# Patient Record
Sex: MALE | Employment: OTHER | URBAN - METROPOLITAN AREA
[De-identification: names, ages, dates, MRNs, and addresses within clinical notes are randomized per-mention and may not be internally consistent; named-entity substitution may affect disease eponyms.]

---

## 2017-08-02 ENCOUNTER — TRANSFERRED RECORDS (OUTPATIENT)
Dept: HEALTH INFORMATION MANAGEMENT | Facility: CLINIC | Age: 77
End: 2017-08-02

## 2018-12-13 ENCOUNTER — TRANSFERRED RECORDS (OUTPATIENT)
Dept: HEALTH INFORMATION MANAGEMENT | Facility: CLINIC | Age: 78
End: 2018-12-13

## 2018-12-13 ENCOUNTER — MEDICAL CORRESPONDENCE (OUTPATIENT)
Dept: HEALTH INFORMATION MANAGEMENT | Facility: CLINIC | Age: 78
End: 2018-12-13

## 2018-12-21 ENCOUNTER — ANCILLARY PROCEDURE (OUTPATIENT)
Dept: PET IMAGING | Facility: CLINIC | Age: 78
End: 2018-12-21
Attending: UROLOGY
Payer: COMMERCIAL

## 2018-12-21 DIAGNOSIS — C61 MALIGNANT NEOPLASM OF PROSTATE (H): ICD-10-CM

## 2022-06-28 ENCOUNTER — APPOINTMENT (OUTPATIENT)
Dept: CT IMAGING | Facility: CLINIC | Age: 82
DRG: 884 | End: 2022-06-28
Attending: EMERGENCY MEDICINE
Payer: MEDICARE

## 2022-06-28 ENCOUNTER — HOSPITAL ENCOUNTER (INPATIENT)
Facility: CLINIC | Age: 82
LOS: 35 days | Discharge: HOME HEALTH | DRG: 884 | End: 2022-08-02
Attending: EMERGENCY MEDICINE | Admitting: INTERNAL MEDICINE
Payer: MEDICARE

## 2022-06-28 DIAGNOSIS — Z20.822 COVID-19 RULED OUT BY LABORATORY TESTING: ICD-10-CM

## 2022-06-28 DIAGNOSIS — G89.29 OTHER CHRONIC PAIN: Primary | ICD-10-CM

## 2022-06-28 DIAGNOSIS — F29 PSYCHOSIS, UNSPECIFIED PSYCHOSIS TYPE (H): ICD-10-CM

## 2022-06-28 DIAGNOSIS — F03.91 DEMENTIA WITH BEHAVIORAL DISTURBANCE, UNSPECIFIED DEMENTIA TYPE: ICD-10-CM

## 2022-06-28 DIAGNOSIS — I50.22 CHRONIC SYSTOLIC CONGESTIVE HEART FAILURE (H): ICD-10-CM

## 2022-06-28 LAB
ALBUMIN SERPL BCG-MCNC: 3.9 G/DL (ref 3.5–5.2)
ALBUMIN UR-MCNC: NEGATIVE MG/DL
ALP SERPL-CCNC: 113 U/L (ref 40–129)
ALT SERPL W P-5'-P-CCNC: 14 U/L (ref 10–50)
ANION GAP SERPL CALCULATED.3IONS-SCNC: 11 MMOL/L (ref 7–15)
APPEARANCE UR: ABNORMAL
AST SERPL W P-5'-P-CCNC: 29 U/L (ref 10–50)
BASOPHILS # BLD AUTO: 0 10E3/UL (ref 0–0.2)
BASOPHILS NFR BLD AUTO: 1 %
BILIRUB SERPL-MCNC: 0.6 MG/DL
BILIRUB UR QL STRIP: NEGATIVE
BUN SERPL-MCNC: 32.1 MG/DL (ref 8–23)
CALCIUM SERPL-MCNC: 9.9 MG/DL (ref 8.8–10.2)
CHLORIDE SERPL-SCNC: 98 MMOL/L (ref 98–107)
COLOR UR AUTO: ABNORMAL
CREAT SERPL-MCNC: 1.08 MG/DL (ref 0.67–1.17)
DEPRECATED HCO3 PLAS-SCNC: 28 MMOL/L (ref 22–29)
EOSINOPHIL # BLD AUTO: 0.1 10E3/UL (ref 0–0.7)
EOSINOPHIL NFR BLD AUTO: 2 %
ERYTHROCYTE [DISTWIDTH] IN BLOOD BY AUTOMATED COUNT: 15.7 % (ref 10–15)
GFR SERPL CREATININE-BSD FRML MDRD: 69 ML/MIN/1.73M2
GLUCOSE SERPL-MCNC: 149 MG/DL (ref 70–99)
GLUCOSE UR STRIP-MCNC: NEGATIVE MG/DL
HCT VFR BLD AUTO: 42 % (ref 40–53)
HGB BLD-MCNC: 13.1 G/DL (ref 13.3–17.7)
HGB UR QL STRIP: ABNORMAL
HYALINE CASTS: 1 /LPF
IMM GRANULOCYTES # BLD: 0 10E3/UL
IMM GRANULOCYTES NFR BLD: 0 %
KETONES UR STRIP-MCNC: NEGATIVE MG/DL
LEUKOCYTE ESTERASE UR QL STRIP: ABNORMAL
LYMPHOCYTES # BLD AUTO: 0.8 10E3/UL (ref 0.8–5.3)
LYMPHOCYTES NFR BLD AUTO: 20 %
MCH RBC QN AUTO: 28.4 PG (ref 26.5–33)
MCHC RBC AUTO-ENTMCNC: 31.2 G/DL (ref 31.5–36.5)
MCV RBC AUTO: 91 FL (ref 78–100)
MONOCYTES # BLD AUTO: 0.5 10E3/UL (ref 0–1.3)
MONOCYTES NFR BLD AUTO: 12 %
MUCOUS THREADS #/AREA URNS LPF: PRESENT /LPF
NEUTROPHILS # BLD AUTO: 2.5 10E3/UL (ref 1.6–8.3)
NEUTROPHILS NFR BLD AUTO: 65 %
NITRATE UR QL: POSITIVE
NRBC # BLD AUTO: 0 10E3/UL
NRBC BLD AUTO-RTO: 0 /100
PH UR STRIP: 5.5 [PH] (ref 5–7)
PLATELET # BLD AUTO: 187 10E3/UL (ref 150–450)
POTASSIUM SERPL-SCNC: 3.8 MMOL/L (ref 3.4–5.3)
PROT SERPL-MCNC: 7 G/DL (ref 6.4–8.3)
RADIOLOGIST FLAGS: ABNORMAL
RBC # BLD AUTO: 4.61 10E6/UL (ref 4.4–5.9)
RBC URINE: 1 /HPF
SARS-COV-2 RNA RESP QL NAA+PROBE: NEGATIVE
SODIUM SERPL-SCNC: 137 MMOL/L (ref 136–145)
SP GR UR STRIP: 1.01 (ref 1–1.03)
UROBILINOGEN UR STRIP-MCNC: NORMAL MG/DL
WBC # BLD AUTO: 3.8 10E3/UL (ref 4–11)
WBC URINE: 12 /HPF

## 2022-06-28 PROCEDURE — 120N000002 HC R&B MED SURG/OB UMMC

## 2022-06-28 PROCEDURE — 87086 URINE CULTURE/COLONY COUNT: CPT | Performed by: EMERGENCY MEDICINE

## 2022-06-28 PROCEDURE — U0003 INFECTIOUS AGENT DETECTION BY NUCLEIC ACID (DNA OR RNA); SEVERE ACUTE RESPIRATORY SYNDROME CORONAVIRUS 2 (SARS-COV-2) (CORONAVIRUS DISEASE [COVID-19]), AMPLIFIED PROBE TECHNIQUE, MAKING USE OF HIGH THROUGHPUT TECHNOLOGIES AS DESCRIBED BY CMS-2020-01-R: HCPCS | Performed by: EMERGENCY MEDICINE

## 2022-06-28 PROCEDURE — 258N000003 HC RX IP 258 OP 636: Performed by: EMERGENCY MEDICINE

## 2022-06-28 PROCEDURE — 70450 CT HEAD/BRAIN W/O DYE: CPT | Mod: 26 | Performed by: RADIOLOGY

## 2022-06-28 PROCEDURE — 81001 URINALYSIS AUTO W/SCOPE: CPT | Performed by: EMERGENCY MEDICINE

## 2022-06-28 PROCEDURE — C9803 HOPD COVID-19 SPEC COLLECT: HCPCS | Performed by: EMERGENCY MEDICINE

## 2022-06-28 PROCEDURE — 99285 EMERGENCY DEPT VISIT HI MDM: CPT | Mod: 25 | Performed by: EMERGENCY MEDICINE

## 2022-06-28 PROCEDURE — 99285 EMERGENCY DEPT VISIT HI MDM: CPT | Performed by: EMERGENCY MEDICINE

## 2022-06-28 PROCEDURE — 80053 COMPREHEN METABOLIC PANEL: CPT | Performed by: EMERGENCY MEDICINE

## 2022-06-28 PROCEDURE — G1010 CDSM STANSON: HCPCS

## 2022-06-28 PROCEDURE — G1010 CDSM STANSON: HCPCS | Mod: GC | Performed by: RADIOLOGY

## 2022-06-28 PROCEDURE — 96365 THER/PROPH/DIAG IV INF INIT: CPT | Performed by: EMERGENCY MEDICINE

## 2022-06-28 PROCEDURE — 36415 COLL VENOUS BLD VENIPUNCTURE: CPT | Performed by: EMERGENCY MEDICINE

## 2022-06-28 PROCEDURE — 85025 COMPLETE CBC W/AUTO DIFF WBC: CPT | Performed by: EMERGENCY MEDICINE

## 2022-06-28 PROCEDURE — 96361 HYDRATE IV INFUSION ADD-ON: CPT | Performed by: EMERGENCY MEDICINE

## 2022-06-28 RX ORDER — QUETIAPINE FUMARATE 25 MG/1
25 TABLET, FILM COATED ORAL 2 TIMES DAILY
Status: DISCONTINUED | OUTPATIENT
Start: 2022-06-28 | End: 2022-06-28 | Stop reason: CLARIF

## 2022-06-28 RX ORDER — SODIUM CHLORIDE 9 MG/ML
INJECTION, SOLUTION INTRAVENOUS CONTINUOUS
Status: DISCONTINUED | OUTPATIENT
Start: 2022-06-28 | End: 2022-06-29

## 2022-06-28 RX ADMIN — SODIUM CHLORIDE: 9 INJECTION, SOLUTION INTRAVENOUS at 18:50

## 2022-06-28 ASSESSMENT — ENCOUNTER SYMPTOMS
HALLUCINATIONS: 1
SHORTNESS OF BREATH: 0
CONFUSION: 1
FEVER: 0
ABDOMINAL PAIN: 0

## 2022-06-28 ASSESSMENT — ACTIVITIES OF DAILY LIVING (ADL)
ADLS_ACUITY_SCORE: 35
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
## 2022-06-28 NOTE — ED PROVIDER NOTES
"    [place] EMERGENCY DEPARTMENT ([facility])  6/28/22  History     Chief Complaint   Patient presents with     Mental Health Problem     The history is provided by the patient and medical records.     William Almazan is a 81 year old male with a past medical history significant for paroxysmal atrial fibrillation (anticoagulated on Eliquis), CHF, mixed hyperlipidemia, essential hypertension, gout, type 2 diabetes mellitus, and dementia who presents to the Emergency Department due to auditory hallucinations.  Patient reports he has been hearing voices ever since he had asked his son unexpectedly on 11/2020.  He states that this loss was very difficult for him to process and feels he is still dealing with the emotional aspects of this traumatic event.  Patient's wife reports the voices have become more commanding over the past week.  The wife reports that within the last week the patient was found on the floor one morning because the voices \"told the  to jump to the floor\".  He has been has also reportedly cut an appliance cable in the kitchen due to this commanding hallucinations.  The following day, the patient attempted to get into the car and drive off, however, the  has not been driving for the last 11-12 months.  The wife also reported the voices have told the patient to harm himself.    Past Medical History  No past medical history on file.  No past surgical history on file.  No current outpatient medications on file.    No Known Allergies  Family History  No family history on file.  Social History       Past medical history, past surgical history, medications, allergies, family history, and social history were reviewed with the patient. No additional pertinent items.     I have reviewed the Medications, Allergies, Past Medical and Surgical History, and Social History in the Epic system.    Review of Systems   Constitutional: Negative for fever.   Respiratory: Negative for shortness of " "breath.    Cardiovascular: Negative for chest pain.   Gastrointestinal: Negative for abdominal pain.   Psychiatric/Behavioral: Positive for behavioral problems, confusion and hallucinations (Auditory). Negative for suicidal ideas.   All other systems reviewed and are negative.    Physical Exam   BP: (!) 124/97  Pulse: 69  Temp: 97.9  F (36.6  C)  Resp: 12  Height: 175.3 cm (5' 9\")  Weight: 78.2 kg (172 lb 8 oz)  SpO2: 99 %      Physical Exam  Vitals and nursing note reviewed.   Constitutional:       General: He is not in acute distress.  HENT:      Head: Atraumatic.      Mouth/Throat:      Mouth: Mucous membranes are moist.   Eyes:      Extraocular Movements: Extraocular movements intact.      Pupils: Pupils are equal, round, and reactive to light.   Cardiovascular:      Rate and Rhythm: Normal rate and regular rhythm.   Pulmonary:      Effort: Pulmonary effort is normal.      Breath sounds: Normal breath sounds.   Neurological:      Mental Status: He is alert.   Psychiatric:         Attention and Perception: Attention normal.         Mood and Affect: Mood normal.         Speech: Speech normal.         Behavior: Behavior normal.         Thought Content: Thought content is delusional.         Cognition and Memory: Cognition is impaired. Memory is impaired. He exhibits impaired remote memory.         Judgment: Judgment is impulsive and inappropriate.         ED Course     At 2:58 PM the patient was seen and examined by Chele Thompson MD in Room ED11.     Procedures          Medications   cefTRIAXone (ROCEPHIN) 1 g vial to attach to  mL bag for ADULTS or NS 50 mL bag for PEDS (1 g Intravenous New Bag 7/2/22 0950)   allopurinol (ZYLOPRIM) tablet 300 mg (300 mg Oral Given 7/2/22 0950)   apixaban ANTICOAGULANT (ELIQUIS) tablet 5 mg (5 mg Oral Given 7/2/22 2036)   carvedilol (COREG) tablet 25 mg (25 mg Oral Given 7/2/22 0951)   Vitamin D3 (CHOLECALCIFEROL) tablet 25 mcg (25 mcg Oral Given 7/2/22 0949) "   loratadine (CLARITIN) tablet 10 mg (10 mg Oral Given 7/2/22 0950)   spironolactone (ALDACTONE) tablet 25 mg ( Oral Automatically Held 7/5/22 0800)   lidocaine 1 % 0.1-1 mL (has no administration in time range)   lidocaine (LMX4) cream (has no administration in time range)   sodium chloride (PF) 0.9% PF flush 3 mL (3 mLs Intracatheter Not Given 7/2/22 2217)   sodium chloride (PF) 0.9% PF flush 3 mL (has no administration in time range)   melatonin tablet 1 mg (1 mg Oral Given 7/2/22 0154)   risperiDONE (risperDAL) tablet 0.25 mg (0.25 mg Oral Given 7/2/22 0950)   risperiDONE (risperDAL) tablet 0.5 mg (0 mg Oral Hold 7/2/22 2113)   furosemide (LASIX) tablet 40 mg ( Oral Automatically Held 7/5/22 2000)   gabapentin (NEURONTIN) capsule 100 mg (100 mg Oral Given 7/2/22 2036)   acetaminophen (TYLENOL) tablet 1,000 mg (has no administration in time range)        Assessments & Plan (with Medical Decision Making)   81 year old gentleman brought in by his wife and daughter for evaluation. He has a history of dementia that was diagnosed about 6 months ago.  Within the last couple weeks he has begun to have auditory hallucinations which he is acting on.  Some of these have been dangerous.  They do not feel comfortable taking him home.  They called the facilities that I am aware of that have geriatric psych units that take dementia patients and there was no bed available.  They expressed they do not feel comfortable taking him home because of the danger and they cannot watch him 24/7.  After discussion with observation and the  I have no other option but to bring him in for psych consult and perhaps medication will help suppress this to the point where he can either go home or will be able to be transferred to a higher level of care.  In the meantime we will check routine labs and a urinalysis.    This part of the medical record was transcribed by Brianne Centeno Medical Scribe, from a dictation done by Chele  Valentín Thompson MD.     I have reviewed the nursing notes.    I have reviewed the findings, diagnosis, plan and need for follow up with the patient.    Current Discharge Medication List          Final diagnoses:   Dementia with behavioral disturbance, unspecified dementia type (H)   Psychosis, unspecified psychosis type (H)       ILouis am serving as a trained medical scribe to document services personally performed by Chele Thompson MD, based on the provider's statements to me.      I, Chele Thompson MD, was physically present and have reviewed and verified the accuracy of this note documented by Louis Alejo.     Chele Thompson MD  6/28/2022   [facility] EMERGENCY DEPARTMENT     Chele Thompson MD  07/02/22 8496

## 2022-06-28 NOTE — LETTER
Transition Communication Hand-off for Care Transitions to Next Level of Care Provider    Name: William Almazan  : 1940  MRN #: [id number]  Primary Care Provider: Victoriano Powers     Primary Clinic: [facility] [address]  [place] [address]     Reason for Hospitalization:  Psychosis, unspecified psychosis type (H) [F29]  Dementia with behavioral disturbance, unspecified dementia type (H) [F03.91]  Admit Date/Time: 2022  2:10 PM  Discharge Date: 22  Payor Source: Payor: MEDICARE / Plan: MEDICARE / Product Type: Medicare /          Reason for Communication Hand-off Referral: Other Elevated Risk Score   Discharge Plan: Patient will discharge home with home care, caregivers hired by family and support from family members  Concern for non-adherence with plan of care: No    Discharge Needs Assessment:  Needs    Flowsheet Row Most Recent Value   Equipment Currently Used at Home cane, straight        Already enrolled in Tele-monitoring program and name of program:  Unknown  Follow-up specialty is recommended: Yes , see below    Follow-up plan:    Future Appointments   Date Time Provider Department Center   11/3/2022  2:00 PM Jake Ordonez MD [facility]     Any outstanding tests or procedures:  No      Referrals     Future Labs/Procedures    Home Care Referral     Comments:    A referral has been made to [facility]- They will call you to schedule an appointment. If you have any questions, please call [phone], choose option #1 for Home Health, then option #1 for scheduling             KAHLIL De Luna, LGSW  8A and 10 ICU   [facility]   Phone: [phone]      AVS/Discharge Summary is the source of truth; this is a helpful guide for improved communication of patient story

## 2022-06-29 PROCEDURE — 250N000013 HC RX MED GY IP 250 OP 250 PS 637: Performed by: INTERNAL MEDICINE

## 2022-06-29 PROCEDURE — 120N000002 HC R&B MED SURG/OB UMMC

## 2022-06-29 PROCEDURE — 96366 THER/PROPH/DIAG IV INF ADDON: CPT | Performed by: EMERGENCY MEDICINE

## 2022-06-29 PROCEDURE — 99222 1ST HOSP IP/OBS MODERATE 55: CPT | Mod: AI | Performed by: PHYSICIAN ASSISTANT

## 2022-06-29 PROCEDURE — 90791 PSYCH DIAGNOSTIC EVALUATION: CPT

## 2022-06-29 PROCEDURE — 250N000011 HC RX IP 250 OP 636: Performed by: INTERNAL MEDICINE

## 2022-06-29 RX ORDER — ALLOPURINOL 300 MG/1
1 TABLET ORAL DAILY
COMMUNITY
Start: 2022-01-05

## 2022-06-29 RX ORDER — VITAMIN B COMPLEX
25 TABLET ORAL DAILY
Status: DISCONTINUED | OUTPATIENT
Start: 2022-06-29 | End: 2022-08-02 | Stop reason: HOSPADM

## 2022-06-29 RX ORDER — AMOXICILLIN 500 MG/1
1 CAPSULE ORAL PRN
COMMUNITY

## 2022-06-29 RX ORDER — SPIRONOLACTONE 25 MG/1
1 TABLET ORAL DAILY
COMMUNITY
Start: 2021-10-12

## 2022-06-29 RX ORDER — ATORVASTATIN CALCIUM 80 MG/1
1 TABLET, FILM COATED ORAL DAILY
COMMUNITY
Start: 2020-09-14 | End: 2022-06-29

## 2022-06-29 RX ORDER — CEFTRIAXONE 1 G/1
1 INJECTION, POWDER, FOR SOLUTION INTRAMUSCULAR; INTRAVENOUS EVERY 24 HOURS
Status: DISCONTINUED | OUTPATIENT
Start: 2022-06-29 | End: 2022-07-04

## 2022-06-29 RX ORDER — ALLOPURINOL 300 MG/1
300 TABLET ORAL DAILY
Status: DISCONTINUED | OUTPATIENT
Start: 2022-06-29 | End: 2022-08-02 | Stop reason: HOSPADM

## 2022-06-29 RX ORDER — LIDOCAINE 40 MG/G
CREAM TOPICAL
Status: DISCONTINUED | OUTPATIENT
Start: 2022-06-29 | End: 2022-08-02 | Stop reason: HOSPADM

## 2022-06-29 RX ORDER — FUROSEMIDE 20 MG
80 TABLET ORAL 2 TIMES DAILY
Status: DISCONTINUED | OUTPATIENT
Start: 2022-06-29 | End: 2022-07-02

## 2022-06-29 RX ORDER — LORATADINE 10 MG/1
1 TABLET ORAL DAILY
COMMUNITY

## 2022-06-29 RX ORDER — CARVEDILOL 25 MG/1
25 TABLET ORAL 2 TIMES DAILY
Status: DISCONTINUED | OUTPATIENT
Start: 2022-06-29 | End: 2022-07-03

## 2022-06-29 RX ORDER — LORATADINE 10 MG/1
10 TABLET ORAL DAILY
Status: DISCONTINUED | OUTPATIENT
Start: 2022-06-29 | End: 2022-08-02 | Stop reason: HOSPADM

## 2022-06-29 RX ORDER — SPIRONOLACTONE 25 MG/1
25 TABLET ORAL DAILY
Status: DISCONTINUED | OUTPATIENT
Start: 2022-06-29 | End: 2022-08-02 | Stop reason: HOSPADM

## 2022-06-29 RX ORDER — ATORVASTATIN CALCIUM 40 MG/1
80 TABLET, FILM COATED ORAL DAILY
Status: DISCONTINUED | OUTPATIENT
Start: 2022-06-29 | End: 2022-06-29

## 2022-06-29 RX ORDER — CARVEDILOL 25 MG/1
25 TABLET ORAL 2 TIMES DAILY
Status: ON HOLD | COMMUNITY
Start: 2021-12-29 | End: 2022-08-23

## 2022-06-29 RX ORDER — MULTIPLE VITAMINS W/ MINERALS TAB 9MG-400MCG
1 TAB ORAL DAILY
COMMUNITY
End: 2022-08-30

## 2022-06-29 RX ORDER — FUROSEMIDE 80 MG
80 TABLET ORAL 2 TIMES DAILY
Status: ON HOLD | COMMUNITY
Start: 2021-09-18 | End: 2022-08-02

## 2022-06-29 RX ORDER — LANOLIN ALCOHOL/MO/W.PET/CERES
1000 CREAM (GRAM) TOPICAL DAILY
COMMUNITY

## 2022-06-29 RX ADMIN — CEFTRIAXONE SODIUM 1 G: 1 INJECTION, POWDER, FOR SOLUTION INTRAMUSCULAR; INTRAVENOUS at 12:29

## 2022-06-29 RX ADMIN — CARVEDILOL 25 MG: 25 TABLET, FILM COATED ORAL at 20:05

## 2022-06-29 RX ADMIN — APIXABAN 5 MG: 5 TABLET, FILM COATED ORAL at 20:05

## 2022-06-29 RX ADMIN — LORATADINE 10 MG: 10 TABLET ORAL at 13:10

## 2022-06-29 RX ADMIN — APIXABAN 5 MG: 5 TABLET, FILM COATED ORAL at 13:10

## 2022-06-29 RX ADMIN — ALLOPURINOL 300 MG: 300 TABLET ORAL at 12:32

## 2022-06-29 RX ADMIN — Medication 25 MCG: at 13:10

## 2022-06-29 RX ADMIN — CARVEDILOL 25 MG: 25 TABLET, FILM COATED ORAL at 12:32

## 2022-06-29 ASSESSMENT — ACTIVITIES OF DAILY LIVING (ADL)
ADLS_ACUITY_SCORE: 36
ADLS_ACUITY_SCORE: 35
ADLS_ACUITY_SCORE: 36
ADLS_ACUITY_SCORE: 35
ADLS_ACUITY_SCORE: 36
ADLS_ACUITY_SCORE: 35
ADLS_ACUITY_SCORE: 36
ADLS_ACUITY_SCORE: 35
ADLS_ACUITY_SCORE: 35

## 2022-06-29 NOTE — PROGRESS NOTES
"Writer was informed by 1:1 that patient has a leg bag for urine and that the bag is getting full, 1:1 states that she offered to assist patient with emptying bag, patient refused.   Writer and charge RN also offered to assist patient with emptying bag. Patient again telling staff to \"get out\". Patient stated that he would empty the urine bag himself.   Continuing to monitor patient. 1:1 remains outside patient's room.   "

## 2022-06-29 NOTE — PROGRESS NOTES
"Patient requested water, was given cup with small amount of water and knocked it out of 1:1's hand. Patient told writer and 1:1 to, \"get out\". 1:1 continues to be outside of room monitoring patient.     "

## 2022-06-29 NOTE — CONSULTS
Diagnostic Evaluation Consultation  Crisis Assessment    Patient was assessed: remote  Patient location: [place]  Was a release of information signed: No. Reason: declined      Referral Data and Chief Complaint  Patient is a 81 year old, who uses he/him pronouns, and presents to the ED with family/friends. Patient is referred to the ED by self. Patient is presenting to the ED for the following concerns: onset of hallucinations that are in the form of commands and at time physical towards self. .      Informed Consent and Assessment Methods     Patient is his own guardian. Writer met with patient and explained the crisis assessment process, including applicable information disclosures and limits to confidentiality, assessed understanding of the process, and obtained consent to proceed with the assessment. Patient was observed to be able to participate in the assessment as evidenced by his ability to verbally confirm. Assessment methods included conducting a formal interview with patient, review of medical records, collaboration with medical staff, and obtaining relevant collateral information from family and community providers when available..     Over the course of this crisis assessment provided reassurance, offered validation, engaged patient in problem solving and disposition planning and provided psychoeducation. Patient's response to interventions was positive     Summary of Patient Situation    Patient and family report a decline in patient's overall cognitive functioning over the past 1-2 years, with the past several weeks to month being extremely difficult due to patient experiencing command hallucinations in the form of destruction of technology/cords, as well as physically throwing himself on the floor to be compliant with commands. Epic chart also indicates potential dementia as contributing factor historically and presently, with patient being compliant and polite throughout the assessment  process.    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 Patient states that he is well supported at home, and cites his anabel as being an important component of his daily routine. No other information was offered by patient or family at this time.     Significant Clinical History    Patient denies having any significant MI or CD features historically, nor has he experienced any prior treatments, per his own report.     Collateral Information    The following information was received from wife and two adult children via phone and joint video interview. All family members report a decline in patient's cognitive functioning and memory, as well as increasingly becoming unsafe due to being unable to resist command hallucinations that have been present for approximately several weeks.     What happened today: Patient was unable at times to be redirected while experiencing command hallucinations, and when able to communicate, indicated that he did not feel safe.     What is different about patient's functioning: Decreased cognitive functioning, increased risk, and fear on behalf of family and their ability to safely manage.    Concern about alcohol/drug use: No    What do you think the patient needs: Help with hallucinations    Has patient made comments about wanting to kill themselves/others:  No    If d/c is recommended, can they take part in safety/aftercare planning: Yes wife and children    Risk Assessment     ESS-6  1.a. Over the past 2 weeks, have you had thoughts of killing yourself? No  1.b. Have you ever attempted to kill yourself and, if yes, when did this last happen? No   2. Recent or current suicide plan? No   3. Recent or current intent to act on ideation? No  4. Lifetime psychiatric hospitalization? No  5. Pattern of excessive substance use? No  6. Current irritability, agitation, or aggression? Yes  Scoring note: BOTH 1a and 1b must be yes for it  to score 1 point, if both are not yes it is zero. All others are 1 point per number. If all questions 1a/1b - 6 are no, risk is negligible. If one of 1a/1b is yes, then risk is mild. If either question 2 or 3, but not both, is yes, then risk is automatically moderate regardless of total score. If both 2 and 3 are yes, risk is automatically high regardless of total score.      Score: 1, mild risk      Does the patient have access to lethal means? No     Does the patient engage in non-suicidal self-injurious behavior (NSSI/SIB)? no     Does the patient have thoughts of harming others? No     Is the patient engaging in sexually inappropriate behavior?  no        Current Substance Abuse     Is there recent substance abuse? no     Was a urine drug screen or blood alcohol level obtained: Yes no concerns       Mental Status Exam     Affect: Appropriate   Appearance: Appropriate    Attention Span/Concentration: Attentive?    Eye Contact: Engaged   Fund of Knowledge: Appropriate    Language /Speech Content: Fluent   Language /Speech Volume: Soft    Language /Speech Rate/Productions: Articulate    Recent Memory: Variable   Remote Memory: Variable   Mood: Irritable and Normal    Orientation to Person: Yes    Orientation to Place: Yes   Orientation to Time of Day: Yes    Orientation to Date: Yes    Situation (Do they understand why they are here?): Yes    Psychomotor Behavior: Normal    Thought Content: Clear   Thought Form: Intact      History of commitment: No     Mini-Cog Assessment  Instructions:  1. Ask the patient to listen carefully and remember three unrelated words (ex. Table, apple, peyton).  2. Ask the patient to draw a clock: first the Lime, then the numbers, then the hands at a specific time (ex. 11:10am).  3. Ask the patient to repeat the three words.    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 Patient does not offer any immediate and acute risk to self/others at this time, so inpatient has been ruled out. The most recent note in the the chart from Dr. Leyva indicates patient will not be admitted to psych unit, remain in the ED with a trial of risperdal to be introduced to gauge impact. It is unclear how long patient will remain in the ED, but as recommended in my original ED, if patient is not responsive to medicine in the ED or after being medically admitted, a psych admit can still be considered for stabilization purposes.   Disposition    Recommended disposition: Medical Admission: as indicated orignally in EPIC chart       Reviewed case and recommendations with attending providers were attempted, but DEC was told by [facility] that Dr. Falk and Arley were no longer able to be reached as as patient was moved out of [place] ED due to pending medical admit.        Attending concurs with disposition: Yes       Patient concurs with disposition: Yes       Guardian concurs with disposition: NA      Final disposition: Medical admission: anticipated, given the progression on consults/notes in medical chart .     Assessment Details    Patient interview started at: 430pm and completed at: 530pm     Total duration spent on the patient case in minutes: 2.0 hrs       CPT code(s) utilized: 41240 - Psychotherapy (with patient) - 60 (53+*) min       Valentín Ferrer, LICSW, MSW, LICSW  DEC - Triage & Transition Services

## 2022-06-29 NOTE — PHARMACY-ADMISSION MEDICATION HISTORY
Admission Medication History Completed by Pharmacy    See [facility] Admission Navigator for allergy information, preferred outpatient pharmacy, prior to admission medications and immunization status.     Medication History Sources:     Fill history, interview with patient wife/daughter    Changes made to PTA medication list (reason):    Added: B12    Deleted: atorvastatin    Changed: None    Additional Information:    None    Prior to Admission medications    Medication Sig Last Dose Taking? Auth Provider Long Term End Date   allopurinol (ZYLOPRIM) 300 MG tablet Take 1 tablet by mouth daily 6/28/2022 at Unknown time Yes Reported, Patient     amoxicillin (AMOXIL) 500 MG capsule Take 1 capsule by mouth as needed PRN for dental procedures Unknown at Unknown time  Reported, Patient     apixaban ANTICOAGULANT (ELIQUIS) 5 MG tablet Take 5 mg by mouth 2 times daily 6/28/2022 at Unknown time Yes Reported, Patient     carvedilol (COREG) 25 MG tablet Take 25 mg by mouth 2 times daily 6/28/2022 at Unknown time Yes Reported, Patient Yes 12/29/22   cholecalciferol (VITAMIN D3) 25 mcg (1000 units) capsule Take 1,000 Units by mouth daily 6/28/2022 at Unknown time Yes Reported, Patient     cyanocobalamin (VITAMIN B-12) 1000 MCG tablet Take 1,000 mcg by mouth daily 6/28/2022 at Unknown time Yes Unknown, Entered By History     furosemide (LASIX) 80 MG tablet Take 80 mg by mouth 2 times daily 6/28/2022 at Unknown time Yes Reported, Patient Yes    loratadine 10 MG capsule Take 1 capsule by mouth daily 6/28/2022 at Unknown time Yes Reported, Patient     multivitamin w/minerals (THERA-VIT-M) tablet Take 1 tablet by mouth daily 6/28/2022 at Unknown time Yes Unknown, Entered By History     spironolactone (ALDACTONE) 25 MG tablet Take 1 tablet by mouth daily 6/28/2022 at Unknown time Yes Reported, Patient Yes        Date completed: 06/29/22    Medication history completed by: April Rose RPH

## 2022-06-29 NOTE — CONSULTS
The patient and his daughter were interviewed and chart reviewed.   With the command hallucinations an antipsychotic is clearly indicated, but the family is understandably ambivalent about using one.   I recommend starting with Risperdal 0.25 mg HS if he will try it.Will probably need 0.5 mg HS and perhaps 0.25 mg during the day  I does not meet criteria for admission to inpatient psychiatry (plus, there are no beds; waiting list is as least # 10)   However, due to his command hallucinations telling him to throw himself on the floor he will continue to need a sitter at least until we see how responds to medications.   I will complete the consult tomorrow.     Aryan Leyva M.D.   [facility]   Contact information available via [facility] Paging/Directory  If I am not available, then [facility] CL line ([phone]) should know who   Is covering our consult service.

## 2022-06-29 NOTE — PROGRESS NOTES
Pt resting on cart, writer introduced self, offered blankets and pillows, patient denies need, will update staff when/if needed. 1:1 seated outside of room.     Per 1:1 sitter patient wants to be left alone.

## 2022-06-29 NOTE — ED NOTES
DEC attempted to consult with Dr. Falk and Ovidio Tubbs, but was told patient is already slotted for admission at [facility] with unknown hospitalist and therefore these providers are no longer available.     DEC assessment will be completed in [facility] shortly, with recommendation for psych admit if sx are not resolved on medical unit, or if deemed to still be necessary in the coming days.

## 2022-06-29 NOTE — ED NOTES
Pt. Threw glass of water and demanding everyone leaves his room. Pt. Refuses to verbally de-escalate. Dr. Wyatt notified.

## 2022-06-29 NOTE — H&P
[facility]    History and Physical - Hospitalist Service, GOLD TEAM 17       Date of Admission:  6/28/2022      A & P:  William Almazan is a 81 year old male with history of CAD s/p CABG, dilated cardiomyopathy, HFrEF, PAF on eliquis, HTN, HLD, DM2, KRISTIAN, prostate cancer, gout, and dementia who presents with worsening auditory hallucinations.      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 Present since ~2020 following death of son. Denies worsening depression or anxiety. No visual or tactile hallucinations. Less likely metabolic encephalopathy from UTI. Family not able to manage at home.   - Consult psychiatry, ? Appropriate for inpatient psychiatry  - Continue 1:1 sitter for now, unclear if necessary  - Currently no agitation, therefore will hold off on antipsychotics  - Will need PT/OT consults once bedded to evaluate dispo needs    Possible UTI:    Chronic indwelling keating catheter. UA grossly abnormal. ? Contaminated specimen vs true UTI. UC pending. Afebrile. WBC normal.  - Continue IV ceftriaxone 1g daily  - Follow up UC results    Chronic HFrEF:    Hx dilated cardiomyopathy. Most recent TTE 6/18/21 showing severely dilated LV with EF 20%. Currently euvolemic on exam. No hypoxia. No pulmonary complaints. Trace LE edema.   - Hold diuretics until taking adequate PO (concern for volume depletion)  - Daily weights, I&O's    CAD s/p CABG (1992):    Not ASA or statin at home. No acute concerns.   - Coreg 25mg BID    HTN:    - Coreg 25mg BID    PAF:    Regular on exam. On chronic AC with eliquis. No acute concerns.  - Eliquis 5mg BID    DM2:    Diet controlled. Glucose stable.   - Monitor    KRISTIAN:    Does not use CPAP at home.  - Monitor    Prostate CA:    Treated with XRT and hormone therapy. Hx urinary retention with chronic indwelling keating.    Gout:    -  Continue PTA allopurinol         Diet: Regular Diet Adult    DVT Prophylaxis: Ambulate every shift  Keating Catheter: Not present  Central Lines: None  Cardiac Monitoring: None  Code Status:   FULL    Clinically Significant Risk Factors Present on Admission               # Coagulation Defect: home medication list includes an anticoagulant medication               The patient's care was discussed with the Attending Physician, Dr. Falk.    Ovidio Tubbs PA-C  Hospitalist Service, [address]  Securely message with the Vocera Web Console (learn more here)  Text page via [facility] Paging/Directory   Please see signed in provider for up to date coverage information      ______________________________________________________________________    Chief Complaint   Auditory hallucinations    History is obtained from the patient and ED provider notes    History of Present Illness   William Almazan is a 81 year old male with dementia, HFrEF, CAD s/p CABG, PAF, HTN, DM2, KRISTIAN, and prostate cancer who was brought to ED by family for evaluation of worsening auditory hallucinations. Patient notes hallucinations since 2020 when his son passed away. He notes increased frequency of hallucinations more recently. Some of these are command hallucinations telling him to harm himself. He denies worsening depression or suicidal ideation. Patient recently threw himself to the floor and was found down by family after hallucinations told him to do so. He suffered no injuries. He currently denies any complaints.     Evaluation in the ED revealed possible UTI. He was started on ceftriaxone. He has a chronic indwelling keating due to urinary retention. He was otherwise medically stable. Family did not feel that he was safe to return home.    Review of Systems    The 10 point Review of Systems is negative other than noted in the HPI or here.     Past Medical History    Gout  PAF on  eliquis  HTN  HLD  DM2 - currently diet controlled  HFrEF, echo 6/8/21 showed severe LV dilation and EF 20%  Hx NSVT s/p ICD  CAD s/p CABG in 1992  Prostate CA s/p XRT and hormone therapy  KRISTIAN, not on CPAP  Ascending aortic aneurysm, 4.0 cm    Past Surgical History   CABG  Knee arthroplasty    Social History   I have reviewed this patient's social history and updated it with pertinent information if needed.       Family History     Notable for heart disease and diabetes    Prior to Admission Medications   Prior to Admission Medications   Prescriptions Last Dose Informant Patient Reported? Taking?   allopurinol (ZYLOPRIM) 300 MG tablet 6/28/2022 at Unknown time Self Yes Yes   Sig: Take 1 tablet by mouth daily   amoxicillin (AMOXIL) 500 MG capsule Unknown at Unknown time Self Yes Yes   Sig: Take 1 capsule by mouth as needed PRN for dental procedures   apixaban ANTICOAGULANT (ELIQUIS) 5 MG tablet 6/28/2022 at Unknown time Self Yes Yes   Sig: Take 5 mg by mouth 2 times daily   carvedilol (COREG) 25 MG tablet 6/28/2022 at Unknown time Self Yes Yes   Sig: Take 25 mg by mouth 2 times daily   cholecalciferol (VITAMIN D3) 25 mcg (1000 units) capsule 6/28/2022 at Unknown time Self Yes Yes   Sig: Take 1,000 Units by mouth daily   cyanocobalamin (VITAMIN B-12) 1000 MCG tablet 6/28/2022 at Unknown time  Yes Yes   Sig: Take 1,000 mcg by mouth daily   furosemide (LASIX) 80 MG tablet 6/28/2022 at Unknown time Self Yes Yes   Sig: Take 80 mg by mouth 2 times daily   loratadine 10 MG capsule 6/28/2022 at Unknown time Self Yes Yes   Sig: Take 1 capsule by mouth daily   multivitamin w/minerals (THERA-VIT-M) tablet 6/28/2022 at Unknown time  Yes Yes   Sig: Take 1 tablet by mouth daily   spironolactone (ALDACTONE) 25 MG tablet 6/28/2022 at Unknown time Self Yes Yes   Sig: Take 1 tablet by mouth daily      Facility-Administered Medications: None     Allergies   No Known Allergies    Physical Exam   Vital Signs: Temp: 97.8  F (36.6  C) Temp  src: Oral BP: 130/76 Pulse: 77   Resp: 14 SpO2: 99 % O2 Device: None (Room air)    Weight: 0 lbs 0 oz    General Appearance:  elderly male, appears stated age. Awake. Alert. Oriented x3. NAD.   HEENT:  No scleral icterus. Moist mucous membranes.   Respiratory:  Normal effort. CTAB.   Cardiovascular:  RRR. S1,S2. No murmurs or gallops.   GI:  Soft, ND, NT, +BS.   Extremity:  No pitting edema. No calf tenderness.   Skin:  No visible rash. No jaundice.   Neuro:  Grossly non-focal.  Psych:  Appropriate mood and affect.      Data   Data reviewed today: I reviewed all medications, new labs and imaging results over the last 24 hours.    Recent Labs   Lab 06/28/22  1744      POTASSIUM 3.8   CHLORIDE 98   CO2 28   ANIONGAP 11   BUN 32.1*   CR 1.08   CRYS 9.9   PROTTOTAL 7.0   ALBUMIN 3.9   BILITOTAL 0.6   ALKPHOS 113   AST 29   ALT 14     Recent Labs   Lab 06/28/22  1744   WBC 3.8*   RBC 4.61   HGB 13.1*   HCT 42.0   MCV 91   MCH 28.4   MCHC 31.2*   RDW 15.7*        No lab results found in last 7 days.  No lab results found in last 7 days.   No lab results found in last 7 days.  No lab results found in last 7 days.  Recent Labs   Lab 06/28/22  1744   *        All labs personally reviewed in [facility].  See A&P for additional results.     Unresulted Labs Ordered in the Past 30 Days of this Admission     Date and Time Order Name Status Description    6/28/2022  6:58 PM Urine Culture Preliminary

## 2022-06-30 ENCOUNTER — TELEPHONE (OUTPATIENT)
Dept: BEHAVIORAL HEALTH | Facility: CLINIC | Age: 82
End: 2022-06-30

## 2022-06-30 LAB
ANION GAP SERPL CALCULATED.3IONS-SCNC: 10 MMOL/L (ref 7–15)
BACTERIA UR CULT: NORMAL
BUN SERPL-MCNC: 22.9 MG/DL (ref 8–23)
CALCIUM SERPL-MCNC: 9.4 MG/DL (ref 8.8–10.2)
CHLORIDE SERPL-SCNC: 103 MMOL/L (ref 98–107)
CREAT SERPL-MCNC: 0.89 MG/DL (ref 0.67–1.17)
DEPRECATED HCO3 PLAS-SCNC: 28 MMOL/L (ref 22–29)
ERYTHROCYTE [DISTWIDTH] IN BLOOD BY AUTOMATED COUNT: 15.9 % (ref 10–15)
GFR SERPL CREATININE-BSD FRML MDRD: 86 ML/MIN/1.73M2
GLUCOSE SERPL-MCNC: 135 MG/DL (ref 70–99)
HCT VFR BLD AUTO: 43.4 % (ref 40–53)
HGB BLD-MCNC: 13.2 G/DL (ref 13.3–17.7)
MCH RBC QN AUTO: 28.4 PG (ref 26.5–33)
MCHC RBC AUTO-ENTMCNC: 30.4 G/DL (ref 31.5–36.5)
MCV RBC AUTO: 93 FL (ref 78–100)
PLATELET # BLD AUTO: 198 10E3/UL (ref 150–450)
POTASSIUM SERPL-SCNC: 3.9 MMOL/L (ref 3.4–5.3)
RBC # BLD AUTO: 4.65 10E6/UL (ref 4.4–5.9)
SODIUM SERPL-SCNC: 141 MMOL/L (ref 136–145)
WBC # BLD AUTO: 4.9 10E3/UL (ref 4–11)

## 2022-06-30 PROCEDURE — 250N000013 HC RX MED GY IP 250 OP 250 PS 637: Performed by: INTERNAL MEDICINE

## 2022-06-30 PROCEDURE — 85027 COMPLETE CBC AUTOMATED: CPT | Performed by: PHYSICIAN ASSISTANT

## 2022-06-30 PROCEDURE — 36415 COLL VENOUS BLD VENIPUNCTURE: CPT | Performed by: PHYSICIAN ASSISTANT

## 2022-06-30 PROCEDURE — 250N000013 HC RX MED GY IP 250 OP 250 PS 637: Performed by: PSYCHIATRY & NEUROLOGY

## 2022-06-30 PROCEDURE — 99221 1ST HOSP IP/OBS SF/LOW 40: CPT | Performed by: PSYCHIATRY & NEUROLOGY

## 2022-06-30 PROCEDURE — 80048 BASIC METABOLIC PNL TOTAL CA: CPT | Performed by: PHYSICIAN ASSISTANT

## 2022-06-30 PROCEDURE — 120N000002 HC R&B MED SURG/OB UMMC

## 2022-06-30 PROCEDURE — 250N000011 HC RX IP 250 OP 636: Performed by: INTERNAL MEDICINE

## 2022-06-30 PROCEDURE — 99233 SBSQ HOSP IP/OBS HIGH 50: CPT | Performed by: STUDENT IN AN ORGANIZED HEALTH CARE EDUCATION/TRAINING PROGRAM

## 2022-06-30 RX ORDER — RISPERIDONE 0.25 MG/1
0.25 TABLET ORAL DAILY
Status: DISCONTINUED | OUTPATIENT
Start: 2022-06-30 | End: 2022-08-02 | Stop reason: HOSPADM

## 2022-06-30 RX ORDER — RISPERIDONE 0.5 MG/1
0.5 TABLET ORAL AT BEDTIME
Status: DISCONTINUED | OUTPATIENT
Start: 2022-06-30 | End: 2022-07-07

## 2022-06-30 RX ADMIN — RISPERIDONE 0.25 MG: 0.25 TABLET ORAL at 15:22

## 2022-06-30 RX ADMIN — Medication 25 MCG: at 09:07

## 2022-06-30 RX ADMIN — ALLOPURINOL 300 MG: 300 TABLET ORAL at 09:07

## 2022-06-30 RX ADMIN — LORATADINE 10 MG: 10 TABLET ORAL at 09:07

## 2022-06-30 RX ADMIN — CEFTRIAXONE SODIUM 1 G: 1 INJECTION, POWDER, FOR SOLUTION INTRAMUSCULAR; INTRAVENOUS at 09:08

## 2022-06-30 RX ADMIN — APIXABAN 5 MG: 5 TABLET, FILM COATED ORAL at 19:49

## 2022-06-30 RX ADMIN — RISPERIDONE 0.5 MG: 0.5 TABLET ORAL at 21:30

## 2022-06-30 RX ADMIN — APIXABAN 5 MG: 5 TABLET, FILM COATED ORAL at 09:07

## 2022-06-30 RX ADMIN — CARVEDILOL 25 MG: 25 TABLET, FILM COATED ORAL at 09:07

## 2022-06-30 RX ADMIN — CARVEDILOL 25 MG: 25 TABLET, FILM COATED ORAL at 19:49

## 2022-06-30 ASSESSMENT — ACTIVITIES OF DAILY LIVING (ADL)
DRESS: 1-->ASSISTANCE (EQUIPMENT/PERSON) NEEDED
TOILETING_ISSUES: YES
TOILETING: 1-->ASSISTANCE (EQUIPMENT/PERSON) NEEDED (NOT DEVELOPMENTALLY APPROPRIATE)
WALKING_OR_CLIMBING_STAIRS: AMBULATION DIFFICULTY, ASSISTANCE 1 PERSON;AMBULATION DIFFICULTY, REQUIRES EQUIPMENT
DOING_ERRANDS_INDEPENDENTLY_DIFFICULTY: NO
CHANGE_IN_FUNCTIONAL_STATUS_SINCE_ONSET_OF_CURRENT_ILLNESS/INJURY: YES
ADLS_ACUITY_SCORE: 41
CONCENTRATING,_REMEMBERING_OR_MAKING_DECISIONS_DIFFICULTY: YES
WEAR_GLASSES_OR_BLIND: YES
ADLS_ACUITY_SCORE: 41
ADLS_ACUITY_SCORE: 41
ADLS_ACUITY_SCORE: 36
DRESSING/BATHING: BATHING DIFFICULTY, ASSISTANCE 1 PERSON
ADLS_ACUITY_SCORE: 36
ADLS_ACUITY_SCORE: 36
DRESSING/BATHING_DIFFICULTY: YES
TRANSFERRING: 1-->ASSISTANCE (EQUIPMENT/PERSON) NEEDED (NOT DEVELOPMENTALLY APPROPRIATE)
TRANSFERRING: 1-->ASSISTANCE (EQUIPMENT/PERSON) NEEDED
ADLS_ACUITY_SCORE: 41
ADLS_ACUITY_SCORE: 41
FALL_HISTORY_WITHIN_LAST_SIX_MONTHS: YES
DIFFICULTY_EATING/SWALLOWING: NO
TOILETING: 1-->ASSISTANCE (EQUIPMENT/PERSON) NEEDED
DRESS: 1-->ASSISTANCE (EQUIPMENT/PERSON) NEEDED (NOT DEVELOPMENTALLY APPROPRIATE)
ADLS_ACUITY_SCORE: 41
EQUIPMENT_CURRENTLY_USED_AT_HOME: CANE, STRAIGHT
ADLS_ACUITY_SCORE: 36
ADLS_ACUITY_SCORE: 41
ADLS_ACUITY_SCORE: 36
WALKING_OR_CLIMBING_STAIRS_DIFFICULTY: YES
BATHING: 1-->ASSISTANCE NEEDED

## 2022-06-30 NOTE — PLAN OF CARE
Goal Outcome Evaluation:       Admitted/transferred around 1545    Pt A/OX4. Pt quiet with flat affect but calm and cooperative. On RA. Pt denies any pain, SI, and hallucinations. Pt Louie draining adequately. Pt ate dinner and drinking fluids. 1:1 sitter preset at bedside. Brief changed and pt cleaned. Pt has skin tears/ abrasions underneath belly flap and scrotum. Provider notified and WOC consult requested. Commode at bedside. Pt reports last BM today. Bp at 165/80 but when checked again at 134/69. Provider notified. No meds due at this time. RisperDAL given in ED at 1522. Continue to monitor and support.

## 2022-06-30 NOTE — PROGRESS NOTES
Brief Social Work Note    Expected Discharge Date:  TBD     Concerns to be Addressed:    Resources    Additional Information:    HERNANDEZ received a  request for a call back from daysi's wife Ilsa ([phone]).     1509 SW called and spoke with Ilsa. Ilsa explained that she and her family were trying to organize William's care and needed resources to help them meet his needs. SW discussed possible options including contacting William's health insurance to see what medical equipment would be covered and the process to to get it; and accessing the Senior LinkAge Line ([facility]) for resources to assist the family in caring for him. Ilsa agreed to having HERNANDEZ make an online referral to [facility] for resources, and told HERNANDEZ that she would call the insurance provider to see how what DME is covered and the process to use to get it. Ilsa told HERNANDEZ that she feels William needs a hospital bed to keep him safe, and likely a commode and a bedside table    No additional questions or concerns were reported. SW provided availability and contact information and Ilsa ended the call.    HERNANDEZ will continue to follow as needed.    AKHLIL Howard, LG  ED/OBS   [facility]  Phone: [phone]  Pager: [phone]  Fax: [phone]     On-call pager, [phone], 4:00 pm to midnight

## 2022-06-30 NOTE — PLAN OF CARE
Shift: 1900-0730  VS: hypertensive managing with Coreg. Vitals q 8   Pain: pt denies pain  Neuro: new dx of dementia. Disoriented to time. Pleasant and cooperative this shift. No report of any auditory hallucinations. Pt remains with a sitter.  Cardiac: WDL  Respiratory: WDL. Room air.  Diet/Appetite:  Comb/reg diet  /GI: chronic indwelling cath w/ leg bag. Possible uti- rocephin q 24 hrs. Continent of stools  LDA's: R PIV upper forearm; saline locked  Skin: WDL  Activity: 2 assist with cane/assistive devices  Tests/Procedures:  Pertinent Labs/Lab Collection:      Plan: continue with 1:1 d/t command hallucinations/safety

## 2022-06-30 NOTE — SAFE
William Almazan  June 30, 2022  SAFE Note    Critical Safety Issues: Pt is experiencing command hallucinations telling him to harm himself or objects.      Current Suicidal Ideation/Self-Injurious Concerns/Methods: None - N/A      Current or Historical Inappropriate Sexual Behavior: No      Current or Historical Aggression/Homicidal Ideation: None - N/A      Triggers: pt unable to identify    Guardianship Status: is his own guardian.. Guardianship paperwork is not required.    This patient is a child/adolescent: No    This patient has additional special visitor precautions: No    Updated care team: Yes: MDs and intake notified       For additional details see full LMHP assessment.       VINEET Childs

## 2022-06-30 NOTE — PROGRESS NOTES
[facility]    Medicine Progress Note - Hospitalist Service, GOLD TEAM 17    Date of Admission:  6/28/2022    Assessment & Plan          William Almazan is a 81 year old male with history of CAD s/p CABG, dilated cardiomyopathy, HFrEF, PAF on eliquis, HTN, HLD, DM2, KRISTIAN, prostate cancer, gout, and dementia who presented with worsening auditory hallucinations.     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 Present since ~2020 following death of son. Denies worsening depression or anxiety. No visual or tactile hallucinations. Family not able to manage at home.   - Consulted psychiatry, appreciate input   - Start risperidone per psych  - Continue 1:1 sitter for now  - PT/OT  - Behavioral Health Intake line contacted for inpatient psych dispo      Pyuria with Leuk Esterase:  Chronic indwelling keating:    Chronic indwelling keating catheter. UA grossly abnormal. UC with mixed qasim. Afebrile. WBC normal. Asymptomatic.  - Discontinue antibiotics  - Follow up UC results     Chronic HFrEF:    Hx dilated cardiomyopathy. Most recent TTE 6/18/21 showing severely dilated LV with EF 20%. Currently euvolemic on exam. No hypoxia. No pulmonary complaints. Trace LE edema.   - Hold diuretics until taking adequate PO (concern for volume depletion)  - Daily weights, I&O's     CAD s/p CABG (1992):    Not ASA or statin at home. No acute concerns.   - Coreg 25mg BID     HTN:    - Coreg 25mg BID     PAF:    Regular on exam. On chronic AC with eliquis. No acute concerns.  - Eliquis 5mg BID     DM2:    Diet controlled. Glucose stable.   - Monitor     KRISTIAN:    Does not use CPAP at home.  - Monitor     Prostate CA:    Treated with XRT and hormone therapy. Hx urinary retention with chronic indwelling keating.     Gout:    - Continue PTA allopurinol           Diet: Combination Diet Regular Diet  Adult    DVT Prophylaxis: DOAC  Keating Catheter: Not present  Central Lines: None  Cardiac Monitoring: None  Code Status: Full Code      Disposition Plan      Expected Discharge Date: 07/04/2022                The patient's care was discussed with the Patient and Psychiatry Consultant.    Parish Flaherty MD  Hospitalist Service, GOLD TEAM 17  [facility]  Securely message with the Vocera Web Console (learn more here)  Text page via [facility] Paging/Directory   Please see signed in provider for up to date coverage information      Clinically Significant Risk Factors Present on Admission                     ______________________________________________________________________    Interval History   No acute overnight events.  Denies any headache, dizziness, chest pain/tightness, abdominal pain/nausea/vomiging.  Denies any changes in UOP (chronic keating).  No hematochezia or diarrhea.  Continues to endorse hearing command hallucinations telling him to throw himself at the floor.     Data reviewed today: I reviewed all medications, new labs and imaging results over the last 24 hours. I personally reviewed no images or EKG's today.    Physical Exam   Vital Signs: Temp: (!) 96.2  F (35.7  C) Temp src: Oral BP: (!) 165/80 Pulse: 75   Resp: 16 SpO2: 100 % O2 Device: None (Room air)    Weight: 0 lbs 0 oz  General Appearance:   elderly male, appears younger than stated age. Awake. Alert. Oriented x3. NAD.   HEENT:  No scleral icterus. Moist mucous membranes.   Respiratory:  Normal effort. CTAB.   Cardiovascular:  RRR. S1,S2. No murmurs or gallops.   GI:  Soft, ND, NT, +BS.   Extremity:  No pitting edema. No calf tenderness.   Skin:  No visible rash. No jaundice.   Psych:  Flat affect, mood somewhat dour     Data   Recent Labs   Lab 06/30/22  0643 06/28/22  1744   WBC 4.9 3.8*   HGB 13.2* 13.1*   MCV 93 91    187    137   POTASSIUM 3.9 3.8   CHLORIDE 103 98   CO2 28 28    BUN 22.9 32.1*   CR 0.89 1.08   ANIONGAP 10 11   CRYS 9.4 9.9   * 149*   ALBUMIN  --  3.9   PROTTOTAL  --  7.0   BILITOTAL  --  0.6   ALKPHOS  --  113   ALT  --  14   AST  --  29     No results found for this or any previous visit (from the past 24 hour(s)).

## 2022-06-30 NOTE — CONSULTS
"          Initial Psychiatric Consult   Consult date: June 30, 2022         Reason for Consult, requesting source:    Memory impairment and hallucinations. Also refer to MH assessment done by Valentín ZAMUDIO.   Requesting source: Parish Flaherty    Labs and imaging reviewed. Discussed with nursing and his family.         HPI:   From H and P:  \"William Almazan is a 81 year old male with dementia, HFrEF, CAD s/p CABG, PAF, HTN, DM2, KRISTIAN, and prostate cancer who was brought to ED by family for evaluation of worsening auditory hallucinations. Patient notes hallucinations since 2020 when his son passed away. He notes increased frequency of hallucinations more recently. Some of these are command hallucinations telling him to harm himself. He denies worsening depression or suicidal ideation. Patient recently threw himself to the floor and was found down by family after hallucinations told him to do so. He suffered no injuries. He currently denies any complaints.\"    This very pleasant gentleman who has a PhD in education and has had a significant memory decline in recent months and this is now associated with command hallucinations telling him to harm himself by throwing himself out of bed and he also has some suicidal thoughts which he is a bit vague about.  He is also paranoid; he was suspicious of taking a glass of water but said he would use bottled water.  Due to concern for his safety, the family is not able to care for him and they said that they want a sitter while he is in the hospital.   During my interview he was very pleasant but obviously quite confused and was doing a lot of confabulation.  He said he has been depressed for much of his life but could not name any antidepressants that he is taking and I cannot find anything in his chart.  He said that his mood is good \" now that I am here\" he is a graduate of [employer] and so he is pleased to be at this hospital.  He denies appetite disturbance " or or loss of interest usual activities.  He is currently not feeling hopeless or suicidal, but he does have these command hallucinations telling him to harm himself.  No history of previous mood cycling or psychotic symptoms.    I spoke with his wife again about the use of the Risperdal and she informed me that she would like me to call her back after 1 hour.  In the interim she will discuss the situation with her children.        Past Psychiatric History:   No psychiatric hospitalizations.  I could not see any psychotropic medication listed in Epic.  No psychotherapy        Substance Use and History:   Use of drugs or alcohol or tobacco.        Past Medical History:   PAST MEDICAL HISTORY: HFrEF, CAD s/p CABG, PAF, HTN, DM2, KRISTIAN, and prostate cancer. Chronic indwelling urinary catheter.     PAST SURGICAL HISTORY: He cannot recall.         Family History:   FAMILY HISTORY: negative for significant psychiatric problems or chemical abuse.         Social History:   He is originally from [place] I believe but has been up in [place] since he started attending college at the [employer].  He holds a PhD in education and has worked in human resources and education as an .  He has 4 children.  He has been living with his wife in their own home but he does occasionally have children stay with them.         Physical ROS:   The 10 point Review of Systems is negative other than noted in the HPI or here.         Medications:       allopurinol  300 mg Oral Daily     apixaban ANTICOAGULANT  5 mg Oral BID     carvedilol  25 mg Oral BID     cefTRIAXone  1 g Intravenous Q24H     [Held by provider] furosemide  80 mg Oral BID     loratadine  10 mg Oral Daily     sodium chloride (PF)  3 mL Intracatheter Q8H     [Held by provider] spironolactone  25 mg Oral Daily     Vitamin D3  25 mcg Oral Daily            Allergies:   No Known Allergies       Labs:     Recent Results (from the past 48 hour(s))    Comprehensive metabolic panel    Collection Time: 06/28/22  5:44 PM   Result Value Ref Range    Sodium 137 136 - 145 mmol/L    Potassium 3.8 3.4 - 5.3 mmol/L    Creatinine 1.08 0.67 - 1.17 mg/dL    Urea Nitrogen 32.1 (H) 8.0 - 23.0 mg/dL    Chloride 98 98 - 107 mmol/L    Carbon Dioxide (CO2) 28 22 - 29 mmol/L    Anion Gap 11 7 - 15 mmol/L    Glucose 149 (H) 70 - 99 mg/dL    Calcium 9.9 8.8 - 10.2 mg/dL    Protein Total 7.0 6.4 - 8.3 g/dL    Albumin 3.9 3.5 - 5.2 g/dL    Bilirubin Total 0.6 <=1.2 mg/dL    Alkaline Phosphatase 113 40 - 129 U/L    AST 29 10 - 50 U/L    ALT 14 10 - 50 U/L    GFR Estimate 69 >60 mL/min/1.73m2   CBC with platelets and differential    Collection Time: 06/28/22  5:44 PM   Result Value Ref Range    WBC Count 3.8 (L) 4.0 - 11.0 10e3/uL    RBC Count 4.61 4.40 - 5.90 10e6/uL    Hemoglobin 13.1 (L) 13.3 - 17.7 g/dL    Hematocrit 42.0 40.0 - 53.0 %    MCV 91 78 - 100 fL    MCH 28.4 26.5 - 33.0 pg    MCHC 31.2 (L) 31.5 - 36.5 g/dL    RDW 15.7 (H) 10.0 - 15.0 %    Platelet Count 187 150 - 450 10e3/uL    % Neutrophils 65 %    % Lymphocytes 20 %    % Monocytes 12 %    % Eosinophils 2 %    % Basophils 1 %    % Immature Granulocytes 0 %    NRBCs per 100 WBC 0 <1 /100    Absolute Neutrophils 2.5 1.6 - 8.3 10e3/uL    Absolute Lymphocytes 0.8 0.8 - 5.3 10e3/uL    Absolute Monocytes 0.5 0.0 - 1.3 10e3/uL    Absolute Eosinophils 0.1 0.0 - 0.7 10e3/uL    Absolute Basophils 0.0 0.0 - 0.2 10e3/uL    Absolute Immature Granulocytes 0.0 <=0.4 10e3/uL    Absolute NRBCs 0.0 10e3/uL   Asymptomatic COVID-19 Virus (Coronavirus) by PCR Nasopharyngeal    Collection Time: 06/28/22  5:47 PM    Specimen: Nasopharyngeal; Swab   Result Value Ref Range    SARS CoV2 PCR Negative Negative, Testing sent to reference lab. Results will be returned via unsolicited result   CT Head w/o Contrast    Collection Time: 06/28/22  6:04 PM   Result Value Ref Range    Radiologist flags Age-indeterminate loss of gray-white matter (Urgent)   "  UA with Microscopic reflex to Culture    Collection Time: 06/28/22  6:16 PM    Specimen: Urine, Louie Catheter   Result Value Ref Range    Color Urine Light Yellow Colorless, Straw, Light Yellow, Yellow    Appearance Urine Slightly Cloudy (A) Clear    Glucose Urine Negative Negative mg/dL    Bilirubin Urine Negative Negative    Ketones Urine Negative Negative mg/dL    Specific Gravity Urine 1.012 1.003 - 1.035    Blood Urine Trace (A) Negative    pH Urine 5.5 5.0 - 7.0    Protein Albumin Urine Negative Negative mg/dL    Urobilinogen Urine Normal Normal, 2.0 mg/dL    Nitrite Urine Positive (A) Negative    Leukocyte Esterase Urine Moderate (A) Negative    Mucus Urine Present (A) None Seen /LPF    RBC Urine 1 <=2 /HPF    WBC Urine 12 (H) <=5 /HPF    Hyaline Casts Urine 1 <=2 /LPF   Urine Culture    Collection Time: 06/28/22  6:16 PM    Specimen: Urine, Louie Catheter   Result Value Ref Range    Culture 50,000-100,000 CFU/mL Mixture of urogenital qasim    Basic metabolic panel    Collection Time: 06/30/22  6:43 AM   Result Value Ref Range    Creatinine 0.89 0.67 - 1.17 mg/dL    Sodium 141 136 - 145 mmol/L    Potassium 3.9 3.4 - 5.3 mmol/L    Urea Nitrogen 22.9 8.0 - 23.0 mg/dL    Chloride 103 98 - 107 mmol/L    Carbon Dioxide (CO2) 28 22 - 29 mmol/L    Anion Gap 10 7 - 15 mmol/L    Glucose 135 (H) 70 - 99 mg/dL    GFR Estimate 86 >60 mL/min/1.73m2    Calcium 9.4 8.8 - 10.2 mg/dL   CBC with platelets    Collection Time: 06/30/22  6:43 AM   Result Value Ref Range    WBC Count 4.9 4.0 - 11.0 10e3/uL    RBC Count 4.65 4.40 - 5.90 10e6/uL    Hemoglobin 13.2 (L) 13.3 - 17.7 g/dL    Hematocrit 43.4 40.0 - 53.0 %    MCV 93 78 - 100 fL    MCH 28.4 26.5 - 33.0 pg    MCHC 30.4 (L) 31.5 - 36.5 g/dL    RDW 15.9 (H) 10.0 - 15.0 %    Platelet Count 198 150 - 450 10e3/uL          Physical and Psychiatric Examination:     /67 (BP Location: Right leg)   Pulse 51   Temp 97.8  F (36.6  C) (Oral)   Resp 15   Ht 1.753 m (5' 9\")  "  SpO2 97%   Weight is 0 lbs 0 oz  There is no height or weight on file to calculate BMI.    Physical Exam:  I have reviewed the physical exam as documented by by the medical team and agree with findings and assessment and have no additional findings to add at this time.         MSE:   Appearance: awake, alert  Attitude:  cooperative  Eye Contact:  good  Mood:  anxious  Affect:  intensity is blunted  Speech:  clear, coherent  Psychomotor Behavior:  no evidence of tardive dyskinesia, dystonia, or tics  Thought Process:  circumstantial  Associations:  loosening of associations present  Thought Content:  denies current suicidal thoughts, but with paranoia and voices.   Insight:  limited  Judgement:  limited  Oriented to:  person and place, month.   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  Risks and benefits were reviewed him with his family.   Due to his ongoing command hallucinations telling him to harm himself he will continue to need a sitter.  He may also have some symptoms of delirium from a UTI, but his hallucinations, etc likely preceded the UTI.   I will be talking to his wife again in about 45 minutes and if she agrees to the Risperdal I will go ahead and order it if ok with primary team.           Summary of Recommendations:   If he agrees to it I would take 0.25 Risperdal now (and then daily) and 0.5 mg at bedtime  I have called mental health intake ([phone]) about finding a psychiatric bed.  Due to his memory impairment he does not qualify for our seniors unit but alternative places will be looked into.  You may want to obtain an EKG at some point if QTc meds are prescribed.   At some point Aricept or Memantine should be added, but one thing at a  "time...  I informed his family that he will eventually need a memory care unit but would first need to get him off of a sitter after control of his symptoms with the antipsychotic.      \"This dictation was performed with voice recognition software and may contain errors,  omissions and inadvertent word substitution.\"           "

## 2022-06-30 NOTE — CONSULTS
"[facility] Crisis Reassessment      William Almazan was reassessed at the request of  for the following reasons: consideration for inpatient placement. Pt was first seen on 6/30/2022 by Valentín Ferrer; see the initial assessment note for details.      Patient Presentation    Initial ED presentation details:   Note from 6/29 DEC assessment, \" 81 year old, who uses he/him pronouns, and presents to the ED with family/friends. Patient is referred to the ED by self. Patient is presenting to the ED for the following concerns: onset of hallucinations that are in the form of commands and at time physical towards self. \"    ED note from psychiatrist   \" 81 year old male with dementia, HFrEF, CAD s/p CABG, PAF, HTN, DM2, KRISTIAN, and prostate cancer who was brought to ED by family for evaluation of worsening auditory hallucinations. Patient notes hallucinations since 2020 when his son passed away. He notes increased frequency of hallucinations more recently. Some of these are command hallucinations telling him to harm himself. He denies worsening depression or suicidal ideation. Patient recently threw himself to the floor and was found down by family after hallucinations told him to do so. He suffered no injuries. He currently denies any complaints.\"      Current patient presentation:   Pt was calm and cooperative today. Pt reports improvement in his mood and hopefulness.  Pt denied visual or auditory hallucinations. Pt reports ongoing command hallucinations which he described to  as \"problems\". Pt reports his family has been supportive of him and this helps him be hopeful. He reports he worries about taking medications but wants to follow recommendations of his treatment team and family.         Changes observed since initial assessment: n/a      Risk of Harm    Is the patient experiencing current suicidal ideation: No    Does the patient have thoughts of harming others? No      Mental Status Exam   Affect: Appropriate " "  Appearance: Appropriate    Attention Span/Concentration: Attentive?    Eye Contact: Engaged   Fund of Knowledge: Appropriate    Language /Speech Content: Fluent   Language /Speech Volume: Normal    Language /Speech Rate/Productions: Normal    Recent Memory: Variable   Remote Memory: Variable   Mood: Normal    Orientation to Person: Yes    Orientation to Place: Yes   Orientation to Time of Day: Yes    Orientation to Date: Yes    Situation (Do they understand why they are here?): Yes    Psychomotor Behavior: Normal    Thought Content: Hallucinations   Thought Form: Intact       Additional Collateral Information   Spoke with  MD Gu and psychiatrist . Dr. Leyva reports he has spoken to pt and pts wife about starting Risperdal and inpatient psychiatry placement for stabilization of pt which ongoing recommendation for memory care placement after that.     Spoke to ED nurses Ilda and Lucinda.       Therapeutic Intervention  The following therapeutic methodologies were employed when working with the patient: Establishing rapport, Active listening, Apply solution-focused therapy to address current crisis and Safety planning. Patient response to intervention: pt was calm, cooperative and engaged with .      Disposition  Recommended disposition: Inpatient Mental Health      Reviewed case and recommendations with attending provider. Attending Name: MD Gu and psychiatrist       Attending concurs with disposition: Yes      Patient concurs with disposition: Yes      Final disposition: Inpatient mental health .       Clinical Substantiation of Recommendations      Pt is 81 year old male with reported new diagnosis of dementia and declining cognitive functioning. Pt was alert and oriented during assessment. Pt denied visual or auditory hallucinations. Pt reports ongoing command hallucinations which he described to  as \"problems\". Pt reports his family has been supportive of him " and this helps him be hopeful. He reports he worries about taking medications but wants to follow recommendations of his treatment team and family. Pt denied SIB, HI and SI. Pt denied current concerns with sleep or appetite. Pt reported to ED staff pt has experienced recent decline in cognitive functioning and in the past month has been experiencing command hallucinations in the form of destruction of technology/cords and physically throwing himself on the floor to comply with the commands. Pt has required 1:1 during ED stay for management of safety and physical needs. It is the recommendation of this clinician that pt admit to IP MH for safety and stabilization. Pt displays the following risk factors that support IP admission: command hallucinations putting pts safety at risk, agitation/aggression in the home and unable to safely care for self at home due to severity of symptoms. Pt is unable to engage in safety planning to mitigate risk level in a non-secure setting. Lower levels of care would not be sufficient in managing the level of risk pt is presenting with. Due to this IP is the least restrictive option of care for pt. Pt should remain in IP until deemed safe to return to the community and engage in OP MH supports. Pt will need assistance establishing OP MH services prior to discharge.        Assessment Details  Total duration spent on the patient case in minutes: 2.25 hrs     CPT code(s) utilized: 71291 - Psychotherapy for Crisis (Each additional 30 minutes) - 30 min        VINEET Childs

## 2022-06-30 NOTE — CONSULTS
I spoke with his family again and they have decided to go ahead with the Risperdal as long as we monitor for side effects. I assured his daughter that we will.  I ordered Risperdal 0.25 mg per day (first dose now) and 0.5 mg HS.   This will likely take several days to help; may end up on IP psych prior to transition to memory care.     Aryan Leyva M.D.   [facility]   Contact information available via [facility] Paging/Directory  If I am not available, then [facility] CL line ([phone]) should know who   Is covering our consult service.

## 2022-07-01 PROCEDURE — 250N000013 HC RX MED GY IP 250 OP 250 PS 637: Performed by: INTERNAL MEDICINE

## 2022-07-01 PROCEDURE — G0463 HOSPITAL OUTPT CLINIC VISIT: HCPCS | Mod: 25

## 2022-07-01 PROCEDURE — 250N000011 HC RX IP 250 OP 636: Performed by: INTERNAL MEDICINE

## 2022-07-01 PROCEDURE — 120N000002 HC R&B MED SURG/OB UMMC

## 2022-07-01 PROCEDURE — 250N000013 HC RX MED GY IP 250 OP 250 PS 637: Performed by: PSYCHIATRY & NEUROLOGY

## 2022-07-01 PROCEDURE — 97602 WOUND(S) CARE NON-SELECTIVE: CPT

## 2022-07-01 PROCEDURE — 99233 SBSQ HOSP IP/OBS HIGH 50: CPT | Performed by: INTERNAL MEDICINE

## 2022-07-01 RX ADMIN — RISPERIDONE 0.25 MG: 0.25 TABLET ORAL at 09:16

## 2022-07-01 RX ADMIN — CEFTRIAXONE SODIUM 1 G: 1 INJECTION, POWDER, FOR SOLUTION INTRAMUSCULAR; INTRAVENOUS at 09:37

## 2022-07-01 RX ADMIN — CARVEDILOL 25 MG: 25 TABLET, FILM COATED ORAL at 20:12

## 2022-07-01 RX ADMIN — CARVEDILOL 25 MG: 25 TABLET, FILM COATED ORAL at 09:15

## 2022-07-01 RX ADMIN — ALLOPURINOL 300 MG: 300 TABLET ORAL at 09:15

## 2022-07-01 RX ADMIN — APIXABAN 5 MG: 5 TABLET, FILM COATED ORAL at 20:12

## 2022-07-01 RX ADMIN — Medication 25 MCG: at 09:15

## 2022-07-01 RX ADMIN — APIXABAN 5 MG: 5 TABLET, FILM COATED ORAL at 09:15

## 2022-07-01 RX ADMIN — LORATADINE 10 MG: 10 TABLET ORAL at 09:15

## 2022-07-01 RX ADMIN — RISPERIDONE 0.5 MG: 0.5 TABLET ORAL at 21:39

## 2022-07-01 RX ADMIN — FUROSEMIDE 80 MG: 20 TABLET ORAL at 20:16

## 2022-07-01 ASSESSMENT — ACTIVITIES OF DAILY LIVING (ADL)
ADLS_ACUITY_SCORE: 41
ADLS_ACUITY_SCORE: 41
ADLS_ACUITY_SCORE: 39
ADLS_ACUITY_SCORE: 41
ADLS_ACUITY_SCORE: 41
ADLS_ACUITY_SCORE: 39
ADLS_ACUITY_SCORE: 39
ADLS_ACUITY_SCORE: 41
ADLS_ACUITY_SCORE: 42
ADLS_ACUITY_SCORE: 39
ADLS_ACUITY_SCORE: 41
ADLS_ACUITY_SCORE: 41

## 2022-07-01 NOTE — PROGRESS NOTES
SPIRITUAL HEALTH SERVICES  SPIRITUAL ASSESSMENT Progress Note  [facility] 8 A Med Surge R 0804 7/2/22      REFERRAL SOURCE: Admissions Request    Introduced self and SHS to Pt. Pt stated he wanted prayer to re connect relationship with Leonel Tripp. Pt wanted to be forgiven of things from his past. Prayed the prayer for Pt's to make amends with Qasim our Lord.    PLAN: Will continue to pray for Pt from a far.     Saad Vazquez MA, MPA  Associate   Pager: [phone]

## 2022-07-01 NOTE — CONSULTS
[facility]  WOC Nurse Inpatient Assessment     Consulted for: Skin tear under pannus and around scrotum    Patient History (according to provider note(s):      Per Dr Parish Flaherty on 6/30/2022: William Almazan is a 81 year old male with history of CAD s/p CABG, dilated cardiomyopathy, HFrEF, PAF on eliquis, HTN, HLD, DM2, KRISTIAN, prostate cancer, gout, and dementia who presented with worsening auditory hallucinations.    Areas Assessed:      Areas visualized during today's visit: Groin, scrotum    Wound location: Left groin creases   Patient declined photo    Wound due to: Moisture Associated Skin Damage (MASD) and friction  Wound history/plan of care: Present on admission. Patient has chronic indwelling keating so is not incontinent of urine yet has a brief in place. The brief appears to be cutting into the groin creases. In addition, patient is sweaty so moisture may be contributing.    Wound base: 100 % dermis     Palpation of the wound bed: normal      Drainage: none     Description of drainage: none     Measurements (length x width x depth, in cm): Linear open areas in left groin from under scrotum to the left hip  Periwound skin: Intact      Color: normal and consistent with surrounding tissue      Temperature: normal   Odor: none  Pain: denies   Pain interventions prior to dressing change: N/A  Treatment goal: Increase moisture   STATUS: initial assessment  Supplies ordered: supplies stored on unit       Treatment Plan:     Left groin crease wound(s): BID: wash area with Shima Cleanse and Protect and a soft cloth. Do not rinse. Apply a thin layer of Criticaid Paste in creases. Avoid brief while in bed (patient has keating in place), use Covidean sheet for any incontinent stool.      Orders: Written    RECOMMEND PRIMARY TEAM ORDER: None, at this time  Education provided: plan of care  Discussed plan of care with: Nurse  WOC nurse follow-up plan: weekly  Notify WOC if  wound(s) deteriorate.  Nursing to notify the Provider(s) and re-consult the [facility] Nurse if new skin concern.    DATA:     Current support surface: Standard  Atmos Air mattress  Containment of urine/stool: Indwelling catheter  BMI: There is no height or weight on file to calculate BMI.   Active diet order: Orders Placed This Encounter      Combination Diet Regular Diet Adult     Output: I/O last 3 completed shifts:  In: 480 [P.O.:480]  Out: 1210 [Urine:1210]     Labs: Recent Labs   Lab 06/30/22  0643 06/28/22  1744   ALBUMIN  --  3.9   HGB 13.2* 13.1*   WBC 4.9 3.8*     Pressure injury risk assessment:   Sensory Perception: 4-->no impairment  Moisture: 3-->occasionally moist  Activity: 3-->walks occasionally  Mobility: 3-->slightly limited  Nutrition: 3-->adequate  Friction and Shear: 3-->no apparent problem  John Score: 19    Judy Mccabe RN   Dept. Pager: [phone]  Dept. Office Number: [phone]

## 2022-07-01 NOTE — PLAN OF CARE
Goal Outcome Evaluation:    Alert and oriented x4. VSS on RA. Denies SI or hallucinations, 1:1 sitter for safety. Louie catheter patent, LBM 6/30. Reg diet. Assist x2 with w/gb. Skin tears under panus and scrotum. R PIV SL. Pt rested well all night.

## 2022-07-01 NOTE — PLAN OF CARE
Goal Outcome Evaluation:    Plan of Care Reviewed With: patient     Overall Patient Progress: improving           Pt A/O X 4. Afebrile.Lungs bilaterally with both anterior and posterior. Denies nausea, shortness of breath, and chest pain.  Temp: 97.7  F (36.5  C) Temp src: Oral BP: (!) 161/74 Pulse: 62   Resp: 14 SpO2: 99 % O2 Device: None (Room air)        Output:       Bowels sounds active in all four quadrants. Voids keating catheter without   difficulty.     Activity:     Assist x 2,repositioned every 2 hrs.     Skin: Lower abdominal skin tears, cleaned, applied skin barrier.     Pain:     Pain 3/10, pt declined intervention.     CMS:     Denies numbness and tingling in all extremities.      Dressing:     None     Diet:       Pt is on a regular diet, refused breakfast, ate lunch with family.     LDA:       PIV in right arm is patent.     Equipment:      Walker, commode,bilateral heels are elevated with pillows, off the bed.     Plan:       Pt is able to make needs known and the call light is within the pt's reach. Continue to monitor.Pt has room sitter.      Additional Info:      Pt like Holy Bible to be on his chest, prays frequently, hallucination.When pt daughter arrival, pt become happy,cooperative more open to interact with the staff.

## 2022-07-01 NOTE — PROGRESS NOTES
[facility]    Medicine Progress Note - Hospitalist Service, GOLD TEAM 17    Date of Admission:  6/28/2022    Assessment & Plan            William Almazan is a 81 year old male with history of CAD s/p CABG, dilated cardiomyopathy, HFrEF, PAF on Eliquis, HTN, HLD, DM2, KRISTIAN, prostate cancer, gout, and dementia who presented with worsening auditory hallucinations.     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 Present since ~2020 following death of son. Denies worsening depression or anxiety. No visual or tactile hallucinations. Family not able to manage at home.   -Consulted psychiatry, appreciate input   -Start risperidone per psych  -Continue 1:1 sitter for now  -PT/OT  -Behavioral Health Intake line contacted for inpatient psych dispo      #Pyuria with leuk esterase:  #Chronic indwelling keating:    Chronic indwelling keating catheter. UA grossly abnormal. UC with mixed qasim. Afebrile. WBC normal. Asymptomatic.  -Discontinue antibiotics  -Follow up UC results     #Chronic HFrEF:    Hx dilated cardiomyopathy. Most recent TTE 6/18/21 showing severely dilated LV with EF 20%. Currently euvolemic on exam. No hypoxia. No pulmonary complaints. Trace LE edema.   -Restart diuretic regimen in the setting of elevated blood pressure  -Daily weights, I&O's     #CAD s/p CABG (1992):    Not ASA or statin at home. No acute concerns.   -Coreg 25mg BID     #HTN:    -Coreg 25mg BID  -Restart PTA diuretic regimen (as above)     #PAF:    Regular on exam. On chronic AC with eliquis. No acute concerns.  -Eliquis 5mg BID     #T2DM:    Diet controlled. Glucose stable.   -Monitor     #KRISTIAN:    Does not use CPAP at home.  -Monitor     #Prostate CA:    Treated with XRT and hormone therapy. Hx urinary retention with chronic indwelling keating.     #Gout:    -Continue PTA allopurinol         Diet: Combination Diet Regular Diet Adult    DVT Prophylaxis: DOAC  Louie Catheter: Not present  Central Lines: None  Cardiac Monitoring: None  Code Status: Full Code      Disposition Plan     Expected Discharge Date: 07/04/2022                The patient's care was discussed with the Bedside Nurse, Care Coordinator/ and Patient.    Giovany Sanchez DO  Hospitalist Service, GOLD TEAM [address] [place] [address]  Securely message with the Vocera Web Console (learn more here)  Text page via [facility] Paging/Directory   Please see signed in provider for up to date coverage information      Clinically Significant Risk Factors Present on Admission                     ______________________________________________________________________    Interval History   Unable to obtain comprehensive ROS at present 2/2 altered mental status.  Per nursing staff, no acute events overPM.  Patient is pending placement.    Data reviewed today: I reviewed all medications, new labs and imaging results over the last 24 hours. I personally reviewed no images or EKG's today.    Physical Exam   Vital Signs: Temp: 97.7  F (36.5  C) Temp src: Oral BP: (!) 161/74 Pulse: 62   Resp: 14 SpO2: 99 % O2 Device: None (Room air)    Weight: 0 lbs 0 oz    GENERAL: Patient appears well; no acute distress.  HEENT: Normocephalic; atraumatic; PERRLA; MMM.  CV: Irregular rhythm; normal S1, S2; no rubs, murmurs, or gallops.  RESP: Lung fields clear to aucultation B/L; no wheezing or crepitations.  GI: Abdomen is soft, nontender, nondistended; no organomegaly; normal bowel sounds.  : Deferred genital examination.   MSK: No clubbing, cyanosis, or edema.  DERM: Skin is intact; no rash, lesions, or skin breakdown.  NEURO: No focal deficits appreciated; strength & sensorium are grossly intact.  PSYCH: Flat affect; unable to fully evaluate psychiatric status at present.      Data   Recent Labs   Lab 06/30/22  0650  06/28/22  1744   WBC 4.9 3.8*   HGB 13.2* 13.1*   MCV 93 91    187    137   POTASSIUM 3.9 3.8   CHLORIDE 103 98   CO2 28 28   BUN 22.9 32.1*   CR 0.89 1.08   ANIONGAP 10 11   CRYS 9.4 9.9   * 149*   ALBUMIN  --  3.9   PROTTOTAL  --  7.0   BILITOTAL  --  0.6   ALKPHOS  --  113   ALT  --  14   AST  --  29     No results found for this or any previous visit (from the past 24 hour(s)).  Medications       allopurinol  300 mg Oral Daily     apixaban ANTICOAGULANT  5 mg Oral BID     carvedilol  25 mg Oral BID     cefTRIAXone  1 g Intravenous Q24H     furosemide  80 mg Oral BID     loratadine  10 mg Oral Daily     risperiDONE  0.25 mg Oral Daily     risperiDONE  0.5 mg Oral At Bedtime     sodium chloride (PF)  3 mL Intracatheter Q8H     spironolactone  25 mg Oral Daily     Vitamin D3  25 mcg Oral Daily

## 2022-07-01 NOTE — PLAN OF CARE
"[id number]    /69 (BP Location: Left arm)   Pulse 90   Temp (!) 96.2  F (35.7  C) (Oral)   Resp 16   Ht 1.753 m (5' 9\")   SpO2 100%     A&O x 4. VSS.  Denies N/T. Denies N/V.  LS clear. Denies SOB. Denies chest pain. Currently on RA. No cough observed.  Last BM on 6/30. Louie catheter patent.  Assist x 2 w/ walker and GB.  Commode at bedside.  Skin intact ex abrasion/skin tears bilateral groin/lower abd.   R PIV - patent, saline locked.  Denies pain.  Regular diet.  Sitter at bedside for pt safety. Denies SI or hallucinations.    Continue POC.  "

## 2022-07-02 ENCOUNTER — APPOINTMENT (OUTPATIENT)
Dept: CT IMAGING | Facility: CLINIC | Age: 82
DRG: 884 | End: 2022-07-02
Attending: PHYSICIAN ASSISTANT
Payer: MEDICARE

## 2022-07-02 ENCOUNTER — APPOINTMENT (OUTPATIENT)
Dept: OCCUPATIONAL THERAPY | Facility: CLINIC | Age: 82
DRG: 884 | End: 2022-07-02
Attending: INTERNAL MEDICINE
Payer: MEDICARE

## 2022-07-02 LAB
ALBUMIN SERPL-MCNC: 2.7 G/DL (ref 3.4–5)
ALP SERPL-CCNC: 99 U/L (ref 40–150)
ALT SERPL W P-5'-P-CCNC: 15 U/L (ref 0–70)
ANION GAP SERPL CALCULATED.3IONS-SCNC: 7 MMOL/L (ref 3–14)
AST SERPL W P-5'-P-CCNC: 14 U/L (ref 0–45)
BASOPHILS # BLD AUTO: 0 10E3/UL (ref 0–0.2)
BASOPHILS NFR BLD AUTO: 0 %
BILIRUB SERPL-MCNC: 1.3 MG/DL (ref 0.2–1.3)
BUN SERPL-MCNC: 16 MG/DL (ref 7–30)
CALCIUM SERPL-MCNC: 8.7 MG/DL (ref 8.5–10.1)
CHLORIDE BLD-SCNC: 105 MMOL/L (ref 94–109)
CO2 SERPL-SCNC: 28 MMOL/L (ref 20–32)
CREAT SERPL-MCNC: 0.72 MG/DL (ref 0.66–1.25)
EOSINOPHIL # BLD AUTO: 0 10E3/UL (ref 0–0.7)
EOSINOPHIL NFR BLD AUTO: 0 %
ERYTHROCYTE [DISTWIDTH] IN BLOOD BY AUTOMATED COUNT: 15.5 % (ref 10–15)
GFR SERPL CREATININE-BSD FRML MDRD: >90 ML/MIN/1.73M2
GLUCOSE BLD-MCNC: 131 MG/DL (ref 70–99)
HCT VFR BLD AUTO: 38.8 % (ref 40–53)
HGB BLD-MCNC: 11.5 G/DL (ref 13.3–17.7)
HGB BLD-MCNC: 12.6 G/DL (ref 13.3–17.7)
IMM GRANULOCYTES # BLD: 0 10E3/UL
IMM GRANULOCYTES NFR BLD: 0 %
LYMPHOCYTES # BLD AUTO: 0.4 10E3/UL (ref 0.8–5.3)
LYMPHOCYTES NFR BLD AUTO: 6 %
MAGNESIUM SERPL-MCNC: 2 MG/DL (ref 1.6–2.3)
MCH RBC QN AUTO: 28.8 PG (ref 26.5–33)
MCHC RBC AUTO-ENTMCNC: 32.5 G/DL (ref 31.5–36.5)
MCV RBC AUTO: 89 FL (ref 78–100)
MONOCYTES # BLD AUTO: 1.1 10E3/UL (ref 0–1.3)
MONOCYTES NFR BLD AUTO: 19 %
NEUTROPHILS # BLD AUTO: 4.6 10E3/UL (ref 1.6–8.3)
NEUTROPHILS NFR BLD AUTO: 75 %
NRBC # BLD AUTO: 0 10E3/UL
NRBC BLD AUTO-RTO: 0 /100
PLATELET # BLD AUTO: 150 10E3/UL (ref 150–450)
POTASSIUM BLD-SCNC: 3.4 MMOL/L (ref 3.4–5.3)
POTASSIUM BLD-SCNC: 3.5 MMOL/L (ref 3.4–5.3)
PROT SERPL-MCNC: 6.2 G/DL (ref 6.8–8.8)
RBC # BLD AUTO: 4.38 10E6/UL (ref 4.4–5.9)
SODIUM SERPL-SCNC: 140 MMOL/L (ref 133–144)
TROPONIN I SERPL HS-MCNC: 44 NG/L
WBC # BLD AUTO: 6.1 10E3/UL (ref 4–11)

## 2022-07-02 PROCEDURE — 97535 SELF CARE MNGMENT TRAINING: CPT | Mod: GO

## 2022-07-02 PROCEDURE — 84132 ASSAY OF SERUM POTASSIUM: CPT | Performed by: PHYSICIAN ASSISTANT

## 2022-07-02 PROCEDURE — 70450 CT HEAD/BRAIN W/O DYE: CPT | Mod: 26 | Performed by: RADIOLOGY

## 2022-07-02 PROCEDURE — G1010 CDSM STANSON: HCPCS

## 2022-07-02 PROCEDURE — 97166 OT EVAL MOD COMPLEX 45 MIN: CPT | Mod: GO

## 2022-07-02 PROCEDURE — 85018 HEMOGLOBIN: CPT | Performed by: PHYSICIAN ASSISTANT

## 2022-07-02 PROCEDURE — 120N000002 HC R&B MED SURG/OB UMMC

## 2022-07-02 PROCEDURE — 250N000013 HC RX MED GY IP 250 OP 250 PS 637: Performed by: PHYSICIAN ASSISTANT

## 2022-07-02 PROCEDURE — 36415 COLL VENOUS BLD VENIPUNCTURE: CPT | Performed by: INTERNAL MEDICINE

## 2022-07-02 PROCEDURE — 250N000011 HC RX IP 250 OP 636: Performed by: INTERNAL MEDICINE

## 2022-07-02 PROCEDURE — 36415 COLL VENOUS BLD VENIPUNCTURE: CPT | Performed by: PHYSICIAN ASSISTANT

## 2022-07-02 PROCEDURE — 250N000013 HC RX MED GY IP 250 OP 250 PS 637: Performed by: STUDENT IN AN ORGANIZED HEALTH CARE EDUCATION/TRAINING PROGRAM

## 2022-07-02 PROCEDURE — 99233 SBSQ HOSP IP/OBS HIGH 50: CPT | Performed by: INTERNAL MEDICINE

## 2022-07-02 PROCEDURE — 85025 COMPLETE CBC W/AUTO DIFF WBC: CPT | Performed by: INTERNAL MEDICINE

## 2022-07-02 PROCEDURE — 84484 ASSAY OF TROPONIN QUANT: CPT | Performed by: PHYSICIAN ASSISTANT

## 2022-07-02 PROCEDURE — 250N000013 HC RX MED GY IP 250 OP 250 PS 637: Performed by: INTERNAL MEDICINE

## 2022-07-02 PROCEDURE — 93010 ELECTROCARDIOGRAM REPORT: CPT | Performed by: INTERNAL MEDICINE

## 2022-07-02 PROCEDURE — 83735 ASSAY OF MAGNESIUM: CPT | Performed by: PHYSICIAN ASSISTANT

## 2022-07-02 PROCEDURE — 80053 COMPREHEN METABOLIC PANEL: CPT | Performed by: INTERNAL MEDICINE

## 2022-07-02 PROCEDURE — G1010 CDSM STANSON: HCPCS | Mod: GC | Performed by: RADIOLOGY

## 2022-07-02 PROCEDURE — 250N000013 HC RX MED GY IP 250 OP 250 PS 637: Performed by: PSYCHIATRY & NEUROLOGY

## 2022-07-02 RX ORDER — FUROSEMIDE 20 MG
40 TABLET ORAL 2 TIMES DAILY
Status: DISCONTINUED | OUTPATIENT
Start: 2022-07-02 | End: 2022-07-11

## 2022-07-02 RX ORDER — GABAPENTIN 100 MG/1
100 CAPSULE ORAL 3 TIMES DAILY
Status: DISCONTINUED | OUTPATIENT
Start: 2022-07-02 | End: 2022-08-02 | Stop reason: HOSPADM

## 2022-07-02 RX ORDER — ACETAMINOPHEN 500 MG
1000 TABLET ORAL EVERY 8 HOURS PRN
Status: DISCONTINUED | OUTPATIENT
Start: 2022-07-02 | End: 2022-08-02 | Stop reason: HOSPADM

## 2022-07-02 RX ORDER — FUROSEMIDE 20 MG
40 TABLET ORAL 2 TIMES DAILY
Status: DISCONTINUED | OUTPATIENT
Start: 2022-07-02 | End: 2022-07-02

## 2022-07-02 RX ADMIN — RISPERIDONE 0.25 MG: 0.25 TABLET ORAL at 09:50

## 2022-07-02 RX ADMIN — GABAPENTIN 100 MG: 100 CAPSULE ORAL at 16:15

## 2022-07-02 RX ADMIN — ALLOPURINOL 300 MG: 300 TABLET ORAL at 09:50

## 2022-07-02 RX ADMIN — LORATADINE 10 MG: 10 TABLET ORAL at 09:50

## 2022-07-02 RX ADMIN — GABAPENTIN 100 MG: 100 CAPSULE ORAL at 20:36

## 2022-07-02 RX ADMIN — CARVEDILOL 25 MG: 25 TABLET, FILM COATED ORAL at 09:51

## 2022-07-02 RX ADMIN — FUROSEMIDE 40 MG: 20 TABLET ORAL at 10:09

## 2022-07-02 RX ADMIN — CEFTRIAXONE SODIUM 1 G: 1 INJECTION, POWDER, FOR SOLUTION INTRAMUSCULAR; INTRAVENOUS at 09:50

## 2022-07-02 RX ADMIN — APIXABAN 5 MG: 5 TABLET, FILM COATED ORAL at 20:36

## 2022-07-02 RX ADMIN — SPIRONOLACTONE 25 MG: 25 TABLET ORAL at 09:50

## 2022-07-02 RX ADMIN — Medication 1 MG: at 01:54

## 2022-07-02 RX ADMIN — Medication 25 MCG: at 09:49

## 2022-07-02 RX ADMIN — APIXABAN 5 MG: 5 TABLET, FILM COATED ORAL at 09:49

## 2022-07-02 ASSESSMENT — ACTIVITIES OF DAILY LIVING (ADL)
ADLS_ACUITY_SCORE: 42
ADLS_ACUITY_SCORE: 47
ADLS_ACUITY_SCORE: 45
ADLS_ACUITY_SCORE: 42
ADLS_ACUITY_SCORE: 47
ADLS_ACUITY_SCORE: 42
ADLS_ACUITY_SCORE: 42
ADLS_ACUITY_SCORE: 47
ADLS_ACUITY_SCORE: 42
ADLS_ACUITY_SCORE: 42

## 2022-07-02 NOTE — PLAN OF CARE
"/65   Pulse 68   Temp 97.7  F (36.5  C) (Oral)   Resp 14   Ht 1.753 m (5' 9\")   SpO2 99%     Oxygen:  Room air;    Output: Chronic keating catheter in place with adequate output;    Last BM: 7/2/22 - large, soft, brown;     Activity: Assist x2   Skin: Skin tears in groin and pannus - patient declined assessment as cares were completed on evening shift;    Pain: 2/10 in BLE - x3 lotion applied and repositioning throughout the shift at patient's request; patient declined other interventions;   CMS Moderate edema persisted in BLE;   LDA: R PIV saline locked;    Additional Information: Sitter at bedside for patient safety - denies SI or plan or auditory hallucinations this shift. Patient is skeptical and cautious of environment.     Patient engaged with nurse in whisper throughout shift and was calm and cooperative when assessing/providing cares. Nurse provided reassurance, choices when available, and empowered patient's participation in requests when appropriate.        "

## 2022-07-02 NOTE — PLAN OF CARE
0400- 0700    Patient refusing vitals this shift. Denies SI and hallucinations but seemed paranoid of sounds this shift. Assist of 2 with gait belt and jose steady to commode. Weakness in lower extremities. Chronic keating patent.     Sitter remains at bedside for safety.     Continue to monitor

## 2022-07-02 NOTE — PLAN OF CARE
Goal Outcome Evaluation:    Plan of Care Reviewed With: patient      Nursing    S- cooperative w cares.  Sitter at bedside for safety.   Pt has a hard time getting self to sit at edge of bed- needs assist.  Very hard for pt to stand upright- using jose steady device to stand and transfer to commode or chair.  Enc chair for meals a w Ot visit  pt agreeable .    Very tired in afternoon - sleeping.  Pt has pain in feet and lower legs with any touch.  Very sensitive.  Md informed.  Pt unalbe to lift leg on its own       Ate meals.    Has had oriented conversations,      pt certain he can do things like his usual- walk stairs etc.  Cath draining good amt urine.    A- calm  cooperative  R-  enc chair,  enc nutriton  reorient prn.  Monitor for halluciantions

## 2022-07-02 NOTE — PROGRESS NOTES
[facility]    Medicine Progress Note - Hospitalist Service, GOLD TEAM 17    Date of Admission:  6/28/2022    Assessment & Plan          William Almazan is a 81 year old male with history of CAD s/p CABG, dilated cardiomyopathy, HFrEF, PAF on Eliquis, HTN, HLD, DM2, KRISTIAN, prostate cancer, gout, and dementia who presented with worsening auditory hallucinations.     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 Present since ~2020 following death of son. Denies worsening depression or anxiety. No visual or tactile hallucinations. Family not able to manage at home.   -Consulted psychiatry, appreciate input   -Start risperidone per psych  -Continue 1:1 sitter for now  -PT/OT  -Behavioral Health Intake line contacted for inpatient psych dispo      #Pyuria with leuk esterase:  #Chronic indwelling keating:    Chronic indwelling keating catheter. UA grossly abnormal. UC with mixed qasim. Afebrile. WBC normal. Symptomology unclear.  -Ceftriaxone 1 g IV q24h     #Chronic HFrEF:    Hx dilated cardiomyopathy. Most recent TTE 6/18/21 showing severely dilated LV with EF 20%. Currently euvolemic on exam. No hypoxia. No pulmonary complaints. Trace LE edema.   -Restart diuretic regimen in the setting of elevated blood pressure  -Decrease Lasix to 40 mg IV BID in the setting of borderline BP  -Daily weights, I&O's     #CAD s/p CABG (1992):    Not ASA or statin at home. No acute concerns.   -Coreg 25mg BID     #HTN:    -Coreg 25mg BID  -Restart PTA diuretic regimen (as above)     #PAF:    Regular on exam. On chronic AC with eliquis. No acute concerns.  -Eliquis 5mg BID     #T2DM:    Diet controlled. Glucose stable.   -Monitor     #KRISTIAN:    Does not use CPAP at home.  -Monitor     #Prostate CA:    Treated with XRT and hormone therapy. Hx urinary retention with chronic indwelling  "keating.     #Gout:    -Continue PTA allopurinol        Diet: Combination Diet Regular Diet Adult    DVT Prophylaxis: DOAC  Keating Catheter: Not present  Central Lines: None  Cardiac Monitoring: None  Code Status: Full Code      Disposition Plan     Expected Discharge Date: 07/05/2022                The patient's care was discussed with the Bedside Nurse and Patient.    Giovany Sanchez DO, LIANA  Hospitalist Service, GOLD TEAM 17  [facility]  Securely message with the Vocera Web Console (learn more here)  Text page via [facility] Paging/Directory   Please see signed in provider for up to date coverage information      Clinically Significant Risk Factors Present on Admission               # Overweight: Estimated body mass index is 25.47 kg/m  as calculated from the following:    Height as of this encounter: 1.753 m (5' 9\").    Weight as of this encounter: 78.2 kg (172 lb 8 oz).        ______________________________________________________________________    Interval History   Per nursing staff, patient is reporting neuropathic pain in BLE.  Patient endorses no specific complaints during interview.  Patient denies chest pain, SOB.  Patient denies abdominal pain, nausea, vomiting.  Patient is pending geriatric psychiatric placement.    Data reviewed today: I reviewed all medications, new labs and imaging results over the last 24 hours. I personally reviewed no images or EKG's today.    Physical Exam   Vital Signs: Temp: 98.9  F (37.2  C) Temp src: Oral BP: 126/59 Pulse: 76   Resp: 16 SpO2: 95 % O2 Device: None (Room air)    Weight: 172 lbs 8 oz    GENERAL: Patient appears well; no acute distress.  HEENT: Normocephalic; atraumatic; PERRLA; MMM.  CV: RRR; normal S1, S2; no rubs, murmurs, or gallops.  RESP: Lung fields clear to aucultation B/L; no wheezing or crepitations.  GI: Abdomen is soft, nontender, nondistended; no organomegaly; normal bowel sounds.  : Deferred genital examination. "   MSK: No clubbing, cyanosis, or edema.  DERM: Skin is intact; no rash, lesions, or skin breakdown.  NEURO: No focal deficits appreciated; strength & sensorium are grossly intact.  PSYCH: No active hallucinations; affect, insight appear within normal limits.    Data   Recent Labs   Lab 07/02/22  0705 06/30/22  0643 06/28/22  1744   WBC 6.1 4.9 3.8*   HGB 12.6* 13.2* 13.1*   MCV 89 93 91    198 187    141 137   POTASSIUM 3.5 3.9 3.8   CHLORIDE 105 103 98   CO2 28 28 28   BUN 16 22.9 32.1*   CR 0.72 0.89 1.08   ANIONGAP 7 10 11   CRYS 8.7 9.4 9.9   * 135* 149*   ALBUMIN 2.7*  --  3.9   PROTTOTAL 6.2*  --  7.0   BILITOTAL 1.3  --  0.6   ALKPHOS 99  --  113   ALT 15  --  14   AST 14  --  29     No results found for this or any previous visit (from the past 24 hour(s)).  Medications       allopurinol  300 mg Oral Daily     apixaban ANTICOAGULANT  5 mg Oral BID     carvedilol  25 mg Oral BID     cefTRIAXone  1 g Intravenous Q24H     furosemide  40 mg Oral BID     loratadine  10 mg Oral Daily     risperiDONE  0.25 mg Oral Daily     risperiDONE  0.5 mg Oral At Bedtime     sodium chloride (PF)  3 mL Intracatheter Q8H     spironolactone  25 mg Oral Daily     Vitamin D3  25 mcg Oral Daily

## 2022-07-02 NOTE — PLAN OF CARE
Pt A/Ox4. Denies hallucinations and SI, 1:1 maintained for safety.    Pt reports mild BL leg pain, declined medication, reports he puts Vaseline on his legs to ease discomfort, Vaseline provided.     Skin tears in groin cleaned and barrier applied per POC. Moderate edema in BLE, scheduled Lasix given.     Louie patent, pale yellow output. Cath cares done.     Pt denies n/t, chx pain and SOB.     R PIV SL.

## 2022-07-02 NOTE — PROGRESS NOTES
07/02/22 1103   Quick Adds   Type of Visit Initial Occupational Therapy Evaluation   Living Environment   People in Home spouse   Current Living Arrangements house   Home Accessibility stairs to enter home;stairs within home   Number of Stairs, Main Entrance 2   Number of Stairs, Within Home, Primary greater than 10 stairs  (13)   Transportation Anticipated family or friend will provide   Living Environment Comments Pt has children living near by who can assist as needed.   Self-Care   Usual Activity Tolerance moderate   Current Activity Tolerance fair   Regular Exercise Yes   Activity/Exercise Type   (Leg raises)   Equipment Currently Used at Home cane, straight   Fall history within last six months yes   Number of times patient has fallen within last six months   (apx 4)   Activity/Exercise/Self-Care Comment Pt reports being IND with mobility and ADLs. Spouse assists with IADLs   General Information   Onset of Illness/Injury or Date of Surgery 06/28/22   Referring Physician Parish Flaherty MD   Patient/Family Therapy Goal Statement (OT) To return home and regain independence   Additional Occupational Profile Info/Pertinent History of Current Problem William Almazan is a 81 year old male with history of CAD s/p CABG, dilated cardiomyopathy, HFrEF, PAF on Eliquis, HTN, HLD, DM2, KRISTIAN, prostate cancer, gout, and dementia who presented with worsening auditory hallucinations   Existing Precautions/Restrictions fall   Cognitive Status Examination   Orientation Status orientation to person, place and time   Cognitive Status Comments Pt reports occasional auditory hallucinations (voices telling him to injure self).   Visual Perception   Visual Impairment/Limitations WFL   Sensory   Sensory Comments BLE tingling   Pain Assessment   Patient Currently in Pain Yes, see Vital Sign flowsheet  (Pain varies between 3-8)   Integumentary/Edema   Integumentary/Edema no deficits were identifed   Posture   Posture  forward head position   Range of Motion Comprehensive   General Range of Motion bilateral upper extremity ROM WFL   Strength Comprehensive (MMT)   Comment, General Manual Muscle Testing (MMT) Assessment Pt is generally deconditioned   Bed Mobility   Bed Mobility supine-sit   Supine-Sit [place] (Bed Mobility) minimum assist (75% patient effort);nonverbal cues (demo/gesture);verbal cues   Assistive Device (Bed Mobility) bed rails;draw sheet   Transfers   Transfers bed-chair transfer;sit-stand transfer   Transfer Skill: Bed to Chair/Chair to Bed   Bed-Chair [place] (Transfers) 2 person assist   Assistive Device (Bed-Chair Transfers)   (SeraSteady)   Sit-Stand Transfer   Sit-Stand [place] (Transfers) 2 person assist   Assistive Device (Sit-Stand Transfers)   (SeraSteady)   Activities of Daily Living   BADL Assessment/Intervention lower body dressing   Lower Body Dressing Assessment/Training   Position (Lower Body Dressing) supported sitting   [place] Level (Lower Body Dressing) minimum assist (75% patient effort);verbal cues   Clinical Impression   Criteria for Skilled Therapeutic Interventions Met (OT) Yes, treatment indicated   OT Diagnosis Decreased IND with ADLs/Mobility   OT Problem List-Impairments impacting ADL activity tolerance impaired;balance;cognition;mobility;strength   Assessment of Occupational Performance 3-5 Performance Deficits   Identified Performance Deficits Functional mobility/trx, dressing, toileting, bathing, functional cognition/safety   Planned Therapy Interventions (OT) ADL retraining;balance training;cognition;transfer training;home program guidelines;progressive activity/exercise   Clinical Decision Making Complexity (OT) moderate complexity   Anticipated Equipment Needs Upon Discharge (OT) lift device;dressing equipment;wheelchair;shower chair   Risk & Benefits of therapy have been explained evaluation/treatment results reviewed;care plan/treatment goals  reviewed;risks/benefits reviewed;current/potential barriers reviewed;participants voiced agreement with care plan;participants included;patient   OT Discharge Planning   OT Discharge Recommendation (DC Rec) Transitional Care Facility;home with assist;home with home care occupational therapy   OT Rationale for DC Rec Patient is well below PLOF and will need ongoing therapy to maximize IND with ADLs and mobility. If pt is able to get a ramp and SeraSteady then pt can return home with assist from spouse/family and HHOT, if not then TCU is more appropriate.   Total Evaluation Time (Minutes)   Total Evaluation Time (Minutes) 10   OT Goals   Therapy Frequency (OT) Daily   OT Predicted Duration/Target Date for Goal Attainment 07/09/22   OT Goals Hygiene/Grooming;Lower Body Dressing;Upper Body Dressing;Toilet Transfer/Toileting;Cognition   OT: Hygiene/Grooming supervision/stand-by assist;using adaptive equipment;while standing   OT: Upper Body Dressing Supervision/stand-by assist   OT: Lower Body Dressing Supervision/stand-by assist;using adaptive equipment;within precautions   OT: Toilet Transfer/Toileting Moderate assist;toilet transfer;cleaning and garment management;using adaptive equipment;within precautions   OT: Cognitive Patient/caregiver will verbalize understanding of cognitive assessment results/recommendations as needed for safe discharge planning

## 2022-07-03 ENCOUNTER — APPOINTMENT (OUTPATIENT)
Dept: PHYSICAL THERAPY | Facility: CLINIC | Age: 82
DRG: 884 | End: 2022-07-03
Attending: INTERNAL MEDICINE
Payer: MEDICARE

## 2022-07-03 ENCOUNTER — APPOINTMENT (OUTPATIENT)
Dept: OCCUPATIONAL THERAPY | Facility: CLINIC | Age: 82
DRG: 884 | End: 2022-07-03
Payer: MEDICARE

## 2022-07-03 LAB
HOLD SPECIMEN: NORMAL
TROPONIN I SERPL HS-MCNC: 46 NG/L

## 2022-07-03 PROCEDURE — 99233 SBSQ HOSP IP/OBS HIGH 50: CPT | Performed by: INTERNAL MEDICINE

## 2022-07-03 PROCEDURE — 250N000013 HC RX MED GY IP 250 OP 250 PS 637: Performed by: PSYCHIATRY & NEUROLOGY

## 2022-07-03 PROCEDURE — 97161 PT EVAL LOW COMPLEX 20 MIN: CPT | Mod: GP

## 2022-07-03 PROCEDURE — 250N000013 HC RX MED GY IP 250 OP 250 PS 637: Performed by: STUDENT IN AN ORGANIZED HEALTH CARE EDUCATION/TRAINING PROGRAM

## 2022-07-03 PROCEDURE — 250N000013 HC RX MED GY IP 250 OP 250 PS 637: Performed by: INTERNAL MEDICINE

## 2022-07-03 PROCEDURE — 97535 SELF CARE MNGMENT TRAINING: CPT | Mod: GO

## 2022-07-03 PROCEDURE — 250N000011 HC RX IP 250 OP 636: Performed by: INTERNAL MEDICINE

## 2022-07-03 PROCEDURE — 36415 COLL VENOUS BLD VENIPUNCTURE: CPT | Performed by: PHYSICIAN ASSISTANT

## 2022-07-03 PROCEDURE — 84484 ASSAY OF TROPONIN QUANT: CPT | Performed by: PHYSICIAN ASSISTANT

## 2022-07-03 PROCEDURE — 97530 THERAPEUTIC ACTIVITIES: CPT | Mod: GP

## 2022-07-03 PROCEDURE — 93005 ELECTROCARDIOGRAM TRACING: CPT

## 2022-07-03 PROCEDURE — 120N000002 HC R&B MED SURG/OB UMMC

## 2022-07-03 RX ORDER — CARVEDILOL 12.5 MG/1
12.5 TABLET ORAL 2 TIMES DAILY
Status: DISCONTINUED | OUTPATIENT
Start: 2022-07-03 | End: 2022-07-03

## 2022-07-03 RX ORDER — CARVEDILOL 12.5 MG/1
12.5 TABLET ORAL 2 TIMES DAILY
Status: DISCONTINUED | OUTPATIENT
Start: 2022-07-03 | End: 2022-08-02 | Stop reason: HOSPADM

## 2022-07-03 RX ADMIN — RISPERIDONE 0.25 MG: 0.25 TABLET ORAL at 10:13

## 2022-07-03 RX ADMIN — Medication 25 MCG: at 10:13

## 2022-07-03 RX ADMIN — CARVEDILOL 12.5 MG: 12.5 TABLET, FILM COATED ORAL at 10:21

## 2022-07-03 RX ADMIN — GABAPENTIN 100 MG: 100 CAPSULE ORAL at 10:13

## 2022-07-03 RX ADMIN — RISPERIDONE 0.5 MG: 0.5 TABLET ORAL at 21:44

## 2022-07-03 RX ADMIN — LORATADINE 10 MG: 10 TABLET ORAL at 10:13

## 2022-07-03 RX ADMIN — APIXABAN 5 MG: 5 TABLET, FILM COATED ORAL at 20:07

## 2022-07-03 RX ADMIN — ACETAMINOPHEN 1000 MG: 500 TABLET ORAL at 10:11

## 2022-07-03 RX ADMIN — ALLOPURINOL 300 MG: 300 TABLET ORAL at 10:13

## 2022-07-03 RX ADMIN — CARVEDILOL 12.5 MG: 12.5 TABLET, FILM COATED ORAL at 20:07

## 2022-07-03 RX ADMIN — APIXABAN 5 MG: 5 TABLET, FILM COATED ORAL at 10:13

## 2022-07-03 RX ADMIN — CEFTRIAXONE SODIUM 1 G: 1 INJECTION, POWDER, FOR SOLUTION INTRAMUSCULAR; INTRAVENOUS at 10:10

## 2022-07-03 RX ADMIN — GABAPENTIN 100 MG: 100 CAPSULE ORAL at 13:13

## 2022-07-03 RX ADMIN — GABAPENTIN 100 MG: 100 CAPSULE ORAL at 20:07

## 2022-07-03 ASSESSMENT — ACTIVITIES OF DAILY LIVING (ADL)
ADLS_ACUITY_SCORE: 42
ADLS_ACUITY_SCORE: 47
ADLS_ACUITY_SCORE: 42
ADLS_ACUITY_SCORE: 47

## 2022-07-03 NOTE — PLAN OF CARE
"Pt A/Ox4, reports occasional disorientation to situation. Pt endorses auditory hallucinations of voicing instructing him to harm himself and his family, he states he tries to ignore the voices and has no intention or desire to harm himself or others. 1:1 maintained for safety.    Pt daughter visited, reported pt is \"zombie-like\" occasionally, though able to carry-on his typical level of conversation, pt sedated later in shift MD notified, PM Risperidone held per MD order.      Pt reports mild BLE pain with movement or touch, scheduled Gabapentin given, pt declined PRN Tylenol. Winces when writer touched leg or repositioned him.      Skin tears in groin cleaned and barrier applied per POC. Moderate edema in BLE, dryness and cracking on ankles and legs, lotion provided.      Louie patent, donna output. Cath cares done.     Pt reported dizziness and nausea w/BP of 98/43 at 21:00, MD notified, Echo complete, labs done, CT of head done. Scheduled Lasix held. Electrolytes WDL, pending final results for remaining tests.      Pt denies n/t, chx pain and SOB.          "

## 2022-07-03 NOTE — PLAN OF CARE
Goal Outcome Evaluation:    Plan of Care Reviewed With: patient, spouse     Overall Patient Progress: improving       Nursing     S- Pt seemed alittle more lethargic in am,  less bright than yesterday..  Better in late morning/afternoon.  Pt able to take steps w walker in rroom w PT  Did well sit to stand  strength improved.     Tolerated reg diet well , ate a large lunch w family here.   Changed keating cath- wife stated was changed 2 weeks ago.     Feet and lower legs still sensitve and tender.  Tyelnol x1 w mild improvement.    No dizziness or nausea today.  Coreg dose decreased  Pt states still hears voices.  Doesn't know or remenber what they said  A- stronger,  R- enc chair for meals.  Use walker to take steps to commode and chair and in room  Continue sitter for safety,   enc po fluids and nutrtion  Wxplained to wife some people do straight caths for urniary retention.  SHe is interested to learn more.

## 2022-07-03 NOTE — PLAN OF CARE
Sitter at bedside for patient safety - denies SI or plan - confirmed auditory hallucinations during the evening but confirms no plan to act on them. Patient slept throughout the shift - was able to arouse with voice. Nurse provided reassurance, choices when available, and empowered patient's participation in requests when appropriate.     Moderate edema persisted in BLE; pain 2/10 with movement of legs. No PRN medications given this shift;    Chronic keating catheter in place with adequate output; Last BM 7/2/2022    Orthostatic BPs obtained as requested - paged acute cross over MD with no reply.    Assist x2 with gait belt and jose steady.     L forearm PIV saline locked;

## 2022-07-03 NOTE — PROVIDER NOTIFICATION
"Pt BP 98/43, reporting nausea and dizziness. Pt holding to side rail stating he felt like he was \"falling or about to fall.\" MD cross-coverage paged. CT, echo and labs ordered.  "

## 2022-07-03 NOTE — PROVIDER NOTIFICATION
Brief Medicine Cross-Cover Note:    Was notified by RN regarding nausea and dizziness. No focal neuro deficits. Patient has been undergoing diuresis. Lasix and Spironolactone placed on hold for now, please resume in the morning if patient still appearing volume up. EKG with sinus rhythm first degree AV block with PVCs, fusion complexes and RBBB. He had an EKG 7/7/21 at [facility] which had a first degree AV block, PVCs, fusion complexes and RBBB, so these findings do not appear new. K 3.4, Mag 2.0, Troponin 44, hemoglobin 11.5.   - Delta troponin ordered.   - CT Head WO ordered as patient is chronically anticoagulated and there was concern on previous CT Head 6/28 for loss of gray-white matter differentiation in the right post central gyrus. He is at risk for unwitnessed falls as well. Pending repeat CT findings, if symptoms persist, would recommend MR imaging tomorrow.   - Neuro checks ordered.   - Possibility it is related to his Risperdal as well, daughter was reporting some concerns that he is more somnolent than usual since this medications was started.    Michael Henson PA-C on 7/2/2022 at 10:14 PM

## 2022-07-03 NOTE — PROGRESS NOTES
07/03/22 1301   Quick Adds   Type of Visit Initial PT Evaluation       Present no   Language English   Living Environment   People in Home spouse   Current Living Arrangements house   Home Accessibility stairs to enter home;stairs within home   Number of Stairs, Main Entrance 2   Stair Railings, Main Entrance none   Number of Stairs, Within Home, Primary greater than 10 stairs  (flight)   Stair Railings, Within Home, Primary   (single rail)   Transportation Anticipated family or friend will provide   Self-Care   Usual Activity Tolerance moderate   Current Activity Tolerance fair   Regular Exercise Yes   Activity/Exercise Type walking   Exercise Amount/Frequency daily   Equipment Currently Used at Home cane, straight   Fall history within last six months yes   Activity/Exercise/Self-Care Comment Patient previously able to go to the bathroom and shower independently   General Information   Onset of Illness/Injury or Date of Surgery 07/03/22   Referring Physician Giovany Sanchez, DO   Patient/Family Therapy Goals Statement (PT) To get back to using his cane   Pertinent History of Current Problem (include personal factors and/or comorbidities that impact the POC) 81 year old male with history of CAD s/p CABG, dilated cardiomyopathy, HFrEF, PAF on Eliquis, HTN, HLD, DM2, KRISTIAN, prostate cancer, gout, and dementia who presented with worsening auditory hallucinations   Existing Precautions/Restrictions fall  (1:1 sitter if family not present)   Weight-Bearing Status - LUE full weight-bearing   Weight-Bearing Status - RUE full weight-bearing   Weight-Bearing Status - LLE full weight-bearing   Weight-Bearing Status - RLE full weight-bearing   General Observations Supine in bed upon arrival, pleasant and agreeable   Cognition   Affect/Mental Status (Cognition) confused  (auditory hallucinations)   Orientation Status (Cognition) oriented x 3   Follows Commands (Cognition) follows two-step commands;50-74%  accuracy   Safety Deficit (Cognition) minimal deficit   Memory Deficit (Cognition) minimal deficit   Pain Assessment   Patient Currently in Pain No   Integumentary/Edema   Integumentary/Edema no deficits were identifed   Posture    Posture Forward head position;Protracted shoulders;Kyphosis   Posture Comments Moderate sitting EOB and standing   Range of Motion (ROM)   ROM Comment Did not formally assess, demonstrates functional ROM with mobility   Strength (Manual Muscle Testing)   Strength Comments Did not formally assess, patient generally deconditioned with decreased activity tolerance   Bed Mobility   Comment, (Bed Mobility) Completes supine to sit transfer with HOB slightly elevated, side rail and light Jeffery   Transfers   Comment, (Transfers) Completes sit<>stand transfer with Jeffery   Gait/Stairs (Locomotion)   Comment, (Gait/Stairs) Takes steps in room with Jeffery using walker   Balance   Balance Comments Supervision sitting balance, CGA to Jeffery standing balance with UE support from walker   Sensory Examination   Sensory Perception WNL   Clinical Impression   Criteria for Skilled Therapeutic Intervention Yes, treatment indicated   PT Diagnosis (PT) impaired bed mobility, transfers, ambulation   Influenced by the following impairments impaired cognition, decreased activity tolerance   Functional limitations due to impairments impaired bed mobility, transfers and ambulation   Clinical Presentation (PT Evaluation Complexity) Evolving/Changing   Clinical Presentation Rationale Per clinical judgment   Clinical Decision Making (Complexity) moderate complexity   Planned Therapy Interventions (PT) balance training;bed mobility training;gait training;home exercise program;neuromuscular re-education;stair training;strengthening;transfer training;progressive activity/exercise;risk factor education;home program guidelines   Anticipated Equipment Needs at Discharge (PT) walker, rolling   Risk & Benefits of therapy have been  explained evaluation/treatment results reviewed;care plan/treatment goals reviewed;risks/benefits reviewed;current/potential barriers reviewed   PT Discharge Planning   PT Discharge Recommendation (DC Rec) home with assist;home with home care physical therapy;Transitional Care Facility   PT Rationale for DC Rec PT: At this time dual plan pending LOS and progress. Based on today's session would recommend rehab but will continue to assess. Per MD will be going to inpatient psych. Will hope to continue on that unit for progression   PT Brief overview of current status PT: Saji for bed mobility, transfers and short distance ambulation with walker   Total Evaluation Time   Total Evaluation Time (Minutes) 10   Physical Therapy Goals   PT Frequency 5x/week   PT Predicted Duration/Target Date for Goal Attainment 07/10/22   PT Goals Bed Mobility;Transfers;Gait;Stairs   PT: Bed Mobility Independent;Supine to/from sit   PT: Transfers Modified independent;Sit to/from stand;Bed to/from chair;Assistive device   PT: Gait Modified independent;Assistive device;100 feet   PT: Stairs Supervision/stand-by assist;6 stairs;Rail on left;Rail on right

## 2022-07-03 NOTE — PROGRESS NOTES
[facility]    Medicine Progress Note - Hospitalist Service, GOLD TEAM 17    Date of Admission:  6/28/2022    Assessment & Plan          William Almazan is a 81 year old male with history of CAD s/p CABG, dilated cardiomyopathy, HFrEF, PAF on Eliquis, HTN, HLD, DM2, KRISTIAN, prostate cancer, gout, and dementia who presented with worsening auditory hallucinations.     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 Present since ~2020 following death of son. Denies worsening depression or anxiety. No visual or tactile hallucinations. Family not able to manage at home.   -Consulted psychiatry, appreciate input   -Start risperidone per psych  -Continue 1:1 sitter for now  -PT/OT  -Behavioral Health Intake line contacted for inpatient psych dispo    -Repeat psych consult Tuesday, 7/5/2022    #Pyuria with leuk esterase:  #Chronic indwelling keating:    Chronic indwelling keating catheter. UA grossly abnormal. UC with mixed qasim. Afebrile. WBC normal. Symptomology unclear.  -Ceftriaxone 1 g IV q24h     #Chronic HFrEF:    Hx dilated cardiomyopathy. Most recent TTE 6/18/21 showing severely dilated LV with EF 20%. Currently euvolemic on exam. No hypoxia. No pulmonary complaints. Trace LE edema.   -Patient cannot hemodynamically tolerate diuretic regimen  -Will decreased Coreg to 12.5 mg PO BID in the setting of hypotension  -Daily weights, I&O's     #CAD s/p CABG (1992):    Not ASA or statin at home. No acute concerns.   -Will decreased Coreg to 12.5 mg PO BID in the setting of hypotension     #HTN:    -Will decreased Coreg to 12.5 mg PO BID in the setting of hypotension  -Patient cannot hemodynamically tolerate diuretic regimen     #PAF:    Regular on exam. On chronic AC with eliquis. No acute concerns.  -Eliquis 5mg BID     #T2DM:    Diet controlled. Glucose stable.    -Monitor     #KRISTIAN:    Does not use CPAP at home.  -Monitor     #Prostate CA:    Treated with XRT and hormone therapy. Hx urinary retention with chronic indwelling keating.     #Gout:    -Continue PTA allopurinol        Diet: Combination Diet Regular Diet Adult    DVT Prophylaxis: DOAC  Keating Catheter: PRESENT, indication: Retention  Central Lines: None  Cardiac Monitoring: None  Code Status: Full Code      Disposition Plan      Expected Discharge Date: 07/05/2022    Discharge Delays: Placement - Mental Health/Addiction/Geripsych  Specialist Consult (enter specialist & decision needed in comments)            The patient's care was discussed with the Bedside Nurse, Care Coordinator/ and Patient.    Giovany Sanchez,   Hospitalist Service, GOLD TEAM [address] [place] [address]  Securely message with the Vocera Web Console (learn more here)  Text page via [employer] Paging/Directory   Please see signed in provider for up to date coverage information      Clinically Significant Risk Factors Present on Admission                      ______________________________________________________________________    Interval History   Unable to obtain comprehensive ROS at present 2/2 altered mental status.  Patient cannot hemodynamically tolerate diuretic regimen  Will decreased Coreg to 12.5 mg PO BID in the setting of hypotension    Data reviewed today: I reviewed all medications, new labs and imaging results over the last 24 hours. I personally reviewed no images or EKG's today.    Physical Exam   Vital Signs: Temp: 98.9  F (37.2  C) Temp src: Oral BP: 114/56 Pulse: 56   Resp: 16 SpO2: 98 % O2 Device: None (Room air)    Weight: 181 lbs 9.6 oz    GENERAL: Patient appears well; no acute distress.  HEENT: Normocephalic; atraumatic; PERRLA; MMM.  CV: RRR; normal S1, S2; no rubs, murmurs, or gallops.  RESP: Lung fields clear to aucultation B/L; no wheezing or crepitations.  GI: Abdomen is soft,  nontender, nondistended; no organomegaly; normal bowel sounds.  : Deferred genital examination.   MSK: No clubbing, cyanosis, or edema.  DERM: Skin is intact; no rash, lesions, or skin breakdown.  NEURO: No focal deficits appreciated; strength & sensorium are grossly intact.  PSYCH: No active hallucinations; affect, insight appear within normal limits.    Data   Recent Labs   Lab 07/02/22  2115 07/02/22  0705 06/30/22  0643 06/28/22  1744   WBC  --  6.1 4.9 3.8*   HGB 11.5* 12.6* 13.2* 13.1*   MCV  --  89 93 91   PLT  --  150 198 187   NA  --  140 141 137   POTASSIUM 3.4 3.5 3.9 3.8   CHLORIDE  --  105 103 98   CO2  --  28 28 28   BUN  --  16 22.9 32.1*   CR  --  0.72 0.89 1.08   ANIONGAP  --  7 10 11   CRYS  --  8.7 9.4 9.9   GLC  --  131* 135* 149*   ALBUMIN  --  2.7*  --  3.9   PROTTOTAL  --  6.2*  --  7.0   BILITOTAL  --  1.3  --  0.6   ALKPHOS  --  99  --  113   ALT  --  15  --  14   AST  --  14  --  29     Recent Results (from the past 24 hour(s))   CT Head w/o Contrast    Narrative    CT HEAD W/O CONTRAST 7/2/2022 10:11 PM    Provided History: sudden nausea and dizziness, anticoagulated    Comparison: Head CT 6/28/2022.    Technique: Using multidetector thin collimation helical acquisition  technique, axial, coronal and sagittal CT images from the skull base  to the vertex were obtained without intravenous contrast.     Findings:    No intracranial hemorrhage. No mass effect. No midline shift. No  extra-axial fluid collection. The gray to white matter differentiation  of the cerebral hemispheres is preserved. Ventricles are proportionate  to the sulci. Mild generalized cerebral atrophy. Periventricular and  subcortical white matter hypoattenuation is nonspecific but likely  represents chronic small vessel ischemic disease in patient this age.  No sulcal effacement. The basal cisterns are patent. Virtually empty  sella.    Paranasal sinuses are relatively clear. The mastoid air cells are  clear. Orbits appear  unremarkable. No acute fracture.      Impression    Impression: No acute intracranial pathology. No significant change  since 6/28/2022.    I have personally reviewed the examination and initial interpretation  and I agree with the findings.    LIZBET IBARRA MD         SYSTEM ID:  [id number]     Medications       allopurinol  300 mg Oral Daily     apixaban ANTICOAGULANT  5 mg Oral BID     carvedilol  12.5 mg Oral BID     cefTRIAXone  1 g Intravenous Q24H     [Held by provider] furosemide  40 mg Oral BID     gabapentin  100 mg Oral TID     loratadine  10 mg Oral Daily     risperiDONE  0.25 mg Oral Daily     risperiDONE  0.5 mg Oral At Bedtime     sodium chloride (PF)  3 mL Intracatheter Q8H     [Held by provider] spironolactone  25 mg Oral Daily     Vitamin D3  25 mcg Oral Daily

## 2022-07-04 ENCOUNTER — APPOINTMENT (OUTPATIENT)
Dept: OCCUPATIONAL THERAPY | Facility: CLINIC | Age: 82
DRG: 884 | End: 2022-07-04
Payer: MEDICARE

## 2022-07-04 LAB
ALBUMIN SERPL-MCNC: 2.1 G/DL (ref 3.4–5)
ALP SERPL-CCNC: 81 U/L (ref 40–150)
ALT SERPL W P-5'-P-CCNC: 15 U/L (ref 0–70)
ANION GAP SERPL CALCULATED.3IONS-SCNC: 4 MMOL/L (ref 3–14)
AST SERPL W P-5'-P-CCNC: 15 U/L (ref 0–45)
ATRIAL RATE - MUSE: 65 BPM
BASOPHILS # BLD AUTO: 0 10E3/UL (ref 0–0.2)
BASOPHILS NFR BLD AUTO: 0 %
BILIRUB SERPL-MCNC: 0.3 MG/DL (ref 0.2–1.3)
BUN SERPL-MCNC: 16 MG/DL (ref 7–30)
CALCIUM SERPL-MCNC: 8.8 MG/DL (ref 8.5–10.1)
CHLORIDE BLD-SCNC: 103 MMOL/L (ref 94–109)
CO2 SERPL-SCNC: 31 MMOL/L (ref 20–32)
CREAT SERPL-MCNC: 0.7 MG/DL (ref 0.66–1.25)
DIASTOLIC BLOOD PRESSURE - MUSE: NORMAL MMHG
EOSINOPHIL # BLD AUTO: 0.1 10E3/UL (ref 0–0.7)
EOSINOPHIL NFR BLD AUTO: 2 %
ERYTHROCYTE [DISTWIDTH] IN BLOOD BY AUTOMATED COUNT: 15.4 % (ref 10–15)
GFR SERPL CREATININE-BSD FRML MDRD: >90 ML/MIN/1.73M2
GLUCOSE BLD-MCNC: 105 MG/DL (ref 70–99)
HCT VFR BLD AUTO: 33 % (ref 40–53)
HGB BLD-MCNC: 10.8 G/DL (ref 13.3–17.7)
IMM GRANULOCYTES # BLD: 0 10E3/UL
IMM GRANULOCYTES NFR BLD: 0 %
INTERPRETATION ECG - MUSE: NORMAL
LYMPHOCYTES # BLD AUTO: 0.6 10E3/UL (ref 0.8–5.3)
LYMPHOCYTES NFR BLD AUTO: 10 %
MCH RBC QN AUTO: 28.8 PG (ref 26.5–33)
MCHC RBC AUTO-ENTMCNC: 32.7 G/DL (ref 31.5–36.5)
MCV RBC AUTO: 88 FL (ref 78–100)
MONOCYTES # BLD AUTO: 0.7 10E3/UL (ref 0–1.3)
MONOCYTES NFR BLD AUTO: 11 %
NEUTROPHILS # BLD AUTO: 4.6 10E3/UL (ref 1.6–8.3)
NEUTROPHILS NFR BLD AUTO: 77 %
NRBC # BLD AUTO: 0 10E3/UL
NRBC BLD AUTO-RTO: 0 /100
P AXIS - MUSE: 52 DEGREES
PLATELET # BLD AUTO: 145 10E3/UL (ref 150–450)
POTASSIUM BLD-SCNC: 3.8 MMOL/L (ref 3.4–5.3)
PR INTERVAL - MUSE: 270 MS
PROT SERPL-MCNC: 5.8 G/DL (ref 6.8–8.8)
QRS DURATION - MUSE: 170 MS
QT - MUSE: 454 MS
QTC - MUSE: 472 MS
R AXIS - MUSE: -71 DEGREES
RBC # BLD AUTO: 3.75 10E6/UL (ref 4.4–5.9)
SODIUM SERPL-SCNC: 138 MMOL/L (ref 133–144)
SYSTOLIC BLOOD PRESSURE - MUSE: NORMAL MMHG
T AXIS - MUSE: 89 DEGREES
VENTRICULAR RATE- MUSE: 65 BPM
WBC # BLD AUTO: 6 10E3/UL (ref 4–11)

## 2022-07-04 PROCEDURE — 250N000013 HC RX MED GY IP 250 OP 250 PS 637: Performed by: PHYSICIAN ASSISTANT

## 2022-07-04 PROCEDURE — 250N000013 HC RX MED GY IP 250 OP 250 PS 637: Performed by: INTERNAL MEDICINE

## 2022-07-04 PROCEDURE — 36415 COLL VENOUS BLD VENIPUNCTURE: CPT | Performed by: INTERNAL MEDICINE

## 2022-07-04 PROCEDURE — 97535 SELF CARE MNGMENT TRAINING: CPT | Mod: GO | Performed by: OCCUPATIONAL THERAPIST

## 2022-07-04 PROCEDURE — 97110 THERAPEUTIC EXERCISES: CPT | Mod: GO | Performed by: OCCUPATIONAL THERAPIST

## 2022-07-04 PROCEDURE — 80053 COMPREHEN METABOLIC PANEL: CPT | Performed by: INTERNAL MEDICINE

## 2022-07-04 PROCEDURE — 99233 SBSQ HOSP IP/OBS HIGH 50: CPT | Performed by: INTERNAL MEDICINE

## 2022-07-04 PROCEDURE — 120N000002 HC R&B MED SURG/OB UMMC

## 2022-07-04 PROCEDURE — 85025 COMPLETE CBC W/AUTO DIFF WBC: CPT | Performed by: INTERNAL MEDICINE

## 2022-07-04 PROCEDURE — 250N000013 HC RX MED GY IP 250 OP 250 PS 637: Performed by: PSYCHIATRY & NEUROLOGY

## 2022-07-04 RX ADMIN — ACETAMINOPHEN 1000 MG: 500 TABLET ORAL at 00:04

## 2022-07-04 RX ADMIN — RISPERIDONE 0.25 MG: 0.25 TABLET ORAL at 09:19

## 2022-07-04 RX ADMIN — ALLOPURINOL 300 MG: 300 TABLET ORAL at 09:19

## 2022-07-04 RX ADMIN — Medication 1 MG: at 00:04

## 2022-07-04 RX ADMIN — GABAPENTIN 100 MG: 100 CAPSULE ORAL at 09:19

## 2022-07-04 RX ADMIN — APIXABAN 5 MG: 5 TABLET, FILM COATED ORAL at 22:07

## 2022-07-04 RX ADMIN — GABAPENTIN 100 MG: 100 CAPSULE ORAL at 22:07

## 2022-07-04 RX ADMIN — Medication 25 MCG: at 09:18

## 2022-07-04 RX ADMIN — LORATADINE 10 MG: 10 TABLET ORAL at 09:19

## 2022-07-04 RX ADMIN — CARVEDILOL 12.5 MG: 12.5 TABLET, FILM COATED ORAL at 22:07

## 2022-07-04 RX ADMIN — GABAPENTIN 100 MG: 100 CAPSULE ORAL at 14:01

## 2022-07-04 RX ADMIN — APIXABAN 5 MG: 5 TABLET, FILM COATED ORAL at 09:19

## 2022-07-04 RX ADMIN — RISPERIDONE 0.5 MG: 0.5 TABLET ORAL at 22:07

## 2022-07-04 RX ADMIN — CARVEDILOL 12.5 MG: 12.5 TABLET, FILM COATED ORAL at 09:19

## 2022-07-04 ASSESSMENT — ACTIVITIES OF DAILY LIVING (ADL)
ADLS_ACUITY_SCORE: 42
ADLS_ACUITY_SCORE: 41
ADLS_ACUITY_SCORE: 42
ADLS_ACUITY_SCORE: 41
ADLS_ACUITY_SCORE: 41
ADLS_ACUITY_SCORE: 42

## 2022-07-04 NOTE — PLAN OF CARE
"Goal Outcome Evaluation:    Plan of Care Reviewed With: patient     A&Ox4- CMS intact- denies numbness/tingling, SOB, and chest pain.   LBM: yesterday  Denies pain at this time.   Resting comfortably in bed    Reports auditory hallucinations- \"trump telling me to hurt myself.\" However pt denies suicidal ideations.  Louie in place- patent.  1:1 sitter at the bedside for safety.   "

## 2022-07-04 NOTE — CONSULTS
Triage and Transition - Consult and Liaison     William Almazan  July 4, 2022      Psychiatry consult acknowledged.     Writer has been informed the psychiatric medication reviews and recommendations will resume on 7/6/22.  Pt will be seen as soon as possible.     DERRICK MANJARREZ MSW, [facility]  Triage and Transition - Consult and Liaison   [phone]

## 2022-07-04 NOTE — PLAN OF CARE
Pt A/Ox4, w/ occasional disorientation to situation. Pt still endorses auditory hallucinations of voices instructing him to harm himself, states he tries to ignore the voices and has no intention or desire to harm himself or others. 1:1 maintained for safety.    Pt declined OOB on shift, up Ax1 w/walker + gb on AM shift.      Pt denies n/t, chx pain, dizziness and SOB.     Pt reports mild BLE pain with movement or touch, scheduled Gabapentin given. Winces when writer touched legs or repositioned him.      Groin wound skin intact, pink, cleaned and barrier applied per POC. Mild edema in BLE, dryness and cracking on ankles and legs, lotion provided.      Jacy patent, donna output. Cath cares done. Louie replaced on AM shift.

## 2022-07-04 NOTE — PLAN OF CARE
"Patient confirms auditory hallucinations present at times but no plan to self harm. Patient verbalized understanding of on going safety plan.     Patient awake 0330 - 0530 - patient requesting organization of items in room after waking from sleep. 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 Patient ended conversation saying \"well I better go back to sleep\".     BLE pain/\"discomfort\" persists for patient when repositioning legs - edema improved within the last 24 hours; PRN tylenol x1 3/10 pain score;     PRN melatonin given per patient request;     AO x3 - reoriented to date/day of the week; patient pleasant and cooperative with ordered cares; he is able to make his needs known.     VSS throughout shift - denies SOB, N/V, N/T     Patient repositioned q2h. VS q4h. Bedside attendant.     Chronic catheter in place for retention; adequate output;  "

## 2022-07-04 NOTE — TELEPHONE ENCOUNTER
Inpatient Bed Call Log 7/4/2022 Morning done at 7:05a    Adults:             Outside Placement    [facility] is posting 0 beds.     [facility] is posting 0 beds.    [facility] is posting 0 beds.    [facility] is posting 0 beds.    [facility] is posting 0 beds.    [facility] is posting 0 beds.    [facility] is posting 0 beds.    [facility] is posting 0 beds.      [facility] is posting 1 beds. Mixed unit 12+. Low acuity only.      is positing 0 beds. No aggression.     [facility] is posting 0 beds.     [facility] is posting 0 beds. Low acuity only.    [facility] is posting 1 beds.    [facility] is posting 1 beds. Low acuity.     [facility] is posting 2 beds. 72 hr hold required.     [facility] is posting 0 beds.       [facility] is posting 0 beds. Vol only, No Hx of aggression, violence or assault. No sexual offenders. No 72 hr holds.    [facility] is posting 5 beds. (Must have the cognitive ability to do programming. No aggressive or violent behavior or recent HX in the last 2 yrs. MH must be primary.)    [facility] is posting 0 beds. Low acuity only. Violence and aggression capped.     [facility] is posting 0 beds. Low acuity, Neg Covid.     [facility] is posting 0 beds. Covid neg. Vol only. Combined adolescent and adult unit. No aggressive or violent behavior. No registered sex offenders.     [facility] is posting 8 beds. Call for details.      [facility] is posting 4 beds.    7:30am No appropriate bed available.

## 2022-07-04 NOTE — PROGRESS NOTES
[facility]    Medicine Progress Note - Hospitalist Service, GOLD TEAM 17    Date of Admission:  6/28/2022    Assessment & Plan          William Almazan is a 81 year old male with history of CAD s/p CABG, dilated cardiomyopathy, HFrEF, PAF on Eliquis, HTN, HLD, DM2, KRISTIAN, prostate cancer, gout, and dementia who presented with worsening auditory hallucinations.     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 Present since ~2020 following death of son. Denies worsening depression or anxiety. No visual or tactile hallucinations. Family not able to manage at home.   -Consulted psychiatry, appreciate input   -Start risperidone per psych  -Continue 1:1 sitter for now  -PT/OT  -Behavioral Health Intake line contacted for inpatient psych dispo    -Repeat psych consult Tuesday, 7/5/2022    #Pyuria with leuk esterase:  #Chronic indwelling keating:    Chronic indwelling keating catheter. UA grossly abnormal. UC with mixed qasim. Afebrile. WBC normal. Symptomology unclear.  -Ceftriaxone 1 g IV q24h x five days (completed)     #Chronic HFrEF:    Hx dilated cardiomyopathy. Most recent TTE 6/18/21 showing severely dilated LV with EF 20%. Currently euvolemic on exam. No hypoxia. No pulmonary complaints. Trace LE edema.   -Patient cannot hemodynamically tolerate diuretic regimen  -Will decreased Coreg to 12.5 mg PO BID in the setting of hypotension  -Daily weights, I&O's     #CAD s/p CABG (1992):    Not ASA or statin at home. No acute concerns.   -Will decreased Coreg to 12.5 mg PO BID in the setting of hypotension     #HTN:    -Will decreased Coreg to 12.5 mg PO BID in the setting of hypotension  -Patient cannot hemodynamically tolerate diuretic regimen     #PAF:    Regular on exam. On chronic AC with eliquis. No acute concerns.  -Eliquis 5mg BID     #T2DM:    Diet controlled.  Glucose stable.   -Monitor     #KRISTIAN:    Does not use CPAP at home.  -Monitor     #Prostate CA:    Treated with XRT and hormone therapy. Hx urinary retention with chronic indwelling keating.     #Gout:    -Continue PTA allopurinol        Diet: Combination Diet Regular Diet Adult    DVT Prophylaxis: DOAC  Keating Catheter: PRESENT, indication: Retention, Retention  Central Lines: None  Cardiac Monitoring: None  Code Status: Full Code      Disposition Plan     Expected Discharge Date: 07/05/2022    Discharge Delays: Placement - Mental Health/Addiction/Geripsych  Specialist Consult (enter specialist & decision needed in comments)            The patient's care was discussed with the Bedside Nurse and Patient.    Giovany Sanchez,   Hospitalist Service, GOLD TEAM [address] [place] [address]  Securely message with the Vocera Web Console (learn more here)  Text page via [employer] Paging/Directory   Please see signed in provider for up to date coverage information      Clinically Significant Risk Factors Present on Admission                      ______________________________________________________________________    Interval History   Patient appears to be in excellent spirits.  Patient is cognizant and coherent.  Patient denies any specific complaints.  Patient reports gabapentin is improving LE discomfort.    Data reviewed today: I reviewed all medications, new labs and imaging results over the last 24 hours. I personally reviewed no images or EKG's today.    Physical Exam   Vital Signs: Temp: 97.4  F (36.3  C) Temp src: Oral BP: 132/59 Pulse: 68   Resp: 16 SpO2: 98 % O2 Device: None (Room air)    Weight: 181 lbs 9.6 oz     GENERAL: Patient appears well; no acute distress.  HEENT: Normocephalic; atraumatic; PERRLA; MMM.  CV: Irregularly irregular rhythm; normal S1, S2; no rubs, murmurs, or gallops.  RESP: Lung fields clear to aucultation B/L; no wheezing or crepitations.  GI: Abdomen is soft,  nontender, nondistended; no organomegaly; normal bowel sounds.  : Deferred genital examination.   MSK: No clubbing, cyanosis, or edema.  DERM: Skin is intact; no rash, lesions, or skin breakdown.  NEURO: No focal deficits appreciated; strength & sensorium are grossly intact.  PSYCH: No active hallucinations; affect, insight appear within normal limits.    Data   Recent Labs   Lab 07/04/22  0701 07/02/22  2115 07/02/22  0705 06/30/22  0643   WBC 6.0  --  6.1 4.9   HGB 10.8* 11.5* 12.6* 13.2*   MCV 88  --  89 93   *  --  150 198     --  140 141   POTASSIUM 3.8 3.4 3.5 3.9   CHLORIDE 103  --  105 103   CO2 31  --  28 28   BUN 16  --  16 22.9   CR 0.70  --  0.72 0.89   ANIONGAP 4  --  7 10   CRYS 8.8  --  8.7 9.4   *  --  131* 135*   ALBUMIN 2.1*  --  2.7*  --    PROTTOTAL 5.8*  --  6.2*  --    BILITOTAL 0.3  --  1.3  --    ALKPHOS 81  --  99  --    ALT 15  --  15  --    AST 15  --  14  --      No results found for this or any previous visit (from the past 24 hour(s)).  Medications       allopurinol  300 mg Oral Daily     apixaban ANTICOAGULANT  5 mg Oral BID     carvedilol  12.5 mg Oral BID     cefTRIAXone  1 g Intravenous Q24H     [Held by provider] furosemide  40 mg Oral BID     gabapentin  100 mg Oral TID     loratadine  10 mg Oral Daily     risperiDONE  0.25 mg Oral Daily     risperiDONE  0.5 mg Oral At Bedtime     sodium chloride (PF)  3 mL Intracatheter Q8H     [Held by provider] spironolactone  25 mg Oral Daily     Vitamin D3  25 mcg Oral Daily

## 2022-07-05 ENCOUNTER — TELEPHONE (OUTPATIENT)
Dept: BEHAVIORAL HEALTH | Facility: CLINIC | Age: 82
End: 2022-07-05

## 2022-07-05 ENCOUNTER — APPOINTMENT (OUTPATIENT)
Dept: OCCUPATIONAL THERAPY | Facility: CLINIC | Age: 82
DRG: 884 | End: 2022-07-05
Payer: MEDICARE

## 2022-07-05 ENCOUNTER — APPOINTMENT (OUTPATIENT)
Dept: PHYSICAL THERAPY | Facility: CLINIC | Age: 82
DRG: 884 | End: 2022-07-05
Payer: MEDICARE

## 2022-07-05 PROCEDURE — 97535 SELF CARE MNGMENT TRAINING: CPT | Mod: GO

## 2022-07-05 PROCEDURE — 120N000002 HC R&B MED SURG/OB UMMC

## 2022-07-05 PROCEDURE — 250N000013 HC RX MED GY IP 250 OP 250 PS 637: Performed by: INTERNAL MEDICINE

## 2022-07-05 PROCEDURE — 99233 SBSQ HOSP IP/OBS HIGH 50: CPT | Performed by: INTERNAL MEDICINE

## 2022-07-05 PROCEDURE — 97530 THERAPEUTIC ACTIVITIES: CPT | Mod: GP

## 2022-07-05 PROCEDURE — 99207 PR CDG-CUT & PASTE-POTENTIAL IMPACT ON LEVEL: CPT | Performed by: INTERNAL MEDICINE

## 2022-07-05 PROCEDURE — 97116 GAIT TRAINING THERAPY: CPT | Mod: GP

## 2022-07-05 PROCEDURE — 250N000013 HC RX MED GY IP 250 OP 250 PS 637: Performed by: PHYSICIAN ASSISTANT

## 2022-07-05 PROCEDURE — 250N000013 HC RX MED GY IP 250 OP 250 PS 637: Performed by: PSYCHIATRY & NEUROLOGY

## 2022-07-05 RX ADMIN — Medication 1 MG: at 00:24

## 2022-07-05 RX ADMIN — CARVEDILOL 12.5 MG: 12.5 TABLET, FILM COATED ORAL at 08:03

## 2022-07-05 RX ADMIN — RISPERIDONE 0.5 MG: 0.5 TABLET ORAL at 21:35

## 2022-07-05 RX ADMIN — GABAPENTIN 100 MG: 100 CAPSULE ORAL at 20:08

## 2022-07-05 RX ADMIN — ALLOPURINOL 300 MG: 300 TABLET ORAL at 08:04

## 2022-07-05 RX ADMIN — APIXABAN 5 MG: 5 TABLET, FILM COATED ORAL at 08:03

## 2022-07-05 RX ADMIN — Medication 25 MCG: at 08:03

## 2022-07-05 RX ADMIN — GABAPENTIN 100 MG: 100 CAPSULE ORAL at 08:06

## 2022-07-05 RX ADMIN — CARVEDILOL 12.5 MG: 12.5 TABLET, FILM COATED ORAL at 20:07

## 2022-07-05 RX ADMIN — APIXABAN 5 MG: 5 TABLET, FILM COATED ORAL at 20:08

## 2022-07-05 RX ADMIN — LORATADINE 10 MG: 10 TABLET ORAL at 08:03

## 2022-07-05 RX ADMIN — RISPERIDONE 0.25 MG: 0.25 TABLET ORAL at 08:08

## 2022-07-05 RX ADMIN — ACETAMINOPHEN 1000 MG: 500 TABLET ORAL at 00:24

## 2022-07-05 RX ADMIN — GABAPENTIN 100 MG: 100 CAPSULE ORAL at 13:32

## 2022-07-05 ASSESSMENT — ACTIVITIES OF DAILY LIVING (ADL)
ADLS_ACUITY_SCORE: 41

## 2022-07-05 NOTE — TELEPHONE ENCOUNTER
0041 Bed Search Update:    [facility]-No Irene psych  [facility]-No beds available.  No dementia or cognitive disorders.  [facility]-No beds available  [facility]-No beds available  [facility]-No beds available  [facility]-Low acuity only.    [facility]-No beds available.  Must be completely independent with ADLs as there are no Rochelle to assist with cares  [facility]-No beds available  [facility]-No beds available  [facility]0046 Per Maria Del Carmen, they are unable to review admissions after hours.  Requesting clinical be faxed and someone will review in AM. Clinical faxed to [phone] at [phone].  Awaiting CB from [facility] in AM.  [facility]-No beds available  [facility]-No beds available.    Remains on wait list.

## 2022-07-05 NOTE — TELEPHONE ENCOUNTER
R: Pt is currently at [facility] 8A [facility] awaiting appropriate bed placement    8:30a Note provided from Intake on 7/5/2022 at 12:41a, pt is being reviewed by India Moyer. Intake awaiting response.      1:15p Intake called Gavin Serra) for review of pt. Gavin Zhao informed Intake to fax clinical for review.    1:22p Intake called India Moyer for update on pt's review. Intake unable to reach provider, left VM message.     2:20p Intake received notification from [place] (Yanci) who informed that they are at capacity at this time with no availability this week.

## 2022-07-05 NOTE — PLAN OF CARE
Goal Outcome Evaluation:    Alert and oriented x4. VSS on RA. 1:1 sitter for safety, denies SI at this time. Chronic indwelling keating WDL, for retention. Reg diet. Up Ax1 to bedside commode. Pain managed with PRN tylenol. L PIV SL. Plan for repeat psych consult 7/5.

## 2022-07-05 NOTE — PROGRESS NOTES
[facility]    Medicine Progress Note - Hospitalist Service, GOLD TEAM 18    Date of Admission:  6/28/2022    Assessment & Plan          William Almazan is a 81 year old male with history of CAD s/p CABG, dilated cardiomyopathy, HFrEF, PAF on Eliquis, HTN, HLD, DM2, KRISTIAN, prostate cancer, gout, and dementia who presented with worsening auditory hallucinations.     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 Present since ~2020 following death of son. Denies worsening depression or anxiety. No visual or tactile hallucinations. Family not able to manage at home.   -Consulted psychiatry, appreciate input   -Start risperidone per psych  -Continue 1:1 sitter for now  -PT/OT  -Behavioral Health Intake line contacted for inpatient psych dispo    -New Psych consult.     #Pyuria with leuk esterase:  #Chronic indwelling keating:    Chronic indwelling keating catheter. UA grossly abnormal. UC with mixed qasim. Afebrile. WBC normal. Symptomology unclear.  -Ceftriaxone 1 g IV q24h x five days (completed)     #Chronic HFrEF:    Hx dilated cardiomyopathy. Most recent TTE 6/18/21 showing severely dilated LV with EF 20%. Currently euvolemic on exam. No hypoxia. No pulmonary complaints. Trace LE edema.   -Patient cannot hemodynamically tolerate diuretic regimen  -Will decreased Coreg to 12.5 mg PO BID in the setting of hypotension  -Daily weights, I&O's     #CAD s/p CABG (1992):    Not ASA or statin at home. No acute concerns.   -Will decreased Coreg to 12.5 mg PO BID in the setting of hypotension     #HTN:    -Will decreased Coreg to 12.5 mg PO BID in the setting of hypotension  -Patient cannot hemodynamically tolerate diuretic regimen     #PAF:    Regular on exam. On chronic AC with eliquis. No acute concerns.  -Eliquis 5mg BID     #T2DM:    Diet controlled. Glucose stable.    -Monitor     #KRISTIAN:    Does not use CPAP at home.  -Monitor     #Prostate CA:    Treated with XRT and hormone therapy. Hx urinary retention with chronic indwelling keating.     #Gout:    -Continue PTA allopurinol        Diet: Combination Diet Regular Diet Adult    DVT Prophylaxis: DOAC  Keating Catheter: PRESENT, indication: Retention, Retention  Central Lines: None  Cardiac Monitoring: None  Code Status: Full Code      Disposition Plan     Expected Discharge Date: 07/05/2022    Discharge Delays: Placement - Mental Health/Addiction/Geripsych  Specialist Consult (enter specialist & decision needed in comments)            The patient's care was discussed with the Bedside Nurse and Patient.    Nirmal Ledesma MD  Hospitalist Service, [address]  Securely message with the Vocera Web Console (learn more here)  Text page via [employer] Paging/Directory   Please see signed in provider for up to date coverage information      Clinically Significant Risk Factors Present on Admission                      ______________________________________________________________________    Interval History     No acute events overnight.   He was pleasant.   No chest pain, palpitations or shortness of breath.     Data reviewed today: I reviewed all medications, new labs and imaging results over the last 24 hours. I personally reviewed no images or EKG's today.    Physical Exam   Vital Signs: Temp: 97.8  F (36.6  C) Temp src: Oral BP: 119/62 Pulse: 71   Resp: 16 SpO2: 97 % O2 Device: None (Room air)    Weight: 181 lbs 9.6 oz     GENERAL: Patient appears well; no acute distress.  HEENT: Normocephalic; atraumatic; PERRLA; MMM.  CV: Irregularly irregular rhythm; normal S1, S2; no rubs, murmurs, or gallops.  RESP: Lung fields clear to aucultation B/L; no wheezing or crepitations.  GI: Abdomen is soft, nontender, nondistended; no organomegaly; normal bowel sounds.  : Deferred genital  examination.   MSK: No clubbing, cyanosis, or edema.  DERM: Skin is intact; no rash, lesions, or skin breakdown.  NEURO: No focal deficits appreciated; strength & sensorium are grossly intact.  PSYCH: No active hallucinations; affect, insight appear within normal limits.    Data   Recent Labs   Lab 07/04/22  0701 07/02/22  2115 07/02/22  0705 06/30/22  0643   WBC 6.0  --  6.1 4.9   HGB 10.8* 11.5* 12.6* 13.2*   MCV 88  --  89 93   *  --  150 198     --  140 141   POTASSIUM 3.8 3.4 3.5 3.9   CHLORIDE 103  --  105 103   CO2 31  --  28 28   BUN 16  --  16 22.9   CR 0.70  --  0.72 0.89   ANIONGAP 4  --  7 10   CRYS 8.8  --  8.7 9.4   *  --  131* 135*   ALBUMIN 2.1*  --  2.7*  --    PROTTOTAL 5.8*  --  6.2*  --    BILITOTAL 0.3  --  1.3  --    ALKPHOS 81  --  99  --    ALT 15  --  15  --    AST 15  --  14  --      No results found for this or any previous visit (from the past 24 hour(s)).  Medications       allopurinol  300 mg Oral Daily     apixaban ANTICOAGULANT  5 mg Oral BID     carvedilol  12.5 mg Oral BID     [Held by provider] furosemide  40 mg Oral BID     gabapentin  100 mg Oral TID     loratadine  10 mg Oral Daily     risperiDONE  0.25 mg Oral Daily     risperiDONE  0.5 mg Oral At Bedtime     sodium chloride (PF)  3 mL Intracatheter Q8H     [Held by provider] spironolactone  25 mg Oral Daily     Vitamin D3  25 mcg Oral Daily

## 2022-07-05 NOTE — PLAN OF CARE
"Pt admitted for auditory hallucinations    /62 (BP Location: Right arm)   Pulse 71   Temp 97.8  F (36.6  C) (Oral)   Resp 16   Ht 1.753 m (5' 9\")   Wt 82.4 kg (181 lb 9.6 oz)   SpO2 97%   BMI 26.82 kg/m      Pt. Alert and oriented x 3   Pt. Continues to hear threatening voices, telling him to hurt himself. Voices are threatening to burn him and his family. Pt states \"I am guarding myself, Im protecting myself from them\" sitter at bedside    Denies suicidal thoughts or behaviors.  Pt up and ambulated to bedside commode.  VSS.  Chronic Louie catheter patent   No wounds noted in scrotum.    Updated report given to family about patient status and upcoming plan of care to inpt. Psych.    Family would like to be contacted post rounds.  "

## 2022-07-05 NOTE — PLAN OF CARE
Pt was a&o x4 this shift, lung sounds clear, bowel sounds active and audible. Denies pain and resting comfortably in bed. Reports auditory hallucinations of Trump telling him to hurt himself but denies suicidal ideations. Louie in place- patent. 1:1 sitter at the bedside for safety. Pt had to be approached multiple times before finally accepting to take HS meds.

## 2022-07-05 NOTE — PLAN OF CARE
"Nursing Assessment:  VT: /62 (BP Location: Right arm)   Pulse 71   Temp 97.8  F (36.6  C) (Oral)   Resp 16   Ht 1.753 m (5' 9\")   Wt 82.4 kg (181 lb 9.6 oz)   SpO2 97%   BMI 26.82 kg/m       Additional Notes: pt's family visited. Pt was calm and denied current SI.    Nursing Plan:  Continue POC    Discharge Disposition:   Awaiting inpt psych placement  "

## 2022-07-06 ENCOUNTER — APPOINTMENT (OUTPATIENT)
Dept: PHYSICAL THERAPY | Facility: CLINIC | Age: 82
DRG: 884 | End: 2022-07-06
Payer: MEDICARE

## 2022-07-06 ENCOUNTER — TELEPHONE (OUTPATIENT)
Dept: BEHAVIORAL HEALTH | Facility: CLINIC | Age: 82
End: 2022-07-06

## 2022-07-06 LAB
ALBUMIN SERPL-MCNC: 2.2 G/DL (ref 3.4–5)
ALP SERPL-CCNC: 81 U/L (ref 40–150)
ALT SERPL W P-5'-P-CCNC: 16 U/L (ref 0–70)
ANION GAP SERPL CALCULATED.3IONS-SCNC: 5 MMOL/L (ref 3–14)
AST SERPL W P-5'-P-CCNC: 17 U/L (ref 0–45)
BASOPHILS # BLD AUTO: 0 10E3/UL (ref 0–0.2)
BASOPHILS NFR BLD AUTO: 1 %
BILIRUB SERPL-MCNC: 0.3 MG/DL (ref 0.2–1.3)
BUN SERPL-MCNC: 18 MG/DL (ref 7–30)
CALCIUM SERPL-MCNC: 8.7 MG/DL (ref 8.5–10.1)
CHLORIDE BLD-SCNC: 107 MMOL/L (ref 94–109)
CO2 SERPL-SCNC: 27 MMOL/L (ref 20–32)
CREAT SERPL-MCNC: 0.62 MG/DL (ref 0.66–1.25)
EOSINOPHIL # BLD AUTO: 0.2 10E3/UL (ref 0–0.7)
EOSINOPHIL NFR BLD AUTO: 5 %
ERYTHROCYTE [DISTWIDTH] IN BLOOD BY AUTOMATED COUNT: 15.5 % (ref 10–15)
GFR SERPL CREATININE-BSD FRML MDRD: >90 ML/MIN/1.73M2
GLUCOSE BLD-MCNC: 100 MG/DL (ref 70–99)
HCT VFR BLD AUTO: 34.8 % (ref 40–53)
HGB BLD-MCNC: 11 G/DL (ref 13.3–17.7)
IMM GRANULOCYTES # BLD: 0 10E3/UL
IMM GRANULOCYTES NFR BLD: 0 %
LYMPHOCYTES # BLD AUTO: 0.6 10E3/UL (ref 0.8–5.3)
LYMPHOCYTES NFR BLD AUTO: 14 %
MCH RBC QN AUTO: 28 PG (ref 26.5–33)
MCHC RBC AUTO-ENTMCNC: 31.6 G/DL (ref 31.5–36.5)
MCV RBC AUTO: 89 FL (ref 78–100)
MONOCYTES # BLD AUTO: 0.5 10E3/UL (ref 0–1.3)
MONOCYTES NFR BLD AUTO: 13 %
NEUTROPHILS # BLD AUTO: 2.6 10E3/UL (ref 1.6–8.3)
NEUTROPHILS NFR BLD AUTO: 67 %
NRBC # BLD AUTO: 0 10E3/UL
NRBC BLD AUTO-RTO: 0 /100
PLATELET # BLD AUTO: 178 10E3/UL (ref 150–450)
POTASSIUM BLD-SCNC: 4.1 MMOL/L (ref 3.4–5.3)
PROT SERPL-MCNC: 5.6 G/DL (ref 6.8–8.8)
RBC # BLD AUTO: 3.93 10E6/UL (ref 4.4–5.9)
SODIUM SERPL-SCNC: 139 MMOL/L (ref 133–144)
WBC # BLD AUTO: 3.9 10E3/UL (ref 4–11)

## 2022-07-06 PROCEDURE — 85025 COMPLETE CBC W/AUTO DIFF WBC: CPT | Performed by: INTERNAL MEDICINE

## 2022-07-06 PROCEDURE — 97116 GAIT TRAINING THERAPY: CPT | Mod: GP

## 2022-07-06 PROCEDURE — 250N000013 HC RX MED GY IP 250 OP 250 PS 637: Performed by: INTERNAL MEDICINE

## 2022-07-06 PROCEDURE — 36415 COLL VENOUS BLD VENIPUNCTURE: CPT | Performed by: INTERNAL MEDICINE

## 2022-07-06 PROCEDURE — 80053 COMPREHEN METABOLIC PANEL: CPT | Performed by: INTERNAL MEDICINE

## 2022-07-06 PROCEDURE — 97530 THERAPEUTIC ACTIVITIES: CPT | Mod: GP

## 2022-07-06 PROCEDURE — 99233 SBSQ HOSP IP/OBS HIGH 50: CPT | Performed by: INTERNAL MEDICINE

## 2022-07-06 PROCEDURE — 250N000013 HC RX MED GY IP 250 OP 250 PS 637: Performed by: PSYCHIATRY & NEUROLOGY

## 2022-07-06 PROCEDURE — 120N000002 HC R&B MED SURG/OB UMMC

## 2022-07-06 PROCEDURE — 99207 PR CDG-CUT & PASTE-POTENTIAL IMPACT ON LEVEL: CPT | Performed by: INTERNAL MEDICINE

## 2022-07-06 RX ADMIN — CARVEDILOL 12.5 MG: 12.5 TABLET, FILM COATED ORAL at 09:06

## 2022-07-06 RX ADMIN — RISPERIDONE 0.25 MG: 0.25 TABLET ORAL at 09:06

## 2022-07-06 RX ADMIN — GABAPENTIN 100 MG: 100 CAPSULE ORAL at 09:06

## 2022-07-06 RX ADMIN — Medication 25 MCG: at 09:06

## 2022-07-06 RX ADMIN — ACETAMINOPHEN 1000 MG: 500 TABLET ORAL at 17:39

## 2022-07-06 RX ADMIN — GABAPENTIN 100 MG: 100 CAPSULE ORAL at 14:05

## 2022-07-06 RX ADMIN — APIXABAN 5 MG: 5 TABLET, FILM COATED ORAL at 19:23

## 2022-07-06 RX ADMIN — CARVEDILOL 12.5 MG: 12.5 TABLET, FILM COATED ORAL at 19:22

## 2022-07-06 RX ADMIN — APIXABAN 5 MG: 5 TABLET, FILM COATED ORAL at 09:06

## 2022-07-06 RX ADMIN — GABAPENTIN 100 MG: 100 CAPSULE ORAL at 19:21

## 2022-07-06 RX ADMIN — LORATADINE 10 MG: 10 TABLET ORAL at 09:06

## 2022-07-06 RX ADMIN — RISPERIDONE 0.5 MG: 0.5 TABLET ORAL at 21:38

## 2022-07-06 RX ADMIN — ACETAMINOPHEN 1000 MG: 500 TABLET ORAL at 03:40

## 2022-07-06 RX ADMIN — ALLOPURINOL 300 MG: 300 TABLET ORAL at 09:06

## 2022-07-06 ASSESSMENT — ACTIVITIES OF DAILY LIVING (ADL)
ADLS_ACUITY_SCORE: 42
ADLS_ACUITY_SCORE: 41
ADLS_ACUITY_SCORE: 42
ADLS_ACUITY_SCORE: 41
ADLS_ACUITY_SCORE: 42

## 2022-07-06 NOTE — PLAN OF CARE
A&Ox4. Ax1 with walker, gait belt. Louie catheter patent. LBM 7/6- uses bedside commode. Denies SI. Continues to have auditory hallucinations. Pt reports that the voices get mad at him and tell him to hurt himself and others. C/o pain in buttock managed with PRN tylenol and repositioning. Denies chest pain, SOB, headache, numbness or tingling. L PIV saline locked. WOC RN consult ordered d/t concern of abrasion in abdominal skin folds and nonblanchable skin on buttocks. Preventive mepilex dressings on coccyx and buttocks. Mild edema in bilateral ankles and feet. Regular diet, thin liquids, takes pills whole. Needs help ordering meals. Sitter at bedside for safety. Makes needs known.

## 2022-07-06 NOTE — PLAN OF CARE
"Nursing Assessment:  VT: /62 (BP Location: Right arm)   Pulse 78   Temp 98.7  F (37.1  C) (Oral)   Resp 16   Ht 1.753 m (5' 9\")   Wt 82.4 kg (181 lb 9.6 oz)   SpO2 100%   BMI 26.82 kg/m       Neuro: pt reported still hearing voices telling him to harm himself, when asked if he has any though or plans to harm himself pt denied and said, \"that would be stupid\"    Additional Notes: patient refused COVID swab    Nursing Plan:  Continue POC, 1:1 sitter for safety, assess hallucinations    Discharge Disposition:   Awaiting inpt psych placement  "

## 2022-07-06 NOTE — PROGRESS NOTES
[facility]    Medicine Progress Note - Hospitalist Service, GOLD TEAM 18    Date of Admission:  6/28/2022    Assessment & Plan          William Almazan is a 81 year old male with history of CAD s/p CABG, dilated cardiomyopathy, HFrEF, PAF on Eliquis, HTN, HLD, DM2, KRISTIAN, prostate cancer, gout, and dementia who presented with worsening auditory hallucinations.     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 Present since ~2020 following death of son. Denies worsening depression or anxiety. No visual or tactile hallucinations. Family not able to manage at home.   -Consulted psychiatry, appreciate input   -Start risperidone per psych  -Continue 1:1 sitter for now  -PT/OT  -Behavioral Health Intake line contacted for inpatient psych dispo    -Waiting for new Psychiatry eval.     #Pyuria with leuk esterase:  #Chronic indwelling keating:    Chronic indwelling keating catheter. UA grossly abnormal. UC with mixed qasim. Afebrile. WBC normal. Symptomology unclear.  -Ceftriaxone 1 g IV q24h x five days (completed)     #Chronic HFrEF:    Hx dilated cardiomyopathy. Most recent TTE 6/18/21 showing severely dilated LV with EF 20%. Currently euvolemic on exam. No hypoxia. No pulmonary complaints. Trace LE edema.   -Patient cannot hemodynamically tolerate diuretic regimen  -Will decreased Coreg to 12.5 mg PO BID in the setting of hypotension  -Daily weights, I&O's     #CAD s/p CABG (1992):    Not ASA or statin at home. No acute concerns.   -Will decreased Coreg to 12.5 mg PO BID in the setting of hypotension     #HTN:    -Will decreased Coreg to 12.5 mg PO BID in the setting of hypotension  -Patient cannot hemodynamically tolerate diuretic regimen     #PAF:    Regular on exam. On chronic AC with eliquis. No acute concerns.  -Eliquis 5mg BID     #T2DM:    Diet controlled. Glucose  stable.   -Monitor     #KRISTIAN:    Does not use CPAP at home.  -Monitor     #Prostate CA:    Treated with XRT and hormone therapy. Hx urinary retention with chronic indwelling keating.     #Gout:    -Continue PTA allopurinol        Diet: Combination Diet Regular Diet Adult    DVT Prophylaxis: DOAC  Keating Catheter: PRESENT, indication: Retention, Retention  Central Lines: None  Cardiac Monitoring: None  Code Status: Full Code      Disposition Plan      The patient's care was discussed with the Bedside Nurse and Patient.    Nirmal Ledesma MD  Hospitalist Service, [address]  Securely message with the Vocera Web Console (learn more here)  Text page via [facility] Paging/Directory   Please see signed in provider for up to date coverage information      Clinically Significant Risk Factors Present on Admission                      ______________________________________________________________________    Interval History     No acute events overnight.   He was pleasant.   No chest pain, palpitations or shortness of breath.   Waiting for placement.     Data reviewed today: I reviewed all medications, new labs and imaging results over the last 24 hours. I personally reviewed no images or EKG's today.    Physical Exam   Vital Signs: Temp: 98.7  F (37.1  C) Temp src: Oral BP: 116/62 Pulse: 78   Resp: 16 SpO2: 100 % O2 Device: None (Room air)    Weight: 181 lbs 9.6 oz     GENERAL: Patient appears well; no acute distress.  CV: Irregularly irregular rhythm; normal S1, S2; no rubs, murmurs, or gallops.  RESP: Lung fields clear to aucultation B/L; no wheezing or crepitations.  GI: Abdomen is soft, nontender, nondistended; no organomegaly; normal bowel sounds.  DERM: Skin is intact; no rash, lesions, or skin breakdown.  NEURO: No focal deficits appreciated; strength & sensorium are grossly intact.  PSYCH: No active hallucinations; affect, insight appear within normal  limits.    Data   Recent Labs   Lab 07/06/22  0540 07/04/22  0701 07/02/22  2115 07/02/22  0705   WBC 3.9* 6.0  --  6.1   HGB 11.0* 10.8* 11.5* 12.6*   MCV 89 88  --  89    145*  --  150    138  --  140   POTASSIUM 4.1 3.8 3.4 3.5   CHLORIDE 107 103  --  105   CO2 27 31  --  28   BUN 18 16  --  16   CR 0.62* 0.70  --  0.72   ANIONGAP 5 4  --  7   CRYS 8.7 8.8  --  8.7   * 105*  --  131*   ALBUMIN 2.2* 2.1*  --  2.7*   PROTTOTAL 5.6* 5.8*  --  6.2*   BILITOTAL 0.3 0.3  --  1.3   ALKPHOS 81 81  --  99   ALT 16 15  --  15   AST 17 15  --  14     No results found for this or any previous visit (from the past 24 hour(s)).  Medications       allopurinol  300 mg Oral Daily     apixaban ANTICOAGULANT  5 mg Oral BID     carvedilol  12.5 mg Oral BID     [Held by provider] furosemide  40 mg Oral BID     gabapentin  100 mg Oral TID     loratadine  10 mg Oral Daily     risperiDONE  0.25 mg Oral Daily     risperiDONE  0.5 mg Oral At Bedtime     sodium chloride (PF)  3 mL Intracatheter Q8H     [Held by provider] spironolactone  25 mg Oral Daily     Vitamin D3  25 mcg Oral Daily

## 2022-07-06 NOTE — TELEPHONE ENCOUNTER
0235 Bed Search Update:    Per chart review, pt was being reviewed by [facility] on 7/5.  This writer called for a status update and was informed that this patient is not being reviewed currently and they are unaware if he was declined on 7/5.  Per [facility], pt's presentation is too acute for their milieu.  Pt remains on wait list.

## 2022-07-06 NOTE — PLAN OF CARE
Goal Outcome Evaluation:    Alert and oriented x4. VSS on RA. Pain managed with PRN tylenol. Up to bedside commode Ax1 and had a BM. Louie in place and patent. Awake most of shift. Denies SI, A & V hallucinations. Sitter maintained for safety.

## 2022-07-06 NOTE — PROGRESS NOTES
"/62 (BP Location: Right arm)   Pulse 71   Temp 97.8  F (36.6  C) (Oral)   Resp 16   Ht 1.753 m (5' 9\")   Wt 82.4 kg (181 lb 9.6 oz)   SpO2 97%   BMI 26.82 kg/m     Pt alert and orientated x 3. Denies chest pain, SOA, and nausea. Sitter at bedside for safety, as patient c/o of auditory hallucinations to harm himself. Denies SI. Up with walker and gait belt with sitter. Went for a walk in the hallways. Louie patent and draining urine. TERESO wounds in groin. Calm and cooperative with staff. Able to make needs known. Continue plan of care.     "

## 2022-07-06 NOTE — TELEPHONE ENCOUNTER
[facility] Access Inpatient Bed Call Log 7/6/2022 done at 8:20am    Adults:       Outside Placement    [facility] is posting 0 beds.     [facility] is posting 0 beds.    [facility] is posting 0 beds.    [facility] is posting 0 beds.    [facility] is posting 0 beds.     [facility] is posting 0 beds.    [facility] is posting 0 beds.    [facility] is posting 0 beds.      [facility] is posting 2 beds. Mixed unit 12+. Low acuity only.     [facility] is positing 0 beds. No aggression.     [facility] is posting 0 beds.     [facility] is posting 1 beds. Low acuity only.    [facility] is posting 1 beds.    [facility] is posting 0 beds. Low acuity.     [facility] is posting 2 beds. 72 hr hold required.     [facility] is posting 2 beds.       [facility] is posting 0 beds. Vol only, No Hx of aggression, violence or assault. No sexual offenders. No 72 hr holds.    [facility] is posting 2 beds. (Must have the cognitive ability to do programming. No aggressive or violent behavior or recent HX in the last 2 yrs. MH must be primary.)    [facility] is posting 0 beds. Low acuity only. Violence and aggression capped.     [facility] is posting 0 beds. Low acuity, Neg Covid.     [facility] is posting 1 beds. Covid neg. Vol only. Combined adolescent and adult unit. No aggressive or violent behavior. No registered sex offenders.     [facility] is posting 6 beds. Call for details.    [facility] is posting 4 beds.     8:20am No appropriate bed available at this time.     9:01am Intake called [facility] and spoke with a representative, Dc. [facility] reviewing.     9:27am Intake faxed all clinical to [facility].     10am Intake received a call from [facility] declining this pt due to acuity.     10:01am Intake called [facility]. Pt was declined during screening due to Dementia  hx.     10:06am Intake tried to call [facility]; phone is off the hook. Intake will try back later.     12:29pm Intake called [facility]. They are unable to accept dementia pt's.

## 2022-07-07 ENCOUNTER — APPOINTMENT (OUTPATIENT)
Dept: PHYSICAL THERAPY | Facility: CLINIC | Age: 82
DRG: 884 | End: 2022-07-07
Payer: MEDICARE

## 2022-07-07 ENCOUNTER — APPOINTMENT (OUTPATIENT)
Dept: OCCUPATIONAL THERAPY | Facility: CLINIC | Age: 82
DRG: 884 | End: 2022-07-07
Payer: MEDICARE

## 2022-07-07 LAB
GLUCOSE BLDC GLUCOMTR-MCNC: 127 MG/DL (ref 70–99)
SARS-COV-2 RNA RESP QL NAA+PROBE: NEGATIVE

## 2022-07-07 PROCEDURE — G0463 HOSPITAL OUTPT CLINIC VISIT: HCPCS

## 2022-07-07 PROCEDURE — 250N000013 HC RX MED GY IP 250 OP 250 PS 637: Performed by: PSYCHIATRY & NEUROLOGY

## 2022-07-07 PROCEDURE — 97530 THERAPEUTIC ACTIVITIES: CPT | Mod: GO

## 2022-07-07 PROCEDURE — 250N000013 HC RX MED GY IP 250 OP 250 PS 637: Performed by: INTERNAL MEDICINE

## 2022-07-07 PROCEDURE — 99233 SBSQ HOSP IP/OBS HIGH 50: CPT | Performed by: INTERNAL MEDICINE

## 2022-07-07 PROCEDURE — 99232 SBSQ HOSP IP/OBS MODERATE 35: CPT | Performed by: PSYCHIATRY & NEUROLOGY

## 2022-07-07 PROCEDURE — 250N000013 HC RX MED GY IP 250 OP 250 PS 637

## 2022-07-07 PROCEDURE — 99207 PR CDG-CUT & PASTE-POTENTIAL IMPACT ON LEVEL: CPT | Performed by: INTERNAL MEDICINE

## 2022-07-07 PROCEDURE — 97116 GAIT TRAINING THERAPY: CPT | Mod: GP

## 2022-07-07 PROCEDURE — 120N000002 HC R&B MED SURG/OB UMMC

## 2022-07-07 PROCEDURE — 87635 SARS-COV-2 COVID-19 AMP PRB: CPT | Performed by: INTERNAL MEDICINE

## 2022-07-07 RX ORDER — RISPERIDONE 0.5 MG/1
2 TABLET ORAL AT BEDTIME
Status: DISCONTINUED | OUTPATIENT
Start: 2022-07-07 | End: 2022-08-02 | Stop reason: HOSPADM

## 2022-07-07 RX ORDER — DONEPEZIL HYDROCHLORIDE 5 MG/1
5 TABLET, FILM COATED ORAL AT BEDTIME
Status: DISCONTINUED | OUTPATIENT
Start: 2022-07-07 | End: 2022-08-02 | Stop reason: HOSPADM

## 2022-07-07 RX ADMIN — ACETAMINOPHEN 1000 MG: 500 TABLET ORAL at 17:52

## 2022-07-07 RX ADMIN — ACETAMINOPHEN 1000 MG: 500 TABLET ORAL at 03:38

## 2022-07-07 RX ADMIN — APIXABAN 5 MG: 5 TABLET, FILM COATED ORAL at 20:10

## 2022-07-07 RX ADMIN — LORATADINE 10 MG: 10 TABLET ORAL at 08:39

## 2022-07-07 RX ADMIN — DONEPEZIL HYDROCHLORIDE 5 MG: 5 TABLET, FILM COATED ORAL at 22:01

## 2022-07-07 RX ADMIN — RISPERIDONE 2 MG: 0.5 TABLET ORAL at 22:00

## 2022-07-07 RX ADMIN — ALLOPURINOL 300 MG: 300 TABLET ORAL at 08:39

## 2022-07-07 RX ADMIN — CARVEDILOL 12.5 MG: 12.5 TABLET, FILM COATED ORAL at 20:10

## 2022-07-07 RX ADMIN — GABAPENTIN 100 MG: 100 CAPSULE ORAL at 13:32

## 2022-07-07 RX ADMIN — GABAPENTIN 100 MG: 100 CAPSULE ORAL at 08:39

## 2022-07-07 RX ADMIN — CARVEDILOL 12.5 MG: 12.5 TABLET, FILM COATED ORAL at 08:35

## 2022-07-07 RX ADMIN — Medication 25 MCG: at 08:39

## 2022-07-07 RX ADMIN — APIXABAN 5 MG: 5 TABLET, FILM COATED ORAL at 08:39

## 2022-07-07 RX ADMIN — RISPERIDONE 0.25 MG: 0.25 TABLET ORAL at 08:39

## 2022-07-07 RX ADMIN — GABAPENTIN 100 MG: 100 CAPSULE ORAL at 20:10

## 2022-07-07 ASSESSMENT — ACTIVITIES OF DAILY LIVING (ADL)
ADLS_ACUITY_SCORE: 42

## 2022-07-07 NOTE — PROGRESS NOTES
[facility]    Medicine Progress Note - Hospitalist Service, GOLD TEAM 18    Date of Admission:  6/28/2022    Assessment & Plan          William Almazan is a 81 year old male with history of CAD s/p CABG, dilated cardiomyopathy, HFrEF, PAF on Eliquis, HTN, HLD, DM2, KRISTIAN, prostate cancer, gout, and dementia who presented with worsening auditory hallucinations.     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 Present since ~2020 following death of son. Denies worsening depression or anxiety. No visual or tactile hallucinations. Family not able to manage at home.   -He continues with auditory hallucinations telling him to hurt himself.   -Start risperidone per psych  -Continue 1:1 sitter for now  -PT/OT  -Waiting for new Psychiatry eval.     #Pyuria with leuk esterase:  #Chronic indwelling keating:    Chronic indwelling keating catheter. UA grossly abnormal. UC with mixed qasim. Afebrile. WBC normal. Symptomology unclear.  -Ceftriaxone 1 g IV q24h x five days (completed)     #Chronic HFrEF:    Hx dilated cardiomyopathy. Most recent TTE 6/18/21 showing severely dilated LV with EF 20%. Currently euvolemic on exam. No hypoxia. No pulmonary complaints. Trace LE edema.   -Coreg to 12.5 mg PO BID   -Daily weights, I&O's     #CAD s/p CABG (1992):    Not ASA or statin at home. No acute concerns.   -Coreg to 12.5 mg PO BID     #PAF:    No acute concerns.  -Eliquis 5mg BID     #T2DM:    Diet controlled. Glucose stable.   -Monitor     #KRISTIAN:    Does not use CPAP at home.  -Monitor     #Prostate CA:    Treated with XRT and hormone therapy. Hx urinary retention with chronic indwelling keating.     #Gout:    -Continue PTA allopurinol        Diet: Combination Diet Regular Diet Adult    DVT Prophylaxis: DOAC  Keating Catheter: PRESENT, indication: Retention, Retention  Central Lines:  None  Cardiac Monitoring: None  Code Status: Full Code      Disposition Plan      The patient's care was discussed with the Bedside Nurse and Patient.    Nirmal Ledesma MD  Hospitalist Service, GOLD TEAM 18  [facility]  Securely message with the Vocera Web Console (learn more here)  Text page via [facility] Paging/Directory   Please see signed in provider for up to date coverage information      Clinically Significant Risk Factors Present on Admission                      ______________________________________________________________________    Interval History     No acute events overnight.   He was pleasant.   No chest pain, palpitations or shortness of breath.   Waiting for placement.   Continues with auditory hallucinations     Data reviewed today: I reviewed all medications, new labs and imaging results over the last 24 hours. I personally reviewed no images or EKG's today.    Physical Exam   Vital Signs: Temp: (!) 96.6  F (35.9  C) Temp src: Oral BP: (!) 149/79 Pulse: 66   Resp: 16 SpO2: 98 % O2 Device: None (Room air)    Weight: 182 lbs 6.4 oz     GENERAL: Patient appears well; no acute distress.  CV: Irregularly irregular rhythm; normal S1, S2; no rubs, murmurs, or gallops.  RESP: Lung fields clear to aucultation B/L; no wheezing or crepitations.  GI: Abdomen is soft, nontender, nondistended; no organomegaly; normal bowel sounds.  DERM: Skin is intact; no rash, lesions, or skin breakdown.  NEURO: No focal deficits appreciated; strength & sensorium are grossly intact.  PSYCH: No active hallucinations; affect, insight appear within normal limits.    Data   Recent Labs   Lab 07/06/22  0540 07/04/22  0701 07/02/22  2115 07/02/22  0705   WBC 3.9* 6.0  --  6.1   HGB 11.0* 10.8* 11.5* 12.6*   MCV 89 88  --  89    145*  --  150    138  --  140   POTASSIUM 4.1 3.8 3.4 3.5   CHLORIDE 107 103  --  105   CO2 27 31  --  28   BUN 18 16  --  16   CR 0.62* 0.70  --   0.72   ANIONGAP 5 4  --  7   CYRS 8.7 8.8  --  8.7   * 105*  --  131*   ALBUMIN 2.2* 2.1*  --  2.7*   PROTTOTAL 5.6* 5.8*  --  6.2*   BILITOTAL 0.3 0.3  --  1.3   ALKPHOS 81 81  --  99   ALT 16 15  --  15   AST 17 15  --  14     No results found for this or any previous visit (from the past 24 hour(s)).  Medications       allopurinol  300 mg Oral Daily     apixaban ANTICOAGULANT  5 mg Oral BID     carvedilol  12.5 mg Oral BID     [Held by provider] furosemide  40 mg Oral BID     gabapentin  100 mg Oral TID     loratadine  10 mg Oral Daily     risperiDONE  0.25 mg Oral Daily     risperiDONE  0.5 mg Oral At Bedtime     sodium chloride (PF)  3 mL Intracatheter Q8H     [Held by provider] spironolactone  25 mg Oral Daily     Vitamin D3  25 mcg Oral Daily

## 2022-07-07 NOTE — PLAN OF CARE
No acute changes this shift. Patient continues to hear voices. Verbalized there are good voices and bad voices. Darin with the voices by using distraction mechanism. Coloring and reading to help cope with the voices. States applying lotion to body help decrease the voices.  Patient slept through part of the night. Patient walked the caputo this shift with assist of 1, walker and gait belt. Bilateral feet ankle +2 edema. Louie catheter in place.     Sitter at bedside for safety    Continue to monitor.

## 2022-07-07 NOTE — TELEPHONE ENCOUNTER
[facility] Access Inpatient Bed Call Log 7/7/2022 done at 8:30am    Adults:           Non-FV Placement    [facility] is posting 0 beds.     [facility] is posting 0 beds.    [facility] is posting 0 beds.    [facility] is posting 0 beds.    [facility] is posting 0 beds.     [facility] is posting 0 beds.    [facility] is posting 0 beds.    [facility] is posting 0 beds.      [facility] is posting 0 beds. Mixed unit 12+. Low acuity only.     [facility] is positing 0 beds. No aggression.     [facility] is posting 0 beds.     [facility] is posting 0 beds. Low acuity only.    [facility] is posting 1 beds.    [facility] is posting 0 beds. Low acuity.     [facility] is posting 1 beds. 72 hr hold required.     [facility] is posting 2 beds.       [facility] is posting 0 beds. Vol only, No Hx of aggression, violence or assault. No sexual offenders. No 72 hr holds.    [facility] is posting 3 beds. (Must have the cognitive ability to do programming. No aggressive or violent behavior or recent HX in the last 2 yrs. MH must be primary.)- [facility] is posting 0 beds. Low acuity only. Violence and aggression capped.     [facility] is posting 0 beds. Low acuity, Neg Covid.     [facility] is posting 1 beds. Covid neg. Vol only. Combined adolescent and adult unit. No aggressive or violent behavior. No registered sex offenders. - [facility] is posting 12 beds. Call for details.- Declined [facility] is posting 1 beds. - Declined    8:30am Intake [facility] and spoke with Anjali nolasco. They are currently on divert. No appropriate bed available at this time.     9:30am Intake called [facility] and spoke with Rosalind nolasco. Dementia is exclusionary.

## 2022-07-07 NOTE — PROGRESS NOTES
"Pt. Admitted for Psychosis Auditory Hallucinations. Pt. Appeared to be pleasant and alert. Pt. A&O to self, location and date with intermittent confusion. Pt. Currently on a 1:1 for safety. Pt. On regular diet and takes medication whole with water. During assessment pt. Stated \"my friend Zuhair Lainez is back again.\" Pt. Also went on to say that the voices say \"hurt himself, but I'm not going to do that.\"  Pt. Walked back and forth in the hallways with PT. LBM 7/7 PIV x1 SL, Ax1 Continue POC.   "

## 2022-07-07 NOTE — TELEPHONE ENCOUNTER
0050 Bed Search Update:    [facility]-No Irene psych  [facility]-No beds available  [facility]-No beds available  [facility]-No beds available  [facility]-No beds available  [facility]-Low acuity only  [facility]-No beds available.  Must be completely independent with ADLs as there are no Rochelle to assist with cares  [facility]-No beds available  [facility]-No beds available  [facility]-No beds available  [facility]-No beds available  [facility]-Declined x2 due to dementia hx.  [facility]-Declined on 7/6 due to dementia hx.    Remains on wait list.

## 2022-07-07 NOTE — PLAN OF CARE
7-11pm  Denies SOB or chest pain. Pt hears voices that's telling him to harm himself and others but denies SI. Pt states that coloring and bible distracts him from voice. Ambulated in hallway with assist x1. Louie in place, adequate output. Lesion on abdominal fold, skin barrier applied and WOC was ordered. Daughter was in room with pt this evening. 1:1 sitter for safety. Regular diet, denies N/V. Continue POC.

## 2022-07-07 NOTE — CONSULTS
[facility] Nurse Inpatient Assessment     Consulted for: Skin tear under pannus and around scrotum  re consulted for same wounds    Patient History (according to provider note(s):      Per Dr Parish Flaherty on 6/30/2022: William Almazan is a 81 year old male with history of CAD s/p CABG, dilated cardiomyopathy, HFrEF, PAF on eliquis, HTN, HLD, DM2, KRISTIAN, prostate cancer, gout, and dementia who presented with worsening auditory hallucinations.    Areas Assessed:      Areas visualized during today's visit: Groin, scrotum, buttock    Wound location: Right groin creases   Patient declined photo    Wound due to: Moisture Associated Skin Damage (MASD) and friction  Wound history/plan of care: Present on admission. Patient has chronic indwelling keating so is not incontinent of urine yet has a brief in place. The brief appears to be cutting into the groin creases. In addition, patient is sweaty so moisture may be contributing.    Wound base: 100 % dermis     Palpation of the wound bed: normal      Drainage: none     Description of drainage: none     Measurements (length x width x depth, in cm):0.3 x 15 x 0.1cm with larger open area on posterior right crease 0.6 x 4 x 0.1 cm   Periwound skin: Intact      Color: dusky chronic moisture damage       Temperature: normal   Odor: none  Pain: denies   Pain interventions prior to dressing change: N/A  Treatment goal: Decrease moisture and Protection  STATUS: evolving  Supplies ordered: supplies stored on unit     Pt refusing interdry and pt refusing to remove brief in bed       Treatment Plan:     Groin crease wound(s): BID: wash area with Shima Cleanse and Protect and a soft cloth. Do not rinse. Apply a thin layer of [employer] Paste in creases. Avoid brief while in bed (patient has keating in place), use Covidean sheet for any incontinent stool.      Right posterior groin crease wound(s): Every 3 days and PRN cleanse with wound  cleanser and pat dry. Paint samy wound skin with no sting. Conform mepilex over wound.     Orders: Written and Updated    RECOMMEND PRIMARY TEAM ORDER: None, at this time  Education provided: plan of care  Discussed plan of care with: Nurse  WOC nurse follow-up plan: weekly  Notify WOC if wound(s) deteriorate.  Nursing to notify the Provider(s) and re-consult the WOC Nurse if new skin concern.    DATA:     Current support surface: Standard  Atmos Air mattress  Containment of urine/stool: Incontinent pad in bed and Indwelling catheter  BMI: Body mass index is 26.82 kg/m .   Active diet order: Orders Placed This Encounter      Combination Diet Regular Diet Adult     Output: I/O last 3 completed shifts:  In: 400 [P.O.:400]  Out: 650 [Urine:650]     Labs:   Recent Labs   Lab 07/06/22  0540   ALBUMIN 2.2*   HGB 11.0*   WBC 3.9*     Pressure injury risk assessment:   Sensory Perception: 4-->no impairment  Moisture: 4-->rarely moist  Activity: 3-->walks occasionally  Mobility: 3-->slightly limited  Nutrition: 3-->adequate  Friction and Shear: 3-->no apparent problem  John Score: 20    Zainab Madrigal RN CWOCN   Dept. Pager: [phone]  Dept. Office Number: [phone]

## 2022-07-08 ENCOUNTER — APPOINTMENT (OUTPATIENT)
Dept: PHYSICAL THERAPY | Facility: CLINIC | Age: 82
DRG: 884 | End: 2022-07-08
Payer: MEDICARE

## 2022-07-08 ENCOUNTER — TELEPHONE (OUTPATIENT)
Dept: BEHAVIORAL HEALTH | Facility: CLINIC | Age: 82
End: 2022-07-08

## 2022-07-08 PROCEDURE — 99207 PR CDG-CUT & PASTE-POTENTIAL IMPACT ON LEVEL: CPT | Performed by: INTERNAL MEDICINE

## 2022-07-08 PROCEDURE — 120N000002 HC R&B MED SURG/OB UMMC

## 2022-07-08 PROCEDURE — 250N000013 HC RX MED GY IP 250 OP 250 PS 637

## 2022-07-08 PROCEDURE — 99232 SBSQ HOSP IP/OBS MODERATE 35: CPT | Performed by: INTERNAL MEDICINE

## 2022-07-08 PROCEDURE — 250N000013 HC RX MED GY IP 250 OP 250 PS 637: Performed by: INTERNAL MEDICINE

## 2022-07-08 PROCEDURE — 97116 GAIT TRAINING THERAPY: CPT | Mod: GP | Performed by: PHYSICAL THERAPIST

## 2022-07-08 PROCEDURE — 250N000013 HC RX MED GY IP 250 OP 250 PS 637: Performed by: PSYCHIATRY & NEUROLOGY

## 2022-07-08 RX ADMIN — CARVEDILOL 12.5 MG: 12.5 TABLET, FILM COATED ORAL at 19:55

## 2022-07-08 RX ADMIN — GABAPENTIN 100 MG: 100 CAPSULE ORAL at 08:02

## 2022-07-08 RX ADMIN — Medication 25 MCG: at 08:01

## 2022-07-08 RX ADMIN — GABAPENTIN 100 MG: 100 CAPSULE ORAL at 19:56

## 2022-07-08 RX ADMIN — RISPERIDONE 0.25 MG: 0.25 TABLET ORAL at 08:01

## 2022-07-08 RX ADMIN — RISPERIDONE 2 MG: 0.5 TABLET ORAL at 21:22

## 2022-07-08 RX ADMIN — APIXABAN 5 MG: 5 TABLET, FILM COATED ORAL at 08:01

## 2022-07-08 RX ADMIN — CARVEDILOL 12.5 MG: 12.5 TABLET, FILM COATED ORAL at 08:02

## 2022-07-08 RX ADMIN — ALLOPURINOL 300 MG: 300 TABLET ORAL at 08:01

## 2022-07-08 RX ADMIN — LORATADINE 10 MG: 10 TABLET ORAL at 08:01

## 2022-07-08 RX ADMIN — DONEPEZIL HYDROCHLORIDE 5 MG: 5 TABLET, FILM COATED ORAL at 21:23

## 2022-07-08 RX ADMIN — GABAPENTIN 100 MG: 100 CAPSULE ORAL at 13:47

## 2022-07-08 RX ADMIN — APIXABAN 5 MG: 5 TABLET, FILM COATED ORAL at 19:56

## 2022-07-08 ASSESSMENT — ACTIVITIES OF DAILY LIVING (ADL)
ADLS_ACUITY_SCORE: 42

## 2022-07-08 NOTE — TELEPHONE ENCOUNTER
0159 Bed Search Update:    [facility]-No [facility] psych  [facility]-No beds available  [facility]-No beds available  [facility]-No beds available  [facility]-No beds available  [facility]-Low acuity.  Must be independent with ADLs.  [facility]-Low acuity.  Must be completely independent with ADLs as there are no Rochelle to assist with cares  [facility]-No beds available  [facility]-No beds available  [facility]-No beds available  [facility]-Declined due to dementia hx.  [facility]-Declined due to dementia hx.    Remains on wait list.

## 2022-07-08 NOTE — PROGRESS NOTES
"CLINICAL NUTRITION SERVICES - BRIEF NOTE    REASON FOR BRIEF ASSESSMENT  William Almazan is a/an 81 year old male assessed by the dietitian for LOS     FINDINGS   Chart review only today. Pt remains admitted to hospital in the setting of auditory hallucinations, suspect dementia with psychosis, other past medical history noted for CAD s/p CABG, dilated cardiomyopathy, HFrEF, PAF on Eliquis, HTN, HLD, DM2, KRISTIAN, prostate cancer, gout, and dementia. Appears discharge disposition is reason for continued admission. Pt is on a regular diet taking % of meals per flow sheets. MAR reviewed. Labs reviewed.     Ht Readings from Last 1 Encounters:   06/28/22 1.753 m (5' 9\")     Wt Readings from Last 20 Encounters:   07/07/22 82.7 kg (182 lb 6.4 oz)     83 kg (183 lb) 01/14/2022 - per CE     BMI Readings from Last 1 Encounters:   07/07/22 26.94 kg/m      Weight assessment: Weight appears fairly stable from 6 months ago, BMI classified as overweight.     INTERVENTIONS/MONITORING/EVALUATION  RD has no nutritional concerns or recommendations at this time, RD services will sign off. Please consult RD services if nutritional issues arise.     Huma Paniagua RD, CNSC, LD  8A RD pager: [phone]      "

## 2022-07-08 NOTE — PLAN OF CARE
"Vitals stable. Forgetful. Still hearing positive and negative voices. Constant hearing negative voice of \"Zuhair Trump\" talking to him. Denies pain later in shift. Patient intermittently slept throughout the night. Bilateral edema in lower extremity. Assist 1 with walker.       Sitter at bedside for safety.     Continue to monitor  "

## 2022-07-08 NOTE — CONSULTS
Consult Date: 07/07/2022    IDENTIFICATION:  Mr. William Almazan is an 81-year-old  male who is being hospitalized for decreased short-term memory and hallucinations.  I am asked to reevaluate his psychiatric status by Dr. Ledesma.    Prior to interviewing this patient, I had an opportunity to review the initial psychiatric consultation completed by Dr. Aryan Leyva on 06/29 and a followup on 06/30.  He started the patient on low-dose Risperdal and suggested that donepezil might be appropriate in the future.    On interview today, the patient has again had auditory hallucinations telling him to hurt himself.  He reports that he is concerned that in the past when they told him to jump out of bed and smash himself on the floor, he did.  He suggests that he has a number of bruises from that event and further suggests he has done that twice.  He also is having visual hallucinations of Zuhairsonja Lainez, which he finds extremely unpleasant.    MENTAL STATUS EXAMINATION:  Today, the patient is very pleasant and cooperative.  He reports his mood is sad, though he denies most symptoms of depression.  He says that he has good energy, good appetite and is not helpless or hopeless, yet he does have a dysphoric affect.  His speech is coherent and goal oriented.  His associations are tight and his thought processes are generally logical and linear.  His content of thought includes auditory and visual hallucinations but not suicidal ideation, though he has responded to command hallucinations in the past.  Recent memory is quite impaired.  He tells me that is why he came here and he reports he does get lost and he has his wife and daughter do the bills, checking, et cetera.  His long-term memory is better preserved.  Interestingly, he has trouble naming the presidents in order until he gets to [facility] and from there, he can go all the way back to [place] as he has responded to auditory hallucinations.  Muscle  strength and tone appear to be at baseline.  He uses a walker or a cane to get around.  Recent vitals include a blood pressure of 122/62, temperature of 97, pulse of 71, respiration rate of 15 with 97% oxygen saturation.  His CT scan reveals generalized atrophy.    ASSESSMENT:    1.  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    2.  One could consider psychotic depression as well, but he is not reporting most symptoms of major depressive disorder.    RECOMMENDATIONS:    1.  Increase nighttime Risperdal to 2 mg as he does have a history of responding to the hallucinations.  Please monitor for any stiffness.    2.  Start donepezil 5 mg.  3.  Consider Lexapro 10 mg if he continues to appear depressed.    Joe Schaefer MD        D: 2022   T: 2022   MT: NE    Name:     NIKKIE ARAUZ  MRN:      [id number]        Account:      [id number]   :      1940           Consult Date: 2022     Document: [id number]

## 2022-07-08 NOTE — PROGRESS NOTES
[facility]    Medicine Progress Note - Hospitalist Service, GOLD TEAM 18    Date of Admission:  6/28/2022    Assessment & Plan          William Almazan is a 81 year old male with history of CAD s/p CABG, dilated cardiomyopathy, HFrEF, PAF on Eliquis, HTN, HLD, DM2, KRISTIAN, prostate cancer, gout, and dementia who presented with worsening auditory hallucinations.     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 Present since ~2020 following death of son. Denies worsening depression or anxiety. No visual or tactile hallucinations. Family not able to manage at home.   -He continues with auditory hallucinations telling him to hurt himself.   -Psych following the patient.   -Continue 1:1 sitter for now.  -PT/OT.    Psych recs:  1.  Increase nighttime Risperdal to 2 mg as he does have a history of responding to the hallucinations.  Please monitor for any stiffness.    2.  Start donepezil 5 mg.  3.  Consider Lexapro 10 mg if he continues to appear depressed.    #Pyuria with leuk esterase:  #Chronic indwelling keating:    Chronic indwelling keating catheter. UA grossly abnormal. UC with mixed qasim. Afebrile. WBC normal. Symptomology unclear.  -Ceftriaxone 1 g IV q24h x five days (completed)     #Chronic HFrEF:    Hx dilated cardiomyopathy. Most recent TTE 6/18/21 showing severely dilated LV with EF 20%. Currently euvolemic on exam. No hypoxia. No pulmonary complaints. Trace LE edema.   -Coreg to 12.5 mg PO BID   -Daily weights, I&O's     #CAD s/p CABG (1992):    Not ASA or statin at home. No acute concerns.   -Coreg to 12.5 mg PO BID     #PAF:    No acute concerns.  -Eliquis 5mg BID     #T2DM:    Diet controlled. Glucose stable.   -Monitor     #KRISTIAN:    Does not use CPAP at home.  -Monitor     #Prostate CA:    Treated with XRT and hormone therapy. Hx urinary retention with  chronic indwelling keating.     #Gout:    -Continue PTA allopurinol        Diet: Combination Diet Regular Diet Adult    DVT Prophylaxis: DOAC  Keating Catheter: PRESENT, indication: Retention, Retention  Central Lines: None  Cardiac Monitoring: None  Code Status: Full Code      Disposition Plan      The patient's care was discussed with the Bedside Nurse and Patient.    Nirmal Ledesma MD  Hospitalist Service, GOLD TEAM 18  [facility]  Securely message with the Vocera Web Console (learn more here)  Text page via [facility] Paging/Directory   Please see signed in provider for up to date coverage information      Clinically Significant Risk Factors Present on Admission                      ______________________________________________________________________    Interval History     No acute events overnight.   No new issues.   He was pleasant.     Data reviewed today: I reviewed all medications, new labs and imaging results over the last 24 hours. I personally reviewed no images or EKG's today.    Physical Exam   Vital Signs: Temp: 98.1  F (36.7  C) Temp src: Oral BP: 125/58 Pulse: 70   Resp: 18 SpO2: 95 % O2 Device: None (Room air)    Weight: 182 lbs 6.4 oz     GENERAL: Patient appears well; no acute distress.  CV: Irregularly irregular rhythm; normal S1, S2; no rubs, murmurs, or gallops.  RESP: Lung fields clear to aucultation B/L; no wheezing or crepitations.  GI: Abdomen is soft, nontender, nondistended; no organomegaly; + BS.   DERM: Skin is intact; no rash, lesions, or skin breakdown.  NEURO: No focal deficits appreciated; strength & sensorium are grossly intact.  PSYCH: No active hallucinations; affect, insight appear within normal limits.    Data   Recent Labs   Lab 07/07/22  1801 07/06/22  0540 07/04/22  0701 07/02/22  2115 07/02/22  0705   WBC  --  3.9* 6.0  --  6.1   HGB  --  11.0* 10.8* 11.5* 12.6*   MCV  --  89 88  --  89   PLT  --  178 145*  --  150   NA  --   139 138  --  140   POTASSIUM  --  4.1 3.8 3.4 3.5   CHLORIDE  --  107 103  --  105   CO2  --  27 31  --  28   BUN  --  18 16  --  16   CR  --  0.62* 0.70  --  0.72   ANIONGAP  --  5 4  --  7   CRYS  --  8.7 8.8  --  8.7   * 100* 105*  --  131*   ALBUMIN  --  2.2* 2.1*  --  2.7*   PROTTOTAL  --  5.6* 5.8*  --  6.2*   BILITOTAL  --  0.3 0.3  --  1.3   ALKPHOS  --  81 81  --  99   ALT  --  16 15  --  15   AST  --  17 15  --  14     No results found for this or any previous visit (from the past 24 hour(s)).  Medications       allopurinol  300 mg Oral Daily     apixaban ANTICOAGULANT  5 mg Oral BID     carvedilol  12.5 mg Oral BID     donepezil  5 mg Oral At Bedtime     [Held by provider] furosemide  40 mg Oral BID     gabapentin  100 mg Oral TID     loratadine  10 mg Oral Daily     risperiDONE  0.25 mg Oral Daily     risperiDONE  2 mg Oral At Bedtime     sodium chloride (PF)  3 mL Intracatheter Q8H     [Held by provider] spironolactone  25 mg Oral Daily     Vitamin D3  25 mcg Oral Daily

## 2022-07-08 NOTE — PLAN OF CARE
"/63 (BP Location: Right arm)   Pulse 80   Temp 99.1  F (37.3  C) (Oral)   Resp 17   Ht 1.753 m (5' 9\")   Wt 82.7 kg (182 lb 6.4 oz)   SpO2 98%   BMI 26.94 kg/m      VSS on RA- A&O x4 with forgetfulness at times. Pt on 1:1 for safety.   Pt denies N/V, SOB, chest pain & pain. Reports numbness in toes.    Pt states that he is still hearing positive and negative voices. The positive voices are telling him that the medications are working and that \"through all the pain and suffering he will be saved by the Lord.\" The negative voices are telling him to \"jump on the floor\" and to hurt himself. He also stated that they \"do not like the medications because they drown out the voices\". Denies SI- does not want to listen to the negative voices.    Louie catheter in place- last BM was this AM (7/8). Regular diet. Bilateral edema in ankles & feet. Assist of 1 with walker & gait belt. Mepilex on abdominal skin fold abrasion.     Continue with POC- possibly going to [facility] for outpatient psych.  "

## 2022-07-08 NOTE — PLAN OF CARE
"Nursing Assessment:  VT: BP (!) 141/68   Pulse 78   Temp 97  F (36.1  C) (Oral)   Resp 15   Ht 1.753 m (5' 9\")   Wt 82.7 kg (182 lb 6.4 oz)   SpO2 97%   BMI 26.94 kg/m       Neuro: pt pleasant, still having auditory hallucinations, denied any visual hallucinations. He said he hears positive and negative voices. The positive voices are voices of his family telling him he is doing great by taking his medications. The negative voices are \"Mr. Lainez, Mr. Hall, \"Wilcox\", and others\". These negative voices are telling him he needs to be punished for taking his medication and that he should harm himself. He denied SI and said it would be stupid to listen to the negative voices.    Skin: right skin fold wound has foam mepilex in place. Pt agreed to intra dry application but floor did not have supply, waiting for [facility] to provide supply    Additional Notes: family updated regarding new psych medications. Psych team mentioned [facility] for one of the possible facilities pt can go to and when writer mentioned the facility to the family, the wife mentioned that pt's son use to work there and could possibly be a trigger but family is still open to [facility] as an option for placement.    Pain Management:  Left hip pain managed w/ PRN tylenol    Nursing Plan:  Continue POC, 1:1 sitter for safety, assess hallucinations, monitor for stiffness    Discharge Disposition:  pending  "

## 2022-07-09 PROCEDURE — 250N000013 HC RX MED GY IP 250 OP 250 PS 637

## 2022-07-09 PROCEDURE — 99207 PR CDG-CUT & PASTE-POTENTIAL IMPACT ON LEVEL: CPT | Performed by: INTERNAL MEDICINE

## 2022-07-09 PROCEDURE — 250N000013 HC RX MED GY IP 250 OP 250 PS 637: Performed by: PSYCHIATRY & NEUROLOGY

## 2022-07-09 PROCEDURE — 120N000002 HC R&B MED SURG/OB UMMC

## 2022-07-09 PROCEDURE — 99232 SBSQ HOSP IP/OBS MODERATE 35: CPT | Performed by: INTERNAL MEDICINE

## 2022-07-09 PROCEDURE — 250N000013 HC RX MED GY IP 250 OP 250 PS 637: Performed by: INTERNAL MEDICINE

## 2022-07-09 RX ADMIN — APIXABAN 5 MG: 5 TABLET, FILM COATED ORAL at 08:09

## 2022-07-09 RX ADMIN — LORATADINE 10 MG: 10 TABLET ORAL at 08:09

## 2022-07-09 RX ADMIN — Medication 25 MCG: at 08:09

## 2022-07-09 RX ADMIN — GABAPENTIN 100 MG: 100 CAPSULE ORAL at 13:54

## 2022-07-09 RX ADMIN — GABAPENTIN 100 MG: 100 CAPSULE ORAL at 19:53

## 2022-07-09 RX ADMIN — DONEPEZIL HYDROCHLORIDE 5 MG: 5 TABLET, FILM COATED ORAL at 22:36

## 2022-07-09 RX ADMIN — ALLOPURINOL 300 MG: 300 TABLET ORAL at 08:08

## 2022-07-09 RX ADMIN — GABAPENTIN 100 MG: 100 CAPSULE ORAL at 08:08

## 2022-07-09 RX ADMIN — APIXABAN 5 MG: 5 TABLET, FILM COATED ORAL at 19:53

## 2022-07-09 RX ADMIN — RISPERIDONE 0.25 MG: 0.25 TABLET ORAL at 08:09

## 2022-07-09 RX ADMIN — CARVEDILOL 12.5 MG: 12.5 TABLET, FILM COATED ORAL at 08:09

## 2022-07-09 RX ADMIN — RISPERIDONE 2 MG: 0.5 TABLET ORAL at 22:36

## 2022-07-09 RX ADMIN — CARVEDILOL 12.5 MG: 12.5 TABLET, FILM COATED ORAL at 19:53

## 2022-07-09 ASSESSMENT — ACTIVITIES OF DAILY LIVING (ADL)
ADLS_ACUITY_SCORE: 42

## 2022-07-09 NOTE — PLAN OF CARE
"/67 (BP Location: Right arm, Cuff Size: Adult Regular)   Pulse 67   Temp 97.4  F (36.3  C) (Oral)   Resp 18   Ht 1.753 m (5' 9\")   Wt 82.7 kg (182 lb 6.4 oz)   SpO2 97%   BMI 26.94 kg/m    Pt states \"I am still hearing voices, but I will not listen to what they are saying\" pt denies SI. 1:1 sitter for safety. Pt ambulated in hallway with assist x 1 walker and GB. Pt refused to order dinner tonight but writer offered snacks and pt agreed to eat. Pt is  alert and oriented x 4, but forgetful. Mepilex to abdominal abrasion. Louie in place, adequate output.  Pt slept after bedtime medications. No acute changes.   "

## 2022-07-09 NOTE — PLAN OF CARE
Goal Outcome Evaluation:    Continued 1:1 sitter for safety. Pt resting comfortably in bed. Up to bedside commode Ax1 with walker. Alert and oriented, able to make needs known.

## 2022-07-09 NOTE — PLAN OF CARE
"Goal Outcome Evaluation:      A/O. Denies any Hallucinations or SI. Pt ambulated to hallway using a walker/gait belt X1 assist. Pt's son and daughter in law came to see pt. 1:1 sitter for safety. Adequately voiding through keating and incontinent of bowel X2. Pt ate 100% of breakfast and lunch. Mepilex to abdm abrasion. Scrotal wound cleansed and barrier applied.      /65 (BP Location: Right arm, Patient Position: Supine, Cuff Size: Adult Regular)   Pulse 93   Temp 97.4  F (36.3  C) (Oral)   Resp 16   Ht 1.753 m (5' 9\")   Wt 82.7 kg (182 lb 6.4 oz)   SpO2 99%   BMI 26.94 kg/m                  "

## 2022-07-09 NOTE — PROGRESS NOTES
[facility]    Medicine Progress Note - Hospitalist Service, GOLD TEAM 18    Date of Admission:  6/28/2022    Assessment & Plan          William Almazan is a 81 year old male with history of CAD s/p CABG, dilated cardiomyopathy, HFrEF, PAF on Eliquis, HTN, HLD, DM2, KRISTIAN, prostate cancer, gout, and dementia who presented with worsening auditory hallucinations.     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 Present since ~2020 following death of son. Denies worsening depression or anxiety. No visual or tactile hallucinations. Family not able to manage at home.   -He continues with auditory hallucinations telling him to hurt himself.   -Psych following the patient.   -Continue 1:1 sitter for now.  -PT/OT.    Psych recs:  1.  Increase nighttime Risperdal to 2 mg as he does have a history of responding to the hallucinations.  Please monitor for any stiffness. 2.  Start donepezil 5 mg.  3.  Consider Lexapro 10 mg if he continues to appear depressed.    #Chronic indwelling keating:    Chronic indwelling keating catheter.     #Chronic HFrEF:    Hx dilated cardiomyopathy. Most recent TTE 6/18/21 showing severely dilated LV with EF 20%. Currently euvolemic on exam. No hypoxia. No pulmonary complaints. Trace LE edema.   -Coreg to 12.5 mg PO BID   -Daily weights, I&O's     #CAD s/p CABG (1992):    Not ASA or statin at home. No acute concerns.   -Coreg to 12.5 mg PO BID     #PAF:    No acute concerns.  -Eliquis 5mg BID     #T2DM:    Diet controlled. Glucose stable.   -Monitor     #KRISTIAN:    Does not use CPAP at home.  -Monitor     #Prostate CA:    Treated with XRT and hormone therapy. Hx urinary retention with chronic indwelling keating.     #Gout:    -Continue PTA allopurinol        Diet: Combination Diet Regular Diet Adult    DVT Prophylaxis: DOAC  Keating Catheter: PRESENT,  indication: Retention, Retention  Central Lines: None  Cardiac Monitoring: None  Code Status: Full Code      Disposition Plan      The patient's care was discussed with the Bedside Nurse and Patient.    Nirmal Ledesma MD  Hospitalist Service, GOLD TEAM 18  [facility]  Securely message with the Vocera Web Console (learn more here)  Text page via [facility] Paging/Directory   Please see signed in provider for up to date coverage information      Clinically Significant Risk Factors Present on Admission                      ______________________________________________________________________    Interval History     No acute events overnight.   No new issues.   He was pleasant.   Sitter at bedside.     Data reviewed today: I reviewed all medications, new labs and imaging results over the last 24 hours. I personally reviewed no images or EKG's today.    Physical Exam   Vital Signs: Temp: 97.4  F (36.3  C) Temp src: Oral BP: 126/65 Pulse: 93   Resp: 16 SpO2: 99 % O2 Device: None (Room air)    Weight: 182 lbs 6.4 oz     GENERAL: Patient appears well; no acute distress.  CV: Irregularly irregular rhythm; normal S1, S2; no rubs, murmurs, or gallops.  RESP: Lung fields clear to aucultation B/L; no wheezing or crepitations.  NEURO: No focal deficits appreciated; strength & sensorium are grossly intact.  PSYCH: No active hallucinations; affect, insight appear within normal limits.    Data   Recent Labs   Lab 07/07/22  1801 07/06/22  0540 07/04/22  0701 07/02/22  2115   WBC  --  3.9* 6.0  --    HGB  --  11.0* 10.8* 11.5*   MCV  --  89 88  --    PLT  --  178 145*  --    NA  --  139 138  --    POTASSIUM  --  4.1 3.8 3.4   CHLORIDE  --  107 103  --    CO2  --  27 31  --    BUN  --  18 16  --    CR  --  0.62* 0.70  --    ANIONGAP  --  5 4  --    CRYS  --  8.7 8.8  --    * 100* 105*  --    ALBUMIN  --  2.2* 2.1*  --    PROTTOTAL  --  5.6* 5.8*  --    BILITOTAL  --  0.3 0.3  --     ALKPHOS  --  81 81  --    ALT  --  16 15  --    AST  --  17 15  --      No results found for this or any previous visit (from the past 24 hour(s)).  Medications       allopurinol  300 mg Oral Daily     apixaban ANTICOAGULANT  5 mg Oral BID     carvedilol  12.5 mg Oral BID     donepezil  5 mg Oral At Bedtime     [Held by provider] furosemide  40 mg Oral BID     gabapentin  100 mg Oral TID     loratadine  10 mg Oral Daily     risperiDONE  0.25 mg Oral Daily     risperiDONE  2 mg Oral At Bedtime     sodium chloride (PF)  3 mL Intracatheter Q8H     [Held by provider] spironolactone  25 mg Oral Daily     Vitamin D3  25 mcg Oral Daily

## 2022-07-10 PROCEDURE — 250N000013 HC RX MED GY IP 250 OP 250 PS 637: Performed by: INTERNAL MEDICINE

## 2022-07-10 PROCEDURE — 250N000013 HC RX MED GY IP 250 OP 250 PS 637: Performed by: PSYCHIATRY & NEUROLOGY

## 2022-07-10 PROCEDURE — 250N000013 HC RX MED GY IP 250 OP 250 PS 637

## 2022-07-10 PROCEDURE — 120N000002 HC R&B MED SURG/OB UMMC

## 2022-07-10 PROCEDURE — 99207 PR CDG-CUT & PASTE-POTENTIAL IMPACT ON LEVEL: CPT | Performed by: INTERNAL MEDICINE

## 2022-07-10 PROCEDURE — 99232 SBSQ HOSP IP/OBS MODERATE 35: CPT | Performed by: INTERNAL MEDICINE

## 2022-07-10 RX ADMIN — ALLOPURINOL 300 MG: 300 TABLET ORAL at 09:44

## 2022-07-10 RX ADMIN — CARVEDILOL 12.5 MG: 12.5 TABLET, FILM COATED ORAL at 09:43

## 2022-07-10 RX ADMIN — APIXABAN 5 MG: 5 TABLET, FILM COATED ORAL at 09:44

## 2022-07-10 RX ADMIN — LORATADINE 10 MG: 10 TABLET ORAL at 09:44

## 2022-07-10 RX ADMIN — RISPERIDONE 0.25 MG: 0.25 TABLET ORAL at 09:43

## 2022-07-10 RX ADMIN — DONEPEZIL HYDROCHLORIDE 5 MG: 5 TABLET, FILM COATED ORAL at 23:54

## 2022-07-10 RX ADMIN — CARVEDILOL 12.5 MG: 12.5 TABLET, FILM COATED ORAL at 20:18

## 2022-07-10 RX ADMIN — GABAPENTIN 100 MG: 100 CAPSULE ORAL at 09:44

## 2022-07-10 RX ADMIN — GABAPENTIN 100 MG: 100 CAPSULE ORAL at 15:15

## 2022-07-10 RX ADMIN — GABAPENTIN 100 MG: 100 CAPSULE ORAL at 20:19

## 2022-07-10 RX ADMIN — Medication 25 MCG: at 09:43

## 2022-07-10 RX ADMIN — APIXABAN 5 MG: 5 TABLET, FILM COATED ORAL at 20:19

## 2022-07-10 RX ADMIN — RISPERIDONE 2 MG: 0.5 TABLET ORAL at 23:52

## 2022-07-10 ASSESSMENT — ACTIVITIES OF DAILY LIVING (ADL)
ADLS_ACUITY_SCORE: 38
ADLS_ACUITY_SCORE: 42
ADLS_ACUITY_SCORE: 38
ADLS_ACUITY_SCORE: 42
ADLS_ACUITY_SCORE: 42
ADLS_ACUITY_SCORE: 38
ADLS_ACUITY_SCORE: 42

## 2022-07-10 NOTE — PLAN OF CARE
Goal Outcome Evaluation:      Pt had an uneventful night. Alert and oriented with forgetfulness. Continue  to be on 1:1this shift. Spent most of the night sleeping.  Pt appears to be impulsive and unaware of his limitations.  Requested to be help to the chair and this writer did. Pt spent about 5 minutes on the chair and requested  to be put back to bed. Pt got up from sleep at around 4:30 Am  and requested to be taken to the bathroom. Pt had a medium soft formed BM. Pt is presently coloring at the time of this report. Will continue with POC.        Patient's most recent vital signs are:     Vital signs:  BP: 126/71  Temp: 98.3  HR: 73  RR: 18  SpO2: 98 %     Patient does not have new respiratory symptoms.  Patient does not have new sore throat.  Patient does not have a fever greater than 99.5.

## 2022-07-10 NOTE — PROGRESS NOTES
[facility]    Medicine Progress Note - Hospitalist Service, GOLD TEAM 18    Date of Admission:  6/28/2022    Assessment & Plan          William Almazan is a 81 year old male with history of CAD s/p CABG, dilated cardiomyopathy, HFrEF, PAF on Eliquis, HTN, HLD, DM2, KRISTIAN, prostate cancer, gout, and dementia who presented with worsening auditory hallucinations.     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 Present since ~2020 following death of son. Denies worsening depression or anxiety. No visual or tactile hallucinations. Family not able to manage at home.   -Psych following the patient.   -Continue 1:1 sitter for now.  -PT/OT.    Psych recs:  1.  Increase nighttime Risperdal to 2 mg as he does have a history of responding to the hallucinations.  Please monitor for any stiffness. 2.  Start donepezil 5 mg.  3.  Consider Lexapro 10 mg if he continues to appear depressed.    #Chronic indwelling keating:    Chronic indwelling keating catheter.     #Chronic HFrEF:    Hx dilated cardiomyopathy. Most recent TTE 6/18/21 showing severely dilated LV with EF 20%. Currently euvolemic on exam. No hypoxia. No pulmonary complaints. Trace LE edema.   -Coreg to 12.5 mg PO BID   -Daily weights, I&O's     #CAD s/p CABG (1992):    Not ASA or statin at home. No acute concerns.   -Coreg to 12.5 mg PO BID     #PAF:    No acute concerns.  -Eliquis 5mg BID     #T2DM:    Diet controlled. Glucose stable.   -Monitor     #KRISTIAN:    Does not use CPAP at home.  -Monitor     #Prostate CA:    Treated with XRT and hormone therapy. Hx urinary retention with chronic indwelling keating.     #Gout:    -Continue PTA allopurinol        Diet: Combination Diet Regular Diet Adult    DVT Prophylaxis: DOAC  Keating Catheter: PRESENT, indication: Retention, Retention  Central Lines: None  Cardiac Monitoring:  None  Code Status: Full Code      Disposition Plan      The patient's care was discussed with the Bedside Nurse and Patient.    Nirmal Ledesma MD  Hospitalist Service, GOLD TEAM 18  [facility]  Securely message with the Vocera Web Console (learn more here)  Text page via [facility] Paging/Directory   Please see signed in provider for up to date coverage information      Clinically Significant Risk Factors Present on Admission                      ______________________________________________________________________    Interval History     No acute events overnight.   Pt's family at bedside, she had an extensive conversation and they are still interested on placement.   No new issues.   He was pleasant.     Data reviewed today: I reviewed all medications, new labs and imaging results over the last 24 hours. I personally reviewed no images or EKG's today.    Physical Exam   Vital Signs: Temp: 96.9  F (36.1  C) Temp src: Oral BP: 134/62 Pulse: 100   Resp: 13 SpO2: 97 % O2 Device: None (Room air)    Weight: 182 lbs 6.4 oz     GENERAL: Alert, room air, no  CV: Irregularly irregular rhythm; normal S1, S2; no rubs, murmurs, or gallops.  RESP: Lung fields clear to aucultation B/L; no wheezing or crepitations.  NEURO: Alert, oriented, moving all extremities.   PSYCH: Mood appears stable.     Data   Recent Labs   Lab 07/07/22  1801 07/06/22  0540 07/04/22  0701   WBC  --  3.9* 6.0   HGB  --  11.0* 10.8*   MCV  --  89 88   PLT  --  178 145*   NA  --  139 138   POTASSIUM  --  4.1 3.8   CHLORIDE  --  107 103   CO2  --  27 31   BUN  --  18 16   CR  --  0.62* 0.70   ANIONGAP  --  5 4   CRYS  --  8.7 8.8   * 100* 105*   ALBUMIN  --  2.2* 2.1*   PROTTOTAL  --  5.6* 5.8*   BILITOTAL  --  0.3 0.3   ALKPHOS  --  81 81   ALT  --  16 15   AST  --  17 15     No results found for this or any previous visit (from the past 24 hour(s)).  Medications       allopurinol  300 mg Oral Daily      apixaban ANTICOAGULANT  5 mg Oral BID     carvedilol  12.5 mg Oral BID     donepezil  5 mg Oral At Bedtime     [Held by provider] furosemide  40 mg Oral BID     gabapentin  100 mg Oral TID     loratadine  10 mg Oral Daily     risperiDONE  0.25 mg Oral Daily     risperiDONE  2 mg Oral At Bedtime     sodium chloride (PF)  3 mL Intracatheter Q8H     [Held by provider] spironolactone  25 mg Oral Daily     Vitamin D3  25 mcg Oral Daily

## 2022-07-11 ENCOUNTER — APPOINTMENT (OUTPATIENT)
Dept: OCCUPATIONAL THERAPY | Facility: CLINIC | Age: 82
DRG: 884 | End: 2022-07-11
Payer: MEDICARE

## 2022-07-11 ENCOUNTER — APPOINTMENT (OUTPATIENT)
Dept: PHYSICAL THERAPY | Facility: CLINIC | Age: 82
DRG: 884 | End: 2022-07-11
Payer: MEDICARE

## 2022-07-11 PROCEDURE — 250N000013 HC RX MED GY IP 250 OP 250 PS 637

## 2022-07-11 PROCEDURE — 120N000002 HC R&B MED SURG/OB UMMC

## 2022-07-11 PROCEDURE — 97530 THERAPEUTIC ACTIVITIES: CPT | Mod: GP

## 2022-07-11 PROCEDURE — 97530 THERAPEUTIC ACTIVITIES: CPT | Mod: GO

## 2022-07-11 PROCEDURE — 250N000013 HC RX MED GY IP 250 OP 250 PS 637: Performed by: PSYCHIATRY & NEUROLOGY

## 2022-07-11 PROCEDURE — 250N000013 HC RX MED GY IP 250 OP 250 PS 637: Performed by: INTERNAL MEDICINE

## 2022-07-11 PROCEDURE — 99232 SBSQ HOSP IP/OBS MODERATE 35: CPT | Performed by: INTERNAL MEDICINE

## 2022-07-11 PROCEDURE — 97535 SELF CARE MNGMENT TRAINING: CPT | Mod: GO

## 2022-07-11 RX ORDER — FUROSEMIDE 20 MG
40 TABLET ORAL
Status: DISCONTINUED | OUTPATIENT
Start: 2022-07-12 | End: 2022-08-02 | Stop reason: HOSPADM

## 2022-07-11 RX ADMIN — APIXABAN 5 MG: 5 TABLET, FILM COATED ORAL at 20:11

## 2022-07-11 RX ADMIN — RISPERIDONE 0.25 MG: 0.25 TABLET ORAL at 10:19

## 2022-07-11 RX ADMIN — CARVEDILOL 12.5 MG: 12.5 TABLET, FILM COATED ORAL at 10:18

## 2022-07-11 RX ADMIN — APIXABAN 5 MG: 5 TABLET, FILM COATED ORAL at 10:18

## 2022-07-11 RX ADMIN — GABAPENTIN 100 MG: 100 CAPSULE ORAL at 20:11

## 2022-07-11 RX ADMIN — GABAPENTIN 100 MG: 100 CAPSULE ORAL at 10:19

## 2022-07-11 RX ADMIN — ACETAMINOPHEN 1000 MG: 500 TABLET ORAL at 20:11

## 2022-07-11 RX ADMIN — CARVEDILOL 12.5 MG: 12.5 TABLET, FILM COATED ORAL at 20:11

## 2022-07-11 RX ADMIN — Medication 25 MCG: at 10:19

## 2022-07-11 RX ADMIN — GABAPENTIN 100 MG: 100 CAPSULE ORAL at 13:57

## 2022-07-11 RX ADMIN — DONEPEZIL HYDROCHLORIDE 5 MG: 5 TABLET, FILM COATED ORAL at 22:07

## 2022-07-11 RX ADMIN — ALLOPURINOL 300 MG: 300 TABLET ORAL at 10:19

## 2022-07-11 RX ADMIN — LORATADINE 10 MG: 10 TABLET ORAL at 10:19

## 2022-07-11 RX ADMIN — RISPERIDONE 2 MG: 0.5 TABLET ORAL at 22:07

## 2022-07-11 ASSESSMENT — ACTIVITIES OF DAILY LIVING (ADL)
ADLS_ACUITY_SCORE: 38
ADLS_ACUITY_SCORE: 44
ADLS_ACUITY_SCORE: 44
ADLS_ACUITY_SCORE: 38
ADLS_ACUITY_SCORE: 44
ADLS_ACUITY_SCORE: 38
ADLS_ACUITY_SCORE: 44
ADLS_ACUITY_SCORE: 38

## 2022-07-11 NOTE — PLAN OF CARE
"Shift Summary     Patient continues to endorse hallucinations but denies SI. Stated that he \"feels safe\". Pleasant demeanor, answering questions appropriately.    Plan: LTC, referrals sent. Please call [facility] tomorrow at 10am for bed availability [phone]  "

## 2022-07-11 NOTE — PROGRESS NOTES
[facility]    Medicine Progress Note - Hospitalist Service, GOLD TEAM 18    Date of Admission:  6/28/2022    Assessment & Plan          William Almazan is a 81 year old male with history of CAD s/p CABG, dilated cardiomyopathy, HFrEF, PAF on Eliquis, HTN, HLD, DM2, KRISTIAN, prostate cancer, gout, and dementia who presented with worsening auditory hallucinations.     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 Present since ~2020 following death of son. Denies worsening depression or anxiety. No visual or tactile hallucinations. Family not able to manage at home.   -Psych following the patient.   -Continue 1:1 sitter for now.  -PT/OT.    Psych recs:  1.  Increase nighttime Risperdal to 2 mg as he does have a history of responding to the hallucinations.  Please monitor for any stiffness. 2.  Start donepezil 5 mg.  3.  Consider Lexapro 10 mg if he continues to appear depressed (mood stable, I didn't start Lexapro).    #Chronic indwelling keating:    Chronic indwelling keating catheter.     #Chronic HFrEF:    Hx dilated cardiomyopathy. Most recent TTE 6/18/21 showing severely dilated LV with EF 20%. Currently euvolemic on exam. No hypoxia. No pulmonary complaints. Trace LE edema.   -Coreg to 12.5 mg PO BID   -Restart PTA Lasix  -Daily weights, I&O's     #CAD s/p CABG (1992):    Not ASA or statin at home. No acute concerns.   -Coreg to 12.5 mg PO BID     #PAF:    No acute concerns.  -Eliquis 5mg BID     #T2DM:    Diet controlled. Glucose stable.   -Monitor     #KRISTIAN:    Does not use CPAP at home.  -Monitor     #Prostate CA:    Treated with XRT and hormone therapy. Hx urinary retention with chronic indwelling keating.     #Gout:    -Continue PTA allopurinol        Diet: Combination Diet Regular Diet Adult    DVT Prophylaxis: DOAC  Keating Catheter: PRESENT, indication:  Retention, Retention  Central Lines: None  Cardiac Monitoring: None  Code Status: Full Code      Disposition Plan      The patient's care was discussed with the Bedside Nurse and Patient.    Nirmal Ledesma MD  Hospitalist Service, GOLD TEAM 18  [facility]  Securely message with the Vocera Web Console (learn more here)  Text page via [facility] Paging/Directory   Please see signed in provider for up to date coverage information      Clinically Significant Risk Factors Present on Admission                      ______________________________________________________________________    Interval History     No acute events overnight.   No new issues.   He was pleasant.   Waiting for placement.     Data reviewed today: I reviewed all medications, new labs and imaging results over the last 24 hours. I personally reviewed no images or EKG's today.    Physical Exam   Vital Signs: Temp: 98  F (36.7  C) Temp src: Oral BP: 130/60 Pulse: 67   Resp: 16 SpO2: 98 % O2 Device: None (Room air)    Weight: 182 lbs 6.4 oz     GENERAL: Alert, room air, no  CV: Irregularly irregular rhythm; normal S1, S2; no rubs, murmurs, or gallops.  RESP: Lung fields clear to aucultation B/L; no wheezing or crepitations.  NEURO: Alert, oriented, moving all extremities.   PSYCH: Mood appears stable.     Data   Recent Labs   Lab 07/07/22  1801 07/06/22  0540   WBC  --  3.9*   HGB  --  11.0*   MCV  --  89   PLT  --  178   NA  --  139   POTASSIUM  --  4.1   CHLORIDE  --  107   CO2  --  27   BUN  --  18   CR  --  0.62*   ANIONGAP  --  5   CRYS  --  8.7   * 100*   ALBUMIN  --  2.2*   PROTTOTAL  --  5.6*   BILITOTAL  --  0.3   ALKPHOS  --  81   ALT  --  16   AST  --  17     No results found for this or any previous visit (from the past 24 hour(s)).  Medications       allopurinol  300 mg Oral Daily     apixaban ANTICOAGULANT  5 mg Oral BID     carvedilol  12.5 mg Oral BID     donepezil  5 mg Oral At Bedtime      [Held by provider] furosemide  40 mg Oral BID     gabapentin  100 mg Oral TID     loratadine  10 mg Oral Daily     risperiDONE  0.25 mg Oral Daily     risperiDONE  2 mg Oral At Bedtime     sodium chloride (PF)  3 mL Intracatheter Q8H     [Held by provider] spironolactone  25 mg Oral Daily     Vitamin D3  25 mcg Oral Daily

## 2022-07-11 NOTE — PROGRESS NOTES
"Alert but disoriented to time. Reports to having both positive and negative auditory hallucinations. Pt states, \"the positive ones helps me decide what color I should use next. The negative ones is mostly Trump taking credit for the things I've done.\"     Has a keating with leg bag with total output of 950 mL on this shift. 1:1 sitter for SI. Appears to be sleeping during rounds. No acute changes. Continue with POC.  "

## 2022-07-11 NOTE — PROGRESS NOTES
Social Work Progress Note    Social work called the [facility] to see if they had any availability for patient to admit. [facility] can be reached at [phone]. Social work was told that the unit reevaluates bed status at 5pm each night and that it would be best to call around that time. SW will not be in office at that time, however, it appears charge nurse may be able to help by calling [phone] at 5pm to see if they have any open beds.     Informed day charge (Alia) who stated she will pass this along. At 5pm, please call the number listed above and ask if they have an openings for patient to transfer to the [facility].     Social work will continue to follow and provide assistance to ensure a safe and timely discharge.     KAHLIL De Luna, LGSW  8A and 10 ICU   [facility]   Phone: [phone]

## 2022-07-11 NOTE — PROGRESS NOTES
1702  RN called [facility] at number listed in SW note to obtain update on bed availability.  RN spoke with [facility]  who stated the unit is at capacity until 10AM tomorrow.      indicated to call back tomorrow at that time, 1000, to assess bed availability.   Writer updated bedside RN.

## 2022-07-11 NOTE — PLAN OF CARE
Patient bypassing urine from keating into brief.  Irrigated keating x 2. Irrigates without problems. Urine clear. No sediment noted. Urine in leg bag. Plan to continue to monitor output. Oncoming nurse updated.

## 2022-07-11 NOTE — PLAN OF CARE
"Goal Outcome Evaluation:       A/O. Pt reports to having Hallucinations tell him to hurt self but \"we are stronger with cherry on our side\". 1:1 sitter for safety. Pt ambulated to BR using a walker/gait belt.  Pt's stayed w/ pt for a few hours. Pt's brief was full of urine and keating bag emptied(provider notified). On coming RN to irrigate keating. Incontinent of bowel and bladder this afternoon. Pt eating adequately. Mepilex to abdm abrasion dressing changed per POC. Scrotal wound cleansed and barrier applied.  Continue to monitor.       /61 (BP Location: Right arm)   Pulse 72   Temp 97.1  F (36.2  C) (Oral)   Resp 15   Ht 1.753 m (5' 9\")   Wt 82.7 kg (182 lb 6.4 oz)   SpO2 98%   BMI 26.94 kg/m             "

## 2022-07-11 NOTE — TELEPHONE ENCOUNTER
0027 Bed Search Update:    [facility]-No beds available  [facility]-No beds available  [facility]-No beds available  [facility]-No beds available  [facility]-Low acuity referrals  [facility]-Low acuity referrals.  Must be completely independent with ADLs as there are no Rochelle to assist with cares  [facility]-No beds available  [facility]-No beds available  [facility]-No beds available  [facility]-No beds available  [facility]-No beds available    Remains on wait list.

## 2022-07-12 ENCOUNTER — APPOINTMENT (OUTPATIENT)
Dept: PHYSICAL THERAPY | Facility: CLINIC | Age: 82
DRG: 884 | End: 2022-07-12
Payer: MEDICARE

## 2022-07-12 LAB
ANION GAP SERPL CALCULATED.3IONS-SCNC: 4 MMOL/L (ref 3–14)
BUN SERPL-MCNC: 13 MG/DL (ref 7–30)
CALCIUM SERPL-MCNC: 8.6 MG/DL (ref 8.5–10.1)
CHLORIDE BLD-SCNC: 111 MMOL/L (ref 94–109)
CO2 SERPL-SCNC: 25 MMOL/L (ref 20–32)
CREAT SERPL-MCNC: 0.56 MG/DL (ref 0.66–1.25)
GFR SERPL CREATININE-BSD FRML MDRD: >90 ML/MIN/1.73M2
GLUCOSE BLD-MCNC: 90 MG/DL (ref 70–99)
HOLD SPECIMEN: NORMAL
POTASSIUM BLD-SCNC: 4.9 MMOL/L (ref 3.4–5.3)
SODIUM SERPL-SCNC: 140 MMOL/L (ref 133–144)

## 2022-07-12 PROCEDURE — 250N000013 HC RX MED GY IP 250 OP 250 PS 637: Performed by: INTERNAL MEDICINE

## 2022-07-12 PROCEDURE — 97530 THERAPEUTIC ACTIVITIES: CPT | Mod: GP

## 2022-07-12 PROCEDURE — 250N000013 HC RX MED GY IP 250 OP 250 PS 637: Performed by: PSYCHIATRY & NEUROLOGY

## 2022-07-12 PROCEDURE — 250N000013 HC RX MED GY IP 250 OP 250 PS 637

## 2022-07-12 PROCEDURE — 36415 COLL VENOUS BLD VENIPUNCTURE: CPT | Performed by: STUDENT IN AN ORGANIZED HEALTH CARE EDUCATION/TRAINING PROGRAM

## 2022-07-12 PROCEDURE — 97116 GAIT TRAINING THERAPY: CPT | Mod: GP

## 2022-07-12 PROCEDURE — 99232 SBSQ HOSP IP/OBS MODERATE 35: CPT | Performed by: INTERNAL MEDICINE

## 2022-07-12 PROCEDURE — 120N000002 HC R&B MED SURG/OB UMMC

## 2022-07-12 PROCEDURE — 250N000013 HC RX MED GY IP 250 OP 250 PS 637: Performed by: PHYSICIAN ASSISTANT

## 2022-07-12 PROCEDURE — 82374 ASSAY BLOOD CARBON DIOXIDE: CPT | Performed by: STUDENT IN AN ORGANIZED HEALTH CARE EDUCATION/TRAINING PROGRAM

## 2022-07-12 RX ADMIN — GABAPENTIN 100 MG: 100 CAPSULE ORAL at 20:34

## 2022-07-12 RX ADMIN — DONEPEZIL HYDROCHLORIDE 5 MG: 5 TABLET, FILM COATED ORAL at 22:03

## 2022-07-12 RX ADMIN — RISPERIDONE 2 MG: 0.5 TABLET ORAL at 22:02

## 2022-07-12 RX ADMIN — APIXABAN 5 MG: 5 TABLET, FILM COATED ORAL at 08:25

## 2022-07-12 RX ADMIN — FUROSEMIDE 40 MG: 20 TABLET ORAL at 08:25

## 2022-07-12 RX ADMIN — CARVEDILOL 12.5 MG: 12.5 TABLET, FILM COATED ORAL at 08:25

## 2022-07-12 RX ADMIN — Medication 1 MG: at 03:00

## 2022-07-12 RX ADMIN — ALLOPURINOL 300 MG: 300 TABLET ORAL at 08:25

## 2022-07-12 RX ADMIN — APIXABAN 5 MG: 5 TABLET, FILM COATED ORAL at 20:33

## 2022-07-12 RX ADMIN — ACETAMINOPHEN 1000 MG: 500 TABLET ORAL at 17:24

## 2022-07-12 RX ADMIN — GABAPENTIN 100 MG: 100 CAPSULE ORAL at 08:25

## 2022-07-12 RX ADMIN — GABAPENTIN 100 MG: 100 CAPSULE ORAL at 13:36

## 2022-07-12 RX ADMIN — RISPERIDONE 0.25 MG: 0.25 TABLET ORAL at 08:25

## 2022-07-12 RX ADMIN — CARVEDILOL 12.5 MG: 12.5 TABLET, FILM COATED ORAL at 20:33

## 2022-07-12 RX ADMIN — FUROSEMIDE 40 MG: 20 TABLET ORAL at 17:20

## 2022-07-12 RX ADMIN — LORATADINE 10 MG: 10 TABLET ORAL at 08:25

## 2022-07-12 RX ADMIN — Medication 25 MCG: at 08:25

## 2022-07-12 ASSESSMENT — ACTIVITIES OF DAILY LIVING (ADL)
ADLS_ACUITY_SCORE: 44

## 2022-07-12 NOTE — PLAN OF CARE
"Goal Outcome Evaluation:      Pt A/OX4. Pt continues to experience hallucinations but feels safe and denies SI. Pt given tylenol for pain. Dressing changed per orders. Louie draining adequately. No new changes. Continue to monitor.     Plan: please call [facility] for bed availability at 10 am today.     /72 (BP Location: Right arm, Patient Position: Semi-Ruby's, Cuff Size: Adult Regular)   Pulse 91   Temp 97.1  F (36.2  C) (Axillary)   Resp 15   Ht 1.753 m (5' 9\")   Wt 84.2 kg (185 lb 9.6 oz)   SpO2 97%   BMI 27.41 kg/m                  "

## 2022-07-12 NOTE — PLAN OF CARE
"/70 (BP Location: Right arm, Patient Position: Supine, Cuff Size: Adult Regular)   Pulse 69   Temp (!) 96.1  F (35.6  C) (Oral)   Resp 18   Ht 1.753 m (5' 9\")   Wt 84.2 kg (185 lb 9.6 oz)   SpO2 96%   BMI 27.41 kg/m    Pt is still hallucinating, states \"voices are telling that trump is removing kids eyes.\" Denies SI. Sitter at bedside for safety. Denies pain, headache, SOB, or chest pain. Ambulated to bathroom with SBA and walker. LBM: 7/12.      Plan: please call [facility] for bed availability at 10 am today.   "

## 2022-07-12 NOTE — PROGRESS NOTES
[facility]    Medicine Progress Note - Hospitalist Service, GOLD TEAM 17    Date of Admission:  6/28/2022    Assessment & Plan        William Almazan is a 81 year old male with history of CAD s/p CABG, dilated cardiomyopathy, HFrEF, PAF on Eliquis, HTN, HLD, DM2, KRISTIAN, prostate cancer, gout, and dementia who presented on 6/28/22 with worsening auditory hallucinations.     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   ---   Hallucination present since ~2020 following death of son.   ---   Denied worsening depression or anxiety.   ---   No visual or tactile hallucinations.   ---   Family not able to manage at home.   ---   Psych following the patient.   ---   Continue 1:1 sitter for now  ---   Increase nighttime Risperdal to 2 mg as he does have a history of responding to the hallucinations.  Please monitor for any stiffness.  ---   Started on donepezil 5 mg daily  ---   Will consider Lexapro 10 mg if he continues to appear depressed (mood stable).    Urinary retention  ---   Chronic indwelling keating catheter.     Chronic HFrEF:    ---   Hx dilated cardiomyopathy.   ---   Most recent TTE 6/18/21 showing severely dilated LV with EF 20%.   ---   Currently euvolemic on exam.   ---   No hypoxia or pulmonary complaints.   ---   Has trace LE edema.   ---   Continue Coreg to 12.5 mg PO BID   ---   Continue PTA Lasix  ---   Daily weights, I&O's     CAD s/p CABG (1992):    ---   Not ASA or statin at home.   ---   No acute concerns.   ---   Coreg to 12.5 mg PO BID     PAF   ---   No acute concerns.  ---   Eliquis 5mg BID     T2DM:    ---   Diet controlled.   ---   Glucose stable.      KRISTIAN:    ---   Does not use CPAP at home.       Prostate CA:    ---   Treated with XRT and hormone therapy.   ---   Hx urinary retention with chronic indwelling keating.     Gout:    ---    Continue PTA allopurinol           Diet: Combination Diet Regular Diet Adult    DVT Prophylaxis: DOAC  Louie Catheter: PRESENT, indication: Retention, Retention  Central Lines: None  Cardiac Monitoring: None  Code Status: Full Code      Disposition Plan   TBD     The patient's care was discussed with the Bedside Nurse and Patient.          Chapis Kinney MD  Hospitalist Service, GOLD TEAM 17  [facility]  Securely message with the Vocera Web Console (learn more here)  Text page via [facility] Paging/Directory   Please see signed in provider for up to date coverage information      ______________________________________________________________________    Interval History     No acute events overnight.   No new issues.   He was pleasant.   Awaiting for placement.     Data reviewed today: I reviewed all medications, new labs and imaging results over the last 24 hours. I personally reviewed no images or EKG's today.    Physical Exam   Vital Signs: Temp: 97.4  F (36.3  C) Temp src: Oral BP: 125/59 Pulse: 66   Resp: 16 SpO2: 100 % O2 Device: None (Room air)    Weight: 185 lbs 9.6 oz     GENERAL: Alert, room air, no  CV: Irregularly irregular rhythm; normal S1, S2; no rubs, murmurs, or gallops.  RESP: Lung fields clear to aucultation B/L; no wheezing or crepitations.  NEURO: Alert, oriented, moving all extremities.   PSYCH: Mood appears stable.     Data   Recent Labs   Lab 07/12/22  0634 07/07/22  1801 07/06/22  0540   WBC  --   --  3.9*   HGB  --   --  11.0*   MCV  --   --  89   PLT  --   --  178     --  139   POTASSIUM 4.9  --  4.1   CHLORIDE 111*  --  107   CO2 25  --  27   BUN 13  --  18   CR 0.56*  --  0.62*   ANIONGAP 4  --  5   CRYS 8.6  --  8.7   GLC 90 127* 100*   ALBUMIN  --   --  2.2*   PROTTOTAL  --   --  5.6*   BILITOTAL  --   --  0.3   ALKPHOS  --   --  81   ALT  --   --  16   AST  --   --  17     No results found for this or any previous visit (from the past 24  hour(s)).  Medications       allopurinol  300 mg Oral Daily     apixaban ANTICOAGULANT  5 mg Oral BID     carvedilol  12.5 mg Oral BID     donepezil  5 mg Oral At Bedtime     furosemide  40 mg Oral BID     gabapentin  100 mg Oral TID     loratadine  10 mg Oral Daily     risperiDONE  0.25 mg Oral Daily     risperiDONE  2 mg Oral At Bedtime     sodium chloride (PF)  3 mL Intracatheter Q8H     [Held by provider] spironolactone  25 mg Oral Daily     Vitamin D3  25 mcg Oral Daily

## 2022-07-12 NOTE — PROGRESS NOTES
Social Work Progress Note     Social work called the [facility] to see if they had any availability for patient to admit. [facility] can be reached at [phone]. No bed availability today. Staff stated to call back again at 10am to see if there are any openings. SW will follow up at the same time tomorrow.     Social work will continue to follow and provide assistance to ensure a safe and timely discharge.      KAHLIL De Luna, LGSW  8A and 10 ICU   [facility]   Phone: [phone]

## 2022-07-13 PROCEDURE — 250N000013 HC RX MED GY IP 250 OP 250 PS 637: Performed by: INTERNAL MEDICINE

## 2022-07-13 PROCEDURE — 99232 SBSQ HOSP IP/OBS MODERATE 35: CPT

## 2022-07-13 PROCEDURE — 99232 SBSQ HOSP IP/OBS MODERATE 35: CPT | Performed by: INTERNAL MEDICINE

## 2022-07-13 PROCEDURE — 250N000013 HC RX MED GY IP 250 OP 250 PS 637

## 2022-07-13 PROCEDURE — 250N000013 HC RX MED GY IP 250 OP 250 PS 637: Performed by: PSYCHIATRY & NEUROLOGY

## 2022-07-13 PROCEDURE — 120N000002 HC R&B MED SURG/OB UMMC

## 2022-07-13 RX ADMIN — FUROSEMIDE 40 MG: 20 TABLET ORAL at 16:26

## 2022-07-13 RX ADMIN — GABAPENTIN 100 MG: 100 CAPSULE ORAL at 21:09

## 2022-07-13 RX ADMIN — LORATADINE 10 MG: 10 TABLET ORAL at 09:29

## 2022-07-13 RX ADMIN — FUROSEMIDE 40 MG: 20 TABLET ORAL at 09:30

## 2022-07-13 RX ADMIN — ALLOPURINOL 300 MG: 300 TABLET ORAL at 09:29

## 2022-07-13 RX ADMIN — DONEPEZIL HYDROCHLORIDE 5 MG: 5 TABLET, FILM COATED ORAL at 21:07

## 2022-07-13 RX ADMIN — APIXABAN 5 MG: 5 TABLET, FILM COATED ORAL at 09:29

## 2022-07-13 RX ADMIN — GABAPENTIN 100 MG: 100 CAPSULE ORAL at 14:24

## 2022-07-13 RX ADMIN — GABAPENTIN 100 MG: 100 CAPSULE ORAL at 09:30

## 2022-07-13 RX ADMIN — Medication 25 MCG: at 09:30

## 2022-07-13 RX ADMIN — CARVEDILOL 12.5 MG: 12.5 TABLET, FILM COATED ORAL at 09:30

## 2022-07-13 RX ADMIN — CARVEDILOL 12.5 MG: 12.5 TABLET, FILM COATED ORAL at 21:07

## 2022-07-13 RX ADMIN — RISPERIDONE 0.25 MG: 0.25 TABLET ORAL at 09:30

## 2022-07-13 RX ADMIN — RISPERIDONE 2 MG: 0.5 TABLET ORAL at 21:09

## 2022-07-13 RX ADMIN — APIXABAN 5 MG: 5 TABLET, FILM COATED ORAL at 21:07

## 2022-07-13 ASSESSMENT — ACTIVITIES OF DAILY LIVING (ADL)
ADLS_ACUITY_SCORE: 42
ADLS_ACUITY_SCORE: 44
ADLS_ACUITY_SCORE: 42

## 2022-07-13 NOTE — PROGRESS NOTES
[facility]    Medicine Progress Note - Hospitalist Service, GOLD TEAM 17    Date of Admission:  6/28/2022    Assessment & Plan        William Almazan is a 81 year old male with history of CAD s/p CABG, dilated cardiomyopathy, HFrEF, PAF on Eliquis, HTN, HLD, DM2, KRISTIAN, prostate cancer, gout, and dementia who presented on 6/28/22 with worsening auditory hallucinations.     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   ---   Hallucination present since ~2020 following death of son.  ---   Patient continues to have auditory hallucination of a friend telling him to harm himself.  ---   He denies suicidal ideation  ---   Denied worsening depression or anxiety.   ---   No visual or tactile hallucinations.   ---   Family not able to manage at home.   ---   Psychiatry evaluated patient this morning and subsequently d/c'd 1:1 sitter  ---   Continue Risperdal 0.5 mg qam and 2 mg at bedtime  ---   Continue donepezil 5 mg daily  ---   continue gabapentin 100 mg po tid  ---   Will consider Lexapro 10 mg if he continues to appear depressed (mood stable).    Urinary retention  ---   Chronic indwelling keating catheter.     Chronic HFrEF:    ---   Hx dilated cardiomyopathy.   ---   Most recent TTE 6/18/21 showing severely dilated LV with EF 20%.   ---   Currently euvolemic on exam.   ---   No hypoxia or pulmonary complaints.   ---   Has trace LE edema.   ---   Continue Coreg to 12.5 mg PO BID   ---   Continue PTA Lasix  ---   Daily weights, I&O's     CAD s/p CABG (1992):    ---   Not ASA or statin at home.   ---   No acute concerns.   ---   Coreg to 12.5 mg PO BID     PAF   ---   No acute concerns.  ---   Eliquis 5mg BID     T2DM:    ---   Diet controlled.   ---   Glucose stable.      KRISTIAN:    ---   Does not use CPAP at home.       Prostate CA:    ---   Treated with XRT and  hormone therapy.   ---   Hx urinary retention with chronic indwelling keating.     Gout:    ---   Continue PTA allopurinol           Diet: Combination Diet Regular Diet Adult    DVT Prophylaxis: DOAC  Keating Catheter: PRESENT, indication: Retention, Retention  Central Lines: None  Cardiac Monitoring: None  Code Status: Full Code      Disposition Plan   Awaiting for placement     The patient's care was discussed with the Bedside Nurse and Patient.          Chapis Kinney MD  Hospitalist Service, GOLD TEAM 17  [facility]  Securely message with the Vocera Web Console (learn more here)  Text page via [facility] Paging/Directory   Please see signed in provider for up to date coverage information      ______________________________________________________________________    Interval History     Uneventful night  No complaints  Continue to hear voices of people telling him to harm himself  Denied suicidal ideation  1:1 sitter discontinued by psychiatry today    Data reviewed today: I reviewed all medications, new labs and imaging results over the last 24 hours. I personally reviewed no images or EKG's today.    Physical Exam   Vital Signs: Temp: 98.2  F (36.8  C) Temp src: Oral BP: 128/67 Pulse: 90   Resp: 16 SpO2: 98 % O2 Device: None (Room air)    Weight: 185 lbs 9.6 oz   General: aao x 3, NAD.  HEENT:  NC/AT, neck supple  CVS:  NL s 1 and s2, no m/r/g.  Lungs:  CTA B/L.   Abd:  Soft, + bs  Ext:  No c/c.  Lymph:  + edema.  Neuro:  Nonfocal.  Musculoskeletal: No calf tenderness to palpation.    Skin:  No rash.  Psychiatry:  Mood and affect appropriate.      Data   Recent Labs   Lab 07/12/22  0634 07/07/22  1801     --    POTASSIUM 4.9  --    CHLORIDE 111*  --    CO2 25  --    BUN 13  --    CR 0.56*  --    ANIONGAP 4  --    CRYS 8.6  --    GLC 90 127*     No results found for this or any previous visit (from the past 24 hour(s)).  Medications       allopurinol  300 mg Oral Daily      apixaban ANTICOAGULANT  5 mg Oral BID     carvedilol  12.5 mg Oral BID     donepezil  5 mg Oral At Bedtime     furosemide  40 mg Oral BID     gabapentin  100 mg Oral TID     loratadine  10 mg Oral Daily     risperiDONE  0.25 mg Oral Daily     risperiDONE  2 mg Oral At Bedtime     sodium chloride (PF)  3 mL Intracatheter Q8H     [Held by provider] spironolactone  25 mg Oral Daily     Vitamin D3  25 mcg Oral Daily

## 2022-07-13 NOTE — PROGRESS NOTES
"VS: /64 (BP Location: Right arm)   Pulse 77   Temp 98.3  F (36.8  C) (Oral)   Resp 15   Ht 1.753 m (5' 9\")   Wt 84.2 kg (185 lb 9.6 oz)   SpO2 98%   BMI 27.41 kg/m   Pt denies CP or SOB, dizziness,lightheadedness and nausea and vomiting..    O2: Room air sat. > 90%.    Output: Voiding adequate amount via leg trap cath keating without difficulty.   Last BM: 7/12/22. Can be incontinent.   Activity: SBA with walker and gait belt.   Skin: Intact.Ex skin rear/abrasion to the right adb, mepilex in coccyx. BLE rosa elena.   Pain: Denies.   CMS: CMS and neuro intact. A/O X 4. Able to make needs known. Baseline N/T in BLE.   Dressing: CDI to coccyx and right abd site.   Diet: Tolerating regular diet.   LDA: PIV SL   Equipment: IV pole, walker and personal belongings.   Plan: Continue with plan of care. Call light within reach.   Additional Info: Sitter at bed side.   please call [facility] for bed availability at 10 am today.         "

## 2022-07-13 NOTE — CONSULTS
"      Psychiatry Consultation; Follow up              Reason for Consult, requesting source:    \"Auditory hallucinations and suicidal ideations, need for sitter\"  Patient was seen by psychiatry on 6/29, 6/30, and 7/7    Requesting source: Chapis Kinney    Labs and imaging reviewed, patient seen and evaluated by Shazia Washington, OTONIEL ZHANG. Discussed case with .                Interim history:     Mr. William Almazan is an 81-year-old  male who is being hospitalized for decreased short-term memory and hallucinations. Prior to interviewing this patient, I had an opportunity to review the initial psychiatric consultation completed by Dr. Aryan Leyva on 06/29 and a followup on 06/30.  He started the patient on low-dose Risperdal and suggested that donepezil might be appropriate in the future. Patient was again seen by psychiatry on 7/7 and risperdal was increased and donepezil started due to continued hallucinations and history of responding to hallucinations.     Upon assessment today, patient continues to be pleasant and cooperative. Patient reports that the last few days he has been sleeping very well. Eating well. He showed provider his paintings and was excited to start a new coloring book. Patient states that he continues to have hallucinations or Trump standing upside down with a bible and being a monster. He states that these hallucinations have not recently told him to do anything. Adamantly states that he would never intentionally hurt himself and would ask for help if his hallucinations got worse. States that he feels protected and safe in the hospital. Denies suicidal ideation and homicidal ideation. States his mood is good.         Current Medications:       allopurinol  300 mg Oral Daily     apixaban ANTICOAGULANT  5 mg Oral BID     carvedilol  12.5 mg Oral BID     donepezil  5 mg Oral At Bedtime     furosemide  40 mg Oral BID     gabapentin  100 mg Oral TID     loratadine  10 mg Oral " "Daily     risperiDONE  0.25 mg Oral Daily     risperiDONE  2 mg Oral At Bedtime     sodium chloride (PF)  3 mL Intracatheter Q8H     [Held by provider] spironolactone  25 mg Oral Daily     Vitamin D3  25 mcg Oral Daily              MSE:   Appearance: awake, alert, adequately groomed and dressed in hospital scrubs  Attitude:  cooperative  Eye Contact:  good  Mood:  good  Affect:  appropriate and in normal range and mood congruent  Speech:  clear, coherent  Psychomotor Behavior:  no evidence of tardive dyskinesia, dystonia, or tics  Thought Process:  goal oriented and illogical at times  Associations:  no loose associations  Thought Content:  no evidence of suicidal ideation or homicidal ideation and auditory hallucinations present  Insight:  Limited  Judgement:  fair  Oriented to:  time, person, and place  Attention Span and Concentration:  intact  Recent and Remote Memory:  poor    Vital signs:  Temp: 98.2  F (36.8  C) Temp src: Oral BP: 128/67 Pulse: 90   Resp: 16 SpO2: 98 % O2 Device: None (Room air)   Height: 175.3 cm (5' 9\") Weight: 84.2 kg (185 lb 9.6 oz)  Estimated body mass index is 27.41 kg/m  as calculated from the following:    Height as of this encounter: 1.753 m (5' 9\").    Weight as of this encounter: 84.2 kg (185 lb 9.6 oz).              DSM-5 Diagnosis:   1. 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 Patient states he is sleeping much better, he is calm, cooperative, and reports to be in a good mood. He continues to report auditory hallucinations, but this appears to be his baseline related to his Major neurocognitive disorder. He denies suicidal ideation and states he would never hurt himself and would tell someone if his voices begin commanding him to hurt himself. Patient states that he feels protected and safe in the " [facility].           Summary of Recommendations:     1. Continue psychotropic medication as prescribed.     2. Recommend patient either be discharged to memory care unit or [facility].     3. May trial patient off of bedside sitter with frequent nursing checks and bed alarm.       Shazia Washington, STANP-BC  Consult/Liaison Psychiatry   [facility]

## 2022-07-13 NOTE — PROGRESS NOTES
Social Work Progress Note     Social work called the [facility] to see if they had any availability for patient to admit. [facility] can be reached at [phone]. No bed availability today. Staff stated to call back again at 10am to see if there are any openings. SW will follow up at the same time tomorrow.     Awaiting psych to see patient to determine what next steps are. [facility] does not seem like a promising option at this time due to lack of beds each day.      Social work will continue to follow and provide assistance to ensure a safe and timely discharge.      Khushbu Rosenberg, KAHLIL, LGSW  8A and 10 ICU   [facility]   Phone: [phone]

## 2022-07-14 ENCOUNTER — APPOINTMENT (OUTPATIENT)
Dept: PHYSICAL THERAPY | Facility: CLINIC | Age: 82
DRG: 884 | End: 2022-07-14
Payer: MEDICARE

## 2022-07-14 PROCEDURE — 250N000013 HC RX MED GY IP 250 OP 250 PS 637: Performed by: PSYCHIATRY & NEUROLOGY

## 2022-07-14 PROCEDURE — 250N000013 HC RX MED GY IP 250 OP 250 PS 637: Performed by: INTERNAL MEDICINE

## 2022-07-14 PROCEDURE — G0463 HOSPITAL OUTPT CLINIC VISIT: HCPCS

## 2022-07-14 PROCEDURE — 250N000013 HC RX MED GY IP 250 OP 250 PS 637

## 2022-07-14 PROCEDURE — 99232 SBSQ HOSP IP/OBS MODERATE 35: CPT | Performed by: INTERNAL MEDICINE

## 2022-07-14 PROCEDURE — 97530 THERAPEUTIC ACTIVITIES: CPT | Mod: GP | Performed by: REHABILITATION PRACTITIONER

## 2022-07-14 PROCEDURE — 120N000002 HC R&B MED SURG/OB UMMC

## 2022-07-14 RX ORDER — POLYETHYLENE GLYCOL 3350 17 G/17G
17 POWDER, FOR SOLUTION ORAL DAILY
Status: DISCONTINUED | OUTPATIENT
Start: 2022-07-14 | End: 2022-07-16

## 2022-07-14 RX ORDER — AMOXICILLIN 250 MG
1-2 CAPSULE ORAL 2 TIMES DAILY PRN
Status: DISCONTINUED | OUTPATIENT
Start: 2022-07-14 | End: 2022-08-02 | Stop reason: HOSPADM

## 2022-07-14 RX ADMIN — CARVEDILOL 12.5 MG: 12.5 TABLET, FILM COATED ORAL at 09:09

## 2022-07-14 RX ADMIN — FUROSEMIDE 40 MG: 20 TABLET ORAL at 16:23

## 2022-07-14 RX ADMIN — GABAPENTIN 100 MG: 100 CAPSULE ORAL at 14:42

## 2022-07-14 RX ADMIN — RISPERIDONE 0.25 MG: 0.25 TABLET ORAL at 09:09

## 2022-07-14 RX ADMIN — RISPERIDONE 2 MG: 0.5 TABLET ORAL at 22:04

## 2022-07-14 RX ADMIN — GABAPENTIN 100 MG: 100 CAPSULE ORAL at 09:09

## 2022-07-14 RX ADMIN — CARVEDILOL 12.5 MG: 12.5 TABLET, FILM COATED ORAL at 20:06

## 2022-07-14 RX ADMIN — ALLOPURINOL 300 MG: 300 TABLET ORAL at 09:09

## 2022-07-14 RX ADMIN — Medication 25 MCG: at 09:09

## 2022-07-14 RX ADMIN — APIXABAN 5 MG: 5 TABLET, FILM COATED ORAL at 20:06

## 2022-07-14 RX ADMIN — LORATADINE 10 MG: 10 TABLET ORAL at 09:09

## 2022-07-14 RX ADMIN — GABAPENTIN 100 MG: 100 CAPSULE ORAL at 20:06

## 2022-07-14 RX ADMIN — FUROSEMIDE 40 MG: 20 TABLET ORAL at 09:09

## 2022-07-14 RX ADMIN — DONEPEZIL HYDROCHLORIDE 5 MG: 5 TABLET, FILM COATED ORAL at 22:04

## 2022-07-14 RX ADMIN — APIXABAN 5 MG: 5 TABLET, FILM COATED ORAL at 09:09

## 2022-07-14 RX ADMIN — POLYETHYLENE GLYCOL 3350 17 G: 17 POWDER, FOR SOLUTION ORAL at 14:42

## 2022-07-14 ASSESSMENT — ACTIVITIES OF DAILY LIVING (ADL)
ADLS_ACUITY_SCORE: 42

## 2022-07-14 NOTE — PROGRESS NOTES
[facility]    Medicine Progress Note - Hospitalist Service, GOLD TEAM 17    Date of Admission:  6/28/2022    Assessment & Plan        William Almazan is a 81 year old male with history of CAD s/p CABG, dilated cardiomyopathy, HFrEF, PAF on Eliquis, HTN, HLD, DM2, KRISTIAN, prostate cancer, gout, and dementia who presented on 6/28/22 with worsening auditory hallucinations.     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   ---   Hallucination present since ~2020 following death of son.  ---   Patient continued to have auditory hallucination of a friend telling him to harm himself.  ---   He denied suicidal ideation  ---   1:1 sitter d/c'd by psychiatry on 7/13/22  ---   Denied worsening depression or anxiety.   ---   No visual or tactile hallucinations.   ---   Family not able to manage at home.   ---   Continue Risperdal 0.5 mg qam and 2 mg at bedtime  ---   Continue donepezil 5 mg daily  ---   continue gabapentin 100 mg po tid  ---   Will consider Lexapro 10 mg if he continues to appear depressed (mood stable).    Urinary retention  ---   Chronic indwelling keating catheter.     Chronic HFrEF:    ---   Hx dilated cardiomyopathy.   ---   Most recent TTE 6/18/21 showing severely dilated LV with EF 20%.   ---   Currently euvolemic on exam.   ---   No hypoxia or pulmonary complaints.   ---   Has trace LE edema.   ---   Continue Coreg to 12.5 mg PO BID   ---   Continue PTA Lasix  ---   Daily weights, I&O's     CAD s/p CABG (1992):    ---   Not ASA or statin at home.   ---   No acute concerns.   ---   Coreg to 12.5 mg PO BID     PAF   ---   No acute concerns.  ---   Eliquis 5mg BID     T2DM:    ---   Diet controlled.   ---   Glucose stable.      KRISTIAN:    ---   Does not use CPAP at home.       Prostate CA:    ---   Treated with XRT and hormone therapy.   ---   Hx  urinary retention with chronic indwelling keating.     Gout:    ---   Continue PTA allopurinol     ?  Rectal prolapse noted by RN  ---   On exam, it looks like rectal prolapse but reducible  ---   Patient denied history of constipation  ---   Avoid constipation  ---   Bowel meds ordered          Diet: Combination Diet Regular Diet Adult    DVT Prophylaxis: DOAC  Keating Catheter: PRESENT, indication: Retention, Retention  Central Lines: None  Cardiac Monitoring: None  Code Status: Full Code      Disposition Plan   Awaiting for placement     The patient's care was discussed with the Bedside Nurse and Patient.          Chapis Kinney MD  Hospitalist Service, GOLD TEAM [address] [place] [address]  Securely message with the Vocera Web Console (learn more here)  Text page via [employer] Paging/Directory   Please see signed in provider for up to date coverage information      ______________________________________________________________________    Interval History     Uneventful night  No complaints  Auditory hallucination persisted  Denied suicidal ideation    Data reviewed today: I reviewed all medications, new labs and imaging results over the last 24 hours. I personally reviewed no images or EKG's today.    Physical Exam   Vital Signs: Temp: 96.9  F (36.1  C) Temp src: Oral BP: 132/69 Pulse: 67   Resp: 15 SpO2: 98 % O2 Device: None (Room air)    Weight: 185 lbs 9.6 oz   General: aao x 3, NAD.  HEENT:  NC/AT, neck supple  CVS:  NL s 1 and s2, no m/r/g.  Lungs:  CTA B/L.   Abd:  Soft, + bs  Ext:  No c/c.  Lymph:  + edema.  Neuro:  Nonfocal.  Musculoskeletal: No calf tenderness to palpation.    Skin:  No rash.  Psychiatry:  Mood and affect appropriate.      Data   Recent Labs   Lab 07/12/22  0634 07/07/22  1801     --    POTASSIUM 4.9  --    CHLORIDE 111*  --    CO2 25  --    BUN 13  --    CR 0.56*  --    ANIONGAP 4  --    CRYS 8.6  --    GLC 90 127*     No results found for this or any  previous visit (from the past 24 hour(s)).  Medications       allopurinol  300 mg Oral Daily     apixaban ANTICOAGULANT  5 mg Oral BID     carvedilol  12.5 mg Oral BID     donepezil  5 mg Oral At Bedtime     furosemide  40 mg Oral BID     gabapentin  100 mg Oral TID     loratadine  10 mg Oral Daily     risperiDONE  0.25 mg Oral Daily     risperiDONE  2 mg Oral At Bedtime     sodium chloride (PF)  3 mL Intracatheter Q8H     [Held by provider] spironolactone  25 mg Oral Daily     Vitamin D3  25 mcg Oral Daily

## 2022-07-14 NOTE — PROGRESS NOTES
[facility]  WO Nurse Inpatient Assessment     Consulted for: Skin tear under pannus and around scrotum  re consulted for same wounds    Patient History (according to provider note(s):      Per Dr Parish Flaherty on 6/30/2022: William Almazan is a 81 year old male with history of CAD s/p CABG, dilated cardiomyopathy, HFrEF, PAF on eliquis, HTN, HLD, DM2, KRISTIAN, prostate cancer, gout, and dementia who presented with worsening auditory hallucinations.    Areas Assessed:      Areas visualized during today's visit: Groin, scrotum, buttock    Wound location: Right groin creases   Patient declined photo    Wound due to: Moisture Associated Skin Damage (MASD) and friction  Wound history/plan of care: Present on admission. Patient has chronic indwelling keating so is not incontinent of urine yet has a brief in place. The brief appears to be cutting into the groin creases. In addition, patient is sweaty so moisture may be contributing.    Wound base: 100 % dermis     Palpation of the wound bed: normal      Drainage: none     Description of drainage: none     Measurements (length x width x depth, in cm):0.3 x 15 x 0.1cm with larger open area on posterior right crease 0.6 x 4 x 0.1 cm: now with scattered open areas, some healing   Periwound skin: Intact      Color: dusky chronic moisture damage       Temperature: normal   Odor: none  Pain: denies   Pain interventions prior to dressing change: N/A  Treatment goal: Decrease moisture and Protection  STATUS: evolving  Supplies ordered: supplies stored on unit     Pt refusing interdry and pt refusing to remove brief in bed      Treatment Plan:     Groin crease wound(s): BID: wash area with Shima Cleanse and Protect and a soft cloth. Do not rinse. Apply a thin layer of [employer] Paste in creases. Avoid brief while in bed (patient has keating in place), use Covidean sheet for any incontinent stool.      Right posterior groin crease wound(s):  Every 3 days and PRN cleanse with wound cleanser and pat dry. Paint samy wound skin with no sting. Conform mepilex over wound.     Orders: Reviewed    RECOMMEND PRIMARY TEAM ORDER: None, at this time  Education provided: plan of care  Discussed plan of care with: Nurse  WOC nurse follow-up plan: weekly  Notify WOC if wound(s) deteriorate.  Nursing to notify the Provider(s) and re-consult the WOC Nurse if new skin concern.    DATA:     Current support surface: Standard  Atmos Air mattress  Containment of urine/stool: Incontinent pad in bed and Indwelling catheter  BMI: Body mass index is 27.41 kg/m .   Active diet order: Orders Placed This Encounter      Combination Diet Regular Diet Adult     Output: I/O last 3 completed shifts:  In: -   Out: 2750 [Urine:2750]     Labs:   No lab results found in last 7 days.    Invalid input(s): GLUCOMBO  Pressure injury risk assessment:   Sensory Perception: 4-->no impairment  Moisture: 4-->rarely moist  Activity: 3-->walks occasionally  Mobility: 3-->slightly limited  Nutrition: 3-->adequate  Friction and Shear: 2-->potential problem  John Score: 19    Judy Mccabe RN CWOCN   Dept. Pager: [phone]  Dept. Office Number: [phone]

## 2022-07-14 NOTE — PROGRESS NOTES
"Social Work Progress Note    Social work still not able to get patient into the [facility]. [facility] can be reached at [phone]. They do not anticipate any openings until at least July 20th, 2022. Per psych note from 7/13, psych provider is continuing to recommend the [facility] or a memory care unit.     Social work attempted to reach patients wife to discuss next steps. Could not leave message because the voicemail states \"memory full\". SW is wondering if wife anticipates to take patient home or if she would be okay with him going to a memory care unit. Will attempt to reach out to wife again later in the day. Auditory hallucinations appears to be patients baseline due to dementia, therefore, a memory care unit may be the most appropriate LOC at this time.     Addendum 3:10 PM:  Social work called patients son as well to see if he would answer since I could not get in touch with patients wife. No answer but able to leave voicemail. Awaiting a call back.    Social work will continue to follow and provide assistance to ensure a safe and timely discharge.     Khushbu Rosenberg, KAHLIL, LGSW  8A and 10 ICU   [facility]   Phone: [phone]  "

## 2022-07-14 NOTE — PLAN OF CARE
"Goal Outcome Evaluation:       Pt alert but disoriented to situation. Pt denied to having hallucinations this shift and said \"not yet: when asked twice. Louie draining adequately. Pt incontinent of bowel at times but usually able to call for BR. Pt has prolapsed rectum, provider notified. Miralax ordered.  Pt family at bedside. Pt denies any pain. Pt is eating well and sleeping adequately. Pt reports to feeling safe. Awaiting for placement. Continue to monitor.    /69 (BP Location: Right arm)   Pulse 67   Temp 96.9  F (36.1  C) (Oral)   Resp 15   Ht 1.753 m (5' 9\")   Wt 84.2 kg (185 lb 9.6 oz)   SpO2 98%   BMI 27.41 kg/m                  "

## 2022-07-14 NOTE — TELEPHONE ENCOUNTER
R:  11am  Spoke w/ LSW on 8A.  They continue to look for appropriate placement in a facility w/ dementia care and may also look at memory care facilities.  Intake will continue bed search  for facilities that have not already declined patient when presented.      R:  12 PM  Bed search completed-there are no appropriate available beds within the [facility].

## 2022-07-14 NOTE — PLAN OF CARE
"Goal Outcome Evaluation:      Pt alert but disoriented to situation. Pt continues to experience hallucinations but denies any SI. Pt says he would never hurt himself and is \"not stupid enough to do that\". Pt reports to feeling safe. Pt off sitter. Pt had one incontinence of bowel and bladder. Louie bag changed from leg bag to the 1000 ml bag. Pt is eating well and sleeping adequately. Pt seen in room reading the bible. Continue to monitor.        /61 (BP Location: Right arm)   Pulse 77   Temp 98.7  F (37.1  C) (Oral)   Resp 16   Ht 1.753 m (5' 9\")   Wt 84.2 kg (185 lb 9.6 oz)   SpO2 96%   BMI 27.41 kg/m           "

## 2022-07-15 PROCEDURE — 99231 SBSQ HOSP IP/OBS SF/LOW 25: CPT | Performed by: INTERNAL MEDICINE

## 2022-07-15 PROCEDURE — 250N000013 HC RX MED GY IP 250 OP 250 PS 637: Performed by: INTERNAL MEDICINE

## 2022-07-15 PROCEDURE — 120N000002 HC R&B MED SURG/OB UMMC

## 2022-07-15 PROCEDURE — 250N000013 HC RX MED GY IP 250 OP 250 PS 637: Performed by: PSYCHIATRY & NEUROLOGY

## 2022-07-15 PROCEDURE — 250N000013 HC RX MED GY IP 250 OP 250 PS 637

## 2022-07-15 RX ADMIN — ACETAMINOPHEN 1000 MG: 500 TABLET ORAL at 09:45

## 2022-07-15 RX ADMIN — RISPERIDONE 2 MG: 0.5 TABLET ORAL at 21:16

## 2022-07-15 RX ADMIN — GABAPENTIN 100 MG: 100 CAPSULE ORAL at 21:16

## 2022-07-15 RX ADMIN — GABAPENTIN 100 MG: 100 CAPSULE ORAL at 13:17

## 2022-07-15 RX ADMIN — CARVEDILOL 12.5 MG: 12.5 TABLET, FILM COATED ORAL at 21:16

## 2022-07-15 RX ADMIN — RISPERIDONE 0.25 MG: 0.25 TABLET ORAL at 07:56

## 2022-07-15 RX ADMIN — APIXABAN 5 MG: 5 TABLET, FILM COATED ORAL at 21:16

## 2022-07-15 RX ADMIN — GABAPENTIN 100 MG: 100 CAPSULE ORAL at 07:56

## 2022-07-15 RX ADMIN — APIXABAN 5 MG: 5 TABLET, FILM COATED ORAL at 07:56

## 2022-07-15 RX ADMIN — POLYETHYLENE GLYCOL 3350 17 G: 17 POWDER, FOR SOLUTION ORAL at 07:56

## 2022-07-15 RX ADMIN — LORATADINE 10 MG: 10 TABLET ORAL at 07:56

## 2022-07-15 RX ADMIN — CARVEDILOL 12.5 MG: 12.5 TABLET, FILM COATED ORAL at 07:56

## 2022-07-15 RX ADMIN — FUROSEMIDE 40 MG: 20 TABLET ORAL at 17:24

## 2022-07-15 RX ADMIN — DONEPEZIL HYDROCHLORIDE 5 MG: 5 TABLET, FILM COATED ORAL at 21:16

## 2022-07-15 RX ADMIN — FUROSEMIDE 40 MG: 20 TABLET ORAL at 07:56

## 2022-07-15 RX ADMIN — ALLOPURINOL 300 MG: 300 TABLET ORAL at 07:56

## 2022-07-15 RX ADMIN — Medication 25 MCG: at 07:56

## 2022-07-15 ASSESSMENT — ACTIVITIES OF DAILY LIVING (ADL)
ADLS_ACUITY_SCORE: 42
ADLS_ACUITY_SCORE: 38
ADLS_ACUITY_SCORE: 42
ADLS_ACUITY_SCORE: 42
ADLS_ACUITY_SCORE: 40
ADLS_ACUITY_SCORE: 42
ADLS_ACUITY_SCORE: 40

## 2022-07-15 NOTE — PLAN OF CARE
"Nursing Assessment:  VT: /66 (BP Location: Right arm)   Pulse 73   Temp 98.4  F (36.9  C) (Oral)   Resp 16   Ht 1.753 m (5' 9\")   Wt 84.2 kg (185 lb 9.6 oz)   SpO2 96%   BMI 27.41 kg/m       Neuro: disoriented to situation, reported to nurse that he is in the hospital for short term memory use. Pt still hearing voices. He reported good voices encouraged him to \"continue to the treatment like I have been doing\" and the bad voices talk him he will be punished for reporting the truth regarding what they have been saying. He also reported that he is not having SI and that the suicide precautions on him was a mistake, that it was for  different patient    Skin: right abdomen and coccyx mepilex intact    Nursing Plan:  Continue POC, assess for hallucinations, monitor for stiffness due to risperidone    Discharge Disposition:   LTC/memory vs home  "

## 2022-07-15 NOTE — PROGRESS NOTES
Social Work Progress Note    Social work was finally able to get in contact with patients wife, Ilsa. Spoke to [place] and patients daughter on the phone to discuss options moving forward. Discussed LTC with memory vs nursing home with memory and the differences regarding nursing services. The current plan is to get Financial Counseling involved to see if patient will qualify for MA as the family reports he will not be able to pay for LTC or SHELLY out of pocket.    Wife expressed being overwhelmed with everything that was going on and fears she wont be able to take care of patient at home. SW reassured Ilsa and daughter that we will continue to work together to get patient to a safe place outside of the hospital. Patients wife wants to be present when Financial Counseling interviews patient, as he has dementia and it may be hard for him to answer questions appropriately.     Social work will reach out to Financial Counseling on Monday to see if we can collaborate a time for FC, wife and patient to get together.     Social work will continue to follow and provide assistance to ensure a safe and timely discharge.     Khushbu Rosenberg, KAHLIL, LGSW  8A and 10 ICU   [facility]   Phone: [phone]

## 2022-07-15 NOTE — PLAN OF CARE
Alert to self,place but not to situation.Does use call light to make needs known.Up CGA1,gait belt and walker.Wounds to R side abdominal fold with mepilex drsg,CDI.Protective mepilex drsg to coccyx,CDI. Numbness to BLE his baseline.Louie patent with descent amts of clear,yellow urine.Is passing gas,had BM today.Continue to hear voices but denies SI.PIV line L AC,saline locked. SW onboard for [facility] unit or memory care unit.

## 2022-07-15 NOTE — PROGRESS NOTES
[facility]    Medicine Progress Note - Hospitalist Service, GOLD TEAM 17    Date of Admission:  6/28/2022    Assessment & Plan        William Almazan is a 81 year old male with history of CAD s/p CABG, dilated cardiomyopathy, HFrEF, PAF on Eliquis, HTN, HLD, DM2, KRISTIAN, prostate cancer, gout, and dementia who presented on 6/28/22 with worsening auditory hallucinations.     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   ---   Hallucination present since ~2020 following death of son.  ---   Patient continued to have auditory hallucination of a friend telling him to harm himself.  ---   He denied suicidal ideation  ---   1:1 sitter d/c'd by psychiatry on 7/13/22  ---   Denied worsening depression or anxiety.   ---   No visual or tactile hallucinations.   ---   Family not able to manage at home.   ---   Continue Risperdal 0.5 mg qam and 2 mg at bedtime  ---   Continue donepezil 5 mg daily  ---   Continue gabapentin 100 mg po tid  ---   Will consider Lexapro 10 mg if he continues to appear depressed (mood stable).    Urinary retention  ---   Chronic indwelling keating catheter.     Chronic HFrEF:    ---   Hx dilated cardiomyopathy.   ---   Most recent TTE 6/18/21 showing severely dilated LV with EF 20%.   ---   Currently euvolemic on exam.   ---   No hypoxia or pulmonary complaints.   ---   Has trace LE edema.   ---   Continue Coreg to 12.5 mg PO BID   ---   Continue PTA Lasix  ---   Daily weights, I&O's     CAD s/p CABG (1992):    ---   Not ASA or statin at home.   ---   No acute concerns.   ---   Coreg to 12.5 mg PO BID     PAF   ---   No acute concerns.  ---   Eliquis 5mg BID     T2DM:    ---   Diet controlled.   ---   Glucose stable.      KRISTIAN:    ---   Does not use CPAP at home.       Prostate CA:    ---   Treated with XRT and hormone therapy.   ---   Hx  urinary retention with chronic indwelling keating.     Gout:    ---   Continue PTA allopurinol     ?  Rectal prolapse noted by RN  ---   On exam, it looks like rectal prolapse but reducible  ---   Patient denied history of constipation  ---   Avoid constipation  ---   Bowel meds ordered          Diet: Combination Diet Regular Diet Adult    DVT Prophylaxis: DOAC  Keating Catheter: PRESENT, indication: Retention, Retention  Central Lines: None  Cardiac Monitoring: None  Code Status: Full Code      Disposition Plan   Awaiting for placement        The patient's care was discussed with the Bedside Nurse and Patient.          Chapis Kinney MD  Hospitalist Service, GOLD TEAM [address] [place] [address]  Securely message with the Vocera Web Console (learn more here)  Text page via [facility] Paging/Directory   Please see signed in provider for up to date coverage information      ______________________________________________________________________    Interval History     Uneventful night  No complaints  Denied suicidal ideation    Data reviewed today: I reviewed all medications, new labs and imaging results over the last 24 hours. I personally reviewed no images or EKG's today.    Physical Exam   Vital Signs: Temp: (!) 96.2  F (35.7  C) Temp src: Oral BP: 122/63 Pulse: 76   Resp: 16 SpO2: 94 % O2 Device: None (Room air)    Weight: 184 lbs 9.6 oz   General: aao x 3, NAD.  HEENT:  NC/AT, neck supple  CVS:  NL s 1 and s2, no m/r/g.  Lungs:  CTA B/L.   Abd:  Soft, + bs  Ext:  No c/c.  Lymph:  + edema.  Neuro:  Nonfocal.  Musculoskeletal: No calf tenderness to palpation.    Skin:  No rash.  Psychiatry:  Mood and affect appropriate.      Data   Recent Labs   Lab 07/12/22  0634      POTASSIUM 4.9   CHLORIDE 111*   CO2 25   BUN 13   CR 0.56*   ANIONGAP 4   CRYS 8.6   GLC 90     No results found for this or any previous visit (from the past 24 hour(s)).  Medications       allopurinol  300 mg Oral  Daily     apixaban ANTICOAGULANT  5 mg Oral BID     carvedilol  12.5 mg Oral BID     donepezil  5 mg Oral At Bedtime     furosemide  40 mg Oral BID     gabapentin  100 mg Oral TID     loratadine  10 mg Oral Daily     polyethylene glycol  17 g Oral Daily     risperiDONE  0.25 mg Oral Daily     risperiDONE  2 mg Oral At Bedtime     sodium chloride (PF)  3 mL Intracatheter Q8H     [Held by provider] spironolactone  25 mg Oral Daily     Vitamin D3  25 mcg Oral Daily

## 2022-07-15 NOTE — PROGRESS NOTES
SPIRITUAL HEALTH SERVICES  SPIRITUAL ASSESSMENT Progress Note  [facility] ([facility]) 8 A Med Surge R 804 7/15/22      REFERRAL SOURCE: Follow Up Visit    Revisited Pt this morning to assess spiritual needs. Pt stated he was doing fine and appreciated recent prayer time. Pt desired prayer ongoing from afar.     PLAN: Will follow up with Pt as needed while on unit,.    Saad Vazquez MA, MPA  Associate   Pager: [phone]

## 2022-07-15 NOTE — PLAN OF CARE
"Pt Alert, disoriented to time and situation. Pt reporting pain in legs when moving, PRN Tylenol given. Up to chair Ax1 w/gb + walker. Pt denies chx pain and SOB.     Pt denies SI. Still reporting auditory hallucinations. Pt reports some positive voices encouraging him to keep going, he also reports negative voices threatening to hurt him if he \"tells the truth.\" Therapeutic listening provided, pt states he tries to not pay attention to the voices.     Louie in place, patent, cath cares done, pale yellow output. LBM 7/14.    L PIV SL.     Groin wound cares done per orders. L abdominal excoriation pink/red, mepilex changed. Preventative sacral mepilex in place.     Anticipated discharge to memory care or [facility] unit pending placement.            "

## 2022-07-16 LAB
ANION GAP SERPL CALCULATED.3IONS-SCNC: 6 MMOL/L (ref 3–14)
BUN SERPL-MCNC: 24 MG/DL (ref 7–30)
CALCIUM SERPL-MCNC: 8.6 MG/DL (ref 8.5–10.1)
CHLORIDE BLD-SCNC: 103 MMOL/L (ref 94–109)
CO2 SERPL-SCNC: 30 MMOL/L (ref 20–32)
CREAT SERPL-MCNC: 0.76 MG/DL (ref 0.66–1.25)
ERYTHROCYTE [DISTWIDTH] IN BLOOD BY AUTOMATED COUNT: 15.9 % (ref 10–15)
GFR SERPL CREATININE-BSD FRML MDRD: 90 ML/MIN/1.73M2
GLUCOSE BLD-MCNC: 136 MG/DL (ref 70–99)
HCT VFR BLD AUTO: 36.6 % (ref 40–53)
HGB BLD-MCNC: 11.7 G/DL (ref 13.3–17.7)
HGB BLD-MCNC: 11.7 G/DL (ref 13.3–17.7)
MAGNESIUM SERPL-MCNC: 1.9 MG/DL (ref 1.6–2.3)
MCH RBC QN AUTO: 28.4 PG (ref 26.5–33)
MCHC RBC AUTO-ENTMCNC: 32 G/DL (ref 31.5–36.5)
MCV RBC AUTO: 89 FL (ref 78–100)
PHOSPHATE SERPL-MCNC: 3.1 MG/DL (ref 2.5–4.5)
PLATELET # BLD AUTO: 259 10E3/UL (ref 150–450)
POTASSIUM BLD-SCNC: 4 MMOL/L (ref 3.4–5.3)
RBC # BLD AUTO: 4.12 10E6/UL (ref 4.4–5.9)
SODIUM SERPL-SCNC: 139 MMOL/L (ref 133–144)
TSH SERPL DL<=0.005 MIU/L-ACNC: 1.49 MU/L (ref 0.4–4)
WBC # BLD AUTO: 8.4 10E3/UL (ref 4–11)

## 2022-07-16 PROCEDURE — 82310 ASSAY OF CALCIUM: CPT | Performed by: PHYSICIAN ASSISTANT

## 2022-07-16 PROCEDURE — 84443 ASSAY THYROID STIM HORMONE: CPT | Performed by: PHYSICIAN ASSISTANT

## 2022-07-16 PROCEDURE — 250N000013 HC RX MED GY IP 250 OP 250 PS 637: Performed by: INTERNAL MEDICINE

## 2022-07-16 PROCEDURE — 250N000013 HC RX MED GY IP 250 OP 250 PS 637

## 2022-07-16 PROCEDURE — 84100 ASSAY OF PHOSPHORUS: CPT | Performed by: PHYSICIAN ASSISTANT

## 2022-07-16 PROCEDURE — 250N000013 HC RX MED GY IP 250 OP 250 PS 637: Performed by: PHYSICIAN ASSISTANT

## 2022-07-16 PROCEDURE — 36415 COLL VENOUS BLD VENIPUNCTURE: CPT | Performed by: INTERNAL MEDICINE

## 2022-07-16 PROCEDURE — 85027 COMPLETE CBC AUTOMATED: CPT | Performed by: PHYSICIAN ASSISTANT

## 2022-07-16 PROCEDURE — 250N000013 HC RX MED GY IP 250 OP 250 PS 637: Performed by: PSYCHIATRY & NEUROLOGY

## 2022-07-16 PROCEDURE — 82746 ASSAY OF FOLIC ACID SERUM: CPT | Performed by: PHYSICIAN ASSISTANT

## 2022-07-16 PROCEDURE — 36415 COLL VENOUS BLD VENIPUNCTURE: CPT | Performed by: PHYSICIAN ASSISTANT

## 2022-07-16 PROCEDURE — 83735 ASSAY OF MAGNESIUM: CPT | Performed by: PHYSICIAN ASSISTANT

## 2022-07-16 PROCEDURE — 99231 SBSQ HOSP IP/OBS SF/LOW 25: CPT | Performed by: INTERNAL MEDICINE

## 2022-07-16 PROCEDURE — 85018 HEMOGLOBIN: CPT | Performed by: INTERNAL MEDICINE

## 2022-07-16 PROCEDURE — 82607 VITAMIN B-12: CPT | Performed by: PHYSICIAN ASSISTANT

## 2022-07-16 PROCEDURE — 120N000002 HC R&B MED SURG/OB UMMC

## 2022-07-16 RX ORDER — POLYETHYLENE GLYCOL 3350 17 G/17G
17 POWDER, FOR SOLUTION ORAL 2 TIMES DAILY
Status: DISCONTINUED | OUTPATIENT
Start: 2022-07-16 | End: 2022-08-02 | Stop reason: HOSPADM

## 2022-07-16 RX ADMIN — APIXABAN 5 MG: 5 TABLET, FILM COATED ORAL at 09:27

## 2022-07-16 RX ADMIN — GABAPENTIN 100 MG: 100 CAPSULE ORAL at 13:27

## 2022-07-16 RX ADMIN — RISPERIDONE 0.25 MG: 0.25 TABLET ORAL at 08:59

## 2022-07-16 RX ADMIN — CARVEDILOL 12.5 MG: 12.5 TABLET, FILM COATED ORAL at 19:57

## 2022-07-16 RX ADMIN — LORATADINE 10 MG: 10 TABLET ORAL at 08:59

## 2022-07-16 RX ADMIN — FUROSEMIDE 40 MG: 20 TABLET ORAL at 16:58

## 2022-07-16 RX ADMIN — POLYETHYLENE GLYCOL 3350 17 G: 17 POWDER, FOR SOLUTION ORAL at 08:59

## 2022-07-16 RX ADMIN — GABAPENTIN 100 MG: 100 CAPSULE ORAL at 08:59

## 2022-07-16 RX ADMIN — CARVEDILOL 12.5 MG: 12.5 TABLET, FILM COATED ORAL at 08:59

## 2022-07-16 RX ADMIN — DONEPEZIL HYDROCHLORIDE 5 MG: 5 TABLET, FILM COATED ORAL at 21:41

## 2022-07-16 RX ADMIN — POLYETHYLENE GLYCOL 3350 17 G: 17 POWDER, FOR SOLUTION ORAL at 19:47

## 2022-07-16 RX ADMIN — GABAPENTIN 100 MG: 100 CAPSULE ORAL at 19:48

## 2022-07-16 RX ADMIN — Medication 25 MCG: at 08:59

## 2022-07-16 RX ADMIN — APIXABAN 5 MG: 5 TABLET, FILM COATED ORAL at 19:48

## 2022-07-16 RX ADMIN — RISPERIDONE 2 MG: 0.5 TABLET ORAL at 21:41

## 2022-07-16 RX ADMIN — ALLOPURINOL 300 MG: 300 TABLET ORAL at 08:58

## 2022-07-16 RX ADMIN — FUROSEMIDE 40 MG: 20 TABLET ORAL at 08:58

## 2022-07-16 ASSESSMENT — ACTIVITIES OF DAILY LIVING (ADL)
ADLS_ACUITY_SCORE: 42
ADLS_ACUITY_SCORE: 38
ADLS_ACUITY_SCORE: 42
ADLS_ACUITY_SCORE: 38
ADLS_ACUITY_SCORE: 42
ADLS_ACUITY_SCORE: 42
ADLS_ACUITY_SCORE: 38
ADLS_ACUITY_SCORE: 44

## 2022-07-16 NOTE — PLAN OF CARE
"/71 (BP Location: Right arm)   Pulse 92   Temp 98.1  F (36.7  C) (Oral)   Resp 16   Ht 1.753 m (5' 9\")   Wt 85 kg (187 lb 4.8 oz)   SpO2 95%   BMI 27.66 kg/m         Patient A&O to self and time. Had 100% of his dinner. Denies hallucinations and SI. No SOB noted. Edema in bilateral ankles +1 non-pitting. Patient had a BM during shift; rectal prolapse present with active bleeding. Small firm BM during shift. Writer reassessed rectal area; small amount of dry blood noted on depends. Notified Dr. Kinney regarding rectal bleeding, MD states he will send PA to come and assess patient. VSS. Reports no pain. Louie cath intact. Urine output 400mL. PA called and advised to push fluids and prescribed BID Miralax. PA ordered labs for electrolytes and baseline labs. Patient reported numbness/tingling in bilateral hands. PA advised neuro checks Q4H and to call RRT for any acute changes in neurologic status.     Writer assessed Neuro check at 9:40pm; patient is A&O to person, place, and time. Reports numbness/tinignling in bilateral toes per baseline. Declined numbness/tingnling in upper extremities. Writer assessed right abdominal fold; mepilex dressing not needed; open to air. Applied powder to fold. Mepilex on coccyx area for protection.   "

## 2022-07-16 NOTE — PLAN OF CARE
A/Ox3. VSS on Room Air. Denied SOB, CP, Cough, N/V, Dizziness, Headache. Baseline numbness in BLE. Pt denied pain this shift. Regular diet, thin liquids, takes pills whole. Continent of B/B, LBM 7/14/22. Assist of 1 with walker and gait belt. L PIV SL, patent with flush. Call light within reach, able to make needs known. Appears to be sleeping during rounds. Continue POC with discharge pending placement.

## 2022-07-16 NOTE — PROGRESS NOTES
[facility]    Medicine Progress Note - Hospitalist Service, GOLD TEAM 17    Date of Admission:  6/28/2022    Assessment & Plan        William Almazan is a 81 year old male with history of CAD s/p CABG, dilated cardiomyopathy, HFrEF, PAF on Eliquis, HTN, HLD, DM2, KRISTIAN, prostate cancer, gout, and dementia who presented on 6/28/22 with worsening auditory hallucinations.     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   ---   Hallucination present since ~2020 following death of son.  ---   Patient continued to have auditory hallucination of a friend telling him to harm himself.  ---   He denied suicidal ideation  ---   1:1 sitter d/c'd by psychiatry on 7/13/22  ---   Denied worsening depression or anxiety.   ---   No visual or tactile hallucinations.   ---   Family not able to manage at home.   ---   Continue Risperdal 0.5 mg qam and 2 mg at bedtime  ---   Continue donepezil 5 mg daily  ---   Continue gabapentin 100 mg po tid  ---   Will consider Lexapro 10 mg if he continues to appear depressed (mood stable).    Urinary retention  ---   Chronic indwelling keating catheter.     Chronic HFrEF:    ---   Hx dilated cardiomyopathy.   ---   Most recent TTE 6/18/21 showing severely dilated LV with EF 20%.   ---   Currently euvolemic on exam.   ---   No hypoxia or pulmonary complaints.   ---   Has trace LE edema.   ---   Continue Coreg to 12.5 mg PO BID   ---   Continue PTA Lasix  ---   Daily weights, I&O's     CAD s/p CABG (1992):    ---   Not ASA or statin at home.   ---   No acute concerns.   ---   Coreg to 12.5 mg PO BID     PAF   ---   No acute concerns.  ---   Eliquis 5mg BID     T2DM:    ---   Diet controlled.   ---   Glucose stable.      KRISTIAN:    ---   Does not use CPAP at home.       Prostate CA:    ---   Treated with XRT and hormone therapy.   ---   Hx  urinary retention with chronic indwelling keating.     Gout:    ---   Continue PTA allopurinol     ?  Rectal prolapse noted by RN  ---   On exam, it looks like rectal prolapse but reducible  ---   Patient denied history of constipation  ---   RN noted streaks of blood mixed with his stool today  ---   Check Hgb today  ---   Avoid constipation  ---   Bowel meds ordered          Diet: Combination Diet Regular Diet Adult    DVT Prophylaxis: DOAC  Keating Catheter: PRESENT, indication: Retention, Retention  Central Lines: None  Cardiac Monitoring: None  Code Status: Full Code      Disposition Plan   Awaiting for placement        The patient's care was discussed with the Bedside Nurse and Patient.          Chapis Kinney MD  Hospitalist Service, GOLD TEAM [address] [place] [address]  Securely message with the Vocera Web Console (learn more here)  Text page via [place] [facility] Paging/Directory   Please see signed in provider for up to date coverage information      ______________________________________________________________________    Interval History     Uneventful night  No complaints  Denied suicidal ideation  Are noted streaks of blood mixed with stool earlier today    Data reviewed today: I reviewed all medications, new labs and imaging results over the last 24 hours. I personally reviewed no images or EKG's today.    Physical Exam   Vital Signs: Temp: 98.1  F (36.7  C) Temp src: Oral BP: 132/73 Pulse: 94   Resp: 16 SpO2: 98 % O2 Device: None (Room air)    Weight: 187 lbs 4.8 oz   General: aao x 3, NAD.  HEENT:  NC/AT, neck supple  CVS:  NL s 1 and s2, no m/r/g.  Lungs:  CTA B/L.   Abd:  Soft, + bs  Ext:  No c/c.  Lymph:  + edema.  Neuro:  Nonfocal.  Musculoskeletal: No calf tenderness to palpation.    Skin:  No rash.  Psychiatry:  Mood and affect appropriate.      Data   Recent Labs   Lab 07/12/22  0634      POTASSIUM 4.9   CHLORIDE 111*   CO2 25   BUN 13   CR 0.56*   ANIONGAP 4   CRYS 8.6    GLC 90     No results found for this or any previous visit (from the past 24 hour(s)).  Medications       allopurinol  300 mg Oral Daily     apixaban ANTICOAGULANT  5 mg Oral BID     carvedilol  12.5 mg Oral BID     donepezil  5 mg Oral At Bedtime     furosemide  40 mg Oral BID     gabapentin  100 mg Oral TID     loratadine  10 mg Oral Daily     polyethylene glycol  17 g Oral Daily     risperiDONE  0.25 mg Oral Daily     risperiDONE  2 mg Oral At Bedtime     sodium chloride (PF)  3 mL Intracatheter Q8H     [Held by provider] spironolactone  25 mg Oral Daily     Vitamin D3  25 mcg Oral Daily

## 2022-07-16 NOTE — PLAN OF CARE
Pt disoriented to situation, endorsing auditory hallucinations, denies SI. Denies pain at rest, endorses leg pain with movement. Ax1 to bathroom w/gb + walker.     Pt notes baseline numbness in BLE.     Louie patent, cath cares done. Pale/yellow output.     BM x3 on shift, bright red blood noted in toilet, MD notified, VSS, Hgb ordered, no action advised. Pt has known hx of rectal prolapse. Hbg 11.7. Loose stools x2.     Groin skin pink/red intact, cares provided per POC. Sacral preventitive mepilex in place, skin intact. R abdominal abrasion, no drianage, mepilex in place. Edema in BLE +2 RLE, +3 LLE, scheduled lasix given.     Anticipated discharge to memory care or [facility] unit pending placement. Financial counseling meeting Mon (7/18) per , to be coordinated w/pt wife.

## 2022-07-16 NOTE — PROVIDER NOTIFICATION
PT had bright red blood in toilet with BM this AM, blood appeared to be from rectum. VSS. Dr. Kinney notified. No action advised, Hgb ordered per MD, Eliquis dose OK'ed.

## 2022-07-16 NOTE — PROGRESS NOTES
Care Management Follow Up    Length of Stay (days): 18    Expected Discharge Date: Unknown      Concerns to be Addressed: Discharge planning      Patient plan of care discussed at interdisciplinary rounds: Yes    Anticipated Discharge Disposition:  Geriatric Inpatient Psychiatry Unit ([facility]) vs LTC or [facility] with a memory care unit.      Education Provided on the Discharge Plan: yes    Patient/Family in Agreement with the Plan: yes    Private pay costs discussed: Out of pocket expense for Long term care (LTC) and/or Assisted Living Facility ([facility])     Additional Information:   and writer spoke to patient's spouse, Ilsa and their daughter on the phone. They were informed that SW has been working on getting patient into the [facility] (geriatric inpatient psychiatry unit). However, they have limited beds and bed availability is not know at this time. Discussed goals with Ilsa and she expressed being overwhelmed and fears she wont be able to take care of patient at home. Went over the options of LTC vs [facility] with a memory care unit and the differences (the main being 24 hour nursing staff at LTC). Ilsa stated that they do not have enough funds to pay out of pocket. SW will be contacting Financial Counseling to see if patient would be eligible for MA (see SW note). Ilsa and daughter verbalized understanding that the [facility] will continue to be pursued. However, psychiatry is recommending discharge to memory care unit if one is able to be set up before a bed is available at the [facility]. See Psychiatry note on 7/13/22.     ELISEO Garrett RNCC  RN Care Coordinator   Office: [phone]   Pager: [phone]

## 2022-07-16 NOTE — PROVIDER NOTIFICATION
Pt had loose BM and started bleeding from rectum at change of shift, estimated loss of 10mL blood or more, pt stopped bleeding within 5 min. Incontinence cares provided, brief re-inforced w/pad, VSS. Dr. Nirmal garcia. Hand-off report given to oncoming nurse, instructed to re-page if nor response within 45min.

## 2022-07-17 LAB
FOLATE SERPL-MCNC: 13.5 NG/ML (ref 4.6–34.8)
HGB BLD-MCNC: 10.3 G/DL (ref 13.3–17.7)
HGB BLD-MCNC: 10.6 G/DL (ref 13.3–17.7)
HOLD SPECIMEN: NORMAL
SARS-COV-2 RNA RESP QL NAA+PROBE: NEGATIVE
VIT B12 SERPL-MCNC: 178 PG/ML (ref 232–1245)

## 2022-07-17 PROCEDURE — 250N000013 HC RX MED GY IP 250 OP 250 PS 637

## 2022-07-17 PROCEDURE — 250N000013 HC RX MED GY IP 250 OP 250 PS 637: Performed by: INTERNAL MEDICINE

## 2022-07-17 PROCEDURE — 99232 SBSQ HOSP IP/OBS MODERATE 35: CPT | Performed by: INTERNAL MEDICINE

## 2022-07-17 PROCEDURE — 250N000013 HC RX MED GY IP 250 OP 250 PS 637: Performed by: PSYCHIATRY & NEUROLOGY

## 2022-07-17 PROCEDURE — 85018 HEMOGLOBIN: CPT | Performed by: INTERNAL MEDICINE

## 2022-07-17 PROCEDURE — U0005 INFEC AGEN DETEC AMPLI PROBE: HCPCS | Performed by: INTERNAL MEDICINE

## 2022-07-17 PROCEDURE — 120N000002 HC R&B MED SURG/OB UMMC

## 2022-07-17 PROCEDURE — 36415 COLL VENOUS BLD VENIPUNCTURE: CPT | Performed by: INTERNAL MEDICINE

## 2022-07-17 RX ADMIN — GABAPENTIN 100 MG: 100 CAPSULE ORAL at 13:08

## 2022-07-17 RX ADMIN — CARVEDILOL 12.5 MG: 12.5 TABLET, FILM COATED ORAL at 21:29

## 2022-07-17 RX ADMIN — ALLOPURINOL 300 MG: 300 TABLET ORAL at 08:58

## 2022-07-17 RX ADMIN — DONEPEZIL HYDROCHLORIDE 5 MG: 5 TABLET, FILM COATED ORAL at 21:29

## 2022-07-17 RX ADMIN — CARVEDILOL 12.5 MG: 12.5 TABLET, FILM COATED ORAL at 08:58

## 2022-07-17 RX ADMIN — FUROSEMIDE 40 MG: 20 TABLET ORAL at 08:58

## 2022-07-17 RX ADMIN — RISPERIDONE 0.25 MG: 0.25 TABLET ORAL at 08:58

## 2022-07-17 RX ADMIN — LORATADINE 10 MG: 10 TABLET ORAL at 08:58

## 2022-07-17 RX ADMIN — FUROSEMIDE 40 MG: 20 TABLET ORAL at 17:14

## 2022-07-17 RX ADMIN — GABAPENTIN 100 MG: 100 CAPSULE ORAL at 21:29

## 2022-07-17 RX ADMIN — Medication 25 MCG: at 08:58

## 2022-07-17 RX ADMIN — Medication 3 CAPSULE: at 13:06

## 2022-07-17 RX ADMIN — GABAPENTIN 100 MG: 100 CAPSULE ORAL at 08:58

## 2022-07-17 RX ADMIN — ACETAMINOPHEN 1000 MG: 500 TABLET ORAL at 09:02

## 2022-07-17 RX ADMIN — RISPERIDONE 2 MG: 0.5 TABLET ORAL at 21:28

## 2022-07-17 ASSESSMENT — ACTIVITIES OF DAILY LIVING (ADL)
ADLS_ACUITY_SCORE: 44
ADLS_ACUITY_SCORE: 43
ADLS_ACUITY_SCORE: 43
ADLS_ACUITY_SCORE: 44
ADLS_ACUITY_SCORE: 43

## 2022-07-17 NOTE — PROGRESS NOTES
Received page from LATONYA Goldsmith that patient continued to have small bleeding from presumed rectal prolapse. Recommend holding Eliquis pending consult today. Will communicate with primary team via this note.     Valentín Vasques MD

## 2022-07-17 NOTE — PLAN OF CARE
A/Ox4. Pt was able to answer all questions correctly and was glad to remember what he wanted to. VSS on Room Air. Denied SOB, CP, Cough, N/V, Dizziness, Headache. Baseline N/T in BLE. Pt denied pain this shift. Regular diet, thin liquids, takes pills whole. Continent of bowel, LBM 7/17/22. Louie in place for retention. Assist of 1 with walker and gait belt. Skin has abrasion on L abd treated with powder. Mepilex on coccyx as precaution. L PIV SL, patent with flush. Call light within reach, able to make needs known. Appears to be sleeping during rounds. Continue POC. Pt is hoping to discharge to memory care facility.     Pt has anal bleeding from prolapsed rectum, consult is scheduled for 7/17/22.

## 2022-07-17 NOTE — PROGRESS NOTES
[facility]    Medicine Progress Note - Hospitalist Service, GOLD TEAM 17    Date of Admission:  6/28/2022    Assessment & Plan        William Almazan is a 81 year old male with history of CAD s/p CABG, dilated cardiomyopathy, HFrEF, PAF on Eliquis, HTN, HLD, DM2, KRISTIAN, prostate cancer, gout, and dementia who presented on 6/28/22 with worsening auditory hallucinations.     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   ---   Hallucination present since ~2020 following death of son.  ---   Patient continued to have auditory hallucination of a friend telling him to harm himself.  ---   He denied suicidal ideation  ---   1:1 sitter d/c'd by psychiatry on 7/13/22  ---   Denied worsening depression or anxiety.   ---   No visual or tactile hallucinations.   ---   Family not able to manage at home.   ---   Continue Risperdal 0.5 mg qam and 2 mg at bedtime  ---   Continue donepezil 5 mg daily  ---   Continue gabapentin 100 mg po tid  ---   Will consider Lexapro 10 mg if he continues to appear depressed (mood stable).    Full thickness rectal prolapse, initially noted 7/15  ---   It appears to be full-thickness but reducible  ---   Intermittently bleeding with bowel movements  ---   Hgb remained stable at 11+ g  ---   Avoid constipation  ---   Psyllium fiber 3 capsule daily, MiraLAX 17 g daily and senna 1-2 tabs bid prn ordered  ---   Discussed patient's condition with CRS consult service.  Recommendation is for fiber and avoid constipation.  Surgical option is colostomy to which the patient is probably not the best candidate.  ---   Check Hgb q8 hr    Urinary retention  ---   Chronic indwelling keating catheter.     Chronic HFrEF:    ---   Hx dilated cardiomyopathy.   ---   Most recent TTE 6/18/21 showing severely dilated LV with EF 20%.   ---   Currently euvolemic on  exam.   ---   No hypoxia or pulmonary complaints.   ---   Has trace LE edema.   ---   Continue Coreg to 12.5 mg PO BID   ---   Continue PTA Lasix  ---   Daily weights, I&O's     CAD s/p CABG (1992):    ---   Not ASA or statin at home.   ---   No acute concerns.   ---   Coreg to 12.5 mg PO BID     PAF   ---   No acute concerns.  ---   Eliquis 5mg BID     T2DM:    ---   Diet controlled.   ---   Glucose stable.      KRISTIAN:    ---   Does not use CPAP at home.       Prostate CA:    ---   Treated with XRT and hormone therapy.   ---   Hx urinary retention with chronic indwelling keating.     Gout:    ---   Continue PTA allopurinol         Diet: Combination Diet Regular Diet Adult    DVT Prophylaxis: DOAC  Keating Catheter: PRESENT, indication: Retention, Retention  Central Lines: None  Cardiac Monitoring: None  Code Status: Full Code      Disposition Plan   Awaiting for placement        The patient's care was discussed with the Bedside Nurse and Patient.          Chapis Kinney MD  Hospitalist Service, GOLD TEAM [address] [place] [address]  Securely message with the Vocera Web Console (learn more here)  Text page via [facility] Paging/Directory   Please see signed in provider for up to date coverage information      ______________________________________________________________________    Interval History     Uneventful night  No complaints  Has had couple episodes of rectal prolapse and bleed with BMs    Data reviewed today: I reviewed all medications, new labs and imaging results over the last 24 hours. I personally reviewed no images or EKG's today.    Physical Exam   Vital Signs: Temp: 97.8  F (36.6  C) Temp src: Oral BP: 119/65 Pulse: 78   Resp: 16 SpO2: 97 % O2 Device: None (Room air)    Weight: 184 lbs 8 oz   General: aao x 3, NAD.  HEENT:  NC/AT, neck supple  CVS:  NL s 1 and s2, no m/r/g.  Lungs:  CTA B/L.   Abd:  Soft, + bs  Ext:  No c/c.  Lymph:  + edema.  Neuro:   Nonfocal.  Musculoskeletal: No calf tenderness to palpation.    Skin:  No rash.  :   Louie present  Rectal exam:   Full-thickness rectal prolapse that is oozing blood after BM noted      Data   Recent Labs   Lab 07/16/22  2056 07/16/22  1226 07/12/22  0634   WBC  --  8.4  --    HGB  --  11.7*  11.7*  --    MCV  --  89  --    PLT  --  259  --      --  140   POTASSIUM 4.0  --  4.9   CHLORIDE 103  --  111*   CO2 30  --  25   BUN 24  --  13   CR 0.76  --  0.56*   ANIONGAP 6  --  4   CRYS 8.6  --  8.6   *  --  90     No results found for this or any previous visit (from the past 24 hour(s)).  Medications       allopurinol  300 mg Oral Daily     [Held by provider] apixaban ANTICOAGULANT  5 mg Oral BID     carvedilol  12.5 mg Oral BID     donepezil  5 mg Oral At Bedtime     furosemide  40 mg Oral BID     gabapentin  100 mg Oral TID     loratadine  10 mg Oral Daily     polyethylene glycol  17 g Oral BID     psyllium  3 capsule Oral Daily     risperiDONE  0.25 mg Oral Daily     risperiDONE  2 mg Oral At Bedtime     sodium chloride (PF)  3 mL Intracatheter Q8H     [Held by provider] spironolactone  25 mg Oral Daily     Vitamin D3  25 mcg Oral Daily

## 2022-07-17 NOTE — PROVIDER NOTIFICATION
Pt bleeding bright red blood w/BM, 10mL or more. Dr Vasques (cross coverage) paged and asked if Eliquis dosing should continue or any action advised. MD responded by holding Eliquis and advised no other action, Gen Surg consult in place for this AM.

## 2022-07-17 NOTE — PROGRESS NOTES
"CLINICAL NUTRITION SERVICES - ASSESSMENT NOTE     Nutrition Prescription    RECOMMENDATIONS FOR MDs/PROVIDERS TO ORDER:  1. Please note, there is no evidence to support low albumin as an indicator for malnutrition. Please refrain from nutrition consults 2/2 low albumin, thanks!  2. Please consult RDN if nutrition needs arise.    Malnutrition Status:    Unable to determine due to inability to perform NFPE as RDN remote on call.    Recommendations already ordered by Registered Dietitian (RD):  None at this time. RDN to sign off.       REASON FOR ASSESSMENT  William Almazan is a/an 81 year old male assessed by the dietitian for Reason For Assessment: [facility] Provider Order - malnutrition, low albumin    [facility]  William Almazan is a 81 year old male with history of CAD s/p CABG, dilated cardiomyopathy, HFrEF, PAF on Eliquis, HTN, HLD, DM2, KRISTIAN, prostate cancer, gout, and dementia who presented on 6/28/22 with worsening auditory hallucinations.    NUTRITION HISTORY  Spoke to nursing over the phone. Reports that patient has been eating well the past few days.    CURRENT NUTRITION ORDERS  Diet: Regular  Diet/Feeding Assistance: none  Intake (%): 100%, per flow sheets     LABS  Labs reviewed 7/17  Vitamin B12 178L (7/16)  Glucose 90WNL-136H (7/12-7/16)     MEDICATIONS  Medications reviewed 7/17  Lasix  Vitamin D3 25mcg  Melatonin     ANTHROPOMETRICS  Height: 175.3 cm (5' 9\")  Most Recent Weight: 83.7 kg (184 lb 8 oz)    IBW: 72.8 kg  %IBW: 115%  Weight History: Weight gain during admission.  07/17/22 0857 83.7 kg (184 lb 8 oz) Bed scale   07/16/22 0900 85 kg (187 lb 4.8 oz) --   07/15/22 1024 83.7 kg (184 lb 9.6 oz) Bed scale   07/11/22 0900 84.2 kg (185 lb 9.6 oz) Standing scale   07/07/22 1501 82.7 kg (182 lb 6.4 oz) Standing scale   07/03/22 0945 82.4 kg (181 lb 9.6 oz) Bed scale   07/02/22 0300 78.2 kg (172 lb 8 oz) Bed scale     Dosing Weight: 83.7 kg (current wt)    ASSESSED NUTRITION NEEDS  Estimated Energy Needs: 9245-2443 " kcals/day (25 - 30 kcals/kg)  Justification: Maintenance  Estimated Protein Needs:  grams protein/day (1 - 1.2 grams of pro/kg)  Justification: Repletion  Estimated Fluid Needs: 0591-5961 mL/day (1 mL/kcal)   Justification: Maintenance    PHYSICAL FINDINGS  See malnutrition section below.     MALNUTRITION  % Intake: No decreased intake noted  % Weight Loss: None noted  Subcutaneous Fat Loss: Unable to assess, RDN remote on call  Muscle Loss: Unable to assess, RDN remote on call  Fluid Accumulation/Edema: trace LE edema, per chart review  Malnutrition Diagnosis: Unable to determine due to inability to perform NFPE as RDN remote on call.    NUTRITION DIAGNOSIS  No nutrition diagnosis at this time.    INTERVENTIONS  Implementation  Nutrition Education: Not appropriate at this time due to patient condition.    Goals  Patient to consume % of nutritionally adequate meal trays TID, or the equivalent with supplements/snacks.     Monitoring/Evaluation  Progress toward goals will be monitored and evaluated per protocol.  Bhakti Cao, MPH, RDN, LD  On Call Pager (weekends only): [phone]

## 2022-07-17 NOTE — PLAN OF CARE
Pt alert w/fluctuating orientation to situation. Denies visual hallucinations, endorses auditory hallucinations. Reports mild pain in L hip w/increased ambulation to bathroom, PRN Tylenol given.     Pt having loose stools w/bright red rectal bleeding, MD garcia, Gen Surgery consult complete, colorectal surgery consult deemed appropriate per Gen Surg, passed on to Dr. Kinney. Scheduled psyllium given for loose stools.    Pt reports baseline numbness in BLE. Denies BUE n/t.     Preventative Mepilex on coccyx, skin intact. R abdominal fold abrasion pink, no drainage, WDL, wound cleanser applied, mepliex changed. Groin wound care provided per POC, skin intact, no drainage.     Anticipated Financial Counseling meeting w/wife present 7/18. Discharge pending placement and care coordination.

## 2022-07-18 ENCOUNTER — APPOINTMENT (OUTPATIENT)
Dept: PHYSICAL THERAPY | Facility: CLINIC | Age: 82
DRG: 884 | End: 2022-07-18
Payer: MEDICARE

## 2022-07-18 LAB
HGB BLD-MCNC: 10.4 G/DL (ref 13.3–17.7)
HOLD SPECIMEN: NORMAL

## 2022-07-18 PROCEDURE — 250N000013 HC RX MED GY IP 250 OP 250 PS 637: Performed by: PSYCHIATRY & NEUROLOGY

## 2022-07-18 PROCEDURE — 36415 COLL VENOUS BLD VENIPUNCTURE: CPT | Performed by: INTERNAL MEDICINE

## 2022-07-18 PROCEDURE — 85018 HEMOGLOBIN: CPT | Performed by: INTERNAL MEDICINE

## 2022-07-18 PROCEDURE — 250N000013 HC RX MED GY IP 250 OP 250 PS 637: Performed by: INTERNAL MEDICINE

## 2022-07-18 PROCEDURE — 99232 SBSQ HOSP IP/OBS MODERATE 35: CPT | Performed by: INTERNAL MEDICINE

## 2022-07-18 PROCEDURE — 97530 THERAPEUTIC ACTIVITIES: CPT | Mod: GP

## 2022-07-18 PROCEDURE — 99221 1ST HOSP IP/OBS SF/LOW 40: CPT | Performed by: STUDENT IN AN ORGANIZED HEALTH CARE EDUCATION/TRAINING PROGRAM

## 2022-07-18 PROCEDURE — 97116 GAIT TRAINING THERAPY: CPT | Mod: GP

## 2022-07-18 PROCEDURE — 120N000002 HC R&B MED SURG/OB UMMC

## 2022-07-18 PROCEDURE — 250N000013 HC RX MED GY IP 250 OP 250 PS 637

## 2022-07-18 RX ADMIN — GABAPENTIN 100 MG: 100 CAPSULE ORAL at 20:49

## 2022-07-18 RX ADMIN — CARVEDILOL 12.5 MG: 12.5 TABLET, FILM COATED ORAL at 20:49

## 2022-07-18 RX ADMIN — RISPERIDONE 2 MG: 0.5 TABLET ORAL at 21:45

## 2022-07-18 RX ADMIN — ACETAMINOPHEN 1000 MG: 500 TABLET ORAL at 16:16

## 2022-07-18 RX ADMIN — FUROSEMIDE 40 MG: 20 TABLET ORAL at 09:09

## 2022-07-18 RX ADMIN — Medication 3 CAPSULE: at 09:09

## 2022-07-18 RX ADMIN — LORATADINE 10 MG: 10 TABLET ORAL at 09:10

## 2022-07-18 RX ADMIN — RISPERIDONE 0.25 MG: 0.25 TABLET ORAL at 09:10

## 2022-07-18 RX ADMIN — DONEPEZIL HYDROCHLORIDE 5 MG: 5 TABLET, FILM COATED ORAL at 21:45

## 2022-07-18 RX ADMIN — FUROSEMIDE 40 MG: 20 TABLET ORAL at 16:16

## 2022-07-18 RX ADMIN — GABAPENTIN 100 MG: 100 CAPSULE ORAL at 13:11

## 2022-07-18 RX ADMIN — ACETAMINOPHEN 1000 MG: 500 TABLET ORAL at 09:09

## 2022-07-18 RX ADMIN — CARVEDILOL 12.5 MG: 12.5 TABLET, FILM COATED ORAL at 09:09

## 2022-07-18 RX ADMIN — ALLOPURINOL 300 MG: 300 TABLET ORAL at 09:09

## 2022-07-18 RX ADMIN — GABAPENTIN 100 MG: 100 CAPSULE ORAL at 09:09

## 2022-07-18 RX ADMIN — Medication 25 MCG: at 09:10

## 2022-07-18 ASSESSMENT — ACTIVITIES OF DAILY LIVING (ADL)
ADLS_ACUITY_SCORE: 44

## 2022-07-18 NOTE — CONSULTS
"      Psychiatry Consultation; Follow up              Reason for Consult, requesting source:    Medication adjustment.   Requesting source: Chapis Kinney MD    Labs and imaging reviewed, patient seen and evaluated by OTONIEL Tobar CNP               Interim history:    Mr. William Almazan is an 81-year-old  male who is being hospitalized for decreased short-term memory and hallucinations. Prior to interviewing this patient, I had an opportunity to review the initial psychiatric consultation completed by Dr. Aryan Leyva on 06/29 and a followup on 06/30.  He started the patient on low-dose Risperdal and suggested that donepezil might be appropriate in the future. Patient was again seen by psychiatry on 7/7 and risperdal was increased and donepezil started due to continued hallucinations and history of responding to hallucinations. On 7/13, psychiatry continued all medications and discontinued the sitter and also recommended memory care placement over [facility].     Patient continues to be doing well. He is in a good mood and has pleasant for all staff members. He is alert and oriented and states he continues to have some hallucinations but he feels safe and \"protected.\" No tremor noted, no cogwheel rigidity.         Current Medications:       allopurinol  300 mg Oral Daily     [Held by provider] apixaban ANTICOAGULANT  5 mg Oral BID     carvedilol  12.5 mg Oral BID     donepezil  5 mg Oral At Bedtime     furosemide  40 mg Oral BID     gabapentin  100 mg Oral TID     loratadine  10 mg Oral Daily     polyethylene glycol  17 g Oral BID     psyllium  3 capsule Oral Daily     risperiDONE  0.25 mg Oral Daily     risperiDONE  2 mg Oral At Bedtime     sodium chloride (PF)  3 mL Intracatheter Q8H     [Held by provider] spironolactone  25 mg Oral Daily     Vitamin D3  25 mcg Oral Daily              MSE:   Appearance: awake, alert, adequately groomed and dressed in hospital scrubs  Attitude:  cooperative  Eye " "Contact:  good  Mood:  good  Affect:  appropriate and in normal range and mood congruent  Speech:  clear, coherent  Psychomotor Behavior:  no evidence of tardive dyskinesia, dystonia, or tics  Thought Process:  logical, linear and goal oriented  Associations:  no loose associations  Thought Content:  no evidence of suicidal ideation or homicidal ideation  Insight:  fair  Judgement:  intact  Oriented to:  time, person, and place  Attention Span and Concentration:  intact  Recent and Remote Memory:  limited    Vital signs:  Temp: 97.6  F (36.4  C) Temp src: Oral BP: 134/58 Pulse: 75   Resp: 14 SpO2: 96 % O2 Device: None (Room air)   Height: 175.3 cm (5' 9\") Weight: 83.7 kg (184 lb 8 oz)  Estimated body mass index is 27.25 kg/m  as calculated from the following:    Height as of this encounter: 1.753 m (5' 9\").    Weight as of this encounter: 83.7 kg (184 lb 8 oz).              DSM-5 Diagnosis:   1. Major Neurocognitive Disorder          Assessment:   Patient was seen by psychiatry most recently on 7/13 and sitter was discontinued, Risperdal was continued at 2mg at bedtime along with Donepezil 5mg. Patient states he is sleeping much better, he is calm, cooperative, and reports to be in a good mood. He continues to report auditory hallucinations, but this appears to be his baseline related to his Major neurocognitive disorder. He denies suicidal ideation and nursing staff report he is doing well off of the sitter, he is cooperative, medication compliant, and sleeping well. Upon assessment today, patient did not have any resting tremor or with arms extended and had little to no cogwheel rigidity.           Summary of Recommendations:     1. Continue psychotropic medications as prescribed.     2. Neurology recommended b12 replacement.       Shazia Washington, PMP-BC  Consult/Liaison Psychiatry   [facility]                 "

## 2022-07-18 NOTE — PROGRESS NOTES
Social Work Progress Note    Social work and RNCC met with patient, patient wife and daughter to discuss next steps. Patient is medically ready to discharge. Patients wife would like to see patient go to TCU first to see if he can regain strength enough to come home. SW sent out referrals near [place] for TCU. See below. RNCC reached out to Financial Counseling to have patient and wife start an MA application.     Pending:    [facility]  [address]  [place] [address]  P: [phone]  F: [phone]    [facility] [place]  [address] [place]  [address]  P: [phone]  F: [phone]    [facility]  [address]  P: [phone]  F: [phone]    [facility]  [address]  [place]  [address]  P: [phone]  F: [phone]    [facility]  [address] [facility] [place]  [address]  P: [phone]  F: [phone]    [facility]  [address] [place] [address]  P: [phone]  F: [phone]    [facility]  [address] [place] [place]  [address]  P: [phone]  F: [phone]    [facility]   [address] [facility] [place]  [address]  P: [phone]  P: [phone] - Admissions  F: [phone]    Social work will continue to follow and provide assistance to ensure a safe and timely discharge.     KAHLIL [place], LGSW  8A and 10 ICU   [facility]   Phone: [phone]

## 2022-07-18 NOTE — PLAN OF CARE
Pt A/Ox4, forgetful about situation. Reports pain in L hip and legs from frequent ambulation to commode, PRN Tylenol given. Ax1 w/walker + gb.     Chronic keating in place for retention. Cath cares done.     Groin skin intact, pink, cleanser and care provided per POC. Powder applied to abdominal folds. R hip abrasion skin pink, intact. BLE edema, scheduled lasix given.     Pt still having bright red, blood dripping from rectum w/loose stools. MD aware, Hgb  10.4, monitoring of prolapsed rectum for swelling and discoloration per Colorectal Surgery note, no swelling or discoloration noted. Hgb re-check ordered for 7/19 AM.     Family (wife and daughter) updated by Dr. Kinney about progression and POC. POC and discharge questions answered. SW paged per family request, consulted with family. Neurology and Psych consults placed by MD, wife's number on white board to be called during consultation.     Family noted infrequent, intermittent tremor in pt, Dr. Kinney aware. Pharmacy called by writer, tremor most likely attributed to medication side effect, instructed to monitor for worsening tremor, page psych to adjust dose if becomes un-manageable per pharmacist.    Discharge pending care coordination and placement.

## 2022-07-18 NOTE — PLAN OF CARE
"Goal Outcome Evaluation:    Plan of Care Reviewed With: patient    /72   Pulse 84   Temp 97.7  F (36.5  C) (Oral)   Resp 18   Ht 1.753 m (5' 9\")   Wt 83.7 kg (184 lb 8 oz)   SpO2 97%   BMI 27.25 kg/m      No acute changes this shift-    A&Ox4- Baseline numbness/tingling in toes.  Denies nausea/vomiting, SOB, and chest pain.   Assist of 1 with walker & gait belt. Able to make needs knows and uses calls light appropriately  Pt continues to have significant amount of bleeding with BM's. Blood dripping from rectum when standing up to wipe. No blood noted in breifs. Cross over paged. Provider came up to assess pt. No external rectal bleeding noted. Will continue to monitor. Per provider- continue to monitor and continue with Miralax although stools are loose, until bleeding improves.   Bed and chair alarm on.  Call light within reach- able to make needs known.  "

## 2022-07-18 NOTE — CONSULTS
"[facility]  Neurology Consultation    Patient Name:  William Almazan  MRN:  [id number]    :  1940  Date of Service:  2022  Primary care provider:  Victoriano Powers      Neurology consultation service was asked to see William Almazan by Dr. Kinney to evaluate diffuse tremoring/shaking    History of Present Illness:   William Almazan is a 81 year old male with history of CAD s/p CABG, a fib on eliquis, CHF, HTN, HLD, DM2, KRISTIAN, prostate cancer, gout, and reported history of dementia who presented in late  with worsening auditory hallucinations.  Since his hospitalization he has had intermittent whole body tremors for which neurology was consulted.    His 1:1 sitter has since been DCed.   Medications notable for risperdal 0.5/2 mg, gabapentin 100 mg TID, donepezil 5 mg daily     Discussed these episodes with William.  He notes preserved consciousness throughout.  He notes precipitating factors primarily in using the restroom/urinating.  Lesser but prominent factors of stress and mood.  They have acutely worsened over the last few days as his stress has worsened, he notes.      Spoke with wife on the phone who agrees these are likely stress related.  She wasn't aware this was a phenomena and was reassured that this is a common manifestation of stress      PMH  As above     Also noteworthy last neurology note in our system: ():  \"Impression:  Memory loss. Patient seems to have a family history of memory loss and also vascular disease. Possible mixed dementia.  Recommend neuropsych testing. Need to establish level of functioning  Vitamin B12 is very low. Recommend patient contact his primary care provider to start replacement.  Patient will be contacted results of memory testing are available to come back and discuss findings.  I briefly discussed with patient today importance of getting more active out in the community, consider volunteering, take a class. Try to " "interact more with people  For now mood does not seem to be significantly affected to warrant any changes    Rehana Boyd MD 8/19/2021\"      Medications   Medications Prior to Admission   Medication Sig Dispense Refill Last Dose     allopurinol (ZYLOPRIM) 300 MG tablet Take 1 tablet by mouth daily   6/28/2022 at Unknown time     amoxicillin (AMOXIL) 500 MG capsule Take 1 capsule by mouth as needed PRN for dental procedures   Unknown at Unknown time     apixaban ANTICOAGULANT (ELIQUIS) 5 MG tablet Take 5 mg by mouth 2 times daily   6/28/2022 at Unknown time     carvedilol (COREG) 25 MG tablet Take 25 mg by mouth 2 times daily   6/28/2022 at Unknown time     cholecalciferol (VITAMIN D3) 25 mcg (1000 units) capsule Take 1,000 Units by mouth daily   6/28/2022 at Unknown time     cyanocobalamin (VITAMIN B-12) 1000 MCG tablet Take 1,000 mcg by mouth daily   6/28/2022 at Unknown time     furosemide (LASIX) 80 MG tablet Take 80 mg by mouth 2 times daily   6/28/2022 at Unknown time     loratadine 10 MG capsule Take 1 capsule by mouth daily   6/28/2022 at Unknown time     multivitamin w/minerals (THERA-VIT-M) tablet Take 1 tablet by mouth daily   6/28/2022 at Unknown time     spironolactone (ALDACTONE) 25 MG tablet Take 1 tablet by mouth daily   6/28/2022 at Unknown time       Allergies  No Known Allergies    Social History  Social History     Tobacco Use     Smoking status: Not on file     Smokeless tobacco: Not on file   Substance Use Topics     Alcohol use: Not on file       Family History    No family history on file.      Physical Examination   Vitals: /58 (BP Location: Right arm, Patient Position: Semi-Ruby's)   Pulse 75   Temp 97.6  F (36.4  C) (Oral)   Resp 14   Ht 1.753 m (5' 9\")   Wt 83.7 kg (184 lb 8 oz)   SpO2 96%   BMI 27.25 kg/m    General: Adult male patient, lying in bed, NAD  HEENT: NC/AT, no icterus, op pink and moist  Chest: non-labored on RA  Psych: Mood pleasant,   Neuro:  Mental status: " Awake, alert, attentive, oriented to self, reason for being in hospital.  Could discuss his favorite book of the bible (Melecio), book was on bed/being read when I entered the room.  Knows current president and .  Struggled with [employer] math.  Mostly appropriate to questioning.    Cranial nerves: VFF, PERRL, conjugate gaze, EOMI, facial sensation intact, face symmetric, shoulder shrug strong, tongue/uvula midline, no dysarthria.   Motor: Normal bulk and tone. No abnormal movements. 5/5 strength in upper and lower extremities.  Reflexes: 2+ symmetric biceps, brachioradialis, triceps, patella  Sensory: Intact to light touch  Coordination: FNF ataxia or dysmetria.  Gait Deferred    Investigations   CT HEAD W/O CONTRAST 7/2/2022 10:11 PM     Provided History: sudden nausea and dizziness, anticoagulated     Comparison: Head CT 6/28/2022.     Technique: Using multidetector thin collimation helical acquisition  technique, axial, coronal and sagittal CT images from the skull base  to the vertex were obtained without intravenous contrast.      Findings:    No intracranial hemorrhage. No mass effect. No midline shift. No  extra-axial fluid collection. The gray to white matter differentiation  of the cerebral hemispheres is preserved. Ventricles are proportionate  to the sulci. Mild generalized cerebral atrophy. Periventricular and  subcortical white matter hypoattenuation is nonspecific but likely  represents chronic small vessel ischemic disease in patient this age.  No sulcal effacement. The basal cisterns are patent. Virtually empty  sella.     Paranasal sinuses are relatively clear. The mastoid air cells are  clear. Orbits appear unremarkable. No acute fracture.                                                                      Impression: No acute intracranial pathology. No significant change  since 6/28/2022.       I personally reviewed the above imaging and agree with the findings in the report.      TSH    Date Value Ref Range Status   07/16/2022 1.49 0.40 - 4.00 mU/L Final     Last B12 178  Impression/Recommendations  William Almazan is a 81 year old male with history of CAD s/p CABG, a fib on eliquis, CHF, HTN, HLD, DM2, KRISTIAN, prostate cancer, gout, and reported history of dementia who presented in late June with worsening auditory hallucinations.  Since his hospitalization he has had intermittent whole body tremors for which neurology was consulted.    I discussed the potential relationship to mood/stress and both he and his wife (who I spoke with on the phone) agreed that this was very likely to be stress related.  That his intermittent tremoring occasionally involving whole body shaking with preserved consciousness/which he can recall fully and aggravated by urination and stress further points to this conclusion which I discussed with William and his wife.  They were reassured that this is something that we in neurology commonly see and that seizures do not present in this manner typically.  I reiterated that William likely cannot help when these occur but that they should dissipate as his mood/stress improves.  They were relieved to know this is something that is typical/ not unexpected in similar patients.     -Unrelated to tremor most likely but for memory/neurology care in general would continue B12 1000 mcg daily and encourage outpatient follow up of this level to see if long term injections are required to get this level >400 as this could improve memory long term.      Thank you for involving Neurology in the care of William Almazan.  Please do not hesitate to call with questions/concerns or should these movements worsen or change (consult pager [phone]).      Yovani Neves MD   of Neurology  [facility]    My total floor time today for this patient was at least 60 minutes, greater than half of which was for counseling and coordination of care

## 2022-07-18 NOTE — PLAN OF CARE
A/Ox4. Pt was able to answer all questions correctly. Pt reported voices telling him to self harm but stated he would never self harm. VSS on Room Air. Denied SOB, CP, Cough, N/V, Dizziness, Headache. Baseline N/T and edema in BLE. Pt denied pain this shift. Regular diet, thin liquids, takes pills whole. Continent of bowel, LBM 7/18/22. Louie in place for retention. Assist of 1 with walker and gait belt. Skin has abrasion on L abd. Mepilex on coccyx as precaution. L PIV SL, patent with flush. Call light within reach, able to make needs known. Appears to be sleeping during rounds. Bed alarm on for safety. Continue POC. Discharge pending placement.    Pt has anal bleeding from prolapsed rectum. MD paged about increase in bleeding. Bleeding returned to previous amount a few hours later. Hemoglobin ordered for AM.

## 2022-07-18 NOTE — PROGRESS NOTES
[facility]    Medicine Progress Note - Hospitalist Service, GOLD TEAM 17    Date of Admission:  6/28/2022    Assessment & Plan        William Almazan is a 81 year old male with history of CAD s/p CABG, dilated cardiomyopathy, HFrEF, PAF on Eliquis, HTN, HLD, DM2, KRISTIAN, prostate cancer, gout, and dementia who presented on 6/28/22 with worsening auditory hallucinations.     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   ---   Hallucination present since ~2020 following death of son.  ---   Patient continued to have auditory hallucination of a friend telling him to harm himself.  ---   He denied suicidal ideation  ---   1:1 sitter d/c'd by psychiatry on 7/13/22  ---   Denied worsening depression or anxiety.   ---   No visual or tactile hallucinations.   ---   Family not able to manage at home.   ---   Continue Risperdal 0.5 mg qam and 2 mg at bedtime  ---   Continue donepezil 5 mg daily  ---   Continue gabapentin 100 mg po tid  ---   Will consider Lexapro 10 mg if he continues to appear depressed (mood stable).    Full thickness rectal prolapse, initially noted 7/15  ---   It appears to be full-thickness but reducible  ---   Intermittently bleeding with bowel movements  ---   Hgb remained stable at 10 - 11+ g  ---   Avoid constipation  ---   Psyllium fiber 3 capsule daily, MiraLAX 17 g daily and senna 1-2 tabs bid prn ordered  ---   Discussed patient's condition with CRS consult service.  Recommendation is for fiber and avoid constipation.  Surgical option is colostomy to which the patient is probably not the best candidate.  ---   CRS recommended outpt clinic follow up    Tremor, whole body  ---   New since admit  ---   Will consult with neurology per patient's family request    Urinary retention  ---   Chronic indwelling keating catheter.     Chronic HFrEF:     ---   Hx dilated cardiomyopathy.   ---   Most recent TTE 6/18/21 showing severely dilated LV with EF 20%.   ---   Currently euvolemic on exam.   ---   No hypoxia or pulmonary complaints.   ---   Has trace LE edema.   ---   Continue Coreg to 12.5 mg PO BID   ---   Continue PTA Lasix  ---   Daily weights, I&O's     CAD s/p CABG (1992):    ---   Not ASA or statin at home.   ---   No acute concerns.   ---   Coreg to 12.5 mg PO BID     PAF   ---   No acute concerns.  ---   Eliquis 5mg BID     T2DM:    ---   Diet controlled.   ---   Glucose stable.      KRISTIAN:    ---   Does not use CPAP at home.       Prostate CA:    ---   Treated with XRT and hormone therapy.   ---   Hx urinary retention with chronic indwelling keating.     Gout:    ---   Continue PTA allopurinol         Diet: Combination Diet Regular Diet Adult    DVT Prophylaxis: DOAC  Keating Catheter: PRESENT, indication: Retention, Retention  Central Lines: None  Cardiac Monitoring: None  Code Status: Full Code      Disposition Plan   Awaiting for placement        The patient's care was discussed with the Bedside Nurse and Patient.          Chapis Kinney MD  Hospitalist Service, GOLD TEAM [address] [place] [address]  Securely message with the Vocera Web Console (learn more here)  Text page via [employer] Paging/Directory   Please see signed in provider for up to date coverage information      ______________________________________________________________________    Interval History     Uneventful night  No complaints  Bleeding from rectal prolapse is slowing down.    Data reviewed today: I reviewed all medications, new labs and imaging results over the last 24 hours. I personally reviewed no images or EKG's today.    Physical Exam   Vital Signs: Temp: 97.6  F (36.4  C) Temp src: Oral BP: 120/61 Pulse: 78   Resp: 16 SpO2: 97 % O2 Device: None (Room air)    Weight: 184 lbs 8 oz   General: aao x 3, NAD.  HEENT:  NC/AT, neck supple  CVS:  NL s 1  and s2, no m/r/g.  Lungs:  CTA B/L.   Abd:  Soft, + bs  Ext:  No c/c.  Lymph:  + edema.  Neuro:  Nonfocal.  Musculoskeletal: No calf tenderness to palpation.    Skin:  No rash.  :   Louie present  Rectal exam:   Full-thickness rectal prolapse that is oozing blood after BM noted      Data   Recent Labs   Lab 07/18/22  0644 07/17/22  2218 07/17/22  1543 07/16/22  2056 07/16/22  1226 07/16/22  1226 07/12/22  0634   WBC  --   --   --   --   --  8.4  --    HGB 10.4* 10.3* 10.6*  --    < > 11.7*  11.7*  --    MCV  --   --   --   --   --  89  --    PLT  --   --   --   --   --  259  --    NA  --   --   --  139  --   --  140   POTASSIUM  --   --   --  4.0  --   --  4.9   CHLORIDE  --   --   --  103  --   --  111*   CO2  --   --   --  30  --   --  25   BUN  --   --   --  24  --   --  13   CR  --   --   --  0.76  --   --  0.56*   ANIONGAP  --   --   --  6  --   --  4   CRYS  --   --   --  8.6  --   --  8.6   GLC  --   --   --  136*  --   --  90    < > = values in this interval not displayed.     No results found for this or any previous visit (from the past 24 hour(s)).  Medications       allopurinol  300 mg Oral Daily     [Held by provider] apixaban ANTICOAGULANT  5 mg Oral BID     carvedilol  12.5 mg Oral BID     donepezil  5 mg Oral At Bedtime     furosemide  40 mg Oral BID     gabapentin  100 mg Oral TID     loratadine  10 mg Oral Daily     polyethylene glycol  17 g Oral BID     psyllium  3 capsule Oral Daily     risperiDONE  0.25 mg Oral Daily     risperiDONE  2 mg Oral At Bedtime     sodium chloride (PF)  3 mL Intracatheter Q8H     [Held by provider] spironolactone  25 mg Oral Daily     Vitamin D3  25 mcg Oral Daily

## 2022-07-18 NOTE — PROGRESS NOTES
CRS Brief Note: Outpatient follow-up requested for 4-6 weeks to ensure post discharge. We will sign off. Please page Colorectal Surgery on-call if additional questions or concerns specifically if unable to reduce and rectum becomes swollen or purple in discoloration. Discussed with staff Dr. Ordonez.

## 2022-07-19 LAB
ANION GAP SERPL CALCULATED.3IONS-SCNC: 3 MMOL/L (ref 3–14)
BUN SERPL-MCNC: 18 MG/DL (ref 7–30)
CALCIUM SERPL-MCNC: 8.6 MG/DL (ref 8.5–10.1)
CHLORIDE BLD-SCNC: 107 MMOL/L (ref 94–109)
CO2 SERPL-SCNC: 30 MMOL/L (ref 20–32)
CREAT SERPL-MCNC: 0.79 MG/DL (ref 0.66–1.25)
ERYTHROCYTE [DISTWIDTH] IN BLOOD BY AUTOMATED COUNT: 15.9 % (ref 10–15)
GFR SERPL CREATININE-BSD FRML MDRD: 89 ML/MIN/1.73M2
GLUCOSE BLD-MCNC: 99 MG/DL (ref 70–99)
HCT VFR BLD AUTO: 30.7 % (ref 40–53)
HGB BLD-MCNC: 9.6 G/DL (ref 13.3–17.7)
MAGNESIUM SERPL-MCNC: 2.2 MG/DL (ref 1.6–2.3)
MCH RBC QN AUTO: 28.2 PG (ref 26.5–33)
MCHC RBC AUTO-ENTMCNC: 31.3 G/DL (ref 31.5–36.5)
MCV RBC AUTO: 90 FL (ref 78–100)
PLATELET # BLD AUTO: 242 10E3/UL (ref 150–450)
POTASSIUM BLD-SCNC: 3.8 MMOL/L (ref 3.4–5.3)
RBC # BLD AUTO: 3.4 10E6/UL (ref 4.4–5.9)
SODIUM SERPL-SCNC: 140 MMOL/L (ref 133–144)
WBC # BLD AUTO: 4.8 10E3/UL (ref 4–11)

## 2022-07-19 PROCEDURE — 250N000013 HC RX MED GY IP 250 OP 250 PS 637: Performed by: INTERNAL MEDICINE

## 2022-07-19 PROCEDURE — 250N000013 HC RX MED GY IP 250 OP 250 PS 637

## 2022-07-19 PROCEDURE — 99233 SBSQ HOSP IP/OBS HIGH 50: CPT | Performed by: INTERNAL MEDICINE

## 2022-07-19 PROCEDURE — 250N000013 HC RX MED GY IP 250 OP 250 PS 637: Performed by: PHYSICIAN ASSISTANT

## 2022-07-19 PROCEDURE — 83735 ASSAY OF MAGNESIUM: CPT | Performed by: INTERNAL MEDICINE

## 2022-07-19 PROCEDURE — 36415 COLL VENOUS BLD VENIPUNCTURE: CPT | Performed by: INTERNAL MEDICINE

## 2022-07-19 PROCEDURE — 120N000002 HC R&B MED SURG/OB UMMC

## 2022-07-19 PROCEDURE — 250N000013 HC RX MED GY IP 250 OP 250 PS 637: Performed by: PSYCHIATRY & NEUROLOGY

## 2022-07-19 PROCEDURE — 80048 BASIC METABOLIC PNL TOTAL CA: CPT | Performed by: INTERNAL MEDICINE

## 2022-07-19 PROCEDURE — 85048 AUTOMATED LEUKOCYTE COUNT: CPT | Performed by: INTERNAL MEDICINE

## 2022-07-19 PROCEDURE — 99207 PR CDG-CUT & PASTE-POTENTIAL IMPACT ON LEVEL: CPT | Performed by: INTERNAL MEDICINE

## 2022-07-19 RX ADMIN — FUROSEMIDE 40 MG: 20 TABLET ORAL at 08:40

## 2022-07-19 RX ADMIN — POLYETHYLENE GLYCOL 3350 17 G: 17 POWDER, FOR SOLUTION ORAL at 08:40

## 2022-07-19 RX ADMIN — CARVEDILOL 12.5 MG: 12.5 TABLET, FILM COATED ORAL at 08:39

## 2022-07-19 RX ADMIN — GABAPENTIN 100 MG: 100 CAPSULE ORAL at 08:39

## 2022-07-19 RX ADMIN — CARVEDILOL 12.5 MG: 12.5 TABLET, FILM COATED ORAL at 20:30

## 2022-07-19 RX ADMIN — Medication 25 MCG: at 08:40

## 2022-07-19 RX ADMIN — LORATADINE 10 MG: 10 TABLET ORAL at 08:40

## 2022-07-19 RX ADMIN — RISPERIDONE 2 MG: 0.5 TABLET ORAL at 21:47

## 2022-07-19 RX ADMIN — Medication 3 CAPSULE: at 08:39

## 2022-07-19 RX ADMIN — RISPERIDONE 0.25 MG: 0.25 TABLET ORAL at 08:40

## 2022-07-19 RX ADMIN — GABAPENTIN 100 MG: 100 CAPSULE ORAL at 14:32

## 2022-07-19 RX ADMIN — DONEPEZIL HYDROCHLORIDE 5 MG: 5 TABLET, FILM COATED ORAL at 21:47

## 2022-07-19 RX ADMIN — ALLOPURINOL 300 MG: 300 TABLET ORAL at 08:39

## 2022-07-19 RX ADMIN — FUROSEMIDE 40 MG: 20 TABLET ORAL at 16:10

## 2022-07-19 RX ADMIN — GABAPENTIN 100 MG: 100 CAPSULE ORAL at 20:30

## 2022-07-19 RX ADMIN — POLYETHYLENE GLYCOL 3350 17 G: 17 POWDER, FOR SOLUTION ORAL at 20:30

## 2022-07-19 ASSESSMENT — ACTIVITIES OF DAILY LIVING (ADL)
ADLS_ACUITY_SCORE: 44

## 2022-07-19 NOTE — PROGRESS NOTES
"Social Work Progress Note    Social work informed by Financial Counseling that patients wife was not interested in completing the MA mihir. Social work let patients wife know that it might be best to complete this application if patient is not accepted to a TCU facility and ends up needing LTC. Patient also would not be able to get PCA services or on an elderly waiver if MA mihir is not completed. Patient wife stated she was going to talk with her family and think on it.     Social work continuing to follow up with TCU referrals and updates can be seen below.      Pending:  [facility]  [address].  P: [phone]  F: [phone]    7/19 - Has not yet reviewed. Facility states they will follow up tomorrow.      [facility]  [address] [place]  [address]  P: [phone]  F: [phone]    7/19 - Social work called to follow up. Left voicemail.      [facility]  [address] [place] [address]  P: [phone]  F: [phone]    7/19 - Has not had a chance to review yet. Social work will follow up tomorrow.      [facility]  [address] [place]  [address]  P: [phone]  F: [phone]    7/19 - Social work called to follow up. No answer. Left voicemail.      [facility]   [address] [place]  [address]  P: [phone]  P: [phone] - Admissions  F: [phone]    7/19 - Admissions states they havent looked at patients file yet but will follow up once they have.     Declined:      [facility]  [address] [place]  [address]  P: [phone]  F: [phone]  7/19 - Declined due to no \"appropriate\" beds for patient    [facility]  [address] [place] [address]  P: [phone]  F: [phone]   7/19 - No beds for the next 7-10 days     [facility], [address] [place]  [address]  P: [phone]  F: [phone]  7/19 - " No availability for the next week or so per Admissions     Social work will continue to follow and provide assistance to ensure a safe and timely discharge.      KAHLIL De Luna, LGSW  8A and 10 ICU   [facility]   Phone: [phone]

## 2022-07-19 NOTE — PLAN OF CARE
"/63 (BP Location: Right arm, Patient Position: Semi-Ruby's, Cuff Size: Adult Regular)   Pulse 75   Temp 98  F (36.7  C)   Resp 16   Ht 1.753 m (5' 9\")   Wt 83.7 kg (184 lb 8 oz)   SpO2 98%   BMI 27.25 kg/m      Pt. A &Ox3.   Denies pain during this shift  Pt. Admits to hearing voices but states that they are \"confusing.\" Pt. States \"The good voices are telling me keep up what am doing and that I am protected\" also states \"the bad voices are telling me to hurt myself.    Denies suicidal ideation.  Chronic keating patent. Light yellow   Patient having  moderate amount of rectal bleeding, with bloody loose stools  Hgb being monitored. Recent Hgb level 9.6, trending down, decreased from previous value  Up and ambulated to the bathroom with gait belt and walker, x 1 assist   Reports baseline intermittent numbness/tinging on toes  "

## 2022-07-19 NOTE — PLAN OF CARE
VSS on room air. Denies pain. Denies chest pain and SOB. Chronic keating, patent and draining. Continues to have moderate rectal bleeding when up to toilet. Daughter and spouse at bedside this evening. Family stated they were supplementing 1000mg b12 at home prior to admission - neurology also recommended B12 supplement - sticky note written to MD team.  Awaiting TCU placement.

## 2022-07-19 NOTE — PROGRESS NOTES
[facility]    Medicine Progress Note - Hospitalist Service, GOLD TEAM 18    Date of Admission:  6/28/2022    Assessment & Plan        William Almazan is a 81 year old male with history of CAD s/p CABG, dilated cardiomyopathy, HFrEF, PAF on Eliquis, HTN, HLD, DM2, KRISTIAN, prostate cancer, gout, and dementia who presented on 6/28/22 with worsening auditory hallucinations.     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   ---   Hallucination present since ~2020 following death of son.  ---   Patient continued to have auditory hallucination of a friend telling him to harm himself.  ---   He denied suicidal ideation  ---   1:1 sitter d/c'd by psychiatry on 7/13/22  ---   Denied worsening depression or anxiety.   ---   No visual or tactile hallucinations.   ---   Family not able to manage at home.   ---   Continue Risperdal 0.5 mg qam and 2 mg at bedtime  ---   Continue donepezil 5 mg daily  ---   Continue gabapentin 100 mg po tid  ---   consider Lexapro 10 mg if he continues to appear depressed (mood stable).     Full thickness rectal prolapse, initially noted 7/15  ---   It appears to be full-thickness but reducible  ---   Intermittently bleeding with bowel movements  ---   Hgb remained stable at 10 - 11+ g  ---   Avoid constipation  ---   Psyllium fiber 3 capsule daily, MiraLAX 17 g daily and senna 1-2 tabs bid prn ordered  ---   Discussed patient's condition with CRS consult service.  Recommendation is for fiber and avoid constipation.  Surgical option is colostomy to which the patient is probably not the best candidate.  ---   CRS recommended outpt clinic follow up     Tremor, whole body  ---   New since admit  ---   consulted  neurology per patient's family request, and was felt to be stress related       Urinary retention  ---   Chronic indwelling keating  catheter.     Chronic HFrEF:    ---   Hx dilated cardiomyopathy.   ---   Most recent TTE 6/18/21 showing severely dilated LV with EF 20%.   ---   Currently euvolemic on exam.   ---   No hypoxia or pulmonary complaints.   ---   Has trace LE edema.   ---   Continue Coreg to 12.5 mg PO BID   ---   Continue PTA Lasix  ---   Daily weights, I&O's     CAD s/p CABG (1992):    ---   Not ASA or statin at home.   ---   No acute concerns.   ---   Coreg to 12.5 mg PO BID     PAF   ---   No acute concerns.  ---   Eliquis 5mg BID     T2DM:    ---   Diet controlled.   ---   Glucose stable.      KRISTIAN:    ---   Does not use CPAP at home.        Prostate CA:    ---   Treated with XRT and hormone therapy.   ---   Hx urinary retention with chronic indwelling keating.     Gout:    ---   Continue PTA allopurinol            Diet: Combination Diet Regular Diet Adult    DVT Prophylaxis: DOAC  Keating Catheter: PRESENT, indication: Retention, Retention  Central Lines: None  Cardiac Monitoring: None  Code Status: Full Code             Diet: Combination Diet Regular Diet Adult    DVT Prophylaxis: ambulate   Keating Catheter: PRESENT, indication: Retention, Retention  Central Lines: None  Cardiac Monitoring: None  Code Status: Full Code      Disposition Plan      Expected Discharge Date: 07/21/2022    Discharge Delays: Placement - Mental Health/Addiction/Geripsych  Specialist Consult (enter specialist & decision needed in comments)    Discharge Comments: psych placement        The patient's care was discussed with the care team .    Марина Ware MD  Hospitalist Service, [address]  Securely message with the Vocera Web Console (learn more here)  Text page via [employer] Paging/Directory   Please see signed in provider for up to date coverage information      Clinically Significant Risk Factors Present on Admission                       ______________________________________________________________________    Interval History    Doing ok, still has hallucinations, still passing blood with BM, no chest pain  No shortness of breath  , no emesis       Data reviewed today: I reviewed all medications, new labs and imaging results over the last 24 hours.  Physical Exam   Vital Signs: Temp: 97.1  F (36.2  C) Temp src: Oral BP: 128/56 Pulse: 67   Resp: 17 SpO2: 98 % O2 Device: None (Room air)    Weight: 184 lbs 8 oz   General appearence: awake alert  no apparent distress    HEENT: EOMI, PEARLA, sclera nonicteric,  moist,  dry mucus membranes,   NECK : supple  RESPIRATORY: lungs clear to auscultation bilateral,    CARDIOVASCULAR:S1 S2 regular rate and rhythm, no rubs gallops or murmurs appreciated  GASTROINTESTINAL:soft, non-distended , non-tender , + bowel sounds, no masses felt   SKIN: warm and dry, no mottling noted   NEUROLOGIC; awake alert and oriented, no focal deficits found  EXTREMITIES: no clubbing, cyanosis or edema , moves all extremity, good pedal pulses   MUSCULOSKELETAL: without deformity       Data   Recent Labs   Lab 07/19/22  0517 07/18/22  0644 07/17/22  2218 07/17/22  1543 07/16/22  2056 07/16/22  1226   WBC 4.8  --   --   --   --  8.4   HGB 9.6* 10.4* 10.3*   < >  --  11.7*  11.7*   MCV 90  --   --   --   --  89     --   --   --   --  259     --   --   --  139  --    POTASSIUM 3.8  --   --   --  4.0  --    CHLORIDE 107  --   --   --  103  --    CO2 30  --   --   --  30  --    BUN 18  --   --   --  24  --    CR 0.79  --   --   --  0.76  --    ANIONGAP 3  --   --   --  6  --    CRYS 8.6  --   --   --  8.6  --    GLC 99  --   --   --  136*  --     < > = values in this interval not displayed.     No results found for this or any previous visit (from the past 24 hour(s)).

## 2022-07-19 NOTE — PROGRESS NOTES
"A/Ox 4. Able to make needs known. Denies SOB, CP, N/V. Reports to having both positive and negative auditory hallucinations. Pt states, \"the good ones tells me that I am protected but the negative ones are Trump telling me to harm myself.\"    Has a keating in place with total output of 1250 mL on this shift. Patent and clear/yellow. Ax1 with walker and gait belt to bathroom. Rectal bleeding, dark red bloody loose stools. Paged MD about stool and HGB value changes of 10.4 to 9.6, awaiting for response.    Call light within reach. Continue with POC.    "

## 2022-07-19 NOTE — PLAN OF CARE
Goal Outcome Evaluation:      AxO x4. Chronic keating in place. Patent and draining clear, yellow urine. Continues to experience rectal bleeding. Watery/ bloody stools. Monitoring HBG. Last HBG was 10.4. Recheck tomorrow morning. Pt denies pain. Baseline numbness/tingling in toes. Edema in L leg. Bed alarms on for safety. Continue to monitor. Pending discharge to TCU.

## 2022-07-20 ENCOUNTER — APPOINTMENT (OUTPATIENT)
Dept: PHYSICAL THERAPY | Facility: CLINIC | Age: 82
DRG: 884 | End: 2022-07-20
Payer: MEDICARE

## 2022-07-20 PROCEDURE — 99233 SBSQ HOSP IP/OBS HIGH 50: CPT | Performed by: INTERNAL MEDICINE

## 2022-07-20 PROCEDURE — 250N000013 HC RX MED GY IP 250 OP 250 PS 637: Performed by: INTERNAL MEDICINE

## 2022-07-20 PROCEDURE — 250N000013 HC RX MED GY IP 250 OP 250 PS 637

## 2022-07-20 PROCEDURE — 250N000013 HC RX MED GY IP 250 OP 250 PS 637: Performed by: PHYSICIAN ASSISTANT

## 2022-07-20 PROCEDURE — 97530 THERAPEUTIC ACTIVITIES: CPT | Mod: GP

## 2022-07-20 PROCEDURE — 250N000013 HC RX MED GY IP 250 OP 250 PS 637: Performed by: NURSE PRACTITIONER

## 2022-07-20 PROCEDURE — 97116 GAIT TRAINING THERAPY: CPT | Mod: GP

## 2022-07-20 PROCEDURE — 99207 PR CDG-CUT & PASTE-POTENTIAL IMPACT ON LEVEL: CPT | Performed by: INTERNAL MEDICINE

## 2022-07-20 PROCEDURE — 250N000013 HC RX MED GY IP 250 OP 250 PS 637: Performed by: PSYCHIATRY & NEUROLOGY

## 2022-07-20 PROCEDURE — 120N000002 HC R&B MED SURG/OB UMMC

## 2022-07-20 RX ORDER — LIDOCAINE 4 G/G
1-2 PATCH TOPICAL
Status: DISCONTINUED | OUTPATIENT
Start: 2022-07-20 | End: 2022-08-02 | Stop reason: HOSPADM

## 2022-07-20 RX ORDER — LANOLIN ALCOHOL/MO/W.PET/CERES
1000 CREAM (GRAM) TOPICAL DAILY
Status: DISCONTINUED | OUTPATIENT
Start: 2022-07-20 | End: 2022-08-02 | Stop reason: HOSPADM

## 2022-07-20 RX ADMIN — CYANOCOBALAMIN TAB 1000 MCG 1000 MCG: 1000 TAB at 14:33

## 2022-07-20 RX ADMIN — Medication 3 CAPSULE: at 08:05

## 2022-07-20 RX ADMIN — LIDOCAINE PATCH 4% 1 PATCH: 40 PATCH TOPICAL at 16:29

## 2022-07-20 RX ADMIN — LORATADINE 10 MG: 10 TABLET ORAL at 08:06

## 2022-07-20 RX ADMIN — RISPERIDONE 2 MG: 0.5 TABLET ORAL at 21:28

## 2022-07-20 RX ADMIN — GABAPENTIN 100 MG: 100 CAPSULE ORAL at 08:06

## 2022-07-20 RX ADMIN — GABAPENTIN 100 MG: 100 CAPSULE ORAL at 14:33

## 2022-07-20 RX ADMIN — FUROSEMIDE 40 MG: 20 TABLET ORAL at 16:29

## 2022-07-20 RX ADMIN — CARVEDILOL 12.5 MG: 12.5 TABLET, FILM COATED ORAL at 08:05

## 2022-07-20 RX ADMIN — DONEPEZIL HYDROCHLORIDE 5 MG: 5 TABLET, FILM COATED ORAL at 21:23

## 2022-07-20 RX ADMIN — ACETAMINOPHEN 1000 MG: 500 TABLET ORAL at 18:02

## 2022-07-20 RX ADMIN — CARVEDILOL 12.5 MG: 12.5 TABLET, FILM COATED ORAL at 21:23

## 2022-07-20 RX ADMIN — POLYETHYLENE GLYCOL 3350 17 G: 17 POWDER, FOR SOLUTION ORAL at 08:05

## 2022-07-20 RX ADMIN — POLYETHYLENE GLYCOL 3350 17 G: 17 POWDER, FOR SOLUTION ORAL at 21:24

## 2022-07-20 RX ADMIN — ACETAMINOPHEN 1000 MG: 500 TABLET ORAL at 08:05

## 2022-07-20 RX ADMIN — GABAPENTIN 100 MG: 100 CAPSULE ORAL at 21:23

## 2022-07-20 RX ADMIN — Medication 25 MCG: at 08:06

## 2022-07-20 RX ADMIN — ALLOPURINOL 300 MG: 300 TABLET ORAL at 08:05

## 2022-07-20 RX ADMIN — FUROSEMIDE 40 MG: 20 TABLET ORAL at 08:06

## 2022-07-20 RX ADMIN — RISPERIDONE 0.25 MG: 0.25 TABLET ORAL at 08:06

## 2022-07-20 ASSESSMENT — ACTIVITIES OF DAILY LIVING (ADL)
ADLS_ACUITY_SCORE: 44
ADLS_ACUITY_SCORE: 39
ADLS_ACUITY_SCORE: 44
ADLS_ACUITY_SCORE: 44
ADLS_ACUITY_SCORE: 40
ADLS_ACUITY_SCORE: 40
ADLS_ACUITY_SCORE: 44
ADLS_ACUITY_SCORE: 40
ADLS_ACUITY_SCORE: 44

## 2022-07-20 NOTE — PROGRESS NOTES
[facility]    Medicine Progress Note - Hospitalist Service, GOLD TEAM 18    Date of Admission:  6/28/2022    Assessment & Plan        William Almazan is a 81 year old male with history of CAD s/p CABG, dilated cardiomyopathy, HFrEF, PAF on Eliquis, HTN, HLD, DM2, KRISTIAN, prostate cancer, gout, and dementia who presented on 6/28/22 with worsening auditory hallucinations.     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   ---   Hallucination present since ~2020 following death of son.  ---   Patient continued to have auditory hallucination of a friend telling him to harm himself.  ---   He denied suicidal ideation  ---   1:1 sitter d/c'd by psychiatry on 7/13/22  ---   Denied worsening depression or anxiety.   ---   No visual or tactile hallucinations.   ---   Family not able to manage at home.   ---   Continue Risperdal 0.5 mg qam and 2 mg at bedtime  ---   Continue donepezil 5 mg daily  ---   Continue gabapentin 100 mg po tid  ---   consider Lexapro 10 mg if he continues to appear depressed (mood stable).     Full thickness rectal prolapse, initially noted 7/15  ---   It appears to be full-thickness but reducible  ---   Intermittently bleeding with bowel movements  ---   Hgb remained stable at 10 - 11+ g  ---   Avoid constipation  ---   Psyllium fiber 3 capsule daily, MiraLAX 17 g daily and senna 1-2 tabs bid prn ordered  ---  Dr Kinney Discussed patient's condition with CRS consult service.  Recommendation is for fiber and avoid constipation.  Surgical option is colostomy to which the patient is probably not the best candidate.  ---   CRS recommended outpt clinic follow up 4-6 weeks       Tremor, whole body  ---   New since admit  ---   consulted  neurology per patient's family request, and was felt to be stress related    -- continue B12 supplements , follow  up  Levels as out patient       Urinary retention  ---   Chronic indwelling keating catheter.     Chronic HFrEF:    ---   Hx dilated cardiomyopathy.   ---   Most recent TTE 6/18/21 showing severely dilated LV with EF 20%.   ---   Currently euvolemic on exam.   ---   No hypoxia or pulmonary complaints.   ---   Has trace LE edema.   ---   Continue Coreg to 12.5 mg PO BID   ---   Continue PTA Lasix  ---   Daily weights, I&O's     CAD s/p CABG (1992):    ---   Not ASA or statin at home.   ---   No acute concerns.   ---   Coreg to 12.5 mg PO BID     PAF   ---   No acute concerns.  ---   Eliquis 5mg BID     T2DM:    ---   Diet controlled.   ---   Glucose stable.      KRISTIAN:    ---   Does not use CPAP at home.        Prostate CA:    ---   Treated with XRT and hormone therapy.   ---   Hx urinary retention with chronic indwelling keating.     Gout:    ---   Continue PTA allopurinol            Diet: Combination Diet Regular Diet Adult    DVT Prophylaxis: DOAC  Keating Catheter: PRESENT, indication: Retention, Retention  Central Lines: None  Cardiac Monitoring: None  Code Status: Full Code             Diet: Combination Diet Regular Diet Adult    DVT Prophylaxis: ambulate   Keating Catheter: PRESENT, indication: Retention, Retention  Central Lines: None  Cardiac Monitoring: None  Code Status: Full Code      Disposition Plan     Expected Discharge Date: 07/21/2022    Discharge Delays: Placement - Mental Health/Addiction/Geripsych  Specialist Consult (enter specialist & decision needed in comments)    Discharge Comments: psych placement        The patient's care was discussed with the care team .    Марина Ware MD  Hospitalist Service, [address]  Securely message with the Vocera Web Console (learn more here)  Text page via [employer] Paging/Directory   Please see signed in provider for up to date coverage information      Clinically Significant Risk Factors Present on Admission                       ______________________________________________________________________    Interval History      Doing ok, appetite good, when I was in he pretty much ordered everything off of the menu. still with hallucinations         Data reviewed today: I reviewed all medications, new labs and imaging results over the last 24 hours.  Physical Exam   Vital Signs: Temp: (!) 96.3  F (35.7  C) Temp src: Oral BP: 136/59 Pulse: 78   Resp: 16 SpO2: 95 % O2 Device: None (Room air)    Weight: 184 lbs 8 oz   General appearence: awake alert  no apparent distress    HEENT: EOMI, PEARLA, sclera nonicteric,  moist,  dry mucus membranes,   NECK : supple  RESPIRATORY: lungs clear to auscultation bilateral,    CARDIOVASCULAR:S1 S2 regular rate and rhythm, no rubs gallops or murmurs appreciated  GASTROINTESTINAL:soft, non-distended , non-tender , + bowel sounds, no masses felt   SKIN: warm and dry, no mottling noted   NEUROLOGIC; awake alert and oriented, no focal deficits found  EXTREMITIES: no clubbing, cyanosis or edema , moves all extremity, good pedal pulses   MUSCULOSKELETAL: without deformity       Data   Recent Labs   Lab 07/19/22  0517 07/18/22  0644 07/17/22  2218 07/17/22  1543 07/16/22  2056 07/16/22  1226   WBC 4.8  --   --   --   --  8.4   HGB 9.6* 10.4* 10.3*   < >  --  11.7*  11.7*   MCV 90  --   --   --   --  89     --   --   --   --  259     --   --   --  139  --    POTASSIUM 3.8  --   --   --  4.0  --    CHLORIDE 107  --   --   --  103  --    CO2 30  --   --   --  30  --    BUN 18  --   --   --  24  --    CR 0.79  --   --   --  0.76  --    ANIONGAP 3  --   --   --  6  --    CRYS 8.6  --   --   --  8.6  --    GLC 99  --   --   --  136*  --     < > = values in this interval not displayed.     No results found for this or any previous visit (from the past 24 hour(s)).

## 2022-07-20 NOTE — PROGRESS NOTES
CROSS COVER NOTE:    Contacted by bedside RN regarding patient reports severe left hip pain rating 8/10    Chart reviewed and hospitalization course reviewed in brief.  Searched chart for 'left hip pain'. Patient saw orthopedics for same problem at [facility] in April 2022 and was diagnosed with severe osteoarthritis. Advised to follow up for possible steroid injection as an outpatient.     Plan:  1. Continue acetaminophen 1000mg TID prn  2. Add voltaren gel prn  3. Add lidocaine patch prn  4. If patient qualifies for specialty bed/low airloss bed per RN protocol, please order.    No indication for imaging this evening. Not a good candidate for NSAID.     Krystina Bazzi, APRN, ACNPC-AG  BA, BSN-RN, CNRN, TCRN  Pgr [phone]

## 2022-07-20 NOTE — PLAN OF CARE
Goal Outcome Evaluation:           Pt up with walker to the bathroom. No BM noted at this time. No report of pain. VSS. Used call light appropriately. No report of hallucinations. Pleasant and cooperative. Cont to assess.

## 2022-07-20 NOTE — PLAN OF CARE
PT: Patient appears to be at new baseline from PT perspective as patient has not made any further progress in the last 2 weeks. Patient SBA for bed mobility, transfers and ambulation with use of walker. Should continue to ambulate throughout the day with nursing staff to maintain level of strength. Has completed 12 stair with bilateral rails at SBA. At this time no further expectation of functional progress. Recommend home with 24 hour hands on assist vs LTC.    Physical Therapy Discharge Summary    Reason for therapy discharge:    All goals and outcomes met, no further needs identified.    Progress towards therapy goal(s). See goals on Care Plan in [facility] electronic health record for goal details.  Goals met    Therapy recommendation(s):    Continue home exercise program.  See above. Recommend home with 24 hour hands on assist vs LTC. Does not appear patient has any further rehab needs or rehab potential at this time.

## 2022-07-20 NOTE — CARE PLAN
"./59 (BP Location: Right arm, Patient Position: Supine, Cuff Size: Adult Regular)   Pulse 78   Temp (!) 96.3  F (35.7  C) (Oral)   Resp 16   Ht 1.753 m (5' 9\")   Wt 83.7 kg (184 lb 8 oz)   SpO2 95%   BMI 27.25 kg/m  .     A&O x 4. VSS.  Numbness reported BLE. Denies N/V.  LS clear. Denies SOB. Denies chest pain. Currently on RA. No cough observed.  Last Bm on 7/20. Chronic keating in place for retention.  SBA w/ walker and GB.  L PIV - patent, saline locked.  Pain rated 3/10 in legs - managed w/ PRN tylenol.  Lidocaine patch on L knee per pt request.  Regular diet.     Continue POC.  "

## 2022-07-21 PROCEDURE — 250N000013 HC RX MED GY IP 250 OP 250 PS 637: Performed by: PSYCHIATRY & NEUROLOGY

## 2022-07-21 PROCEDURE — 120N000002 HC R&B MED SURG/OB UMMC

## 2022-07-21 PROCEDURE — 99233 SBSQ HOSP IP/OBS HIGH 50: CPT | Performed by: INTERNAL MEDICINE

## 2022-07-21 PROCEDURE — 250N000013 HC RX MED GY IP 250 OP 250 PS 637: Performed by: INTERNAL MEDICINE

## 2022-07-21 PROCEDURE — 99207 PR CDG-CUT & PASTE-POTENTIAL IMPACT ON LEVEL: CPT | Performed by: INTERNAL MEDICINE

## 2022-07-21 PROCEDURE — 250N000013 HC RX MED GY IP 250 OP 250 PS 637: Performed by: NURSE PRACTITIONER

## 2022-07-21 PROCEDURE — 250N000013 HC RX MED GY IP 250 OP 250 PS 637: Performed by: PHYSICIAN ASSISTANT

## 2022-07-21 PROCEDURE — G0463 HOSPITAL OUTPT CLINIC VISIT: HCPCS

## 2022-07-21 PROCEDURE — 250N000013 HC RX MED GY IP 250 OP 250 PS 637

## 2022-07-21 RX ADMIN — GABAPENTIN 100 MG: 100 CAPSULE ORAL at 21:26

## 2022-07-21 RX ADMIN — GABAPENTIN 100 MG: 100 CAPSULE ORAL at 08:05

## 2022-07-21 RX ADMIN — Medication 3 CAPSULE: at 08:05

## 2022-07-21 RX ADMIN — CARVEDILOL 12.5 MG: 12.5 TABLET, FILM COATED ORAL at 21:27

## 2022-07-21 RX ADMIN — CARVEDILOL 12.5 MG: 12.5 TABLET, FILM COATED ORAL at 08:05

## 2022-07-21 RX ADMIN — FUROSEMIDE 40 MG: 20 TABLET ORAL at 17:03

## 2022-07-21 RX ADMIN — CYANOCOBALAMIN TAB 1000 MCG 1000 MCG: 1000 TAB at 08:05

## 2022-07-21 RX ADMIN — DONEPEZIL HYDROCHLORIDE 5 MG: 5 TABLET, FILM COATED ORAL at 21:26

## 2022-07-21 RX ADMIN — GABAPENTIN 100 MG: 100 CAPSULE ORAL at 14:33

## 2022-07-21 RX ADMIN — LORATADINE 10 MG: 10 TABLET ORAL at 08:05

## 2022-07-21 RX ADMIN — RISPERIDONE 0.25 MG: 0.25 TABLET ORAL at 08:05

## 2022-07-21 RX ADMIN — Medication 25 MCG: at 08:05

## 2022-07-21 RX ADMIN — RISPERIDONE 2 MG: 0.5 TABLET ORAL at 21:26

## 2022-07-21 RX ADMIN — FUROSEMIDE 40 MG: 20 TABLET ORAL at 08:05

## 2022-07-21 RX ADMIN — POLYETHYLENE GLYCOL 3350 17 G: 17 POWDER, FOR SOLUTION ORAL at 21:26

## 2022-07-21 RX ADMIN — ALLOPURINOL 300 MG: 300 TABLET ORAL at 08:05

## 2022-07-21 RX ADMIN — LIDOCAINE PATCH 4% 1 PATCH: 40 PATCH TOPICAL at 09:15

## 2022-07-21 ASSESSMENT — ACTIVITIES OF DAILY LIVING (ADL)
ADLS_ACUITY_SCORE: 39
ADLS_ACUITY_SCORE: 39
ADLS_ACUITY_SCORE: 43
ADLS_ACUITY_SCORE: 39

## 2022-07-21 NOTE — PLAN OF CARE
Patient is alert and oriented per baseline, and able to communicate needs. VSS. Denies pain at this time. Assist of x1 with GB and walker. Louie is in place, patent. Patient up during meal time. Will continue with current POC.

## 2022-07-21 NOTE — PROGRESS NOTES
Pt A+Ox4, calm, cooperative, uses call light appropriately and able to express his needs.  Pt up with SBA, gait belt and walker.

## 2022-07-21 NOTE — PROGRESS NOTES
"Care Management Follow Up    Length of Stay (days): 23    Expected Discharge Date: Unknown      Concerns to be Addressed: Discharge planning       Patient plan of care discussed at interdisciplinary rounds: Yes    Anticipated Discharge Disposition: SHE[facility]/LTC with [facility] vs Home with 24 hour care and home care- PT/OT/HHA    Anticipated Discharge Services: TBD  Anticipated Discharge DME: TBD    Patient/family educated on Medicare website which has current facility and service quality ratings: yes   Education Provided on the Discharge Plan: yes   Patient/Family in Agreement with the Plan: yes    Referrals Placed by CM/SW:    Private pay costs discussed: Private pay caregiving    Additional Information:  TCU referrals are pending. However, per therapy, \"patient has remained at the same level for the past 2 weeks. Recommend patient either return home with 24 hour hands on assist vs LTC. Patient appears to be at his new baseline from PT perspective\".     Psychiatry is recommending memory care placement vs [facility].     Discussed LTC/care home with memory care facilities vs home with 24 hour assist and hiring care givers (out of pocket) if needed. Would also be able to look into home care- PT/OT/HHA. Ilsa would like to talk to family about both options. Will start working on putting a list together of facilities near [place] and resources if he returns home.     4:20 PM  Spoke to Ilsa and she is leaning towards patient going to a facility. Gave her the number to [facility] who can assist with looking into SHELLY or LTC with a memory care unit.       [facility]: [phone]    ELISEO Garrett RNCC  RN Care Coordinator   Office: [phone]   Pager: [phone]          "

## 2022-07-21 NOTE — PROGRESS NOTES
[facility]    Medicine Progress Note - Hospitalist Service, GOLD TEAM 18    Date of Admission:  6/28/2022    Assessment & Plan        William Almazan is a 81 year old male with history of CAD s/p CABG, dilated cardiomyopathy, HFrEF, PAF on Eliquis, HTN, HLD, DM2, KRISTIAN, prostate cancer, gout, and dementia who presented on 6/28/22 with worsening auditory hallucinations.     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   ---   Hallucination present since ~2020 following death of son.  ---   Patient continued to have auditory hallucination of a friend telling him to harm himself.  ---   He denied suicidal ideation  ---   1:1 sitter d/c'd by psychiatry on 7/13/22  ---   Denied worsening depression or anxiety.   ---   No visual or tactile hallucinations.   ---   Family not able to manage at home.   ---   Continue Risperdal 0.5 mg qam and 2 mg at bedtime  ---   Continue donepezil 5 mg daily  ---   Continue gabapentin 100 mg po tid  ---   consider Lexapro 10 mg if he continues to appear depressed (mood stable).     Full thickness rectal prolapse, initially noted 7/15  ---   It appears to be full-thickness but reducible  ---   Intermittently bleeding with bowel movements  ---   Hgb remained stable at 10 - 11+ g  ---   Avoid constipation  ---   Psyllium fiber 3 capsule daily, MiraLAX 17 g daily and senna 1-2 tabs bid prn ordered  ---  Dr Kinney Discussed patient's condition with CRS consult service.  Recommendation is for fiber and avoid constipation.  Surgical option is colostomy to which the patient is probably not the best candidate.  ---   CRS recommended outpt clinic follow up 4-6 weeks       Left hip pain  --- complains of  7/19 and seen by cross cover  per notes patient  Was seen by orthopedic surgery  In April for hip pain  and has severe arthritis  tylenol OTC  added as well as lidocaine patch and volatern cream, avoid NSAIDS with rectal bleed  ---states his pain is at baseline     Tremor, whole body  ---   New since admit  ---   consulted  neurology per patient's family request, and was felt to be stress related    -- continue B12 supplements , follow up  Levels as out patient       Urinary retention  ---   Chronic indwelling keating catheter.     Chronic HFrEF:    ---   Hx dilated cardiomyopathy.   ---   Most recent TTE 6/18/21 showing severely dilated LV with EF 20%.   ---   Currently euvolemic on exam.   ---   No hypoxia or pulmonary complaints.   ---   Has trace LE edema.   ---   Continue Coreg to 12.5 mg PO BID   ---   Continue PTA Lasix  ---   Daily weights, I&O's     CAD s/p CABG (1992):    ---   Not ASA or statin at home.   ---   No acute concerns.   ---   Coreg to 12.5 mg PO BID     PAF   ---   No acute concerns.  ---   Eliquis 5mg BID     T2DM:    ---   Diet controlled.   ---   Glucose stable.      KRISTIAN:    ---   Does not use CPAP at home.        Prostate CA:    ---   Treated with XRT and hormone therapy.   ---   Hx urinary retention with chronic indwelling keating.     Gout:    ---   Continue PTA allopurinol            Diet: Combination Diet Regular Diet Adult    DVT Prophylaxis: DOAC  Keating Catheter: PRESENT, indication: Retention, Retention  Central Lines: None  Cardiac Monitoring: None  Code Status: Full Code             Diet: Combination Diet Regular Diet Adult    DVT Prophylaxis: ambulate   Keating Catheter: PRESENT, indication: Retention, Retention  Central Lines: None  Cardiac Monitoring: None  Code Status: Full Code      Disposition Plan     Expected Discharge Date: 07/21/2022    Discharge Delays: Placement - Mental Health/Addiction/Geripsych  Specialist Consult (enter specialist & decision needed in comments)    Discharge Comments: psych placement        The patient's care was discussed with the care team .    Марина Ware MD  Hospitalist ServiceMy[facility]  TEAM 18  [facility]  Securely message with the Vocera Web Console (learn more here)  Text page via [facility] Paging/Directory   Please see signed in provider for up to date coverage information      Clinically Significant Risk Factors Present on Admission                      ______________________________________________________________________    Interval History      Was seen for hip pain yesterday , now his pain is at baseline , no chest pain  No shortness of breath      Data reviewed today: I reviewed all medications, new labs and imaging results over the last 24 hours.  Physical Exam   Vital Signs: Temp: 98.4  F (36.9  C) Temp src: Oral BP: 113/70 Pulse: 88   Resp: 17 SpO2: 95 % O2 Device: None (Room air)    Weight: 184 lbs 8 oz   General appearence: awake alert  no apparent distress    HEENT: EOMI, PEARLA, sclera nonicteric,  moist,  dry mucus membranes,   NECK : supple  RESPIRATORY: lungs clear to auscultation bilateral,    CARDIOVASCULAR:S1 S2 regular rate and rhythm, no rubs gallops or murmurs appreciated  GASTROINTESTINAL:soft, non-distended , non-tender , + bowel sounds, no masses felt   SKIN: warm and dry, no mottling noted   NEUROLOGIC; awake alert and oriented, no focal deficits found  EXTREMITIES: no clubbing, cyanosis or edema , moves all extremity, good pedal pulses   MUSCULOSKELETAL: without deformity       Data   Recent Labs   Lab 07/19/22  0517 07/18/22  0644 07/17/22  2218 07/17/22  1543 07/16/22  2056 07/16/22  1226   WBC 4.8  --   --   --   --  8.4   HGB 9.6* 10.4* 10.3*   < >  --  11.7*  11.7*   MCV 90  --   --   --   --  89     --   --   --   --  259     --   --   --  139  --    POTASSIUM 3.8  --   --   --  4.0  --    CHLORIDE 107  --   --   --  103  --    CO2 30  --   --   --  30  --    BUN 18  --   --   --  24  --    CR 0.79  --   --   --  0.76  --    ANIONGAP 3  --   --   --  6  --    CRYS 8.6  --   --   --  8.6  --    GLC 99  --    --   --  136*  --     < > = values in this interval not displayed.     No results found for this or any previous visit (from the past 24 hour(s)).

## 2022-07-21 NOTE — PLAN OF CARE
[id number]  Alert and oriented x 4. Ambulated to BR x2  Louie in place, patent, adequete output  No BM, no rectal bleeding noted, drained blood on brief  Lidocaine patch on L knee  Regular diet, denies N/V  Pt denies Pain. Up with assist x1, walker and GB

## 2022-07-21 NOTE — PROGRESS NOTES
[facility]  WOC Nurse Inpatient Assessment     Consulted for: Skin tear under pannus and around scrotum  re consulted for same wounds    Patient History (according to provider note(s):      Per Dr Parish Flaherty on 6/30/2022: William Almazan is a 81 year old male with history of CAD s/p CABG, dilated cardiomyopathy, HFrEF, PAF on eliquis, HTN, HLD, DM2, KRISTIAN, prostate cancer, gout, and dementia who presented with worsening auditory hallucinations.    Areas Assessed:      Areas visualized during today's visit: Groin, scrotum, buttock    Wound location: Right groin creases   Patient declined photo    Wound due to: Moisture Associated Skin Damage (MASD) and friction  Wound history/plan of care: Present on admission. Patient has chronic indwelling keating so is not incontinent of urine yet has a brief in place. The brief appears to be cutting into the groin creases. In addition, patient is sweaty so moisture may be contributing.    Wound base: mostly resolved     Pt refusing interdry and pt refusing to remove brief in bed      Treatment Plan:     Groin crease wound(s): BID: wash area with Shima Cleanse and Protect and a soft cloth. Do not rinse. Apply a thin layer of [employer] Paste in creases. Avoid brief while in bed (patient has keating in place), use Covidean sheet for any incontinent stool.      Right posterior groin crease wound(s): Every 3 days and PRN cleanse with wound cleanser and pat dry. Paint samy wound skin with no sting. Conform mepilex over wound.     Orders: Reviewed    RECOMMEND PRIMARY TEAM ORDER: None, at this time  Education provided: plan of care  Discussed plan of care with: Nurse  WOC nurse follow-up plan: signing off  Notify WOC if wound(s) deteriorate.  Nursing to notify the Provider(s) and re-consult the WOC Nurse if new skin concern.    DATA:     Current support surface: Standard  Atmos Air mattress  Containment of urine/stool: Incontinent pad in bed  Vaccine Information Statement(s) for was given today. This has been reviewed, questions answered, and verbal consent given by Patient for injection(s) and administration of Influenza (Inactivated).    1. Does the patient have a moderate to severe fever?  No  2. Has the patient had a serious reaction to a flu shot before?   No  3. Has the patient ever had Guillian Dickinson Syndrome within 6 weeks of a previous flu shot?  No  4. Does the patient have a serious allergy to eggs?  No  5. Is the patient less that 6 months of age?  No    Patient is eligible to receive the vaccine based on all questions being answered as 'No'.          Patient tolerated without incident. See immunization grid for documentation.   and Indwelling catheter  BMI: Body mass index is 27.25 kg/m .   Active diet order: Orders Placed This Encounter      Combination Diet Regular Diet Adult     Output: I/O last 3 completed shifts:  In: -   Out: 1100 [Urine:1100]     Labs:   Recent Labs   Lab 07/19/22  0517   HGB 9.6*   WBC 4.8     Pressure injury risk assessment:   Sensory Perception: 4-->no impairment  Moisture: 4-->rarely moist  Activity: 3-->walks occasionally  Mobility: 3-->slightly limited  Nutrition: 3-->adequate  Friction and Shear: 3-->no apparent problem  John Score: 20    Eliza Jones RN BSN CWOCN  Dept. Pager: [phone]  Dept. Office Number: [phone]

## 2022-07-22 PROCEDURE — 99207 PR CDG-CUT & PASTE-POTENTIAL IMPACT ON LEVEL: CPT | Performed by: INTERNAL MEDICINE

## 2022-07-22 PROCEDURE — 250N000013 HC RX MED GY IP 250 OP 250 PS 637: Performed by: INTERNAL MEDICINE

## 2022-07-22 PROCEDURE — 99233 SBSQ HOSP IP/OBS HIGH 50: CPT | Performed by: INTERNAL MEDICINE

## 2022-07-22 PROCEDURE — 250N000013 HC RX MED GY IP 250 OP 250 PS 637: Performed by: PSYCHIATRY & NEUROLOGY

## 2022-07-22 PROCEDURE — 250N000013 HC RX MED GY IP 250 OP 250 PS 637

## 2022-07-22 PROCEDURE — 250N000013 HC RX MED GY IP 250 OP 250 PS 637: Performed by: PHYSICIAN ASSISTANT

## 2022-07-22 PROCEDURE — 120N000002 HC R&B MED SURG/OB UMMC

## 2022-07-22 RX ADMIN — POLYETHYLENE GLYCOL 3350 17 G: 17 POWDER, FOR SOLUTION ORAL at 08:23

## 2022-07-22 RX ADMIN — POLYETHYLENE GLYCOL 3350 17 G: 17 POWDER, FOR SOLUTION ORAL at 21:35

## 2022-07-22 RX ADMIN — Medication 25 MCG: at 08:23

## 2022-07-22 RX ADMIN — ACETAMINOPHEN 1000 MG: 500 TABLET ORAL at 21:34

## 2022-07-22 RX ADMIN — LORATADINE 10 MG: 10 TABLET ORAL at 08:23

## 2022-07-22 RX ADMIN — RISPERIDONE 0.25 MG: 0.25 TABLET ORAL at 08:23

## 2022-07-22 RX ADMIN — GABAPENTIN 100 MG: 100 CAPSULE ORAL at 15:02

## 2022-07-22 RX ADMIN — CYANOCOBALAMIN TAB 1000 MCG 1000 MCG: 1000 TAB at 08:23

## 2022-07-22 RX ADMIN — GABAPENTIN 100 MG: 100 CAPSULE ORAL at 21:33

## 2022-07-22 RX ADMIN — GABAPENTIN 100 MG: 100 CAPSULE ORAL at 08:23

## 2022-07-22 RX ADMIN — FUROSEMIDE 40 MG: 20 TABLET ORAL at 15:02

## 2022-07-22 RX ADMIN — CARVEDILOL 12.5 MG: 12.5 TABLET, FILM COATED ORAL at 08:23

## 2022-07-22 RX ADMIN — Medication 3 CAPSULE: at 09:56

## 2022-07-22 RX ADMIN — FUROSEMIDE 40 MG: 20 TABLET ORAL at 08:23

## 2022-07-22 RX ADMIN — CARVEDILOL 12.5 MG: 12.5 TABLET, FILM COATED ORAL at 21:34

## 2022-07-22 RX ADMIN — ALLOPURINOL 300 MG: 300 TABLET ORAL at 09:56

## 2022-07-22 RX ADMIN — DONEPEZIL HYDROCHLORIDE 5 MG: 5 TABLET, FILM COATED ORAL at 21:33

## 2022-07-22 RX ADMIN — RISPERIDONE 2 MG: 0.5 TABLET ORAL at 21:34

## 2022-07-22 ASSESSMENT — ACTIVITIES OF DAILY LIVING (ADL)
ADLS_ACUITY_SCORE: 43

## 2022-07-22 NOTE — PROGRESS NOTES
"Care Management Follow Up    Length of Stay (days): 24    Expected Discharge Date: Unknown      Concerns to be Addressed: Discharge planning       Patient plan of care discussed at interdisciplinary rounds: Yes    Anticipated Discharge Disposition: [facility]/LTC with [facility] vs Home with 24 hour care and home care- PT/OT/HHA    Anticipated Discharge Services: TBD  Anticipated Discharge DME: TBD    Patient/family educated on Medicare website which has current facility and service quality ratings: yes   Education Provided on the Discharge Plan: yes   Patient/Family in Agreement with the Plan: yes    Referrals Placed by CM/SW:    Private pay costs discussed: Private pay caregiving    Additional Information:  Attempted to call Ilsa to follow up about placement but she did not answer and her VM is full.       Note on 7/21/22:  TCU referrals are pending. However, per therapy, \"patient has remained at the same level for the past 2 weeks. Recommend patient either return home with 24 hour hands on assist vs LTC. Patient appears to be at his new baseline from PT perspective\".     Psychiatry is recommending memory care placement vs [facility].     Discussed LTC/SHELLY with memory care facilities vs home with 24 hour assist and hiring care givers (out of pocket) if needed. Would also be able to look into home care- PT/OT/HHA. Ilsa would like to talk to family about both options. Will start working on putting a list together of facilities near [place] and resources if he returns home.     4:20 PM  Spoke to Ilsa and she is leaning towards patient going to a facility. Gave her the number to [facility] who can assist with looking into SHELLY or LTC with a memory care unit.     [facility]: [phone]    ELISEO Garrett RNCC  RN Care Coordinator   Office: [phone]   Pager: [phone]          "

## 2022-07-22 NOTE — PROGRESS NOTES
[facility]    Medicine Progress Note - Hospitalist Service, GOLD TEAM 18    Date of Admission:  6/28/2022    Assessment & Plan        William Almazan is a 81 year old male with history of CAD s/p CABG, dilated cardiomyopathy, HFrEF, PAF on Eliquis, HTN, HLD, DM2, KRISTIAN, prostate cancer, gout, and dementia who presented on 6/28/22 with worsening auditory hallucinations.     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   ---   Hallucination present since ~2020 following death of son.  ---   Patient continued to have auditory hallucination of a friend telling him to harm himself.  ---   He denied suicidal ideation  ---   1:1 sitter d/c'd by psychiatry on 7/13/22  ---   Denied worsening depression or anxiety.   ---   No visual or tactile hallucinations.   ---   Family not able to manage at home.   ---   Continue Risperdal 0.5 mg qam and 2 mg at bedtime  ---   Continue donepezil 5 mg daily  ---   Continue gabapentin 100 mg po tid  ---   consider Lexapro 10 mg if he continues to appear depressed (mood stable).     Full thickness rectal prolapse, initially noted 7/15  ---   It appears to be full-thickness but reducible  ---   Intermittently bleeding with bowel movements  ---   Hgb remained stable at 10 - 11+ g  ---   Avoid constipation  ---   Psyllium fiber 3 capsule daily, MiraLAX 17 g daily and senna 1-2 tabs bid prn ordered  ---  Dr Kinney Discussed patient's condition with CRS consult service.  Recommendation is for fiber and avoid constipation.  Surgical option is colostomy to which the patient is probably not the best candidate.  ---   CRS recommended outpt clinic follow up 4-6 weeks       Left hip pain  --- complains of  7/19 and seen by cross cover  per notes patient  Was seen by orthopedic surgery  In April for hip pain  and has severe arthritis  tylenol OTC  added as well as lidocaine patch and volatern cream, avoid NSAIDS with rectal bleed  ---states his pain is at baseline     Tremor, whole body  ---   New since admit  ---   consulted  neurology per patient's family request, and was felt to be stress related    -- continue B12 supplements , follow up  Levels as out patient       Urinary retention  ---   Chronic indwelling keating catheter.     Chronic HFrEF:    ---   Hx dilated cardiomyopathy.   ---   Most recent TTE 6/18/21 showing severely dilated LV with EF 20%.   ---   Currently euvolemic on exam.   ---   No hypoxia or pulmonary complaints.   ---   Has trace LE edema.   ---   Continue Coreg to 12.5 mg PO BID   ---   Continue PTA Lasix  ---   Daily weights, I&O's     CAD s/p CABG (1992):    ---   Not ASA or statin at home.   ---   No acute concerns.   ---   Coreg to 12.5 mg PO BID     PAF   ---   No acute concerns.  ---   Eliquis 5mg BID     T2DM:    ---   Diet controlled.   ---   Glucose stable.      KRISTIAN:    ---   Does not use CPAP at home.        Prostate CA:    ---   Treated with XRT and hormone therapy.   ---   Hx urinary retention with chronic indwelling keating.     Gout:    ---   Continue PTA allopurinol            Diet: Combination Diet Regular Diet Adult    DVT Prophylaxis: DOAC  Keating Catheter: PRESENT, indication: Retention, Retention  Central Lines: None  Cardiac Monitoring: None  Code Status: Full Code             Diet: Combination Diet Regular Diet Adult    DVT Prophylaxis: ambulate   Keating Catheter: PRESENT, indication: Retention, Retention  Central Lines: None  Cardiac Monitoring: None  Code Status: Full Code      Disposition Plan         The patient's care was discussed with the care team .    Марина Ware MD  Hospitalist Service, GOLD TEAM 18  [facility]  Securely message with the Vocera Web Console (learn more here)  Text page via [employer] Paging/Directory   Please see signed in provider for up to date  coverage information      Clinically Significant Risk Factors Present on Admission                      ______________________________________________________________________    Interval History      No major issues, appetite good, ongoing chronic hip pain     Data reviewed today: I reviewed all medications, new labs and imaging results over the last 24 hours.  Physical Exam   Vital Signs: Temp: 98.6  F (37  C) Temp src: Oral BP: 124/63 Pulse: 74   Resp: 16 SpO2: 97 % O2 Device: None (Room air)    Weight: 184 lbs 8 oz   General appearence: awake alert  no apparent distress    HEENT: EOMI, PEARLA, sclera nonicteric,  moist,  dry mucus membranes,   NECK : supple  RESPIRATORY: lungs clear to auscultation bilateral,    CARDIOVASCULAR:S1 S2 regular rate and rhythm, no rubs gallops or murmurs appreciated  GASTROINTESTINAL:soft, non-distended , non-tender , + bowel sounds, no masses felt   SKIN: warm and dry, no mottling noted   NEUROLOGIC; awake alert and oriented, no focal deficits found  EXTREMITIES: no clubbing, cyanosis or edema , moves all extremity, good pedal pulses   MUSCULOSKELETAL: without deformity       Data   Recent Labs   Lab 07/19/22  0517 07/18/22  0644 07/17/22  2218 07/17/22  1543 07/16/22  2056 07/16/22  1226   WBC 4.8  --   --   --   --  8.4   HGB 9.6* 10.4* 10.3*   < >  --  11.7*  11.7*   MCV 90  --   --   --   --  89     --   --   --   --  259     --   --   --  139  --    POTASSIUM 3.8  --   --   --  4.0  --    CHLORIDE 107  --   --   --  103  --    CO2 30  --   --   --  30  --    BUN 18  --   --   --  24  --    CR 0.79  --   --   --  0.76  --    ANIONGAP 3  --   --   --  6  --    CRYS 8.6  --   --   --  8.6  --    GLC 99  --   --   --  136*  --     < > = values in this interval not displayed.     No results found for this or any previous visit (from the past 24 hour(s)).

## 2022-07-22 NOTE — PLAN OF CARE
Up to BR walker/gb  Loiue in place (baseline)   Monitor rectal prolapse/bleeding in stool - none this shift noted   A&Ox4  Wife vistiing

## 2022-07-22 NOTE — PLAN OF CARE
Goal Outcome Evaluation:      Pt resting in bed. Up to ambulated to bathroom with walker and gait bed. Sat up in chair for a short time. No BM or blood from rectum. VSS.  Cont to assess.

## 2022-07-23 PROCEDURE — 250N000013 HC RX MED GY IP 250 OP 250 PS 637

## 2022-07-23 PROCEDURE — 250N000013 HC RX MED GY IP 250 OP 250 PS 637: Performed by: INTERNAL MEDICINE

## 2022-07-23 PROCEDURE — 99207 PR CDG-CUT & PASTE-POTENTIAL IMPACT ON LEVEL: CPT | Performed by: INTERNAL MEDICINE

## 2022-07-23 PROCEDURE — 250N000013 HC RX MED GY IP 250 OP 250 PS 637: Performed by: PSYCHIATRY & NEUROLOGY

## 2022-07-23 PROCEDURE — 250N000013 HC RX MED GY IP 250 OP 250 PS 637: Performed by: PHYSICIAN ASSISTANT

## 2022-07-23 PROCEDURE — 120N000002 HC R&B MED SURG/OB UMMC

## 2022-07-23 PROCEDURE — 99233 SBSQ HOSP IP/OBS HIGH 50: CPT | Performed by: INTERNAL MEDICINE

## 2022-07-23 RX ADMIN — GABAPENTIN 100 MG: 100 CAPSULE ORAL at 19:44

## 2022-07-23 RX ADMIN — POLYETHYLENE GLYCOL 3350 17 G: 17 POWDER, FOR SOLUTION ORAL at 08:00

## 2022-07-23 RX ADMIN — DONEPEZIL HYDROCHLORIDE 5 MG: 5 TABLET, FILM COATED ORAL at 22:07

## 2022-07-23 RX ADMIN — CARVEDILOL 12.5 MG: 12.5 TABLET, FILM COATED ORAL at 19:44

## 2022-07-23 RX ADMIN — Medication 3 CAPSULE: at 08:38

## 2022-07-23 RX ADMIN — LORATADINE 10 MG: 10 TABLET ORAL at 08:38

## 2022-07-23 RX ADMIN — GABAPENTIN 100 MG: 100 CAPSULE ORAL at 13:53

## 2022-07-23 RX ADMIN — FUROSEMIDE 40 MG: 20 TABLET ORAL at 15:27

## 2022-07-23 RX ADMIN — POLYETHYLENE GLYCOL 3350 17 G: 17 POWDER, FOR SOLUTION ORAL at 19:43

## 2022-07-23 RX ADMIN — CYANOCOBALAMIN TAB 1000 MCG 1000 MCG: 1000 TAB at 08:38

## 2022-07-23 RX ADMIN — Medication 25 MCG: at 08:38

## 2022-07-23 RX ADMIN — GABAPENTIN 100 MG: 100 CAPSULE ORAL at 08:38

## 2022-07-23 RX ADMIN — RISPERIDONE 0.25 MG: 0.25 TABLET ORAL at 08:38

## 2022-07-23 RX ADMIN — CARVEDILOL 12.5 MG: 12.5 TABLET, FILM COATED ORAL at 08:38

## 2022-07-23 RX ADMIN — FUROSEMIDE 40 MG: 20 TABLET ORAL at 08:38

## 2022-07-23 RX ADMIN — RISPERIDONE 2 MG: 0.5 TABLET ORAL at 22:07

## 2022-07-23 RX ADMIN — ALLOPURINOL 300 MG: 300 TABLET ORAL at 08:38

## 2022-07-23 ASSESSMENT — ACTIVITIES OF DAILY LIVING (ADL)
ADLS_ACUITY_SCORE: 43

## 2022-07-23 NOTE — PLAN OF CARE
Jacy in place  A&Ox4  Using walker A1, weakness  Son visited   Michelo patch off this am   Up to chair for a bit then back to bed

## 2022-07-23 NOTE — PLAN OF CARE
Patient is alert and oriented per baseline, and able to communicate needs. VSS. Patient c/o pain this evening, PRN tylanol given. Up with assist of x1 with walker.  Stayed in bed for the most part of the shift. Eating well. Family visited. No concern noted. Will continue with current POC.

## 2022-07-23 NOTE — PROGRESS NOTES
[facility]    Medicine Progress Note - Hospitalist Service, GOLD TEAM 18    Date of Admission:  6/28/2022    Assessment & Plan        William Almazan is a 81 year old male with history of CAD s/p CABG, dilated cardiomyopathy, HFrEF, PAF on Eliquis, HTN, HLD, DM2, KRISTIAN, prostate cancer, gout, and dementia who presented on 6/28/22 with worsening auditory hallucinations.     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   ---   Hallucination present since ~2020 following death of son.  ---   Patient continued to have auditory hallucination of a friend telling him to harm himself.  ---   He denied suicidal ideation  ---   1:1 sitter d/c'd by psychiatry on 7/13/22  ---   Denied worsening depression or anxiety.   ---   No visual or tactile hallucinations.   ---   Family not able to manage at home.   ---   Continue Risperdal 0.5 mg qam and 2 mg at bedtime  ---   Continue donepezil 5 mg daily  ---   Continue gabapentin 100 mg po tid  ---   consider Lexapro 10 mg if he continues to appear depressed (mood stable).     Full thickness rectal prolapse, initially noted 7/15  ---   It appears to be full-thickness but reducible  ---   Intermittently bleeding with bowel movements  ---   Hgb remained stable at 10 - 11+ g  ---   Avoid constipation  ---   Psyllium fiber 3 capsule daily, MiraLAX 17 g daily and senna 1-2 tabs bid prn ordered  ---  Dr Kinney Discussed patient's condition with CRS consult service.  Recommendation is for fiber and avoid constipation.  Surgical option is colostomy to which the patient is probably not the best candidate.  ---   CRS recommended outpt clinic follow up 4-6 weeks       Left hip pain  --- complains of  7/19 and seen by cross cover  per notes patient  Was seen by orthopedic surgery  In April for hip pain  and has severe arthritis  tylenol OTC  added as well as lidocaine patch and volatern cream, avoid NSAIDS with rectal bleed  ---states his pain is at baseline     Tremor, whole body  ---   New since admit  ---   consulted  neurology per patient's family request, and was felt to be stress related    -- continue B12 supplements , follow up  Levels as out patient       Urinary retention  ---   Chronic indwelling keating catheter.     Chronic HFrEF:    ---   Hx dilated cardiomyopathy.   ---   Most recent TTE 6/18/21 showing severely dilated LV with EF 20%.   ---   Currently euvolemic on exam.   ---   No hypoxia or pulmonary complaints.   ---   Has trace LE edema.   ---   Continue Coreg to 12.5 mg PO BID   ---   Continue PTA Lasix  ---   Daily weights, I&O's     CAD s/p CABG (1992):    ---   Not ASA or statin at home.   ---   No acute concerns.   ---   Coreg to 12.5 mg PO BID     PAF   ---   No acute concerns.  ---   Eliquis 5mg BID on hold with rectal bleed      T2DM:    ---   Diet controlled.   ---   Glucose stable.      KRISTIAN:    ---   Does not use CPAP at home.        Prostate CA:    ---   Treated with XRT and hormone therapy.   ---   Hx urinary retention with chronic indwelling keating.     Gout:    ---   Continue PTA allopurinol            Diet: Combination Diet Regular Diet Adult    DVT Prophylaxis: DOAC  Keating Catheter: PRESENT, indication: Retention, Retention  Central Lines: None  Cardiac Monitoring: None  Code Status: Full Code             Diet: Combination Diet Regular Diet Adult    DVT Prophylaxis: ambulate AC on hold with rectal bleed   Keating Catheter: PRESENT, indication: Retention, Retention  Central Lines: None  Cardiac Monitoring: None  Code Status: Full Code      Disposition Plan         The patient's care was discussed with the care team .    Марина Ware MD  Hospitalist Service, GOLD TEAM [address] [place] [address]  Securely message with the [employer] Web Console (learn more here)  Text page via [employer]  Paging/Directory   Please see signed in provider for up to date coverage information      Clinically Significant Risk Factors Present on Admission                      ______________________________________________________________________    Interval History     doing ok, no new issues, hip paina bout the same       Data reviewed today: I reviewed all medications, new labs and imaging results over the last 24 hours.  Physical Exam   Vital Signs: Temp: 97.6  F (36.4  C) Temp src: Oral BP: 133/63 Pulse: 77   Resp: 18 SpO2: 98 % O2 Device: None (Room air)    Weight: 184 lbs 8 oz   General appearence: awake alert  no apparent distress    HEENT: EOMI, PEARLA, sclera nonicteric,  moist,  dry mucus membranes,   NECK : supple  RESPIRATORY: lungs clear to auscultation bilateral,    CARDIOVASCULAR:S1 S2 regular rate and rhythm, no rubs gallops or murmurs appreciated  GASTROINTESTINAL:soft, non-distended , non-tender , + bowel sounds, no masses felt   SKIN: warm and dry, no mottling noted   NEUROLOGIC; awake alert and oriented, no focal deficits found  EXTREMITIES: no clubbing, cyanosis or edema , moves all extremity, good pedal pulses   MUSCULOSKELETAL: without deformity       Data   Recent Labs   Lab 07/19/22  0517 07/18/22  0644 07/17/22  2218 07/17/22  1543 07/16/22  2056   WBC 4.8  --   --   --   --    HGB 9.6* 10.4* 10.3*   < >  --    MCV 90  --   --   --   --      --   --   --   --      --   --   --  139   POTASSIUM 3.8  --   --   --  4.0   CHLORIDE 107  --   --   --  103   CO2 30  --   --   --  30   BUN 18  --   --   --  24   CR 0.79  --   --   --  0.76   ANIONGAP 3  --   --   --  6   CRYS 8.6  --   --   --  8.6   GLC 99  --   --   --  136*    < > = values in this interval not displayed.     No results found for this or any previous visit (from the past 24 hour(s)).

## 2022-07-23 NOTE — PLAN OF CARE
Pt rested well most of night. Louie in place and patent. No complaints of pain this shift. Alert and oriented x4. No new concerns. Pt refused skin assessment.

## 2022-07-23 NOTE — PLAN OF CARE
Goal Outcome Evaluation:    Patient alert/oriented x 4  BM this shift; Louie in place  Walker with1 assist  Good appetite. Ate dinner in chair.   Uses call light appropriately.

## 2022-07-24 PROCEDURE — 250N000013 HC RX MED GY IP 250 OP 250 PS 637: Performed by: PSYCHIATRY & NEUROLOGY

## 2022-07-24 PROCEDURE — 250N000013 HC RX MED GY IP 250 OP 250 PS 637

## 2022-07-24 PROCEDURE — 250N000013 HC RX MED GY IP 250 OP 250 PS 637: Performed by: INTERNAL MEDICINE

## 2022-07-24 PROCEDURE — 99207 PR CDG-CUT & PASTE-POTENTIAL IMPACT ON LEVEL: CPT | Performed by: INTERNAL MEDICINE

## 2022-07-24 PROCEDURE — 99233 SBSQ HOSP IP/OBS HIGH 50: CPT | Performed by: INTERNAL MEDICINE

## 2022-07-24 PROCEDURE — 250N000013 HC RX MED GY IP 250 OP 250 PS 637: Performed by: PHYSICIAN ASSISTANT

## 2022-07-24 PROCEDURE — 120N000002 HC R&B MED SURG/OB UMMC

## 2022-07-24 RX ADMIN — LORATADINE 10 MG: 10 TABLET ORAL at 08:45

## 2022-07-24 RX ADMIN — CARVEDILOL 12.5 MG: 12.5 TABLET, FILM COATED ORAL at 08:44

## 2022-07-24 RX ADMIN — CYANOCOBALAMIN TAB 1000 MCG 1000 MCG: 1000 TAB at 08:00

## 2022-07-24 RX ADMIN — FUROSEMIDE 40 MG: 20 TABLET ORAL at 15:33

## 2022-07-24 RX ADMIN — RISPERIDONE 2 MG: 0.5 TABLET ORAL at 21:13

## 2022-07-24 RX ADMIN — Medication 25 MCG: at 08:43

## 2022-07-24 RX ADMIN — GABAPENTIN 100 MG: 100 CAPSULE ORAL at 08:44

## 2022-07-24 RX ADMIN — POLYETHYLENE GLYCOL 3350 17 G: 17 POWDER, FOR SOLUTION ORAL at 21:12

## 2022-07-24 RX ADMIN — ALLOPURINOL 300 MG: 300 TABLET ORAL at 08:43

## 2022-07-24 RX ADMIN — DONEPEZIL HYDROCHLORIDE 5 MG: 5 TABLET, FILM COATED ORAL at 21:13

## 2022-07-24 RX ADMIN — Medication 3 CAPSULE: at 08:45

## 2022-07-24 RX ADMIN — CARVEDILOL 12.5 MG: 12.5 TABLET, FILM COATED ORAL at 21:12

## 2022-07-24 RX ADMIN — RISPERIDONE 0.25 MG: 0.25 TABLET ORAL at 08:43

## 2022-07-24 RX ADMIN — GABAPENTIN 100 MG: 100 CAPSULE ORAL at 21:13

## 2022-07-24 RX ADMIN — POLYETHYLENE GLYCOL 3350 17 G: 17 POWDER, FOR SOLUTION ORAL at 08:44

## 2022-07-24 RX ADMIN — GABAPENTIN 100 MG: 100 CAPSULE ORAL at 14:10

## 2022-07-24 ASSESSMENT — ACTIVITIES OF DAILY LIVING (ADL)
ADLS_ACUITY_SCORE: 46
ADLS_ACUITY_SCORE: 45
ADLS_ACUITY_SCORE: 43
ADLS_ACUITY_SCORE: 46
ADLS_ACUITY_SCORE: 45
ADLS_ACUITY_SCORE: 43
ADLS_ACUITY_SCORE: 45
ADLS_ACUITY_SCORE: 43
ADLS_ACUITY_SCORE: 46
ADLS_ACUITY_SCORE: 45
ADLS_ACUITY_SCORE: 43
ADLS_ACUITY_SCORE: 46

## 2022-07-24 NOTE — PROGRESS NOTES
[facility]    Medicine Progress Note - Hospitalist Service, GOLD TEAM 18    Date of Admission:  6/28/2022    Assessment & Plan        William Almazan is a 81 year old male with history of CAD s/p CABG, dilated cardiomyopathy, HFrEF, PAF on Eliquis, HTN, HLD, DM2, KRISTIAN, prostate cancer, gout, and dementia who presented on 6/28/22 with worsening auditory hallucinations.     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   ---   Hallucination present since ~2020 following death of son.\  --- still having hallucinations   ---   Patient continued to have auditory hallucination of a friend telling him to harm himself.  ---   He denied suicidal ideation  ---   1:1 sitter d/c'd by psychiatry on 7/13/22  ---   Denied worsening depression or anxiety.   ---   No visual or tactile hallucinations.   ---   Family not able to manage at home.   ---   Continue Risperdal 0.5 mg qam and 2 mg at bedtime  ---   Continue donepezil 5 mg daily  ---   Continue gabapentin 100 mg po tid  ---   consider Lexapro 10 mg if he continues to appear depressed (mood stable).     Full thickness rectal prolapse, initially noted 7/15  ---   It appears to be full-thickness but reducible  ---   Intermittently bleeding with bowel movements  ---   Hgb remained stable at 10 - 11+ g  ---   Avoid constipation  ---   Psyllium fiber 3 capsule daily, MiraLAX 17 g daily and senna 1-2 tabs bid prn ordered  ---  Dr Kinney Discussed patient's condition with CRS consult service.  Recommendation is for fiber and avoid constipation.  Surgical option is colostomy to which the patient is probably not the best candidate.  ---   CRS recommended outpt clinic follow up 4-6 weeks       Left hip pain  --- complains of  7/19 and seen by cross cover  per notes patient  Was seen by orthopedic surgery  In April for hip pain   and has severe arthritis  tylenol OTC added as well as lidocaine patch and volatern cream, avoid NSAIDS with rectal bleed  ---states his pain is at baseline     Tremor, whole body  ---   New since admit  ---   consulted  neurology per patient's family request, and was felt to be stress related    -- continue B12 supplements , follow up  Levels as out patient       Urinary retention  ---   Chronic indwelling keating catheter.     Chronic HFrEF:    ---   Hx dilated cardiomyopathy.   ---   Most recent TTE 6/18/21 showing severely dilated LV with EF 20%.   ---   Currently euvolemic on exam.   ---   No hypoxia or pulmonary complaints.   ---   Has trace LE edema.   ---   Continue Coreg to 12.5 mg PO BID   ---   Continue PTA Lasix  ---   Daily weights, I&O's, in sign negative balance,  Needs daily weights, repeat BMP 7/25       CAD s/p CABG (1992):    ---   Not ASA or statin at home.   ---   No acute concerns.   ---   Coreg to 12.5 mg PO BID     PAF   ---   No acute concerns.  ---   Eliquis 5mg BID on hold with rectal bleed      T2DM:    ---   Diet controlled.   ---   Glucose stable.      KRISTIAN:    ---   Does not use CPAP at home.        Prostate CA:    ---   Treated with XRT and hormone therapy.   ---   Hx urinary retention with chronic indwelling keating.     Gout:    ---   Continue PTA allopurinol            Diet: Combination Diet Regular Diet Adult    DVT Prophylaxis: DOAC  Keating Catheter: PRESENT, indication: Retention, Retention  Central Lines: None  Cardiac Monitoring: None  Code Status: Full Code             Diet: Combination Diet Regular Diet Adult    DVT Prophylaxis: ambulate AC on hold with rectal bleed   Keating Catheter: PRESENT, indication: Retention, Retention  Central Lines: None  Cardiac Monitoring: None  Code Status: Full Code      Disposition Plan         The patient's care was discussed with the care team .  7/24 with son by bedside     Марина Ware MD  Hospitalist Service, GOLD TEAM [address] [place] [address]  [facility]  Securely message with the [employer] Web Console (learn more here)  Text page via [facility] Paging/Directory   Please see signed in provider for up to date coverage information      Clinically Significant Risk Factors Present on Admission                      ______________________________________________________________________    Interval History    Still with ongoing hallucinations, hearing voices , hip pain about the same, calm and cooperative and very respectful         Data reviewed today: I reviewed all medications, new labs and imaging results over the last 24 hours.  Physical Exam   Vital Signs: Temp: 97  F (36.1  C) Temp src: Oral BP: 122/67 Pulse: 97   Resp: 14 SpO2: 98 % O2 Device: None (Room air)    Weight: 184 lbs 8 oz   General appearence: awake alert  no apparent distress  Respiratory nonlaboured   neur awake alert, oriented   extremities, moves all extremities       Data   Recent Labs   Lab 07/19/22  0517 07/18/22  0644 07/17/22  2218   WBC 4.8  --   --    HGB 9.6* 10.4* 10.3*   MCV 90  --   --      --   --      --   --    POTASSIUM 3.8  --   --    CHLORIDE 107  --   --    CO2 30  --   --    BUN 18  --   --    CR 0.79  --   --    ANIONGAP 3  --   --    CRYS 8.6  --   --    GLC 99  --   --      No results found for this or any previous visit (from the past 24 hour(s)).

## 2022-07-24 NOTE — PLAN OF CARE
Pt rested well most of night. Up to BR with walker/gb and minimal assistance. Sat up in chair briefy to color. Denied pain this shift. Jacy patent. L PIV SL. No new concerns.

## 2022-07-24 NOTE — PLAN OF CARE
Told NA he was having breakfast with god  Still having hallucintations to hurt himself with Mr Hawley?  Up to chair this am  A1 with walker  Louie in place  Son visiting, wife expected this afternoon  [facility] while waiting for [facility] care placement

## 2022-07-25 LAB
ANION GAP SERPL CALCULATED.3IONS-SCNC: 5 MMOL/L (ref 3–14)
BUN SERPL-MCNC: 22 MG/DL (ref 7–30)
CALCIUM SERPL-MCNC: 8.8 MG/DL (ref 8.5–10.1)
CHLORIDE BLD-SCNC: 106 MMOL/L (ref 94–109)
CO2 SERPL-SCNC: 31 MMOL/L (ref 20–32)
CREAT SERPL-MCNC: 0.76 MG/DL (ref 0.66–1.25)
GFR SERPL CREATININE-BSD FRML MDRD: 90 ML/MIN/1.73M2
GLUCOSE BLD-MCNC: 93 MG/DL (ref 70–99)
HOLD SPECIMEN: NORMAL
POTASSIUM BLD-SCNC: 4.1 MMOL/L (ref 3.4–5.3)
SODIUM SERPL-SCNC: 142 MMOL/L (ref 133–144)

## 2022-07-25 PROCEDURE — 250N000013 HC RX MED GY IP 250 OP 250 PS 637: Performed by: INTERNAL MEDICINE

## 2022-07-25 PROCEDURE — 250N000013 HC RX MED GY IP 250 OP 250 PS 637: Performed by: PHYSICIAN ASSISTANT

## 2022-07-25 PROCEDURE — 250N000013 HC RX MED GY IP 250 OP 250 PS 637: Performed by: PSYCHIATRY & NEUROLOGY

## 2022-07-25 PROCEDURE — 99232 SBSQ HOSP IP/OBS MODERATE 35: CPT

## 2022-07-25 PROCEDURE — 250N000013 HC RX MED GY IP 250 OP 250 PS 637: Performed by: NURSE PRACTITIONER

## 2022-07-25 PROCEDURE — 36415 COLL VENOUS BLD VENIPUNCTURE: CPT | Performed by: INTERNAL MEDICINE

## 2022-07-25 PROCEDURE — 99233 SBSQ HOSP IP/OBS HIGH 50: CPT | Performed by: INTERNAL MEDICINE

## 2022-07-25 PROCEDURE — 80048 BASIC METABOLIC PNL TOTAL CA: CPT | Performed by: INTERNAL MEDICINE

## 2022-07-25 PROCEDURE — 99207 PR CDG-CUT & PASTE-POTENTIAL IMPACT ON LEVEL: CPT | Performed by: INTERNAL MEDICINE

## 2022-07-25 PROCEDURE — 120N000002 HC R&B MED SURG/OB UMMC

## 2022-07-25 RX ORDER — ESCITALOPRAM OXALATE 10 MG/1
10 TABLET ORAL DAILY
Status: DISCONTINUED | OUTPATIENT
Start: 2022-07-26 | End: 2022-08-02 | Stop reason: HOSPADM

## 2022-07-25 RX ORDER — LANOLIN ALCOHOL/MO/W.PET/CERES
1000 CREAM (GRAM) TOPICAL DAILY
Status: DISCONTINUED | OUTPATIENT
Start: 2022-07-25 | End: 2022-07-25

## 2022-07-25 RX ADMIN — GABAPENTIN 100 MG: 100 CAPSULE ORAL at 14:04

## 2022-07-25 RX ADMIN — Medication 3 CAPSULE: at 08:49

## 2022-07-25 RX ADMIN — ALLOPURINOL 300 MG: 300 TABLET ORAL at 08:48

## 2022-07-25 RX ADMIN — LORATADINE 10 MG: 10 TABLET ORAL at 08:49

## 2022-07-25 RX ADMIN — CYANOCOBALAMIN TAB 1000 MCG 1000 MCG: 1000 TAB at 08:49

## 2022-07-25 RX ADMIN — FUROSEMIDE 40 MG: 20 TABLET ORAL at 16:10

## 2022-07-25 RX ADMIN — RISPERIDONE 0.25 MG: 0.25 TABLET ORAL at 08:49

## 2022-07-25 RX ADMIN — DONEPEZIL HYDROCHLORIDE 5 MG: 5 TABLET, FILM COATED ORAL at 22:04

## 2022-07-25 RX ADMIN — CARVEDILOL 12.5 MG: 12.5 TABLET, FILM COATED ORAL at 22:05

## 2022-07-25 RX ADMIN — CARVEDILOL 12.5 MG: 12.5 TABLET, FILM COATED ORAL at 08:48

## 2022-07-25 RX ADMIN — GABAPENTIN 100 MG: 100 CAPSULE ORAL at 08:49

## 2022-07-25 RX ADMIN — GABAPENTIN 100 MG: 100 CAPSULE ORAL at 22:05

## 2022-07-25 RX ADMIN — LIDOCAINE PATCH 4% 1 PATCH: 40 PATCH TOPICAL at 08:53

## 2022-07-25 RX ADMIN — FUROSEMIDE 40 MG: 20 TABLET ORAL at 08:49

## 2022-07-25 RX ADMIN — POLYETHYLENE GLYCOL 3350 17 G: 17 POWDER, FOR SOLUTION ORAL at 22:05

## 2022-07-25 RX ADMIN — Medication 25 MCG: at 08:49

## 2022-07-25 RX ADMIN — POLYETHYLENE GLYCOL 3350 17 G: 17 POWDER, FOR SOLUTION ORAL at 08:49

## 2022-07-25 ASSESSMENT — ACTIVITIES OF DAILY LIVING (ADL)
ADLS_ACUITY_SCORE: 42
ADLS_ACUITY_SCORE: 44
ADLS_ACUITY_SCORE: 42
ADLS_ACUITY_SCORE: 42
ADLS_ACUITY_SCORE: 44
ADLS_ACUITY_SCORE: 42
ADLS_ACUITY_SCORE: 44
ADLS_ACUITY_SCORE: 46
ADLS_ACUITY_SCORE: 46
ADLS_ACUITY_SCORE: 44

## 2022-07-25 NOTE — PLAN OF CARE
Goal Outcome Evaluation:    Patient alert/oriented x4.   Wife visited today and ate a late lunch with him.   Assist of 1 with walker to bathroom.   Louie in place  BM today  Uses call light appropriately.

## 2022-07-25 NOTE — PROGRESS NOTES
[facility]    Medicine Progress Note - Hospitalist Service, GOLD TEAM 18    Date of Admission:  6/28/2022    Assessment & Plan        William Almazan is a 81 year old male with history of CAD s/p CABG, dilated cardiomyopathy, HFrEF, PAF on Eliquis, HTN, HLD, DM2, KRISTIAN, prostate cancer, gout, and dementia who presented on 6/28/22 with worsening auditory hallucinations.     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   ---   Hallucination present since ~2020 following death of son.\  --- still having hallucinations   ---   Patient continued to have auditory hallucination of a friend telling him to harm himself.  ---   He denied suicidal ideation  ---   1:1 sitter d/c'd by psychiatry on 7/13/22  ---   Denied worsening depression or anxiety.   ---   No visual or tactile hallucinations.   ---   Family not able to manage at home.   ---   Continue Risperdal 0.5 mg qam and 2 mg at bedtime  ---   Continue donepezil 5 mg daily  ---   Continue gabapentin 100 mg po tid  ---   Lexapro 10 mg started 7/25 .     Full thickness rectal prolapse, initially noted 7/15  ---   It appears to be full-thickness but reducible  ---   Intermittently bleeding with bowel movements  ---   Hgb remained stable at 10 - 11+ g  ---   Avoid constipation  ---   Psyllium fiber 3 capsule daily, MiraLAX 17 g daily and senna 1-2 tabs bid prn ordered  ---  Dr Kinney Discussed patient's condition with CRS consult service.  Recommendation is for fiber and avoid constipation.  Surgical option is colostomy to which the patient is probably not the best candidate.  ---   CRS recommended outpt clinic follow up 4-6 weeks       Left hip pain  --- complains of  7/19 and seen by cross cover  per notes patient  Was seen by orthopedic surgery  In April for hip pain  and has severe arthritis  tylenol OTC added as  well as lidocaine patch and volatern cream, avoid NSAIDS with rectal bleed  ---states his pain is at baseline     Tremor, whole body  ---   New since admit  ---   consulted  neurology per patient's family request, and was felt to be stress related    -- continue B12 supplements , follow up  Levels as out patient       Urinary retention  ---   Chronic indwelling keating catheter.     Chronic HFrEF:    ---   Hx dilated cardiomyopathy.   ---   Most recent TTE 6/18/21 showing severely dilated LV with EF 20%.   ---   Currently euvolemic on exam.   ---   No hypoxia or pulmonary complaints.   ---   Has trace LE edema.   ---   Continue Coreg to 12.5 mg PO BID   ---   Continue PTA Lasix 40 mg bid but might need to cut back if weight down   ---   Daily weights, I&O's, in sign negative balance,  Needs daily weights  - BMP OK  7/25       CAD s/p CABG (1992):    ---   Not ASA or statin at home.   ---   No acute concerns.   ---   Coreg to 12.5 mg PO BID     PAF   ---   No acute concerns.  ---   Eliquis 5mg BID on hold with rectal bleed      T2DM:    ---   Diet controlled.   ---   Glucose stable.      KRISTIAN:    ---   Does not use CPAP at home.        Prostate CA:    ---   Treated with XRT and hormone therapy.   ---   Hx urinary retention with chronic indwelling keating.     Gout:    ---   Continue PTA allopurinol            Diet: Combination Diet Regular Diet Adult    DVT Prophylaxis: DOAC  Keating Catheter: PRESENT, indication: Retention, Retention  Central Lines: None  Cardiac Monitoring: None  Code Status: Full Code             Diet: Combination Diet Regular Diet Adult    DVT Prophylaxis: ambulate AC on hold with rectal bleed   Keating Catheter: PRESENT, indication: Retention, Retention  Central Lines: None  Cardiac Monitoring: None  Code Status: Full Code      Disposition Plan         The patient's care was discussed with the care team .  7/24 with son by bedside     Марина Ware MD  Hospitalist Service, GOLD TEAM 18  [facility]  [facility]  Securely message with the [employer] Web Console (learn more here)  Text page via [facility] Paging/Directory   Please see signed in provider for up to date coverage information      Clinically Significant Risk Factors Present on Admission                      ______________________________________________________________________    Interval History      Still with hallucinations , denies blood per rectum        Data reviewed today: I reviewed all medications, new labs and imaging results over the last 24 hours.  Physical Exam   Vital Signs: Temp: (!) 96.7  F (35.9  C) Temp src: Oral BP: 131/77 Pulse: 87   Resp: 16 SpO2: 96 % O2 Device: None (Room air)    Weight: 184 lbs 8 oz   General appearence: awake alert  no apparent distress  Respiratory nonlaboured   neur awake alert, oriented   extremities, moves all extremities       Data   Recent Labs   Lab 07/25/22  0559 07/19/22  0517   WBC  --  4.8   HGB  --  9.6*   MCV  --  90   PLT  --  242    140   POTASSIUM 4.1 3.8   CHLORIDE 106 107   CO2 31 30   BUN 22 18   CR 0.76 0.79   ANIONGAP 5 3   CRYS 8.8 8.6   GLC 93 99     No results found for this or any previous visit (from the past 24 hour(s)).

## 2022-07-25 NOTE — PLAN OF CARE
"Nursing Assessment:  VT: BP (!) 145/79 (BP Location: Right arm, Patient Position: Semi-Ruby's, Cuff Size: Adult Regular)   Pulse 82   Temp 97.1  F (36.2  C) (Oral)   Resp 16   Ht 1.753 m (5' 9\")   Wt 83.7 kg (184 lb 8 oz)   SpO2 96%   BMI 27.25 kg/m       Neuro: pt reported still having auditory hallucinations. He reported that he is hearing Mr. Lainez threatening to poison him for hiding his toothbrush and toothpaste under his shirt. He also warned writer to be careful because Mr. Lainez is threatening to harm writer once writer leaves pt's room. Pt requested to have his can by him so he can use it incase Mr. Lainez comes in to harm him.     Additional Notes: pt has stiffness in the left extremity    Pain Management:  Left knee pain reported by pt declined pain intervention    Nursing Plan:  Continue POC  "

## 2022-07-25 NOTE — PLAN OF CARE
No acute changes on shift. Pt A/Ox4, endorses auditory hallucinations, denies visual hallucinations. Pt denies SI or intent to hurt self. Pt reports mild L knee pain, managed w/Lidocaine patch on medial side of knee.     Pt ambulated in caputo with writer Ax1 w/walker + GB.    Baseline numbness in BLE.     Pt denies chx pain, SOB and n/v.     Louie in pace for retention, clear yellow output. Cath cares done.     R abdominal abrasion healing, site cleaned w/wound cleanser and protective mepilex applied per POC. Groin skin intact, cares provided per POC.

## 2022-07-25 NOTE — CONSULTS
"      Psychiatry Consultation; Follow up              Reason for Consult, requesting source:    \"ongoing hallucinations\"     Requesting source: Марина Ware MD    Labs and imaging reviewed, patient seen and evaluated by OTONIEL Tobar CNP               Interim history:    Mr. William Almazan is an 81-year-old  male who is being hospitalized for decreased short-term memory and hallucinations. Prior to interviewing this patient, I had an opportunity to review the initial psychiatric consultation completed by Dr. Aryan Leyva on 06/29 and a followup on 06/30.  He started the patient on low-dose Risperdal and suggested that donepezil might be appropriate in the future. Patient was again seen by psychiatry on 7/7 and risperdal was increased and donepezil started due to continued hallucinations and history of responding to hallucinations. On 7/13, psychiatry continued all medications and discontinued the sitter and also recommended memory care placement over [facility]. On 7/18, psychaityry saw again and pt was doing well. No changes made.     Psychiatry was consulted again today for ongoing hallucinations. Patient seems more down in the dumps today. States he misses his wife, feels Zuhair Lainez is holding her hostage. The hallucinations are no longer commanding. Patient states he feels safe and protected.         Current Medications:       allopurinol  300 mg Oral Daily     [Held by provider] apixaban ANTICOAGULANT  5 mg Oral BID     carvedilol  12.5 mg Oral BID     cyanocobalamin  1,000 mcg Oral Daily     cyanocobalamin  1,000 mcg Oral Daily     donepezil  5 mg Oral At Bedtime     [START ON 7/26/2022] escitalopram  10 mg Oral Daily     furosemide  40 mg Oral BID     gabapentin  100 mg Oral TID     lidocaine  1-2 patch Transdermal Q24H     lidocaine   Transdermal Q8H [facility]     loratadine  10 mg Oral Daily     polyethylene glycol  17 g Oral BID     psyllium  3 capsule Oral Daily     risperiDONE  0.25 " "mg Oral Daily     risperiDONE  2 mg Oral At Bedtime     sodium chloride (PF)  3 mL Intracatheter Q8H     [Held by provider] spironolactone  25 mg Oral Daily     Vitamin D3  25 mcg Oral Daily              MSE:   Appearance: awake, alert, adequately groomed and dressed in hospital scrubs  Attitude:  cooperative  Eye Contact:  good  Mood:  good  Affect:  appropriate and in normal range and mood congruent  Speech:  clear, coherent  Psychomotor Behavior:  no evidence of tardive dyskinesia, dystonia, or tics  Thought Process:  logical, linear and goal oriented  Associations:  no loose associations  Thought Content:  no evidence of suicidal ideation or homicidal ideation  Insight:  fair  Judgement:  intact  Oriented to:  time, person, and place  Attention Span and Concentration:  intact  Recent and Remote Memory:  limited    Vital signs:  Temp: 97.8  F (36.6  C) Temp src: Oral BP: 117/63 Pulse: 74   Resp: 16 SpO2: 95 % O2 Device: None (Room air)   Height: 175.3 cm (5' 9\") Weight: 83.7 kg (184 lb 8 oz)  Estimated body mass index is 27.25 kg/m  as calculated from the following:    Height as of this encounter: 1.753 m (5' 9\").    Weight as of this encounter: 83.7 kg (184 lb 8 oz).              DSM-5 Diagnosis:   1. Major Neurocognitive Disorder           Assessment:   Patient was seen by psychiatry most recently on 7/13 and sitter was discontinued, Risperdal was continued at 2mg at bedtime along with Donepezil 5mg. Patient states he is sleeping much better, he is calm, cooperative. Per my assessment last note which continues to be true: He continues to report auditory hallucinations, but this appears to be his baseline related to his Major neurocognitive disorder. There is room to increase Risperdal, however benefits must outweigh the risks and as of now he feels safe. He appears more depressed today, will start Lexapro. He denies suicidal ideation and nursing staff report he is doing well off of the sitter, he is " cooperative, medication compliant, and sleeping well. Upon assessment today, patient did not have any resting tremor or with arms extended and had little to no cogwheel rigidity.           Summary of Recommendations:     1. Started Lexapro 10mg    2. Ordered home med of B12 replacement per psych and neurology recommendation.       Shazia Washington, PMHNP-BC  Consult/Liaison Psychiatry   [facility]

## 2022-07-26 PROCEDURE — 250N000013 HC RX MED GY IP 250 OP 250 PS 637: Performed by: INTERNAL MEDICINE

## 2022-07-26 PROCEDURE — 99232 SBSQ HOSP IP/OBS MODERATE 35: CPT | Performed by: INTERNAL MEDICINE

## 2022-07-26 PROCEDURE — 250N000013 HC RX MED GY IP 250 OP 250 PS 637: Performed by: NURSE PRACTITIONER

## 2022-07-26 PROCEDURE — 120N000002 HC R&B MED SURG/OB UMMC

## 2022-07-26 PROCEDURE — 250N000013 HC RX MED GY IP 250 OP 250 PS 637: Performed by: PHYSICIAN ASSISTANT

## 2022-07-26 PROCEDURE — 250N000013 HC RX MED GY IP 250 OP 250 PS 637: Performed by: PSYCHIATRY & NEUROLOGY

## 2022-07-26 PROCEDURE — 250N000013 HC RX MED GY IP 250 OP 250 PS 637

## 2022-07-26 RX ADMIN — LORATADINE 10 MG: 10 TABLET ORAL at 13:04

## 2022-07-26 RX ADMIN — GABAPENTIN 100 MG: 100 CAPSULE ORAL at 10:05

## 2022-07-26 RX ADMIN — RISPERIDONE 2 MG: 0.5 TABLET ORAL at 22:12

## 2022-07-26 RX ADMIN — Medication 1 MG: at 02:08

## 2022-07-26 RX ADMIN — Medication 25 MCG: at 13:02

## 2022-07-26 RX ADMIN — RISPERIDONE 2 MG: 0.5 TABLET ORAL at 00:52

## 2022-07-26 RX ADMIN — ESCITALOPRAM OXALATE 10 MG: 10 TABLET ORAL at 10:05

## 2022-07-26 RX ADMIN — CYANOCOBALAMIN TAB 1000 MCG 1000 MCG: 1000 TAB at 13:02

## 2022-07-26 RX ADMIN — GABAPENTIN 100 MG: 100 CAPSULE ORAL at 14:03

## 2022-07-26 RX ADMIN — ALLOPURINOL 300 MG: 300 TABLET ORAL at 07:44

## 2022-07-26 RX ADMIN — DONEPEZIL HYDROCHLORIDE 5 MG: 5 TABLET, FILM COATED ORAL at 22:12

## 2022-07-26 RX ADMIN — FUROSEMIDE 20 MG: 20 TABLET ORAL at 07:44

## 2022-07-26 RX ADMIN — GABAPENTIN 100 MG: 100 CAPSULE ORAL at 20:06

## 2022-07-26 RX ADMIN — RISPERIDONE 0.25 MG: 0.25 TABLET ORAL at 07:44

## 2022-07-26 RX ADMIN — CARVEDILOL 12.5 MG: 12.5 TABLET, FILM COATED ORAL at 22:12

## 2022-07-26 RX ADMIN — POLYETHYLENE GLYCOL 3350 17 G: 17 POWDER, FOR SOLUTION ORAL at 20:06

## 2022-07-26 RX ADMIN — POLYETHYLENE GLYCOL 3350 17 G: 17 POWDER, FOR SOLUTION ORAL at 07:41

## 2022-07-26 RX ADMIN — Medication 1 CAPSULE: at 13:03

## 2022-07-26 RX ADMIN — CARVEDILOL 12.5 MG: 12.5 TABLET, FILM COATED ORAL at 10:05

## 2022-07-26 RX ADMIN — FUROSEMIDE 40 MG: 20 TABLET ORAL at 16:44

## 2022-07-26 RX ADMIN — LIDOCAINE PATCH 4% 2 PATCH: 40 PATCH TOPICAL at 07:41

## 2022-07-26 ASSESSMENT — ACTIVITIES OF DAILY LIVING (ADL)
ADLS_ACUITY_SCORE: 43
ADLS_ACUITY_SCORE: 44
ADLS_ACUITY_SCORE: 43

## 2022-07-26 NOTE — PLAN OF CARE
"Temp: 97  F (36.1  C) Temp src: Oral BP: 127/67 Pulse: 75   Resp: 16 SpO2: 98 % O2 Device: None (Room air)      AOx4. Denies CP & SOB. Baseline N/T in BLE. Louie in place with adequate output. LBM 7/26/22.  Pt denies pain this shift. Reports auditory and visual hallucinations with mention of \"Mr. Lainez threatening his family.\" Denies thoughts of self-harm. Pt reassured of his safety; verbalizes acceptance.     Call light within reach and pt able to make needs known. Awaiting catrina-psych placement. Continue with POC.   "

## 2022-07-26 NOTE — PROGRESS NOTES
[facility]    Medicine Progress Note - Hospitalist Service, GOLD TEAM 18    Date of Admission:  6/28/2022    Assessment & Plan        William Almazan is a 81 year old male with history of CAD s/p CABG, dilated cardiomyopathy, HFrEF, PAF on Eliquis, HTN, HLD, DM2, KRISTIAN, prostate cancer, gout, and dementia who presented on 6/28/22 with worsening auditory hallucinations.     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   ---   Hallucination present since ~2020 following death of son.\  --- still having hallucinations   ---   Patient continued to have auditory hallucination of a friend telling him to harm himself.  ---   He denied suicidal ideation  ---   1:1 sitter d/c'd by psychiatry on 7/13/22  ---   Denied worsening depression or anxiety.   ---   No visual or tactile hallucinations.   ---   Family not able to manage at home.   ---   Continue Risperdal 0.5 mg qam and 2 mg at bedtime  ---   Continue donepezil 5 mg daily  ---   Continue gabapentin 100 mg po tid  ---   Lexapro 10 mg started 7/25 .     Full thickness rectal prolapse, initially noted 7/15  ---   It appears to be full-thickness but reducible  ---   Intermittently bleeding with bowel movements  ---   Hgb remained stable at 10 - 11+ g  ---   Avoid constipation  ---   Psyllium fiber 3 capsule daily, MiraLAX 17 g daily and senna 1-2 tabs bid prn ordered  ---  Dr Kinney Discussed patient's condition with CRS consult service.  Recommendation is for fiber and avoid constipation.  Surgical option is colostomy to which the patient is probably not the best candidate.  ---   CRS recommended outpt clinic follow up 4-6 weeks       Left hip pain  --- complains of  7/19 and seen by cross cover  per notes patient  Was seen by orthopedic surgery  In April for hip pain  and has severe arthritis  tylenol OTC added as  well as lidocaine patch and volatern cream, avoid NSAIDS with rectal bleed  ---states his pain is at baseline     Tremor, whole body  ---   New since admit  ---   consulted  neurology per patient's family request, and was felt to be stress related    -- continue B12 supplements , follow up  Levels as out patient       Urinary retention  ---   Chronic indwelling keating catheter.     Chronic HFrEF:    ---   Hx dilated cardiomyopathy.   ---   Most recent TTE 6/18/21 showing severely dilated LV with EF 20%.   ---   Currently euvolemic on exam.   ---   No hypoxia or pulmonary complaints.   ---   Has trace LE edema.   ---   Continue Coreg to 12.5 mg PO BID   ---   Continue PTA Lasix 40 mg bid but might need to cut back if weight down   ---   Daily weights, I&O's, in sign negative balance,  Needs daily weights  - BMP OK  7/25       CAD s/p CABG (1992):    ---   Not ASA or statin at home.   ---   No acute concerns.   ---   Coreg to 12.5 mg PO BID     PAF   ---   No acute concerns.  ---   Eliquis 5mg BID on hold with rectal bleed      T2DM:    ---   Diet controlled.   ---   Glucose stable.      KRISTIAN:    ---   Does not use CPAP at home.        Prostate CA:    ---   Treated with XRT and hormone therapy.   ---   Hx urinary retention with chronic indwelling keating.     Gout:    ---   Continue PTA allopurinol            Diet: Combination Diet Regular Diet Adult    DVT Prophylaxis: DOAC  Keating Catheter: PRESENT, indication: Retention, Retention  Central Lines: None  Cardiac Monitoring: None  Code Status: Full Code             Diet: Combination Diet Regular Diet Adult    DVT Prophylaxis: ambulate AC on hold with rectal bleed   Keating Catheter: PRESENT, indication: Retention, Retention  Central Lines: None  Cardiac Monitoring: None  Code Status: Full Code      Disposition Plan         The patient's care was discussed with the care team .  7/24 with son by bedside     Rhiannon Peña MD  Hospitalist Service, GOLD TEAM 18  [facility]  [facility]  Securely message with the [employer] Web Console (learn more here)  Text page via [facility] Paging/Directory   Please see signed in provider for up to date coverage information      Clinically Significant Risk Factors Present on Admission                      ______________________________________________________________________    Interval History    7/26/2022.  Awake and alert.  Continues to have hallucinations.  He tells me that he does not need help at home as soon as I walked into his room to examine him.  He does not want to go to TCU.  , denies blood per rectum.  Family would like  to continue to look for home care in the home so he can return to home staff.  Will likely need home care 24/7.      Data reviewed today: I reviewed all medications, new labs and imaging results over the last 24 hours.  Physical Exam   Vital Signs: Temp: (!) 96.6  F (35.9  C) Temp src: Oral BP: 130/64 Pulse: 82   Resp: 17 SpO2: 97 % O2 Device: None (Room air) Oxygen Delivery: (P) 1 LPM  Weight: 168 lbs 0 oz   General appearence: awake alert  no apparent distress  Respiratory nonlaboured   neur awake alert, oriented   extremities, moves all extremities       Data   Recent Labs   Lab 07/25/22  0559      POTASSIUM 4.1   CHLORIDE 106   CO2 31   BUN 22   CR 0.76   ANIONGAP 5   CRYS 8.8   GLC 93     No results found for this or any previous visit (from the past 24 hour(s)).

## 2022-07-26 NOTE — PROGRESS NOTES
"Pt alert and oriented to person and place. Refused covid testing, states his daughter \"doesn't want me to do that, there are other methods.\" Denied pain. Groin skin intact, no Shima spray, and pt declined wound care. Took meds as ordered, risperidone late due to pt request. Continue POC  "

## 2022-07-26 NOTE — PLAN OF CARE
Goal Outcome Evaluation:                    Pt doing well, up to chair X 2, tolerating reg diet.  Pleasant, calm, cooperative with meds and cares.  Family is cont. To look for home care in the home so he can return to home asap.

## 2022-07-26 NOTE — PROGRESS NOTES
"Care Management Follow Up    Length of Stay (days): 28    Expected Discharge Date: when family secures private pay help at home   Concerns to be Addressed:       Patient plan of care discussed at interdisciplinary rounds: Yes    Anticipated Discharge Disposition:  Home with 24/7 assistance for spouse     Anticipated Discharge Services:  Private caregivers  Anticipated Discharge DME:  N/A      Additional Information:  Patient is not a candidate for TCU, current  Behavior is new \"baseline\" patient able to ambulate independently. This RNCC spoke with spouse Ilsa who states they are trying to locate private  Caregivers and it has been difficult, they have another interview today, spouse aware that patient has been medically ready for several weeks and that they may have to take him home and continue to work on securing care givers. Ilsa is trying to get 24/7 care giver, suggested she might need several care givers who can rotate shifts. Ilsa has contacted [facility] and was directed to a company that can provide a Care Coordinator in the community that can assist with ongoing future needs. Informed Ilsa we will check back in several days to discuss her progress with obtaining services and the need to DC to home as soon as possible.      Hilary HORNERN RN CCM  RN Care Coordinator 8A and 10 ICU  [address] [place] [address]  [email or web address]   Office: (10 ICU) [phone]   Pager: [phone]    For Weekend & Holiday on call RN Care Coordinator:  (Tasks: Home care, home infusion, medical equipment/oxygen, transportation, IMM & [place] forms, etc.)   [facility] ([id number]) Saturday & Sunday; ([phone]) [facility] Recognized Holidays  Pager #1: [phone] Units: 4A, 4C, 4E, 5A & 5B   Pager #2: [phone] Units: 6A, 6B, 6C, 6D  Pager #3: [phone] Units: 7A, 7B, 7C, 7D & 5C   Pager #4: [phone] Units: 5 Ortho, 8A, 10 ICU, & [facility]      For Weekend & " Holiday on call Social Work:  (Tasks: TCU, transportation, Hospice, adjustment to illness counseling, Health Care Directives, Child Protection and Domestic Violence concerns, Vulnerable Adult, IMM forms, etc.)   [place] (0800 [phone] 1630) Saturday and Sunday  Pager: [phone] Units: 4A, 4C, 4E, 5A and 5B   Pager: [phone] Units: 6A, 6B, 6C, 6D   Pager: [phone]: 7A, 7B, 7C, 7D, and 5C      [facility] ([phone]) Saturday and Sunday  Units: 5 Ortho, 8A, and 10 ICU   Pager: [phone]

## 2022-07-27 PROCEDURE — 250N000013 HC RX MED GY IP 250 OP 250 PS 637: Performed by: INTERNAL MEDICINE

## 2022-07-27 PROCEDURE — 250N000013 HC RX MED GY IP 250 OP 250 PS 637: Performed by: PSYCHIATRY & NEUROLOGY

## 2022-07-27 PROCEDURE — 120N000002 HC R&B MED SURG/OB UMMC

## 2022-07-27 PROCEDURE — 99232 SBSQ HOSP IP/OBS MODERATE 35: CPT | Performed by: INTERNAL MEDICINE

## 2022-07-27 PROCEDURE — 250N000013 HC RX MED GY IP 250 OP 250 PS 637: Performed by: NURSE PRACTITIONER

## 2022-07-27 PROCEDURE — 250N000013 HC RX MED GY IP 250 OP 250 PS 637: Performed by: PHYSICIAN ASSISTANT

## 2022-07-27 PROCEDURE — 250N000013 HC RX MED GY IP 250 OP 250 PS 637

## 2022-07-27 RX ADMIN — FUROSEMIDE 40 MG: 20 TABLET ORAL at 08:25

## 2022-07-27 RX ADMIN — ESCITALOPRAM OXALATE 10 MG: 10 TABLET ORAL at 08:25

## 2022-07-27 RX ADMIN — DONEPEZIL HYDROCHLORIDE 5 MG: 5 TABLET, FILM COATED ORAL at 21:12

## 2022-07-27 RX ADMIN — RISPERIDONE 2 MG: 0.5 TABLET ORAL at 21:13

## 2022-07-27 RX ADMIN — CARVEDILOL 12.5 MG: 12.5 TABLET, FILM COATED ORAL at 21:11

## 2022-07-27 RX ADMIN — ALLOPURINOL 300 MG: 300 TABLET ORAL at 08:25

## 2022-07-27 RX ADMIN — GABAPENTIN 100 MG: 100 CAPSULE ORAL at 21:11

## 2022-07-27 RX ADMIN — POLYETHYLENE GLYCOL 3350 17 G: 17 POWDER, FOR SOLUTION ORAL at 21:14

## 2022-07-27 RX ADMIN — LORATADINE 10 MG: 10 TABLET ORAL at 08:25

## 2022-07-27 RX ADMIN — Medication 3 CAPSULE: at 08:27

## 2022-07-27 RX ADMIN — FUROSEMIDE 40 MG: 20 TABLET ORAL at 15:44

## 2022-07-27 RX ADMIN — RISPERIDONE 0.25 MG: 0.25 TABLET ORAL at 08:25

## 2022-07-27 RX ADMIN — LIDOCAINE PATCH 4% 2 PATCH: 40 PATCH TOPICAL at 08:29

## 2022-07-27 RX ADMIN — GABAPENTIN 100 MG: 100 CAPSULE ORAL at 08:25

## 2022-07-27 RX ADMIN — CYANOCOBALAMIN TAB 1000 MCG 1000 MCG: 1000 TAB at 08:27

## 2022-07-27 RX ADMIN — Medication 25 MCG: at 08:27

## 2022-07-27 RX ADMIN — GABAPENTIN 100 MG: 100 CAPSULE ORAL at 14:20

## 2022-07-27 RX ADMIN — CARVEDILOL 12.5 MG: 12.5 TABLET, FILM COATED ORAL at 08:25

## 2022-07-27 ASSESSMENT — ACTIVITIES OF DAILY LIVING (ADL)
ADLS_ACUITY_SCORE: 43

## 2022-07-27 NOTE — PROGRESS NOTES
Pt. A&Ox4 able to make needs known and use the call-light effectively. Pt. Denied SI, Hallucinations and acts of self harm towards himself and others. Louie in place LBM 7/27. Pt. Denied pain this shift. Pt. Sat in the chair during the night for sometime and after went back to bed. Bed alarm on due to impulsive behavior. Vitals within normal range. Continue with POC.

## 2022-07-27 NOTE — PLAN OF CARE
"VS:     BP (P) 106/48 (BP Location: Right arm)   Pulse (P) 76   Temp (P) 98.6  F (37  C) (Oral)   Resp (P) 20   Ht 1.753 m (5' 9\")   Wt 82.8 kg (182 lb 9.6 oz)   SpO2 (P) 96%   BMI 26.97 kg/m      Pt A/O X 4. Lungs- clear bilaterally. IS encouraged. Denies nausea, shortness of breath, and chest pain.     Output:       Bowels- active in all four quadrants. Louie in place. BM today     Activity:       Pt up w/ assist of. Walker and gait belt     Skin:    Right abdominal skin fold tear, cleaned w/ Shima and mepilex applied. Scab like wounds on sacrum, sacral meplix applied.     Pain:     Pt denies pain     CMS:       CMS and Neuro's are intact. Denies numbness and tingling in all extremities.      Diet:       Pt is on a reg diet and appetite was adequate this shift.       Plan:       Pt is able to make needs known and the call light is within the pt's reach.       Additional Info:     Pt was hearing voices this shift. Denies command hallucinations.   The voices threatened to throw him out of bed. Pt was able to ignore them and knows they are not real.             "

## 2022-07-27 NOTE — PROGRESS NOTES
[facility]    Medicine Progress Note - Hospitalist Service, GOLD TEAM 18    Date of Admission:  6/28/2022    Assessment & Plan        William Almazan is a 81 year old male with history of CAD s/p CABG, dilated cardiomyopathy, HFrEF, PAF on Eliquis, HTN, HLD, DM2, KRISTIAN, prostate cancer, gout, and dementia who presented on 6/28/22 with worsening auditory hallucinations.     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   ---   Hallucination present since ~2020 following death of son.\  --- still having hallucinations   ---   Patient continued to have auditory hallucination of a friend telling him to harm himself.  ---   He denied suicidal ideation  ---   1:1 sitter d/c'd by psychiatry on 7/13/22  ---   Denied worsening depression or anxiety.   ---   No visual or tactile hallucinations.   ---   Family not able to manage at home.   ---   Continue Risperdal 0.5 mg qam and 2 mg at bedtime  ---   Continue donepezil 5 mg daily  ---   Continue gabapentin 100 mg po tid  ---   Lexapro 10 mg started 7/25 .     Full thickness rectal prolapse, initially noted 7/15  ---   It appears to be full-thickness but reducible  ---   Intermittently bleeding with bowel movements  ---   Hgb remained stable at 10 - 11+ g  ---   Avoid constipation  ---   Psyllium fiber 3 capsule daily, MiraLAX 17 g daily and senna 1-2 tabs bid prn ordered  ---  Dr Kinney Discussed patient's condition with CRS consult service.  Recommendation is for fiber and avoid constipation.  Surgical option is colostomy to which the patient is probably not the best candidate.  ---   CRS recommended outpt clinic follow up 4-6 weeks       Left hip pain  --- complains of  7/19 and seen by cross cover  per notes patient  Was seen by orthopedic surgery  In April for hip pain  and has severe arthritis  tylenol OTC added as  well as lidocaine patch and volatern cream, avoid NSAIDS with rectal bleed  ---states his pain is at baseline     Tremor, whole body  ---   New since admit  ---   consulted  neurology per patient's family request, and was felt to be stress related    -- continue B12 supplements , follow up  Levels as out patient       Urinary retention  ---   Chronic indwelling keating catheter.     Chronic HFrEF:    ---   Hx dilated cardiomyopathy.   ---   Most recent TTE 6/18/21 showing severely dilated LV with EF 20%.   ---   Currently euvolemic on exam.   ---   No hypoxia or pulmonary complaints.   ---   Has trace LE edema.   ---   Continue Coreg to 12.5 mg PO BID   ---   Continue PTA Lasix 40 mg bid but might need to cut back if weight down   ---   Daily weights, I&O's, in sign negative balance,  Needs daily weights  - BMP OK  7/25       CAD s/p CABG (1992):    ---   Not ASA or statin at home.   ---   No acute concerns.   ---   Coreg to 12.5 mg PO BID     PAF   ---   No acute concerns.  ---   Eliquis 5mg BID on hold with rectal bleed      T2DM:    ---   Diet controlled.   ---   Glucose stable.      KRISTIAN:    ---   Does not use CPAP at home.        Prostate CA:    ---   Treated with XRT and hormone therapy.   ---   Hx urinary retention with chronic indwelling keating.     Gout:    ---   Continue PTA allopurinol            Diet: Combination Diet Regular Diet Adult    DVT Prophylaxis: DOAC  Keating Catheter: PRESENT, indication: Retention, Retention  Central Lines: None  Cardiac Monitoring: None  Code Status: Full Code             Diet: Combination Diet Regular Diet Adult    DVT Prophylaxis: ambulate AC on hold with rectal bleed   Keating Catheter: PRESENT, indication: Retention, Retention  Central Lines: None  Cardiac Monitoring: None  Code Status: Full Code      Disposition Plan         The patient's care was discussed with the care team .  7/24 with son by bedside     Rhiannon Peña MD  Hospitalist Service, GOLD TEAM 18  [facility]  [facility]  Securely message with the [email or web address] Web Console (learn more here)  Text page via [facility] Paging/Directory   Please see signed in provider for up to date coverage information      Clinically Significant Risk Factors Present on Admission                      ______________________________________________________________________    Interval History    7/26/2022.  Awake and alert.  Continues to have hallucinations.  He tells me that he does not need help at home as soon as I walked into his room to examine him.  He does not want to go to TCU.  , denies blood per rectum.  Family would like  to continue to look for home care in the home so he can return to home staff.  Will likely need home care 24/7.  7/27/22.  No any active issues overnight.  Still has hallucinations.  At times impulsive behavior.  Had a bowel movement today.  Plan is for discharge home with home health care.  Discussed with care coordinators during multidisciplinary rounds.  Data reviewed today: I reviewed all medications, new labs and imaging results over the last 24 hours.  Physical Exam   Vital Signs: Temp: (P) 98.6  F (37  C) Temp src: (P) Oral BP: (P) 106/48 Pulse: (P) 76   Resp: (P) 20 SpO2: (P) 96 % O2 Device: (P) None (Room air)    Weight: 168 lbs 0 oz   General appearence: awake alert  no apparent distress  Respiratory nonlaboured   neur awake alert, oriented   extremities, moves all extremities       Data   Recent Labs   Lab 07/25/22  0559      POTASSIUM 4.1   CHLORIDE 106   CO2 31   BUN 22   CR 0.76   ANIONGAP 5   CRYS 8.8   GLC 93     No results found for this or any previous visit (from the past 24 hour(s)).

## 2022-07-27 NOTE — PLAN OF CARE
Occupational Therapy Discharge Summary    Reason for therapy discharge:    No further expectations of functional progress. Goals met to SBA/supervision level.    Progress towards therapy goal(s). See goals on Care Plan in [facility] electronic health record for goal details.  Goals partially met.  Barriers to achieving goals:   Pt appearing to be at new baseline, needs set-up/SBA for most tasks related to cognition, impulsiveness. Safe discharge plan in place as family in the process of obtaining 24/7 assist.    Therapy recommendation(s):    May benefit from home safety evaluation.      Spoke with RNCC regarding any DME needs for home. RNCC not aware of any DME needs at this time; please contact OT if pt/family needing DME issued prior to discharge. Please continue to encourage pt to complete ADL and ambulate with A from staff.

## 2022-07-28 ENCOUNTER — TELEPHONE (OUTPATIENT)
Dept: SURGERY | Facility: CLINIC | Age: 82
End: 2022-07-28

## 2022-07-28 PROCEDURE — 250N000013 HC RX MED GY IP 250 OP 250 PS 637: Performed by: INTERNAL MEDICINE

## 2022-07-28 PROCEDURE — 99232 SBSQ HOSP IP/OBS MODERATE 35: CPT | Performed by: INTERNAL MEDICINE

## 2022-07-28 PROCEDURE — 250N000013 HC RX MED GY IP 250 OP 250 PS 637: Performed by: PHYSICIAN ASSISTANT

## 2022-07-28 PROCEDURE — 250N000013 HC RX MED GY IP 250 OP 250 PS 637: Performed by: NURSE PRACTITIONER

## 2022-07-28 PROCEDURE — 250N000013 HC RX MED GY IP 250 OP 250 PS 637: Performed by: PSYCHIATRY & NEUROLOGY

## 2022-07-28 PROCEDURE — 250N000013 HC RX MED GY IP 250 OP 250 PS 637

## 2022-07-28 PROCEDURE — 120N000002 HC R&B MED SURG/OB UMMC

## 2022-07-28 RX ADMIN — CYANOCOBALAMIN TAB 1000 MCG 1000 MCG: 1000 TAB at 07:55

## 2022-07-28 RX ADMIN — POLYETHYLENE GLYCOL 3350 17 G: 17 POWDER, FOR SOLUTION ORAL at 20:00

## 2022-07-28 RX ADMIN — DONEPEZIL HYDROCHLORIDE 5 MG: 5 TABLET, FILM COATED ORAL at 21:33

## 2022-07-28 RX ADMIN — GABAPENTIN 100 MG: 100 CAPSULE ORAL at 14:44

## 2022-07-28 RX ADMIN — GABAPENTIN 100 MG: 100 CAPSULE ORAL at 20:00

## 2022-07-28 RX ADMIN — FUROSEMIDE 40 MG: 20 TABLET ORAL at 07:54

## 2022-07-28 RX ADMIN — ESCITALOPRAM OXALATE 10 MG: 10 TABLET ORAL at 07:55

## 2022-07-28 RX ADMIN — FUROSEMIDE 40 MG: 20 TABLET ORAL at 16:58

## 2022-07-28 RX ADMIN — CARVEDILOL 12.5 MG: 12.5 TABLET, FILM COATED ORAL at 07:54

## 2022-07-28 RX ADMIN — Medication 3 CAPSULE: at 07:54

## 2022-07-28 RX ADMIN — GABAPENTIN 100 MG: 100 CAPSULE ORAL at 07:54

## 2022-07-28 RX ADMIN — Medication 25 MCG: at 07:54

## 2022-07-28 RX ADMIN — ALLOPURINOL 300 MG: 300 TABLET ORAL at 07:54

## 2022-07-28 RX ADMIN — CARVEDILOL 12.5 MG: 12.5 TABLET, FILM COATED ORAL at 20:00

## 2022-07-28 RX ADMIN — RISPERIDONE 2 MG: 0.5 TABLET ORAL at 21:33

## 2022-07-28 RX ADMIN — POLYETHYLENE GLYCOL 3350 17 G: 17 POWDER, FOR SOLUTION ORAL at 07:53

## 2022-07-28 RX ADMIN — RISPERIDONE 0.25 MG: 0.25 TABLET ORAL at 07:56

## 2022-07-28 RX ADMIN — LIDOCAINE PATCH 4% 1 PATCH: 40 PATCH TOPICAL at 07:55

## 2022-07-28 RX ADMIN — LORATADINE 10 MG: 10 TABLET ORAL at 07:55

## 2022-07-28 ASSESSMENT — ACTIVITIES OF DAILY LIVING (ADL)
ADLS_ACUITY_SCORE: 43

## 2022-07-28 NOTE — TELEPHONE ENCOUNTER
Called to schedule f/u from hospital. Unable to lvm, so will send letter.    Please Schedule This Appointment:     With: Dr. Jake Ordonez     For / Appt Notes: reducible rectal prolapse and associated rectal bleeding     Appt Type: Post op  and Return Patient     Appt Date/Time: 8-10 weeks.       Thank you!

## 2022-07-28 NOTE — PROGRESS NOTES
Care Management Follow Up    Length of Stay (days): 30    Expected Discharge Date: TBD      Concerns to be Addressed: Discharge planning        Patient plan of care discussed at interdisciplinary rounds: Yes    Anticipated Discharge Disposition:  Home with 24 hour care givers   Anticipated Discharge Services:  Home Care- RN/SURESHA/SW/PT/OT  Anticipated Discharge DME: TBD    Patient/family educated on Medicare website which has current facility and service quality ratings: No home care agency preference    Education Provided on the Discharge Plan: yes   Patient/Family in Agreement with the Plan: yes    Referrals Placed by CM/SW: Home Care    Private pay costs discussed: Not applicable    Additional Information:  Discussed discharge planning with Ilsa and her daughter Renuka via phone. Ilsa set up Meals on Wheels and is in the process of interviewing 3 private pay caregivers. A family member is also planning to stay with them for additional support. Ilsa stated that patient will have 24/7 supervision. Discussed home care and Jamia agree with RN/LOW/HERNANDEZ/PT/OT. No agency preference. Sent a referral to [facility]. Awaiting response.     Ilsa stated that she hopes to have caregivers lined up soon and would like to plan discharge for Monday, 8/1/22.     Of note, Ilsa verbalized that she is not feeling well. She explained that she thinks it is from something she ate but will be testing for Covid.     ELISEO Garrett RNCC  RN Care Coordinator   Office: [phone]   Pager: [phone]

## 2022-07-28 NOTE — PLAN OF CARE
"  VS: /67 (BP Location: Right arm)   Pulse 73   Temp 97.4  F (36.3  C) (Oral)   Resp 16   Ht 1.753 m (5' 9\")   Wt 83.2 kg (183 lb 6.4 oz)   SpO2 97%   BMI 27.08 kg/m     O2: 97% on RA, denies SOB or respiratory distress    Output: Louie catheter in place, draining clear, yellow urine    Last BM: 7/27   Activity: Needs assist of one with walker and gait belt    Up for meals? Up in chair for meals    Skin: Right abdominal wound and groin area wounds, blanchable erythema to sacrum    Pain: Denies pain or discomfort. Lidocaine path to left knee    CMS: Alert & oriented, reports baseline numbness to bilateral toes    Dressing: Sacral mepilex to sacrum changed    Diet: Regular, appetite 100% for meals    LDA: None    Equipment: W/C and personal belongings    Plan: Discharge home with homecare, awaiting for homecare referrals response    Additional Info:                            "

## 2022-07-28 NOTE — PLAN OF CARE
"/68 (BP Location: Right arm)   Pulse 78   Temp 98.3  F (36.8  C) (Oral)   Resp 16   Ht 1.753 m (5' 9\")   Wt 82.8 kg (182 lb 9.6 oz)   SpO2 98%   BMI 26.97 kg/m    Denies pain. Regular diet, adequate intake. Alert and oriented x 4, Tingling present to BLE (toes). Chronic keating, adequate output. Pt c/o of auditory hallucinations, but denies SI. Ambulated to bathroom with assist x1, walker&GB. Pt denies SOB, chest pain. Mepilex to sacrum. R abdominal wound care completed. Repositioned and heels suspended. Bed alarm on for impulsiveness. Call light within reach.       "

## 2022-07-28 NOTE — PROGRESS NOTES
[facility]    Medicine Progress Note - Hospitalist Service, GOLD TEAM 18    Date of Admission:  6/28/2022    Assessment & Plan        William Almazan is a 81 year old male with history of CAD s/p CABG, dilated cardiomyopathy, HFrEF, PAF on Eliquis, HTN, HLD, DM2, KRISTIAN, prostate cancer, gout, and dementia who presented on 6/28/22 with worsening auditory hallucinations.     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   ---   Hallucination present since ~2020 following death of son.\  --- still having hallucinations   ---   Patient continued to have auditory hallucination of a friend telling him to harm himself.  ---   He denied suicidal ideation  ---   1:1 sitter d/c'd by psychiatry on 7/13/22  ---   Denied worsening depression or anxiety.   ---   No visual or tactile hallucinations.   ---   Family not able to manage at home.   ---   Continue Risperdal 0.5 mg qam and 2 mg at bedtime  ---   Continue donepezil 5 mg daily  ---   Continue gabapentin 100 mg po tid  ---   Lexapro 10 mg started 7/25 .     Full thickness rectal prolapse, initially noted 7/15  ---   It appears to be full-thickness but reducible  ---   Intermittently bleeding with bowel movements  ---   Hgb remained stable at 10 - 11+ g  ---   Avoid constipation  ---   Psyllium fiber 3 capsule daily, MiraLAX 17 g daily and senna 1-2 tabs bid prn ordered  ---  Dr Kinney Discussed patient's condition with CRS consult service.  Recommendation is for fiber and avoid constipation.  Surgical option is colostomy to which the patient is probably not the best candidate.  ---   CRS recommended outpt clinic follow up 4-6 weeks       Left hip pain  --- complains of  7/19 and seen by cross cover  per notes patient  Was seen by orthopedic surgery  In April for hip pain  and has severe arthritis  tylenol OTC added as  well as lidocaine patch and volatern cream, avoid NSAIDS with rectal bleed  ---states his pain is at baseline     Tremor, whole body  ---   New since admit  ---   consulted  neurology per patient's family request, and was felt to be stress related    -- continue B12 supplements , follow up  Levels as out patient       Urinary retention  ---   Chronic indwelling keating catheter.     Chronic HFrEF:    ---   Hx dilated cardiomyopathy.   ---   Most recent TTE 6/18/21 showing severely dilated LV with EF 20%.   ---   Currently euvolemic on exam.   ---   No hypoxia or pulmonary complaints.   ---   Has trace LE edema.   ---   Continue Coreg to 12.5 mg PO BID   ---   Continue PTA Lasix 40 mg bid but might need to cut back if weight down   ---   Daily weights, I&O's, in sign negative balance,  Needs daily weights  - BMP OK  7/25       CAD s/p CABG (1992):    ---   Not ASA or statin at home.   ---   No acute concerns.   ---   Coreg to 12.5 mg PO BID     PAF   ---   No acute concerns.  ---   Eliquis 5mg BID on hold with rectal bleed      T2DM:    ---   Diet controlled.   ---   Glucose stable.      KRISTIAN:    ---   Does not use CPAP at home.        Prostate CA:    ---   Treated with XRT and hormone therapy.   ---   Hx urinary retention with chronic indwelling keating.     Gout:    ---   Continue PTA allopurinol            Diet: Combination Diet Regular Diet Adult    DVT Prophylaxis: DOAC  Keating Catheter: PRESENT, indication: Retention, Retention  Central Lines: None  Cardiac Monitoring: None  Code Status: Full Code             Diet: Combination Diet Regular Diet Adult    DVT Prophylaxis: ambulate AC on hold with rectal bleed   Keating Catheter: PRESENT, indication: Retention, Retention  Central Lines: None  Cardiac Monitoring: None  Code Status: Full Code      Disposition Plan         The patient's care was discussed with the care team .  7/24 with son by bedside     Rhiannon Peña MD  Hospitalist Service, GOLD TEAM 18  [facility]  [facility]  Securely message with the [employer] Web Console (learn more here)  Text page via [facility] Paging/Directory   Please see signed in provider for up to date coverage information      Clinically Significant Risk Factors Present on Admission                      ______________________________________________________________________    Interval History    7/26/2022.  Awake and alert.  Continues to have hallucinations.  He tells me that he does not need help at home as soon as I walked into his room to examine him.  He does not want to go to TCU.  , denies blood per rectum.  Family would like  to continue to look for home care in the home so he can return to home staff.  Will likely need home care 24/7.  7/27/22.  No any active issues overnight.  Still has hallucinations.  At times impulsive behavior.  Had a bowel movement today.  Plan is for discharge home with home health care.  Discussed with care.  During multidisciplinary rounds.  7/28/2022.  No acute events overnight.  Patient is awake alert and conversant.  Discharge is pending Home with home health care.  I tried to reach his wife Ilsa on her fall but unable to talk to her.  Discussed with patient's RN and care coordinator.  Hoping for discharge home with assistance at home tomorrow.    Data reviewed today: I reviewed all medications, new labs and imaging results over the last 24 hours.  Physical Exam   Vital Signs: Temp: 97.8  F (36.6  C) Temp src: Oral BP: 111/65 Pulse: 69   Resp: 16 SpO2: 97 % O2 Device: None (Room air)    Weight: 183 lbs 6.4 oz   General appearence: awake alert  no apparent distress  Respiratory nonlaboured   neur awake alert, oriented   extremities, moves all extremities       Data   Recent Labs   Lab 07/25/22  0559      POTASSIUM 4.1   CHLORIDE 106   CO2 31   BUN 22   CR 0.76   ANIONGAP 5   CRYS 8.8   GLC 93     No results found for this or any previous visit (from the past 24 hour(s)).

## 2022-07-29 PROCEDURE — 99232 SBSQ HOSP IP/OBS MODERATE 35: CPT | Performed by: INTERNAL MEDICINE

## 2022-07-29 PROCEDURE — 250N000013 HC RX MED GY IP 250 OP 250 PS 637: Performed by: INTERNAL MEDICINE

## 2022-07-29 PROCEDURE — 250N000013 HC RX MED GY IP 250 OP 250 PS 637: Performed by: PHYSICIAN ASSISTANT

## 2022-07-29 PROCEDURE — 250N000013 HC RX MED GY IP 250 OP 250 PS 637: Performed by: PSYCHIATRY & NEUROLOGY

## 2022-07-29 PROCEDURE — 120N000002 HC R&B MED SURG/OB UMMC

## 2022-07-29 PROCEDURE — 250N000013 HC RX MED GY IP 250 OP 250 PS 637

## 2022-07-29 PROCEDURE — 250N000013 HC RX MED GY IP 250 OP 250 PS 637: Performed by: NURSE PRACTITIONER

## 2022-07-29 RX ADMIN — CARVEDILOL 12.5 MG: 12.5 TABLET, FILM COATED ORAL at 20:19

## 2022-07-29 RX ADMIN — FUROSEMIDE 40 MG: 20 TABLET ORAL at 10:36

## 2022-07-29 RX ADMIN — ALLOPURINOL 300 MG: 300 TABLET ORAL at 10:36

## 2022-07-29 RX ADMIN — LIDOCAINE PATCH 4% 1 PATCH: 40 PATCH TOPICAL at 10:40

## 2022-07-29 RX ADMIN — RISPERIDONE 2 MG: 0.5 TABLET ORAL at 22:44

## 2022-07-29 RX ADMIN — Medication 3 CAPSULE: at 10:36

## 2022-07-29 RX ADMIN — FUROSEMIDE 40 MG: 20 TABLET ORAL at 20:18

## 2022-07-29 RX ADMIN — LORATADINE 10 MG: 10 TABLET ORAL at 10:37

## 2022-07-29 RX ADMIN — CARVEDILOL 12.5 MG: 12.5 TABLET, FILM COATED ORAL at 10:37

## 2022-07-29 RX ADMIN — RISPERIDONE 0.25 MG: 0.25 TABLET ORAL at 10:37

## 2022-07-29 RX ADMIN — DONEPEZIL HYDROCHLORIDE 5 MG: 5 TABLET, FILM COATED ORAL at 22:42

## 2022-07-29 RX ADMIN — POLYETHYLENE GLYCOL 3350 17 G: 17 POWDER, FOR SOLUTION ORAL at 10:40

## 2022-07-29 RX ADMIN — GABAPENTIN 100 MG: 100 CAPSULE ORAL at 20:28

## 2022-07-29 RX ADMIN — CYANOCOBALAMIN TAB 1000 MCG 1000 MCG: 1000 TAB at 10:36

## 2022-07-29 RX ADMIN — GABAPENTIN 100 MG: 100 CAPSULE ORAL at 10:49

## 2022-07-29 RX ADMIN — Medication 25 MCG: at 10:36

## 2022-07-29 RX ADMIN — ESCITALOPRAM OXALATE 10 MG: 10 TABLET ORAL at 10:37

## 2022-07-29 ASSESSMENT — ACTIVITIES OF DAILY LIVING (ADL)
ADLS_ACUITY_SCORE: 43

## 2022-07-29 NOTE — PROGRESS NOTES
[facility]    Medicine Progress Note - Hospitalist Service, GOLD TEAM 18    Date of Admission:  6/28/2022    Assessment & Plan        William Almazan is a 81 year old male with history of CAD s/p CABG, dilated cardiomyopathy, HFrEF, PAF on Eliquis, HTN, HLD, DM2, KRISTIAN, prostate cancer, gout, and dementia who presented on 6/28/22 with worsening auditory hallucinations.     Auditory hallucinations, Dementia with behavioral health disturbances.   ---   Presented with progressive auditory hallucinations, including self harm command hallucinations.   ---   Hallucination present since ~2020 following death of son.  --- still having hallucinations   ---   Patient continued to have auditory hallucination of a friend telling him to harm himself.  ---   He denied suicidal ideation  ---   1:1 sitter d/c'd by psychiatry on 7/13/22  ---   Denied worsening depression or anxiety.   ---   No visual or tactile hallucinations.   ---   Family not able to manage at home.   ---   Continue Risperdal 0.5 mg qam and 2 mg at bedtime  ---   Continue donepezil 5 mg daily  ---   Continue gabapentin 100 mg po tid  ---   Lexapro 10 mg started 7/25 .     Full thickness rectal prolapse, initially noted 7/15  ---   It appears to be full-thickness but reducible  ---   Intermittently bleeding with bowel movements  ---   Hgb remained stable at 10 - 11+ g  ---   Avoid constipation  ---   Psyllium fiber 3 capsule daily, MiraLAX 17 g daily and senna 1-2 tabs bid prn ordered  ---  Dr Kinney Discussed patient's condition with CRS consult service.  Recommendation is for fiber and avoid constipation.  Surgical option is colostomy to which the patient is probably not the best candidate.  ---   CRS recommended outpt clinic follow up 4-6 weeks       Left hip pain  --- complains of  7/19 and seen by cross cover  per notes patient  Was seen by orthopedic surgery  In April for hip pain  and has severe arthritis  tylenol  OTC added as well as lidocaine patch and volatern cream, avoid NSAIDS with rectal bleed  ---states his pain is at baseline     Tremor, whole body  ---   New since admit  ---   consulted  neurology per patient's family request, and was felt to be stress related    -- continue B12 supplements , follow up  Levels as out patient       Urinary retention  ---   Chronic indwelling keating catheter.     Chronic HFrEF:    ---   Hx dilated cardiomyopathy.   ---   Most recent TTE 6/18/21 showing severely dilated LV with EF 20%.   ---   Currently euvolemic on exam.   ---   No hypoxia or pulmonary complaints.   ---   Has trace LE edema.   ---   Continue Coreg to 12.5 mg PO BID   ---   Continue PTA Lasix 40 mg bid but might need to cut back if weight down   ---   Daily weights, I&O's, in sign negative balance,  Needs daily weights  - BMP OK  7/25       CAD s/p CABG (1992):    ---   Not ASA or statin at home.   ---   No acute concerns.   ---   Coreg to 12.5 mg PO BID     PAF   ---   No acute concerns.  ---   Eliquis 5mg BID on hold with rectal bleed      T2DM:    ---   Diet controlled.   ---   Glucose stable.      KRISTIAN:    ---   Does not use CPAP at home.        Prostate CA:    ---   Treated with XRT and hormone therapy.   ---   Hx urinary retention with chronic indwelling keating.     Gout:    ---   Continue PTA allopurinol            Diet: Combination Diet Regular Diet Adult    DVT Prophylaxis: DOAC  Keating Catheter: PRESENT, indication: Retention, Retention  Central Lines: None  Cardiac Monitoring: None  Code Status: Full Code             Diet: Combination Diet Regular Diet Adult    DVT Prophylaxis: ambulate AC on hold with rectal bleed   Keating Catheter: PRESENT, indication: Retention, Retention  Central Lines: None  Cardiac Monitoring: None  Code Status: Full Code      Disposition Plan         The patient's care was discussed with the care team .  7/24 with son by bedside     Rhiannon Peña MD  Hospitalist Service, [facility] TEAM 18  [facility]  [facility]  Securely message with the Voc[employer] Web Console (learn more here)  Text page via [facility] Paging/Directory   Please see signed in provider for up to date coverage information      Clinically Significant Risk Factors Present on Admission                      ______________________________________________________________________    Interval History    7/26/2022.  Awake and alert.  Continues to have hallucinations.  He tells me that he does not need help at home as soon as I walked into his room to examine him.  He does not want to go to TCU.  , denies blood per rectum.  Family would like  to continue to look for home care in the home so he can return to home staff.  Will likely need home care 24/7.  7/27/22.  No any active issues overnight.  Still has hallucinations.  At times impulsive behavior.  Had a bowel movement today.  Plan is for discharge home with home health care.  Discussed with care.  During multidisciplinary rounds.  7/28/2022.  No acute events overnight.  Patient is awake alert and conversant.  Discharge is pending Home with home health care.  I tried to reach his wife Ilsa on her fall but unable to talk to her.  Discussed with patient's RN and care coordinator.  Hoping for discharge home with assistance at home tomorrow.  7/29/2022.  Patient continues to have hallucinations.  He requires 1 person assist for transfer and uses walker.  He has a Louie catheter in.  Has blanchable coccygeal lesions as well as wound on the right  abdomen and right groin.  Calm and composed.  Disposition plan remains the same, home with home health care.  Discussed care during the collaborative rounds.  Data reviewed today: I reviewed all medications, new labs and imaging results over the last 24 hours.  Physical Exam   Vital Signs: Temp: 99.4  F (37.4  C) Temp src: Oral BP: 134/61 Pulse: 76   Resp: 15 SpO2: 95 % O2 Device: None (Room air)    Weight: 183 lbs 6.4 oz   General  appearence: awake alert  no apparent distress  Respiratory nonlaboured   neur awake alert, oriented   extremities, moves all extremities       Data   Recent Labs   Lab 07/25/22  0559      POTASSIUM 4.1   CHLORIDE 106   CO2 31   BUN 22   CR 0.76   ANIONGAP 5   CRYS 8.8   GLC 93     Current Facility-Administered Medications   Medication     acetaminophen (TYLENOL) tablet 1,000 mg     allopurinol (ZYLOPRIM) tablet 300 mg     [Held by provider] apixaban ANTICOAGULANT (ELIQUIS) tablet 5 mg     carvedilol (COREG) tablet 12.5 mg     cyanocobalamin (VITAMIN B-12) tablet 1,000 mcg     diclofenac (VOLTAREN) 1 % topical gel 4 g     donepezil (ARICEPT) tablet 5 mg     escitalopram (LEXAPRO) tablet 10 mg     furosemide (LASIX) tablet 40 mg     gabapentin (NEURONTIN) capsule 100 mg     Lidocaine (LIDOCARE) 4 % Patch 1-2 patch     lidocaine (LMX4) cream     lidocaine 1 % 0.1-1 mL     lidocaine patch in PLACE     loratadine (CLARITIN) tablet 10 mg     melatonin tablet 1 mg     polyethylene glycol (MIRALAX) Packet 17 g     psyllium (METAMUCIL/KONSYL) capsule 3 capsule     risperiDONE (risperDAL) tablet 0.25 mg     risperiDONE (risperDAL) tablet 2 mg     senna-docusate (SENOKOT-S/PERICOLACE) 8.6-50 MG per tablet 1-2 tablet     sodium chloride (PF) 0.9% PF flush 3 mL     sodium chloride (PF) 0.9% PF flush 3 mL     [Held by provider] spironolactone (ALDACTONE) tablet 25 mg     Vitamin D3 (CHOLECALCIFEROL) tablet 25 mcg

## 2022-07-29 NOTE — PROGRESS NOTES
Care Management Follow Up    Length of Stay (days): 31    Expected Discharge Date: 8/1/22     Concerns to be Addressed: Discharge planning        Patient plan of care discussed at interdisciplinary rounds: Yes    Anticipated Discharge Disposition:  Home with 24 hour care givers   Anticipated Discharge Services:  [facility] ([facility])- RN/SURESHA/SW/PT/OT  Anticipated Discharge DME: TBD    Patient/family educated on Medicare website which has current facility and service quality ratings: No home care agency preference    Education Provided on the Discharge Plan: yes   Patient/Family in Agreement with the Plan: yes    Referrals Placed by CM/SW: Home Care    Private pay costs discussed: Not applicable    Additional Information:  [facility] accepted patient for RN/HHA/SW/PT/OT. Wife, Ilsa, family or caregivers will need to be able to do his wound care on days the nurse is not there. Attempted to call Ilsa x2 but she did not answer and VM is full.    3:31 PM  Spoke to Ilsa and her daughter Renuka. They stated that a family member or caregiver will be able to assist with wound care on days a nurse isn't there. Updated [facility] HC liaison. Family is planning to transport on Monday. They do not know what time they will be available. Requested that they come to the hospital around 1000 to go over discharge instructions and transport him home. They would like medications filled here and sent with him.       Note on 7/28/22:  Discussed discharge planning with Ilsa and her daughter Renuka via phone. Ilsa set up [employer] Whe[employer] and is in the process of interviewing 3 private pay caregivers. A family member is also planning to stay with them for additional support. Ilsa stated that patient will have 24/7 supervision. Discussed home care and Ilsa and Renuka agree with RN/SURESHA/HERNANDEZ/PT/OT. No agency preference. Sent a referral to [facility]. Awaiting response.   Ilsa stated that she hopes to have  caregivers lined up soon and would like to plan discharge for Monday, 8/1/22.   Of note, Ilsa verbalized that she is not feeling well. She explained that she thinks it is from something she ate but will be testing for Covid.     EVENS GarrettN RNCC  RN Care Coordinator   Office: [phone]   Pager: [phone]

## 2022-07-29 NOTE — PROGRESS NOTES
"Pt slept all noc with no significant clinical events. Continues to be intermittently confused asking for his breakfast at 0200, asking to bring his wife in because she was standing in front of his room, and using the call light at times and saying he doesn't know why he called. No s/s of auditory hallucination noted and VSS, SPO2 >90% on room air. Needs assist x1 with transfers and FWB as tolerated. Louie intact and patent, draining clear yellow urine with output 450cc this shift. Plans for discharge to home with home care services per  updates.     /61 (BP Location: Right arm)   Pulse 76   Temp 99.4  F (37.4  C) (Oral)   Resp 15   Ht 1.753 m (5' 9\")   Wt 83.2 kg (183 lb 6.4 oz)   SpO2 95%   BMI 27.08 kg/m      "

## 2022-07-29 NOTE — PLAN OF CARE
"  1930-2330    /65 (BP Location: Right arm)   Pulse 77   Temp 97.4  F (36.3  C) (Oral)   Resp 16   Ht 1.753 m (5' 9\")   Wt 83.2 kg (183 lb 6.4 oz)   SpO2 98%   BMI 27.08 kg/m      Patient is alert and oriented x4, able to make needs known. Denies pain. On RA stable >97%, denies SOB, denies distress, denies cough, clear LS bilaterally. Denies CP, baseline numbness and tingling to bilateral toes. Denies N/V. On regular diet, thins. Encouraging oral hydration. Louie in place, patent. Draining adequate, clear, yellow urine. LBM 7/28/22, BS+, passing flatus. Edema to BLE, elevating on pillow. Up with assist of 1, walker and belt. Blanchable redness to coccyx covered with foam dressing.  Wounds to R abdominal and groin, cleaned and barrier cream applied. Plan to discharge home with home care, awaiting for referrals response. Continue with POC.      "

## 2022-07-30 LAB — SARS-COV-2 RNA RESP QL NAA+PROBE: POSITIVE

## 2022-07-30 PROCEDURE — 250N000013 HC RX MED GY IP 250 OP 250 PS 637: Performed by: PSYCHIATRY & NEUROLOGY

## 2022-07-30 PROCEDURE — 250N000013 HC RX MED GY IP 250 OP 250 PS 637: Performed by: PHYSICIAN ASSISTANT

## 2022-07-30 PROCEDURE — 250N000013 HC RX MED GY IP 250 OP 250 PS 637: Performed by: INTERNAL MEDICINE

## 2022-07-30 PROCEDURE — 120N000002 HC R&B MED SURG/OB UMMC

## 2022-07-30 PROCEDURE — 250N000013 HC RX MED GY IP 250 OP 250 PS 637: Performed by: NURSE PRACTITIONER

## 2022-07-30 PROCEDURE — 87635 SARS-COV-2 COVID-19 AMP PRB: CPT | Performed by: INTERNAL MEDICINE

## 2022-07-30 PROCEDURE — 250N000013 HC RX MED GY IP 250 OP 250 PS 637

## 2022-07-30 PROCEDURE — 99232 SBSQ HOSP IP/OBS MODERATE 35: CPT | Performed by: INTERNAL MEDICINE

## 2022-07-30 RX ORDER — MULTIPLE VITAMINS W/ MINERALS TAB 9MG-400MCG
1 TAB ORAL DAILY
Status: DISCONTINUED | OUTPATIENT
Start: 2022-07-30 | End: 2022-08-02 | Stop reason: HOSPADM

## 2022-07-30 RX ADMIN — GABAPENTIN 100 MG: 100 CAPSULE ORAL at 15:26

## 2022-07-30 RX ADMIN — GABAPENTIN 100 MG: 100 CAPSULE ORAL at 20:35

## 2022-07-30 RX ADMIN — ESCITALOPRAM OXALATE 10 MG: 10 TABLET ORAL at 10:03

## 2022-07-30 RX ADMIN — RISPERIDONE 0.25 MG: 0.25 TABLET ORAL at 10:03

## 2022-07-30 RX ADMIN — FUROSEMIDE 40 MG: 20 TABLET ORAL at 15:26

## 2022-07-30 RX ADMIN — CARVEDILOL 12.5 MG: 12.5 TABLET, FILM COATED ORAL at 20:35

## 2022-07-30 RX ADMIN — CARVEDILOL 12.5 MG: 12.5 TABLET, FILM COATED ORAL at 10:03

## 2022-07-30 RX ADMIN — CYANOCOBALAMIN TAB 1000 MCG 1000 MCG: 1000 TAB at 10:03

## 2022-07-30 RX ADMIN — MULTIPLE VITAMINS W/ MINERALS TAB 1 TABLET: TAB at 13:24

## 2022-07-30 RX ADMIN — LIDOCAINE PATCH 4% 1 PATCH: 40 PATCH TOPICAL at 10:01

## 2022-07-30 RX ADMIN — DONEPEZIL HYDROCHLORIDE 5 MG: 5 TABLET, FILM COATED ORAL at 22:13

## 2022-07-30 RX ADMIN — LORATADINE 10 MG: 10 TABLET ORAL at 10:03

## 2022-07-30 RX ADMIN — RISPERIDONE 2 MG: 0.5 TABLET ORAL at 22:13

## 2022-07-30 RX ADMIN — GABAPENTIN 100 MG: 100 CAPSULE ORAL at 10:32

## 2022-07-30 RX ADMIN — ALLOPURINOL 300 MG: 300 TABLET ORAL at 10:03

## 2022-07-30 RX ADMIN — FUROSEMIDE 40 MG: 20 TABLET ORAL at 10:02

## 2022-07-30 RX ADMIN — Medication 25 MCG: at 10:03

## 2022-07-30 ASSESSMENT — ACTIVITIES OF DAILY LIVING (ADL)
ADLS_ACUITY_SCORE: 42
ADLS_ACUITY_SCORE: 43
ADLS_ACUITY_SCORE: 43
ADLS_ACUITY_SCORE: 42

## 2022-07-30 NOTE — PLAN OF CARE
Goal Outcome Evaluation:    Pt is A/OX3. Pt had fever of 101.2 this morning. Pt was Covid swapped and results came back positive for covid. Pt is on isolation. Provider and wife Ilsa notified. Pt denies any other symptoms. Louie draining adequately with yellow clear urine. Pt had diarrhea once this shift.   Up with assist of 1 GB+walker to BR.  Bed alarm on for safety but able to use call light appropriately. Continue with POC.

## 2022-07-30 NOTE — PLAN OF CARE
Pt resting in bed. A&Ox4. Declined dinner. Able to make needs known. Passing gas. Has not gotten out of bed this shift. Bed alarm on and bed in low position.

## 2022-07-30 NOTE — PLAN OF CARE
A&Ox3, calm, intermittent confusion   Up with Ax1+GB+walker ambulating to BR  Pt moves very slow and seemed moderately unsteady during this shift  Had loose BMx1,   Louie in place with good yellow clear urine   Resting in bed, sleeping on/off  Bed alarm on for safety but able to use call light appropriately

## 2022-07-30 NOTE — PROGRESS NOTES
[facility]    Medicine Progress Note - Hospitalist Service, GOLD TEAM 18    Date of Admission:  6/28/2022    Assessment & Plan        William Almazan is a 81 year old male with history of CAD s/p CABG, dilated cardiomyopathy, HFrEF, PAF on Eliquis, HTN, HLD, DM2, KRISTIAN, prostate cancer, gout, and dementia who presented on 6/28/22 with worsening auditory hallucinations.     Auditory hallucinations, Dementia with behavioral health disturbances.   ---   Presented with progressive auditory hallucinations, including self harm command hallucinations.   ---   Hallucination present since ~2020 following death of son.  --- still having hallucinations   ---   Patient continued to have auditory hallucination of a friend telling him to harm himself.  ---   He denied suicidal ideation  ---   1:1 sitter d/c'd by psychiatry on 7/13/22  ---   Denied worsening depression or anxiety.   ---   No visual or tactile hallucinations.   ---   Family not able to manage at home.   ---   Continue Risperdal 0.5 mg qam and 2 mg at bedtime  ---   Continue donepezil 5 mg daily  ---   Continue gabapentin 100 mg po tid  ---   Lexapro 10 mg started 7/25 .     Full thickness rectal prolapse, initially noted 7/15  ---   It appears to be full-thickness but reducible  ---   Intermittently bleeding with bowel movements  ---   Hgb remained stable at 10 - 11+ g  ---   Avoid constipation  ---   Psyllium fiber 3 capsule daily, MiraLAX 17 g daily and senna 1-2 tabs bid prn ordered  ---  Dr Kinney Discussed patient's condition with CRS consult service.  Recommendation is for fiber and avoid constipation.  Surgical option is colostomy to which the patient is probably not the best candidate.  ---   CRS recommended outpt clinic follow up 4-6 weeks       Left hip pain  --- complains of  7/19 and seen by cross cover  per notes patient  Was seen by orthopedic surgery  In April for hip pain  and has severe arthritis  tylenol  OTC added as well as lidocaine patch and volatern cream, avoid NSAIDS with rectal bleed  ---states his pain is at baseline     Tremor, whole body  ---   New since admit  ---   consulted  neurology per patient's family request, and was felt to be stress related    -- continue B12 supplements , follow up  Levels as out patient       Urinary retention  ---   Chronic indwelling keating catheter.     Chronic HFrEF:    ---   Hx dilated cardiomyopathy.   ---   Most recent TTE 6/18/21 showing severely dilated LV with EF 20%.   ---   Currently euvolemic on exam.   ---   No hypoxia or pulmonary complaints.   ---   Has trace LE edema.   ---   Continue Coreg to 12.5 mg PO BID   ---   Continue PTA Lasix 40 mg bid but might need to cut back if weight down   ---   Daily weights, I&O's, in sign negative balance,  Needs daily weights  - BMP OK  7/25       CAD s/p CABG (1992):    ---   Not ASA or statin at home.   ---   No acute concerns.   ---   Coreg to 12.5 mg PO BID     PAF   ---   No acute concerns.  ---   Eliquis 5mg BID on hold with rectal bleed      T2DM:    ---   Diet controlled.   ---   Glucose stable.      KRISTIAN:    ---   Does not use CPAP at home.        Prostate CA:    ---   Treated with XRT and hormone therapy.   ---   Hx urinary retention with chronic indwelling keating.     Gout:    ---   Continue PTA allopurinol            Diet: Combination Diet Regular Diet Adult    DVT Prophylaxis: DOAC  Keating Catheter: PRESENT, indication: Retention, Retention  Central Lines: None  Cardiac Monitoring: None  Code Status: Full Code             Diet: Combination Diet Regular Diet Adult    DVT Prophylaxis: ambulate AC on hold with rectal bleed   Keating Catheter: PRESENT, indication: Retention, Retention  Central Lines: None  Cardiac Monitoring: None  Code Status: Full Code      Disposition Plan         The patient's care was discussed with the care team .  7/24 with son by bedside     Rhiannon Peña MD  Hospitalist Service, [facility] TEAM 18  M  [facility]  Securely message with the Vocera Web Console (learn more here)  Text page via [facility] Paging/Directory   Please see signed in provider for up to date coverage information      Clinically Significant Risk Factors Present on Admission                      ______________________________________________________________________    Interval History    7/26/2022.  Awake and alert.  Continues to have hallucinations.  He tells me that he does not need help at home as soon as I walked into his room to examine him.  He does not want to go to TCU.  , denies blood per rectum.  Family would like  to continue to look for home care in the home so he can return to home staff.  Will likely need home care 24/7.  7/27/22.  No any active issues overnight.  Still has hallucinations.  At times impulsive behavior.  Had a bowel movement today.  Plan is for discharge home with home health care.  Discussed with care.  During multidisciplinary rounds.  7/28/2022.  No acute events overnight.  Patient is awake alert and conversant.  Discharge is pending Home with home health care.  I tried to reach his wife Ilsa on her fall but unable to talk to her.  Discussed with patient's RN and care coordinator.  Hoping for discharge home with assistance at home tomorrow.  7/29/2022.  Patient continues to have hallucinations.  He requires 1 person assist for transfer and uses walker.  He has a Louie catheter in.  Has blanchable coccygeal lesions as well as wound on the right  abdomen and right groin.  Calm and composed.  Disposition plan remains the same, home with home health care.  Discussed care during the collaborative rounds.  7/30/2022.  Patient is awake alert.  He has been having fever since last night.  This morning his temperature is 101.2  F.  Not tachycardic.  Blood pressure is moderately elevated.  No any cough or dyspnea complaints.  No diarrhea.  He tested negative for SARS-CoV-2 on 7/17.   SARS-CoV-2 PCR test ordered today along with CBC, CMP and procalcitonin.  I did auscultate his lungs.  No any wheezes or crackles.  Does not have any urinary complaints.  Data reviewed today: I reviewed all medications, new labs and imaging results over the last 24 hours.  Physical Exam   Vital Signs: Temp: (!) 101.2  F (38.4  C) Temp src: Oral BP: (!) 154/58 Pulse: 66   Resp: 16 SpO2: 96 % O2 Device: None (Room air)    Weight: 183 lbs 6.4 oz   General appearence: awake alert  no apparent distress  Respiratory nonlaboured   neur awake alert, oriented   extremities, moves all extremities       Data   Recent Labs   Lab 07/25/22  0559      POTASSIUM 4.1   CHLORIDE 106   CO2 31   BUN 22   CR 0.76   ANIONGAP 5   CRYS 8.8   GLC 93     Current Facility-Administered Medications   Medication     acetaminophen (TYLENOL) tablet 1,000 mg     allopurinol (ZYLOPRIM) tablet 300 mg     [Held by provider] apixaban ANTICOAGULANT (ELIQUIS) tablet 5 mg     carvedilol (COREG) tablet 12.5 mg     cyanocobalamin (VITAMIN B-12) tablet 1,000 mcg     diclofenac (VOLTAREN) 1 % topical gel 4 g     donepezil (ARICEPT) tablet 5 mg     escitalopram (LEXAPRO) tablet 10 mg     furosemide (LASIX) tablet 40 mg     gabapentin (NEURONTIN) capsule 100 mg     Lidocaine (LIDOCARE) 4 % Patch 1-2 patch     lidocaine (LMX4) cream     lidocaine 1 % 0.1-1 mL     lidocaine patch in PLACE     loratadine (CLARITIN) tablet 10 mg     melatonin tablet 1 mg     multivitamin w/minerals (THERA-VIT-M) tablet 1 tablet     polyethylene glycol (MIRALAX) Packet 17 g     psyllium (METAMUCIL/KONSYL) capsule 3 capsule     risperiDONE (risperDAL) tablet 0.25 mg     risperiDONE (risperDAL) tablet 2 mg     senna-docusate (SENOKOT-S/PERICOLACE) 8.6-50 MG per tablet 1-2 tablet     sodium chloride (PF) 0.9% PF flush 3 mL     sodium chloride (PF) 0.9% PF flush 3 mL     [Held by provider] spironolactone (ALDACTONE) tablet 25 mg     Vitamin D3 (CHOLECALCIFEROL) tablet 25 mcg

## 2022-07-31 LAB
ALBUMIN SERPL-MCNC: 2.5 G/DL (ref 3.4–5)
ALP SERPL-CCNC: 95 U/L (ref 40–150)
ALT SERPL W P-5'-P-CCNC: 15 U/L (ref 0–70)
ANION GAP SERPL CALCULATED.3IONS-SCNC: 5 MMOL/L (ref 3–14)
AST SERPL W P-5'-P-CCNC: 16 U/L (ref 0–45)
BASOPHILS # BLD MANUAL: 0 10E3/UL (ref 0–0.2)
BASOPHILS NFR BLD MANUAL: 1 %
BILIRUB SERPL-MCNC: 0.3 MG/DL (ref 0.2–1.3)
BUN SERPL-MCNC: 24 MG/DL (ref 7–30)
CALCIUM SERPL-MCNC: 8.4 MG/DL (ref 8.5–10.1)
CHLORIDE BLD-SCNC: 106 MMOL/L (ref 94–109)
CO2 SERPL-SCNC: 28 MMOL/L (ref 20–32)
CREAT SERPL-MCNC: 0.7 MG/DL (ref 0.66–1.25)
EOSINOPHIL # BLD MANUAL: 0 10E3/UL (ref 0–0.7)
EOSINOPHIL NFR BLD MANUAL: 0 %
ERYTHROCYTE [DISTWIDTH] IN BLOOD BY AUTOMATED COUNT: 15.7 % (ref 10–15)
GFR SERPL CREATININE-BSD FRML MDRD: >90 ML/MIN/1.73M2
GLUCOSE BLD-MCNC: 116 MG/DL (ref 70–99)
HCT VFR BLD AUTO: 31.2 % (ref 40–53)
HGB BLD-MCNC: 9.7 G/DL (ref 13.3–17.7)
LYMPHOCYTES # BLD MANUAL: 0.2 10E3/UL (ref 0.8–5.3)
LYMPHOCYTES NFR BLD MANUAL: 6 %
MCH RBC QN AUTO: 27.8 PG (ref 26.5–33)
MCHC RBC AUTO-ENTMCNC: 31.1 G/DL (ref 31.5–36.5)
MCV RBC AUTO: 89 FL (ref 78–100)
MONOCYTES # BLD MANUAL: 0.8 10E3/UL (ref 0–1.3)
MONOCYTES NFR BLD MANUAL: 29 %
NEUTROPHILS # BLD MANUAL: 1.7 10E3/UL (ref 1.6–8.3)
NEUTROPHILS NFR BLD MANUAL: 64 %
NRBC # BLD AUTO: 0 10E3/UL
NRBC BLD MANUAL-RTO: 1 %
PLAT MORPH BLD: ABNORMAL
PLATELET # BLD AUTO: 148 10E3/UL (ref 150–450)
POTASSIUM BLD-SCNC: 3.6 MMOL/L (ref 3.4–5.3)
PROCALCITONIN SERPL-MCNC: 0.06 NG/ML
PROT SERPL-MCNC: 5.8 G/DL (ref 6.8–8.8)
RBC # BLD AUTO: 3.49 10E6/UL (ref 4.4–5.9)
RBC MORPH BLD: ABNORMAL
SODIUM SERPL-SCNC: 139 MMOL/L (ref 133–144)
WBC # BLD AUTO: 2.6 10E3/UL (ref 4–11)

## 2022-07-31 PROCEDURE — 80053 COMPREHEN METABOLIC PANEL: CPT | Performed by: STUDENT IN AN ORGANIZED HEALTH CARE EDUCATION/TRAINING PROGRAM

## 2022-07-31 PROCEDURE — 250N000013 HC RX MED GY IP 250 OP 250 PS 637: Performed by: PSYCHIATRY & NEUROLOGY

## 2022-07-31 PROCEDURE — 250N000013 HC RX MED GY IP 250 OP 250 PS 637: Performed by: INTERNAL MEDICINE

## 2022-07-31 PROCEDURE — 36415 COLL VENOUS BLD VENIPUNCTURE: CPT | Performed by: STUDENT IN AN ORGANIZED HEALTH CARE EDUCATION/TRAINING PROGRAM

## 2022-07-31 PROCEDURE — 85027 COMPLETE CBC AUTOMATED: CPT | Performed by: STUDENT IN AN ORGANIZED HEALTH CARE EDUCATION/TRAINING PROGRAM

## 2022-07-31 PROCEDURE — 120N000002 HC R&B MED SURG/OB UMMC

## 2022-07-31 PROCEDURE — 99232 SBSQ HOSP IP/OBS MODERATE 35: CPT | Performed by: INTERNAL MEDICINE

## 2022-07-31 PROCEDURE — 85007 BL SMEAR W/DIFF WBC COUNT: CPT | Performed by: STUDENT IN AN ORGANIZED HEALTH CARE EDUCATION/TRAINING PROGRAM

## 2022-07-31 PROCEDURE — 84145 PROCALCITONIN (PCT): CPT | Performed by: STUDENT IN AN ORGANIZED HEALTH CARE EDUCATION/TRAINING PROGRAM

## 2022-07-31 PROCEDURE — 250N000013 HC RX MED GY IP 250 OP 250 PS 637: Performed by: NURSE PRACTITIONER

## 2022-07-31 PROCEDURE — 250N000013 HC RX MED GY IP 250 OP 250 PS 637

## 2022-07-31 RX ADMIN — APIXABAN 5 MG: 5 TABLET, FILM COATED ORAL at 20:48

## 2022-07-31 RX ADMIN — DONEPEZIL HYDROCHLORIDE 5 MG: 5 TABLET, FILM COATED ORAL at 20:48

## 2022-07-31 RX ADMIN — RISPERIDONE 0.25 MG: 0.25 TABLET ORAL at 08:06

## 2022-07-31 RX ADMIN — GABAPENTIN 100 MG: 100 CAPSULE ORAL at 20:48

## 2022-07-31 RX ADMIN — GABAPENTIN 100 MG: 100 CAPSULE ORAL at 08:06

## 2022-07-31 RX ADMIN — ACETAMINOPHEN 1000 MG: 500 TABLET ORAL at 14:18

## 2022-07-31 RX ADMIN — LORATADINE 10 MG: 10 TABLET ORAL at 08:06

## 2022-07-31 RX ADMIN — FUROSEMIDE 40 MG: 20 TABLET ORAL at 16:58

## 2022-07-31 RX ADMIN — RISPERIDONE 2 MG: 0.5 TABLET ORAL at 20:48

## 2022-07-31 RX ADMIN — CYANOCOBALAMIN TAB 1000 MCG 1000 MCG: 1000 TAB at 08:06

## 2022-07-31 RX ADMIN — FUROSEMIDE 40 MG: 20 TABLET ORAL at 08:06

## 2022-07-31 RX ADMIN — LIDOCAINE PATCH 4% 1 PATCH: 40 PATCH TOPICAL at 08:06

## 2022-07-31 RX ADMIN — CARVEDILOL 12.5 MG: 12.5 TABLET, FILM COATED ORAL at 08:06

## 2022-07-31 RX ADMIN — MULTIPLE VITAMINS W/ MINERALS TAB 1 TABLET: TAB at 08:06

## 2022-07-31 RX ADMIN — Medication 25 MCG: at 08:06

## 2022-07-31 RX ADMIN — ESCITALOPRAM OXALATE 10 MG: 10 TABLET ORAL at 08:06

## 2022-07-31 RX ADMIN — ACETAMINOPHEN 1000 MG: 500 TABLET ORAL at 05:16

## 2022-07-31 RX ADMIN — GABAPENTIN 100 MG: 100 CAPSULE ORAL at 14:19

## 2022-07-31 RX ADMIN — ALLOPURINOL 300 MG: 300 TABLET ORAL at 08:06

## 2022-07-31 RX ADMIN — CARVEDILOL 12.5 MG: 12.5 TABLET, FILM COATED ORAL at 20:48

## 2022-07-31 ASSESSMENT — ACTIVITIES OF DAILY LIVING (ADL)
ADLS_ACUITY_SCORE: 44
ADLS_ACUITY_SCORE: 42
ADLS_ACUITY_SCORE: 44
ADLS_ACUITY_SCORE: 42
ADLS_ACUITY_SCORE: 44
ADLS_ACUITY_SCORE: 42

## 2022-07-31 NOTE — PLAN OF CARE
19:00-23:00  Pt has fluctuating disorientation to situation and place, bed alarm maintained. Denies pain. Not OOB on shift.    Pt reports baseline numbness in BLE.    Pt having loose BM's this AM.     Mild edema in BLE. Pt groin fold skin pink, intact, cleaned/barrier applied per POC. L abdominal fold skin slough, cleaned w/wound cleanser/barrier applied.     Chronic catheter for retention, patent, 550 donna/clear output.

## 2022-07-31 NOTE — PROGRESS NOTES
[facility]    Medicine Progress Note - Hospitalist Service, GOLD TEAM 18    Date of Admission:  6/28/2022    Assessment & Plan        William Almazan is a 81 year old male with history of CAD s/p CABG, dilated cardiomyopathy, HFrEF, PAF on Eliquis, HTN, HLD, DM2, KRISTIAN, prostate cancer, gout, and dementia who presented on 6/28/22 with worsening auditory hallucinations.     Auditory hallucinations, Dementia with behavioral health disturbances.   ---   Presented with progressive auditory hallucinations, including self harm command hallucinations.   ---   Hallucination present since ~2020 following death of son.  --- still having hallucinations   ---   Patient continued to have auditory hallucination of a friend telling him to harm himself.  ---   He denied suicidal ideation  ---   1:1 sitter d/c'd by psychiatry on 7/13/22  ---   Denied worsening depression or anxiety.   ---   No visual or tactile hallucinations.   ---   Family not able to manage at home.   ---   Continue Risperdal 0.5 mg qam and 2 mg at bedtime  ---   Continue donepezil 5 mg daily  ---   Continue gabapentin 100 mg po tid  ---   Lexapro 10 mg started 7/25 .     Full thickness rectal prolapse, initially noted 7/15  ---   It appears to be full-thickness but reducible  ---   Intermittently bleeding with bowel movements  ---   Hgb remained stable at 10 - 11+ g  ---   Avoid constipation  ---   Psyllium fiber 3 capsule daily, MiraLAX 17 g daily and senna 1-2 tabs bid prn ordered  ---  Dr Kinney Discussed patient's condition with CRS consult service.  Recommendation is for fiber and avoid constipation.  Surgical option is colostomy to which the patient is probably not the best candidate.  ---   CRS recommended outpt clinic follow up 4-6 weeks       Left hip pain  --- complains of  7/19 and seen by cross cover  per notes patient  Was seen by orthopedic surgery  In April for hip pain  and has severe arthritis  tylenol  OTC added as well as lidocaine patch and volatern cream, avoid NSAIDS with rectal bleed  ---states his pain is at baseline     Tremor, whole body  ---   New since admit  ---   consulted  neurology per patient's family request, and was felt to be stress related    -- continue B12 supplements , follow up  Levels as out patient       Urinary retention  ---   Chronic indwelling keating catheter.     Chronic HFrEF:    ---   Hx dilated cardiomyopathy.   ---   Most recent TTE 6/18/21 showing severely dilated LV with EF 20%.   ---   Currently euvolemic on exam.   ---   No hypoxia or pulmonary complaints.   ---   Has trace LE edema.   ---   Continue Coreg to 12.5 mg PO BID   ---   Continue PTA Lasix 40 mg bid but might need to cut back if weight down   ---   Daily weights, I&O's, in sign negative balance,  Needs daily weights  - BMP OK  7/25       CAD s/p CABG (1992):    ---   Not ASA or statin at home.   ---   No acute concerns.   ---   Coreg to 12.5 mg PO BID     PAF   ---   No acute concerns.  ---   Eliquis 5mg BID was held with rectal bleed. No any ongoing rectal bleed and after he tested positive for covid-19 he was put back on  Eliquis 5 mg po BID     T2DM:    ---   Diet controlled.   ---   Glucose stable.      KRISTIAN:    ---   Does not use CPAP at home.        Prostate CA:    ---   Treated with XRT and hormone therapy.   ---   Hx urinary retention with chronic indwelling keating.     Gout:    ---   Continue PTA allopurinol      COVID-19 infection.  Tested positive on 7/30/2022.  No signs of severity.  Symptoms have started on 7/29/2022.  He did have fever and occasional cough.       Diet: Combination Diet Regular Diet Adult    DVT Prophylaxis: DOAC  Keating Catheter: PRESENT, indication: Retention, Retention  Central Lines: None  Cardiac Monitoring: None  Code Status: Full Code             Diet: Combination Diet Regular Diet Adult    DVT Prophylaxis: ambulate AC on hold with rectal bleed   Keating Catheter: PRESENT, indication:  Retention, Retention  Central Lines: None  Cardiac Monitoring: None  Code Status: Full Code      Disposition Plan         The patient's care was discussed with the care team .  7/24 with son by bedside     Rhiannon Peña MD  Hospitalist Service, GOLD TEAM 18  [facility]  Securely message with the Vocera Web Console (learn more here)  Text page via [facility] Paging/Directory   Please see signed in provider for up to date coverage information      Clinically Significant Risk Factors Present on Admission                      ______________________________________________________________________    Interval History    7/26/2022.  Awake and alert.  Continues to have hallucinations.  He tells me that he does not need help at home as soon as I walked into his room to examine him.  He does not want to go to TCU.  , denies blood per rectum.  Family would like  to continue to look for home care in the home so he can return to home staff.  Will likely need home care 24/7.  7/27/22.  No any active issues overnight.  Still has hallucinations.  At times impulsive behavior.  Had a bowel movement today.  Plan is for discharge home with home health care.  Discussed with care.  During multidisciplinary rounds.  7/28/2022.  No acute events overnight.  Patient is awake alert and conversant.  Discharge is pending Home with home health care.  I tried to reach his wife Ilsa on her fall but unable to talk to her.  Discussed with patient's RN and care coordinator.  Hoping for discharge home with assistance at home tomorrow.  7/29/2022.  Patient continues to have hallucinations.  He requires 1 person assist for transfer and uses walker.  He has a Louie catheter in.  Has blanchable coccygeal lesions as well as wound on the right  abdomen and right groin.  Calm and composed.  Disposition plan remains the same, home with home health care.  Discussed care during the collaborative rounds.  7/30/2022.   Patient is awake alert.  He has been having fever since last night.  This morning his temperature is 101.2  F.  Not tachycardic.  Blood pressure is moderately elevated.  No any cough or dyspnea complaints.  No diarrhea.  He tested negative for SARS-CoV-2 on 7/17.  SARS-CoV-2 PCR test ordered today along with CBC, CMP and procalcitonin.  I did auscultate his lungs.  No any wheezes or crackles.  Does not have any urinary complaints.  7/31/2022.  Patient awake alert was eating his breakfast when I came.  Patient denies any pain.  Vital signs stable.  SARS-CoV-2 screen positive from 7/30/22.  That explains the fever that he had yesterday.  He is not needing any oxygen.  No complaining of any dyspnea.  Has occasional cough.  In contact plus precautions/isolation.    Data reviewed today: I reviewed all medications, new labs and imaging results over the last 24 hours.  Physical Exam   Vital Signs: Temp: 97.8  F (36.6  C) Temp src: Oral BP: 118/58 Pulse: 67   Resp: 16 SpO2: 95 % O2 Device: None (Room air)    Weight: 183 lbs 6.4 oz   General appearence: awake alert  no apparent distress  Respiratory nonlaboured   neur awake alert, oriented   extremities, moves all extremities       Data   Recent Labs   Lab 07/31/22  0608 07/25/22  0559   WBC 2.6*  --    HGB 9.7*  --    MCV 89  --    *  --     142   POTASSIUM 3.6 4.1   CHLORIDE 106 106   CO2 28 31   BUN 24 22   CR 0.70 0.76   ANIONGAP 5 5   CRYS 8.4* 8.8   * 93   ALBUMIN 2.5*  --    PROTTOTAL 5.8*  --    BILITOTAL 0.3  --    ALKPHOS 95  --    ALT 15  --    AST 16  --      Current Facility-Administered Medications   Medication     acetaminophen (TYLENOL) tablet 1,000 mg     allopurinol (ZYLOPRIM) tablet 300 mg     [Held by provider] apixaban ANTICOAGULANT (ELIQUIS) tablet 5 mg     carvedilol (COREG) tablet 12.5 mg     cyanocobalamin (VITAMIN B-12) tablet 1,000 mcg     diclofenac (VOLTAREN) 1 % topical gel 4 g     donepezil (ARICEPT) tablet 5 mg      escitalopram (LEXAPRO) tablet 10 mg     furosemide (LASIX) tablet 40 mg     gabapentin (NEURONTIN) capsule 100 mg     Lidocaine (LIDOCARE) 4 % Patch 1-2 patch     lidocaine (LMX4) cream     lidocaine 1 % 0.1-1 mL     lidocaine patch in PLACE     loratadine (CLARITIN) tablet 10 mg     melatonin tablet 1 mg     multivitamin w/minerals (THERA-VIT-M) tablet 1 tablet     polyethylene glycol (MIRALAX) Packet 17 g     psyllium (METAMUCIL/KONSYL) capsule 3 capsule     risperiDONE (risperDAL) tablet 0.25 mg     risperiDONE (risperDAL) tablet 2 mg     senna-docusate (SENOKOT-S/PERICOLACE) 8.6-50 MG per tablet 1-2 tablet     sodium chloride (PF) 0.9% PF flush 3 mL     sodium chloride (PF) 0.9% PF flush 3 mL     [Held by provider] spironolactone (ALDACTONE) tablet 25 mg     Vitamin D3 (CHOLECALCIFEROL) tablet 25 mcg

## 2022-07-31 NOTE — PLAN OF CARE
"Goal Outcome Evaluation:  /62 (BP Location: Right arm)   Pulse 67   Temp 97.8  F (36.6  C) (Oral)   Resp 16   Ht 1.753 m (5' 9\")   Wt 83.2 kg (183 lb 6.4 oz)   SpO2 95%   BMI 27.08 kg/m         Pt a/o but intermittently sleeping in between meals. Pt complains of fatigue and has infrequent coughing. c/o pain in toes; Tylenol given with good effect. Pt had diarrhea X1 this shift. Louie draining adequately with yellow clear urine. Up with assist of 1 GB+walker to BR. Bed alarm on for safety but able to use call light appropriately. Covid precautions maintained. Continue with POC.                 "

## 2022-07-31 NOTE — PLAN OF CARE
"/58 (BP Location: Right arm, Patient Position: Semi-Ruby's, Cuff Size: Adult Regular)   Pulse 68   Temp (!) 96.4  F (35.8  C) (Oral)   Resp 16   Ht 1.753 m (5' 9\")   Wt 83.2 kg (183 lb 6.4 oz)   SpO2 94%   BMI 27.08 kg/m      A&Ox2. Disorientated to time and situation. BA on.   COVID precautions.   Louie for retention.   LBM 7/30 (loose).   SBA w/ walker/GB.   No IV access.   Pt c/o 8/10 pain in R thigh and received tylenol for pain.   L abdominal + groin wounds.   Plan: continue POC. Pending discharge home w/ home care 8/1 per care coordinator note.     "

## 2022-08-01 PROCEDURE — 250N000013 HC RX MED GY IP 250 OP 250 PS 637: Performed by: INTERNAL MEDICINE

## 2022-08-01 PROCEDURE — 250N000013 HC RX MED GY IP 250 OP 250 PS 637: Performed by: PSYCHIATRY & NEUROLOGY

## 2022-08-01 PROCEDURE — 250N000013 HC RX MED GY IP 250 OP 250 PS 637: Performed by: PHYSICIAN ASSISTANT

## 2022-08-01 PROCEDURE — 99232 SBSQ HOSP IP/OBS MODERATE 35: CPT | Performed by: INTERNAL MEDICINE

## 2022-08-01 PROCEDURE — 120N000002 HC R&B MED SURG/OB UMMC

## 2022-08-01 PROCEDURE — 250N000013 HC RX MED GY IP 250 OP 250 PS 637: Performed by: NURSE PRACTITIONER

## 2022-08-01 PROCEDURE — 250N000013 HC RX MED GY IP 250 OP 250 PS 637

## 2022-08-01 RX ADMIN — GABAPENTIN 100 MG: 100 CAPSULE ORAL at 08:51

## 2022-08-01 RX ADMIN — CARVEDILOL 12.5 MG: 12.5 TABLET, FILM COATED ORAL at 08:51

## 2022-08-01 RX ADMIN — GABAPENTIN 100 MG: 100 CAPSULE ORAL at 14:06

## 2022-08-01 RX ADMIN — Medication 25 MCG: at 08:51

## 2022-08-01 RX ADMIN — LIDOCAINE PATCH 4% 1 PATCH: 40 PATCH TOPICAL at 08:51

## 2022-08-01 RX ADMIN — LORATADINE 10 MG: 10 TABLET ORAL at 08:51

## 2022-08-01 RX ADMIN — FUROSEMIDE 40 MG: 20 TABLET ORAL at 15:58

## 2022-08-01 RX ADMIN — CYANOCOBALAMIN TAB 1000 MCG 1000 MCG: 1000 TAB at 08:50

## 2022-08-01 RX ADMIN — FUROSEMIDE 40 MG: 20 TABLET ORAL at 08:50

## 2022-08-01 RX ADMIN — APIXABAN 5 MG: 5 TABLET, FILM COATED ORAL at 21:08

## 2022-08-01 RX ADMIN — ESCITALOPRAM OXALATE 10 MG: 10 TABLET ORAL at 09:18

## 2022-08-01 RX ADMIN — APIXABAN 5 MG: 5 TABLET, FILM COATED ORAL at 08:51

## 2022-08-01 RX ADMIN — MULTIPLE VITAMINS W/ MINERALS TAB 1 TABLET: TAB at 08:50

## 2022-08-01 RX ADMIN — RISPERIDONE 0.25 MG: 0.25 TABLET ORAL at 08:51

## 2022-08-01 RX ADMIN — ALLOPURINOL 300 MG: 300 TABLET ORAL at 08:51

## 2022-08-01 RX ADMIN — RISPERIDONE 2 MG: 0.5 TABLET ORAL at 21:08

## 2022-08-01 RX ADMIN — DONEPEZIL HYDROCHLORIDE 5 MG: 5 TABLET, FILM COATED ORAL at 21:09

## 2022-08-01 RX ADMIN — Medication 3 CAPSULE: at 08:49

## 2022-08-01 RX ADMIN — GABAPENTIN 100 MG: 100 CAPSULE ORAL at 21:09

## 2022-08-01 RX ADMIN — CARVEDILOL 12.5 MG: 12.5 TABLET, FILM COATED ORAL at 21:07

## 2022-08-01 ASSESSMENT — ACTIVITIES OF DAILY LIVING (ADL)
ADLS_ACUITY_SCORE: 42

## 2022-08-01 NOTE — PROGRESS NOTES
"/65 (BP Location: Right arm)   Pulse 70   Temp 98.3  F (36.8  C) (Oral)   Resp 16   Ht 1.753 m (5' 9\")   Wt 83.2 kg (183 lb 6.4 oz)   SpO2 95%   BMI 27.08 kg/m       Covid positive. Pt still reporting loose stools; LBM 8/1/22 per pt report. Pt denies SOB or chest pain, pt complaining of intermittent cough with some phlegm; lung sounds clear and equal. Bed alarm on; making frequent checks on patient. Louie is patent and draining; 1550 mL output this shift.    Pt reports auditory hallucinations today, when asked what the voices are saying, pt reports: \"they are punishing me for telling the truth, but I feel safe here\".     Plan for discharge is TBD with positive covid test; home care.  "

## 2022-08-01 NOTE — PROGRESS NOTES
[facility]    Medicine Progress Note - Hospitalist Service, GOLD TEAM 18    Date of Admission:  6/28/2022    Assessment & Plan        William Almazan is a 81 year old male with history of CAD s/p CABG, dilated cardiomyopathy, HFrEF, PAF on Eliquis, HTN, HLD, DM2, KRISTIAN, prostate cancer, gout, and dementia who presented on 6/28/22 with worsening auditory hallucinations.     Auditory hallucinations, Dementia with behavioral health disturbances.   ---   Presented with progressive auditory hallucinations, including self harm command hallucinations.   ---   Hallucination present since ~2020 following death of son.  --- still having hallucinations   ---   Patient continued to have auditory hallucination of a friend telling him to harm himself.  ---   He denied suicidal ideation  ---   1:1 sitter d/c'd by psychiatry on 7/13/22  ---   Denied worsening depression or anxiety.   ---   No visual or tactile hallucinations.   ---   Family not able to manage at home.   ---   Continue Risperdal 0.5 mg qam and 2 mg at bedtime  ---   Continue donepezil 5 mg daily  ---   Continue gabapentin 100 mg po tid  ---   Lexapro 10 mg started 7/25 .     Full thickness rectal prolapse, initially noted 7/15  ---   It appears to be full-thickness but reducible  ---   Intermittently bleeding with bowel movements  ---   Hgb remained stable at 10 - 11+ g  ---   Avoid constipation  ---   Psyllium fiber 3 capsule daily, MiraLAX 17 g daily and senna 1-2 tabs bid prn ordered  ---  Dr Kinney Discussed patient's condition with CRS consult service.  Recommendation is for fiber and avoid constipation.  Surgical option is colostomy to which the patient is probably not the best candidate.  ---   CRS recommended outpt clinic follow up 4-6 weeks       Left hip pain  --- complains of  7/19 and seen by cross cover  per notes patient  Was seen by orthopedic surgery  In April for hip pain  and has severe arthritis  tylenol  OTC added as well as lidocaine patch and volatern cream, avoid NSAIDS with rectal bleed  ---states his pain is at baseline     Tremor, whole body  ---   New since admit  ---   consulted  neurology per patient's family request, and was felt to be stress related    -- continue B12 supplements , follow up  Levels as out patient       Urinary retention  ---   Chronic indwelling keating catheter.     Chronic HFrEF:    ---   Hx dilated cardiomyopathy.   ---   Most recent TTE 6/18/21 showing severely dilated LV with EF 20%.   ---   Currently euvolemic on exam.   ---   No hypoxia or pulmonary complaints.   ---   Has trace LE edema.   ---   Continue Coreg to 12.5 mg PO BID   ---   Continue PTA Lasix 40 mg bid but might need to cut back if weight down   ---   Daily weights, I&O's, in sign negative balance,  Needs daily weights  - BMP OK  7/25       CAD s/p CABG (1992):    ---   Not ASA or statin at home.   ---   No acute concerns.   ---   Coreg to 12.5 mg PO BID     PAF   ---   No acute concerns.  ---   Eliquis 5mg BID was held with rectal bleed. No any ongoing rectal bleed and after he tested positive for covid-19 he was put back on  Eliquis 5 mg po BID     T2DM:    ---   Diet controlled.   ---   Glucose stable.      KRISTIAN:    ---   Does not use CPAP at home.        Prostate CA:    ---   Treated with XRT and hormone therapy.   ---   Hx urinary retention with chronic indwelling keating.     Gout:    ---   Continue PTA allopurinol      COVID-19 infection.  Tested positive on 7/30/2022.  No signs of severity.  Symptoms have started on 7/29/2022.  He did have fever and occasional cough.    Patient's symptoms are mild.  Discussed with his wife.  I called her on an phone [phone].  Wife told me that she had vomiting and diarrhea since Wednesday 4 days ago and feels weak.  She is the primary caregiver and waiting for COVID test results from [facility] pharmacy where she was tested last Sunday yesterday.       Diet: Combination Diet  Regular Diet Adult    DVT Prophylaxis: DOAC  Louie Catheter: PRESENT, indication: Retention, Retention  Central Lines: None  Cardiac Monitoring: None  Code Status: Full Code             Diet: Combination Diet Regular Diet Adult    DVT Prophylaxis: ambulate AC on hold with rectal bleed   Louie Catheter: PRESENT, indication: Retention, Retention  Central Lines: None  Cardiac Monitoring: None  Code Status: Full Code      Disposition Plan         The patient's care was discussed with the care team .  7/24 with son by bedside     Rhiannon Peña MD  Hospitalist Service, GOLD TEAM 18  [facility]  Securely message with the Vocera Web Console (learn more here)  Text page via [facility] Paging/Directory   Please see signed in provider for up to date coverage information      Clinically Significant Risk Factors Present on Admission                      ______________________________________________________________________    Interval History    7/26/2022.  Awake and alert.  Continues to have hallucinations.  He tells me that he does not need help at home as soon as I walked into his room to examine him.  He does not want to go to TCU.  , denies blood per rectum.  Family would like  to continue to look for home care in the home so he can return to home staff.  Will likely need home care 24/7.  7/27/22.  No any active issues overnight.  Still has hallucinations.  At times impulsive behavior.  Had a bowel movement today.  Plan is for discharge home with home health care.  Discussed with care.  During multidisciplinary rounds.  7/28/2022.  No acute events overnight.  Patient is awake alert and conversant.  Discharge is pending Home with home health care.  I tried to reach his wife Ilsa on her fall but unable to talk to her.  Discussed with patient's RN and care coordinator.  Hoping for discharge home with assistance at home tomorrow.  7/29/2022.  Patient continues to have hallucinations.   He requires 1 person assist for transfer and uses walker.  He has a Louie catheter in.  Has blanchable coccygeal lesions as well as wound on the right  abdomen and right groin.  Calm and composed.  Disposition plan remains the same, home with home health care.  Discussed care during the collaborative rounds.  7/30/2022.  Patient is awake alert.  He has been having fever since last night.  This morning his temperature is 101.2  F.  Not tachycardic.  Blood pressure is moderately elevated.  No any cough or dyspnea complaints.  No diarrhea.  He tested negative for SARS-CoV-2 on 7/17.  SARS-CoV-2 PCR test ordered today along with CBC, CMP and procalcitonin.  I did auscultate his lungs.  No any wheezes or crackles.  Does not have any urinary complaints.  7/31/2022.  Patient awake alert was eating his breakfast when I came.  Patient denies any pain.  Vital signs stable.  SARS-CoV-2 screen positive from 7/30/22.  That explains the fever that he had yesterday.  He is not needing any oxygen.  No complaining of any dyspnea.  Has occasional cough.  In contact plus precautions/isolation.  8/20/2022.COVID-19 infection.  Tested positive on 7/30/2022.  No signs of severity.  Symptoms have started on 7/29/2022.  He did have fever and occasional cough.    Patient's symptoms are mild.  Discussed with his wife.  I called her on an phone [phone].  Wife told me that she had vomiting and diarrhea since Wednesday 4 days ago and feels weak.  She is the primary caregiver and waiting for COVID test results from [facility] pharmacy where she was tested last Sunday yesterday.  She told me that she is happy with the care her  and she had received the hospital, but states she is not ready to have him back to her house today, while she does not know her on the status and while still feeling weak from the vomiting and diarrhea that she has been having she thinks is likely from COVID infection.  Data reviewed today: I reviewed all  medications, new labs and imaging results over the last 24 hours.  Physical Exam   Vital Signs: Temp: 98.3  F (36.8  C) Temp src: Oral BP: 132/65 Pulse: 70   Resp: 16 SpO2: 95 % O2 Device: None (Room air)    Weight: 183 lbs 6.4 oz   General appearence: awake alert  no apparent distress  Respiratory nonlaboured   neur awake alert, oriented   extremities, moves all extremities       Data   Recent Labs   Lab 07/31/22  0608   WBC 2.6*   HGB 9.7*   MCV 89   *      POTASSIUM 3.6   CHLORIDE 106   CO2 28   BUN 24   CR 0.70   ANIONGAP 5   CRYS 8.4*   *   ALBUMIN 2.5*   PROTTOTAL 5.8*   BILITOTAL 0.3   ALKPHOS 95   ALT 15   AST 16     Current Facility-Administered Medications   Medication     acetaminophen (TYLENOL) tablet 1,000 mg     allopurinol (ZYLOPRIM) tablet 300 mg     apixaban ANTICOAGULANT (ELIQUIS) tablet 5 mg     carvedilol (COREG) tablet 12.5 mg     cyanocobalamin (VITAMIN B-12) tablet 1,000 mcg     diclofenac (VOLTAREN) 1 % topical gel 4 g     donepezil (ARICEPT) tablet 5 mg     escitalopram (LEXAPRO) tablet 10 mg     furosemide (LASIX) tablet 40 mg     gabapentin (NEURONTIN) capsule 100 mg     Lidocaine (LIDOCARE) 4 % Patch 1-2 patch     lidocaine (LMX4) cream     lidocaine 1 % 0.1-1 mL     lidocaine patch in PLACE     loratadine (CLARITIN) tablet 10 mg     melatonin tablet 1 mg     multivitamin w/minerals (THERA-VIT-M) tablet 1 tablet     polyethylene glycol (MIRALAX) Packet 17 g     psyllium (METAMUCIL/KONSYL) capsule 3 capsule     risperiDONE (risperDAL) tablet 0.25 mg     risperiDONE (risperDAL) tablet 2 mg     senna-docusate (SENOKOT-S/PERICOLACE) 8.6-50 MG per tablet 1-2 tablet     sodium chloride (PF) 0.9% PF flush 3 mL     [Held by provider] spironolactone (ALDACTONE) tablet 25 mg     Vitamin D3 (CHOLECALCIFEROL) tablet 25 mcg

## 2022-08-01 NOTE — PLAN OF CARE
"Nursing Assessment:  VT: /67 (BP Location: Right arm)   Pulse 65   Temp 99.3  F (37.4  C) (Oral)   Resp 16   Ht 1.753 m (5' 9\")   Wt 83.2 kg (183 lb 6.4 oz)   SpO2 94%   BMI 27.08 kg/m       Neuro: pt still having hallucinations, reported that the voices are telling him to hurt himself and that he should be punished for telling the truth about the voices. He denied any SI or thoughts of self-harm    Pain Management:  Denied pain    Nursing Plan:  Continue POC    Discharge Disposition:   Discharge home with home care.  "

## 2022-08-01 NOTE — PLAN OF CARE
"/59   Pulse 71   Temp 98.4  F (36.9  C) (Oral)   Resp 16   Ht 1.753 m (5' 9\")   Wt 83.2 kg (183 lb 6.4 oz)   SpO2 95%   BMI 27.08 kg/m  on RA  Asymptomatic covid positive except loose BMs.    Denies SOB, CP, change in tastes / smell.   Flat affect , denies hallucinations  or  Suicidal thought.    Pt denies pain.   Lidocaine off from left knee.  Fair appetite ,  keeping meal tray at bedside  per pt's request but not eating.  Encouraged  PO fluid intake.    Bed alarm on for safety.   Pt used call light for assist well.  Louie patent with adequate urine output.  LBM  7/31/22.  Bowel meds held  due to loose BM.  Discharge plan  :  TBD  for discharge date due to covid +.   home with  home care.              "

## 2022-08-02 ENCOUNTER — APPOINTMENT (OUTPATIENT)
Dept: GENERAL RADIOLOGY | Facility: CLINIC | Age: 82
DRG: 640 | End: 2022-08-02
Attending: EMERGENCY MEDICINE
Payer: MEDICARE

## 2022-08-02 ENCOUNTER — HOSPITAL ENCOUNTER (INPATIENT)
Facility: CLINIC | Age: 82
LOS: 21 days | Discharge: NURSING, REHAB OR LONG-TERM CARE | DRG: 640 | End: 2022-08-24
Attending: EMERGENCY MEDICINE | Admitting: HOSPITALIST
Payer: MEDICARE

## 2022-08-02 VITALS
HEIGHT: 69 IN | DIASTOLIC BLOOD PRESSURE: 68 MMHG | TEMPERATURE: 97.9 F | BODY MASS INDEX: 27.16 KG/M2 | HEART RATE: 57 BPM | RESPIRATION RATE: 15 BRPM | SYSTOLIC BLOOD PRESSURE: 113 MMHG | OXYGEN SATURATION: 96 % | WEIGHT: 183.4 LBS

## 2022-08-02 DIAGNOSIS — K59.00 CONSTIPATION, UNSPECIFIED CONSTIPATION TYPE: ICD-10-CM

## 2022-08-02 DIAGNOSIS — I50.22 CHRONIC SYSTOLIC HEART FAILURE (H): ICD-10-CM

## 2022-08-02 DIAGNOSIS — G89.29 CHRONIC PAIN OF BOTH KNEES: ICD-10-CM

## 2022-08-02 DIAGNOSIS — F03.91 DEMENTIA WITH BEHAVIORAL DISTURBANCE, UNSPECIFIED DEMENTIA TYPE: ICD-10-CM

## 2022-08-02 DIAGNOSIS — M25.562 CHRONIC PAIN OF BOTH KNEES: ICD-10-CM

## 2022-08-02 DIAGNOSIS — I95.9 HYPOTENSION, UNSPECIFIED HYPOTENSION TYPE: ICD-10-CM

## 2022-08-02 DIAGNOSIS — U07.1 INFECTION DUE TO 2019 NOVEL CORONAVIRUS: ICD-10-CM

## 2022-08-02 DIAGNOSIS — M25.561 CHRONIC PAIN OF BOTH KNEES: ICD-10-CM

## 2022-08-02 DIAGNOSIS — F06.30 DEPRESSIVE DISORDER DUE TO SEPARATE MEDICAL CONDITION: ICD-10-CM

## 2022-08-02 DIAGNOSIS — F29 PSYCHOSIS, UNSPECIFIED PSYCHOSIS TYPE (H): ICD-10-CM

## 2022-08-02 DIAGNOSIS — F03.92 DEMENTIA WITH PSYCHOSIS (H): ICD-10-CM

## 2022-08-02 DIAGNOSIS — F03.91 DEMENTIA WITH BEHAVIORAL DISTURBANCE, UNSPECIFIED DEMENTIA TYPE: Primary | ICD-10-CM

## 2022-08-02 DIAGNOSIS — E61.1 IRON DEFICIENCY: Primary | ICD-10-CM

## 2022-08-02 LAB
ALBUMIN SERPL BCG-MCNC: 3.2 G/DL (ref 3.5–5.2)
ALP SERPL-CCNC: 97 U/L (ref 40–129)
ALT SERPL W P-5'-P-CCNC: 15 U/L (ref 10–50)
ANION GAP SERPL CALCULATED.3IONS-SCNC: 16 MMOL/L (ref 7–15)
AST SERPL W P-5'-P-CCNC: 35 U/L (ref 10–50)
BASOPHILS # BLD AUTO: 0 10E3/UL (ref 0–0.2)
BASOPHILS NFR BLD AUTO: 0 %
BILIRUB SERPL-MCNC: 0.6 MG/DL
BUN SERPL-MCNC: 23.9 MG/DL (ref 8–23)
CA-I BLD-MCNC: 4.8 MG/DL (ref 4.4–5.2)
CALCIUM SERPL-MCNC: 8.7 MG/DL (ref 8.8–10.2)
CHLORIDE SERPL-SCNC: 103 MMOL/L (ref 98–107)
CPB POCT: NO
CREAT SERPL-MCNC: 1.28 MG/DL (ref 0.67–1.17)
DEPRECATED HCO3 PLAS-SCNC: 21 MMOL/L (ref 22–29)
EOSINOPHIL # BLD AUTO: 0 10E3/UL (ref 0–0.7)
EOSINOPHIL NFR BLD AUTO: 0 %
ERYTHROCYTE [DISTWIDTH] IN BLOOD BY AUTOMATED COUNT: 15.5 % (ref 10–15)
GFR SERPL CREATININE-BSD FRML MDRD: 56 ML/MIN/1.73M2
GLUCOSE BLD-MCNC: 192 MG/DL (ref 70–99)
GLUCOSE SERPL-MCNC: 198 MG/DL (ref 70–99)
HCO3 BLDV-SCNC: 28 MMOL/L (ref 21–28)
HCO3 BLDV-SCNC: 29 MMOL/L (ref 21–28)
HCT VFR BLD AUTO: 36.5 % (ref 40–53)
HCT VFR BLD CALC: 35 % (ref 40–53)
HGB BLD-MCNC: 11.1 G/DL (ref 13.3–17.7)
HGB BLD-MCNC: 11.9 G/DL (ref 13.3–17.7)
HOLD SPECIMEN: NORMAL
IMM GRANULOCYTES # BLD: 0.1 10E3/UL
IMM GRANULOCYTES NFR BLD: 1 %
LACTATE BLD-SCNC: 3 MMOL/L
LACTATE SERPL-SCNC: 3.1 MMOL/L (ref 0.7–2)
LYMPHOCYTES # BLD AUTO: 0.5 10E3/UL (ref 0.8–5.3)
LYMPHOCYTES NFR BLD AUTO: 13 %
MCH RBC QN AUTO: 27.8 PG (ref 26.5–33)
MCHC RBC AUTO-ENTMCNC: 30.4 G/DL (ref 31.5–36.5)
MCV RBC AUTO: 91 FL (ref 78–100)
MONOCYTES # BLD AUTO: 0.7 10E3/UL (ref 0–1.3)
MONOCYTES NFR BLD AUTO: 18 %
NEUTROPHILS # BLD AUTO: 2.6 10E3/UL (ref 1.6–8.3)
NEUTROPHILS NFR BLD AUTO: 68 %
NRBC # BLD AUTO: 0 10E3/UL
NRBC BLD AUTO-RTO: 0 /100
PCO2 BLDV: 46 MM HG (ref 40–50)
PCO2 BLDV: 46 MM HG (ref 40–50)
PH BLDV: 7.4 [PH] (ref 7.32–7.43)
PH BLDV: 7.4 [PH] (ref 7.32–7.43)
PLATELET # BLD AUTO: 201 10E3/UL (ref 150–450)
PO2 BLDV: 20 MM HG (ref 25–47)
PO2 BLDV: 23 MM HG (ref 25–47)
POTASSIUM BLD-SCNC: 3.6 MMOL/L (ref 3.4–5.3)
POTASSIUM SERPL-SCNC: 3.7 MMOL/L (ref 3.4–5.3)
PROT SERPL-MCNC: 5.9 G/DL (ref 6.4–8.3)
RBC # BLD AUTO: 4 10E6/UL (ref 4.4–5.9)
SAO2 % BLDV: 31 % (ref 94–100)
SAO2 % BLDV: 39 % (ref 94–100)
SODIUM BLD-SCNC: 142 MMOL/L (ref 133–144)
SODIUM SERPL-SCNC: 140 MMOL/L (ref 136–145)
WBC # BLD AUTO: 3.9 10E3/UL (ref 4–11)

## 2022-08-02 PROCEDURE — 51702 INSERT TEMP BLADDER CATH: CPT | Performed by: EMERGENCY MEDICINE

## 2022-08-02 PROCEDURE — 87086 URINE CULTURE/COLONY COUNT: CPT | Performed by: EMERGENCY MEDICINE

## 2022-08-02 PROCEDURE — 250N000013 HC RX MED GY IP 250 OP 250 PS 637: Performed by: INTERNAL MEDICINE

## 2022-08-02 PROCEDURE — 93010 ELECTROCARDIOGRAM REPORT: CPT | Mod: 76 | Performed by: EMERGENCY MEDICINE

## 2022-08-02 PROCEDURE — 99285 EMERGENCY DEPT VISIT HI MDM: CPT | Mod: 25 | Performed by: EMERGENCY MEDICINE

## 2022-08-02 PROCEDURE — 83880 ASSAY OF NATRIURETIC PEPTIDE: CPT | Performed by: EMERGENCY MEDICINE

## 2022-08-02 PROCEDURE — 84540 ASSAY OF URINE/UREA-N: CPT

## 2022-08-02 PROCEDURE — 36415 COLL VENOUS BLD VENIPUNCTURE: CPT | Performed by: EMERGENCY MEDICINE

## 2022-08-02 PROCEDURE — 83605 ASSAY OF LACTIC ACID: CPT

## 2022-08-02 PROCEDURE — 250N000013 HC RX MED GY IP 250 OP 250 PS 637: Performed by: PHYSICIAN ASSISTANT

## 2022-08-02 PROCEDURE — 82803 BLOOD GASES ANY COMBINATION: CPT

## 2022-08-02 PROCEDURE — 250N000013 HC RX MED GY IP 250 OP 250 PS 637: Performed by: PSYCHIATRY & NEUROLOGY

## 2022-08-02 PROCEDURE — 80053 COMPREHEN METABOLIC PANEL: CPT | Performed by: EMERGENCY MEDICINE

## 2022-08-02 PROCEDURE — 93010 ELECTROCARDIOGRAM REPORT: CPT | Performed by: EMERGENCY MEDICINE

## 2022-08-02 PROCEDURE — 71045 X-RAY EXAM CHEST 1 VIEW: CPT

## 2022-08-02 PROCEDURE — 82040 ASSAY OF SERUM ALBUMIN: CPT | Performed by: EMERGENCY MEDICINE

## 2022-08-02 PROCEDURE — 81001 URINALYSIS AUTO W/SCOPE: CPT | Performed by: EMERGENCY MEDICINE

## 2022-08-02 PROCEDURE — 99239 HOSP IP/OBS DSCHRG MGMT >30: CPT | Performed by: INTERNAL MEDICINE

## 2022-08-02 PROCEDURE — 83605 ASSAY OF LACTIC ACID: CPT | Performed by: EMERGENCY MEDICINE

## 2022-08-02 PROCEDURE — 84300 ASSAY OF URINE SODIUM: CPT

## 2022-08-02 PROCEDURE — 250N000013 HC RX MED GY IP 250 OP 250 PS 637

## 2022-08-02 PROCEDURE — 71045 X-RAY EXAM CHEST 1 VIEW: CPT | Mod: 26 | Performed by: RADIOLOGY

## 2022-08-02 PROCEDURE — 84484 ASSAY OF TROPONIN QUANT: CPT | Performed by: EMERGENCY MEDICINE

## 2022-08-02 PROCEDURE — 84145 PROCALCITONIN (PCT): CPT | Performed by: EMERGENCY MEDICINE

## 2022-08-02 PROCEDURE — 83935 ASSAY OF URINE OSMOLALITY: CPT

## 2022-08-02 PROCEDURE — 250N000013 HC RX MED GY IP 250 OP 250 PS 637: Performed by: NURSE PRACTITIONER

## 2022-08-02 PROCEDURE — 82570 ASSAY OF URINE CREATININE: CPT

## 2022-08-02 PROCEDURE — 85025 COMPLETE CBC W/AUTO DIFF WBC: CPT | Performed by: EMERGENCY MEDICINE

## 2022-08-02 PROCEDURE — 93005 ELECTROCARDIOGRAM TRACING: CPT | Performed by: EMERGENCY MEDICINE

## 2022-08-02 PROCEDURE — 82947 ASSAY GLUCOSE BLOOD QUANT: CPT

## 2022-08-02 RX ORDER — RISPERIDONE 2 MG/1
2 TABLET ORAL AT BEDTIME
Qty: 30 TABLET | Refills: 0 | Status: SHIPPED | OUTPATIENT
Start: 2022-08-02 | End: 2022-08-30

## 2022-08-02 RX ORDER — FUROSEMIDE 80 MG
40 TABLET ORAL 2 TIMES DAILY
Qty: 60 TABLET | Refills: 3 | Status: ON HOLD | OUTPATIENT
Start: 2022-08-02 | End: 2022-08-23

## 2022-08-02 RX ORDER — RISPERIDONE 2 MG/1
2 TABLET ORAL AT BEDTIME
Qty: 30 TABLET | Refills: 0 | Status: SHIPPED | OUTPATIENT
Start: 2022-08-02 | End: 2022-08-03

## 2022-08-02 RX ORDER — LIDOCAINE 4 G/G
1 PATCH TOPICAL EVERY 24 HOURS
Qty: 30 PATCH | Refills: 0 | Status: ON HOLD | OUTPATIENT
Start: 2022-08-02 | End: 2022-08-23

## 2022-08-02 RX ORDER — ESCITALOPRAM OXALATE 10 MG/1
10 TABLET ORAL DAILY
Qty: 30 TABLET | Refills: 3 | Status: ON HOLD | OUTPATIENT
Start: 2022-08-03 | End: 2022-08-23

## 2022-08-02 RX ORDER — RISPERIDONE 0.25 MG/1
0.25 TABLET ORAL DAILY
Qty: 30 TABLET | Refills: 3 | Status: SHIPPED | OUTPATIENT
Start: 2022-08-03 | End: 2022-08-02

## 2022-08-02 RX ORDER — ESCITALOPRAM OXALATE 10 MG/1
10 TABLET ORAL DAILY
Qty: 30 TABLET | Refills: 3 | Status: SHIPPED | OUTPATIENT
Start: 2022-08-03 | End: 2022-08-02

## 2022-08-02 RX ORDER — RISPERIDONE 0.25 MG/1
0.25 TABLET ORAL EVERY MORNING
Qty: 30 TABLET | Refills: 0 | Status: SHIPPED | OUTPATIENT
Start: 2022-08-02 | End: 2022-08-03

## 2022-08-02 RX ORDER — FUROSEMIDE 80 MG
40 TABLET ORAL 2 TIMES DAILY
Qty: 60 TABLET | Refills: 3 | Status: SHIPPED | OUTPATIENT
Start: 2022-08-02 | End: 2022-08-02

## 2022-08-02 RX ORDER — LIDOCAINE 4 G/G
1 PATCH TOPICAL EVERY 24 HOURS
Qty: 30 PATCH | Refills: 0 | Status: SHIPPED | OUTPATIENT
Start: 2022-08-02 | End: 2022-08-02

## 2022-08-02 RX ORDER — RISPERIDONE 2 MG/1
2 TABLET ORAL AT BEDTIME
Qty: 30 TABLET | Refills: 3 | Status: SHIPPED | OUTPATIENT
Start: 2022-08-02 | End: 2022-08-02

## 2022-08-02 RX ORDER — RISPERIDONE 0.25 MG/1
0.25 TABLET ORAL EVERY MORNING
Qty: 30 TABLET | Refills: 0 | Status: SHIPPED | OUTPATIENT
Start: 2022-08-02 | End: 2022-08-30

## 2022-08-02 RX ORDER — PIPERACILLIN SODIUM, TAZOBACTAM SODIUM 4; .5 G/20ML; G/20ML
4.5 INJECTION, POWDER, LYOPHILIZED, FOR SOLUTION INTRAVENOUS ONCE
Status: COMPLETED | OUTPATIENT
Start: 2022-08-03 | End: 2022-08-03

## 2022-08-02 RX ORDER — GABAPENTIN 100 MG/1
100 CAPSULE ORAL 3 TIMES DAILY
Qty: 90 CAPSULE | Refills: 3 | Status: SHIPPED | OUTPATIENT
Start: 2022-08-02 | End: 2022-08-02

## 2022-08-02 RX ORDER — GABAPENTIN 100 MG/1
100 CAPSULE ORAL 3 TIMES DAILY
Qty: 90 CAPSULE | Refills: 3 | Status: SHIPPED | OUTPATIENT
Start: 2022-08-02

## 2022-08-02 RX ADMIN — ALLOPURINOL 300 MG: 300 TABLET ORAL at 08:49

## 2022-08-02 RX ADMIN — LIDOCAINE PATCH 4% 1 PATCH: 40 PATCH TOPICAL at 08:49

## 2022-08-02 RX ADMIN — CARVEDILOL 12.5 MG: 12.5 TABLET, FILM COATED ORAL at 08:48

## 2022-08-02 RX ADMIN — FUROSEMIDE 40 MG: 20 TABLET ORAL at 08:48

## 2022-08-02 RX ADMIN — LORATADINE 10 MG: 10 TABLET ORAL at 08:48

## 2022-08-02 RX ADMIN — RISPERIDONE 0.25 MG: 0.25 TABLET ORAL at 08:48

## 2022-08-02 RX ADMIN — Medication 3 CAPSULE: at 08:49

## 2022-08-02 RX ADMIN — ESCITALOPRAM OXALATE 10 MG: 10 TABLET ORAL at 08:49

## 2022-08-02 RX ADMIN — APIXABAN 5 MG: 5 TABLET, FILM COATED ORAL at 08:48

## 2022-08-02 RX ADMIN — Medication 25 MCG: at 08:48

## 2022-08-02 RX ADMIN — GABAPENTIN 100 MG: 100 CAPSULE ORAL at 10:48

## 2022-08-02 RX ADMIN — MULTIPLE VITAMINS W/ MINERALS TAB 1 TABLET: TAB at 08:49

## 2022-08-02 RX ADMIN — CYANOCOBALAMIN TAB 1000 MCG 1000 MCG: 1000 TAB at 08:49

## 2022-08-02 RX ADMIN — POLYETHYLENE GLYCOL 3350 17 G: 17 POWDER, FOR SOLUTION ORAL at 10:48

## 2022-08-02 ASSESSMENT — ACTIVITIES OF DAILY LIVING (ADL)
ADLS_ACUITY_SCORE: 42

## 2022-08-02 NOTE — DISCHARGE SUMMARY
[facility]  Hospitalist Discharge Summary      Date of Admission:  6/28/2022  Date of Discharge:  8/2/2022  Discharging Provider: Rhiannon Peña MD  Discharge Service: Hospitalist Service, GOLD TEAM 18    Discharge Diagnoses   Dementia with behavioral health issues  COVID-19 infection  Chronic urinary retention with indwelling Louie catheter  Chronic anticoagulation therapy  Chronic systolic congestive heart failure  Physical impairment  Follow-ups Needed After Discharge   Follow-up Appointments     Adult [facility] Follow-up and recommended labs and tests      Follow up with primary care provider, Victoriano Powers, within 7 days for   hospital follow- up.  No follow up labs or test are needed.      Appointments on [place] and/or [facility] (with [facility] or [facility]   provider or service). Call [phone] if you haven't heard regarding   these appointments within 7 days of discharge.         Additional follow-up instructions/to-do's for PCP    : To follow-up with his primary care provider in 1 to 2 weeks.  He is also advised to follow-up with colorectal surgery in 4 to 6 weeks.  The dose of furosemide decreased to 40 mg p.o. twice daily  Any further diuretic dose adjustment is based on his volume status during follow-up with primary care provider.      Unresulted Labs Ordered in the Past 30 Days of this Admission     No orders found from 5/29/2022 to 6/29/2022.      These results will be followed up by none    Discharge Disposition   Discharged to home  Condition at discharge: Fair  Hospital Course            William Almazan is a 81 year old male with history of CAD s/p CABG, dilated cardiomyopathy, HFrEF, PAF on Eliquis, HTN, HLD, DM2, KRISTIAN, prostate cancer, gout, and dementia who presented on 6/28/22 with worsening auditory hallucinations.    Admitted with dementia with behavioral disturbance.  He was seen by psychiatry, neurology in consultation.  Started on psychotropic  medications and improved significantly.  He does have a chronic Louie.  History of prostate CA.  Had rectal bleed and rectal prolapse which resolved.  Seen by colorectal surgery.  Advised stool softeners and fiber diet.  Follow-up advised with colorectal surgery in 4 to 6 weeks.  Patient tested positive for COVID without any signs of severity.  He is vaccinated.         Auditory hallucinations, Dementia with behavioral health disturbances.   ---   Presented with progressive auditory hallucinations, including self harm command hallucinations.   ---   Hallucination present since ~2020 following death of son.  --- still having hallucinations   ---   Patient continued to have auditory hallucination of a friend telling him to harm himself.  ---   He denied suicidal ideation  ---   1:1 sitter d/c'd by psychiatry on 7/13/22  ---   Denied worsening depression or anxiety.   ---   No visual or tactile hallucinations.   ---   Family not able to manage at home.   ---   Continue Risperdal 0.5 mg qam and 2 mg at bedtime  ---   Continue donepezil 5 mg daily  ---   Continue gabapentin 100 mg po tid  ---   Lexapro 10 mg started 7/25 .     Full thickness rectal prolapse, initially noted 7/15  ---   It appears to be full-thickness but reducible  ---   Intermittently bleeding with bowel movements  ---   Hgb remained stable at 10 - 11+ g  ---   Avoid constipation  ---   Psyllium fiber 3 capsule daily, MiraLAX 17 g daily and senna 1-2 tabs bid prn ordered  ---  Dr Kinney Discussed patient's condition with CRS consult service.  Recommendation is for fiber and avoid constipation.  Surgical option is colostomy to which the patient is probably not the best candidate.  ---   CRS recommended outpt clinic follow up 4-6 weeks       Left hip pain  --- complains of  7/19 and seen by cross cover  per notes patient  Was seen by orthopedic surgery  In April for hip pain  and has severe arthritis  tylenol OTC added as well as lidocaine patch and  volatern cream, avoid NSAIDS with rectal bleed  ---states his pain is at baseline      Tremor, whole body  ---   New since admit  ---   consulted  neurology per patient's family request, and was felt to be stress related    -- continue B12 supplements , follow up  Levels as out patient       Urinary retention  ---   Chronic indwelling keating catheter.     Chronic HFrEF:    ---   Hx dilated cardiomyopathy.   ---   Most recent TTE 6/18/21 showing severely dilated LV with EF 20%.   ---   Currently euvolemic on exam.   ---   No hypoxia or pulmonary complaints.   ---   Has trace LE edema.   ---   Continue Coreg to 12.5 mg PO BID   ---   Continue PTA Lasix 40 mg bid but might need to cut back if weight down   ---   Daily weights, I&O's, in sign negative balance,  Needs daily weights  - BMP OK  7/25       CAD s/p CABG (1992):    ---   Not ASA or statin at home.   ---   No acute concerns.   ---   Coreg to 12.5 mg PO BID     PAF   ---   No acute concerns.  ---   Eliquis 5mg BID was held with rectal bleed. No any ongoing rectal bleed and after he tested positive for covid-19 he was put back on  Eliquis 5 mg po BID     T2DM:    ---   Diet controlled.   ---   Glucose stable.      KRISTIAN:    ---   Does not use CPAP at home.        Prostate CA:    ---   Treated with XRT and hormone therapy.   ---   Hx urinary retention with chronic indwelling keating.     Gout:    ---   Continue PTA allopurinol      COVID-19 infection.  Tested positive on 7/30/2022.  No signs of severity.  Symptoms have started on 7/29/2022.  He did have fever and occasional cough.    Patient's symptoms are mild.  Discussed with his wife.  I called her on an phone [phone].  Wife told me that she had vomiting and diarrhea since Wednesday 4 days ago and feels weak.  She is the primary caregiver and waiting for COVID test results from [facility] pharmacy where she was tested last Sunday yesterday.       Advised patient and the patient's daughters who were present at  bedside about need to continue to isolate at home for 5 more days and while he continues to have symptoms.  I personally provided them with some N95 masks which they requested.  Discussed with his daughter Renuka and also discussed with the patient's wife yesterday at length.  Diet: Combination Diet Regular Diet Adult    DVT Prophylaxis: DOAC  Louie Catheter: PRESENT, indication: Retention, Retention  Central Lines: None  Cardiac Monitoring: None  Code Status: Full Code                   Diet: Combination Diet Regular Diet Adult    DVT Prophylaxis: ambulate AC on hold with rectal bleed   Louie Catheter: PRESENT, indication: Retention, Retention  Central Lines: None  Cardiac Monitoring: None  Code Status: Full Code       Consultations This Hospital Stay   PSYCHIATRY IP CONSULT  WOUND OSTOMY CONTINENCE NURSE  IP CONSULT  PHYSICAL THERAPY ADULT IP CONSULT  OCCUPATIONAL THERAPY ADULT IP CONSULT  PSYCHIATRY IP CONSULT  WOUND OSTOMY CONTINENCE NURSE  IP CONSULT  PSYCHIATRY IP CONSULT  PSYCHIATRY IP CONSULT  NUTRITION SERVICES ADULT IP CONSULT  SURGERY GENERAL ADULT IP CONSULT  COLORECTAL SURGERY ADULT IP CONSULT  NEUROLOGY GENERAL ADULT IP CONSULT  PSYCHIATRY IP CONSULT  PSYCHIATRY IP CONSULT    Code Status   Full Code    Time Spent on this Encounter   I, Rhiannon Peña MD, personally saw the patient today and spent greater than 30 minutes discharging this patient.       Rhiannon Peña MD  [facility] [address]  [address] [place] [id number]  Phone: [phone]  Fax: [phone]  ______________________________________________________________________    Physical Exam   Vital Signs: Temp: 97.9  F (36.6  C) Temp src: Oral BP: 113/68 Pulse: 57   Resp: 15 SpO2: 96 % O2 Device: None (Room air)    Weight: 183 lbs 6.4 oz  General Appearance: Awake alert oriented.  Comfortable.  Respiratory: Lungs clear to auscultation no wheezes  Cardiovascular: Regular rate and rhythm, no murmur  GI: Abdomen is soft nontender bowel  sounds are active, catheter in place.  Skin: No jaundice, no rashes  CNS: Awake alert oriented x3.  No any focal neurological deficits.       Primary Care Physician   Victoriano Powers    Discharge Orders      Home Care Referral      Reason for your hospital stay    Dementia with psychosis, covid 19 infection with out signs of severity in a fully vaccinated patient. CHF with reduced EF. Hx of PAF     Activity    Your activity upon discharge: activity as tolerated     Resume Home Care Services     Adult [facility] Follow-up and recommended labs and tests    Follow up with primary care provider, Victoriano Powers, within 7 days for hospital follow- up.  No follow up labs or test are needed.      Appointments on [place] and/or [facility] (with [facility] or [facility] provider or service). Call [phone] if you haven't heard regarding these appointments within 7 days of discharge.     Diet    Follow this diet upon discharge: Orders Placed This Encounter      Combination Diet Regular Diet Adult       Significant Results and Procedures   Results for orders placed or performed during the hospital encounter of 06/28/22   CT Head w/o Contrast     Value    Radiologist flags Age-indeterminate loss of gray-white matter (Urgent)    Narrative    CT HEAD W/O CONTRAST 6/28/2022 6:04 PM    History: Dementia with acute worsening of symptoms, paranoia     Comparison: PET/CT 12/21/2018    Technique: Using multidetector thin collimation helical acquisition  technique, axial, coronal and sagittal CT images from the skull base  to the vertex were obtained without intravenous contrast.   (topogram) image(s) also obtained and reviewed.    Findings: There is no intracranial hemorrhage, mass effect, or midline  shift. Age-indeterminate loss of gray-white matter differentiation in  the right pelvis central gyrus (series 3 image 13). Ventricles are  proportionate to the cerebral sulci. The basal cisterns are clear.  Vascular calcifications of the  carotid siphons.    The orbits are grossly unremarkable. The bony calvaria and the bones  of the skull base are normal. The visualized portions of the paranasal  sinuses and mastoid air cells are clear. Soft tissue debris within the  left external auditory canal, presumably cerumen.      Impression    Impression:  Age-indeterminate loss of gray-white matter differentiation in the  right post central gyrus. If there is further concern for acute  infarct MRI of the brain could be considered.    [Urgent Result: Age-indeterminate loss of gray-white matter  differentiation in the right post central gyrus. ]    Finding was identified on 6/28/2022 6:05 PM.     Dr. Wyatt was contacted by Dr. Mohamud at 6/28/2022 6:33 PM and  verbalized understanding of the urgent finding.      I have personally reviewed the examination and initial interpretation  and I agree with the findings.    LIZBET IBARRA MD         SYSTEM ID:  [id number]   CT Head w/o Contrast    Narrative    CT HEAD W/O CONTRAST 7/2/2022 10:11 PM    Provided History: sudden nausea and dizziness, anticoagulated    Comparison: Head CT 6/28/2022.    Technique: Using multidetector thin collimation helical acquisition  technique, axial, coronal and sagittal CT images from the skull base  to the vertex were obtained without intravenous contrast.     Findings:    No intracranial hemorrhage. No mass effect. No midline shift. No  extra-axial fluid collection. The gray to white matter differentiation  of the cerebral hemispheres is preserved. Ventricles are proportionate  to the sulci. Mild generalized cerebral atrophy. Periventricular and  subcortical white matter hypoattenuation is nonspecific but likely  represents chronic small vessel ischemic disease in patient this age.  No sulcal effacement. The basal cisterns are patent. Virtually empty  sella.    Paranasal sinuses are relatively clear. The mastoid air cells are  clear. Orbits appear unremarkable. No acute  fracture.      Impression    Impression: No acute intracranial pathology. No significant change  since 6/28/2022.    I have personally reviewed the examination and initial interpretation  and I agree with the findings.    LIZBET IBARRA MD         SYSTEM ID:  [id number]       Discharge Medications   Current Discharge Medication List      START taking these medications    Details   escitalopram (LEXAPRO) 10 MG tablet Take 1 tablet (10 mg) by mouth daily for 30 days  Qty: 30 tablet, Refills: 3    Associated Diagnoses: Other chronic pain      gabapentin (NEURONTIN) 100 MG capsule Take 1 capsule (100 mg) by mouth 3 times daily for 30 days  Qty: 90 capsule, Refills: 3    Associated Diagnoses: Other chronic pain      Lidocaine (LIDOCARE) 4 % Patch Place 1 patch onto the skin every 24 hours for 30 days To prevent lidocaine toxicity, patient should be patch free for 12 hrs daily.  Qty: 30 patch, Refills: 0    Associated Diagnoses: Psychosis, unspecified psychosis type (H); Other chronic pain      !! risperiDONE (RISPERDAL) 0.25 MG tablet Take 1 tablet (0.25 mg) by mouth daily  Qty: 30 tablet, Refills: 3    Associated Diagnoses: Psychosis, unspecified psychosis type (H)      !! risperiDONE (RISPERDAL) 2 MG tablet Take 1 tablet (2 mg) by mouth At Bedtime for 30 days  Qty: 30 tablet, Refills: 3    Associated Diagnoses: Psychosis, unspecified psychosis type (H)       !! - Potential duplicate medications found. Please discuss with provider.      CONTINUE these medications which have CHANGED    Details   furosemide (LASIX) 80 MG tablet Take 0.5 tablets (40 mg) by mouth 2 times daily  Qty: 60 tablet, Refills: 3    Associated Diagnoses: COVID-19 ruled out by laboratory testing         CONTINUE these medications which have NOT CHANGED    Details   allopurinol (ZYLOPRIM) 300 MG tablet Take 1 tablet by mouth daily      amoxicillin (AMOXIL) 500 MG capsule Take 1 capsule by mouth as needed PRN for dental procedures      apixaban  ANTICOAGULANT (ELIQUIS) 5 MG tablet Take 5 mg by mouth 2 times daily      carvedilol (COREG) 25 MG tablet Take 25 mg by mouth 2 times daily      cholecalciferol (VITAMIN D3) 25 mcg (1000 units) capsule Take 1,000 Units by mouth daily      cyanocobalamin (VITAMIN B-12) 1000 MCG tablet Take 1,000 mcg by mouth daily      loratadine 10 MG capsule Take 1 capsule by mouth daily      multivitamin w/minerals (THERA-VIT-M) tablet Take 1 tablet by mouth daily      spironolactone (ALDACTONE) 25 MG tablet Take 1 tablet by mouth daily           Allergies   No Known Allergies

## 2022-08-02 NOTE — PLAN OF CARE
Goal Outcome Evaluation:          Pt ready for discharge to , meds sent to [facility], per family.  Reviewed discharge instructions and meds with family, both daughters here, they verbalized understanding.  Given equipment for home that was requested, home care set up.  Pt discharged with daughters at 1200 to home.

## 2022-08-02 NOTE — PLAN OF CARE
Goal Outcome Evaluation:        Pt resting in bed. Louie catheter patent. No report of pain . Covid precautions maintained.  Cont to assess

## 2022-08-02 NOTE — PROGRESS NOTES
Care Management Discharge Note    Discharge Date: 08/02/2022     Discharge Disposition: Home Care    Discharge Services: Meals on Wheels (Private pay caregivers)    Discharge DME: None    Discharge Transportation: Family or friend will provide    Private pay costs discussed: Not applicable    PAS Confirmation Code: Not applicable    Patient/family educated on Medicare website which has current facility and service quality ratings: Yes    Education Provided on the Discharge Plan: Yes    Persons Notified of Discharge Plans: Yes    Patient/Family in Agreement with the Plan: Yes    Handoff Referral Completed: Yes    Additional Information:  Social work called patients wife to confirm patient will be discharging today. Patients wife reported that her daughters are currently on the way to pick him up. Attempted to do IMM with patients wife but she stated she could not do it right now because shes not feeling well. Social work will try to get IMM completed once daughters are here if able. External handoff completed. No other social work needs at this time.     KAHLIL De Luna, LGSW  [facility] [address] [facility] ICU   [facility]   Phone: [phone]

## 2022-08-02 NOTE — LETTER
Recipient: Rachana Santiago          Sender: Kiara Webster [phone]              Date: August 22, 2022  Patient Name:  William Almazan      The documents accompanying this notice contain confidential information belonging to the sender.  This information is intended only for the use of the individual or entity named above.  The authorized recipient of this information is prohibited from disclosing this information to any other party and is required to destroy the information after its stated need has been fulfilled, unless otherwise required by state law.    If you are not the intended recipient, you are hereby notified that any disclosure, copy, distribution or action taken in reliance on the contents of these documents is strictly prohibited.  If you have received this document in error, please return it by fax to [phone] with a note on the cover sheet explaining why you are returning it (e.g. not your patient, not your provider, etc.).  Documents may also be returned by mail to Health Information Management, , [facility] [address] [place], [place] [address].

## 2022-08-02 NOTE — PLAN OF CARE
"/67 (BP Location: Right arm)   Pulse 65   Temp 99.3  F (37.4  C) (Oral)   Resp 16   Ht 1.753 m (5' 9\")   Wt 83.2 kg (183 lb 6.4 oz)   SpO2 94%   BMI 27.08 kg/m      Patient alert. Disorientated to time. VSS on RA. Lungs clear.  COVID + (asymptomatic).   Patient refused skin check under clothes.  Louie in place. Draining well.  Loose stool on AM shift. Scheduled mirilax held.  Patient states he is having auditory and visual hallucinations.  Denies SI.  Denies chest pain, SOB.  No IV access.  Continue with POC.      Plan: Prior nurse was talking to patients daughter on phone. They want to discharge him home tomorrow morning.  "

## 2022-08-02 NOTE — LETTER
Recipient: Azra Dietrich          Sender:Kiara Webster [phone]              Date: August 22, 2022  Patient Name:  William Almazan      The documents accompanying this notice contain confidential information belonging to the sender.  This information is intended only for the use of the individual or entity named above.  The authorized recipient of this information is prohibited from disclosing this information to any other party and is required to destroy the information after its stated need has been fulfilled, unless otherwise required by state law.    If you are not the intended recipient, you are hereby notified that any disclosure, copy, distribution or action taken in reliance on the contents of these documents is strictly prohibited.  If you have received this document in error, please return it by fax to [phone] with a note on the cover sheet explaining why you are returning it (e.g. not your patient, not your provider, etc.).  Documents may also be returned by mail to Health Information Management, , [facility] [address], [place], [place] [address].

## 2022-08-03 ENCOUNTER — APPOINTMENT (OUTPATIENT)
Dept: CARDIOLOGY | Facility: CLINIC | Age: 82
DRG: 640 | End: 2022-08-03
Payer: MEDICARE

## 2022-08-03 PROBLEM — I95.9 HYPOTENSION, UNSPECIFIED HYPOTENSION TYPE: Status: ACTIVE | Noted: 2022-08-03

## 2022-08-03 PROBLEM — U07.1 INFECTION DUE TO 2019 NOVEL CORONAVIRUS: Status: ACTIVE | Noted: 2022-08-03

## 2022-08-03 LAB
ALBUMIN UR-MCNC: 50 MG/DL
ALBUMIN UR-MCNC: 70 MG/DL
ANION GAP SERPL CALCULATED.3IONS-SCNC: 11 MMOL/L (ref 7–15)
APPEARANCE UR: ABNORMAL
APPEARANCE UR: ABNORMAL
ATRIAL RATE - MUSE: 59 BPM
ATRIAL RATE - MUSE: 65 BPM
BACTERIA #/AREA URNS HPF: ABNORMAL /HPF
BILIRUB UR QL STRIP: NEGATIVE
BILIRUB UR QL STRIP: NEGATIVE
BUN SERPL-MCNC: 22.8 MG/DL (ref 8–23)
CALCIUM SERPL-MCNC: 8.6 MG/DL (ref 8.8–10.2)
CHLORIDE SERPL-SCNC: 107 MMOL/L (ref 98–107)
COLOR UR AUTO: ABNORMAL
COLOR UR AUTO: YELLOW
CREAT SERPL-MCNC: 0.97 MG/DL (ref 0.67–1.17)
CREAT UR-MCNC: 52.1 MG/DL
DEPRECATED HCO3 PLAS-SCNC: 27 MMOL/L (ref 22–29)
DIASTOLIC BLOOD PRESSURE - MUSE: NORMAL MMHG
DIASTOLIC BLOOD PRESSURE - MUSE: NORMAL MMHG
ERYTHROCYTE [DISTWIDTH] IN BLOOD BY AUTOMATED COUNT: 15.7 % (ref 10–15)
GFR SERPL CREATININE-BSD FRML MDRD: 78 ML/MIN/1.73M2
GLUCOSE BLDC GLUCOMTR-MCNC: 110 MG/DL (ref 70–99)
GLUCOSE SERPL-MCNC: 112 MG/DL (ref 70–99)
GLUCOSE UR STRIP-MCNC: 70 MG/DL
GLUCOSE UR STRIP-MCNC: NEGATIVE MG/DL
HCT VFR BLD AUTO: 36.7 % (ref 40–53)
HGB BLD-MCNC: 10.8 G/DL (ref 13.3–17.7)
HGB UR QL STRIP: ABNORMAL
HGB UR QL STRIP: ABNORMAL
HOLD SPECIMEN: NORMAL
HYALINE CASTS: 39 /LPF
INTERPRETATION ECG - MUSE: NORMAL
INTERPRETATION ECG - MUSE: NORMAL
KETONES UR STRIP-MCNC: ABNORMAL MG/DL
KETONES UR STRIP-MCNC: NEGATIVE MG/DL
LACTATE SERPL-SCNC: 2.1 MMOL/L (ref 0.7–2)
LEUKOCYTE ESTERASE UR QL STRIP: ABNORMAL
LEUKOCYTE ESTERASE UR QL STRIP: ABNORMAL
LVEF ECHO: NORMAL
MCH RBC QN AUTO: 27.6 PG (ref 26.5–33)
MCHC RBC AUTO-ENTMCNC: 29.4 G/DL (ref 31.5–36.5)
MCV RBC AUTO: 94 FL (ref 78–100)
MRSA DNA SPEC QL NAA+PROBE: NEGATIVE
MUCOUS THREADS #/AREA URNS LPF: PRESENT /LPF
NITRATE UR QL: NEGATIVE
NITRATE UR QL: NEGATIVE
NT-PROBNP SERPL-MCNC: 3406 PG/ML (ref 0–1800)
OSMOLALITY UR: 388 MMOL/KG (ref 100–1200)
P AXIS - MUSE: 21 DEGREES
P AXIS - MUSE: 84 DEGREES
PH UR STRIP: 5.5 [PH] (ref 5–7)
PH UR STRIP: 7.5 [PH] (ref 5–7)
PLATELET # BLD AUTO: 152 10E3/UL (ref 150–450)
POTASSIUM SERPL-SCNC: 3.4 MMOL/L (ref 3.4–5.3)
PR INTERVAL - MUSE: 188 MS
PR INTERVAL - MUSE: 226 MS
PROCALCITONIN SERPL IA-MCNC: 0.26 NG/ML
QRS DURATION - MUSE: 158 MS
QRS DURATION - MUSE: 166 MS
QT - MUSE: 480 MS
QT - MUSE: 518 MS
QTC - MUSE: 525 MS
QTC - MUSE: 562 MS
R AXIS - MUSE: -51 DEGREES
R AXIS - MUSE: -75 DEGREES
RBC # BLD AUTO: 3.92 10E6/UL (ref 4.4–5.9)
RBC URINE: 58 /HPF
RBC URINE: >182 /HPF
SA TARGET DNA: NEGATIVE
SODIUM SERPL-SCNC: 145 MMOL/L (ref 136–145)
SODIUM UR-SCNC: 97 MMOL/L
SP GR UR STRIP: 1.01 (ref 1–1.03)
SP GR UR STRIP: 1.02 (ref 1–1.03)
SQUAMOUS EPITHELIAL: 3 /HPF
SYSTOLIC BLOOD PRESSURE - MUSE: NORMAL MMHG
SYSTOLIC BLOOD PRESSURE - MUSE: NORMAL MMHG
T AXIS - MUSE: 132 DEGREES
T AXIS - MUSE: 7 DEGREES
TROPONIN T SERPL HS-MCNC: 47 NG/L
TROPONIN T SERPL HS-MCNC: 52 NG/L
UROBILINOGEN UR STRIP-MCNC: NORMAL MG/DL
UROBILINOGEN UR STRIP-MCNC: NORMAL MG/DL
UUN UR-MCNC: 313 MG/DL (ref 801–1666)
VENTRICULAR RATE- MUSE: 71 BPM
VENTRICULAR RATE- MUSE: 72 BPM
WBC # BLD AUTO: 5.7 10E3/UL (ref 4–11)
WBC CLUMPS #/AREA URNS HPF: PRESENT /HPF
WBC URINE: 25 /HPF
WBC URINE: >182 /HPF

## 2022-08-03 PROCEDURE — 36415 COLL VENOUS BLD VENIPUNCTURE: CPT | Performed by: STUDENT IN AN ORGANIZED HEALTH CARE EDUCATION/TRAINING PROGRAM

## 2022-08-03 PROCEDURE — 120N000002 HC R&B MED SURG/OB UMMC

## 2022-08-03 PROCEDURE — 85027 COMPLETE CBC AUTOMATED: CPT | Performed by: STUDENT IN AN ORGANIZED HEALTH CARE EDUCATION/TRAINING PROGRAM

## 2022-08-03 PROCEDURE — 81001 URINALYSIS AUTO W/SCOPE: CPT

## 2022-08-03 PROCEDURE — 96366 THER/PROPH/DIAG IV INF ADDON: CPT | Performed by: EMERGENCY MEDICINE

## 2022-08-03 PROCEDURE — 258N000003 HC RX IP 258 OP 636

## 2022-08-03 PROCEDURE — 250N000011 HC RX IP 250 OP 636: Performed by: EMERGENCY MEDICINE

## 2022-08-03 PROCEDURE — 250N000013 HC RX MED GY IP 250 OP 250 PS 637: Performed by: STUDENT IN AN ORGANIZED HEALTH CARE EDUCATION/TRAINING PROGRAM

## 2022-08-03 PROCEDURE — 87086 URINE CULTURE/COLONY COUNT: CPT

## 2022-08-03 PROCEDURE — 93005 ELECTROCARDIOGRAM TRACING: CPT | Mod: 76 | Performed by: EMERGENCY MEDICINE

## 2022-08-03 PROCEDURE — G0378 HOSPITAL OBSERVATION PER HR: HCPCS

## 2022-08-03 PROCEDURE — 84484 ASSAY OF TROPONIN QUANT: CPT

## 2022-08-03 PROCEDURE — 87040 BLOOD CULTURE FOR BACTERIA: CPT | Performed by: HOSPITALIST

## 2022-08-03 PROCEDURE — 96367 TX/PROPH/DG ADDL SEQ IV INF: CPT | Performed by: EMERGENCY MEDICINE

## 2022-08-03 PROCEDURE — 96365 THER/PROPH/DIAG IV INF INIT: CPT | Performed by: EMERGENCY MEDICINE

## 2022-08-03 PROCEDURE — 255N000002 HC RX 255 OP 636: Performed by: INTERNAL MEDICINE

## 2022-08-03 PROCEDURE — 93005 ELECTROCARDIOGRAM TRACING: CPT

## 2022-08-03 PROCEDURE — 87641 MR-STAPH DNA AMP PROBE: CPT | Performed by: HOSPITALIST

## 2022-08-03 PROCEDURE — 82962 GLUCOSE BLOOD TEST: CPT

## 2022-08-03 PROCEDURE — 99223 1ST HOSP IP/OBS HIGH 75: CPT | Mod: AI | Performed by: HOSPITALIST

## 2022-08-03 PROCEDURE — 80048 BASIC METABOLIC PNL TOTAL CA: CPT | Performed by: STUDENT IN AN ORGANIZED HEALTH CARE EDUCATION/TRAINING PROGRAM

## 2022-08-03 PROCEDURE — 93306 TTE W/DOPPLER COMPLETE: CPT | Mod: 26 | Performed by: INTERNAL MEDICINE

## 2022-08-03 PROCEDURE — 83605 ASSAY OF LACTIC ACID: CPT | Performed by: EMERGENCY MEDICINE

## 2022-08-03 PROCEDURE — 250N000013 HC RX MED GY IP 250 OP 250 PS 637

## 2022-08-03 PROCEDURE — 250N000011 HC RX IP 250 OP 636

## 2022-08-03 PROCEDURE — 36415 COLL VENOUS BLD VENIPUNCTURE: CPT | Performed by: EMERGENCY MEDICINE

## 2022-08-03 PROCEDURE — 258N000003 HC RX IP 258 OP 636: Performed by: EMERGENCY MEDICINE

## 2022-08-03 PROCEDURE — 93010 ELECTROCARDIOGRAM REPORT: CPT | Performed by: INTERNAL MEDICINE

## 2022-08-03 RX ORDER — RISPERIDONE 0.25 MG/1
0.25 TABLET ORAL EVERY MORNING
Status: DISCONTINUED | OUTPATIENT
Start: 2022-08-03 | End: 2022-08-20

## 2022-08-03 RX ORDER — SENNOSIDES 8.6 MG
1 TABLET ORAL 2 TIMES DAILY PRN
Status: DISCONTINUED | OUTPATIENT
Start: 2022-08-03 | End: 2022-08-24 | Stop reason: HOSPADM

## 2022-08-03 RX ORDER — LORATADINE 10 MG/1
10 TABLET ORAL DAILY
Status: DISCONTINUED | OUTPATIENT
Start: 2022-08-03 | End: 2022-08-24 | Stop reason: HOSPADM

## 2022-08-03 RX ORDER — ACETAMINOPHEN 325 MG/1
325 TABLET ORAL EVERY 4 HOURS PRN
Status: DISCONTINUED | OUTPATIENT
Start: 2022-08-03 | End: 2022-08-17

## 2022-08-03 RX ORDER — LANOLIN ALCOHOL/MO/W.PET/CERES
1000 CREAM (GRAM) TOPICAL DAILY
Status: DISCONTINUED | OUTPATIENT
Start: 2022-08-03 | End: 2022-08-24 | Stop reason: HOSPADM

## 2022-08-03 RX ORDER — FUROSEMIDE 20 MG
40 TABLET ORAL
Status: DISCONTINUED | OUTPATIENT
Start: 2022-08-03 | End: 2022-08-07

## 2022-08-03 RX ORDER — ALLOPURINOL 300 MG/1
300 TABLET ORAL DAILY
Status: DISCONTINUED | OUTPATIENT
Start: 2022-08-03 | End: 2022-08-24 | Stop reason: HOSPADM

## 2022-08-03 RX ORDER — SPIRONOLACTONE 25 MG/1
25 TABLET ORAL DAILY
Status: DISCONTINUED | OUTPATIENT
Start: 2022-08-03 | End: 2022-08-24 | Stop reason: HOSPADM

## 2022-08-03 RX ORDER — ESCITALOPRAM OXALATE 10 MG/1
10 TABLET ORAL DAILY
Status: DISCONTINUED | OUTPATIENT
Start: 2022-08-03 | End: 2022-08-22

## 2022-08-03 RX ORDER — POLYETHYLENE GLYCOL 3350 17 G/17G
17 POWDER, FOR SOLUTION ORAL DAILY
Status: DISCONTINUED | OUTPATIENT
Start: 2022-08-03 | End: 2022-08-24 | Stop reason: HOSPADM

## 2022-08-03 RX ORDER — CARVEDILOL 12.5 MG/1
12.5 TABLET ORAL 2 TIMES DAILY
Status: DISCONTINUED | OUTPATIENT
Start: 2022-08-03 | End: 2022-08-04

## 2022-08-03 RX ORDER — RISPERIDONE 2 MG/1
2 TABLET ORAL AT BEDTIME
Status: DISCONTINUED | OUTPATIENT
Start: 2022-08-03 | End: 2022-08-20

## 2022-08-03 RX ORDER — LIDOCAINE 40 MG/G
CREAM TOPICAL
Status: DISCONTINUED | OUTPATIENT
Start: 2022-08-03 | End: 2022-08-24 | Stop reason: HOSPADM

## 2022-08-03 RX ORDER — DONEPEZIL HYDROCHLORIDE 5 MG/1
5 TABLET, FILM COATED ORAL AT BEDTIME
Status: DISCONTINUED | OUTPATIENT
Start: 2022-08-03 | End: 2022-08-20

## 2022-08-03 RX ORDER — GABAPENTIN 100 MG/1
100 CAPSULE ORAL 3 TIMES DAILY
Status: DISCONTINUED | OUTPATIENT
Start: 2022-08-03 | End: 2022-08-24 | Stop reason: HOSPADM

## 2022-08-03 RX ORDER — VITAMIN B COMPLEX
25 TABLET ORAL DAILY
Status: DISCONTINUED | OUTPATIENT
Start: 2022-08-03 | End: 2022-08-24 | Stop reason: HOSPADM

## 2022-08-03 RX ADMIN — SODIUM CHLORIDE 1000 ML: 9 INJECTION, SOLUTION INTRAVENOUS at 00:28

## 2022-08-03 RX ADMIN — GABAPENTIN 100 MG: 100 CAPSULE ORAL at 20:24

## 2022-08-03 RX ADMIN — DONEPEZIL HYDROCHLORIDE 5 MG: 5 TABLET, FILM COATED ORAL at 20:34

## 2022-08-03 RX ADMIN — PIPERACILLIN SODIUM AND TAZOBACTAM SODIUM 4.5 G: 4; .5 INJECTION, POWDER, LYOPHILIZED, FOR SOLUTION INTRAVENOUS at 00:27

## 2022-08-03 RX ADMIN — ACETAMINOPHEN 325 MG: 325 TABLET, FILM COATED ORAL at 22:30

## 2022-08-03 RX ADMIN — GABAPENTIN 100 MG: 100 CAPSULE ORAL at 14:13

## 2022-08-03 RX ADMIN — CARVEDILOL 12.5 MG: 12.5 TABLET, FILM COATED ORAL at 07:42

## 2022-08-03 RX ADMIN — ALLOPURINOL 300 MG: 300 TABLET ORAL at 07:43

## 2022-08-03 RX ADMIN — VANCOMYCIN HYDROCHLORIDE 2000 MG: 1 INJECTION, POWDER, LYOPHILIZED, FOR SOLUTION INTRAVENOUS at 03:07

## 2022-08-03 RX ADMIN — Medication 25 MCG: at 07:43

## 2022-08-03 RX ADMIN — ESCITALOPRAM OXALATE 10 MG: 10 TABLET ORAL at 07:43

## 2022-08-03 RX ADMIN — GABAPENTIN 100 MG: 100 CAPSULE ORAL at 07:43

## 2022-08-03 RX ADMIN — POLYETHYLENE GLYCOL 3350 17 G: 17 POWDER, FOR SOLUTION ORAL at 07:43

## 2022-08-03 RX ADMIN — RISPERIDONE 2 MG: 2 TABLET ORAL at 20:34

## 2022-08-03 RX ADMIN — Medication 3 CAPSULE: at 07:43

## 2022-08-03 RX ADMIN — RISPERIDONE 0.25 MG: 0.25 TABLET ORAL at 07:42

## 2022-08-03 RX ADMIN — HUMAN ALBUMIN MICROSPHERES AND PERFLUTREN 6 ML: 10; .22 INJECTION, SOLUTION INTRAVENOUS at 11:01

## 2022-08-03 RX ADMIN — LORATADINE 10 MG: 10 TABLET ORAL at 07:42

## 2022-08-03 RX ADMIN — Medication 1000 MCG: at 07:42

## 2022-08-03 RX ADMIN — SPIRONOLACTONE 25 MG: 25 TABLET ORAL at 07:42

## 2022-08-03 RX ADMIN — APIXABAN 5 MG: 5 TABLET, FILM COATED ORAL at 07:42

## 2022-08-03 ASSESSMENT — ACTIVITIES OF DAILY LIVING (ADL)
WALKING_OR_CLIMBING_STAIRS: AMBULATION DIFFICULTY, ASSISTANCE 1 PERSON;AMBULATION DIFFICULTY, REQUIRES EQUIPMENT
DIFFICULTY_EATING/SWALLOWING: YES
ADLS_ACUITY_SCORE: 35
ADLS_ACUITY_SCORE: 35
EQUIPMENT_CURRENTLY_USED_AT_HOME: CANE, STRAIGHT;WALKER, STANDARD
ADLS_ACUITY_SCORE: 35
DRESS: 1-->ASSISTANCE (EQUIPMENT/PERSON) NEEDED
TRANSFERRING: 1-->ASSISTANCE (EQUIPMENT/PERSON) NEEDED (NOT DEVELOPMENTALLY APPROPRIATE)
TOILETING_ASSISTANCE: TOILETING DIFFICULTY, REQUIRES EQUIPMENT;TOILETING DIFFICULTY, ASSISTANCE 1 PERSON
DIFFICULTY_COMMUNICATING: NO
ADLS_ACUITY_SCORE: 35
DOING_ERRANDS_INDEPENDENTLY_DIFFICULTY: YES
HEARING_DIFFICULTY_OR_DEAF: NO
NUMBER_OF_TIMES_PATIENT_HAS_FALLEN_WITHIN_LAST_SIX_MONTHS: 3
ADLS_ACUITY_SCORE: 35
FALL_HISTORY_WITHIN_LAST_SIX_MONTHS: YES
WALKING_OR_CLIMBING_STAIRS_DIFFICULTY: YES
EATING/SWALLOWING: SWALLOWING SOLID FOOD
WEAR_GLASSES_OR_BLIND: YES
TRANSFERRING: 1-->ASSISTANCE (EQUIPMENT/PERSON) NEEDED
ADLS_ACUITY_SCORE: 35
SWALLOWING: 2-->DIFFICULTY SWALLOWING FOODS
ADLS_ACUITY_SCORE: 35
ADLS_ACUITY_SCORE: 35
CONCENTRATING,_REMEMBERING_OR_MAKING_DECISIONS_DIFFICULTY: YES
SWALLOWING: 2-->DIFFICULTY SWALLOWING FOODS
CHANGE_IN_FUNCTIONAL_STATUS_SINCE_ONSET_OF_CURRENT_ILLNESS/INJURY: NO
DRESSING/BATHING_DIFFICULTY: YES
TOILETING: 1-->ASSISTANCE (EQUIPMENT/PERSON) NEEDED (NOT DEVELOPMENTALLY APPROPRIATE)
TOILETING_MANAGEMENT: COMMODE
EATING: 0-->INDEPENDENT
DRESS: 1-->ASSISTANCE (EQUIPMENT/PERSON) NEEDED (NOT DEVELOPMENTALLY APPROPRIATE)
DRESSING/BATHING: BATHING DIFFICULTY, ASSISTANCE 1 PERSON;DRESSING DIFFICULTY, ASSISTANCE 1 PERSON
TOILETING: 1-->ASSISTANCE (EQUIPMENT/PERSON) NEEDED
TOILETING_ISSUES: YES
EATING: 0-->INDEPENDENT
ADLS_ACUITY_SCORE: 35
BATHING: 1-->ASSISTANCE NEEDED

## 2022-08-03 NOTE — PROGRESS NOTES
[facility]    Medicine Progress Note - Medicine Service, SYDNEE TEAM 1    Date of Admission:  8/2/2022    Assessment & Plan          William Almazan is a 81 year old male admitted on 8/2/2022. He has a history of CAD s/p CABG, HFrEF, pAfib on apixaban, HTN, HLD, DM2, KRISTIAN, prostate Ca, gout, and dementia and is admitted for weakness and hypotension.     Weakness  Acute Hypotension  Reported SBPs in 60s at home w/ inability to walk/ shower. No associated presyncopal, cardiac, or respiratory sxs. Did not fall or have LOC. Bps improved after 500 ml bolus PTA. Initial concern for sepsis on arrival (given lymphopenia, lactate 3.1 and procal of 0.26) and started on IV Vanc and Zosyn. However, pt's lymphopenia appears to be chronic (unknown etiology) and no other SIRS criteria was met (afebrile, normal RR, normal HR) so antibiotics discontinued. Lymphopenia, lactate, and procal improved with hydration (1.5 L total), which is reassuring. Weakness could be due to hypovolemia (2/2 dehydration vs. Hyperdiuresis) vs infection (UTI vs COVID19) vs deconditioning after recent month-long hospitalization. As for infectious sources, is COVID positive but asymptomatic and satting 97% on room air. Initial UA was dirty. Exchanged keating and repeat UA w/ leuks and WBCs but elevated RBCs indicating a traumatic sample. Will hold off on treating (given no CVA or suprapubic tenderness) pending urine culture results   -Hypovolemia workup:  --Continue to hold lasix  --Hold more IV fluids for now   -Infectious workup:  --Hold antibiotics pending below workups  --Awaiting second urine culture results   --BC pending   -Decompensation workup:  --PT and OT ordered    Prerenal TONE, resolved  Cr 1.28 (BL 0.7) on arrival--> resolved to baseline after 1.5 L NS. Given this drastic improvement, TONE likely 2/2 prerenal hypovolemia, potentially from hyperdiuresis. Patient was on lasix 80 PO BID prior to  last admission and was discharged on 40 mg BID out of concern dose was too high.   - Continue to hold PTA lasix for now  - Follow up AM BMP, can give more fluid as appropriate accounting for reported EF of 20%    COVID +  Acquired at recent admission (7/30 first positive), unclear significance to current presentation. Did not receive monoclonal antibodies during admission. Satting 97% on room air without respiratory distress, CXR unremarkable. COVID vaccinated x 3 (Pfizer).  - Monitor SpO2    Chronic HFrEF  Elevated Troponin  Hx dilated cardiomyopathy, most recent TTE 6/18/21 showing severely dilated LV with EF 20%. Currently euvolemic on exam. No hypoxia and CXR negative for cardiopulm concern. Apparently I&Os were in neg balance at recent hospitalization concerning for lasix dosing being too high. Troponin elevated on arrival, now downtrending on recheck. Likely 2/2 demand ischemia.   - Repeat ECHO 8/3 without change compared to 6/8/21  - PTA coreg 12.5 mg PO BID  - Hold PTA lasix while current concern for hypotension/TONE  - Daily weights, I&Os    Toe injury- left  Toenail injured in shower this AM. Some dried blood around the toenail and toe is painful on exam but no current bleeding or opened wounds.   - Consider podiatry consult if wound worsens    Urinary retention  Chronic, and has chronic keating in place. Wife reports that it is changed monthly and is due for a change.   -Keating changed today     CHRONIC CONDITIONS:  Auditory hallucinations, Dementia with behavioral health disturbances.   Admitted 06/2022 with progressive auditory hallucinations, including self harm command hallucinations. Seen by neuro and psych, started on psychotropic meds and improved significantly. Started on risperdol, donepezil, gabapentin, and lexapro. Denies hallucinations currently. Required a 1:1 previously but was d/c'd two weeks prior to discharge; no current behavioral concerns requiring a 1:1.  - PTA risperdol  - PTA  donepezil  - PTA gabapentin  - PTA lexapro    Full thickness rectal prolapse, initially noted 7/15  Concern on recent admission, full-thickness but reducible, intermitted bleeding with bowel movements but Hgb stable 10-11. CRS recommend fiber and avoiding constipation, follow up with surgery outpt in 4-6 weeks.  - PTA psyllium fiber 3 capsule daily, MiraLAX 17 g daily and senna 1-2 tabs bid pr    CAD s/p CABG (1992):    Not ASA or statin at home. No acute concerns. EKG dual paced rhythm with frequent PVCs, no evidence of ACS.   - PTA Coreg    Tremor, whole body  New since recent admission, neuro thought to be stress related.  - PTA B12     PAF: Not currently in Afib. PTA Eliquis 5 mg PO BI  T2DM: Diet controlled  KRISTIAN: Does not use CPAP at home  Gout: PTA allopurinol       Diet: Regular Diet Adult Regular  DVT Prophylaxis: PTA apixaban   Louie Catheter: PRESENT, indication: Retention  Central Lines: None  Cardiac Monitoring: None  Code Status: Full Code      Disposition Plan      Expected Discharge Date: 08/04/2022                The patient's care was discussed with the Attending Physician, Dr. Lou, Patient and Patient's Family.    Ana Maria Horn MD  Medicine Service, [facility] TEAM [address] [place] [address]  Securely message with the Vocera Web Console (learn more here)  Text page via [facility] Paging/Directory   Please see signed in provider for up to date coverage information      Clinically Significant Risk Factors Present on Admission         # Hypernatremia: Na = 145 mmol/L (Ref range: 136 - 145 mmol/L) on admission, will monitor as appropriate     # Hypoalbuminemia: Albumin = 3.2 g/dL (Ref range: 3.5 - 5.2 g/dL) on admission, will monitor as appropriate   # Coagulation Defect: home medication list includes an anticoagulant medication    # Chronic systolic heart failure: echo within the past year with EF <40%    # Overweight: Estimated body mass index is 27.08 kg/m  as calculated from  "the following:    Height as of this encounter: 1.753 m (5' 9\").    Weight as of 7/28/22: 83.2 kg (183 lb 6.4 oz).        ______________________________________________________________________    Interval History   Per wife Ilsa, pt arrived home and was tired. Had difficulty climbing the stairs due to weakness/fatigue. No chest pain or shortness of breath. Went to take a shower and said \"I can't, I feel weak.\" Wife and daughter helped pt out of shower. Denies him ever losing consciousness or falling or hitting his head. At one point he injured his left toe in the shower, but wife is not sure how. He did not describe any presyncopal sxs. Called EMS and pt felt better.     Pt says he is here because he couldn't walk. When asked why, he said he was weak and that his toe hurt. Denies chest pain, shortness of breath, abdominal pain, lightheadedness, dizziness, suprapubic pain, back pain, fevers, chills.     Data reviewed today: I reviewed all medications, new labs and imaging results over the last 24 hours.     Physical Exam   Vital Signs: Temp: 98  F (36.7  C) Temp src: Oral BP: (!) 141/64 Pulse: 68   Resp: 16 SpO2: 96 % O2 Device: None (Room air) Oxygen Delivery: 2 LPM  Weight: 0 lbs 0 oz  Constitutional: sleeping, but awakes on command, cooperative, no apparent distress, and appears stated age  Respiratory: No increased work of breathing, good air exchange, clear to auscultation bilaterally, no crackles or wheezing  Cardiovascular: Normal apical impulse, regular rate and rhythm, normal S1 and S2, no S3 or S4, and no murmur noted  GI: Normal bowel sounds, soft, non-distended, non-tender, no masses palpated  Extremities: No LE edema   Skin: dried blood of left great toe. No bruising or bleeding, normal skin color, texture, turgor, no redness, warmth, or swelling and no rashes  Neuropsychiatric: General: normal, calm, eyes closed but will open to command. Alert and oriented x4    Data   Recent Results (from the past 24 " hour(s))   XR Chest Port 1 View    Narrative    EXAM: CHEST SINGLE VIEW PORTABLE  LOCATION: [facility]  DATE/TIME: 2022 11:18 PM    INDICATION: COVID infection. Hypotension. Near-syncope.  COMPARISON: None.    FINDINGS: No convincing pulmonary opacities. Normal-sized cardiac silhouette. Atherosclerotic calcification in the thoracic aorta. Prior median sternotomy. Left anterior chest wall cardiac device with lead tips in right atrium and ventricle. Moderate   elevation of the right hemidiaphragm with underlying gas-filled colon.      Impression    IMPRESSION: No convincing evidence of active cardiopulmonary disease.    Echo Complete   Result Value    LVEF  15-20% (severely reduced)    Narrative    [id number]  [id number]  [id number]  [id number]^RANDALL^LUKAS     [facility],[place]  Echocardiography Laboratory  [address] [place] [id number]     Name: NIKKIE ARAUZ  MRN: [id number]  : 1940  Study Date: 2022 10:42 AM  Age: 81 yrs  Gender: Male  Patient Location: [facility]  Reason For Study: Heart Failure  Ordering Physician: LUKAS PEREIRA  Performed By: Zahraa Kaiser     BSA: 2.0 m2  Height: 69 in  Weight: 183 lb  HR: 68  BP: 96/50 mmHg  ______________________________________________________________________________  Procedure  Complete Portable Echo Adult. Contrast Optison. Optison (NDC #1756-5754-94)  given intravenously. Patient was given 6 ml mixture of 3 ml Optison and 6 ml  saline. 3 ml wasted. The final echo results were communicated to ordering  provider via this report..  ______________________________________________________________________________  Interpretation Summary  Left ventricular function is severely reduced. The ejection fraction is 15-  20%. Severe diffuse hypokinesis.  Global right ventricular function is mildly reduced.  No hemodynamically significant valve abnormalities.  The inferior vena cava cannot be  assessed.  No pericardial effusion.     Compared to the echo report in [facility] in June of 2021, no significant change in ventricular function.  ______________________________________________________________________________  Left Ventricle  Mild left ventricular dilation is present. Eccentric LVH. Left ventricular  function is decreased. The ejection fraction is 15-20% (severely reduced).  Grade I or early diastolic dysfunction. Severe diffuse hypokinesis is present.  There is no thrombus.     Right Ventricle  The right ventricle is normal size. Global right ventricular function is  mildly reduced. A pacemaker lead is noted in the right ventricle.     Atria  Moderate left atrial enlargement is present.     Mitral Valve  The mitral valve is normal.     Aortic Valve  Aortic valve is normal in structure and function. The aortic valve is  tricuspid.     Tricuspid Valve  The tricuspid valve is normal. Mild tricuspid insufficiency is present.  Estimated pulmonary artery systolic pressure is 28 mmHg plus right atrial  pressure. Pulmonary artery systolic pressure is normal.     Pulmonic Valve  The pulmonic valve is normal. Trace pulmonic insufficiency is present.     Vessels  Normal diameter aortic root and proximal ascending aorta. The inferior vena  cava cannot be assessed. Sinuses of Valsalva 4.0 cm. Ascending aorta 3.9 cm.     Pericardium  No pericardial effusion is present.     ______________________________________________________________________________  MMode/2D Measurements & Calculations  IVSd: 0.97 cm  LVIDd: 6.3 cm  LVPWd: 1.00 cm  LV mass(C)d: 263.0 grams  LV mass(C)dI: 132.2 grams/m2     asc Aorta Diam: 3.9 cm  LVOT diam: 2.5 cm  LVOT area: 4.9 cm2  LA Volume Index (BP): 41.8 ml/m2  RWT: 0.32     Doppler Measurements & Calculations  MV E max susan: 53.9 cm/sec  MV A max susan: 80.2 cm/sec  MV E/A: 0.67  MV dec slope: 235.7 cm/sec2  MV dec time: 0.23 sec  TR max susan: 229.4 cm/sec  TR max  P.1 mmHg  E/E' av.9     Lateral E/e': 5.6  Medial E/e': 12.2     ______________________________________________________________________________  Report approved by: Autumn Lundberg 2022 11:37 AM

## 2022-08-03 NOTE — PLAN OF CARE
Goal Outcome Evaluation:    Plan of Care Reviewed With: patient     Overall Patient Progress: no change    Temp: 98  F (36.7  C) Temp src: Oral BP: (!) 141/64 Pulse: 68   Resp: 16 SpO2: 96 % O2 Device: None (Room air) Oxygen Delivery: 2 LPM    NEURO: lethargic, oriented x4, calm/cooperative, slow to respond to questions  RESPIRATORY: Mid-90s on RA - weaned off 2L upon admission, Covid + (result on 7/31), infrequent/dry cough, dyspnea with exertion  CARDIAC: denies CP, +2 bilat LE edema, hx afib & pacemaker  GI/: Urine positive for infection, chronic keating d/t retention, bowel sounds audible/normoactive, LBM 8/1, denies abd discomfort  SKIN: L-toe wound w scant bloody drainage  DIET: Regular  PAIN/NAUSEA: Bilateral knee pain/swelling - legs elevated w pillows, denies nausea  INCISION/DRAINS: Keating intact w/ straw yellow output  IV ACCESS: R&L arm PIV - SL  ACTIVITY: Generalized weakness, no OOB this shift, assist x2 with walker and gait belt  LAB: Reviewed, pending follow-up urine and blood cultures  PLAN: Pt on obs status - needs obs education/handout, follow up on urine/blood culture results, IV abx, continue POC

## 2022-08-03 NOTE — PROGRESS NOTES
Taken over care of patient at 1600, patient is oriented x4, lethargic. Pt KHALIL symmetrically, 1/5 strength in BLE, keating in place (exchanged previously on day shift, prior to collecting urine specimen). Perineum cleansed and barrier cream applied. Provider updated for new diet order and wound care order for perinuem and L Great Toe reddened wound. Report given to Perlita BLAS RN at 1630.

## 2022-08-03 NOTE — PROGRESS NOTES
Brief Medicine Cross Cover Note    Notified by nursing that patient did not get prescription for Risperdal upon discharge. Spoke with Dr. Peña who states he does want this prescribed on discharge.     Since discharge was over 2 hours ago, called in prescription to patient's pharmacy, ([facility] in [place]) Risperidone PO 0.25 mg every morning and Risperidone 2 mg every evening per discharge AVS and recent MAR. Updated nursing who will call patient and patient's spouse.     ADDENDUM: Family called, asked to send prescription to [facility] [address] [place] [address], in [place] [facility]. Prescription sent to [facility].     Claudette Stark [facility] Medicine

## 2022-08-03 NOTE — ED PROVIDER NOTES
[place] EMERGENCY DEPARTMENT ([facility])  8/02/22 ED 1    History     Chief Complaint   Patient presents with     Hypotension     near syncope     The history is provided by the patient and medical records.     William Almazan is a 81 year old male with history of CAD s/p CABG, dilated cardiomyopathy, HFrEF, PAF on Eliquis, HTN, HLD, DM2, KRISTIAN, prostate cancer, gout, and dementia who presents for further evaluation of low blood pressure.  Was recently admitted at [facility] for decline in patient's overall cognitive functioning over the past 1-2 years, diagnosed with dementia with behavioral issues.  He was seen by psychiatry, neurology and then started on psychotropic medications with marked improvement.  His hospitalization was prolonged due to difficulty finding appropriate beds in the [facility] given his history of dementia.  He tested positive for COVID 3 days ago, as symptoms had started 4 days ago with fever and occasional cough.  He was discharged to home this morning.  Notably, patient's wife has had vomiting and diarrhea for nearly a week and has been feeling generally weak, was waiting for COVID testing.  She told care team that she was not ready to have them back in her house given that she herself was feeling poorly and she would be his primary care giver.  Family went to pick patient up.  At home, patient was lightheaded upon standing.  He was in the shower and became very lightheaded that he almost fell.  Wife was able to assist him.  Called EMS.  On EMS arrival, patient reported high blood pressure in the 60s.  He is otherwise afebrile.  Was given 500 cc of fluids in route.  No additional history available at this time.  Patient denies any symptoms.    Per [facility] records, patient has had 3 doses of the Pfizer COVID-19 vaccine.  Past Medical History  Past Medical History:   Diagnosis Date     Infection due to 2019 novel coronavirus      No past surgical history on file.  No  "current outpatient medications on file.    No Known Allergies  Family History  No family history on file.  Social History       Past medical history, past surgical history, medications, allergies, family history, and social history were reviewed with the patient. No additional pertinent items.       Review of Systems  A complete review of systems was performed with pertinent positives and negatives noted in the HPI, and all other systems negative.    Physical Exam   BP: 102/54  Pulse: 88  Temp: 97.4  F (36.3  C)  Resp: 28  Height: 175.3 cm (5' 9\")  SpO2: 91 %  Physical Exam  Vitals and nursing note reviewed.   Constitutional:       General: He is not in acute distress.     Appearance: Normal appearance. He is ill-appearing. He is not toxic-appearing.   HENT:      Head: Normocephalic and atraumatic.      Nose: Nose normal.      Mouth/Throat:      Mouth: Mucous membranes are moist.   Eyes:      Pupils: Pupils are equal, round, and reactive to light.   Cardiovascular:      Rate and Rhythm: Normal rate.      Pulses: Normal pulses.      Heart sounds: Normal heart sounds.   Pulmonary:      Effort: Pulmonary effort is normal. No respiratory distress.      Breath sounds: Normal breath sounds.   Abdominal:      General: Abdomen is flat. There is no distension.   Musculoskeletal:         General: No swelling or deformity. Normal range of motion.      Cervical back: Normal range of motion. No rigidity.   Skin:     General: Skin is warm.      Capillary Refill: Capillary refill takes more than 3 seconds.   Neurological:      Mental Status: He is alert.      Comments: Somnolent.  Oriented x2 but requires frequent prompting   Psychiatric:         Mood and Affect: Mood normal.         ED Course     ED Course as of 08/03/22 0257   Tue Aug 02, 2022   2369 Attempted to contact patient's wife, April Calderón.  Call was sent directly to [employer].  Left message.   Wed Aug 03, 2022   0113 Cardiac monitor showing some \"v run alarm\" " messages. Rechecked patient at bedside, patient alert, conversant, no acute changes. Repeat EKG ordered      Procedures            EKG Interpretation:      Interpreted by Shaquille Shen DO  Time reviewed: 2251  Symptoms at time of EKG: hypotension   Rhythm: normal sinus w PVCS  Rate: normal  Axis: normal  Ectopy: none  Conduction: interventricular conduction delay  ST Segments/ T Waves: No ST-T wave changes  Q Waves: none  Comparison to prior: Unchanged    Clinical Impression: unchanged EKG                    Results for orders placed or performed during the hospital encounter of 08/02/22   XR Chest Port 1 View     Status: None    Narrative    EXAM: CHEST SINGLE VIEW PORTABLE  LOCATION: [facility]  DATE/TIME: 8/2/2022 11:18 PM    INDICATION: COVID infection. Hypotension. Near-syncope.  COMPARISON: None.    FINDINGS: No convincing pulmonary opacities. Normal-sized cardiac silhouette. Atherosclerotic calcification in the thoracic aorta. Prior median sternotomy. Left anterior chest wall cardiac device with lead tips in right atrium and ventricle. Moderate   elevation of the right hemidiaphragm with underlying gas-filled colon.      Impression    IMPRESSION: No convincing evidence of active cardiopulmonary disease.    [place] Draw     Status: None (In process)    Narrative    The following orders were created for panel order [place] Draw.  Procedure                               Abnormality         Status                     ---------                               -----------         ------                     Extra Blue Top Tube[[id number]]                              Final result               Extra Red Top Tube[[id number]]                               Final result               Extra Green Top (Lithium...[[id number]]                      Final result               Extra Purple Top Tube[[id number]]                            Final result               Extra Blood Bank  Purple ...[[id number]]                                                 Extra Green Top (Lithium...[[id number]]                      Final result                 Please view results for these tests on the individual orders.   Comprehensive metabolic panel     Status: Abnormal   Result Value Ref Range    Sodium 140 136 - 145 mmol/L    Potassium 3.7 3.4 - 5.3 mmol/L    Creatinine 1.28 (H) 0.67 - 1.17 mg/dL    Urea Nitrogen 23.9 (H) 8.0 - 23.0 mg/dL    Chloride 103 98 - 107 mmol/L    Carbon Dioxide (CO2) 21 (L) 22 - 29 mmol/L    Anion Gap 16 (H) 7 - 15 mmol/L    Glucose 198 (H) 70 - 99 mg/dL    Calcium 8.7 (L) 8.8 - 10.2 mg/dL    Protein Total 5.9 (L) 6.4 - 8.3 g/dL    Albumin 3.2 (L) 3.5 - 5.2 g/dL    Bilirubin Total 0.6 <=1.2 mg/dL    Alkaline Phosphatase 97 40 - 129 U/L    AST 35 10 - 50 U/L    ALT 15 10 - 50 U/L    GFR Estimate 56 (L) >60 mL/min/1.73m2   Extra Blue Top Tube     Status: None   Result Value Ref Range    Hold Specimen JIC    Extra Red Top Tube     Status: None   Result Value Ref Range    Hold Specimen JIC    Extra Green Top (Lithium Heparin) Tube     Status: None   Result Value Ref Range    Hold Specimen JIC    Extra Purple Top Tube     Status: None   Result Value Ref Range    Hold Specimen JIC    Extra Green Top (Lithium Heparin) ON ICE     Status: None   Result Value Ref Range    Hold Specimen JIC    CBC with platelets and differential     Status: Abnormal   Result Value Ref Range    WBC Count 3.9 (L) 4.0 - 11.0 10e3/uL    RBC Count 4.00 (L) 4.40 - 5.90 10e6/uL    Hemoglobin 11.1 (L) 13.3 - 17.7 g/dL    Hematocrit 36.5 (L) 40.0 - 53.0 %    MCV 91 78 - 100 fL    MCH 27.8 26.5 - 33.0 pg    MCHC 30.4 (L) 31.5 - 36.5 g/dL    RDW 15.5 (H) 10.0 - 15.0 %    Platelet Count 201 150 - 450 10e3/uL    % Neutrophils 68 %    % Lymphocytes 13 %    % Monocytes 18 %    % Eosinophils 0 %    % Basophils 0 %    % Immature Granulocytes 1 %    NRBCs per 100 WBC 0 <1 /100    Absolute Neutrophils 2.6 1.6 - 8.3 10e3/uL     Absolute Lymphocytes 0.5 (L) 0.8 - 5.3 10e3/uL    Absolute Monocytes 0.7 0.0 - 1.3 10e3/uL    Absolute Eosinophils 0.0 0.0 - 0.7 10e3/uL    Absolute Basophils 0.0 0.0 - 0.2 10e3/uL    Absolute Immature Granulocytes 0.1 <=0.4 10e3/uL    Absolute NRBCs 0.0 10e3/uL   Lactic acid whole blood     Status: Abnormal   Result Value Ref Range    Lactic Acid 3.1 (H) 0.7 - 2.0 mmol/L   iStat Gases (lactate) venous, POCT     Status: Abnormal   Result Value Ref Range    Lactic Acid POCT 3.0 (H) <=2.0 mmol/L    Bicarbonate Venous POCT 29 (H) 21 - 28 mmol/L    O2 Sat, Venous POCT 31 (L) 94 - 100 %    pCO2V Venous POCT 46 40 - 50 mm Hg    pH Venous POCT 7.40 7.32 - 7.43    pO2 Venous POCT 20 (L) 25 - 47 mm Hg   iStat Gases Electrolytes ICA Glucose Venous, POCT     Status: Abnormal   Result Value Ref Range    CPB Applied No     Hematocrit POCT 35 (L) 40 - 53 %    Calcium, Ionized Whole Blood POCT 4.8 4.4 - 5.2 mg/dL    Glucose Whole Blood POCT 192 (H) 70 - 99 mg/dL    Bicarbonate Venous POCT 28 21 - 28 mmol/L    Hemoglobin POCT 11.9 (L) 13.3 - 17.7 g/dL    Potassium POCT 3.6 3.4 - 5.3 mmol/L    Sodium POCT 142 133 - 144 mmol/L    pCO2 Venous POCT 46 40 - 50 mm Hg    pO2 Venous POCT 23 (L) 25 - 47 mm Hg    pH Venous POCT 7.40 7.32 - 7.43    O2 Sat, Venous POCT 39 (L) 94 - 100 %   UA with Microscopic reflex to Culture     Status: Abnormal    Specimen: Urine, Louie Catheter   Result Value Ref Range    Color Urine Yellow Colorless, Straw, Light Yellow, Yellow    Appearance Urine Slightly Cloudy (A) Clear    Glucose Urine 70  (A) Negative mg/dL    Bilirubin Urine Negative Negative    Ketones Urine Negative Negative mg/dL    Specific Gravity Urine 1.012 1.003 - 1.035    Blood Urine Large (A) Negative    pH Urine 7.5 (H) 5.0 - 7.0    Protein Albumin Urine 70  (A) Negative mg/dL    Urobilinogen Urine Normal Normal, 2.0 mg/dL    Nitrite Urine Negative Negative    Leukocyte Esterase Urine Moderate (A) Negative    Bacteria Urine Few (A) None  Seen /HPF    Mucus Urine Present (A) None Seen /LPF    RBC Urine 58 (H) <=2 /HPF    WBC Urine 25 (H) <=5 /HPF    Hyaline Casts Urine 39 (H) <=2 /LPF    Narrative    Urine Culture ordered based on laboratory criteria   Troponin T, High Sensitivity     Status: Abnormal   Result Value Ref Range    Troponin T, High Sensitivity 52 (H) <=22 ng/L   EKG 12 lead     Status: None (Preliminary result)   Result Value Ref Range    Systolic Blood Pressure  mmHg    Diastolic Blood Pressure  mmHg    Ventricular Rate 72 BPM    Atrial Rate 65 BPM    IL Interval 226 ms    QRS Duration 158 ms     ms    QTc 525 ms    P Axis 84 degrees    R AXIS -75 degrees    T Axis 7 degrees    Interpretation ECG       Undetermined rhythm  Left axis deviation  Non-specific intra-ventricular conduction block  Abnormal ECG     EKG 12-lead, tracing only     Status: None (Preliminary result)   Result Value Ref Range    Systolic Blood Pressure  mmHg    Diastolic Blood Pressure  mmHg    Ventricular Rate 71 BPM    Atrial Rate 59 BPM    IL Interval 188 ms    QRS Duration 166 ms     ms    QTc 562 ms    P Axis 21 degrees    R AXIS -51 degrees    T Axis 132 degrees    Interpretation ECG       AV dual-paced rhythm with premature ventricular or aberrantly conducted complexes  Abnormal ECG     CBC with platelets differential     Status: Abnormal    Narrative    The following orders were created for panel order CBC with platelets differential.  Procedure                               Abnormality         Status                     ---------                               -----------         ------                     CBC with platelets and d...[[id number]]  Abnormal            Final result                 Please view results for these tests on the individual orders.     Medications   pharmacy alert - intermittent dosing (has no administration in time range)   vancomycin (VANCOCIN) 2,000 mg in sodium chloride 0.9 % 500 mL intermittent infusion (has no  administration in time range)   vancomycin 1250 mg in 0.9% NaCl 250 mL intermittent infusion 1,250 mg (has no administration in time range)   piperacillin-tazobactam (ZOSYN) 4.5 g vial to attach to  mL bag (0 g Intravenous Stopped 8/3/22 0140)   0.9% sodium chloride BOLUS (1,000 mLs Intravenous New Bag 8/3/22 0028)        Assessments & Plan (with Medical Decision Making)   Patient presents to the ED for evaluation of hypotension.  He was reportedly in the 60s at home.  Only diagnosed with COVID.  Discharged from the hospital this morning.  Was given 500 cc IV fluids in route.    Differential diagnosis includes sepsis, septic shock, bacteremia, dehydration, electrolyte abnormality, adrenal suppression    On arrival, patient has blood pressures in the low 100s.  Heart rhythm is 60 to 70s.  EKG shows dual paced rhythm with frequent PVCs.    Labs notable for a leukopenia.  Lactic acid is 3.1.  Septic work-up initiated.  Patient started on broad-spectrum antibiotics, vancomycin/Zosyn.  Blood cultures drawn.    Chest x-ray unremarkable.  Urinalysis suggestive of infection, likely source of patient's early sepsis.    Discussed with the medicine team who will admit to their service.    I have reviewed the nursing notes. I have reviewed the findings, diagnosis, plan and need for follow up with the patient.    New Prescriptions    No medications on file       Final diagnoses:   Hypotension, unspecified hypotension type   Infection due to 2019 novel coronavirus       --  Shaquille Shen DO  formerly [facility] EMERGENCY DEPARTMENT  8/2/2022     Shaquille Shen DO  08/03/22 [id number]

## 2022-08-03 NOTE — H&P
[facility]    History and Physical - Medicine Service, [facility] TEAM        Date of Admission:  8/2/2022    Assessment & Plan      William Almazan is a 81 year old male admitted on 8/2/2022. He has a history of CAD s/p CABG, HFrEF, pAfib on apixaban, HTN, HLD, DM2, KRISTIAN, prostate Ca, gout, and dementia and is admitted for weakness and hypotension concerning for sepsis.     Concern for sepsis  Reported hypotension with SBPs in 60s at home reported by EMS, also leukopenia (lymphopenia), lactate 3.1, procal 0.26. Otherwise afebrile and HDS, and lactate improved to 2.1 after 500 ml IVF. Source unclear. UA is dirty but patient has chronic keating, no suprapubic pain or fevers, and has never had a UTI before per his wife. CXR unremarkable, no diarrhea, no clear source of skin infection. Is COVID+ x 5 days but satting well on 2L NC (likely unnecessary) and denies respiratory symtpoms, again CXR unremarkable. No antibiotics were given at his recent month-long admission and he has no history of ESBL infxns. UCx and BCx pending and vanc/zosyn started in ED. As opposed to sepsis, hypotension may have been caused by overdiuresis and lymphopenia may be from COVID infection (see problems below).  - Follow up BCx, UCx  - Continue Vanc/Zosyn until source identified, can narrow or stop pending workup  - No fluids for now given BP and lactate improvement, give as needed    (has dilated CMP, most recent TTE 6/18/21 with EF 20%)    TONE  FEurea 32.2% consistent with prerenal, which could be from hypotension reported at home (potentially sepsis) or over-diuresis. Patient takes lasix 40 PO BID but apparently concerns at his recent hospitalization for lasix dosing being too high, though he was discharged on the same dose and told to follow his weights carefully. Given 500ml bolus in ED.  - Hold PTA lasix for now  - Follow up AM BMP, can give more fluid as appropriate accounting for reported EF of  20%    COVID +  Acquired at recent admission (7/30 first positive), unclear significance to current presentation. Satting 100% on 2L NC, no respiratory distress, CXR unremarkable. COVID vaccinated x 3 (Pfizer).  - Discontinue nasal cannula  - Monitor SpO2    Chronic HFrEF  Hx dilated cardiomyopathy, most recent TTE 6/18/21 showing severely dilated LV with EF 20%. Currently euvolemic on exam. No hypoxia and CXR negative for cardiopulm concern. Apparently I&Os were in neg balance at recent hospitalization concerning for lasix dosing being too high.   - PTA coreg 12.5 mg PO BID  - Hold PTA lasix while current concern for hypotension/TONE  - Daily weights, I&Os    Toe injury- left  Toenail injured during his fall at home which led to this admission. Some dried blood around the toenail and toe is painful on exam but no current bleeding or opened wounds.   - Consider podiatry consult if wound worsens    Urinary retention  Chronic, and has chronic keating in place. Wife reports that it is changed monthly and is due for a change.     CHRONIC CONDITIONS:  Auditory hallucinations, Dementia with behavioral health disturbances.   Admitted 06/2022 with progressive auditory hallucinations, including self harm command hallucinations. Seen by neuro and psych, started on psychotropic meds and improved significantly. Started on risperdol, donepezil, gabapentin, and lexapro. Denies hallucinations currently. Required a 1:1 previously but was d/c'd two weeks prior to discharge; no current behavioral concerns requiring a 1:1.  - PTA risperdol  - PTA donepezil  - PTA gabapentin  - PTA lexapro    Full thickness rectal prolapse, initially noted 7/15  Concern on recent admission, full-thickness but reducible, intermitted bleeding with bowel movements but Hgb stable 10-11. CRS recommend fiber and avoiding constipation, follow up with surgery outpt in 4-6 weeks.  - PTA psyllium fiber 3 capsule daily, MiraLAX 17 g daily and senna 1-2 tabs bid  pr    CAD s/p CABG (1992):    Not ASA or statin at home. No acute concerns. EKG dual paced rhythm with frequent PVCs, no evidence of ACS.   - PTA Coreg    Tremor, whole body  New since recent admission, neuro thought to be stress related.  - PTA B12     PAF: Not currently in Afib. PTA Eliquis 5 mg PO BI  T2DM: Diet controlled  KRISTIAN: Does not use CPAP at home  Gout: PTA allopurinol       Diet:   Regular  DVT Prophylaxis: DOAC  Louie Catheter: PRESENT, indication:    Fluids: None  Central Lines: None  Cardiac Monitoring: None  Code Status:   Full    The patient's care will be discussed with day attending.     Amy Leonardo MD  Medicine Service, [facility]  Securely message with the Vocera Web Console (learn more here)  Text page via [facility] Paging/Directory   Please see signed in provider for up to date coverage information    ______________________________________________________________________    Chief Complaint   Weakness, hypotension    History of Present Illness   William Almazan is a 81 year old male admitted on 8/2/2022. He has a history of CAD s/p CABG, HFrEF, pAfib on apixaban, HTN, HLD, DM2, KRISTIAN, prostate Ca, gout, and dementia presenting for weakness and hypotension.     Was recently admitted to [place] for one month for dementia, behavioral issues, and hallucinations. Was seen by psychiatry and neurology and started on psychotropic meds with marked improvement. Course complicated by COVID + test on 7/30 due to symtpoms of fever and occasional cough. Was discharged home on morning of 8/2/21.     Per his wife Naya, after he came home he was feeling OK and able to eat a small amount, had a normal BM, then wanted to take a shower. In the shower he started to feel weak standing up and slowly slumped to the ground. Didn't hit his head or any part of his body.     While in the shower waiting for EMS he was laying on his toenail in an odd way and it flaked  off and started bleeding. It has stopped bleeding now but is painful for him.    In the ED he feels weak all over. Mostly feels pain in left big toe. Denies dyspnea, orthopnea, chest pain, diarrhea, fevers, cough, abdominal pain, suprapubic pain, leg swelling. Has a chronic keating so can't report or deny dysuria.    Review of Systems    The 10 point Review of Systems is negative other than noted in the HPI or here.     Past Medical History    I have reviewed this patient's medical history and updated it with pertinent information if needed.   Past Medical History:   Diagnosis Date     Infection due to 2019 novel coronavirus         Past Surgical History   I have reviewed this patient's surgical history and updated it with pertinent information if needed.  Unknown    Social History   Wife Naya, Daughter Renuka, Granddaughter Butterfly      Family History   No significant    Prior to Admission Medications   Prior to Admission Medications   Prescriptions Last Dose Informant Patient Reported? Taking?   Lidocaine (LIDOCARE) 4 % Patch   No No   Sig: Place 1 patch onto the skin every 24 hours To prevent lidocaine toxicity, patient should be patch free for 12 hrs daily.   allopurinol (ZYLOPRIM) 300 MG tablet  Self Yes No   Sig: Take 1 tablet by mouth daily   amoxicillin (AMOXIL) 500 MG capsule  Self Yes No   Sig: Take 1 capsule by mouth as needed PRN for dental procedures   apixaban ANTICOAGULANT (ELIQUIS) 5 MG tablet  Self Yes No   Sig: Take 5 mg by mouth 2 times daily   carvedilol (COREG) 25 MG tablet  Self Yes No   Sig: Take 25 mg by mouth 2 times daily   cholecalciferol (VITAMIN D3) 25 mcg (1000 units) capsule  Self Yes No   Sig: Take 1,000 Units by mouth daily   cyanocobalamin (VITAMIN B-12) 1000 MCG tablet   Yes No   Sig: Take 1,000 mcg by mouth daily   escitalopram (LEXAPRO) 10 MG tablet   No No   Sig: Take 1 tablet (10 mg) by mouth daily   furosemide (LASIX) 80 MG tablet   No No   Sig: Take 0.5 tablets (40 mg) by  mouth 2 times daily   gabapentin (NEURONTIN) 100 MG capsule   No No   Sig: Take 1 capsule (100 mg) by mouth 3 times daily   loratadine 10 MG capsule  Self Yes No   Sig: Take 1 capsule by mouth daily   multivitamin w/minerals (THERA-VIT-M) tablet   Yes No   Sig: Take 1 tablet by mouth daily   risperiDONE (RISPERDAL) 0.25 MG tablet   No No   Sig: Take 1 tablet (0.25 mg) by mouth every morning for 30 days   risperiDONE (RISPERDAL) 0.25 MG tablet   No No   Sig: Take 1 tablet (0.25 mg) by mouth every morning for 30 days   risperiDONE (RISPERDAL) 2 MG tablet   No No   Sig: Take 1 tablet (2 mg) by mouth At Bedtime   risperiDONE (RISPERDAL) 2 MG tablet   No No   Sig: Take 1 tablet (2 mg) by mouth At Bedtime for 30 days   spironolactone (ALDACTONE) 25 MG tablet  Self Yes No   Sig: Take 1 tablet by mouth daily      Facility-Administered Medications: None     Allergies   No Known Allergies    Physical Exam   Vital Signs: Temp: 97.4  F (36.3  C) Temp src: Oral BP: 124/68 Pulse: 78   Resp: 17 SpO2: 100 % O2 Device: Nasal cannula Oxygen Delivery: 2 LPM  Weight: 0 lbs 0 oz    Physical Exam  Constitutional:       General: He is not in acute distress.  HENT:      Mouth/Throat:      Mouth: Mucous membranes are moist.   Eyes:      Extraocular Movements: Extraocular movements intact.      Pupils: Pupils are equal, round, and reactive to light.   Cardiovascular:      Rate and Rhythm: Normal rate and regular rhythm.   Pulmonary:      Effort: Pulmonary effort is normal.      Breath sounds: Normal breath sounds.   Abdominal:      General: Abdomen is flat.      Palpations: Abdomen is soft.      Tenderness: There is no abdominal tenderness.   Musculoskeletal:         General: No swelling.      Right lower leg: No edema.      Left lower leg: No edema.   Feet:      Comments: L big toe with dried blood especially around base of toenail, no open wounds, tender to palpation  Neurological:      General: No focal deficit present.      Mental  Status: He is alert and oriented to person, place, and time.      Motor: Weakness present.      Comments: Symmetric weakness in upper and lower extremities bilaterally         Data   Data reviewed today: I reviewed all medications, new labs and imaging results over the last 24 hours.    Recent Labs   Lab 08/02/22 2239 08/02/22 2232 07/31/22  0608   WBC  --  3.9* 2.6*   HGB 11.9* 11.1* 9.7*   MCV  --  91 89   PLT  --  201 148*    140 139   POTASSIUM 3.6 3.7 3.6   CHLORIDE  --  103 106   CO2  --  21* 28   BUN  --  23.9* 24   CR  --  1.28* 0.70   ANIONGAP  --  16* 5   CRYS  --  8.7* 8.4*   * 198* 116*   ALBUMIN  --  3.2* 2.5*   PROTTOTAL  --  5.9* 5.8*   BILITOTAL  --  0.6 0.3   ALKPHOS  --  97 95   ALT  --  15 15   AST  --  35 16

## 2022-08-03 NOTE — UTILIZATION REVIEW
"Admission Status; Secondary Review Determination     Under the authority of the Utilization Management Committee, the utilization review process indicated a secondary review on the above patient.  The review outcome is based on review of the medical records, discussions with staff, and applying clinical experience noted on the date of the review.       (x) Observation Status Appropriate - This patient does not meet hospital inpatient criteria and is placed in observation status. If this patient's primary payer is Medicare and was admitted as an inpatient, Condition Code 44 should be used and patient status changed to \"observation\".     RATIONALE FOR DETERMINATION:  Complex 81-year-old male with history of CAD s/p CABG, HFrEF, pAfib on apixaban, HTN, HLD, DM2, KRISTIAN, prostate Ca, gout, and dementia.  Recently hospitalized for 5 weeks with dementia associated with behavioral issues, COVID-19 infection, chronic urinary retention with indwelling Louie catheter along with management of chronic systolic heart failure and generalized weakness.  Patient has known severely reduced left ventricular function with an EF of 15-20% with severe diffuse hypokinesis.  Patient was diuresed while in hospital with plans for discharge on Lasix 40 mg twice a day with a note that this might need to be reduced as patient is euvolemic.  Patient discharged on 8/2/2022 at noon and return to the hospital that same evening when it appeared patient might pass out during a shower and found to have hypotension.  Patient's creatinine level at approximately 66% rising creatinine level from 2 days earlier, blood pressure normalized quickly with IV fluids, no associated fever, tachycardia or leukocytosis.  Patient has mild lymphopenia felt to be from recent COVID illness.  Patient did have a borderline elevated procalcitonin level along with pyuria associated with chronic indwelling Louie catheter.  Observation care appropriate for readmission on the " day of discharge to rule out potentially new acute infection causing low blood pressure while treating for probable overdiuresis volume depletion.       The severity of illness, intensity of service provided, expected LOS and risk for adverse outcome make the care appropriate for further observation; however, doesn't meet criteria for hospital inpatient admission. This was discussed with attending physician who concurred with this determination.    The information on this document is developed by the utilization review team in order for the business office to ensure compliance.  This only denotes the appropriateness of proper admission status and does not reflect the quality of care rendered.         The definitions of Inpatient Status and Observation Status used in making the determination above are those provided in the CMS Coverage Manual, Chapter 1 and Chapter 6, section 70.4.      Sincerely,     Stefan Russo MD    Physician Advisor  Utilization Review/ Case Management  [facility].

## 2022-08-03 NOTE — ED TRIAGE NOTES
Pt brought to ER by [facility] after wife called 911 because pt felt like he was going to pass out while in the shower. He was assisted to lie down in the shower by his wife. Upon EMS arrival BP was 60/35. HR 50-90's. . IV x 2 was started and NS infusing wide open. Received 500 ML bolus prior to arrival /54. Sats 92%   RA. Oxygen on 2 L via NC. Pt alert and has no complaints. Blood being drawn off of IV's. EKG being done. Small skin tear on left great toe. Pt was discharged at 1130 today from being hospitalized for 20 days. Tested positive for COVID on 07/30/2022     Triage Assessment     Row Name 08/02/22 2241       Triage Assessment (Adult)    Airway WDL WDL       Respiratory WDL    Respiratory WDL X;cough    Cough Frequency infrequent    Cough Type dry       Skin Circulation/Temperature WDL    Skin Circulation/Temperature WDL WDL       Cardiac WDL    Cardiac WDL X;rhythm    Cardiac Rhythm other (see comments)  Frequent PVC's       Peripheral/Neurovascular WDL    Peripheral Neurovascular WDL WDL       Cognitive/Neuro/Behavioral WDL    Cognitive/Neuro/Behavioral WDL X  H/O dementia    Level of Consciousness alert    Arousal Level opens eyes spontaneously    Orientation oriented x 4    Speech clear    Mood/Behavior calm;cooperative       Rivera Coma Scale    Best Eye Response 4-->(E4) spontaneous    Best Motor Response 6-->(M6) obeys commands    Best Verbal Response 5-->(V5) oriented    Brashear Coma Scale Score 15

## 2022-08-03 NOTE — PHARMACY-VANCOMYCIN DOSING SERVICE
"Pharmacy Vancomycin Initial Note  Date of Service August 3, 2022  Patient's  1940  81 year old, male    Indication: Sepsis    Current estimated CrCl = Estimated Creatinine Clearance: 52.4 mL/min (A) (based on SCr of 1.28 mg/dL (H)).    Creatinine for last 3 days  2022:  6:08 AM Creatinine 0.70 mg/dL  2022: 10:32 PM Creatinine 1.28 mg/dL    Recent Vancomycin Level(s) for last 3 days  No results found for requested labs within last 72 hours.      Vancomycin IV Administrations (past 72 hours)      No vancomycin orders with administrations in past 72 hours.                Nephrotoxins and other renal medications (From now, onward)    Start     Dose/Rate Route Frequency Ordered Stop    22 0000  piperacillin-tazobactam (ZOSYN) 4.5 g vial to attach to  mL bag        Note to Pharmacy: For [facility], [facility] and [facility]: For Zosyn-naive patients, use the \"Zosyn initial dose + extended infusion\" order panel.    4.5 g  over 30 Minutes Intravenous ONCE 22 2354            Contrast Orders - past 72 hours (72h ago, onward)    None          Insight[employer] Prediction of Planned Initial Vancomycin Regimen  Loading dose: 2000 mg at 00:30 2022.  Regimen: 1250 mg IV every 24 hours.  Start time: 00:10 on 2022  Exposure target: AUC24 (range)400-600 mg/L.hr   AUC24,ss: 527 mg/L.hr  Probability of AUC24 > 400: 79 %  Ctrough,ss: 16.4 mg/L  Probability of Ctrough,ss > 20: 32 %  Probability of nephrotoxicity (Lodise YASMIN ): 12 %          Plan:  1. Start vancomycin  2000 mg IV X1 followed by 1250 mg IV q24h.   2. Vancomycin monitoring method: AUC  3. Vancomycin therapeutic monitoring goal: 400-600 mg*h/L  4. Pharmacy will check vancomycin levels as appropriate in 1-3 Days.    5. Serum creatinine levels will be ordered daily for the first week of therapy and at least twice weekly for subsequent weeks.      Mayco Watkins, [facility]    "

## 2022-08-04 ENCOUNTER — APPOINTMENT (OUTPATIENT)
Dept: PHYSICAL THERAPY | Facility: CLINIC | Age: 82
DRG: 640 | End: 2022-08-04
Payer: MEDICARE

## 2022-08-04 LAB
ANION GAP SERPL CALCULATED.3IONS-SCNC: 10 MMOL/L (ref 7–15)
ATRIAL RATE - MUSE: 58 BPM
BACTERIA UR CULT: NO GROWTH
BASOPHILS # BLD AUTO: 0 10E3/UL (ref 0–0.2)
BASOPHILS NFR BLD AUTO: 0 %
BUN SERPL-MCNC: 20.5 MG/DL (ref 8–23)
CALCIUM SERPL-MCNC: 8.7 MG/DL (ref 8.8–10.2)
CHLORIDE SERPL-SCNC: 105 MMOL/L (ref 98–107)
CREAT SERPL-MCNC: 0.66 MG/DL (ref 0.67–1.17)
DEPRECATED HCO3 PLAS-SCNC: 27 MMOL/L (ref 22–29)
DIASTOLIC BLOOD PRESSURE - MUSE: NORMAL MMHG
EOSINOPHIL # BLD AUTO: 0 10E3/UL (ref 0–0.7)
EOSINOPHIL NFR BLD AUTO: 0 %
ERYTHROCYTE [DISTWIDTH] IN BLOOD BY AUTOMATED COUNT: 15.5 % (ref 10–15)
GFR SERPL CREATININE-BSD FRML MDRD: >90 ML/MIN/1.73M2
GLUCOSE SERPL-MCNC: 87 MG/DL (ref 70–99)
HCT VFR BLD AUTO: 31.7 % (ref 40–53)
HGB BLD-MCNC: 9.7 G/DL (ref 13.3–17.7)
IMM GRANULOCYTES # BLD: 0 10E3/UL
IMM GRANULOCYTES NFR BLD: 0 %
INTERPRETATION ECG - MUSE: NORMAL
LACTATE SERPL-SCNC: 0.8 MMOL/L (ref 0.7–2)
LYMPHOCYTES # BLD AUTO: 0.4 10E3/UL (ref 0.8–5.3)
LYMPHOCYTES NFR BLD AUTO: 9 %
MCH RBC QN AUTO: 27.6 PG (ref 26.5–33)
MCHC RBC AUTO-ENTMCNC: 30.6 G/DL (ref 31.5–36.5)
MCV RBC AUTO: 90 FL (ref 78–100)
MONOCYTES # BLD AUTO: 0.7 10E3/UL (ref 0–1.3)
MONOCYTES NFR BLD AUTO: 14 %
NEUTROPHILS # BLD AUTO: 3.4 10E3/UL (ref 1.6–8.3)
NEUTROPHILS NFR BLD AUTO: 77 %
NRBC # BLD AUTO: 0 10E3/UL
NRBC BLD AUTO-RTO: 0 /100
P AXIS - MUSE: NORMAL DEGREES
PLATELET # BLD AUTO: 180 10E3/UL (ref 150–450)
POTASSIUM SERPL-SCNC: 3.2 MMOL/L (ref 3.4–5.3)
POTASSIUM SERPL-SCNC: 3.5 MMOL/L (ref 3.4–5.3)
PR INTERVAL - MUSE: NORMAL MS
QRS DURATION - MUSE: 156 MS
QT - MUSE: 494 MS
QTC - MUSE: 509 MS
R AXIS - MUSE: -69 DEGREES
RBC # BLD AUTO: 3.52 10E6/UL (ref 4.4–5.9)
SODIUM SERPL-SCNC: 142 MMOL/L (ref 136–145)
SYSTOLIC BLOOD PRESSURE - MUSE: NORMAL MMHG
T AXIS - MUSE: 78 DEGREES
VENTRICULAR RATE- MUSE: 64 BPM
WBC # BLD AUTO: 4.5 10E3/UL (ref 4–11)

## 2022-08-04 PROCEDURE — 97116 GAIT TRAINING THERAPY: CPT | Mod: GP

## 2022-08-04 PROCEDURE — 85025 COMPLETE CBC W/AUTO DIFF WBC: CPT

## 2022-08-04 PROCEDURE — 99207 PR NOT IN PERSON INPATIENT CONSULT STATISTICAL MARKER: CPT | Performed by: INTERNAL MEDICINE

## 2022-08-04 PROCEDURE — 80048 BASIC METABOLIC PNL TOTAL CA: CPT

## 2022-08-04 PROCEDURE — 97161 PT EVAL LOW COMPLEX 20 MIN: CPT | Mod: GP

## 2022-08-04 PROCEDURE — 250N000013 HC RX MED GY IP 250 OP 250 PS 637

## 2022-08-04 PROCEDURE — 83605 ASSAY OF LACTIC ACID: CPT

## 2022-08-04 PROCEDURE — 250N000013 HC RX MED GY IP 250 OP 250 PS 637: Performed by: STUDENT IN AN ORGANIZED HEALTH CARE EDUCATION/TRAINING PROGRAM

## 2022-08-04 PROCEDURE — 99232 SBSQ HOSP IP/OBS MODERATE 35: CPT | Mod: GC | Performed by: PEDIATRICS

## 2022-08-04 PROCEDURE — 36415 COLL VENOUS BLD VENIPUNCTURE: CPT

## 2022-08-04 PROCEDURE — 97530 THERAPEUTIC ACTIVITIES: CPT | Mod: GP

## 2022-08-04 PROCEDURE — 120N000002 HC R&B MED SURG/OB UMMC

## 2022-08-04 PROCEDURE — 84132 ASSAY OF SERUM POTASSIUM: CPT

## 2022-08-04 RX ORDER — POTASSIUM CHLORIDE 750 MG/1
40 TABLET, EXTENDED RELEASE ORAL ONCE
Status: COMPLETED | OUTPATIENT
Start: 2022-08-04 | End: 2022-08-04

## 2022-08-04 RX ORDER — CARVEDILOL 6.25 MG/1
6.25 TABLET ORAL 2 TIMES DAILY
Status: DISCONTINUED | OUTPATIENT
Start: 2022-08-04 | End: 2022-08-05

## 2022-08-04 RX ADMIN — Medication 3 CAPSULE: at 07:59

## 2022-08-04 RX ADMIN — POLYETHYLENE GLYCOL 3350 17 G: 17 POWDER, FOR SOLUTION ORAL at 07:59

## 2022-08-04 RX ADMIN — CARVEDILOL 6.25 MG: 6.25 TABLET, FILM COATED ORAL at 20:30

## 2022-08-04 RX ADMIN — Medication 25 MCG: at 08:00

## 2022-08-04 RX ADMIN — RISPERIDONE 0.25 MG: 0.25 TABLET ORAL at 07:59

## 2022-08-04 RX ADMIN — GABAPENTIN 100 MG: 100 CAPSULE ORAL at 20:30

## 2022-08-04 RX ADMIN — DONEPEZIL HYDROCHLORIDE 5 MG: 5 TABLET, FILM COATED ORAL at 20:30

## 2022-08-04 RX ADMIN — ACETAMINOPHEN 325 MG: 325 TABLET, FILM COATED ORAL at 17:52

## 2022-08-04 RX ADMIN — LORATADINE 10 MG: 10 TABLET ORAL at 08:00

## 2022-08-04 RX ADMIN — SPIRONOLACTONE 25 MG: 25 TABLET ORAL at 08:00

## 2022-08-04 RX ADMIN — POTASSIUM CHLORIDE 40 MEQ: 750 TABLET, EXTENDED RELEASE ORAL at 09:53

## 2022-08-04 RX ADMIN — APIXABAN 5 MG: 5 TABLET, FILM COATED ORAL at 20:30

## 2022-08-04 RX ADMIN — ALLOPURINOL 300 MG: 300 TABLET ORAL at 08:00

## 2022-08-04 RX ADMIN — ESCITALOPRAM OXALATE 10 MG: 10 TABLET ORAL at 07:59

## 2022-08-04 RX ADMIN — CARVEDILOL 12.5 MG: 12.5 TABLET, FILM COATED ORAL at 08:00

## 2022-08-04 RX ADMIN — APIXABAN 5 MG: 5 TABLET, FILM COATED ORAL at 07:59

## 2022-08-04 RX ADMIN — GABAPENTIN 100 MG: 100 CAPSULE ORAL at 08:00

## 2022-08-04 RX ADMIN — GABAPENTIN 100 MG: 100 CAPSULE ORAL at 13:47

## 2022-08-04 RX ADMIN — Medication 1000 MCG: at 07:59

## 2022-08-04 RX ADMIN — RISPERIDONE 2 MG: 2 TABLET ORAL at 20:29

## 2022-08-04 ASSESSMENT — ACTIVITIES OF DAILY LIVING (ADL)
ADLS_ACUITY_SCORE: 55
DEPENDENT_IADLS:: CLEANING;COOKING;LAUNDRY;SHOPPING;MEAL PREPARATION;MEDICATION MANAGEMENT;TRANSPORTATION;MONEY MANAGEMENT
ADLS_ACUITY_SCORE: 55

## 2022-08-04 NOTE — UTILIZATION REVIEW
Admission Status; Secondary Review Determination       Under the authority of the Utilization Management Committee, the utilization review process indicated a secondary review on the above patient. The review outcome is based on review of the medical records, discussions with staff, and applying clinical experience noted on the date of the review.     (x) Inpatient Status Appropriate - This patient's medical care is consistent with medical management for inpatient care and reasonable inpatient medical practice.     RATIONALE FOR DETERMINATION     William Almazan is an 81 year old male admitted on 8/2/2022. He has a history of CAD s/p CABG, HFrEF, paroxysmal atrial fibrillation for which he is on Coreg and apixaban, HTN, HLD, DM2, KRISTIAN, prostate Ca, gout, and dementia. He presented to the [facility] ED on 8/2/2028 for evaluation of weakness, lightheadedness, and near fall.        Was recently admitted to [place] for one month for dementia, behavioral issues, and hallucinations. Was seen by psychiatry and neurology and started on psychotropic meds with marked improvement. Course complicated by COVID + test on 7/30 due to symtpoms of fever and occasional cough. Was discharged home on morning of 8/2/21.  He had recently been admitted from 6/28/2022 through 8/2/2022 with dementia and COVID-19 infection.  Shortly after returning home he was found to be weak and lightheaded with standing.  He almost fell.  He was brought to the emergency department for evaluation.  Emergency department evaluation showed stable vital signs.  Heart rate was in the 60s blood pressure was adequate.  Laboratory evaluation showed BUN 24, creatinine 1.28, BNP 3406, high-sensitivity troponin 52, white blood cell 3.9, hemoglobin 11.1, and abnormal urinalysis with 25 white blood cells, moderate leukocyte esterase, and few bacteria.  ECG was unremarkable.  Chest x-ray was unremarkable.  He was admitted with concern for possible infection, acute kidney  injury, and COVID-19 infection (first tested positive on 7/30/2022).  Hospital course has been complicated by bradycardia with heart rate in the 40s this morning.  Prior to admission Coreg was held this morning and dose will be reduced this evening.  Cardiology has been consulted.  Inpatient admission is appropriate for ongoing evaluation and treatment of symptomatic bradycardia tenderness 81-year-old male with dementia who presented with weakness, lightheadedness, and near fall.        At the time of admission with the information available to the attending physician more than 2 nights Hospital complex care was anticipated, based on patient risk of adverse outcome if treated as outpatient and complex care required. Inpatient admission is appropriate based on the Medicare guidelines.     This document was produced using voice recognition software       The information on this document is developed by the utilization review team in order for the business office to ensure compliance. This only denotes the appropriateness of proper admission status and does not reflect the quality of care rendered.   The definitions of Inpatient Status and Observation Status used in making the determination above are those provided in the CMS Coverage Manual, Chapter 1 and Chapter 6, section 70.4.   Sincerely,     Haja Lanier MD    Utilization Review  Physician Advisor  [facility].

## 2022-08-04 NOTE — CONSULTS
Care Management Initial Consult    General Information  Assessment completed with: Spouse or significant other, [place]  Type of CM/SW Visit: Initial Assessment    Primary Care Provider verified and updated as needed: Yes   Readmission within the last 30 days: previous discharge plan unsuccessful   Return Category: Medically unsuccessful treatment plan  Reason for Consult: discharge planning  Advance Care Planning:            Communication Assessment  Patient's communication style: spoken language (English or Bilingual)    Hearing Difficulty or Deaf: no   Wear Glasses or Blind: yes    Cognitive  Cognitive/Neuro/Behavioral: .WDL except  Level of Consciousness: lethargic  Arousal Level: arouses to voice  Orientation: oriented x 4  Mood/Behavior: flat affect  Best Language: 0 - No aphasia  Speech: slow, logical    Living Environment:   People in home: child(ashley), adult, spouse  Ilsa  Current living Arrangements: house      Able to return to prior arrangements: yes       Family/Social Support:  Care provided by: spouse/significant other, child(ashley)  Provides care for: no one  Marital Status:   Wife, Children  Ilsa       Description of Support System: Supportive    Support Assessment: Adequate family and caregiver support, Adequate social supports    Current Resources:   Patient receiving home care services: Yes  Skilled Home Care Services: Skilled Nursing, Home Health Aid, Physicial Therapy, Occupational Therapy  Community Resources: Home Care  Equipment currently used at home: cane, straight, walker, standard  Supplies currently used at home: Incontinence Supplies    Employment/Financial:  Employment Status: retired        Lifestyle & Psychosocial Needs: (pulled from chart)  Social Determinants of Health     Tobacco Use: Not on file   Alcohol Use: Not on file   Financial Resource Strain: Not on file   Food Insecurity: Not on file   Transportation Needs: Not on file   Physical Activity: Not on file   Stress: Not on  file   Social Connections: Not on file   Intimate Partner Violence: Not on file   Depression: Not on file   Housing Stability: Not on file       Functional Status:  Prior to admission patient needed assistance:   Dependent ADLs:: Incontinence, Bathing (Emptying leg bag)  Dependent IADLs:: Cleaning, Cooking, Laundry, Shopping, Meal Preparation, Medication Management, Transportation, Money Management  Assesssment of Functional Status: Not at  functional baseline    Values/Beliefs:  Spiritual, Cultural Beliefs, Restorationism Practices, Values that affect care:    Description of Beliefs that Will Affect Care: [employer] L[employer]-day            Additional Information:  Pt is an 81 year old male with PMH of CAD s/p CABG, HFrEF, pAfib on apixaban, HTN, HLD, DM2, KRISTIAN, prostate Ca, gout, and dementia that was discharged on 8/2/22 but then came back to hospital same day due to weakness and hypotension concerning for sepsis.     RNCC completed initial consult assessment with pt's wife Ilsa for elevated risk score. Ilsa states that pt lives with her in their house and that they do have flights of stairs pt would need to be able to use. Ilsa states that pt was pretty independent before previous hospitalization and that he was able to move up and down the stairs and complete all ADLs himself. Ilsa assisted him with various IADLs such as cooking, shopping, and cleaning. Pt does have a cane and walker that he can use when needed. Ilsa states that pt's daughter, Renuka, also lives with him and helping her to care for the pt so that he has 24hr supervision. Ilsa states that when William is ready for discharge, her or her daughter will be coming to take pt home. Pt was scheduled to open with [facility] for SN, HHA, SW, and PT/OT but was rehospitalized instead. Will send new referrals and orders for [facility] to see him once he discharges.    RNCC explained to [place] regarding [place] form. Ilsa upset that she was not notified of  observation status as provider had told her pt was admitted. RNCC reached out to Dr. Billy to follow up with Ilsa.     José Underwood RN

## 2022-08-04 NOTE — PROGRESS NOTES
SPIRITUAL HEALTH SERVICES Progress Note  [facility] 5A  Pt Request    Attempted phone visit 4x.     Plan: Will notify unit  of request.    Derrick Duncan, [facility]   Intern  Pager [phone]      * [facility] remains available 24/7 for emergent requests/referrals, either by having the switchboard page the on-call  or by entering an ASAP/STAT consult in Epic (this will also page the on-call ). Routine Epic consults receive an initial response within 24 hours.*

## 2022-08-04 NOTE — PLAN OF CARE
Goal Outcome Evaluation:    Plan of Care Reviewed With: patient     Overall Patient Progress: no change    Temp: 97.7  F (36.5  C) Temp src: Axillary BP: (!) 146/60 Pulse: 63   Resp: 16 SpO2: 97 % O2 Device: None (Room air) Oxygen Delivery: 2 LPM    NEURO: Lethargic, oriented x4, arouses to voice, slow response to questions  RESPIRATORY: 100% on RA, Covid+ (result on 7/31), infrequent/dry cough, dyspnea w exertion, LS diminished  CARDIAC: On tele, HR fluctuating 50-90s, denies CP, cardiology consult ordered today, bilat LE +2/+3 edema  GI/: Chronic keating d/t retention, LBM 8/1, passing flatus, denies abd discomfort  SKIN: WOC consult done in A.M., sacral mepilex CDI w/ 3x pressure sores underneath, dressing changes ordered q3d w/ mepilex, adaptic, iodoform gel and ostomy paste (supplies in room); L-toe wound open to air; high risk for pressure injuries  DIET: Regular  PAIN/NAUSEA: Intermittent bilat foot pain, radiates up legs, fluctuates from 3-8/10, heels elevated off bed w pillows per pt request  INCISION/DRAINS: Keating intact w/ 550cc clear donna output  IV ACCESS: R&L PIV - SL  ACTIVITY: Generalized weakness, assist x2, pt stood at bedside/took side steps w/ PT today, goal to get up to chair x1 this shift  LAB: Reviewed, low K 3.2 - replacement protocol ordered and oral replacement given  PLAN: Continue POC, expected to discharge to TCU when stable

## 2022-08-04 NOTE — CONSULTS
[facility]    Cardiology Consult Note-Cards 1      Date of Admission:  8/2/2022  Consult Requested by: Dr. Michael Riley  Reason for Consult: Bradycardia and hypotension    Assessment & Plan: HVSL     William Almazan is a 80 yo male with h/o CAD s/p CABG, HFrEF, previous PVC ablation, s/p ICD, pAfib on apixaban, HTn, HLD, DMII, KRISTIAN, dementia who presents with weakness and hypotension. Cardiology consulted for bradycardia and hypotension.    Pt admitted with SBP reported to be 60s per EMS. Pt reportedly lightheaded during episode, denies chest pain. No falls or LOC was reported. Blood pressures improved w/ fluids and has not been hypotensive or symptomatic since. Overnight on 8/3, pt had bradycardia on telemetry in 40s.Recent echo on 8/4 is unchanged from previous echo in 2021 (EF 15-20%).    Given fluid responsiveness of blood pressure, pt hypotension likely d/t hypovolemia.     #Bradycardia. patient's telemetry during these episodes reveal pauses in which pacemaker spikes were not recorded. As an aside, for pulse dependent HR measurements, false bradycardia is commonly seen in the setting of PVCs, which pt has a chronic hx of and were observed on multiple EKGs this admission. Unlikely that the patient's reported bradycardia was contributing to his hypotension, given the lack of correlation between bradycardic episodes on telemetry here in the hospital, episodes of hypotension since admission  -We will order for device interrogation for his Medtronic ICD    #Acute Hypotension likely hypovolemic  #HFrEF 2/2 Ischemic Cardiomyopathy  #EF 15-20%  #CAD s/p CABG  #previous PVC ablation (unsuccessful)  - No interventions indicated at this time  - Continue with current management        The patient's care was discussed with the cardiology fellow Dr. Lyon and Attending Physician, Dr. Zurita.  ---  Shreyas Guzman, MS4  [facility] [employer]    I, Lele Lyon MD,  was present with the medical/ROMIE student who participated in the service and in the documentation of the note.  I have verified the history and personally performed the physical exam and medical decision making.  I agree with the assessment and plan of care as documented in the note.         ______________________________________________________________________    Chief Complaint   Bradycardia and hypotension    History is obtained from the patient    History of Present Illness   William Almazan is a 81 year old male who had a recent admission from 6/28 - 8/2 for auditory hallucinations. Discharged and readmitted same day that evening for hypotension and weakness. Pt reportedly lightheaded during episode, denies chest pain. No falls or LOC was reported. SBP reported to be 60s per EMS.     Blood pressures improved w/ fluids and has not been hypotensive or symptomatic since. Concern for sepsis in ED given elevated lactate, hypotension, and leukopenia. Started on IV abx but discontinued given low suspicion with no other SIRS symptoms (afebrile, normal HR, RR). Overnight on 8/3, pt with brief bradycardia on telemetry in 40s. No hypotension or symptoms with episode.    Review of Systems   The 10 point Review of Systems is negative other than noted in the HPI or here.     Past Medical History    I have reviewed this patient's medical history and updated it with pertinent information if needed.   Past Medical History:   Diagnosis Date     Infection due to 2019 novel coronavirus      CAD, CABG, HFrEF, pAfib on apixaban, HTN, HLD, DMII, KRISTIAN, dementia    Past Surgical History   I have reviewed this patient's surgical history and updated it with pertinent information if needed.  No past surgical history on file.     CABG, PVC ablation, ICD    Social History   I have reviewed this patient's social history and updated it with pertinent information if needed.     Family History   I have reviewed this patient's family history and  updated it with pertinent information if needed.    No significant family history.    Medications   I have reviewed this patient's current medications    Allergies   No Known Allergies    Physical Exam   Vital Signs: Temp: 97.7  F (36.5  C) Temp src: Axillary BP: (!) 146/60 Pulse: 63   Resp: 16 SpO2: 97 % O2 Device: None (Room air) Oxygen Delivery: 2 LPM  Weight: 0 lbs 0 oz    General Appearance: Thin, muscle wasting throughout, bitemporal wasting.  Eyes: no scleral icterus  Respiratory: clear lung sounds bilaterally on anterior  Cardiovascular: RRR, distant heart sounds, no murmurs, trace peripheral edema on BLE  GI: soft, non-distended, non-tender  Skin: warm, dry  Musculoskeletal: no deformities noted  Neurologic: alert and oriented x3, appropriately answers questions, follows commands  Psychiatric: flat affect, mood appropriate        Data   I personally reviewed the EKG tracing showing dual paced rhythm with frequent PVCs, following EKG sinus rhythm with RBBB.    Telemetry reveals brief transient episodes of bradycardia with sinus pauses in the morning and 8/4.

## 2022-08-04 NOTE — PROGRESS NOTES
[facility]    Medicine Progress Note - Medicine Service, MARBETTY TEAM 1    Date of Admission:  8/2/2022    Assessment & Plan          William Almazan is a 81 year old male admitted on 8/2/2022. He has a history of CAD s/p CABG, HFrEF, pAfib on apixaban, HTN, HLD, DM2, KRISTIAN, prostate Ca, gout, and dementia and is admitted for weakness and hypotension.    Today's updates:  -Bradycardic to 40s this AM. EKG noted first degree AV block with intermittent PVCs, which appear chronic. Seems to be symptomatic bradycardia given lethargy. May have contributed to pt's presentation  -AM Coreg held--changed PM dose to 6.25 mg   -Cards consulted   -Wound consulted for sacral wound  -PT eval today: recommending TCU on eventual discharge  -Submitted utilization review to admit pt from obs    Persistent encephalopathy most pronounced by lethargy   Symptomatic 1st degree AV block   Weakness  Acute Hypotension  Reported SBPs in 60s at home w/ inability to walk/ shower. No associated presyncopal, cardiac, or respiratory sxs. Did not fall or have LOC. Bps improved after 500 ml bolus PTA. Arrived lethargic but oriented x4. Initial concern for sepsis on arrival (given lymphopenia, lactate 3.1 and procal of 0.26) and started on IV Vanc and Zosyn. However, pt's lymphopenia appears to be chronic (unknown etiology) and no other SIRS criteria was met (afebrile, normal RR, normal HR) so antibiotics discontinued. Lymphopenia, lactate, and procal improved with hydration (1.5 L total), which is reassuring. Had episode bradycardia overnight w/ HR to 40s. EKG revealed first degree AV block, which appears to be chronic. Weakness and lethargy could be due to symptomatic first degree AV block vs hypovolemia (2/2 dehydration vs. Hyperdiuresis) vs infection (UTI vs COVID19) vs deconditioning after recent month-long hospitalization. As for infectious sources, is COVID positive but satting 97% on room air.  Initial UA was dirty. Exchanged keatign and repeat UA w/ leuks and WBCs but elevated RBCs indicating a traumatic sample. Will hold off on treating (given no CVA or suprapubic tenderness) pending urine culture results   -Symptomatic 1st degree AV block:  --AM Coreg held. Will halve dose to 6.25 mg BID  --Cardiology consulted  -Hypovolemia workup:  --Continue to hold lasix  --Hold more IV fluids for now   -Infectious workup:  --Hold antibiotics pending below workups  --Awaiting second urine culture results   --BC pending   -Decompensation workup:  --PT and OT consulted--> recommending TCU     Prerenal TONE, resolved  Cr 1.28 (BL 0.7) on arrival--> resolved to baseline after 1.5 L NS. Given this drastic improvement, TONE likely 2/2 prerenal hypovolemia, potentially from hyperdiuresis. Patient was on lasix 80 PO BID prior to last admission and was discharged on 40 mg BID out of concern dose was too high.   - Continue to hold PTA lasix for now  - Follow up AM BMP, can give more fluid as appropriate accounting for reported EF of 20%    COVID +  Acquired at recent admission (7/30 first positive), unclear significance to current presentation. Did not receive monoclonal antibodies during admission. Satting 97% on room air without respiratory distress, CXR unremarkable. COVID vaccinated x 3 (Pfizer).  - Monitor SpO2    Chronic HFrEF  pAF s/p dual chamber ICD  Elevated Troponin  First degree AV block  Hx dilated cardiomyopathy, most recent TTE 6/18/21 showing severely dilated LV with EF 20%. Currently euvolemic on exam. No hypoxia and CXR negative for cardiopulm concern. Apparently I&Os were in neg balance at recent hospitalization concerning for lasix dosing being too high. Troponin elevated on arrival, now downtrending on recheck. Likely 2/2 demand ischemia. Symptomatic bradycardic this AM w/ first degree AV block noted on EKG that appears to be chronic.   - Repeat ECHO 8/3 without change compared to 6/8/21  - Cards consult  regarding symptomatic bradycardia in setting of dual chamber ICD  - PTA coreg 12.5 mg PO BID held this AM 2/2 bradycardia. Will decrease dose to 6.25 mg BID   - Hold PTA lasix while current concern for hypotension/TONE  - Daily weights, I&Os    Toe injury- left  Toenail injured in shower this AM. Some dried blood around the toenail and toe is painful on exam but no current bleeding or opened wounds.   - Podiatry to be consulted tomorrow     Urinary retention  Chronic, and has chronic keating in place. Wife reports that it is changed monthly and is due for a change.   -Keating changed 8/3    CHRONIC CONDITIONS:  Auditory hallucinations, Dementia with behavioral health disturbances.   Admitted 06/2022 with progressive auditory hallucinations, including self harm command hallucinations. Seen by neuro and psych, started on psychotropic meds and improved significantly. Started on risperdol, donepezil, gabapentin, and lexapro. Denies hallucinations currently. Required a 1:1 previously but was d/c'd two weeks prior to discharge; no current behavioral concerns requiring a 1:1.  - PTA risperdol  - PTA donepezil  - PTA gabapentin  - PTA lexapro    Full thickness rectal prolapse, initially noted 7/15  Concern on recent admission, full-thickness but reducible, intermitted bleeding with bowel movements but Hgb stable 10-11. CRS recommend fiber and avoiding constipation, follow up with surgery outpt in 4-6 weeks.  - PTA psyllium fiber 3 capsule daily, MiraLAX 17 g daily and senna 1-2 tabs bid pr    CAD s/p CABG (1992):    Not ASA or statin at home. No acute concerns. EKG dual paced rhythm with frequent PVCs, no evidence of ACS.   - PTA Coreg decreased to 6.25 mg BID    Tremor, whole body  New since recent admission, neuro thought to be stress related.  - PTA B12     PAF: Not currently in Afib. PTA Eliquis 5 mg PO BI  T2DM: Diet controlled  KRISTIAN: Does not use CPAP at home  Gout: PTA allopurinol       Diet: Regular Diet Adult  "Regular  DVT Prophylaxis: PTA apixaban   Louie Catheter: PRESENT, indication: Retention  Central Lines: None  Cardiac Monitoring: None  Code Status: Full Code      Disposition Plan      Expected Discharge Date: 08/08/2022      Destination: TCU          The patient's care was discussed with the Attending Physician, Dr. Lou, Patient and Patient's Family.    Ana Maria Horn MD  Medicine Service, [facility] TEAM [address] [place] [address]  Securely message with the Vocera Web Console (learn more here)  Text page via [facility] Paging/Directory   Please see signed in provider for up to date coverage information      Clinically Significant Risk Factors Present on Admission               # Coagulation Defect: home medication list includes an anticoagulant medication    # Chronic systolic heart failure: echo within the past year with EF <40%    # Overweight: Estimated body mass index is 27.08 kg/m  as calculated from the following:    Height as of this encounter: 1.753 m (5' 9\").    Weight as of 7/28/22: 83.2 kg (183 lb 6.4 oz).        ______________________________________________________________________    Interval History   Continues to be lethargic   Bradycardic to 40's overnight. EKG w/ first deg AV block. HR resolved spontaneously.     Pt most concerned about weakness and his ability to walk. Working with PT    Data reviewed today: I reviewed all medications, new labs and imaging results over the last 24 hours.     Physical Exam   Vital Signs: Temp: 97.7  F (36.5  C) Temp src: Axillary BP: 127/55 Pulse: 92   Resp: 18 SpO2: 100 % O2 Device: None (Room air) Oxygen Delivery: 2 LPM  Weight: 0 lbs 0 oz  Constitutional: sleepy, but responds to questions, cooperative, no apparent distress, and appears stated age  Respiratory: No increased work of breathing, good air exchange, clear to auscultation bilaterally, no crackles or wheezing  Cardiovascular: Normal apical impulse, regular rate and rhythm, " normal S1 and S2, no S3 or S4, and no murmur noted  GI: Normal bowel sounds, soft, non-distended, non-tender, no masses palpated  Extremities: No LE edema   Skin: dried blood of left great toe. No bruising or bleeding, normal skin color, texture, turgor, no redness, warmth, or swelling and no rashes  Neuropsychiatric: General: normal, calm, eyes closed but will open to command.    Data   Recent Results (from the past 24 hour(s))   Echo Complete   Result Value    LVEF  15-20% (severely reduced)    [facility]    [id number]  [id number]  [id number]  [id number]^RANDALL^LUKAS     [facility]  Echocardiography Laboratory  [address] [place] [address]     Name: NIKKIE ARAUZ  MRN: [id number]  : 1940  Study Date: 2022 10:42 AM  Age: 81 yrs  Gender: Male  Patient Location: [facility]  Reason For Study: Heart Failure  Ordering Physician: LUKAS PEREIRA  Performed By: Zahraa Kaiser     BSA: 2.0 m2  Height: 69 in  Weight: 183 lb  HR: 68  BP: 96/50 mmHg  ______________________________________________________________________________  Procedure  Complete Portable Echo Adult. Contrast Optison. Optison (NDC #2179-4494-71)  given intravenously. Patient was given 6 ml mixture of 3 ml Optison and 6 ml  saline. 3 ml wasted. The final echo results were communicated to ordering  provider via this report..  ______________________________________________________________________________  Interpretation Summary  Left ventricular function is severely reduced. The ejection fraction is 15-  20%. Severe diffuse hypokinesis.  Global right ventricular function is mildly reduced.  No hemodynamically significant valve abnormalities.  The inferior vena cava cannot be assessed.  No pericardial effusion.     Compared to the echo report in [facility] from [facility] in 2021, no significant change in ventricular  function.  ______________________________________________________________________________  Left Ventricle  Mild left ventricular dilation is present. Eccentric LVH. Left ventricular  function is decreased. The ejection fraction is 15-20% (severely reduced).  Grade I or early diastolic dysfunction. Severe diffuse hypokinesis is present.  There is no thrombus.     Right Ventricle  The right ventricle is normal size. Global right ventricular function is  mildly reduced. A pacemaker lead is noted in the right ventricle.     Atria  Moderate left atrial enlargement is present.     Mitral Valve  The mitral valve is normal.     Aortic Valve  Aortic valve is normal in structure and function. The aortic valve is  tricuspid.     Tricuspid Valve  The tricuspid valve is normal. Mild tricuspid insufficiency is present.  Estimated pulmonary artery systolic pressure is 28 mmHg plus right atrial  pressure. Pulmonary artery systolic pressure is normal.     Pulmonic Valve  The pulmonic valve is normal. Trace pulmonic insufficiency is present.     Vessels  Normal diameter aortic root and proximal ascending aorta. The inferior vena  cava cannot be assessed. Sinuses of Valsalva 4.0 cm. Ascending aorta 3.9 cm.     Pericardium  No pericardial effusion is present.     ______________________________________________________________________________  MMode/2D Measurements & Calculations  IVSd: 0.97 cm  LVIDd: 6.3 cm  LVPWd: 1.00 cm  LV mass(C)d: 263.0 grams  LV mass(C)dI: 132.2 grams/m2     asc Aorta Diam: 3.9 cm  LVOT diam: 2.5 cm  LVOT area: 4.9 cm2  LA Volume Index (BP): 41.8 ml/m2  RWT: 0.32     Doppler Measurements & Calculations  MV E max susan: 53.9 cm/sec  MV A max susan: 80.2 cm/sec  MV E/A: 0.67  MV dec slope: 235.7 cm/sec2  MV dec time: 0.23 sec  TR max susan: 229.4 cm/sec  TR max P.1 mmHg  E/E' av.9     Lateral E/e': 5.6  Medial E/e': 12.2     ______________________________________________________________________________  Report  approved by: Autumn Lundberg 08/03/2022 11:37 AM

## 2022-08-04 NOTE — PLAN OF CARE
Goal Outcome Evaluation:    Plan of Care Reviewed With: patient     Overall Patient Progress: no change    Outcome Evaluation: VSS on RA.  Repo q2h; heels elevated.  Chronic keating for retention.  Tylenol given x 1 for pain.

## 2022-08-04 NOTE — CONSULTS
[facility]  WOC Nurse Inpatient Assessment     Consulted for: sacrum actually was the coccyx and bilateral lower buttocks     Patient History (according to provider note(s):      Per Dr Parish Flaherty on 6/30/2022: William Almazan is a 81 year old male with history of CAD s/p CABG, dilated cardiomyopathy, HFrEF, PAF on eliquis, HTN, HLD, DM2, KRISTIAN, prostate cancer, gout, and dementia who presented with worsening auditory hallucinations.    Areas Assessed:      Areas visualized during today's visit: Sacrum/coccyx    Wound Location:  Coccyx        Date of last photo 8/4  Wound History: pressure and IAD weakness present on admission  Stage unstageable    Wound Base: 90/10 % dermis and slough     Palpation of the wound bed: normal      Drainage: none     Description of drainage: none     Measurements (length x width x depth, in cm) 2  x 1  x utd cm   Periwound skin: dry/scaly and irritant dermatitis      Color: dusky      Temperature: normal   Odor: none  Pain: no grimacing or signs of discomfort, none      Treatment Plan:     coccyx wound(s): Every 3 days   Apply vashe # [id number] moistened gauze to wound bed and let sit while prepping iodosorb gel  Apply iodosorb gel #[id number] to oil emulsion gauze  Apply to wound bed  Apply a small amount of adapt paste to the base of the sacral dressing to prevent fecal incontinence from undermining the dressing   Cover with sacral mepilex   Elevate heels - if that fails use prevalon boots large     Orders: Written    RECOMMEND PRIMARY TEAM ORDER: None, at this time  Education provided: plan of care, Moisture management and Off-loading pressure  Discussed plan of care with: Nurse  WOC nurse follow-up plan: weekly  Notify WOC if wound(s) deteriorate.  Nursing to notify the Provider(s) and re-consult the WOC Nurse if new skin concern.    DATA:     Current support surface: Standard  Atmos Air mattress and pulsate mattress here   Containment of  urine/stool: Incontinence Protocol  BMI: Body mass index is 27.08 kg/m .   Active diet order: Orders Placed This Encounter      Regular Diet Adult     Output: I/O last 3 completed shifts:  In: -   Out: 1200 [Urine:1200]     Labs: Recent Labs   Lab 08/04/22  0549 08/02/22  2239 08/02/22 2232   ALBUMIN  --   --  3.2*   HGB 9.7*   < > 11.1*   WBC 4.5   < > 3.9*    < > = values in this interval not displayed.     Pressure injury risk assessment:   Sensory Perception: 3-->slightly limited  Moisture: 3-->occasionally moist  Activity: 2-->chairfast  Mobility: 3-->slightly limited  Nutrition: 2-->probably inadequate  Friction and Shear: 2-->potential problem  John Score: 15    Ana Maria Cedeno RN BS CWOCN   Dept. Pager: [phone]

## 2022-08-04 NOTE — PROGRESS NOTES
"   08/04/22 1018   Quick Adds   Type of Visit Initial PT Evaluation       Present no   Language english   Living Environment   People in Home spouse;child(ashley), adult  (\"my care providers\" - splouse & daughter & other family per pt)   Current Living Arrangements house   Home Accessibility stairs to enter home;stairs within home   Number of Stairs, Main Entrance 2  (\"2 tall ones\")   Stair Railings, Main Entrance none   Number of Stairs, Within Home, Primary greater than 10 stairs  (14)   Stair Railings, Within Home, Primary railing on left side (ascending)   Living Environment Comments Pt reports he lives with wife & his daughter lives with there 6 months of the yr ( lives in [place] the other 6 months of the year. Pt reports other family is able to assist as needed \"they come when I ask' & reports some are there every day   Self-Care   Usual Activity Tolerance moderate   Current Activity Tolerance poor   Equipment Currently Used at Home cane, straight;walker, rolling   Fall history within last six months yes   Number of times patient has fallen within last six months 6  (\"at least 5 or 6\" 2 times bc \"heard voices to jump down & hurt myself\"; also reports some falls from tripping/ mechanical falls)   Activity/Exercise/Self-Care Comment Pt with impaired memory at baseline, all infor per pt & needs verification: Pt states he uses walker most of the time for mobility, but sometimes uses 1 or 2 canes instead. Pt reports his wife or daughter  (sometimes other family/ his sons) usually is next to him when he is up and moving around. Pt reports wife helps some with ADLs but pt does some on his own also (wife is there to assist if needed per pt). Pt reports he has shower chair available at home, thinks he sometimes uses it   General Information   Onset of Illness/Injury or Date of Surgery 08/02/22   Referring Physician Ana Maria Horn MD   Patient/Family Therapy Goals Statement (PT) \"to walk more\" "   Cognition   Orientation Status (Cognition) oriented to;oriented x 3;person;place;time  (partially oriented to situation, states he has memory issues)   Cognitive Status Comments Pt reports he has short term memory problem; baseline dementia per chart, recently hospitalized for worsening auditory halluinations   Pain Assessment   Patient Currently in Pain Yes, see Vital Sign flowsheet  (At rest: B foot pain 2-3/10, this is typical for pt; reports L knee pain with mobility, did not rate)   Integumentary/Edema   Integumentary/Edema Comments dry skin, flaky   Posture    Posture Forward head position;Protracted shoulders   Range of Motion (ROM)   ROM Comment lacking full knee extension bilaterally; knee flexion limited L moreso than R, L knee flexion ~90 degrees (not formally measured), R knee flexion >90. L knee pain with end range flexion   Strength (Manual Muscle Testing)   Strength Comments LE weakness L moreso than R, hip flexion <3/5 on L, R 3+/5, knee extension 4-/5 L, 4/5 R, ankle DF 4/5 bilaterally   Bed Mobility   Comment, (Bed Mobility) Supine>sit with extra time, use of bedrail, HOB elevated & ModA at trunk   Transfers   Comment, (Transfers) Sit>stand from EOB to walker with ModA on 3rd attempt after 2 unsuccessful attempts without physical assist   Gait/Stairs (Locomotion)   Comment, (Gait/Stairs) Pt able to take side step at EOB with L foot, difficulty picking up R foot off floor due to decreased L weight shift, unable to safely ambulate away from bedside   Balance   Balance Comments Fair sitting balance- relies on R UE support on bedrail or mattress with R lean toward HOB, needs SBA   Sensory Examination   Sensory Perception Comments reports baseline numbness/tingling B feet   Clinical Impression   Criteria for Skilled Therapeutic Intervention Yes, treatment indicated   PT Diagnosis (PT) Difficulty ambulating   Influenced by the following impairments decreased activity tolerance, decreased strength,  pain, decreased balance   Functional limitations due to impairments difficulty with bed mobility, transfers, ambulation, stairs   Clinical Presentation (PT Evaluation Complexity) Stable/Uncomplicated   Clinical Presentation Rationale clinical judgement   Clinical Decision Making (Complexity) low complexity   Planned Therapy Interventions (PT) balance training;bed mobility training;gait training;cryotherapy;home exercise program;patient/family education;ROM (range of motion);stair training;strengthening;transfer training;progressive activity/exercise;home program guidelines   Anticipated Equipment Needs at Discharge (PT)   (has walker and canes at home per pt)   Risk & Benefits of therapy have been explained evaluation/treatment results reviewed;care plan/treatment goals reviewed;risks/benefits reviewed;current/potential barriers reviewed;participants included;patient;participants voiced agreement with care plan   Clinical Impression Comments Patient is below baseline mobility, needing significant assist for bed mobility and standing/pregait at EOB, not able to ambulate away from bedside at time of PT eval. He will need continued therapies in hospital and TCU to progress mobility toward baseline   PT Discharge Planning   PT Discharge Recommendation (DC Rec) Transitional Care Facility   PT Rationale for DC Rec Below baseline, unable to ambulate currently with assist provided. Has flight of stairs to manage at home. Pt would need to progress to light Ax1 for mobility including ambulation and stairs in order to return home.   PT Brief overview of current status ModA bed mobilty, ModA transfers. Recommend Ax1-2 bed<>chair/commode with nursing. Chair alarm & commode ordered to room 8/4   Plan of Care Review   Plan of Care Reviewed With patient   Total Evaluation Time   Total Evaluation Time (Minutes) 15   Physical Therapy Goals   PT Frequency 5x/week   PT Predicted Duration/Target Date for Goal Attainment 08/11/22   PT  Goals Bed Mobility;Transfers;Gait;Stairs   PT: Bed Mobility Supervision/stand-by assist;Supine to/from sit;Rolling   PT: Transfers Supervision/stand-by assist;Sit to/from stand;Bed to/from chair;Assistive device   PT: Gait Supervision/stand-by assist;100 feet;Rolling walker   PT: Stairs Supervision/stand-by assist;Greater than 10 stairs;Rail on left;Assistive device  (AND 2 stairs Saji no rail support, with cane as needed)

## 2022-08-04 NOTE — PROGRESS NOTES
Observation Goals:   - Safe discharge plan: Not met   - Neurologic baseline: Not met     Temp: 97.7  F (36.5  C) Temp src: Axillary BP: 127/55 Pulse: 92   Resp: 18 SpO2: 100 % O2 Device: None (Room air) Oxygen Delivery: 2 LPM

## 2022-08-04 NOTE — PLAN OF CARE
Goal Outcome Evaluation:    Plan of Care Reviewed With: patient     Overall Patient Progress: no change    Outcome Evaluation: VS on RA. HR ranging from 70's-40's, team paged. Ordered EKG- see results.  Lethargic. Oriented x4. Slow to respond. Bed alarm in place for pt safety. A2/Lift. Pt has very poor lower extremity movement. Pt complains of HA- PRN tylenol given. Swallows pills okay. Regular diet, poor appetite. Denies nausea. Pulsate mattress ordered. WOC placed for pre-existing injury on sacrum- mepilex In place.     Admission/Transfer from: [facility] ED  2 RN skin assessment completed. YES  Significant findings include:    -two open areas on sacrum    -Scattered bruising   -Mild redness in groin area   -L toe wound  WOC Nurse Consult Ordered? Yes       Observation Goals:   -Safe discharge plan: Not met    -Neurologic baseline: Not met, pt lethargic

## 2022-08-05 ENCOUNTER — ANCILLARY PROCEDURE (OUTPATIENT)
Dept: CARDIOLOGY | Facility: CLINIC | Age: 82
DRG: 640 | End: 2022-08-05
Payer: MEDICARE

## 2022-08-05 ENCOUNTER — APPOINTMENT (OUTPATIENT)
Dept: GENERAL RADIOLOGY | Facility: CLINIC | Age: 82
DRG: 640 | End: 2022-08-05
Payer: MEDICARE

## 2022-08-05 LAB
ANION GAP SERPL CALCULATED.3IONS-SCNC: 8 MMOL/L (ref 7–15)
BACTERIA UR CULT: NO GROWTH
BUN SERPL-MCNC: 16.4 MG/DL (ref 8–23)
CALCIUM SERPL-MCNC: 8.6 MG/DL (ref 8.8–10.2)
CHLORIDE SERPL-SCNC: 104 MMOL/L (ref 98–107)
CREAT SERPL-MCNC: 0.54 MG/DL (ref 0.67–1.17)
DEPRECATED HCO3 PLAS-SCNC: 26 MMOL/L (ref 22–29)
ERYTHROCYTE [DISTWIDTH] IN BLOOD BY AUTOMATED COUNT: 15.4 % (ref 10–15)
GFR SERPL CREATININE-BSD FRML MDRD: >90 ML/MIN/1.73M2
GLUCOSE SERPL-MCNC: 101 MG/DL (ref 70–99)
HCT VFR BLD AUTO: 30.3 % (ref 40–53)
HGB BLD-MCNC: 9.4 G/DL (ref 13.3–17.7)
MCH RBC QN AUTO: 27.8 PG (ref 26.5–33)
MCHC RBC AUTO-ENTMCNC: 31 G/DL (ref 31.5–36.5)
MCV RBC AUTO: 90 FL (ref 78–100)
PLATELET # BLD AUTO: 190 10E3/UL (ref 150–450)
POTASSIUM SERPL-SCNC: 3.5 MMOL/L (ref 3.4–5.3)
RBC # BLD AUTO: 3.38 10E6/UL (ref 4.4–5.9)
SODIUM SERPL-SCNC: 138 MMOL/L (ref 136–145)
WBC # BLD AUTO: 4.2 10E3/UL (ref 4–11)

## 2022-08-05 PROCEDURE — 250N000013 HC RX MED GY IP 250 OP 250 PS 637

## 2022-08-05 PROCEDURE — 120N000002 HC R&B MED SURG/OB UMMC

## 2022-08-05 PROCEDURE — 93283 PRGRMG EVAL IMPLANTABLE DFB: CPT

## 2022-08-05 PROCEDURE — 99233 SBSQ HOSP IP/OBS HIGH 50: CPT | Mod: GC | Performed by: INTERNAL MEDICINE

## 2022-08-05 PROCEDURE — 99232 SBSQ HOSP IP/OBS MODERATE 35: CPT | Mod: GC | Performed by: PEDIATRICS

## 2022-08-05 PROCEDURE — 85027 COMPLETE CBC AUTOMATED: CPT

## 2022-08-05 PROCEDURE — 36415 COLL VENOUS BLD VENIPUNCTURE: CPT

## 2022-08-05 PROCEDURE — 73630 X-RAY EXAM OF FOOT: CPT | Mod: 26 | Performed by: RADIOLOGY

## 2022-08-05 PROCEDURE — 93283 PRGRMG EVAL IMPLANTABLE DFB: CPT | Mod: 26 | Performed by: INTERNAL MEDICINE

## 2022-08-05 PROCEDURE — 73630 X-RAY EXAM OF FOOT: CPT | Mod: LT

## 2022-08-05 PROCEDURE — 250N000013 HC RX MED GY IP 250 OP 250 PS 637: Performed by: STUDENT IN AN ORGANIZED HEALTH CARE EDUCATION/TRAINING PROGRAM

## 2022-08-05 PROCEDURE — 80048 BASIC METABOLIC PNL TOTAL CA: CPT

## 2022-08-05 RX ORDER — CARVEDILOL 12.5 MG/1
12.5 TABLET ORAL 2 TIMES DAILY
Status: DISCONTINUED | OUTPATIENT
Start: 2022-08-05 | End: 2022-08-24 | Stop reason: HOSPADM

## 2022-08-05 RX ADMIN — LORATADINE 10 MG: 10 TABLET ORAL at 08:57

## 2022-08-05 RX ADMIN — RISPERIDONE 0.25 MG: 0.25 TABLET ORAL at 08:58

## 2022-08-05 RX ADMIN — CARVEDILOL 6.25 MG: 6.25 TABLET, FILM COATED ORAL at 08:58

## 2022-08-05 RX ADMIN — Medication 25 MCG: at 08:58

## 2022-08-05 RX ADMIN — APIXABAN 5 MG: 5 TABLET, FILM COATED ORAL at 20:25

## 2022-08-05 RX ADMIN — GABAPENTIN 100 MG: 100 CAPSULE ORAL at 08:58

## 2022-08-05 RX ADMIN — RISPERIDONE 2 MG: 2 TABLET ORAL at 22:14

## 2022-08-05 RX ADMIN — Medication 1000 MCG: at 08:57

## 2022-08-05 RX ADMIN — CARVEDILOL 12.5 MG: 12.5 TABLET, FILM COATED ORAL at 20:25

## 2022-08-05 RX ADMIN — SPIRONOLACTONE 25 MG: 25 TABLET ORAL at 08:57

## 2022-08-05 RX ADMIN — Medication 3 CAPSULE: at 08:57

## 2022-08-05 RX ADMIN — ESCITALOPRAM OXALATE 10 MG: 10 TABLET ORAL at 08:57

## 2022-08-05 RX ADMIN — DONEPEZIL HYDROCHLORIDE 5 MG: 5 TABLET, FILM COATED ORAL at 22:14

## 2022-08-05 RX ADMIN — APIXABAN 5 MG: 5 TABLET, FILM COATED ORAL at 08:58

## 2022-08-05 RX ADMIN — ALLOPURINOL 300 MG: 300 TABLET ORAL at 08:57

## 2022-08-05 RX ADMIN — POLYETHYLENE GLYCOL 3350 17 G: 17 POWDER, FOR SOLUTION ORAL at 08:58

## 2022-08-05 RX ADMIN — GABAPENTIN 100 MG: 100 CAPSULE ORAL at 20:25

## 2022-08-05 ASSESSMENT — ACTIVITIES OF DAILY LIVING (ADL)
ADLS_ACUITY_SCORE: 55
ADLS_ACUITY_SCORE: 55
ADLS_ACUITY_SCORE: 53
ADLS_ACUITY_SCORE: 53
ADLS_ACUITY_SCORE: 55
ADLS_ACUITY_SCORE: 53
ADLS_ACUITY_SCORE: 55
ADLS_ACUITY_SCORE: 53
ADLS_ACUITY_SCORE: 55

## 2022-08-05 NOTE — PROGRESS NOTES
Brief Orthopaedic Note:    Consulted for left toenail injury after patient injured it in the shower this AM. X-rays ordered, results pending.     If concern for acute fracture, please reach out to ortho otherwise plan for follow up with Podiatry as an outpatient versus when when they round on Thursday if patient is still admitted.    Merissa Huynh MD   Orthopaedic Surgery, PGY1    Discussed with senior resident Luz Maria Molina PGY-4
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
## 2022-08-05 NOTE — PROGRESS NOTES
[facility]    Medicine Progress Note - Medicine Service, SYDNEE TEAM 1    Date of Admission:  8/2/2022    Assessment & Plan          William Almazan is a 81 year old male admitted on 8/2/2022. He has a history of CAD s/p CABG, HFrEF, pAfib on apixaban, HTN, HLD, DM2, KRISTIAN, prostate Ca, gout, and dementia and is admitted for weakness and hypotension.    Today's updates:  - monitor UOP given slight decline this AM, ensure keating flushing, push PO fluids   - ICD interrogation per cards, recs pending   - start TCU search process, SW/CC aware and note likely difficult placement  - L foot XR per ortho/podiatry     Persistent encephalopathy most pronounced by lethargy   Symptomatic 1st degree AV block   Weakness  Acute Hypotension - resolved  Reported SBPs in 60s at home w/ inability to walk/ shower. No associated presyncopal, cardiac, or respiratory sxs. Did not fall or have LOC. Bps improved after 500 ml bolus PTA. Arrived lethargic but oriented x4. Initial concern for sepsis on arrival (given lymphopenia, lactate 3.1 and procal of 0.26) and started on IV Vanc and Zosyn. However, pt's lymphopenia appears to be chronic (unknown etiology) and no other SIRS criteria was met (afebrile, normal RR, normal HR) so antibiotics discontinued. Lymphopenia, lactate, and procal improved with hydration (1.5 L total), which is reassuring. Had episode bradycardia overnight w/ HR to 40s. EKG revealed first degree AV block, which appears to be chronic. Weakness and lethargy could be due to symptomatic first degree AV block vs hypovolemia (2/2 dehydration vs. Hyperdiuresis) vs infection (UTI vs COVID19) vs deconditioning after recent month-long hospitalization. As for infectious sources, is COVID positive but satting 97% on room air. Initial UA was dirty. Exchanged keating and repeat UA w/ leuks and WBCs but elevated RBCs indicating a traumatic sample. Will hold off on treating (given no CVA  or suprapubic tenderness) pending urine culture results   -Symptomatic 1st degree AV block:  --AM Coreg held 8/4. Halved dose to 6.25 mg BID, seems to be tolerating  --Cardiology consulted, pending ICD interrogation   -Hypovolemia workup:  --Continue to hold lasix  --Hold more IV fluids for now   -Infectious workup:  --Hold antibiotics pending below workups  --Second urine culture results - NGTD  --BC NGTD   -Decompensation workup:  --PT and OT consulted--> recommending TCU     Prerenal TONE, resolved  Cr 1.28 (BL 0.7) on arrival--> resolved to baseline after 1.5 L NS. Given this drastic improvement, TONE likely 2/2 prerenal hypovolemia, potentially from hyperdiuresis. Patient was on lasix 80 PO BID prior to last admission and was discharged on 40 mg BID out of concern dose was too high.   - Continue to hold PTA lasix for now  - push PO IVF    COVID +  Acquired at recent admission (7/30 first positive), unclear significance to current presentation. Did not receive monoclonal antibodies during admission. Satting 97% on room air without respiratory distress, CXR unremarkable. COVID vaccinated x 3 (Pfizer).  - Monitor SpO2    Chronic HFrEF  pAF s/p dual chamber ICD  Elevated Troponin - downtrending   First degree AV block  Hx dilated cardiomyopathy, most recent TTE 6/18/21 showing severely dilated LV with EF 20%. Currently euvolemic on exam. No hypoxia and CXR negative for cardiopulm concern. Apparently I&Os were in neg balance at recent hospitalization concerning for lasix dosing being too high. Troponin elevated on arrival, now downtrending on recheck. Likely 2/2 demand ischemia. Symptomatic bradycardic 8/4 AM w/ first degree AV block noted on EKG that appears to be chronic.   - Repeat ECHO 8/3 without change compared to 6/8/21  - Cards consult regarding symptomatic bradycardia in setting of dual chamber ICD  - PTA coreg 12.5 mg PO BID 2/2 bradycardia, decreased dose to 6.25 mg BID and seems to be tolerating   - Hold  PTA lasix   - Daily weights, I&Os    Toe injury- left  Toenail injured in shower this AM. Some dried blood around the toenail and toe is painful on exam but no current bleeding or opened wounds.   - Podiatry consulted    Urinary retention  Chronic, and has chronic keating in place. Wife reports that it is changed monthly and is due for a change.   -Keating changed 8/3    CHRONIC CONDITIONS:  Auditory hallucinations, Dementia with behavioral health disturbances.   Admitted 06/2022 with progressive auditory hallucinations, including self harm command hallucinations. Seen by neuro and psych, started on psychotropic meds and improved significantly. Started on risperdol, donepezil, gabapentin, and lexapro. Denies hallucinations currently. Required a 1:1 previously but was d/c'd two weeks prior to discharge; no current behavioral concerns requiring a 1:1.  - PTA risperdal  - PTA donepezil  - PTA gabapentin  - PTA lexapro    Full thickness rectal prolapse, initially noted 7/15  Concern on recent admission, full-thickness but reducible, intermitted bleeding with bowel movements but Hgb stable 10-11. CRS recommend fiber and avoiding constipation, follow up with surgery outpt in 4-6 weeks.  - PTA psyllium fiber 3 capsule daily, MiraLAX 17 g daily and senna 1-2 tabs bid pr    CAD s/p CABG (1992):    Not ASA or statin at home. No acute concerns. EKG dual paced rhythm with frequent PVCs, no evidence of ACS.   - PTA Coreg decreased to 6.25 mg BID --> per cards can restart at PTA dose tomorrow     Tremor, whole body  New since recent admission, neuro thought to be stress related.  - PTA B12     PAF: Not currently in Afib. PTA Eliquis 5 mg PO BI  T2DM: Diet controlled  KRISTIAN: Does not use CPAP at home  Gout: PTA allopurinol       Diet: Regular Diet Adult Regular  DVT Prophylaxis: PTA apixaban   [place] Catheter: PRESENT, indication: (P) Retention  Central Lines: None  Cardiac Monitoring: None  Code Status: Full Code      Disposition Plan  "     Expected Discharge Date: 08/08/2022      Destination: TCU          The patient's care was discussed with the Attending Physician, Dr. Lou, Patient and Patient's Family.    Idalia Johns MD  Medicine Service, [address]  Securely message with the Vocera Web Console (learn more here)  Text page via [facility] Paging/Directory   Please see signed in provider for up to date coverage information      Clinically Significant Risk Factors Present on Admission                # Overweight: Estimated body mass index is 27.08 kg/m  as calculated from the following:    Height as of this encounter: 1.753 m (5' 9\").    Weight as of 7/28/22: 83.2 kg (183 lb 6.4 oz).      ______________________________________________________________________    Interval History   NAEON. Lowest HR 53, primarily in 60-70s. This morning with lower UOP per RN report, however appears ok per my review. Pt tired and reporting LE pain, bilateral. No SOB, chest pain.     4 pt ROS otherwise negative.     Data reviewed today: I reviewed all medications, new labs and imaging results over the last 24 hours.     Physical Exam   Vital Signs: Temp: 98.3  F (36.8  C) Temp src: Oral BP: 131/73 Pulse: 73   Resp: 20 SpO2: 95 % O2 Device: None (Room air)    Weight: 0 lbs 0 oz  Constitutional: sleepy, but responds to questions, cooperative, no apparent distress  Respiratory: No increased work of breathing, good air exchange, clear to auscultation bilaterally, no crackles or wheezing  Cardiovascular: Normal apical impulse, regular rate and rhythm  GI: soft, non-distended, non-tender  Extremities: No LE edema   Skin: dried blood of left great toe. No bruising or bleeding, normal skin color, texture, turgor, no redness, warmth, or swelling and no rashes  Neuropsychiatric: General: normal, calm, eyes closed but will open to command    Data   No results found for this or any previous visit (from the past 24 " hour(s)).

## 2022-08-05 NOTE — PROGRESS NOTES
Care Management Follow Up    Length of Stay (days): 2    Expected Discharge Date: 08/08/2022     Concerns to be Addressed: all concerns addressed in this encounter     Patient plan of care discussed at interdisciplinary rounds: Yes    Anticipated Discharge Disposition: Home Care     Anticipated Discharge Services: None  Anticipated Discharge DME: None    Patient/family educated on Medicare website which has current facility and service quality ratings: no  Education Provided on the Discharge Plan:    Patient/Family in Agreement with the Plan: yes    Referrals Placed by CM/SW: External Care Coordination  Private pay costs discussed: Not applicable    Additional Information:  RNCC spoke to pt's wife, Ilsa, regarding therapy recommendations of TCUs. Ilsa is in agreement with pt going to TCU before returning home. This writer provided Ilsa with list of TCUs by proximity and emailed her it to her. Ilsa states she will review tonight with family and select 10 choices. Ilsa states she had already started looking and was very interested in [facility] in [place]. Referral sent to [facility]. Spoke to admissions at [facility] and they stated they did not have availability until next Monday or Tuesday. This writer stated that was fine and if they could review and let us know if accepted. RNCC to follow-up on Monday for more TCU referrals and if pt was accepted to [facility] [place] [address]  Ph: [phone]  Fax: [phone]    José Underwood RN BSN  RN Care Coordinator [facility]

## 2022-08-05 NOTE — PLAN OF CARE
Goal Outcome Evaluation:    Plan of Care Reviewed With: patient     Overall Patient Progress: no change    Outcome Evaluation: VS on RA. HR more stable tonight- not fluctuating. Lethargic. Oriented x4. Slow to respond. Bed alarm in place for pt safety. A2. Pt has very poor lower extremity movement. Regular diet, fair appetite. Denies nausea. sacral mepilex in place.

## 2022-08-05 NOTE — PROGRESS NOTES
[facility]    Cardiology Progress Note- Cards 1        Date of Admission:  8/2/2022     Assessment & Plan: HVSL       William Almazan is a 82 yo male with h/o CAD s/p CABG, HFrEF, previous PVC ablation, s/p ICD, pAfib on apixaban, HTn, HLD, DMII, KRISTIAN, dementia who presents with weakness and hypotension. Cardiology consulted for bradycardia and hypotension.     Pt admitted with SBP reported to be 60s per EMS. Pt reportedly lightheaded during episode, denies chest pain. No falls or LOC was reported. Blood pressures improved w/ fluids and has not been hypotensive or symptomatic since. Overnight on 8/3, pt had bradycardia on telemetry in 40s.Recent echo on 8/4 is unchanged from previous echo in 2021 (EF 15-20%).     Given fluid responsiveness of blood pressure, pt hypotension likely d/t hypovolemia. In setting of recent decreased PO intake, weakness, and recent hypotension episode, would consider decreasing PTA lasix dose.      Patient's telemetry during these episodes reveal pauses in which pacemaker spikes were not recorded. As an aside, for pulse dependent HR measurements, false bradycardia is commonly seen in the setting of PVCs, which pt has a chronic hx of and were observed on multiple EKGs this admission. Unlikely that the patient's reported bradycardia was contributing to his hypotension, given the lack of correlation between bradycardic episodes on telemetry here in the hospital, episodes of hypotension since admission.    #Bradycardia  #Pauses on telemetry  #Previous PVC ablation (unsuccessful)   device interrogation for his [employer] ICD showed normal functioned ICD. Bradycardia on telemetry likely undetected PVCs.      #Acute Hypotension likely hypovolemic - resolved  #HFrEF 2/2 Ischemic Cardiomyopathy  #EF 15-20%  #CAD s/p CABG  - resume home PO lasix dosing on discharge.   - Continue with current management     We will sign off at this time. Please feel free  to reach out with any questions.      The patient's care was discussed with the cardiology fellow Dr. Lyon and Attending Physician, Dr. Zurita.  ---  Shreyas Guzman, MS4  [facility]      Diet: Regular Adult Diet  DVT Prophylaxis: DOAC  Louie Catheter: PRESENT, indication: (P) Retention  Code Status: Full Code  Fluids: none  Lines: PIV x2     Disposition Plan   Expected discharge: Tomorrow, recommended to prior living arrangement once patient ICD interrogated and cleared for dysfunction..         The patient's care was discussed with the cardiology fellow Dr. Lyon and Attending Physician, Dr. Zurita.  ---  Shreyas Guzman, MS4  [facility]    I, Lele Lyon MD, was present with the medical/ROMIE student who participated in the service and in the documentation of the note.  I have verified the history and personally performed the physical exam and medical decision making.  I agree with the assessment and plan of care as documented in the note.       _____________________________________________________________________    Interval History   - HR stable  - NAEO    Data reviewed today: I reviewed all medications, new labs and imaging results over the last 24 hours. I personally reviewed no imaging or EKG's to review for today.     Physical Exam   Vital Signs: Temp: 98.3  F (36.8  C) Temp src: Oral BP: 131/73 Pulse: 73   Resp: 20 SpO2: 95 % O2 Device: None (Room air)    Weight: 0 lbs 0 oz    General Appearance: Thin, muscle wasting throughout, bitemporal wasting.  Eyes: no scleral icterus  Respiratory: clear lung sounds bilaterally on anterior  Cardiovascular: RRR, distant heart sounds, no murmurs, trace peripheral edema on BLE  GI: soft, non-distended, non-tender  Skin: warm, dry  Musculoskeletal: no deformities noted  Neurologic: alert and oriented x3, appropriately answers questions, follows commands  Psychiatric: flat affect, mood appropriate       Data   Recent Labs    Lab 08/05/22  0634 08/04/22  1320 08/04/22  0549 08/03/22  2249 08/03/22  0827 08/02/22 2239 08/02/22 2232 07/31/22  0608 07/31/22  0608   WBC 4.2  --  4.5  --  5.7  --  3.9*  --  2.6*   HGB 9.4*  --  9.7*  --  10.8*   < > 11.1*  --  9.7*   MCV 90  --  90  --  94  --  91  --  89     --  180  --  152  --  201  --  148*     --  142  --  145   < > 140  --  139   POTASSIUM 3.5 3.5 3.2*  --  3.4   < > 3.7   < > 3.6   CHLORIDE 104  --  105  --  107  --  103   < > 106   CO2 26  --  27  --  27  --  21*   < > 28   BUN 16.4  --  20.5  --  22.8  --  23.9*   < > 24   CR 0.54*  --  0.66*  --  0.97  --  1.28*  --  0.70   ANIONGAP 8  --  10  --  11  --  16*   < > 5   CRYS 8.6*  --  8.7*  --  8.6*  --  8.7*  --  8.4*   *  --  87 110* 112*   < > 198*   < > 116*   ALBUMIN  --   --   --   --   --   --  3.2*  --  2.5*   PROTTOTAL  --   --   --   --   --   --  5.9*  --  5.8*   BILITOTAL  --   --   --   --   --   --  0.6  --  0.3   ALKPHOS  --   --   --   --   --   --  97  --  95   ALT  --   --   --   --   --   --  15  --  15   AST  --   --   --   --   --   --  35  --  16    < > = values in this interval not displayed.     No results found for this or any previous visit (from the past 24 hour(s)).  Medications     - MEDICATION INSTRUCTIONS -         allopurinol  300 mg Oral Daily     apixaban ANTICOAGULANT  5 mg Oral BID     carvedilol  6.25 mg Oral BID     cyanocobalamin  1,000 mcg Oral Daily     donepezil  5 mg Oral At Bedtime     escitalopram  10 mg Oral Daily     [Held by provider] furosemide  40 mg Oral BID     gabapentin  100 mg Oral TID     loratadine  10 mg Oral Daily     polyethylene glycol  17 g Oral Daily     psyllium  3 capsule Oral Daily     risperiDONE  0.25 mg Oral QAM     risperiDONE  2 mg Oral At Bedtime     sodium chloride (PF)  3 mL Intracatheter Q8H     spironolactone  25 mg Oral Daily     Vitamin D3  25 mcg Oral Daily

## 2022-08-06 LAB
MAGNESIUM SERPL-MCNC: 1.8 MG/DL (ref 1.7–2.3)
POTASSIUM SERPL-SCNC: 4 MMOL/L (ref 3.4–5.3)

## 2022-08-06 PROCEDURE — 99232 SBSQ HOSP IP/OBS MODERATE 35: CPT | Mod: GC | Performed by: PEDIATRICS

## 2022-08-06 PROCEDURE — 36415 COLL VENOUS BLD VENIPUNCTURE: CPT | Performed by: PEDIATRICS

## 2022-08-06 PROCEDURE — 250N000013 HC RX MED GY IP 250 OP 250 PS 637

## 2022-08-06 PROCEDURE — 250N000013 HC RX MED GY IP 250 OP 250 PS 637: Performed by: STUDENT IN AN ORGANIZED HEALTH CARE EDUCATION/TRAINING PROGRAM

## 2022-08-06 PROCEDURE — 83735 ASSAY OF MAGNESIUM: CPT | Performed by: PEDIATRICS

## 2022-08-06 PROCEDURE — 120N000002 HC R&B MED SURG/OB UMMC

## 2022-08-06 PROCEDURE — 84132 ASSAY OF SERUM POTASSIUM: CPT

## 2022-08-06 RX ORDER — FUROSEMIDE 20 MG
20 TABLET ORAL DAILY
Status: DISCONTINUED | OUTPATIENT
Start: 2022-08-06 | End: 2022-08-07

## 2022-08-06 RX ADMIN — LORATADINE 10 MG: 10 TABLET ORAL at 09:37

## 2022-08-06 RX ADMIN — APIXABAN 5 MG: 5 TABLET, FILM COATED ORAL at 21:15

## 2022-08-06 RX ADMIN — FUROSEMIDE 20 MG: 20 TABLET ORAL at 18:21

## 2022-08-06 RX ADMIN — ESCITALOPRAM OXALATE 10 MG: 10 TABLET ORAL at 09:38

## 2022-08-06 RX ADMIN — Medication 25 MCG: at 09:38

## 2022-08-06 RX ADMIN — ALLOPURINOL 300 MG: 300 TABLET ORAL at 09:37

## 2022-08-06 RX ADMIN — RISPERIDONE 0.25 MG: 0.25 TABLET ORAL at 09:38

## 2022-08-06 RX ADMIN — Medication 3 CAPSULE: at 09:38

## 2022-08-06 RX ADMIN — CARVEDILOL 12.5 MG: 12.5 TABLET, FILM COATED ORAL at 09:39

## 2022-08-06 RX ADMIN — GABAPENTIN 100 MG: 100 CAPSULE ORAL at 21:15

## 2022-08-06 RX ADMIN — CARVEDILOL 12.5 MG: 12.5 TABLET, FILM COATED ORAL at 21:15

## 2022-08-06 RX ADMIN — GABAPENTIN 100 MG: 100 CAPSULE ORAL at 09:39

## 2022-08-06 RX ADMIN — DONEPEZIL HYDROCHLORIDE 5 MG: 5 TABLET, FILM COATED ORAL at 21:15

## 2022-08-06 RX ADMIN — APIXABAN 5 MG: 5 TABLET, FILM COATED ORAL at 09:40

## 2022-08-06 RX ADMIN — RISPERIDONE 2 MG: 2 TABLET ORAL at 21:15

## 2022-08-06 RX ADMIN — Medication 1000 MCG: at 09:37

## 2022-08-06 RX ADMIN — SPIRONOLACTONE 25 MG: 25 TABLET ORAL at 09:39

## 2022-08-06 RX ADMIN — GABAPENTIN 100 MG: 100 CAPSULE ORAL at 13:00

## 2022-08-06 ASSESSMENT — ACTIVITIES OF DAILY LIVING (ADL)
ADLS_ACUITY_SCORE: 57
ADLS_ACUITY_SCORE: 53
ADLS_ACUITY_SCORE: 55
ADLS_ACUITY_SCORE: 57
ADLS_ACUITY_SCORE: 57
ADLS_ACUITY_SCORE: 55
ADLS_ACUITY_SCORE: 59
ADLS_ACUITY_SCORE: 54
ADLS_ACUITY_SCORE: 57
ADLS_ACUITY_SCORE: 59
ADLS_ACUITY_SCORE: 54
ADLS_ACUITY_SCORE: 53

## 2022-08-06 NOTE — PROGRESS NOTES
[facility]    Medicine Progress Note - Medicine Service, MARBETTY TEAM 1    Date of Admission:  8/2/2022    Assessment & Plan          William Almazan is a 81 year old male admitted on 8/2/2022. He has a history of CAD s/p CABG, HFrEF, pAfib on apixaban, HTN, HLD, DM2, KRISTIAN, prostate Ca, gout, and dementia and is admitted for weakness and hypotension.    Today's updates:  - 250 ml total urine output during day shift   - Started 20 mg daily lasix given BP stability   - Strict I/Os and daily weights  - Nutrition consult and calorie counting for malnutrition concern    Persistent encephalopathy most pronounced by lethargy   Symptomatic 1st degree AV block   Weakness  Acute Hypotension - resolved  Reported SBPs in 60s at home w/ inability to walk/ shower. No associated presyncopal, cardiac, or respiratory sxs. Did not fall or have LOC. Bps improved after 500 ml bolus PTA. Arrived lethargic but oriented x4. Initial concern for sepsis on arrival (given lymphopenia, lactate 3.1 and procal of 0.26) and started on IV Vanc and Zosyn. However, pt's lymphopenia appears to be chronic (unknown etiology) and no other SIRS criteria was met (afebrile, normal RR, normal HR) so antibiotics discontinued. Lymphopenia, lactate, and procal improved with hydration (1.5 L total), which is reassuring. Had episode bradycardia overnight w/ HR to 40s. EKG revealed first degree AV block, which appears to be chronic. Weakness and lethargy could be due to symptomatic first degree AV block vs hypovolemia (2/2 dehydration vs. Hyperdiuresis) vs infection (UTI vs COVID19) vs deconditioning after recent month-long hospitalization. As for infectious sources, is COVID positive but satting 97% on room air. Initial UA was dirty. Exchanged keating and repeat UA w/ leuks and WBCs but elevated RBCs indicating a traumatic sample. Culture negative.   -Symptomatic 1st degree AV block:  --Cardiology  consulted:   -ICD interrogated: working well    -bradycardia thought to be secondary to missed PVCs on tele. No further workup necessary at this time    -Coreg increased back up to PTA dose of 12.5 mg BID  -Hypovolemia workup:  --Will restart lasix at 20 mg daily given BP stability and -250 ml output during day shift  --I/Os and daily weights  -Infectious workup:  --Hold antibiotics pending below workups  --Second urine culture results - NGTD  --BC NGTD   -Decompensation workup:  --PT and OT consulted--> recommending TCU   --Nutrition consulted-->rec Ensure Enlive x 2 for supplementation   --Calorie counting     Prerenal TONE, resolved  Cr 1.28 (BL 0.7) on arrival--> resolved to baseline after 1.5 L NS. Given this drastic improvement, TONE likely 2/2 prerenal hypovolemia, potentially from hyperdiuresis. Patient was on lasix 80 PO BID prior to last admission and was discharged on 40 mg BID out of concern dose was too high.   - Restart lasix at a lower dose (20 mg daily)   - push PO IVF    COVID +  Acquired at recent admission (7/30 first positive), unclear significance to current presentation. Did not receive monoclonal antibodies during admission. Satting 97% on room air without respiratory distress, CXR unremarkable. COVID vaccinated x 3 (Pfizer).  - Monitor SpO2    Chronic HFrEF  pAF s/p dual chamber ICD  Elevated Troponin - downtrending   First degree AV block  Hx dilated cardiomyopathy, most recent TTE 6/18/21 showing severely dilated LV with EF 20%. Currently euvolemic on exam. No hypoxia and CXR negative for cardiopulm concern. Apparently I&Os were in neg balance at recent hospitalization concerning for lasix dosing being too high. Troponin elevated on arrival, now downtrending on recheck. Likely 2/2 demand ischemia. Symptomatic bradycardic 8/4 AM w/ first degree AV block noted on EKG that appears to be chronic.   - Repeat ECHO 8/3 without change compared to 6/8/21  - Cards consulted regarding bradycardia, ICD  interrogated, no further workup needed  - PTA coreg 12.5 mg PO BID   - Lasix 20 mg daily   - Daily weights, I&Os    Toe injury- left  Toenail injured in shower this AM. Some dried blood around the toenail and toe is painful on exam but no current bleeding or opened wounds.   - Ortho called to discuss pt. L foot XR ordered--negative for fracture  -Referral to podiatry outpatient on discharge     Urinary retention  Chronic, and has chronic keating in place. Wife reports that it is changed monthly and is due for a change.   -Keating changed 8/3    CHRONIC CONDITIONS:  Auditory hallucinations, Dementia with behavioral health disturbances.   Admitted 06/2022 with progressive auditory hallucinations, including self harm command hallucinations. Seen by neuro and psych, started on psychotropic meds and improved significantly. Started on risperdol, donepezil, gabapentin, and lexapro. Denies hallucinations currently. Required a 1:1 previously but was d/c'd two weeks prior to discharge; no current behavioral concerns requiring a 1:1.  - PTA risperdal  - PTA donepezil  - PTA gabapentin  - PTA lexapro    Full thickness rectal prolapse, initially noted 7/15  Concern on recent admission, full-thickness but reducible, intermitted bleeding with bowel movements but Hgb stable 10-11. CRS recommend fiber and avoiding constipation, follow up with surgery outpt in 4-6 weeks.  - PTA psyllium fiber 3 capsule daily, MiraLAX 17 g daily and senna 1-2 tabs bid pr    CAD s/p CABG (1992):    Not ASA or statin at home. No acute concerns. EKG dual paced rhythm with frequent PVCs, no evidence of ACS.   - PTA Coreg    Tremor, whole body  New since recent admission, neuro thought to be stress related.  - PTA B12     PAF: Not currently in Afib. PTA Eliquis 5 mg PO BI  T2DM: Diet controlled  KRISTIAN: Does not use CPAP at home  Gout: PTA allopurinol       Diet: Regular Diet Adult  Calorie Counts  Snacks/Supplements Adult: Ensure Enlive; With Meals Regular  DVT  "Prophylaxis: PTA apixaban   Louie Catheter: PRESENT, indication: Retention  Central Lines: None  Cardiac Monitoring: None  Code Status: Full Code      Disposition Plan      Expected Discharge Date: 08/08/2022      Destination: TCU          The patient's care was discussed with the Attending Physician, Dr. Lou, Patient and Patient's Family.    Ana Maria Horn MD  Medicine Service, [facility] TEAM [address] [place] [address]  Securely message with the Vocera Web Console (learn more here)  Text page via [facility] Paging/Directory   Please see signed in provider for up to date coverage information      Clinically Significant Risk Factors Present on Admission                # Overweight: Estimated body mass index is 27.08 kg/m  as calculated from the following:    Height as of this encounter: 1.753 m (5' 9\").    Weight as of 7/28/22: 83.2 kg (183 lb 6.4 oz).      ______________________________________________________________________    Interval History   NAEON.   Urine output for day shift 240 ml     Cough today, unclear how long its been there. Denies shortness of breath. Endorses intermittent pain from his toes to his chest.     4 pt ROS otherwise negative.     Data reviewed today: I reviewed all medications, new labs and imaging results over the last 24 hours.     Physical Exam   Vital Signs: Temp: 96.9  F (36.1  C) Temp src: Oral BP: 133/71 Pulse: 72   Resp: 18 SpO2: 98 % O2 Device: None (Room air)    Weight: 0 lbs 0 oz  Constitutional: sleepy, keeps his eyes closed but responds to questions, no apparent distress  Respiratory: No increased work of breathing, good air exchange, clear to auscultation bilaterally, no crackles or wheezing  Cardiovascular: Regular rate and rhythm, no murmur   GI: soft, non-distended, non-tender  Extremities: No LE edema   Skin: dried blood of left great toe. No bruising or bleeding, normal skin color, texture, turgor, no redness, warmth, or swelling and no " laya  Neuropsychiatric: General: normal, calm, eyes closed but will open to command    Data   Recent Results (from the past 24 hour(s))   XR Foot Left G/E 3 Views    Narrative    EXAM: XR FOOT LEFT G/E 3 VIEWS  LOCATION: [facility]  DATE/TIME: 8/5/2022 3:56 PM    INDICATION: Left foot and toe pain after trauma.  COMPARISON: None.      Impression    IMPRESSION:   1.  No fracture or joint malalignment.  2.  Moderate first MTP degenerative arthrosis.  3.  Moderate-advanced bone demineralization.

## 2022-08-06 NOTE — PROGRESS NOTES
"CLINICAL NUTRITION SERVICES - ASSESSMENT NOTE     Nutrition Prescription    RECOMMENDATIONS FOR MDs/PROVIDERS TO ORDER:  - Continue liberal diet  - Encouragement of adequate PO     Malnutrition Status:    - Unable to determine due to unable to assess all parameters of NFPA    Recommendations already ordered by Registered Dietitian (RD):  - Carlos counts already ordered   - Ordered oral nutrition supplements = Ensure Enlive x 2     Future/Additional Recommendations:  - PO/supp adequacy (cralos counts ordered 8/6)  - Weight trends      REASON FOR ASSESSMENT  William Almazan is a/an 81 year old male assessed by the dietitian for Provider Order - malnutrition     Medical History: Pt with history of CAD s/p CABG, dilated cardiomyopathy, HFrEF, PAF on Eliquis, HTN, HLD, DM2, KRISTIAN, prostate cancer, gout, and dementia who presents for further evaluation of low blood pressure. Was recently admitted at [facility] for decline in patient's overall cognitive functioning over the past 1-2 years, diagnosed with dementia with behavioral issues. Covid +     NUTRITION HISTORY  Vomiting and diarrhea for nearly a week and has been feeling generally weak.    CURRENT NUTRITION ORDERS  Diet: Regular  Intake/Tolerance: 50% from prior admit. No record of intake yet this admit    LABS  Labs reviewed  Creatinine: 0.54 (L)  Glucose: 101   Trace ketones     MEDICATIONS  Medications reviewed  B12  Miralax  Metamucil  Aldactone  D3    ANTHROPOMETRICS  Height: 175.3 cm (5' 9\") 69\"   Most Recent Weight:  Wt Readings from Last 1 Encounters:   07/28/22 83.2 kg (183 lb 6.4 oz)   IBW: 73 kg  BMI: Overweight BMI 25-29.9  Weight History:   Wt Readings from Last 8 Encounters:   07/28/22 83.2 kg (183 lb 6.4 oz)     Dosing Weight: 83 kg (most recently available weight)    ASSESSED NUTRITION NEEDS  Estimated Energy Needs: 9703-0463 kcals/day (25 - 30 kcals/kg)  Justification: Maintenance  Estimated Protein Needs: 125+ grams protein/day (1.5+ grams of " pro/kg)  Justification: Increased needs  Estimated Fluid Needs: (1 mL/kcal)   Justification: Maintenance and Per provider pending fluid status    PHYSICAL FINDINGS  See malnutrition section below.     MALNUTRITION  % Intake: Unable to assess -> suspected but unable to quantify   % Weight Loss: Unable to assess  Subcutaneous Fat Loss: Unable to assess  Muscle Loss: Unable to assess  Fluid Accumulation/Edema: Unable to assess  Malnutrition Diagnosis: Unable to determine due to unable to assess all parameters of NFPA    NUTRITION DIAGNOSIS  Inadequate oral intake related to suspected decreased appetite, N/D, neuro status as evidenced by decreased PO from last recent admit and currently no PO recorded since admit.        INTERVENTIONS  Implementation  Nutrition Education: Will be provided if education needs arise   Medical food supplement therapy  Composition of meals/snacks - fausto counts ordered     Goals  Patient to consume % of nutritionally adequate meal trays TID, or the equivalent with supplements/snacks.     Monitoring/Evaluation  Progress toward goals will be monitored and evaluated per protocol.      Monisha Saldivar, RD, LD, [facility]  Neuro ICU  Pager: [phone]      7C/5A (beds 5201 though 5211-02) RD pager: [phone]  Weekend/Holiday RD pager: [phone]

## 2022-08-06 NOTE — PLAN OF CARE
"Goal Outcome Evaluation:    Plan of Care Reviewed With: patient     Overall Patient Progress: no change    Outcome Evaluation: Pt oriented x4 overnight, but lethargic, only awakening for cares. Intake poor overngiht (50mL) but had 300mL of dark yellow urine output. Pt denies pain. Repositioned q2hrs to keep off coccyx wound. HTN this AM, but otherwise vitally stable on RA.     BP (!) 146/76 (BP Location: Right arm, Patient Position: Semi-Ruby's)   Pulse 60   Temp 97.8  F (36.6  C) (Oral)   Resp 24   Ht 1.753 m (5' 9\")   SpO2 98%   BMI 27.08 kg/m      "

## 2022-08-06 NOTE — PLAN OF CARE
Goal Outcome Evaluation:    Plan of Care Reviewed With: patient     Overall Patient Progress: no change    Outcome Evaluation: A&Ox 3. Disoriented to situation. Vitals have been stable on RA. Complains of some ankle/foot pain. Slept most of the shift. Had 2 BM smears. Had 200 mL urine output via catheter. Continued to encourage fluid intake. Sacral dressing was changed this shift.

## 2022-08-06 NOTE — PLAN OF CARE
Goal Outcome Evaluation:    Plan of Care Reviewed With: patient        Pt. vitals stable on room air. Denied pain/discomfort, repositioned, wound on the coccyx area cleansed and dressing changed per order. Deena-care and partial bath performed. Encourage to cough. Pt reminded to use call light for help. Pt assisted to call his wife.       Patient a 77 y/o  male, domiciled with wife, no past Psychiatric hx; no prior SI/SA; no drug abuse hx, medically has Dementia, with BPH; DM; HLD; HTN; Arthritis was BIB/EMS activated by wife due to agitation.    Patient in a stretcher, on 1:1, alert with no orientation and not able to have any reasonable conversation, endorses that he has children with G[employer] Children, but does not remember how many child he has. Collateral Info obtained from his wife Nhi @ [phone] who informed that at 3 AM he had an episode and started to shout, throwing things and was cursing. As per wife, she is unwilling to take him back at this time as she feels that she is not able to manage him in this way. He has poor sleep, up at night most of the time, but able to dress himself, able to walk unassisted, and also able to eat by himself. She added that he has Kidney stone, and she feels that with UTI/Kidney Stone people can get upset/agitated and need help in that level for stability. She wants him to get stable before she decides to take him back. They have no HHA or any other assistance at home. No prior SI/SA, no perceptual experiences noted, but seems to have disorganization especially with Memory/and day-to-day events and night time aggravation. His wife unsure how lonmg he has the Dementia, but believes that it's more than 1-2 years.    Patient is on multiple meds at this time, may need meds adjustment for stability, he is under care of  Neurologist Dr. Russell who prescribes him Depakote 250 mg daily, with Namenda 5 mg BID etc. He needs meds adjustment --which includes, Discontinue Depakote and Remeron, add Lexapro 5 mg with titration to Lexapro 10 mg daily. To increase Namenda 5 mg BID to Namenda 10 mg BID and to add Seroquel 12.5 mg HS. patient may need an admission for stability/safety.

## 2022-08-06 NOTE — PLAN OF CARE
Goal Outcome Evaluation:  Problem: Oral Intake Inadequate  Goal: Improved Oral Intake  Outcome: Ongoing, Not Progressing -- ordered supplements; fausto counts ordered per team

## 2022-08-07 ENCOUNTER — APPOINTMENT (OUTPATIENT)
Dept: PHYSICAL THERAPY | Facility: CLINIC | Age: 82
DRG: 640 | End: 2022-08-07
Payer: MEDICARE

## 2022-08-07 LAB
ANION GAP SERPL CALCULATED.3IONS-SCNC: 5 MMOL/L (ref 7–15)
BUN SERPL-MCNC: 14.4 MG/DL (ref 8–23)
CALCIUM SERPL-MCNC: 8.7 MG/DL (ref 8.8–10.2)
CHLORIDE SERPL-SCNC: 103 MMOL/L (ref 98–107)
CREAT SERPL-MCNC: 0.49 MG/DL (ref 0.67–1.17)
DEPRECATED HCO3 PLAS-SCNC: 29 MMOL/L (ref 22–29)
GFR SERPL CREATININE-BSD FRML MDRD: >90 ML/MIN/1.73M2
GLUCOSE SERPL-MCNC: 104 MG/DL (ref 70–99)
HOLD SPECIMEN: NORMAL
MAGNESIUM SERPL-MCNC: 1.8 MG/DL (ref 1.7–2.3)
POTASSIUM SERPL-SCNC: 4 MMOL/L (ref 3.4–5.3)
SODIUM SERPL-SCNC: 137 MMOL/L (ref 136–145)

## 2022-08-07 PROCEDURE — 97530 THERAPEUTIC ACTIVITIES: CPT | Mod: GP

## 2022-08-07 PROCEDURE — 250N000013 HC RX MED GY IP 250 OP 250 PS 637: Performed by: STUDENT IN AN ORGANIZED HEALTH CARE EDUCATION/TRAINING PROGRAM

## 2022-08-07 PROCEDURE — 250N000013 HC RX MED GY IP 250 OP 250 PS 637

## 2022-08-07 PROCEDURE — 36415 COLL VENOUS BLD VENIPUNCTURE: CPT

## 2022-08-07 PROCEDURE — 82310 ASSAY OF CALCIUM: CPT

## 2022-08-07 PROCEDURE — 97116 GAIT TRAINING THERAPY: CPT | Mod: GP

## 2022-08-07 PROCEDURE — 99232 SBSQ HOSP IP/OBS MODERATE 35: CPT | Mod: GC | Performed by: PEDIATRICS

## 2022-08-07 PROCEDURE — 250N000011 HC RX IP 250 OP 636: Performed by: STUDENT IN AN ORGANIZED HEALTH CARE EDUCATION/TRAINING PROGRAM

## 2022-08-07 PROCEDURE — 120N000002 HC R&B MED SURG/OB UMMC

## 2022-08-07 PROCEDURE — 83735 ASSAY OF MAGNESIUM: CPT

## 2022-08-07 RX ORDER — FUROSEMIDE 10 MG/ML
20 INJECTION INTRAMUSCULAR; INTRAVENOUS ONCE
Status: COMPLETED | OUTPATIENT
Start: 2022-08-07 | End: 2022-08-07

## 2022-08-07 RX ORDER — MAGNESIUM OXIDE 400 MG/1
400 TABLET ORAL DAILY
Status: DISCONTINUED | OUTPATIENT
Start: 2022-08-07 | End: 2022-08-24 | Stop reason: HOSPADM

## 2022-08-07 RX ADMIN — SPIRONOLACTONE 25 MG: 25 TABLET ORAL at 08:55

## 2022-08-07 RX ADMIN — RISPERIDONE 2 MG: 2 TABLET ORAL at 21:00

## 2022-08-07 RX ADMIN — GABAPENTIN 100 MG: 100 CAPSULE ORAL at 13:39

## 2022-08-07 RX ADMIN — Medication 1000 MCG: at 08:55

## 2022-08-07 RX ADMIN — GABAPENTIN 100 MG: 100 CAPSULE ORAL at 08:55

## 2022-08-07 RX ADMIN — ALLOPURINOL 300 MG: 300 TABLET ORAL at 08:54

## 2022-08-07 RX ADMIN — ACETAMINOPHEN 325 MG: 325 TABLET, FILM COATED ORAL at 21:00

## 2022-08-07 RX ADMIN — GABAPENTIN 100 MG: 100 CAPSULE ORAL at 20:59

## 2022-08-07 RX ADMIN — Medication 400 MG: at 08:56

## 2022-08-07 RX ADMIN — CARVEDILOL 12.5 MG: 12.5 TABLET, FILM COATED ORAL at 21:00

## 2022-08-07 RX ADMIN — LORATADINE 10 MG: 10 TABLET ORAL at 08:55

## 2022-08-07 RX ADMIN — CARVEDILOL 12.5 MG: 12.5 TABLET, FILM COATED ORAL at 08:56

## 2022-08-07 RX ADMIN — ESCITALOPRAM OXALATE 10 MG: 10 TABLET ORAL at 08:55

## 2022-08-07 RX ADMIN — DONEPEZIL HYDROCHLORIDE 5 MG: 5 TABLET, FILM COATED ORAL at 21:00

## 2022-08-07 RX ADMIN — APIXABAN 5 MG: 5 TABLET, FILM COATED ORAL at 20:59

## 2022-08-07 RX ADMIN — Medication 25 MCG: at 08:55

## 2022-08-07 RX ADMIN — Medication 3 CAPSULE: at 08:58

## 2022-08-07 RX ADMIN — RISPERIDONE 0.25 MG: 0.25 TABLET ORAL at 08:55

## 2022-08-07 RX ADMIN — APIXABAN 5 MG: 5 TABLET, FILM COATED ORAL at 08:55

## 2022-08-07 RX ADMIN — FUROSEMIDE 20 MG: 10 INJECTION, SOLUTION INTRAMUSCULAR; INTRAVENOUS at 08:43

## 2022-08-07 RX ADMIN — ACETAMINOPHEN 325 MG: 325 TABLET, FILM COATED ORAL at 06:20

## 2022-08-07 ASSESSMENT — ACTIVITIES OF DAILY LIVING (ADL)
ADLS_ACUITY_SCORE: 53

## 2022-08-07 NOTE — PLAN OF CARE
Goal Outcome Evaluation:    Plan of Care Reviewed With: patient       Outcome Evaluation: Alert and orinted x4. Denied pain and discomfort. Ate 50% of his breakfast, declined lunch. Urine output improved after Lasix, no bm during the shift, slept most of the shift. Vital stable on room air.

## 2022-08-07 NOTE — PLAN OF CARE
Goal Outcome Evaluation:    Plan of Care Reviewed With: patient     Overall Patient Progress: no change    Outcome Evaluation: AxOx4, but noticed a couple times would mention things that did not happen. ex. daughter stopping by a little bit ago to drop things off. Patient had large soft bm overnight, c/o pain in the left heel in the am, tylenol given. able to make needs known but but occasionally needs reinforcement on call light use.

## 2022-08-07 NOTE — PROGRESS NOTES
[facility]    Medicine Progress Note - Medicine Service, SYDNEE TEAM 1    Date of Admission:  8/2/2022    Assessment & Plan          William Almazan is a 81 year old male admitted on 8/2/2022. He has a history of CAD s/p CABG, HFrEF, pAfib on apixaban, HTN, HLD, DM2, KRISTIAN, prostate Ca, gout, and dementia and is admitted for weakness and hypotension.    Today's updates:  - IV lasix 20 mg x1 and reassess for another dose pending UOP today   - Strict I/Os and daily weights  - Nutrition consult and calorie counting for malnutrition concern    Presyncope  Generalized weakness  Acute hypotension 2/2 hypovolemia - resolved  Reported SBPs in 60s at home w/ inability to walk/ shower. No associated presyncopal, cardiac, or respiratory sxs. Did not fall or have LOC. Bps improved after 500 ml bolus PTA. Arrived lethargic but oriented x4. Initial concern for sepsis on arrival (given lymphopenia, lactate 3.1 and procal of 0.26) and started on IV Vanc and Zosyn. However, pt's lymphopenia appears to be chronic (unknown etiology) and no other SIRS criteria met (afebrile, normal RR, normal HR) so antibiotics discontinued. Lymphopenia, lactate, and procal improved with hydration (1.5 L total), which is reassuring. Suspect pre-syncope 2/2 hypovolemia (2/2 dehydration +hyperdiuresis) vs deconditioning after recent month-long hospitalization. As for infectious sources, is COVID positive but satting 97% on room air. Initial UA was dirty. Exchanged keating and repeat Ucx NGTD.   -I/Os and daily weights  -PTA furosemide 40 mg BID initially held then decreased to 20 mg daily yesterday, however poor UOP with this, will trial 20 mg IV x1 today, then reassess  -Infectious workup:  --Second urine culture results - NGTD  --BC NGTD     Failure to thrive  Malnutrition  --PT and OT consulted--> recommending TCU   --Nutrition consulted-->rec Ensure Enlive x 2 for supplementation   --Calorie counting      Prerenal TONE, resolved  Cr 1.28 (BL 0.7) on arrival--> resolved to baseline after 1.5 L NS. Given this drastic improvement, TONE likely 2/2 prerenal hypovolemia, potentially from hyperdiuresis. Patient was on lasix 80 PO BID prior to last admission and was discharged on 40 mg BID out of concern dose was too high.   - Restart lasix at a lower dose (20 mg daily)   - push PO IVF    COVID +  Acquired at recent admission (7/30 first positive), unclear significance to current presentation. Did not receive monoclonal antibodies during admission. Satting 97% on room air without respiratory distress, CXR unremarkable. COVID vaccinated x 3 (Pfizer).  - Monitor SpO2    Chronic HFrEF  pAF s/p dual chamber ICD  Elevated Troponin - downtrending   First degree AV block  Hx dilated cardiomyopathy, most recent TTE 6/18/21 showing severely dilated LV with EF 20%. Currently euvolemic on exam. No hypoxia and CXR negative for cardiopulm concern. Apparently I&Os were in neg balance at recent hospitalization concerning for lasix dosing being too high. Troponin elevated on arrival, now downtrending on recheck. Likely 2/2 demand ischemia. Symptomatic bradycardic 8/4 AM w/ first degree AV block noted on EKG that appears to be chronic.   - Repeat ECHO 8/3 without change compared to 6/8/21  - Cards consulted regarding bradycardia, ICD interrogated, no further workup needed  - PTA coreg 12.5 mg PO BID   - Lasix 20 mg daily   - Daily weights, I&Os    Toe injury- left  Toenail injured in shower this AM. Some dried blood around the toenail and toe is painful on exam but no current bleeding or opened wounds.   - Ortho called to discuss pt. L foot XR ordered--negative for fracture  -Referral to podiatry outpatient on discharge     Urinary retention  Chronic, and has chronic keating in place. Wife reports that it is changed monthly and is due for a change.   -Keating changed 8/3      CHRONIC CONDITIONS:  Auditory hallucinations, Dementia with  behavioral health disturbances.   Admitted 06/2022 with progressive auditory hallucinations, including self harm command hallucinations. Seen by neuro and psych, started on psychotropic meds and improved significantly. Started on risperdol, donepezil, gabapentin, and lexapro. Denies hallucinations currently. Required a 1:1 previously but was d/c'd two weeks prior to discharge; no current behavioral concerns requiring a 1:1.  - PTA risperdal  - PTA donepezil  - PTA gabapentin  - PTA lexapro    Full thickness rectal prolapse, initially noted 7/15  Concern on recent admission, full-thickness but reducible, intermitted bleeding with bowel movements but Hgb stable 10-11. CRS recommend fiber and avoiding constipation, follow up with surgery outpt in 4-6 weeks.  - PTA psyllium fiber 3 capsule daily, MiraLAX 17 g daily and senna 1-2 tabs bid pr    CAD s/p CABG (1992):    Not ASA or statin at home. No acute concerns. EKG dual paced rhythm with frequent PVCs, no evidence of ACS.   - PTA Coreg    Tremor, whole body  New since recent admission, neuro thought to be stress related.  - PTA B12     PAF: Not currently in Afib. PTA Eliquis 5 mg PO BI  T2DM: Diet controlled  KRISTIAN: Does not use CPAP at home  Gout: PTA allopurinol       Diet: Regular Diet Adult  Calorie Counts  Snacks/Supplements Adult: Ensure Enlive; With Meals Regular  DVT Prophylaxis: PTA apixaban   Louie Catheter: PRESENT, indication: Retention  Central Lines: None  Cardiac Monitoring: None  Code Status: Full Code      Disposition Plan      Expected Discharge Date: approaching medical stability to discharge, would start TCU search process, pending TCU availability      Destination: TCU          The patient's care was discussed with the Attending Physician, Dr. Lou, Patient and Patient's Family.    Idalia Johns MD  Medicine Service, [facility] TEAM 1  [facility]  Securely message with the Vocera Web Console (learn more  here)  Text page via [facility] Paging/Directory   Please see signed in provider for up to date coverage information      Clinically Significant Risk Factors Present on Admission                    ______________________________________________________________________    Interval History   NAEON.   Large soft BM overnight. Largely AOx4 per RN notes however a few times mentioned things that did not happen (I.e. daughter stopping by to drop things off). This morning states he appreciated the lasix--thinks it helped. Pain is better, slept well. No other concerns.     4 pt ROS otherwise negative.     Data reviewed today: I reviewed all medications, new labs and imaging results over the last 24 hours.     Physical Exam   Vital Signs: Temp: 98  F (36.7  C) Temp src: Oral BP: (!) 144/72 Pulse: 74   Resp: 16 SpO2: 98 % O2 Device: None (Room air)    Weight: 194 lbs 9.25 oz  Constitutional: in process of being lifted up to zero bed per RNs, keeps his eyes closed but responds to questions, no apparent distress  Respiratory: No increased work of breathing, sats wnl on RA  Cardiovascular: Regular rate and rhythm on monitor   GI: soft, non-distended  Extremities: No LE edema   Skin: dried blood of left great toe. No bruising or bleeding, normal skin color, texture, turgor, no redness, warmth, or swelling and no rashes  Neuropsychiatric: General: normal, calm, eyes closed but will open to command    Data   No results found for this or any previous visit (from the past 24 hour(s)).

## 2022-08-08 ENCOUNTER — APPOINTMENT (OUTPATIENT)
Dept: PHYSICAL THERAPY | Facility: CLINIC | Age: 82
DRG: 640 | End: 2022-08-08
Payer: MEDICARE

## 2022-08-08 LAB
ANION GAP SERPL CALCULATED.3IONS-SCNC: 5 MMOL/L (ref 7–15)
BACTERIA BLD CULT: NO GROWTH
BACTERIA BLD CULT: NO GROWTH
BUN SERPL-MCNC: 14 MG/DL (ref 8–23)
CALCIUM SERPL-MCNC: 9 MG/DL (ref 8.8–10.2)
CHLORIDE SERPL-SCNC: 104 MMOL/L (ref 98–107)
CREAT SERPL-MCNC: 0.49 MG/DL (ref 0.67–1.17)
DEPRECATED HCO3 PLAS-SCNC: 29 MMOL/L (ref 22–29)
ERYTHROCYTE [DISTWIDTH] IN BLOOD BY AUTOMATED COUNT: 15.5 % (ref 10–15)
GFR SERPL CREATININE-BSD FRML MDRD: >90 ML/MIN/1.73M2
GLUCOSE SERPL-MCNC: 97 MG/DL (ref 70–99)
HCT VFR BLD AUTO: 32.5 % (ref 40–53)
HGB BLD-MCNC: 9.9 G/DL (ref 13.3–17.7)
MAGNESIUM SERPL-MCNC: 1.8 MG/DL (ref 1.7–2.3)
MCH RBC QN AUTO: 27.3 PG (ref 26.5–33)
MCHC RBC AUTO-ENTMCNC: 30.5 G/DL (ref 31.5–36.5)
MCV RBC AUTO: 90 FL (ref 78–100)
MDC_IDC_EPISODE_DTM: NORMAL
MDC_IDC_EPISODE_DURATION: 1 S
MDC_IDC_EPISODE_DURATION: 2 S
MDC_IDC_EPISODE_DURATION: 3 S
MDC_IDC_EPISODE_DURATION: NORMAL S
MDC_IDC_EPISODE_ID: 10
MDC_IDC_EPISODE_ID: 11
MDC_IDC_EPISODE_ID: 12
MDC_IDC_EPISODE_ID: 13
MDC_IDC_EPISODE_ID: 14
MDC_IDC_EPISODE_ID: 15
MDC_IDC_EPISODE_ID: 16
MDC_IDC_EPISODE_ID: 17
MDC_IDC_EPISODE_ID: 18
MDC_IDC_EPISODE_ID: 19
MDC_IDC_EPISODE_ID: 20
MDC_IDC_EPISODE_ID: 21
MDC_IDC_EPISODE_ID: 22
MDC_IDC_EPISODE_ID: 23
MDC_IDC_EPISODE_ID: 9
MDC_IDC_EPISODE_TYPE: NORMAL
MDC_IDC_LEAD_IMPLANT_DT: NORMAL
MDC_IDC_LEAD_IMPLANT_DT: NORMAL
MDC_IDC_LEAD_LOCATION: NORMAL
MDC_IDC_LEAD_LOCATION: NORMAL
MDC_IDC_LEAD_LOCATION_DETAIL_1: NORMAL
MDC_IDC_LEAD_LOCATION_DETAIL_1: NORMAL
MDC_IDC_LEAD_MFG: NORMAL
MDC_IDC_LEAD_MFG: NORMAL
MDC_IDC_LEAD_MODEL: NORMAL
MDC_IDC_LEAD_MODEL: NORMAL
MDC_IDC_LEAD_POLARITY_TYPE: NORMAL
MDC_IDC_LEAD_POLARITY_TYPE: NORMAL
MDC_IDC_LEAD_SERIAL: NORMAL
MDC_IDC_LEAD_SERIAL: NORMAL
MDC_IDC_LEAD_SPECIAL_FUNCTION: NORMAL
MDC_IDC_LEAD_SPECIAL_FUNCTION: NORMAL
MDC_IDC_MSMT_BATTERY_DTM: NORMAL
MDC_IDC_MSMT_BATTERY_REMAINING_LONGEVITY: 64 MO
MDC_IDC_MSMT_BATTERY_RRT_TRIGGER: 2.73
MDC_IDC_MSMT_BATTERY_STATUS: NORMAL
MDC_IDC_MSMT_BATTERY_VOLTAGE: 2.97 V
MDC_IDC_MSMT_CAP_CHARGE_DTM: NORMAL
MDC_IDC_MSMT_CAP_CHARGE_ENERGY: 18 J
MDC_IDC_MSMT_CAP_CHARGE_TIME: 3.73
MDC_IDC_MSMT_CAP_CHARGE_TYPE: NORMAL
MDC_IDC_MSMT_LEADCHNL_RA_IMPEDANCE_VALUE: 399 OHM
MDC_IDC_MSMT_LEADCHNL_RA_PACING_THRESHOLD_AMPLITUDE: 0.75 V
MDC_IDC_MSMT_LEADCHNL_RA_PACING_THRESHOLD_PULSEWIDTH: 0.4 MS
MDC_IDC_MSMT_LEADCHNL_RA_SENSING_INTR_AMPL: 2.1 MV
MDC_IDC_MSMT_LEADCHNL_RV_IMPEDANCE_VALUE: 266 OHM
MDC_IDC_MSMT_LEADCHNL_RV_IMPEDANCE_VALUE: 323 OHM
MDC_IDC_MSMT_LEADCHNL_RV_PACING_THRESHOLD_AMPLITUDE: 1 V
MDC_IDC_MSMT_LEADCHNL_RV_PACING_THRESHOLD_PULSEWIDTH: 0.4 MS
MDC_IDC_MSMT_LEADCHNL_RV_SENSING_INTR_AMPL: 6 MV
MDC_IDC_PG_IMPLANT_DTM: NORMAL
MDC_IDC_PG_MFG: NORMAL
MDC_IDC_PG_MODEL: NORMAL
MDC_IDC_PG_SERIAL: NORMAL
MDC_IDC_PG_TYPE: NORMAL
MDC_IDC_SESS_CLINIC_NAME: NORMAL
MDC_IDC_SESS_DTM: NORMAL
MDC_IDC_SESS_TYPE: NORMAL
MDC_IDC_SET_BRADY_AT_MODE_SWITCH_RATE: 171 {BEATS}/MIN
MDC_IDC_SET_BRADY_HYSTRATE: NORMAL
MDC_IDC_SET_BRADY_LOWRATE: 60 {BEATS}/MIN
MDC_IDC_SET_BRADY_MAX_SENSOR_RATE: 130 {BEATS}/MIN
MDC_IDC_SET_BRADY_MAX_TRACKING_RATE: 130 {BEATS}/MIN
MDC_IDC_SET_BRADY_MODE: NORMAL
MDC_IDC_SET_BRADY_PAV_DELAY_LOW: 180 MS
MDC_IDC_SET_BRADY_SAV_DELAY_LOW: 150 MS
MDC_IDC_SET_LEADCHNL_RA_PACING_AMPLITUDE: 1.5 V
MDC_IDC_SET_LEADCHNL_RA_PACING_ANODE_ELECTRODE_1: NORMAL
MDC_IDC_SET_LEADCHNL_RA_PACING_ANODE_LOCATION_1: NORMAL
MDC_IDC_SET_LEADCHNL_RA_PACING_CAPTURE_MODE: NORMAL
MDC_IDC_SET_LEADCHNL_RA_PACING_CATHODE_ELECTRODE_1: NORMAL
MDC_IDC_SET_LEADCHNL_RA_PACING_CATHODE_LOCATION_1: NORMAL
MDC_IDC_SET_LEADCHNL_RA_PACING_POLARITY: NORMAL
MDC_IDC_SET_LEADCHNL_RA_PACING_PULSEWIDTH: 0.4 MS
MDC_IDC_SET_LEADCHNL_RA_SENSING_ANODE_ELECTRODE_1: NORMAL
MDC_IDC_SET_LEADCHNL_RA_SENSING_ANODE_LOCATION_1: NORMAL
MDC_IDC_SET_LEADCHNL_RA_SENSING_CATHODE_ELECTRODE_1: NORMAL
MDC_IDC_SET_LEADCHNL_RA_SENSING_CATHODE_LOCATION_1: NORMAL
MDC_IDC_SET_LEADCHNL_RA_SENSING_POLARITY: NORMAL
MDC_IDC_SET_LEADCHNL_RA_SENSING_SENSITIVITY: 0.3 MV
MDC_IDC_SET_LEADCHNL_RV_PACING_AMPLITUDE: 2 V
MDC_IDC_SET_LEADCHNL_RV_PACING_ANODE_ELECTRODE_1: NORMAL
MDC_IDC_SET_LEADCHNL_RV_PACING_ANODE_LOCATION_1: NORMAL
MDC_IDC_SET_LEADCHNL_RV_PACING_CAPTURE_MODE: NORMAL
MDC_IDC_SET_LEADCHNL_RV_PACING_CATHODE_ELECTRODE_1: NORMAL
MDC_IDC_SET_LEADCHNL_RV_PACING_CATHODE_LOCATION_1: NORMAL
MDC_IDC_SET_LEADCHNL_RV_PACING_POLARITY: NORMAL
MDC_IDC_SET_LEADCHNL_RV_PACING_PULSEWIDTH: 0.4 MS
MDC_IDC_SET_LEADCHNL_RV_SENSING_ANODE_ELECTRODE_1: NORMAL
MDC_IDC_SET_LEADCHNL_RV_SENSING_ANODE_LOCATION_1: NORMAL
MDC_IDC_SET_LEADCHNL_RV_SENSING_CATHODE_ELECTRODE_1: NORMAL
MDC_IDC_SET_LEADCHNL_RV_SENSING_CATHODE_LOCATION_1: NORMAL
MDC_IDC_SET_LEADCHNL_RV_SENSING_POLARITY: NORMAL
MDC_IDC_SET_LEADCHNL_RV_SENSING_SENSITIVITY: 0.3 MV
MDC_IDC_SET_ZONE_DETECTION_BEATS_DENOMINATOR: 40 {BEATS}
MDC_IDC_SET_ZONE_DETECTION_BEATS_NUMERATOR: 30 {BEATS}
MDC_IDC_SET_ZONE_DETECTION_INTERVAL: 260 MS
MDC_IDC_SET_ZONE_DETECTION_INTERVAL: 300 MS
MDC_IDC_SET_ZONE_DETECTION_INTERVAL: 350 MS
MDC_IDC_SET_ZONE_DETECTION_INTERVAL: 400 MS
MDC_IDC_SET_ZONE_DETECTION_INTERVAL: NORMAL
MDC_IDC_SET_ZONE_TYPE: NORMAL
MDC_IDC_STAT_AT_BURDEN_PERCENT: 0 %
MDC_IDC_STAT_AT_DTM_END: NORMAL
MDC_IDC_STAT_AT_DTM_START: NORMAL
MDC_IDC_STAT_BRADY_AP_VP_PERCENT: 3.9 %
MDC_IDC_STAT_BRADY_AP_VS_PERCENT: 40.73 %
MDC_IDC_STAT_BRADY_AS_VP_PERCENT: 3.1 %
MDC_IDC_STAT_BRADY_AS_VS_PERCENT: 52.26 %
MDC_IDC_STAT_BRADY_DTM_END: NORMAL
MDC_IDC_STAT_BRADY_DTM_START: NORMAL
MDC_IDC_STAT_BRADY_RA_PERCENT_PACED: 41.22 %
MDC_IDC_STAT_BRADY_RV_PERCENT_PACED: 6.46 %
MDC_IDC_STAT_EPISODE_RECENT_COUNT: 0
MDC_IDC_STAT_EPISODE_RECENT_COUNT: 4
MDC_IDC_STAT_EPISODE_RECENT_COUNT_DTM_END: NORMAL
MDC_IDC_STAT_EPISODE_RECENT_COUNT_DTM_START: NORMAL
MDC_IDC_STAT_EPISODE_TOTAL_COUNT: 0
MDC_IDC_STAT_EPISODE_TOTAL_COUNT: 1
MDC_IDC_STAT_EPISODE_TOTAL_COUNT: 20
MDC_IDC_STAT_EPISODE_TOTAL_COUNT_DTM_END: NORMAL
MDC_IDC_STAT_EPISODE_TOTAL_COUNT_DTM_START: NORMAL
MDC_IDC_STAT_EPISODE_TYPE: NORMAL
MDC_IDC_STAT_TACHYTHERAPY_ATP_DELIVERED_RECENT: 0
MDC_IDC_STAT_TACHYTHERAPY_ATP_DELIVERED_TOTAL: 0
MDC_IDC_STAT_TACHYTHERAPY_RECENT_DTM_END: NORMAL
MDC_IDC_STAT_TACHYTHERAPY_RECENT_DTM_START: NORMAL
MDC_IDC_STAT_TACHYTHERAPY_SHOCKS_ABORTED_RECENT: 0
MDC_IDC_STAT_TACHYTHERAPY_SHOCKS_ABORTED_TOTAL: 0
MDC_IDC_STAT_TACHYTHERAPY_SHOCKS_DELIVERED_RECENT: 0
MDC_IDC_STAT_TACHYTHERAPY_SHOCKS_DELIVERED_TOTAL: 1
MDC_IDC_STAT_TACHYTHERAPY_TOTAL_DTM_END: NORMAL
MDC_IDC_STAT_TACHYTHERAPY_TOTAL_DTM_START: NORMAL
PLATELET # BLD AUTO: 283 10E3/UL (ref 150–450)
POTASSIUM SERPL-SCNC: 4 MMOL/L (ref 3.4–5.3)
RBC # BLD AUTO: 3.63 10E6/UL (ref 4.4–5.9)
SODIUM SERPL-SCNC: 138 MMOL/L (ref 136–145)
WBC # BLD AUTO: 5.1 10E3/UL (ref 4–11)

## 2022-08-08 PROCEDURE — 97116 GAIT TRAINING THERAPY: CPT | Mod: GP

## 2022-08-08 PROCEDURE — 99232 SBSQ HOSP IP/OBS MODERATE 35: CPT | Mod: GC | Performed by: PEDIATRICS

## 2022-08-08 PROCEDURE — 82310 ASSAY OF CALCIUM: CPT | Performed by: STUDENT IN AN ORGANIZED HEALTH CARE EDUCATION/TRAINING PROGRAM

## 2022-08-08 PROCEDURE — 36415 COLL VENOUS BLD VENIPUNCTURE: CPT | Performed by: STUDENT IN AN ORGANIZED HEALTH CARE EDUCATION/TRAINING PROGRAM

## 2022-08-08 PROCEDURE — 83735 ASSAY OF MAGNESIUM: CPT | Performed by: STUDENT IN AN ORGANIZED HEALTH CARE EDUCATION/TRAINING PROGRAM

## 2022-08-08 PROCEDURE — 120N000002 HC R&B MED SURG/OB UMMC

## 2022-08-08 PROCEDURE — 85027 COMPLETE CBC AUTOMATED: CPT | Performed by: STUDENT IN AN ORGANIZED HEALTH CARE EDUCATION/TRAINING PROGRAM

## 2022-08-08 PROCEDURE — 250N000013 HC RX MED GY IP 250 OP 250 PS 637: Performed by: STUDENT IN AN ORGANIZED HEALTH CARE EDUCATION/TRAINING PROGRAM

## 2022-08-08 PROCEDURE — 250N000013 HC RX MED GY IP 250 OP 250 PS 637

## 2022-08-08 PROCEDURE — 82374 ASSAY BLOOD CARBON DIOXIDE: CPT | Performed by: STUDENT IN AN ORGANIZED HEALTH CARE EDUCATION/TRAINING PROGRAM

## 2022-08-08 PROCEDURE — 97530 THERAPEUTIC ACTIVITIES: CPT | Mod: GP

## 2022-08-08 RX ORDER — FUROSEMIDE 20 MG
40 TABLET ORAL ONCE
Status: COMPLETED | OUTPATIENT
Start: 2022-08-08 | End: 2022-08-08

## 2022-08-08 RX ADMIN — DONEPEZIL HYDROCHLORIDE 5 MG: 5 TABLET, FILM COATED ORAL at 21:39

## 2022-08-08 RX ADMIN — FUROSEMIDE 40 MG: 20 TABLET ORAL at 11:42

## 2022-08-08 RX ADMIN — GABAPENTIN 100 MG: 100 CAPSULE ORAL at 14:08

## 2022-08-08 RX ADMIN — APIXABAN 5 MG: 5 TABLET, FILM COATED ORAL at 09:08

## 2022-08-08 RX ADMIN — GABAPENTIN 100 MG: 100 CAPSULE ORAL at 20:07

## 2022-08-08 RX ADMIN — GABAPENTIN 100 MG: 100 CAPSULE ORAL at 09:08

## 2022-08-08 RX ADMIN — Medication 1000 MCG: at 09:08

## 2022-08-08 RX ADMIN — Medication 400 MG: at 09:08

## 2022-08-08 RX ADMIN — ALLOPURINOL 300 MG: 300 TABLET ORAL at 09:07

## 2022-08-08 RX ADMIN — SPIRONOLACTONE 25 MG: 25 TABLET ORAL at 09:08

## 2022-08-08 RX ADMIN — LORATADINE 10 MG: 10 TABLET ORAL at 09:08

## 2022-08-08 RX ADMIN — CARVEDILOL 12.5 MG: 12.5 TABLET, FILM COATED ORAL at 09:08

## 2022-08-08 RX ADMIN — RISPERIDONE 0.25 MG: 0.25 TABLET ORAL at 09:08

## 2022-08-08 RX ADMIN — Medication 3 CAPSULE: at 09:08

## 2022-08-08 RX ADMIN — RISPERIDONE 2 MG: 2 TABLET ORAL at 21:39

## 2022-08-08 RX ADMIN — Medication 25 MCG: at 09:08

## 2022-08-08 RX ADMIN — ESCITALOPRAM OXALATE 10 MG: 10 TABLET ORAL at 09:08

## 2022-08-08 RX ADMIN — ACETAMINOPHEN 325 MG: 325 TABLET, FILM COATED ORAL at 21:49

## 2022-08-08 RX ADMIN — APIXABAN 5 MG: 5 TABLET, FILM COATED ORAL at 20:07

## 2022-08-08 RX ADMIN — ACETAMINOPHEN 325 MG: 325 TABLET, FILM COATED ORAL at 02:18

## 2022-08-08 RX ADMIN — CARVEDILOL 12.5 MG: 12.5 TABLET, FILM COATED ORAL at 20:07

## 2022-08-08 ASSESSMENT — ACTIVITIES OF DAILY LIVING (ADL)
ADLS_ACUITY_SCORE: 53

## 2022-08-08 NOTE — PROGRESS NOTES
Care Managment    [facility] AT [facility] ([facility])   [address] [address]   Phone: [phone]   Fax: [phone]   8/8:CHW LVM for admissions to f/u on referral; requested they call SW back.     Philip Stuart   Inpatient Community Health Worker  [facility] 5A & 5B

## 2022-08-08 NOTE — PROGRESS NOTES
Care Management Follow Up    Length of Stay (days): 5    Expected Discharge Date: 08/11/2022     Concerns to be Addressed:discharge planning  Patient plan of care discussed at interdisciplinary rounds: Yes    Anticipated Discharge Disposition: TCU  Anticipated Discharge Services: None  Anticipated Discharge DME: None    Patient/family educated on Medicare website which has current facility and service quality ratings: Yes  Education Provided on the Discharge Plan: Yes   Patient/Family in Agreement with the Plan: Yes    Referrals Placed by CM/SW:   CHW Followed up on Referral:    [facility] - AT [facility] ([facility])   [address] [facility] [address]   Phone: [phone]   Fax: [phone]   8/8: No current bed availability at this time and do not foresee an opening any time soon.    SW sent new initial referrals:    [facility]  [address]  [place] [address]  [phone]    [facility] [place]  [address] [place]  [address]  P: [phone]  F: [phone]    [facility]  [address]  [place]  [address]  P: [phone]  F: [phone]  8/8 Unable to accept pt with COVID + status. Must be 14 days post positive COVID test.    [facility]  [address] [place]  [address]  P: [phone]  F: [phone]    [facility]  [address] [place] [address]  [phone]    [facility]  [address] [place]  [address]  P: [phone]  F: [phone]    [facility]   [address] [place]  [address]  P: [phone]  P: [phone] - Admissions  F: [phone]  8/8: Declined via Epic. Unable to accept pt with COVID positive status and no available bed at this time.    [facility]  [address] [place]  [address]  P: [phone]  F: [phone]  8/8 Pt declined. No appropriate bed for pt.  No LTC/memory care bed at this  time.    Private pay costs discussed: Not applicable    Additional Information:  SW received TCU choices via email from pts spouse Ilsa. SW sent new initial referrals.     will continue to follow for discharge planning, support, and resources.    KAHLIL Jerez, [facility]  [address]   Office: [phone]   Pager: [phone]  [email or web address]

## 2022-08-08 NOTE — PROGRESS NOTES
Calorie Count  Intake recorded for: 8/7  Total Kcals: 1308 Total Protein: 61g  Kcals from Hospital Food: 1308   Protein: 61g  Kcals from Outside Food (average):0 Protein: 0g  # Meals Ordered from Kitchen: 1 meal  # Meals Recorded: 1 meal (First - 100% oatmeal w/ brown sugar, 8oz apple juice, 50% breakfast sandwich w/ eggs cheese and meats)  # Supplements Recorded: 100% 2 ensure enlive

## 2022-08-08 NOTE — PLAN OF CARE
Goal Outcome Evaluation:    Plan of Care Reviewed With: patient     Overall Patient Progress: no change    Outcome Evaluation: Denied any pain throughout shift.  Repositioned q2h.  No BM, keating catheter in place.

## 2022-08-08 NOTE — PLAN OF CARE
Goal Outcome Evaluation:    Plan of Care Reviewed With: patient     Overall Patient Progress: improving    Outcome Evaluation: Improving. VSS on RA. AxOx4, pleasant. No BM overnight. Denied pain overnight. Repositioned q2h. Sacral mepilex in place.

## 2022-08-08 NOTE — PROGRESS NOTES
[facility]    Medicine Progress Note - Medicine Service, MAROON TEAM 1    Date of Admission:  8/2/2022    Assessment & Plan          William Almazan is a 81 year old male admitted on 8/2/2022. He has a history of CAD s/p CABG, HFrEF, pAfib on apixaban, HTN, HLD, DM2, KRISTIAN, prostate Ca, gout, and dementia and is admitted for weakness and hypotension.    Today's updates:  - Net output -677 ml after 20 mg IV lasix yesterday --> started 40 mg lasix PO today   - Continue strict I/Os and daily weights  - Continue calorie counting   - Continue PT/OT    ----------------------------------------------------------------------------------------------------    Presyncope  Generalized weakness  Acute hypotension 2/2 hypovolemia - resolved  Reported SBPs in 60s at home w/ inability to walk/ shower. No associated presyncopal, cardiac, or respiratory sxs. Did not fall or have LOC. Bps improved after 500 ml bolus PTA. Arrived lethargic but oriented x4. Initial concern for sepsis on arrival (given lymphopenia, lactate 3.1 and procal of 0.26) and started on IV Vanc and Zosyn. However, pt's lymphopenia appears to be chronic (unknown etiology) and no other SIRS criteria met (afebrile, normal RR, normal HR) so antibiotics discontinued. Lymphopenia, lactate, and procal improved with hydration (1.5 L total), which is reassuring. Suspect pre-syncope 2/2 hypovolemia (2/2 dehydration +hyperdiuresis) vs deconditioning after recent month-long hospitalization. As for infectious sources, is COVID positive but satting 97% on room air. Initial UA was dirty. Exchanged keating and repeat Ucx NGTD.   -start 40 mg lasix PO today  -I/Os and daily weights  -Infectious workup:  --Second urine culture results - NGTD  --BC NGTD     Failure to thrive  Malnutrition  --PT and OT consulted--> recommending TCU   --Nutrition consulted-->rec Ensure Enlive x 2 for supplementation   --Calorie counting     Prerenal  TONE, resolved  Cr 1.28 (BL 0.7) on arrival--> resolved to baseline after 1.5 L NS. Given this drastic improvement, TONE likely 2/2 prerenal hypovolemia, potentially from hyperdiuresis. Patient was on lasix 80 PO BID prior to last admission and was discharged on 40 mg BID out of concern dose was too high.   - Restart lasix at a lower dose (20 mg daily)   - push PO IVF    COVID +  Acquired at recent admission (7/30 first positive), unclear significance to current presentation. Did not receive monoclonal antibodies during admission. Satting 97% on room air without respiratory distress, CXR unremarkable. COVID vaccinated x 3 (Pfizer).  - Monitor SpO2    Chronic HFrEF  pAF s/p dual chamber ICD  Elevated Troponin - downtrending   First degree AV block  Hx dilated cardiomyopathy, most recent TTE 6/18/21 showing severely dilated LV with EF 20%. Currently euvolemic on exam. No hypoxia and CXR negative for cardiopulm concern. Apparently I&Os were in neg balance at recent hospitalization concerning for lasix dosing being too high. Troponin elevated on arrival, now downtrending on recheck. Likely 2/2 demand ischemia. Symptomatic bradycardic 8/4 AM w/ first degree AV block noted on EKG that appears to be chronic.   - Repeat ECHO 8/3 without change compared to 6/8/21  - Cards consulted regarding bradycardia, ICD interrogated, no further workup needed  - PTA coreg 12.5 mg PO BID   - Lasix 20 mg daily   - Daily weights, I&Os    Toe injury- left  Toenail injured in shower this AM. Some dried blood around the toenail and toe is painful on exam but no current bleeding or opened wounds.   - Ortho called to discuss pt. L foot XR ordered--negative for fracture  -Referral to podiatry outpatient on discharge     Urinary retention  Chronic, and has chronic keating in place. Wife reports that it is changed monthly and is due for a change.   -Keating changed 8/3      CHRONIC CONDITIONS:  Auditory hallucinations, Dementia with behavioral health  disturbances.   Admitted 06/2022 with progressive auditory hallucinations, including self harm command hallucinations. Seen by neuro and psych, started on psychotropic meds and improved significantly. Started on risperdol, donepezil, gabapentin, and lexapro. Denies hallucinations currently. Required a 1:1 previously but was d/c'd two weeks prior to discharge; no current behavioral concerns requiring a 1:1.  - PTA risperdal  - PTA donepezil  - PTA gabapentin  - PTA lexapro    Full thickness rectal prolapse, initially noted 7/15  Concern on recent admission, full-thickness but reducible, intermitted bleeding with bowel movements but Hgb stable 10-11. CRS recommend fiber and avoiding constipation, follow up with surgery outpt in 4-6 weeks.  - PTA psyllium fiber 3 capsule daily, MiraLAX 17 g daily and senna 1-2 tabs bid pr    CAD s/p CABG (1992):    Not ASA or statin at home. No acute concerns. EKG dual paced rhythm with frequent PVCs, no evidence of ACS.   - PTA Coreg    Tremor, whole body  New since recent admission, neuro thought to be stress related.  - PTA B12     PAF: Not currently in Afib. PTA Eliquis 5 mg PO BI  T2DM: Diet controlled  KRISTIAN: Does not use CPAP at home  Gout: PTA allopurinol       Diet: Regular Diet Adult  Calorie Counts  Snacks/Supplements Adult: Ensure Enlive; With Meals Regular  DVT Prophylaxis: PTA apixaban   Louie Catheter: PRESENT, indication: Retention  Central Lines: None  Cardiac Monitoring: None  Code Status: Full Code      Disposition Plan      Expected Discharge Date: approaching medical stability to discharge, would start TCU search process, pending TCU availability      Destination: TCU          The patient's care was discussed with the Attending Physician, Dr. Lou, Patient and Patient's Family.    Ana Maria Horn MD  Medicine Service, [facility], [place] TEAM [address] [place] [address]  Securely message with the Vocera Web Console (learn more here)  Text page via  [employer] Paging/Directory   Please see signed in provider for up to date coverage information      Clinically Significant Risk Factors Present on Admission                    ______________________________________________________________________    Interval History   NAEON. Feeling well. Wanted assistance to the bathroom.     4 pt ROS otherwise negative.     Data reviewed today: I reviewed all medications, new labs and imaging results over the last 24 hours.     Physical Exam   Vital Signs: Temp: 98.1  F (36.7  C) Temp src: Oral BP: (!) 143/81 Pulse: 73   Resp: 16 SpO2: 98 % O2 Device: None (Room air)    Weight: 188 lbs 7.89 oz  Constitutional: in process of being lifted up to zero bed per RNs, keeps his eyes closed but responds to questions, no apparent distress  Respiratory: No increased work of breathing, sats wnl on RA  Cardiovascular: Regular rate and rhythm on monitor   GI: soft, non-distended  Extremities: No LE edema   Skin: dried blood of left great toe. No bruising or bleeding, normal skin color, texture, turgor, no redness, warmth, or swelling and no rashes  Neuropsychiatric: General: normal, calm, eyes closed but will open to command    Data   No results found for this or any previous visit (from the past 24 hour(s)).

## 2022-08-09 LAB
HOLD SPECIMEN: NORMAL
MAGNESIUM SERPL-MCNC: 1.7 MG/DL (ref 1.7–2.3)
POTASSIUM SERPL-SCNC: 4.1 MMOL/L (ref 3.4–5.3)

## 2022-08-09 PROCEDURE — 120N000002 HC R&B MED SURG/OB UMMC

## 2022-08-09 PROCEDURE — 250N000013 HC RX MED GY IP 250 OP 250 PS 637

## 2022-08-09 PROCEDURE — 36415 COLL VENOUS BLD VENIPUNCTURE: CPT | Performed by: PEDIATRICS

## 2022-08-09 PROCEDURE — 250N000013 HC RX MED GY IP 250 OP 250 PS 637: Performed by: STUDENT IN AN ORGANIZED HEALTH CARE EDUCATION/TRAINING PROGRAM

## 2022-08-09 PROCEDURE — 84132 ASSAY OF SERUM POTASSIUM: CPT | Performed by: PEDIATRICS

## 2022-08-09 PROCEDURE — 99232 SBSQ HOSP IP/OBS MODERATE 35: CPT | Mod: GC | Performed by: STUDENT IN AN ORGANIZED HEALTH CARE EDUCATION/TRAINING PROGRAM

## 2022-08-09 PROCEDURE — 83735 ASSAY OF MAGNESIUM: CPT | Performed by: PEDIATRICS

## 2022-08-09 RX ORDER — FUROSEMIDE 20 MG
40 TABLET ORAL DAILY
Status: DISCONTINUED | OUTPATIENT
Start: 2022-08-09 | End: 2022-08-24 | Stop reason: HOSPADM

## 2022-08-09 RX ADMIN — POLYETHYLENE GLYCOL 3350 17 G: 17 POWDER, FOR SOLUTION ORAL at 08:29

## 2022-08-09 RX ADMIN — GABAPENTIN 100 MG: 100 CAPSULE ORAL at 13:52

## 2022-08-09 RX ADMIN — SPIRONOLACTONE 25 MG: 25 TABLET ORAL at 08:30

## 2022-08-09 RX ADMIN — LORATADINE 10 MG: 10 TABLET ORAL at 08:29

## 2022-08-09 RX ADMIN — GABAPENTIN 100 MG: 100 CAPSULE ORAL at 08:29

## 2022-08-09 RX ADMIN — ACETAMINOPHEN 325 MG: 325 TABLET, FILM COATED ORAL at 09:47

## 2022-08-09 RX ADMIN — ALLOPURINOL 300 MG: 300 TABLET ORAL at 08:30

## 2022-08-09 RX ADMIN — ACETAMINOPHEN 325 MG: 325 TABLET, FILM COATED ORAL at 22:29

## 2022-08-09 RX ADMIN — Medication 1000 MCG: at 08:29

## 2022-08-09 RX ADMIN — APIXABAN 5 MG: 5 TABLET, FILM COATED ORAL at 08:30

## 2022-08-09 RX ADMIN — DONEPEZIL HYDROCHLORIDE 5 MG: 5 TABLET, FILM COATED ORAL at 22:29

## 2022-08-09 RX ADMIN — CARVEDILOL 12.5 MG: 12.5 TABLET, FILM COATED ORAL at 08:30

## 2022-08-09 RX ADMIN — CARVEDILOL 12.5 MG: 12.5 TABLET, FILM COATED ORAL at 20:05

## 2022-08-09 RX ADMIN — APIXABAN 5 MG: 5 TABLET, FILM COATED ORAL at 20:05

## 2022-08-09 RX ADMIN — RISPERIDONE 2 MG: 2 TABLET ORAL at 22:29

## 2022-08-09 RX ADMIN — RISPERIDONE 0.25 MG: 0.25 TABLET ORAL at 08:30

## 2022-08-09 RX ADMIN — Medication 3 CAPSULE: at 08:30

## 2022-08-09 RX ADMIN — Medication 400 MG: at 08:29

## 2022-08-09 RX ADMIN — ESCITALOPRAM OXALATE 10 MG: 10 TABLET ORAL at 08:30

## 2022-08-09 RX ADMIN — GABAPENTIN 100 MG: 100 CAPSULE ORAL at 20:05

## 2022-08-09 RX ADMIN — Medication 25 MCG: at 08:30

## 2022-08-09 RX ADMIN — FUROSEMIDE 40 MG: 20 TABLET ORAL at 13:52

## 2022-08-09 ASSESSMENT — ACTIVITIES OF DAILY LIVING (ADL)
ADLS_ACUITY_SCORE: 55
ADLS_ACUITY_SCORE: 59
ADLS_ACUITY_SCORE: 55
ADLS_ACUITY_SCORE: 53
ADLS_ACUITY_SCORE: 55
ADLS_ACUITY_SCORE: 59
ADLS_ACUITY_SCORE: 55
ADLS_ACUITY_SCORE: 59
ADLS_ACUITY_SCORE: 59
ADLS_ACUITY_SCORE: 53

## 2022-08-09 NOTE — PROGRESS NOTES
Care Management Follow Up    Length of Stay (days): 6    Expected Discharge Date: 08/12/2022     Concerns to be Addressed: discharge planning    Patient plan of care discussed at interdisciplinary rounds: Yes    Anticipated Discharge Disposition: TCU  Anticipated Discharge Services: Transportation  Anticipated Discharge DME: TBD    Patient/family educated on Medicare website which has current facility and service quality ratings: Yes  Education Provided on the Discharge Plan:  Yes  Patient/Family in Agreement with the Plan:  Yes    Referrals Placed by CM/SW:     CHW Jacob followed up on referrals:  [place] [address]  [phone]  8/9:CHW LVM for admissions to f/u on referral; requested they call SW back     [address] [place]  [address]  P: [phone]  F: [phone]   8/9: CHW spoke with admissions and pt needs to be 14 days out in order to be considered.     [facility]  [address] [place]  [address]  P: [phone]  F: [phone]  8/9 Unable to accept pt with COVID + status. Must be 14 days post positive COVID test.     [facility]  [address] [facility] [place]  [address]  P: [phone]  F: [phone]  8/9: CHW LVM for admissions to f/u on referral; requested they call SW back     [facility]  [address] [place], [place] [address] [phone]   8/9:CHW LVM for admissions to f/u on referral; requested they call SW back     Discontinued:  [facility]  [address] [place]  [address]  P: [phone]  F: [phone]  8/9:Pt declined. No reason given on Epic.     [facility]   [address] [facility] [address]   Phone: [phone]   Fax: [phone]   8/8: No current bed availability at this time and do not foresee an opening any time soon.     [facility]   [address] [place] [address] [place].  [facility]  [place]  [address]  P: [phone]  P: [phone] - Admissions  F: [phone]  8/8: Declined via Epic. Unable to accept pt with COVID positive status and no available bed at this time.     [facility]  [address] [place]  [address]  P: [phone]  F: [phone]  8/8 Pt declined. No appropriate bed for pt.  No LTC/memory care bed at this time.    [facility]:  Liaison: Nimco Fabian  8/9: Declined due to no beds. He also is still on special precautions so not medically ready per our TCU guidelines. Looks like his needs can be met at a community TCU.    Private pay costs discussed: Not applicable    Additional Information:  CHW Jacob followed up on referrals.     will continue to follow for discharge planning, support, and resources.    KAHLIL Jerez, [facility]  [address]   Office: [phone]   Pager: [phone]  [email or web address]

## 2022-08-09 NOTE — PLAN OF CARE
Goal Outcome Evaluation:    Plan of Care Reviewed With: patient     Overall Patient Progress: improving    Outcome Evaluation: Up to chair for breakfast this morning.  Sat up for about 1.5 hours.  Cusion ordered for pressure injury prevention and treatment for current pressure injury.  Feet soaked due to lots of dry skin long nails.  Attempted to clip long toenails but clipper not large enough.  Tylenol given for coccyx pain related to wound.  Dressing changed.  Up to commode for BM.  Up with walker, gait belt, and assist of 2.  On calorie counts.  Good sat's on room air.   Using call light appropreately today but placed on chair alarm when OOB.   Dinner ordered for tonight at 6pm/

## 2022-08-09 NOTE — PROGRESS NOTES
Care Management Follow Up    [facility]  [address] [place] [address]  [phone]  8/9:CHW LVM for admissions to f/u on referral; requested they call SW back    [address] [place]  [address]  P: [phone]  F: [phone]   8/9: CHW spoke with admissions and pt needs to be 14 days out in order to be considered.    [facility]  [address] [place]  [address]  P: [phone]  F: [phone]  8/9 Unable to accept pt with COVID + status. Must be 14 days post positive COVID test.     [facility]  [address] [place]  [address]  P: [phone]  F: [phone]  8/9: CHW LVM for admissions to f/u on referral; requested they call SW back    [facility]  [address] [facility] [place] [address]  [phone]   8/9:CHW LVM for admissions to f/u on referral; requested they call SW back    [facility]  [address] [place]  [address]  P: [phone]  F: [phone]  8/9:CHW LVM for admissions to f/u on referral; requested they call SW back      [facility]     [facility])   [address] [facility] [address]   Phone: [phone]   Fax: [phone]   8/8: No current bed availability at this time and do not foresee an opening any time soon.    [facility]   [address] [facility] [place]  [address]  P: [phone]  P: [phone] - Admissions  F: [phone]  8/8: Declined via Epic. Unable to accept pt with COVID positive status and no available bed at this time.     [facility]  [address] [place]  [address]  P: [phone]  F: [phone]  8/8 Pt declined. No appropriate bed for pt.  No LTC/memory care bed at this time.    Philip Stuart   Inpatient Community Health Worker  [facility] 5A & 5B

## 2022-08-09 NOTE — PLAN OF CARE
"Goal Outcome Evaluation:    Plan of Care Reviewed With: patient     Overall Patient Progress: no change    Outcome Evaluation: Repositioned q2 hrs, gave PRN tylenol before bed to help with L toe pain. Louie in place with moderate output. No BM overnight. Ate about 75 percent of meal for dinner. Infrequent poductive cough noted, but remains stable on RA.    /73 (BP Location: Left arm)   Pulse 73   Temp 97.7  F (36.5  C) (Oral)   Resp 20   Ht 1.753 m (5' 9\")   Wt 85.5 kg (188 lb 7.9 oz)   SpO2 98%   BMI 27.84 kg/m      "

## 2022-08-09 NOTE — PROGRESS NOTES
Calorie Count  Intake recorded for: 8/8  Total Kcals: 0 Total Protein: 0g  Kcals from Hospital Food: 0   Protein: 0g  Kcals from Outside Food (average):0 Protein: 0g  # Meals Ordered from Kitchen: 2 meals   # Meals Recorded: no intake recorded.   # Supplements Recorded: no intake recorded.

## 2022-08-09 NOTE — PROGRESS NOTES
[facility]    Medicine Progress Note - Medicine Service, MARBETTY TEAM 1    Date of Admission:  8/2/2022    Assessment & Plan          William Almazan is a 81 year old male admitted on 8/2/2022. He has a history of CAD s/p CABG, HFrEF, pAfib on apixaban, HTN, HLD, DM2, KRISTIAN, prostate Ca, gout, and dementia and is admitted for weakness and hypotension.    Today's updates:  - Cont lasix 40 mg daily    - Continue strict I/Os and daily weights  - Continue calorie counting   - Continue PT/OT  - waiting for TCU    ----------------------------------------------------------------------------------------------------    Presyncope  Generalized weakness - improving   Acute hypotension 2/2 hypovolemia - resolved  Reported SBPs in 60s at home w/ inability to walk/ shower. No associated presyncopal, cardiac, or respiratory sxs. Did not fall or have LOC. Bps improved after 500 ml bolus PTA. Arrived lethargic but oriented x4. Initial concern for sepsis on arrival (given lymphopenia, lactate 3.1 and procal of 0.26) and started on IV Vanc and Zosyn. However, pt's lymphopenia appears to be chronic (unknown etiology) and no other SIRS criteria met (afebrile, normal RR, normal HR) so antibiotics discontinued. Lymphopenia, lactate, and procal improved with hydration (1.5 L total), which is reassuring. Suspect pre-syncope 2/2 hypovolemia (2/2 dehydration +hyperdiuresis) vs deconditioning after recent month-long hospitalization. As for infectious sources, is COVID positive but satting 97% on room air. Initial UA was dirty. Exchanged keating and repeat Ucx NGTD.   -cont 40 mg lasix daily   -I/Os and daily weights  -Infectious workup:  --Second urine culture results - NGTD  --BC NGTD     Failure to thrive  Malnutrition  --PT and OT consulted--> recommending TCU   --Nutrition consulted-->rec Ensure Enlive x 2 for supplementation   --Calorie counts continue     Prerenal TONE, resolved  Cr 1.28 (BL  0.7) on arrival--> resolved to baseline after 1.5 L NS. Given this drastic improvement, TONE likely 2/2 prerenal hypovolemia, potentially from hyperdiuresis. Patient was on lasix 80 PO BID prior to last admission and was discharged on 40 mg BID out of concern dose was too high.   - Lasix to 40 mg daily as above   - push PO fluids     COVID +  Acquired at recent admission (7/30 first positive), unclear significance to current presentation. Did not receive monoclonal antibodies during admission. Satting 97% on room air without respiratory distress, CXR unremarkable. COVID vaccinated x 3 (Pfizer).  - Monitor SpO2    Chronic HFrEF  pAF s/p dual chamber ICD  Elevated Troponin - downtrending   First degree AV block  Hx dilated cardiomyopathy, most recent TTE 6/18/21 showing severely dilated LV with EF 20%. Currently euvolemic on exam. No hypoxia and CXR negative for cardiopulm concern. Apparently I&Os were in neg balance at recent hospitalization concerning for lasix dosing being too high. Troponin elevated on arrival, now downtrending on recheck. Likely 2/2 demand ischemia. Symptomatic bradycardic 8/4 AM w/ first degree AV block noted on EKG that appears to be chronic.   - Repeat ECHO 8/3 without change compared to 6/8/21  - Cards consulted regarding bradycardia, ICD interrogated, no further workup needed  - PTA coreg 12.5 mg PO BID   - Lasix 20 mg daily   - Daily weights, I&Os    Toe injury- left  Toenail injured in shower this AM. Some dried blood around the toenail and toe is painful on exam but no current bleeding or opened wounds.   - Ortho called to discuss pt. L foot XR ordered--negative for fracture  -Referral to podiatry outpatient on discharge     Urinary retention  Chronic, and has chronic keating in place. Wife reports that it is changed monthly and is due for a change.   -Keating changed 8/3      CHRONIC CONDITIONS:  Auditory hallucinations, Dementia with behavioral health disturbances.   Admitted 06/2022 with  progressive auditory hallucinations, including self harm command hallucinations. Seen by neuro and psych, started on psychotropic meds and improved significantly. Started on risperdol, donepezil, gabapentin, and lexapro. Denies hallucinations currently. Required a 1:1 previously but was d/c'd two weeks prior to discharge; no current behavioral concerns requiring a 1:1.  - PTA risperdal  - PTA donepezil  - PTA gabapentin  - PTA lexapro    Full thickness rectal prolapse, initially noted 7/15  Concern on recent admission, full-thickness but reducible, intermitted bleeding with bowel movements but Hgb stable 10-11. CRS recommend fiber and avoiding constipation, follow up with surgery outpt in 4-6 weeks.  - PTA psyllium fiber 3 capsule daily, MiraLAX 17 g daily and senna 1-2 tabs bid pr    CAD s/p CABG (1992):    Not ASA or statin at home. No acute concerns. EKG dual paced rhythm with frequent PVCs, no evidence of ACS.   - PTA Coreg    Tremor, whole body  New since recent admission, neuro thought to be stress related.  - PTA B12     PAF: Not currently in Afib. PTA Eliquis 5 mg PO BI  T2DM: Diet controlled  KRISTIAN: Does not use CPAP at home  Gout: PTA allopurinol       Diet: Regular Diet Adult  Calorie Counts  Snacks/Supplements Adult: Ensure Enlive; With Meals Regular  DVT Prophylaxis: PTA apixaban   Louie Catheter: PRESENT, indication: Retention  Central Lines: None  Cardiac Monitoring: None  Code Status: Full Code      Disposition Plan      Expected Discharge Date: approaching medical stability to discharge, would start TCU search process, pending TCU availability      Destination: TCU          The patient's care was discussed with the Attending Physician, Dr. Lou, Patient and Patient's Family.    Idalia Johns MD  Medicine Service, [facility] TEAM 1  [facility]  Securely message with the Vocera Web Console (learn more here)  Text page via [employer] Paging/Directory   Please see  signed in provider for up to date coverage information      Clinically Significant Risk Factors Present on Admission                    ______________________________________________________________________    Interval History   NAEON. Feeling well. Siting up in chair. States current lasix dose is good, no dizziness when he got up. Hungry and waiting for breakfast. Feels good.     4 pt ROS otherwise negative.     Data reviewed today: I reviewed all medications, new labs and imaging results over the last 24 hours.     Physical Exam   Vital Signs: Temp: 98.3  F (36.8  C) Temp src: Oral BP: 132/72 Pulse: 80   Resp: 18 SpO2: 98 % O2 Device: None (Room air)    Weight: 184 lbs 15.46 oz  Constitutional: sitting up in chair, NAD  Respiratory: No increased work of breathing, sats wnl on RA  Cardiovascular: Regular rate and rhythm on monitor   GI: soft, non-distended  Extremities: No LE edema   Skin: warm and dry   Neuropsychiatric: General: normal, calm    Data   No results found for this or any previous visit (from the past 24 hour(s)).

## 2022-08-10 ENCOUNTER — APPOINTMENT (OUTPATIENT)
Dept: PHYSICAL THERAPY | Facility: CLINIC | Age: 82
DRG: 640 | End: 2022-08-10
Payer: MEDICARE

## 2022-08-10 LAB
HOLD SPECIMEN: NORMAL
MAGNESIUM SERPL-MCNC: 1.7 MG/DL (ref 1.7–2.3)
POTASSIUM SERPL-SCNC: 4.7 MMOL/L (ref 3.4–5.3)

## 2022-08-10 PROCEDURE — 97116 GAIT TRAINING THERAPY: CPT | Mod: GP

## 2022-08-10 PROCEDURE — 250N000013 HC RX MED GY IP 250 OP 250 PS 637: Performed by: STUDENT IN AN ORGANIZED HEALTH CARE EDUCATION/TRAINING PROGRAM

## 2022-08-10 PROCEDURE — 250N000013 HC RX MED GY IP 250 OP 250 PS 637

## 2022-08-10 PROCEDURE — 36415 COLL VENOUS BLD VENIPUNCTURE: CPT | Performed by: STUDENT IN AN ORGANIZED HEALTH CARE EDUCATION/TRAINING PROGRAM

## 2022-08-10 PROCEDURE — 97530 THERAPEUTIC ACTIVITIES: CPT | Mod: GP

## 2022-08-10 PROCEDURE — 83735 ASSAY OF MAGNESIUM: CPT | Performed by: STUDENT IN AN ORGANIZED HEALTH CARE EDUCATION/TRAINING PROGRAM

## 2022-08-10 PROCEDURE — 84132 ASSAY OF SERUM POTASSIUM: CPT | Performed by: STUDENT IN AN ORGANIZED HEALTH CARE EDUCATION/TRAINING PROGRAM

## 2022-08-10 PROCEDURE — 99232 SBSQ HOSP IP/OBS MODERATE 35: CPT | Performed by: STUDENT IN AN ORGANIZED HEALTH CARE EDUCATION/TRAINING PROGRAM

## 2022-08-10 PROCEDURE — 120N000002 HC R&B MED SURG/OB UMMC

## 2022-08-10 RX ADMIN — ALLOPURINOL 300 MG: 300 TABLET ORAL at 09:49

## 2022-08-10 RX ADMIN — RISPERIDONE 0.25 MG: 0.25 TABLET ORAL at 09:49

## 2022-08-10 RX ADMIN — GABAPENTIN 100 MG: 100 CAPSULE ORAL at 13:36

## 2022-08-10 RX ADMIN — GABAPENTIN 100 MG: 100 CAPSULE ORAL at 09:51

## 2022-08-10 RX ADMIN — Medication 1000 MCG: at 09:48

## 2022-08-10 RX ADMIN — Medication 400 MG: at 09:52

## 2022-08-10 RX ADMIN — DONEPEZIL HYDROCHLORIDE 5 MG: 5 TABLET, FILM COATED ORAL at 21:35

## 2022-08-10 RX ADMIN — FUROSEMIDE 40 MG: 20 TABLET ORAL at 09:50

## 2022-08-10 RX ADMIN — CARVEDILOL 12.5 MG: 12.5 TABLET, FILM COATED ORAL at 09:51

## 2022-08-10 RX ADMIN — ACETAMINOPHEN 325 MG: 325 TABLET, FILM COATED ORAL at 17:15

## 2022-08-10 RX ADMIN — LORATADINE 10 MG: 10 TABLET ORAL at 09:51

## 2022-08-10 RX ADMIN — ESCITALOPRAM OXALATE 10 MG: 10 TABLET ORAL at 09:50

## 2022-08-10 RX ADMIN — CARVEDILOL 12.5 MG: 12.5 TABLET, FILM COATED ORAL at 19:49

## 2022-08-10 RX ADMIN — SPIRONOLACTONE 25 MG: 25 TABLET ORAL at 09:51

## 2022-08-10 RX ADMIN — GABAPENTIN 100 MG: 100 CAPSULE ORAL at 19:49

## 2022-08-10 RX ADMIN — Medication 3 CAPSULE: at 09:49

## 2022-08-10 RX ADMIN — POLYETHYLENE GLYCOL 3350 17 G: 17 POWDER, FOR SOLUTION ORAL at 09:48

## 2022-08-10 RX ADMIN — Medication 25 MCG: at 09:49

## 2022-08-10 RX ADMIN — RISPERIDONE 2 MG: 2 TABLET ORAL at 21:35

## 2022-08-10 RX ADMIN — APIXABAN 5 MG: 5 TABLET, FILM COATED ORAL at 09:51

## 2022-08-10 RX ADMIN — APIXABAN 5 MG: 5 TABLET, FILM COATED ORAL at 19:49

## 2022-08-10 ASSESSMENT — ACTIVITIES OF DAILY LIVING (ADL)
ADLS_ACUITY_SCORE: 55

## 2022-08-10 NOTE — PLAN OF CARE
Goal Outcome Evaluation:    Plan of Care Reviewed With: patient     Outcome Evaluation: Vital stable on RA. Pt are no longer in isolation. Alret and orinted, slow to respond, denies pain/discomfort. Pt. transfred max assist of 2, PT work with the patient and advised to use lift. Ate 75% of his breakfast.

## 2022-08-10 NOTE — PROGRESS NOTES
"BP (!) 145/74 (BP Location: Left arm)   Pulse 84   Temp 98.1  F (36.7  C) (Oral)   Resp 20   Ht 1.753 m (5' 9\")   Wt 83.9 kg (184 lb 15.5 oz)   SpO2 97%   BMI 27.31 kg/m      VSS, A/O x 4, denies pain this shift, PIV SL, bath bath this shift, RA, continue to monitor.   "

## 2022-08-10 NOTE — PLAN OF CARE
Goal Outcome Evaluation:    Plan of Care Reviewed With: patient     Overall Patient Progress: no change    Outcome Evaluation: Uneventful shift. VSS on RA, AxOx4, Denies pain. No bm overnight. sat in bedside chair for 30 minutes. repo q2h. Using call light aproppriately. Awaiting TCU.

## 2022-08-10 NOTE — PROGRESS NOTES
[facility]    Medicine Progress Note - Medicine Service, SYDNEE TEAM 1    Date of Admission:  8/2/2022    Assessment & Plan          William Almazan is a 81 year old male admitted on 8/2/2022. He has a history of CAD s/p CABG, HFrEF, pAfib on apixaban, HTN, HLD, DM2, KRISTIAN, prostate Ca, gout, and dementia and is admitted for weakness and hypotension.    Today's updates:  - Medically stable for discharge pending placement  - Discontinue COVID special precautions as >10 days out from initial positive without symptom onsent  - Cont lasix 40 mg daily    - Repeat BMP on 8/11 in AM  - Discontinue electrolyte repletion protocols to reduce blood draws between scheduled labs. Will plan to replete manually on 8/11 if needed.  - Continue strict I/Os and daily weights  - Continue PT/OT    ----------------------------------------------------------------------------------------------------    Presyncope  Generalized weakness - improving   Acute hypotension 2/2 hypovolemia - resolved  Reported SBPs in 60s at home w/ inability to walk/ shower. No associated presyncopal, cardiac, or respiratory sxs. Did not fall or have LOC. Bps improved after 500 ml bolus PTA. Arrived lethargic but oriented x4. Initial concern for sepsis on arrival (given lymphopenia, lactate 3.1 and procal of 0.26) and started on IV Vanc and Zosyn. However, pt's lymphopenia appears to be chronic (unknown etiology) and no other SIRS criteria met (afebrile, normal RR, normal HR) so antibiotics discontinued. Lymphopenia, lactate, and procal improved with hydration (1.5 L total), which is reassuring. Suspect pre-syncope 2/2 hypovolemia (2/2 dehydration +hyperdiuresis) vs deconditioning after recent month-long hospitalization. As for infectious sources, is COVID positive but satting 97% on room air. Initial UA was dirty. Exchanged keating and repeat Ucx NGTD.   -cont 40 mg lasix daily   -I/Os and daily  weights  -Infectious workup:  --Second urine culture results - NGTD  --BC NGTD     Failure to thrive  Malnutrition  --PT and OT consulted--> recommending TCU   --Nutrition consulted-->rec Ensure Enlive x 2 for supplementation   --Calorie counts continue     Prerenal TONE, resolved  Cr 1.28 (BL 0.7) on arrival--> resolved to baseline after 1.5 L NS. Given this drastic improvement, TONE likely 2/2 prerenal hypovolemia, potentially from hyperdiuresis. Patient was on lasix 80 PO BID prior to last admission and was discharged on 40 mg BID out of concern dose was too high.   - Lasix to 40 mg daily as above   - push PO fluids     COVID +  Acquired at recent admission (7/30 first positive), unclear significance to current presentation. Did not receive monoclonal antibodies during admission. Satting 97% on room air without respiratory distress, CXR unremarkable. COVID vaccinated x 3 (Pfizer).  - Monitor SpO2    Chronic HFrEF  pAF s/p dual chamber ICD  Elevated Troponin - downtrending   First degree AV block  Hx dilated cardiomyopathy, most recent TTE 6/18/21 showing severely dilated LV with EF 20%. Currently euvolemic on exam. No hypoxia and CXR negative for cardiopulm concern. Apparently I&Os were in neg balance at recent hospitalization concerning for lasix dosing being too high. Troponin elevated on arrival, now downtrending on recheck. Likely 2/2 demand ischemia. Symptomatic bradycardic 8/4 AM w/ first degree AV block noted on EKG that appears to be chronic.   - Repeat ECHO 8/3 without change compared to 6/8/21  - Cards consulted regarding bradycardia, ICD interrogated, no further workup needed  - PTA coreg 12.5 mg PO BID   - Lasix 20 mg daily   - Daily weights, I&Os    Toe injury- left  Toenail injured in shower this AM. Some dried blood around the toenail and toe is painful on exam but no current bleeding or opened wounds.   - Ortho called to discuss pt. L foot XR ordered--negative for fracture  - Referral to podiatry  outpatient on discharge     Urinary retention  Chronic, and has chronic keating in place. Wife reports that it is changed monthly and is due for a change.   - Keating changed 8/3      CHRONIC CONDITIONS:  Auditory hallucinations, Dementia with behavioral health disturbances.   Admitted 06/2022 with progressive auditory hallucinations, including self harm command hallucinations. Seen by neuro and psych, started on psychotropic meds and improved significantly. Started on risperdol, donepezil, gabapentin, and lexapro. Denies hallucinations currently. Required a 1:1 previously but was d/c'd two weeks prior to discharge; no current behavioral concerns requiring a 1:1.  - PTA risperdal  - PTA donepezil  - PTA gabapentin  - PTA lexapro    Full thickness rectal prolapse, initially noted 7/15  Concern on recent admission, full-thickness but reducible, intermitted bleeding with bowel movements but Hgb stable 10-11. CRS recommend fiber and avoiding constipation, follow up with surgery outpt in 4-6 weeks.  - PTA psyllium fiber 3 capsule daily, MiraLAX 17 g daily and senna 1-2 tabs bid pr    CAD s/p CABG (1992):    Not ASA or statin at home. No acute concerns. EKG dual paced rhythm with frequent PVCs, no evidence of ACS.   - PTA Coreg    Tremor, whole body  New since recent admission, neuro thought to be stress related.  - PTA B12     PAF: Not currently in Afib. PTA Eliquis 5 mg PO BI  T2DM: Diet controlled  KRISTIAN: Does not use CPAP at home  Gout: PTA allopurinol       Diet: Regular Diet Adult  Snacks/Supplements Adult: Ensure Enlive; With Meals Regular  DVT Prophylaxis: PTA apixaban   Keating Catheter: PRESENT, indication: Retention  Central Lines: None  Cardiac Monitoring: None  Code Status: Full Code      Disposition Plan      Expected Discharge Date: approaching medical stability to discharge, would start TCU search process, pending TCU availability      Destination: TCU          The patient's care was discussed with the  Patient.    Octavia Bernard MD  Medicine Service, MAROON TEAM 1  [facility]  Securely message with the [employer] Web Console (learn more here)  Text page via [facility] Paging/Directory   Please see signed in provider for up to date coverage information      Clinically Significant Risk Factors Present on Admission                    ______________________________________________________________________    Interval History   NAEON. Feeling well. In bed. Adequate oral intake with calorie count >1800. Feels well with no concerns or complaints.    4 pt ROS otherwise negative.     Data reviewed today: I reviewed all medications, new labs and imaging results over the last 24 hours.     Physical Exam   Vital Signs: Temp: 98.3  F (36.8  C) Temp src: Oral BP: 110/70 Pulse: 83   Resp: 16 SpO2: 97 % O2 Device: None (Room air)    Weight: 181 lbs 6.4 oz  Constitutional: sitting up in chair, NAD  Respiratory: No increased work of breathing, sats wnl on RA  Cardiovascular: Regular rate and rhythm on monitor   GI: soft, non-distended  Extremities: No LE edema   Skin: warm and dry   Neuropsychiatric: General: normal, calm    Data   No results found for this or any previous visit (from the past 24 hour(s)).

## 2022-08-10 NOTE — PROGRESS NOTES
Calorie Count  Intake recorded for: 8/9  Total Kcals: 1826 Total Protein: 86g  Kcals from Hospital Food: 1826   Protein: 86g  Kcals from Outside Food (average):0 Protein: 0g  # Meals Ordered from Kitchen: 2 meals   # Meals Recorded: 2 (First - 100% scrambled eggs with cheddar, engel, white toast, oatmeal with brown sugar,  Deli turkey sandwich with moses, coffee with whole milk, apple juice)     (Second - 100% greek yogurt, apple juice, 50% scrambled eggs with cheddar cheese, engel, pork sausage, mushrooms, green peppers, yellow onions, tomato, spinach)  # Supplements Recorded: 100% - 1 Ensure Enlive

## 2022-08-10 NOTE — PROGRESS NOTES
Care Management Follow Up    Length of Stay (days): 7    Expected Discharge Date: 08/12/2022     Concerns to be Addressed: discharge planning  Patient plan of care discussed at interdisciplinary rounds: Yes    Anticipated Discharge Disposition: TCU  Anticipated Discharge Services: None  Anticipated Discharge DME: None    Patient/family educated on Medicare website which has current facility and service quality ratings: Yes  Education Provided on the Discharge Plan:  Yes  Patient/Family in Agreement with the Plan: yes    Referrals Placed by CM/SW:   [facility]  [address]  [place] [address]  [phone]  8/10:CHW LVM for admissions to f/u on referral; requested they call SW back     [address] [place]  [address]  P: [phone]  F: [phone]   8/10: CHW spoke with admissions and pt needs to be 14 days out in order to be considered.     [facility]  [address]  [place]  [address]  P: [phone]  F: [phone]  8/9 Unable to accept pt with COVID + status. Must be 14 days post positive COVID test.     [facility]  [address] [place]  [address]  P: [phone]  F: [phone]  8/10: CHW LVM for admissions to f/u on referral; requested they call SW back     [facility]  [address] [place] [address]  [phone]   8/10:CHW LVM for admissions to f/u on referral; requested they call SW back     Discontinued:  [facility]  [address] [place]  [address]  P: [phone]  F: [phone]  8/9:Pt declined. No reason given on Epic.      [facility]   [address] [facility] [address]   Phone: [phone]   Fax: [phone]   8/8: No current bed availability at this time and do not foresee an opening any time soon.     [facility]   [address] [facility] [place]  [address]  P: [phone]  P:  [phone] - Admissions  F: [phone]  8/8: Declined via Epic. Unable to accept pt with COVID positive status and no available bed at this time.     [facility]  [address] [place]  [address]  P: [phone]  F: [phone]  8/8 Pt declined. No appropriate bed for pt.  No LTC/memory care bed at this time.     [facility]:  Liaison: Nimco Bowel  8/9: Declined due to no beds. He also is still on special precautions so not medically ready per our TCU guidelines. Looks like his needs can be met at a community TCU.    Private pay costs discussed: Not applicable    Additional Information:  CHW Jacob followed up on referrals.      HERNANDEZ informed by  [facility] liaison Jaky Giron that pt was previously open to [facility] for PT/OT/Nursing.  HERNANDEZ informed Jaky Giron via email that currently the discharge plan is for TCU but it could change if pt progresses.     will continue to follow for discharge planning, support, and resources.    KAHLIL Jerez, LGSW  Unit 5A   Office: [phone]   Pager: [phone]  [email or web address]

## 2022-08-10 NOTE — PROGRESS NOTES
Care Management Follow Up    [facility]  [address] [place] [address]  [phone]  8/10:CHW LVM for admissions to f/u on referral; requested they call SW back     [address] [place]  [address]  P: [phone]  F: [phone]   8/10: CHW spoke with admissions and pt needs to be 14 days out in order to be considered.     [facility]  [address]  [place]  [address]  P: [phone]  F: [phone]  8/9 Unable to accept pt with COVID + status. Must be 14 days post positive COVID test.     [facility]  [address] [facility] [place]  [address]  P: [phone]  F: [phone]  8/10: CHW LVM for admissions to f/u on referral; requested they call SW back     [facility]  [address] [place] [address]  [phone]   8/10:CHW LVM for admissions to f/u on referral; requested they call SW back     Discontinued:  [facility]  [address] [place]  [address]  P: [phone]  F: [phone]  8/9:Pt declined. No reason given on Epic.      [facility]   [address] [facility] [address]   Phone: [phone]   Fax: [phone]   8/8: No current bed availability at this time and do not foresee an opening any time soon.     [facility]   [address] [facility] [place]  [address]  P: [phone]  P: [phone] - Admissions  F: [phone]  8/8: Declined via Epic. Unable to accept pt with COVID positive status and no available bed at this time.     [facility]  [address] [place]  [address]  P: [phone]  F: [phone]  8/8 Pt declined. No appropriate bed for pt.  No LTC/memory care bed at this time.     [facility]:  Liaison: Nimco Fabian  8/9: Declined due to no beds. He also is still on special precautions so not medically ready per our TCU guidelines. Looks like his needs can be met at a community  ALYSE.    Philip Stuart   Inpatient Community Health Worker  [facility] 5A & 5B

## 2022-08-10 NOTE — PLAN OF CARE
Goal Outcome Evaluation:    Plan of Care Reviewed With: patient     Overall Patient Progress: no change    Outcome Evaluation: Hallucinating engel on sheets tonight. Repo Q2 w/ pillows. Attempted to call wife to let her know pt is off isolation, no answer and voicemail is full.

## 2022-08-11 ENCOUNTER — APPOINTMENT (OUTPATIENT)
Dept: PHYSICAL THERAPY | Facility: CLINIC | Age: 82
DRG: 640 | End: 2022-08-11
Payer: MEDICARE

## 2022-08-11 LAB
ANION GAP SERPL CALCULATED.3IONS-SCNC: 7 MMOL/L (ref 7–15)
BUN SERPL-MCNC: 19.2 MG/DL (ref 8–23)
CALCIUM SERPL-MCNC: 8.9 MG/DL (ref 8.8–10.2)
CHLORIDE SERPL-SCNC: 103 MMOL/L (ref 98–107)
CREAT SERPL-MCNC: 0.62 MG/DL (ref 0.67–1.17)
DEPRECATED HCO3 PLAS-SCNC: 29 MMOL/L (ref 22–29)
GFR SERPL CREATININE-BSD FRML MDRD: >90 ML/MIN/1.73M2
GLUCOSE SERPL-MCNC: 94 MG/DL (ref 70–99)
HOLD SPECIMEN: NORMAL
POTASSIUM SERPL-SCNC: 4.6 MMOL/L (ref 3.4–5.3)
SODIUM SERPL-SCNC: 139 MMOL/L (ref 136–145)

## 2022-08-11 PROCEDURE — 120N000002 HC R&B MED SURG/OB UMMC

## 2022-08-11 PROCEDURE — 250N000013 HC RX MED GY IP 250 OP 250 PS 637: Performed by: STUDENT IN AN ORGANIZED HEALTH CARE EDUCATION/TRAINING PROGRAM

## 2022-08-11 PROCEDURE — 99232 SBSQ HOSP IP/OBS MODERATE 35: CPT | Mod: GC | Performed by: STUDENT IN AN ORGANIZED HEALTH CARE EDUCATION/TRAINING PROGRAM

## 2022-08-11 PROCEDURE — 82310 ASSAY OF CALCIUM: CPT | Performed by: STUDENT IN AN ORGANIZED HEALTH CARE EDUCATION/TRAINING PROGRAM

## 2022-08-11 PROCEDURE — 250N000013 HC RX MED GY IP 250 OP 250 PS 637

## 2022-08-11 PROCEDURE — 97116 GAIT TRAINING THERAPY: CPT | Mod: GP

## 2022-08-11 PROCEDURE — G0463 HOSPITAL OUTPT CLINIC VISIT: HCPCS

## 2022-08-11 PROCEDURE — 97530 THERAPEUTIC ACTIVITIES: CPT | Mod: GP

## 2022-08-11 PROCEDURE — 36415 COLL VENOUS BLD VENIPUNCTURE: CPT | Performed by: STUDENT IN AN ORGANIZED HEALTH CARE EDUCATION/TRAINING PROGRAM

## 2022-08-11 PROCEDURE — 97110 THERAPEUTIC EXERCISES: CPT | Mod: GP

## 2022-08-11 RX ADMIN — Medication 3 CAPSULE: at 08:42

## 2022-08-11 RX ADMIN — CARVEDILOL 12.5 MG: 12.5 TABLET, FILM COATED ORAL at 20:15

## 2022-08-11 RX ADMIN — APIXABAN 5 MG: 5 TABLET, FILM COATED ORAL at 20:16

## 2022-08-11 RX ADMIN — APIXABAN 5 MG: 5 TABLET, FILM COATED ORAL at 08:43

## 2022-08-11 RX ADMIN — GABAPENTIN 100 MG: 100 CAPSULE ORAL at 14:32

## 2022-08-11 RX ADMIN — GABAPENTIN 100 MG: 100 CAPSULE ORAL at 20:15

## 2022-08-11 RX ADMIN — GABAPENTIN 100 MG: 100 CAPSULE ORAL at 08:43

## 2022-08-11 RX ADMIN — SPIRONOLACTONE 25 MG: 25 TABLET ORAL at 08:42

## 2022-08-11 RX ADMIN — Medication 25 MCG: at 08:43

## 2022-08-11 RX ADMIN — DONEPEZIL HYDROCHLORIDE 5 MG: 5 TABLET, FILM COATED ORAL at 22:25

## 2022-08-11 RX ADMIN — RISPERIDONE 0.25 MG: 0.25 TABLET ORAL at 08:43

## 2022-08-11 RX ADMIN — Medication 400 MG: at 08:43

## 2022-08-11 RX ADMIN — CARVEDILOL 12.5 MG: 12.5 TABLET, FILM COATED ORAL at 08:44

## 2022-08-11 RX ADMIN — LORATADINE 10 MG: 10 TABLET ORAL at 08:43

## 2022-08-11 RX ADMIN — ALLOPURINOL 300 MG: 300 TABLET ORAL at 08:43

## 2022-08-11 RX ADMIN — Medication 1000 MCG: at 08:43

## 2022-08-11 RX ADMIN — RISPERIDONE 2 MG: 2 TABLET ORAL at 22:25

## 2022-08-11 RX ADMIN — ESCITALOPRAM OXALATE 10 MG: 10 TABLET ORAL at 08:42

## 2022-08-11 RX ADMIN — POLYETHYLENE GLYCOL 3350 17 G: 17 POWDER, FOR SOLUTION ORAL at 08:44

## 2022-08-11 RX ADMIN — FUROSEMIDE 40 MG: 20 TABLET ORAL at 08:43

## 2022-08-11 ASSESSMENT — ACTIVITIES OF DAILY LIVING (ADL)
ADLS_ACUITY_SCORE: 59
ADLS_ACUITY_SCORE: 55

## 2022-08-11 NOTE — PROGRESS NOTES
Care Management Follow Up    [facility]  [address]  [place] [address]  [phone]  8/11:CHW spoke with  Admissions they will review and  call SW back.      [address] [place]  [address]  P: [phone]  F: [phone]   8/11: CHW spoke with admissions and the need us to resend referral since the pt recovered from COVID. Which the writer did.      [facility]  [address]  [place]  [address]  P: [phone]  F: [phone]  8/11: CHW spoke with admissions and they wouldn't be able to take him until Monday due to the 14 days out of Covid. They would like updated notes sent over to them.      [facility]  [address] [facility] [place]  [address]  P: [phone]  F: [phone]  8/11: CHW LVM for admissions to f/u on referral; requested they call SW back     [facility]  [address] [facility] [address]  [phone]   8/11:CHW LVM for admissions to f/u on referral; requested they call SW back     Discontinued:  [facility]  [address] [place]  [address]  P: [phone]  F: [phone]  8/9:Pt declined. No reason given on Epic.      [facility] ([facility])   [address] [facility] [place] [address]   Phone: [phone]   Fax: [phone]   8/8: No current bed availability at this time and do not foresee an opening any time soon.     [facility]   [address] [place]  [address]  P: [phone]  P: [phone] - Admissions  F: [phone]  8/8: Declined via Epic. Unable to accept pt with COVID positive status and no available bed at this time.     [facility]  [address] [place]  [address]  P: [phone]  F: [phone]  8/8 Pt declined. No appropriate bed for pt.  No LTC/memory care bed at this time.     [facility]:  Liaison: Nimco Fabian  8/9: Declined due to no beds. He also is still on special  precautions so not medically ready per our TCU guidelines. Looks like his needs can be met at a community TCU.    Philip Stuart   Inpatient Community Health Worker  [facility] 5A & 5B

## 2022-08-11 NOTE — PLAN OF CARE
Goal Outcome Evaluation:    Plan of Care Reviewed With: patient     Overall Patient Progress: no change    Outcome Evaluation: Repositioning pt q2h.  No c/o of pain.  Coccyx dressing CDI.

## 2022-08-11 NOTE — PLAN OF CARE
Goal Outcome Evaluation:    Plan of Care Reviewed With: patient     Overall Patient Progress: no change    Outcome Evaluation: Pt coherant and agreeable today. No changes.

## 2022-08-11 NOTE — PROGRESS NOTES
[facility]    Medicine Progress Note - Medicine Service, MARBETTY TEAM 1    Date of Admission:  8/2/2022    Assessment & Plan          William Almazan is a 81 year old male admitted on 8/2/2022. He has a history of CAD s/p CABG, HFrEF, pAfib on apixaban, HTN, HLD, DM2, KRISTIAN, prostate Ca, gout, and dementia and is admitted for weakness and hypotension.    Today's updates:  - Medically stable for discharge pending placement  - Cont lasix 40 mg daily    - Continue strict I/Os and daily weights  - Continue PT/OT  ----------------------------------------------------------------------------------------------------    Presyncope  Generalized weakness - improving   Acute hypotension 2/2 hypovolemia - resolved  Reported SBPs in 60s at home w/ inability to walk/ shower. No associated presyncopal, cardiac, or respiratory sxs. Did not fall or have LOC. Bps improved after 500 ml bolus PTA. Arrived lethargic but oriented x4. Initial concern for sepsis on arrival (given lymphopenia, lactate 3.1 and procal of 0.26) and started on IV Vanc and Zosyn. However, pt's lymphopenia appears to be chronic (unknown etiology) and no other SIRS criteria met (afebrile, normal RR, normal HR) so antibiotics discontinued. Lymphopenia, lactate, and procal improved with hydration (1.5 L total), which is reassuring. Suspect pre-syncope 2/2 hypovolemia (2/2 dehydration +hyperdiuresis) vs deconditioning after recent month-long hospitalization. As for infectious sources, is COVID positive but satting 97% on room air. Initial UA was dirty. Exchanged keating and repeat Ucx NGTD.   -cont 40 mg lasix daily   -I/Os and daily weights - net negative ~1L yest   -BMP every other day - K ok     Failure to thrive  Malnutrition  --PT and OT consulted--> recommending TCU   --Nutrition consulted-->rec Ensure Enlive x 2 for supplementation   --Calorie counts continue - did well yest with 1800 in     Prerenal TONE,  resolved  Cr 1.28 (BL 0.7) on arrival--> resolved to baseline after 1.5 L NS. Given this drastic improvement, TONE likely 2/2 prerenal hypovolemia, potentially from hyperdiuresis. Patient was on lasix 80 PO BID prior to last admission and was discharged on 40 mg BID out of concern dose was too high.   - Lasix to 40 mg daily as above     COVID + - recovered  Acquired at recent admission (7/30 first positive), unclear significance to current presentation. Did not receive monoclonal antibodies during admission. Satting 97% on room air without respiratory distress, CXR unremarkable. COVID vaccinated x 3 (Pfizer). Never developed hypoxia.   - off precautions now     Chronic HFrEF  pAF s/p dual chamber ICD  Elevated Troponin - downtrending   First degree AV block  Hx dilated cardiomyopathy, most recent TTE 6/18/21 showing severely dilated LV with EF 20%. Currently euvolemic on exam. No hypoxia and CXR negative for cardiopulm concern. Apparently I&Os were in neg balance at recent hospitalization concerning for lasix dosing being too high. Troponin elevated on arrival, now downtrending on recheck. Likely 2/2 demand ischemia. Symptomatic bradycardic 8/4 AM w/ first degree AV block noted on EKG that appears to be chronic.   - Repeat ECHO 8/3 without change compared to 6/8/21  - Cards consulted regarding bradycardia, ICD interrogated, no further workup needed  - PTA coreg 12.5 mg PO BID   - Daily weights, I&Os, lasix as above     Toe injury- left  Toenail injured in shower this AM. Some dried blood around the toenail and toe is painful on exam but no current bleeding or opened wounds.   - Ortho called to discuss pt. L foot XR ordered--negative for fracture  - Referral to podiatry outpatient on discharge     Urinary retention, chronic   Chronic, and has chronic keating in place. Wife reports that it is changed monthly and is due for a change.   - Keating changed 8/3, UOP adequate since       CHRONIC CONDITIONS:  Auditory  hallucinations, Dementia with behavioral health disturbances.   Admitted 06/2022 with progressive auditory hallucinations, including self harm command hallucinations. Seen by neuro and psych, started on psychotropic meds and improved significantly. Started on risperdol, donepezil, gabapentin, and lexapro. Denies hallucinations currently. Required a 1:1 previously but was d/c'd two weeks prior to discharge; no current behavioral concerns requiring a 1:1.  - PTA risperdal  - PTA donepezil  - PTA gabapentin  - PTA lexapro    Full thickness rectal prolapse, initially noted 7/15  Concern on recent admission, full-thickness but reducible, intermitted bleeding with bowel movements but Hgb stable 10-11. CRS recommend fiber and avoiding constipation, follow up with surgery outpt in 4-6 weeks.  - PTA psyllium fiber 3 capsule daily, MiraLAX 17 g daily and senna 1-2 tabs bid pr    CAD s/p CABG (1992):    Not ASA or statin at home. No acute concerns. EKG dual paced rhythm with frequent PVCs, no evidence of ACS.   - PTA Coreg    Tremor, whole body  New since recent admission, neuro thought to be stress related.  - PTA B12     PAF: Not currently in Afib. PTA Eliquis 5 mg PO BI  T2DM: Diet controlled  KRISTIAN: Does not use CPAP at home  Gout: PTA allopurinol       Diet: Regular Diet Adult  Snacks/Supplements Adult: Ensure Enlive; With Meals Regular  DVT Prophylaxis: PTA apixaban   Louie Catheter: PRESENT, indication: Retention  Central Lines: None  Cardiac Monitoring: None  Code Status: Full Code      Disposition Plan      Expected Discharge Date: approaching medical stability to discharge, would start TCU search process, pending TCU availability      Destination: TCU          The patient's care was discussed with the Attending Physician, Dr. Bernard and Patient.    Idalia Johns MD  Medicine Service, [facility] TEAM [address] [place] [address]  Securely message with the [employer] Web Console (learn more  here)  Text page via [facility] Paging/Directory   Please see signed in provider for up to date coverage information      Clinically Significant Risk Factors Present on Admission                    ______________________________________________________________________    Interval History   ESSENCEEON. Had hallucinations of engel on his sheets in the evening. Good UOP. This morning no complaints, feeling well.     4 pt ROS otherwise negative.     Data reviewed today: I reviewed all medications, new labs and imaging results over the last 24 hours.     Physical Exam   Vital Signs: Temp: 98.2  F (36.8  C) Temp src: Oral BP: 126/67 Pulse: 72   Resp: 18 SpO2: 97 % O2 Device: None (Room air)    Weight: 181 lbs 6.4 oz  Constitutional: lying in bed in NAD, eyes closed but opens when asked   Respiratory: No increased work of breathing, sats wnl on RA  Cardiovascular: Regular rate and rhythm on monitor   GI: soft, non-distended  Extremities: No LE edema   Skin: warm and dry   Neuropsychiatric: General: normal, calm    Data   No results found for this or any previous visit (from the past 24 hour(s)).

## 2022-08-11 NOTE — PLAN OF CARE
Goal Outcome Evaluation:    Plan of Care Reviewed With: patient     Overall Patient Progress: no change    Outcome Evaluation: Slept well overnight, Denied pain no bm overnight. Good UO. Medically ready for discharge. Awaiting TCU.

## 2022-08-11 NOTE — PROGRESS NOTES
"[facility] Nurse Inpatient Assessment     Consulted for: sacrum actually was the coccyx and bilateral lower buttocks     Patient History (according to provider note(s):      Per Dr Parish Flaherty on 6/30/2022: William Almazan is a 81 year old male with history of CAD s/p CABG, dilated cardiomyopathy, HFrEF, PAF on eliquis, HTN, HLD, DM2, KRISTIAN, prostate cancer, gout, and dementia who presented with worsening auditory hallucinations.    Areas Assessed:      Areas visualized during today's visit: Sacrum/coccyx    Wound Location:  Coccyx      Date of last photo 8/4 and 8/11  Wound History: pressure and IAD weakness present on admission  Stage unstageable    Wound Base: 90/10 % granulation tissue and slough     Palpation of the wound bed: normal      Drainage: scant     Description of drainage: serous     Measurements (length x width x depth, in cm) 1 x 1 x 0.2 cm   Periwound skin: dry/scaly      Color: dusky      Temperature: normal   Odor: none  Pain: no grimacing or signs of discomfort, none     L great toe wound noted. Podiatry has been consulted and ordered x ray, per note planning on rounding on pt sometime today (Thursday)     Moisture associated skin damage noted to bilateral groin, macerated and pink, tender per pt        Treatment Plan:     Coccyx wound(s): Every 3 days   Cleanse wound with saline and dry  Apply Cavilon barrier film to skin around wound and let dry   Apply iodosorb gel #[id number] to wound bed  Cover with sacral mepilex, fits area better \"upside down\"   Turns q2hr from side to side, avoid supine position as much as possible  HOB less than 30 degrees as able   Lift foot of bed prior to lifting head of bed to reduce shearing to sacrum     Bilateral groin cares: Daily and PRN cleanse skin with saline and dry thoroughly, then place interdry AG ([id number]) into skin fold, ensure at least a 2 inch wick remains outside of fold to allow for wicking action of " textile. Remove for cares and replace same piece if not soiled, can use same piece up to 5 days if not soiled with urine or stool due to silver impregnated within textile.         Orders: Written    RECOMMEND PRIMARY TEAM ORDER: None, at this time  Education provided: plan of care, Moisture management and Off-loading pressure  Discussed plan of care with: Nurse  WOC nurse follow-up plan: weekly  Notify WOC if wound(s) deteriorate.  Nursing to notify the Provider(s) and re-consult the WOC Nurse if new skin concern.    DATA:     Current support surface: Standard  pulsate mattress  Containment of urine/stool: Incontinence Protocol  BMI: Body mass index is 27.12 kg/m .   Active diet order: Orders Placed This Encounter      Regular Diet Adult     Output: I/O last 3 completed shifts:  In: 1080 [P.O.:1080]  Out: 1950 [Urine:1950]     Labs:   Recent Labs   Lab 08/08/22  0612   HGB 9.9*   WBC 5.1     Pressure injury risk assessment:   Sensory Perception: 3-->slightly limited  Moisture: 3-->occasionally moist  Activity: 2-->chairfast  Mobility: 2-->very limited  Nutrition: 3-->adequate  Friction and Shear: 1-->problem  John Score: 14     Dept. Pager: [phone]

## 2022-08-11 NOTE — PROGRESS NOTES
Care Management Follow Up    Length of Stay (days): 8    Expected Discharge Date: 08/15/2022     Concerns to be Addressed: discharge planning  Patient plan of care discussed at interdisciplinary rounds: Yes    Anticipated Discharge Disposition: TCU  Anticipated Discharge Services: Transportation  Anticipated Discharge DME: TBD    Patient/family educated on Medicare website which has current facility and service quality ratings: Yes  Education Provided on the Discharge Plan:Yes    Patient/Family in Agreement with the Plan: yes    Referrals Placed by CM/SW:   [facility]  [address]  [place] [id number] [phone]  8/11:CHW spoke with  Admissions they will review and  call SW back.       [facility] [place]  [address] [place]  [address]  P: [phone]  F: [phone]  8/11: CHW spoke with admissions and they wouldn't be able to take him until Monday due to the 14 days out of Covid. They would like updated notes sent over to them. SW faxed over updated notes and called and LVM to follow up with admissions.     [facility]  [address] [facility] [place]  [address]  P: [phone]  F: [phone]  8/11: CHW LVM for admissions to f/u on referral; requested they call SW back     [facility]  [address] [place] [address]  [phone]   8/11:CHW LVM for admissions to f/u on referral; requested they call SW back     Discontinued:  [facility], [address] [place]  [address]  P: [phone]  F: [phone]   8/11: Declined. No appropriate bed.     [facility]  [address] [place]  [address]  P: [phone]  F: [phone]  8/9:Pt declined. No reason given on Epic.      [facility]   [address] [facility] [address]   Phone: [phone]   Fax: [phone]   8/8: No current bed availability at this time and do not foresee an opening any time soon.      [facility]   [address] [facility]  [place]  [address]  P: [phone]  P: [phone] - Admissions  F: [phone]  8/8: Declined via Epic. Unable to accept pt with COVID positive status and no available bed at this time.     [facility]  [address] [facility] [place]  [address]  P: [phone]  F: [phone]  8/8 Pt declined. No appropriate bed for pt.  No LTC/memory care bed at this time.     [facility]:  Liaison: [facility]  8/9: Declined due to no beds. He also is still on special precautions so not medically ready per our TCU guidelines. Looks like his needs can be met at a community TCU.    Private pay costs discussed: Not applicable    Additional Information:   HERNANDEZ informed by APRIL Packer [facility](P: [phone]  F: [phone]) would like updated clinical notes faxed to them. HERNANDEZ hard-faxed updated clinical notes to the admissions office([phone]).    @1152AM HERNANDEZ called the [facility] admissions office and [facility] inquiring if they were able to receive fax and also to inquire if there would be a bed available to pt on Monday.     will continue to follow for discharge planning, support, and resources.    KAHLIL Jerez, [facility]  [address]   Office: [phone]   Pager: [phone]  [email or web address]

## 2022-08-12 ENCOUNTER — APPOINTMENT (OUTPATIENT)
Dept: PHYSICAL THERAPY | Facility: CLINIC | Age: 82
DRG: 640 | End: 2022-08-12
Payer: MEDICARE

## 2022-08-12 PROCEDURE — 97530 THERAPEUTIC ACTIVITIES: CPT | Mod: GP | Performed by: REHABILITATION PRACTITIONER

## 2022-08-12 PROCEDURE — 97110 THERAPEUTIC EXERCISES: CPT | Mod: GP | Performed by: REHABILITATION PRACTITIONER

## 2022-08-12 PROCEDURE — 250N000013 HC RX MED GY IP 250 OP 250 PS 637

## 2022-08-12 PROCEDURE — 99232 SBSQ HOSP IP/OBS MODERATE 35: CPT | Mod: GC | Performed by: STUDENT IN AN ORGANIZED HEALTH CARE EDUCATION/TRAINING PROGRAM

## 2022-08-12 PROCEDURE — 250N000013 HC RX MED GY IP 250 OP 250 PS 637: Performed by: STUDENT IN AN ORGANIZED HEALTH CARE EDUCATION/TRAINING PROGRAM

## 2022-08-12 PROCEDURE — 120N000002 HC R&B MED SURG/OB UMMC

## 2022-08-12 RX ADMIN — Medication 400 MG: at 08:22

## 2022-08-12 RX ADMIN — ALLOPURINOL 300 MG: 300 TABLET ORAL at 08:21

## 2022-08-12 RX ADMIN — RISPERIDONE 0.25 MG: 0.25 TABLET ORAL at 08:21

## 2022-08-12 RX ADMIN — FUROSEMIDE 40 MG: 20 TABLET ORAL at 08:21

## 2022-08-12 RX ADMIN — GABAPENTIN 100 MG: 100 CAPSULE ORAL at 13:57

## 2022-08-12 RX ADMIN — RISPERIDONE 2 MG: 2 TABLET ORAL at 21:02

## 2022-08-12 RX ADMIN — Medication 3 CAPSULE: at 08:21

## 2022-08-12 RX ADMIN — LORATADINE 10 MG: 10 TABLET ORAL at 08:21

## 2022-08-12 RX ADMIN — GABAPENTIN 100 MG: 100 CAPSULE ORAL at 20:19

## 2022-08-12 RX ADMIN — DONEPEZIL HYDROCHLORIDE 5 MG: 5 TABLET, FILM COATED ORAL at 21:02

## 2022-08-12 RX ADMIN — APIXABAN 5 MG: 5 TABLET, FILM COATED ORAL at 08:22

## 2022-08-12 RX ADMIN — ESCITALOPRAM OXALATE 10 MG: 10 TABLET ORAL at 08:22

## 2022-08-12 RX ADMIN — CARVEDILOL 12.5 MG: 12.5 TABLET, FILM COATED ORAL at 20:18

## 2022-08-12 RX ADMIN — SPIRONOLACTONE 25 MG: 25 TABLET ORAL at 08:21

## 2022-08-12 RX ADMIN — Medication 25 MCG: at 08:21

## 2022-08-12 RX ADMIN — GABAPENTIN 100 MG: 100 CAPSULE ORAL at 08:22

## 2022-08-12 RX ADMIN — Medication 1000 MCG: at 08:21

## 2022-08-12 RX ADMIN — APIXABAN 5 MG: 5 TABLET, FILM COATED ORAL at 20:18

## 2022-08-12 RX ADMIN — POLYETHYLENE GLYCOL 3350 17 G: 17 POWDER, FOR SOLUTION ORAL at 08:21

## 2022-08-12 RX ADMIN — CARVEDILOL 12.5 MG: 12.5 TABLET, FILM COATED ORAL at 08:22

## 2022-08-12 ASSESSMENT — ACTIVITIES OF DAILY LIVING (ADL)
ADLS_ACUITY_SCORE: 55
ADLS_ACUITY_SCORE: 55
ADLS_ACUITY_SCORE: 59
ADLS_ACUITY_SCORE: 59
ADLS_ACUITY_SCORE: 52
ADLS_ACUITY_SCORE: 59
ADLS_ACUITY_SCORE: 55
ADLS_ACUITY_SCORE: 52
ADLS_ACUITY_SCORE: 55
ADLS_ACUITY_SCORE: 52
ADLS_ACUITY_SCORE: 55
ADLS_ACUITY_SCORE: 55

## 2022-08-12 NOTE — PROGRESS NOTES
[facility]    Medicine Progress Note - Medicine Service, MARBETTY TEAM 1    Date of Admission:  8/2/2022    Assessment & Plan          William Almazan is a 81 year old male admitted on 8/2/2022. He has a history of CAD s/p CABG, HFrEF, pAfib on apixaban, HTN, HLD, DM2, KRISTIAN, prostate Ca, gout, and dementia and is admitted for weakness and hypotension.    Today's updates:  - Medically stable for discharge pending placement  - Cont lasix 40 mg daily    - Continue strict I/Os and daily weights  - Continue PT/OT  ----------------------------------------------------------------------------------------------------    Presyncope  Generalized weakness - improving   Acute hypotension 2/2 hypovolemia - resolved  Reported SBPs in 60s at home w/ inability to walk/ shower. No associated presyncopal, cardiac, or respiratory sxs. Did not fall or have LOC. Bps improved after 500 ml bolus PTA. Arrived lethargic but oriented x4. Initial concern for sepsis on arrival (given lymphopenia, lactate 3.1 and procal of 0.26) and started on IV Vanc and Zosyn. However, pt's lymphopenia appears to be chronic (unknown etiology) and no other SIRS criteria met (afebrile, normal RR, normal HR) so antibiotics discontinued. Lymphopenia, lactate, and procal improved with hydration (1.5 L total), which is reassuring. Suspect pre-syncope 2/2 hypovolemia (2/2 dehydration +hyperdiuresis) vs deconditioning after recent month-long hospitalization. As for infectious sources, is COVID positive but satting 97% on room air. Initial UA was dirty. Exchanged keating and repeat Ucx NGTD.   -cont 40 mg lasix daily   -I/Os and daily weights - net negative ~1.2 L yest   -BMP every few days    Failure to thrive  Malnutrition  --PT and OT consulted--> recommending TCU   --Nutrition consulted-->rec Ensure Enlive x 2 for supplementation   --Calorie counts complete    Prerenal TONE, resolved  Cr 1.28 (BL 0.7) on arrival-->  resolved to baseline after 1.5 L NS. Given this drastic improvement, TONE likely 2/2 prerenal hypovolemia, potentially from hyperdiuresis. Patient was on lasix 80 PO BID prior to last admission and was discharged on 40 mg BID out of concern dose was too high.   - Lasix to 40 mg daily as above     Lactic acidosis - resolved  Lactate 3.1, improved to 2.1 with IVF in ED. Suspect hypovolemia-driven.     Hypokalemia - resolved  Improved with repletion, 2/2 lasix.     COVID + - recovered  Acquired at recent admission (7/30 first positive), unclear significance to current presentation. Did not receive monoclonal antibodies during admission. Satting 97% on room air without respiratory distress, CXR unremarkable. COVID vaccinated x 3 (Pfizer). Never developed hypoxia.   - off precautions now     Chronic HFrEF  pAF s/p dual chamber ICD  Elevated Troponin - downtrending   First degree AV block  Hx dilated cardiomyopathy, most recent TTE 6/18/21 showing severely dilated LV with EF 20%. Currently euvolemic on exam. No hypoxia and CXR negative for cardiopulm concern. Apparently I&Os were in neg balance at recent hospitalization concerning for lasix dosing being too high. Troponin elevated on arrival, now downtrending on recheck. Likely 2/2 demand ischemia. Symptomatic bradycardic 8/4 AM w/ first degree AV block noted on EKG that appears to be chronic.   - Repeat ECHO 8/3 without change compared to 6/8/21  - Cards consulted regarding bradycardia, ICD interrogated, no further workup needed  - PTA coreg 12.5 mg PO BID   - Daily weights, I&Os, lasix as above     Toe injury- left  Toenail injured in shower this AM. Some dried blood around the toenail and toe is painful on exam but no current bleeding or opened wounds.   - Ortho called to discuss pt. L foot XR ordered--negative for fracture  - Referral to podiatry outpatient on discharge     Urinary retention, chronic   Chronic, and has chronic keating in place. Wife reports that it is  changed monthly and is due for a change.   - Louie changed 8/3, UOP adequate since       CHRONIC CONDITIONS:  Auditory hallucinations, Dementia with behavioral health disturbances.   Admitted 06/2022 with progressive auditory hallucinations, including self harm command hallucinations. Seen by neuro and psych, started on psychotropic meds and improved significantly. Started on risperdol, donepezil, gabapentin, and lexapro. Denies hallucinations currently. Required a 1:1 previously but was d/c'd two weeks prior to discharge; no current behavioral concerns requiring a 1:1.  - PTA risperdal  - PTA donepezil  - PTA gabapentin  - PTA lexapro    Full thickness rectal prolapse, initially noted 7/15  Concern on recent admission, full-thickness but reducible, intermitted bleeding with bowel movements but Hgb stable 10-11. CRS recommend fiber and avoiding constipation, follow up with surgery outpt in 4-6 weeks.  - PTA psyllium fiber 3 capsule daily, MiraLAX 17 g daily and senna 1-2 tabs bid pr    CAD s/p CABG (1992):    Not ASA or statin at home. No acute concerns. EKG dual paced rhythm with frequent PVCs, no evidence of ACS.   - PTA Coreg    Tremor, whole body  New since recent admission, neuro thought to be stress related.  - PTA B12     PAF: Not currently in Afib. PTA Eliquis 5 mg PO BI  T2DM: Diet controlled  KRISTIAN: Does not use CPAP at home  Gout: PTA allopurinol       Diet: Regular Diet Adult  Snacks/Supplements Adult: Ensure Enlive; With Meals  Room Service Regular  DVT Prophylaxis: PTA apixaban   Louie Catheter: PRESENT, indication: Retention  Central Lines: None  Cardiac Monitoring: None  Code Status: Full Code      Disposition Plan      Expected Discharge Date: approaching medical stability to discharge, would start TCU search process, pending TCU availability      Destination: TCU          The patient's care was discussed with the Attending Physician, Dr. Bernard and Patient.    Idalia Johns MD  Medicine Service,  SYDNEE TEAM 1  [facility]  Securely message with the [employer] Web Console (learn more here)  Text page via [facility] Paging/Directory   Please see signed in provider for up to date coverage information      Clinically Significant Risk Factors Present on Admission                    ______________________________________________________________________    Interval History   NAEON. Seen while ambulating in room with PT, appeared steady. Stated he was doing well and no acute concerns.     4 pt ROS otherwise negative.     Data reviewed today: I reviewed all medications, new labs and imaging results over the last 24 hours.     Physical Exam   Vital Signs: Temp: 98.3  F (36.8  C) Temp src: Oral BP: 137/70 Pulse: 83   Resp: 17 SpO2: 98 % O2 Device: None (Room air)    Weight: 183 lbs 10.29 oz  Constitutional: walking a few steps in room with PT, NAD  Respiratory: non-labored breathing on RA  Cardiovascular: Regular rate and rhythm  Extremities: No LE edema   Skin: warm and dry   Neuropsychiatric: calm, cooperative, oriented    Data   No results found for this or any previous visit (from the past 24 hour(s)).

## 2022-08-12 NOTE — PROGRESS NOTES
Care Management Follow Up    Length of Stay (days): 9    Expected Discharge Date: 08/15/2022     Concerns to be Addressed: discharge planning  Patient plan of care discussed at interdisciplinary rounds: Yes    Anticipated Discharge Disposition: Home Care  Anticipated Discharge Services: TBD  Anticipated Discharge DME: TBD    Patient/family educated on Medicare website which has current facility and service quality ratings: Yes  Education Provided on the Discharge Plan:  Yes  Patient/Family in Agreement with the Plan: yes    Referrals Placed by CM/SW:   [facility]  [address]  [place]  [address]  P: [phone]  F: [phone]  8/11: CHW spoke with admissions and they wouldn't be able to take him until Monday due to the 14 days out of Covid. They would like updated notes sent over to them. SW faxed over updated notes and called and LVM to follow up with admissions.  8/12: SW called facility and LVM that pt is not an assist of two at baseline and pt is not currently receiving cancer treatment.     [facility]  [address] [facility] [place]  [address]  P: [phone]  F: [phone]  8/12: No bed openings for at least 2 weeks.     [facility]  [address] [place], [place] [address] [phone]   8/12:CHW LVM for admissions to f/u on referral; requested they call SW back     Discontinued:  [facility]  [address]  [place] [id number] [phone]  8/12: Too high acuity. Cannot accept pt.      [facility] [place]  [address]  P: [phone]  F: [phone]   8/11: Declined. No appropriate bed.      [facility]  [address] [place]  [address]  P: [phone]  F: [phone]  8/9:Pt declined. No reason given on Epic.      [facility]   [address] [facility] [address]   Phone: [phone]   Fax: [phone]   8/8: No current bed  availability at this time and do not foresee an opening any time soon.     [facility]   [address] [facility]  [place]  [address]  P: [phone]  P: [phone] - Admissions  F: [phone]  8/8: Declined via Epic. Unable to accept pt with COVID positive status and no available bed at this time.     [facility]  [address] [facility] [place]  [address]  P: [phone]  F: [phone]  8/8 Pt declined. No appropriate bed for pt.  No LTC/memory care bed at this time.     [facility]:  Liaison: [facility] Bowel  8/9: Declined due to no beds. He also is still on special precautions so not medically ready per our TCU guidelines. Looks like his needs can be met at a community TCU.    Private pay costs discussed: Not applicable    Additional Information:  CHW followed up on referrals.     will continue to follow for discharge planning, support, and resources.    KAHLIL Jerez, [facility]  [address]   Office: [phone]   Pager: [phone]  [email or web address]

## 2022-08-12 NOTE — PLAN OF CARE
Goal Outcome Evaluation:          Overall Patient Progress: improving    Outcome Evaluation: PO intake improving per fausto counts

## 2022-08-12 NOTE — PLAN OF CARE
Goal Outcome Evaluation:    Plan of Care Reviewed With: patient     Overall Patient Progress: no change    Outcome Evaluation: No acute chnages. A&Ox4. Vitals are stable on RA. Appetite increased. Denies pain. Sat in the chair with PT today. Perfers to take his medication with applesause instead of water. Large BM this shift. Louie still in place with good output.

## 2022-08-12 NOTE — PLAN OF CARE
Goal Outcome Evaluation:    Plan of Care Reviewed With: patient     Overall Patient Progress: no change    Outcome Evaluation: Pt A/ox4, hypoactive. Repositioning to prevent skin breakdown q2 hours. Louie cath leaking, notable urine output during incontinence cares overnight. Bulb in place and refilled with NS, may need larger catheter size. Two BMs overnight. Denied pain in coccyx, dressing intact. Bed bath done after dinner, lotion applied to extremities. May need podiatry consult for toe nails.       POSTPARTUM PROGRESS NOTE    Subjective:     PPD/POD#: 1   Procedure: Primary LTCS   EGA: 35w1d   N/V: No   F/C: No   Abd Pain: Mild, well-controlled with oral pain medication   Lochia: Mild   Voiding: Webster in place   Ambulating: Yes   Bowel fnc: No   Breastfeeding: Yes   Contraception: Per primary OB   Circumcision: Consented.  Needs to be examined by OB attending. Infant with bag for collection     Objective:      Temp:  [97.9 °F (36.6 °C)-98.3 °F (36.8 °C)] 98 °F (36.7 °C)  Pulse:  [70-90] 72  Resp:  [18] 18  SpO2:  [86 %-100 %] 95 %  BP: (112-171)/() 118/75    Lung: Normal respiratory effort   Abdomen: Soft, appropriately tender   Uterus: Firm, no fundal tenderness   Incision: Bandage in place without shadowing   : Deferred   Extremities: Bilateral 2+ LE edema     Lab Review    Recent Labs   Lab 06/02/22  1219      K 4.3      CO2 19*   BUN 11   CREATININE 0.6   *   PROT 6.0   BILITOT 0.3   ALKPHOS 181*   ALT 17   AST 19       Recent Labs   Lab 06/02/22  1219 06/04/22  0441   WBC 7.95 12.27   HGB 11.4* 9.7*   HCT 33.3* 28.5*   MCV 93 94    188         I/O    Intake/Output Summary (Last 24 hours) at 6/4/2022 0904  Last data filed at 6/4/2022 0700  Gross per 24 hour   Intake 2819.51 ml   Output 5450 ml   Net -2630.49 ml        Assessment and Plan:   Postpartum care:  - Patient doing well.  - Continue routine management and advances.    cHTN w/JEREL w/SF  - BP as above  - asymptomatic  - preE labs as above  - UOP: 2.2 cc/kg/hr  - Mag: continue  - Hypertensive agent labetalol 200mg po bid and procardia 90 po qd    Mood Disorder  - Mood stable  - Medications: Escitalopram  - Will need 1-2 week postpartum mood check    Anemia acute blood loss  - H/H as above  - QBL: 775 mL  - asymptomatic  - iron/colace      Kailey Caballero

## 2022-08-12 NOTE — PROGRESS NOTES
CLINICAL NUTRITION SERVICES - REASSESSMENT NOTE     Nutrition Prescription    RECOMMENDATIONS FOR MDs/PROVIDERS TO ORDER:  None at this time     Recommendations already ordered by Registered Dietitian (RD):  Continue supplements and Room Service with Assist    Future/Additional Recommendations:  Monitor PO intake, wt trends.  Offer different/additional supplements and/or scheduled snacks if appetite low and/or if wt trends down. Consider multivitamin w/ minerals if appropriate if with ongoing poor or variable appetite.     EVALUATION OF THE PROGRESS TOWARD GOALS   Diet: Regular  - Room Service with Assist  - Supplements: Ensure Enlive with lunch and dinner    Intake: PO intake improving, see fausto counts below. Eating mostly % of meals documented in flowsheets the past week, at times 50%.    Calorie Counts  8/9: 1826 kcal and 86 g protein (73% kcal needs and 80% protein needs)  8/8: no food intake recorded  8/7: 1308 kcal and 61 g protein (53% kcal needs and 56% protein needs)     NEW FINDINGS   Weight: weight trending back near recent PTA weight of ~83 kg on 7/28.  Suspect some fluid related wt shifts may be involved?  Pt on diuretics  08/11/22 1307 83.3 kg (183 lb 10.3 oz)   08/10/22 1320 82.3 kg (181 lb 6.4 oz)   08/09/22 1022 83.9 kg (184 lb 15.5 oz)   08/07/22 1125 85.5 kg (188 lb 7.9 oz)   08/06/22 1500 88.3 kg (194 lb 9.3 oz)   07/28/22 (PTA wt) 83.2 kg (183 lb 6.4 oz)     ASSESSED NUTRITION NEEDS  Estimated Energy Needs: 7063-0746 kcals/day (30 - 35 kcals/kg)  Justification: Increased needs for wound healing  Estimated Protein Needs: 108-125 grams protein/day (1.3 - 1.5 grams of pro/kg)  Justification: Increased needs for wound healing  Estimated Fluid Needs: (1 mL/kcal)   Justification: Maintenance and Per provider pending fluid status    Meds: Lasix, spironolactone    Skin: per WOC note on 8/11, pt with unstageable coccyx wound due to pressure and IAD (present on admission)    MALNUTRITION  % Intake: <  75% for > 7 days (moderate)  % Weight Loss: Weight loss does not meet criteria  Subcutaneous Fat Loss: Unable to assess  Muscle Loss: Unable to assess  Fluid Accumulation/Edema: mild-moderate per flowsheets - unclear if nutrition related  Malnutrition Diagnosis: Unable to determine due to unable to pt unavailable during attempts to visit    Previous Goals   Patient to consume % of nutritionally adequate meal trays TID, or the equivalent with supplements/snacks  Evaluation: Not met    Previous Nutrition Diagnosis  Inadequate oral intake related to suspected decreased appetite, N/D, neuro status as evidenced by decreased PO from last recent admit and currently no PO recorded since admit  Evaluation: Improving    CURRENT NUTRITION DIAGNOSIS  Inadequate oral intake related to variable (but improving) appetite as evidenced by fausto counts meeting 53-75% kcal needs 56-80% protein needs (3rd day of fausto counts no PO intake documented - unsure if missing documentation)     INTERVENTIONS  Implementation  Chart review, pt with other cares during attempts to visit    Goals  Patient to consume % of nutritionally adequate meal trays TID, or the equivalent with supplements/snacks.    Monitoring/Evaluation  Progress toward goals will be monitored and evaluated per protocol.     Kiesha Cabrera, RD, , LD  Weekday Pager: [phone]  Weekday Units covered: 7C (all beds) and 5A (beds 5201 through 5211-2)  Weekend/Holiday RD Pager: [phone]

## 2022-08-12 NOTE — PROGRESS NOTES
Care Management Follow Up    [facility]  [address] [place] [address]  [phone]  8/12:CHW LVM for admissions to f/u on referral; requested they call SW back    [facility]  [address]  [place]  [address]  P: [phone]  F: [phone]  8/11: CHW spoke with admissions and they wouldn't be able to take him until Monday due to the 14 days out of Covid. They would like updated notes sent over to them. SW faxed over updated notes and called and LVM to follow up with admissions.  8/12: If  pt is still assist of two or is that the pt's baseline? Receiving cancer treatment? Those are the two hesitations currently that Zahraa had.      [facility]  [address] [place]  [address]  P: [phone]  F: [phone]  8/12: CHW LVM for admissions to f/u on referral; requested they call SW back     [facility] [place]  [address] [place] [address]  [phone]   8/12:CHW LVM for admissions to f/u on referral; requested they call SW back     Discontinued:  [facility] [place], [address] [place]  [address]  P: [phone]  F: [phone]   8/11: Declined. No appropriate bed.      [facility]  [address] [place]  [address]  P: [phone]  F: [phone]  8/9:Pt declined. No reason given on Epic.      [facility]   [address] [facility] [place] [address]   Phone: [phone]   Fax: [phone]   8/8: No current bed availability at this time and do not foresee an opening any time soon.     [facility]   [address] [place]  [address]  P: [phone]  P: [phone] - Admissions  F: [phone]  8/8: Declined via Epic. Unable to accept pt with COVID positive status and no available bed at this time.     [facility]  [address] [facility] [place]  [address]  P: [phone]  F: [phone]  8/8 Pt declined. No appropriate  bed for pt.  No LTC/memory care bed at this time.     [facility]:  Liaison: Nimco Bowel  8/9: Declined due to no beds. He also is still on special precautions so not medically ready per our TCU guidelines. Looks like his needs can be met at a community TCU.    Philip Stuart   Inpatient Community Health Worker  [facility] 5A & 5B

## 2022-08-13 LAB
ANION GAP SERPL CALCULATED.3IONS-SCNC: 6 MMOL/L (ref 7–15)
BUN SERPL-MCNC: 24.9 MG/DL (ref 8–23)
CALCIUM SERPL-MCNC: 9.1 MG/DL (ref 8.8–10.2)
CHLORIDE SERPL-SCNC: 104 MMOL/L (ref 98–107)
CREAT SERPL-MCNC: 0.61 MG/DL (ref 0.67–1.17)
DEPRECATED HCO3 PLAS-SCNC: 31 MMOL/L (ref 22–29)
GFR SERPL CREATININE-BSD FRML MDRD: >90 ML/MIN/1.73M2
GLUCOSE SERPL-MCNC: 95 MG/DL (ref 70–99)
HOLD SPECIMEN: NORMAL
POTASSIUM SERPL-SCNC: 4.5 MMOL/L (ref 3.4–5.3)
SODIUM SERPL-SCNC: 141 MMOL/L (ref 136–145)

## 2022-08-13 PROCEDURE — 250N000013 HC RX MED GY IP 250 OP 250 PS 637: Performed by: STUDENT IN AN ORGANIZED HEALTH CARE EDUCATION/TRAINING PROGRAM

## 2022-08-13 PROCEDURE — 250N000013 HC RX MED GY IP 250 OP 250 PS 637

## 2022-08-13 PROCEDURE — 80048 BASIC METABOLIC PNL TOTAL CA: CPT | Performed by: STUDENT IN AN ORGANIZED HEALTH CARE EDUCATION/TRAINING PROGRAM

## 2022-08-13 PROCEDURE — 36415 COLL VENOUS BLD VENIPUNCTURE: CPT | Performed by: STUDENT IN AN ORGANIZED HEALTH CARE EDUCATION/TRAINING PROGRAM

## 2022-08-13 PROCEDURE — 120N000002 HC R&B MED SURG/OB UMMC

## 2022-08-13 PROCEDURE — 99232 SBSQ HOSP IP/OBS MODERATE 35: CPT | Mod: GC | Performed by: STUDENT IN AN ORGANIZED HEALTH CARE EDUCATION/TRAINING PROGRAM

## 2022-08-13 RX ADMIN — FUROSEMIDE 40 MG: 20 TABLET ORAL at 08:17

## 2022-08-13 RX ADMIN — LORATADINE 10 MG: 10 TABLET ORAL at 08:19

## 2022-08-13 RX ADMIN — CARVEDILOL 12.5 MG: 12.5 TABLET, FILM COATED ORAL at 08:15

## 2022-08-13 RX ADMIN — GABAPENTIN 100 MG: 100 CAPSULE ORAL at 08:15

## 2022-08-13 RX ADMIN — DONEPEZIL HYDROCHLORIDE 5 MG: 5 TABLET, FILM COATED ORAL at 22:27

## 2022-08-13 RX ADMIN — RISPERIDONE 2 MG: 2 TABLET ORAL at 22:28

## 2022-08-13 RX ADMIN — CARVEDILOL 12.5 MG: 12.5 TABLET, FILM COATED ORAL at 22:27

## 2022-08-13 RX ADMIN — APIXABAN 5 MG: 5 TABLET, FILM COATED ORAL at 08:15

## 2022-08-13 RX ADMIN — APIXABAN 5 MG: 5 TABLET, FILM COATED ORAL at 22:28

## 2022-08-13 RX ADMIN — SPIRONOLACTONE 25 MG: 25 TABLET ORAL at 08:16

## 2022-08-13 RX ADMIN — Medication 25 MCG: at 08:19

## 2022-08-13 RX ADMIN — ESCITALOPRAM OXALATE 10 MG: 10 TABLET ORAL at 08:17

## 2022-08-13 RX ADMIN — ALLOPURINOL 300 MG: 300 TABLET ORAL at 08:14

## 2022-08-13 RX ADMIN — GABAPENTIN 100 MG: 100 CAPSULE ORAL at 15:21

## 2022-08-13 RX ADMIN — ACETAMINOPHEN 325 MG: 325 TABLET, FILM COATED ORAL at 22:28

## 2022-08-13 RX ADMIN — Medication 1000 MCG: at 08:19

## 2022-08-13 RX ADMIN — RISPERIDONE 0.25 MG: 0.25 TABLET ORAL at 08:19

## 2022-08-13 RX ADMIN — GABAPENTIN 100 MG: 100 CAPSULE ORAL at 22:27

## 2022-08-13 RX ADMIN — Medication 400 MG: at 08:19

## 2022-08-13 ASSESSMENT — ACTIVITIES OF DAILY LIVING (ADL)
ADLS_ACUITY_SCORE: 55

## 2022-08-13 NOTE — PLAN OF CARE
Goal Outcome Evaluation:    Plan of Care Reviewed With: patient     Overall Patient Progress: no change    Outcome Evaluation: Pt repositioned q2-3 hrs. Dressing c/d/i on sacrum. Skin tear on buttocks next to wound dressing, mepilex applied.

## 2022-08-13 NOTE — CARE PLAN
RN [id number]:    Pain/Nausea: denies nausea, denies pain  Mobility: assist x 2 with lift  Diet: regular diet  Labs: reviewed  LDAs: R PIV  Skin/incisions: sacral dressing due to be changed 8/15. R hip cleaned and interdry placed per plan of care. Mepilex applied to L heel for protection. Repositioning q2h and as needed  Neuro: AOX4, forgetful. Flat affect  Respiratory: WNL. Denies SOB  Cardiac: denies chest pain  GI/: keating in place AUOP. Last BM yesterday per report  New Changes: patient keating bag with blood tinged urine output noted around 1530. Provider aware and said urology will be here to assess further tomorrow morning. If gets worse, urology will come assess sooner  Plan: awaiting placement    Goal Outcome Evaluation:    Plan of Care Reviewed With: patient     Overall Patient Progress: no change

## 2022-08-13 NOTE — PROGRESS NOTES
[facility]    Medicine Progress Note - Medicine Service, MARBETTY TEAM 1    Date of Admission:  8/2/2022    Assessment & Plan          William Almazan is a 81 year old male admitted on 8/2/2022. He has a history of CAD s/p CABG, HFrEF, pAfib on apixaban, HTN, HLD, DM2, KRISTIAN, prostate Ca, gout, and dementia and is admitted for weakness and hypotension.    Today's updates:  - Medically stable for discharge pending placement  - Cont lasix 40 mg daily    - Continue strict I/Os and daily weights  - Continue PT/OT  ----------------------------------------------------------------------------------------------------    Presyncope  Generalized weakness - improving   Acute hypotension 2/2 hypovolemia - resolved  Reported SBPs in 60s at home w/ inability to walk/ shower. No associated presyncopal, cardiac, or respiratory sxs. Did not fall or have LOC. Bps improved after 500 ml bolus PTA. Arrived lethargic but oriented x4. Initial concern for sepsis on arrival (given lymphopenia, lactate 3.1 and procal of 0.26) and started on IV Vanc and Zosyn. However, pt's lymphopenia appears to be chronic (unknown etiology) and no other SIRS criteria met (afebrile, normal RR, normal HR) so antibiotics discontinued. Lymphopenia, lactate, and procal improved with hydration (1.5 L total), which is reassuring. Suspect pre-syncope 2/2 hypovolemia (2/2 dehydration +hyperdiuresis) vs deconditioning after recent month-long hospitalization. As for infectious sources, is COVID positive but satting 97% on room air. Initial UA was dirty. Exchanged keating and repeat Ucx NGTD.   -cont 40 mg lasix daily   -I/Os and daily weights - net negative ~1.3 L yest   -BMP every few days    Failure to thrive  Malnutrition  --PT and OT consulted--> recommending TCU   --Nutrition consulted-->rec Ensure Enlive x 2 for supplementation   --Calorie counts complete    Prerenal TONE, resolved  Cr 1.28 (BL 0.7) on arrival-->  resolved to baseline after 1.5 L NS. Given this drastic improvement, TONE likely 2/2 prerenal hypovolemia, potentially from hyperdiuresis. Patient was on lasix 80 PO BID prior to last admission and was discharged on 40 mg BID out of concern dose was too high.   - Lasix to 40 mg daily as above     Lactic acidosis - resolved  Lactate 3.1, improved to 2.1 with IVF in ED. Suspect hypovolemia-driven.     Hypokalemia - resolved  Improved with repletion, 2/2 lasix.     COVID + - recovered  Acquired at recent admission (7/30 first positive), unclear significance to current presentation. Did not receive monoclonal antibodies during admission. Satting 97% on room air without respiratory distress, CXR unremarkable. COVID vaccinated x 3 (Pfizer). Never developed hypoxia.   - off precautions now     Chronic HFrEF  pAF s/p dual chamber ICD  Elevated Troponin - downtrending   First degree AV block  Hx dilated cardiomyopathy, most recent TTE 6/18/21 showing severely dilated LV with EF 20%. Currently euvolemic on exam. No hypoxia and CXR negative for cardiopulm concern. Apparently I&Os were in neg balance at recent hospitalization concerning for lasix dosing being too high. Troponin elevated on arrival, now downtrending on recheck. Likely 2/2 demand ischemia. Symptomatic bradycardic 8/4 AM w/ first degree AV block noted on EKG that appears to be chronic.   - Repeat ECHO 8/3 without change compared to 6/8/21  - Cards consulted regarding bradycardia, ICD interrogated, no further workup needed  - PTA coreg 12.5 mg PO BID   - Daily weights, I&Os, lasix as above     Toe injury- left  Toenail injured in shower this AM. Some dried blood around the toenail and toe is painful on exam but no current bleeding or opened wounds.   - Ortho called to discuss pt. L foot XR ordered--negative for fracture  - Referral to podiatry outpatient on discharge     Urinary retention, chronic   Chronic, and has chronic keating in place. Wife reports that it is  changed monthly and is due for a change.   - Louie changed 8/3, UOP adequate since       CHRONIC CONDITIONS:  Auditory hallucinations, Dementia with behavioral health disturbances.   Admitted 06/2022 with progressive auditory hallucinations, including self harm command hallucinations. Seen by neuro and psych, started on psychotropic meds and improved significantly. Started on risperdol, donepezil, gabapentin, and lexapro. Denies hallucinations currently. Required a 1:1 previously but was d/c'd two weeks prior to discharge; no current behavioral concerns requiring a 1:1.  - PTA risperdal  - PTA donepezil  - PTA gabapentin  - PTA lexapro    Full thickness rectal prolapse, initially noted 7/15  Concern on recent admission, full-thickness but reducible, intermitted bleeding with bowel movements but Hgb stable 10-11. CRS recommend fiber and avoiding constipation, follow up with surgery outpt in 4-6 weeks.  - PTA psyllium fiber 3 capsule daily, MiraLAX 17 g daily and senna 1-2 tabs bid pr    CAD s/p CABG (1992):    Not ASA or statin at home. No acute concerns. EKG dual paced rhythm with frequent PVCs, no evidence of ACS.   - PTA Coreg    Tremor, whole body  New since recent admission, neuro thought to be stress related.  - PTA B12     PAF: Not currently in Afib. PTA Eliquis 5 mg PO BI  T2DM: Diet controlled  KRISTIAN: Does not use CPAP at home  Gout: PTA allopurinol       Diet: Regular Diet Adult  Snacks/Supplements Adult: Ensure Enlive; With Meals  Room Service Regular  DVT Prophylaxis: PTA apixaban   Louie Catheter: PRESENT, indication: Retention  Central Lines: None  Cardiac Monitoring: None  Code Status: Full Code      Disposition Plan      Expected Discharge Date: approaching medical stability to discharge, would start TCU search process, pending TCU availability      Destination: TCU          The patient's care was discussed with the Attending Physician, Dr. Bernard and Patient.    Idalia Johns MD  Medicine Service,  SYDNEE TEAM 1  [facility]  Securely message with the [employer] Web Console (learn more here)  Text page via [facility] Paging/Directory   Please see signed in provider for up to date coverage information      Clinically Significant Risk Factors Present on Admission                    ______________________________________________________________________    Interval History   NAEON. Eating well per nutrition notes--% of meals. This morning states he is feeling ok, maybe was a little dizzy when up in chair earlier.  Having some watery stools. UOP good.     4 pt ROS otherwise negative.     Data reviewed today: I reviewed all medications, new labs and imaging results over the last 24 hours.     Physical Exam   Vital Signs: Temp: 98  F (36.7  C) Temp src: Oral BP: 132/79 Pulse: 82   Resp: 18 SpO2: 97 % O2 Device: None (Room air)    Weight: 183 lbs 10.29 oz     Constitutional: lying in bed getting bed bath, NAD  Respiratory: non-labored breathing on RA  Cardiovascular: Regular rate and rhythm  Extremities: trace LE edema   Skin: warm and dry   Neuropsychiatric: calm, cooperative, oriented    Data   No results found for this or any previous visit (from the past 24 hour(s)).

## 2022-08-13 NOTE — PROVIDER NOTIFICATION
5A. Room 203. M. T., Patient been having issues with keating. Leaking around catheter earlier and now there is slight blood tinged urine in keating bag. Thanks. LATONYA Root. [id number]

## 2022-08-13 NOTE — PLAN OF CARE
Goal Outcome Evaluation:    Plan of Care Reviewed With: patient     Overall Patient Progress: no change    Outcome Evaluation: Fair appetite this evening. Drank two of his supplements.  Reposition x2.  Not very motivated to move around. Affect flat.  Dressing c/d/i on sacrum.  Louie in place, good amount of urine.

## 2022-08-14 PROCEDURE — 250N000013 HC RX MED GY IP 250 OP 250 PS 637

## 2022-08-14 PROCEDURE — 99231 SBSQ HOSP IP/OBS SF/LOW 25: CPT | Mod: GC | Performed by: STUDENT IN AN ORGANIZED HEALTH CARE EDUCATION/TRAINING PROGRAM

## 2022-08-14 PROCEDURE — 250N000013 HC RX MED GY IP 250 OP 250 PS 637: Performed by: STUDENT IN AN ORGANIZED HEALTH CARE EDUCATION/TRAINING PROGRAM

## 2022-08-14 PROCEDURE — 120N000002 HC R&B MED SURG/OB UMMC

## 2022-08-14 RX ORDER — ONDANSETRON 4 MG/1
4 TABLET, FILM COATED ORAL EVERY 6 HOURS PRN
Status: DISCONTINUED | OUTPATIENT
Start: 2022-08-14 | End: 2022-08-24 | Stop reason: HOSPADM

## 2022-08-14 RX ADMIN — GABAPENTIN 100 MG: 100 CAPSULE ORAL at 21:27

## 2022-08-14 RX ADMIN — FUROSEMIDE 40 MG: 20 TABLET ORAL at 08:59

## 2022-08-14 RX ADMIN — GABAPENTIN 100 MG: 100 CAPSULE ORAL at 15:17

## 2022-08-14 RX ADMIN — Medication 25 MCG: at 09:00

## 2022-08-14 RX ADMIN — SPIRONOLACTONE 25 MG: 25 TABLET ORAL at 08:57

## 2022-08-14 RX ADMIN — GABAPENTIN 100 MG: 100 CAPSULE ORAL at 08:57

## 2022-08-14 RX ADMIN — DONEPEZIL HYDROCHLORIDE 5 MG: 5 TABLET, FILM COATED ORAL at 21:27

## 2022-08-14 RX ADMIN — LORATADINE 10 MG: 10 TABLET ORAL at 09:00

## 2022-08-14 RX ADMIN — APIXABAN 5 MG: 5 TABLET, FILM COATED ORAL at 21:26

## 2022-08-14 RX ADMIN — RISPERIDONE 2 MG: 2 TABLET ORAL at 21:26

## 2022-08-14 RX ADMIN — CARVEDILOL 12.5 MG: 12.5 TABLET, FILM COATED ORAL at 21:27

## 2022-08-14 RX ADMIN — RISPERIDONE 0.25 MG: 0.25 TABLET ORAL at 08:59

## 2022-08-14 RX ADMIN — APIXABAN 5 MG: 5 TABLET, FILM COATED ORAL at 08:59

## 2022-08-14 RX ADMIN — ESCITALOPRAM OXALATE 10 MG: 10 TABLET ORAL at 09:00

## 2022-08-14 RX ADMIN — CARVEDILOL 12.5 MG: 12.5 TABLET, FILM COATED ORAL at 08:57

## 2022-08-14 RX ADMIN — ALLOPURINOL 300 MG: 300 TABLET ORAL at 08:57

## 2022-08-14 RX ADMIN — Medication 1000 MCG: at 09:00

## 2022-08-14 ASSESSMENT — ACTIVITIES OF DAILY LIVING (ADL)
ADLS_ACUITY_SCORE: 61
ADLS_ACUITY_SCORE: 62
ADLS_ACUITY_SCORE: 61
ADLS_ACUITY_SCORE: 62
ADLS_ACUITY_SCORE: 61
ADLS_ACUITY_SCORE: 61
ADLS_ACUITY_SCORE: 62
ADLS_ACUITY_SCORE: 55
ADLS_ACUITY_SCORE: 61
ADLS_ACUITY_SCORE: 61
ADLS_ACUITY_SCORE: 56
ADLS_ACUITY_SCORE: 61

## 2022-08-14 NOTE — PROGRESS NOTES
[facility]    Medicine Progress Note - Medicine Service, SYDNEE TEAM 1    Date of Admission:  8/2/2022    Assessment & Plan          William Almazan is a 81 year old male admitted on 8/2/2022. He has a history of CAD s/p CABG, HFrEF, pAfib on apixaban, HTN, HLD, DM2, KRISTIAN, prostate Ca, gout, and dementia and is admitted for weakness and hypotension.    Today's updates:  - Medically stable for discharge pending placement  - Cont lasix 40 mg daily    - Continue strict I/Os and daily weights  - Continue PT/OT  - hold bowel meds and mag ox given loose stools  - check mag, K in AM  - d/w RN re: exchanging keating, will contact urology if issues with this  ----------------------------------------------------------------------------------------------------    Presyncope  Generalized weakness - improving   Acute hypotension 2/2 hypovolemia - resolved  Reported SBPs in 60s at home w/ inability to walk/ shower. No associated presyncopal, cardiac, or respiratory sxs. Did not fall or have LOC. Bps improved after 500 ml bolus PTA. Arrived lethargic but oriented x4. Initial concern for sepsis on arrival (given lymphopenia, lactate 3.1 and procal of 0.26) and started on IV Vanc and Zosyn. However, pt's lymphopenia appears to be chronic (unknown etiology) and no other SIRS criteria met (afebrile, normal RR, normal HR) so antibiotics discontinued. Lymphopenia, lactate, and procal improved with hydration (1.5 L total), which is reassuring. Suspect pre-syncope 2/2 hypovolemia (2/2 dehydration +hyperdiuresis) vs deconditioning after recent month-long hospitalization. As for infectious sources, is COVID positive but satting 97% on room air. Initial UA was dirty. Exchanged keating and repeat Ucx NGTD.   -cont 40 mg lasix daily   -I/Os and daily weights - net negative ~1 L yest   -BMP every few days    #Loose stools  May be iatrogenic.   - hold miralax, mag ox today   - BMP+Mg in  AM    Failure to thrive  Malnutrition  --PT and OT consulted--> recommending TCU   --Nutrition consulted-->rec Ensure Enlive x 2 for supplementation   --Calorie counts complete    Prerenal TONE, resolved  Cr 1.28 (BL 0.7) on arrival--> resolved to baseline after 1.5 L NS. Given this drastic improvement, TONE likely 2/2 prerenal hypovolemia, potentially from hyperdiuresis. Patient was on lasix 80 PO BID prior to last admission and was discharged on 40 mg BID out of concern dose was too high.   - Lasix to 40 mg daily as above     Chronic HFrEF  pAF s/p dual chamber ICD  Elevated Troponin - downtrending   First degree AV block  Hx dilated cardiomyopathy, most recent TTE 6/18/21 showing severely dilated LV with EF 20%. Currently euvolemic on exam. No hypoxia and CXR negative for cardiopulm concern. Apparently I&Os were in neg balance at recent hospitalization concerning for lasix dosing being too high. Troponin elevated on arrival, now downtrending on recheck. Likely 2/2 demand ischemia. Symptomatic bradycardic 8/4 AM w/ first degree AV block noted on EKG that appears to be chronic.   - Repeat ECHO 8/3 without change compared to 6/8/21  - Cards consulted regarding bradycardia, ICD interrogated, no further workup needed  - PTA coreg 12.5 mg PO BID   - Daily weights, I&Os, lasix as above     Urinary retention, chronic   Chronic, and has chronic keating in place. Wife reports that it is changed monthly and is due for a change.   - Keating changed 8/3, UOP adequate since - now w/ c/f leaking around keating, d/w RN who will exchange it today and we will call urology if needed     Toe injury - left  Toenail injured in shower this AM. Some dried blood around the toenail and toe is painful on exam but no current bleeding or opened wounds.   - Ortho called to discuss pt. L foot XR ordered--negative for fracture  - Referral to podiatry outpatient on discharge     Lactic acidosis - resolved  Lactate 3.1, improved to 2.1 with IVF in ED.  Suspect hypovolemia-driven.     Hypokalemia - resolved  Improved with repletion, 2/2 lasix, loose stools.     COVID + - recovered  Acquired at recent admission (7/30 first positive), unclear significance to current presentation. Did not receive monoclonal antibodies during admission. Satting 97% on room air without respiratory distress, CXR unremarkable. COVID vaccinated x 3 (Pfizer). Never developed hypoxia.   - off precautions now       CHRONIC CONDITIONS:  Auditory hallucinations, Dementia with behavioral health disturbances.   Admitted 06/2022 with progressive auditory hallucinations, including self harm command hallucinations. Seen by neuro and psych, started on psychotropic meds and improved significantly. Started on risperdol, donepezil, gabapentin, and lexapro. Denies hallucinations currently. Required a 1:1 previously but was d/c'd two weeks prior to discharge; no current behavioral concerns requiring a 1:1.  - PTA risperdal  - PTA donepezil  - PTA gabapentin  - PTA lexapro    Full thickness rectal prolapse, initially noted 7/15  Concern on recent admission, full-thickness but reducible, intermitted bleeding with bowel movements but Hgb stable 10-11. CRS recommend fiber and avoiding constipation, follow up with surgery outpt in 4-6 weeks.  - PTA psyllium fiber 3 capsule daily, MiraLAX 17 g daily and senna 1-2 tabs bid pr    CAD s/p CABG (1992):    Not ASA or statin at home. No acute concerns. EKG dual paced rhythm with frequent PVCs, no evidence of ACS.   - PTA Coreg    Tremor, whole body  New during recent admission, neuro thought to be stress related.  - PTA B12     PAF: Not currently in Afib. PTA Eliquis 5 mg PO BI  T2DM: Diet controlled  KRISTIAN: Does not use CPAP at home  Gout: PTA allopurinol       Diet: Regular Diet Adult  Snacks/Supplements Adult: Ensure Enlive; With Meals  Room Service Regular  DVT Prophylaxis: PTA apixaban   Louie Catheter: PRESENT, indication: Retention  Central Lines: None  Cardiac  Monitoring: None  Code Status: Full Code      Disposition Plan      Expected Discharge Date: approaching medical stability to discharge, would start TCU search process, pending TCU availability      Destination: TCU        The patient's care was discussed with the Attending Physician, Dr. Bernard and Patient.    Idalia Johns MD  Medicine Service, [facility] TEAM 1  [facility]  Securely message with the Vocera Web Console (learn more here)  Text page via [facility] Paging/Directory   Please see signed in provider for up to date coverage information      Clinically Significant Risk Factors Present on Admission                    ______________________________________________________________________    Interval History   NAEON. Incontinent of bowel x3 overnight, drank 2 ensures. Urine leaking around keating, blood-tinged urine in bag. Reported heel pain. Today states he is feeling well. No pain from keating.     4 pt ROS otherwise negative.     Data reviewed today: I reviewed all medications, new labs and imaging results over the last 24 hours.     Physical Exam   Vital Signs: Temp: 98  F (36.7  C) Temp src: Axillary BP: 100/62 Pulse: 73   Resp: 16 SpO2: 99 % O2 Device: None (Room air)    Weight: 183 lbs 10.29 oz     Constitutional: lying in bed, NAD  Respiratory: non-labored breathing on RA  Cardiovascular: Regular rate and rhythm  Extremities: trace LE edema   Skin: warm and dry   : keating bag with dark yellow urine  Neuropsychiatric: calm, cooperative, oriented, restricted affect     Data   No results found for this or any previous visit (from the past 24 hour(s)).

## 2022-08-14 NOTE — CARE PLAN
RN [id number]:     Pain/Nausea: patient had 1 episode of emesis this afternoon. Said nausea subsided after episode. Provider aware, PRN Zofran now on medication list if needed  Mobility: assist x 2 with lift  Diet: regular diet  Labs: reviewed  LDAs: R PIV  Skin/incisions: sacral dressing due to be changed 8/15. Mepilex to L heel for protection along with pneumo boots. Repositioning q2h and as needed  Neuro: AOX4, forgetful. Flat affect  Respiratory: WNL. Denies SOB  Cardiac: denies chest pain  GI/: keating in place AUOP, some blood tinged urine noted, provider aware. Last BM today. Incontinent  Plan: awaiting placement     Goal Outcome Evaluation:     Plan of Care Reviewed With: patient      Overall Patient Progress: no change

## 2022-08-14 NOTE — PLAN OF CARE
Goal Outcome Evaluation:    Plan of Care Reviewed With: patient     Overall Patient Progress: no change    Outcome Evaluation: No acute events overnight. VSS on RA. Pt complaining of heal pain- PRN tylenol given & pneumo boots placed. Incontinenet of bowel x3 this shift. Urine leaking around keating. Mepilex on sacrum C/D/I. Pt drank 2 ensures overnight.

## 2022-08-15 ENCOUNTER — APPOINTMENT (OUTPATIENT)
Dept: OCCUPATIONAL THERAPY | Facility: CLINIC | Age: 82
DRG: 640 | End: 2022-08-15
Payer: MEDICARE

## 2022-08-15 ENCOUNTER — APPOINTMENT (OUTPATIENT)
Dept: PHYSICAL THERAPY | Facility: CLINIC | Age: 82
DRG: 640 | End: 2022-08-15
Payer: MEDICARE

## 2022-08-15 LAB
ANION GAP SERPL CALCULATED.3IONS-SCNC: 7 MMOL/L (ref 7–15)
BUN SERPL-MCNC: 24.3 MG/DL (ref 8–23)
CALCIUM SERPL-MCNC: 8.9 MG/DL (ref 8.8–10.2)
CHLORIDE SERPL-SCNC: 101 MMOL/L (ref 98–107)
CREAT SERPL-MCNC: 0.58 MG/DL (ref 0.67–1.17)
DEPRECATED HCO3 PLAS-SCNC: 29 MMOL/L (ref 22–29)
GFR SERPL CREATININE-BSD FRML MDRD: >90 ML/MIN/1.73M2
GLUCOSE SERPL-MCNC: 110 MG/DL (ref 70–99)
MAGNESIUM SERPL-MCNC: 1.9 MG/DL (ref 1.7–2.3)
POTASSIUM SERPL-SCNC: 4.5 MMOL/L (ref 3.4–5.3)
SODIUM SERPL-SCNC: 137 MMOL/L (ref 136–145)

## 2022-08-15 PROCEDURE — 120N000002 HC R&B MED SURG/OB UMMC

## 2022-08-15 PROCEDURE — 250N000013 HC RX MED GY IP 250 OP 250 PS 637

## 2022-08-15 PROCEDURE — 82310 ASSAY OF CALCIUM: CPT | Performed by: STUDENT IN AN ORGANIZED HEALTH CARE EDUCATION/TRAINING PROGRAM

## 2022-08-15 PROCEDURE — 97165 OT EVAL LOW COMPLEX 30 MIN: CPT | Mod: GO

## 2022-08-15 PROCEDURE — 250N000013 HC RX MED GY IP 250 OP 250 PS 637: Performed by: STUDENT IN AN ORGANIZED HEALTH CARE EDUCATION/TRAINING PROGRAM

## 2022-08-15 PROCEDURE — 99231 SBSQ HOSP IP/OBS SF/LOW 25: CPT | Mod: GC | Performed by: STUDENT IN AN ORGANIZED HEALTH CARE EDUCATION/TRAINING PROGRAM

## 2022-08-15 PROCEDURE — 97110 THERAPEUTIC EXERCISES: CPT | Mod: GP | Performed by: PHYSICAL THERAPIST

## 2022-08-15 PROCEDURE — 97535 SELF CARE MNGMENT TRAINING: CPT | Mod: GO

## 2022-08-15 PROCEDURE — 36415 COLL VENOUS BLD VENIPUNCTURE: CPT | Performed by: STUDENT IN AN ORGANIZED HEALTH CARE EDUCATION/TRAINING PROGRAM

## 2022-08-15 PROCEDURE — 97530 THERAPEUTIC ACTIVITIES: CPT | Mod: GP | Performed by: PHYSICAL THERAPIST

## 2022-08-15 PROCEDURE — 83735 ASSAY OF MAGNESIUM: CPT | Performed by: STUDENT IN AN ORGANIZED HEALTH CARE EDUCATION/TRAINING PROGRAM

## 2022-08-15 RX ORDER — ACETAMINOPHEN 325 MG/1
325 TABLET ORAL EVERY 4 HOURS PRN
Status: CANCELLED | DISCHARGE
Start: 2022-08-15

## 2022-08-15 RX ORDER — DONEPEZIL HYDROCHLORIDE 5 MG/1
5 TABLET, FILM COATED ORAL AT BEDTIME
Status: CANCELLED | DISCHARGE
Start: 2022-08-15

## 2022-08-15 RX ADMIN — SPIRONOLACTONE 25 MG: 25 TABLET ORAL at 08:48

## 2022-08-15 RX ADMIN — Medication 25 MCG: at 08:48

## 2022-08-15 RX ADMIN — GABAPENTIN 100 MG: 100 CAPSULE ORAL at 14:04

## 2022-08-15 RX ADMIN — CARVEDILOL 12.5 MG: 12.5 TABLET, FILM COATED ORAL at 19:08

## 2022-08-15 RX ADMIN — Medication 1000 MCG: at 08:48

## 2022-08-15 RX ADMIN — RISPERIDONE 2 MG: 2 TABLET ORAL at 21:55

## 2022-08-15 RX ADMIN — ACETAMINOPHEN 325 MG: 325 TABLET, FILM COATED ORAL at 21:55

## 2022-08-15 RX ADMIN — Medication 3 CAPSULE: at 08:48

## 2022-08-15 RX ADMIN — FUROSEMIDE 40 MG: 20 TABLET ORAL at 08:48

## 2022-08-15 RX ADMIN — ALLOPURINOL 300 MG: 300 TABLET ORAL at 08:48

## 2022-08-15 RX ADMIN — GABAPENTIN 100 MG: 100 CAPSULE ORAL at 19:08

## 2022-08-15 RX ADMIN — ACETAMINOPHEN 325 MG: 325 TABLET, FILM COATED ORAL at 14:04

## 2022-08-15 RX ADMIN — LORATADINE 10 MG: 10 TABLET ORAL at 08:48

## 2022-08-15 RX ADMIN — DONEPEZIL HYDROCHLORIDE 5 MG: 5 TABLET, FILM COATED ORAL at 21:56

## 2022-08-15 RX ADMIN — CARVEDILOL 12.5 MG: 12.5 TABLET, FILM COATED ORAL at 08:48

## 2022-08-15 RX ADMIN — GABAPENTIN 100 MG: 100 CAPSULE ORAL at 08:48

## 2022-08-15 RX ADMIN — RISPERIDONE 0.25 MG: 0.25 TABLET ORAL at 08:48

## 2022-08-15 RX ADMIN — APIXABAN 5 MG: 5 TABLET, FILM COATED ORAL at 19:08

## 2022-08-15 RX ADMIN — APIXABAN 5 MG: 5 TABLET, FILM COATED ORAL at 08:48

## 2022-08-15 RX ADMIN — ESCITALOPRAM OXALATE 10 MG: 10 TABLET ORAL at 08:48

## 2022-08-15 RX ADMIN — ACETAMINOPHEN 325 MG: 325 TABLET, FILM COATED ORAL at 17:57

## 2022-08-15 ASSESSMENT — ACTIVITIES OF DAILY LIVING (ADL)
ADLS_ACUITY_SCORE: 62
ADLS_ACUITY_SCORE: 61
ADLS_ACUITY_SCORE: 62
ADLS_ACUITY_SCORE: 57
ADLS_ACUITY_SCORE: 61
ADLS_ACUITY_SCORE: 61
ADLS_ACUITY_SCORE: 62

## 2022-08-15 NOTE — PROGRESS NOTES
Care Management Follow Up    Length of Stay (days): 12    Expected Discharge Date: 8/18/2022      Concerns to be Addressed: discharge planning   Patient plan of care discussed at interdisciplinary rounds: Yes    Anticipated Discharge Disposition: TCU  Anticipated Discharge Services: TBD  Anticipated Discharge DME: TBD    Patient/family educated on Medicare website which has current facility and service quality ratings: Yes  Education Provided on the Discharge Plan:  Yes  Patient/Family in Agreement with the Plan: yes    Referrals Placed by CM/SW:  [facility]  [address] [place]  [address]  P: [phone]  F: [phone]  8/15: SW received a call from Zahraa and she advised SW to send updated PT/OT notes from the weekend and also inquired if pt will need IV meds at discharge.       [facility]  [address] [place]  [address]  P: [phone]  F: [phone]  8/15: No bed openings for at least 2 weeks.     [address] [place] [address]  [phone]   8/15:CHW LVM for admissions to f/u on referral; requested they call SW back     Discontinued:  [facility]  [address] [address] [phone]  8/12: Too high acuity. Cannot accept pt.       [facility], [address] [place]  [address]  P: [phone]  F: [phone]   8/11: Declined. No appropriate bed.      [facility]  [address]  [address]  P: [phone]  F: [phone]  8/9:Pt declined. No reason given on Epic.      [facility]   [address] [facility] [place] [address]   Phone: [phone]   Fax: [phone]   8/8: No current bed availability at this time and do not foresee an opening any time soon.     [facility]   [address] [facility] [place]  [address]  P: [phone]  P: [phone] - Admissions  F: [phone]  8/8:  Declined via Epic. Unable to accept pt with COVID positive status and no available bed at this time.     [facility]  [address] [place]  [address]  P: [phone]  F: [phone]  8/8 Pt declined. No appropriate bed for pt.  No LTC/memory care bed at this time.     [facility]:  Liaison: Nimco Bowel  8/9: Declined due to no beds. He also is still on special precautions so not medically ready per our TCU guidelines. Looks like his needs can be met at a community TCU.     Private pay costs discussed: Not applicable    Additional Information:  HERNANDEZ received a call from Zahraa(admissions at [facility]).  She requests HERNANDEZ to send updated PT/OT notes from the weekend and also inquired if pt will need IV meds at discharge.  Zahraa reports that they do have bed availability at this time.     @0947 HERNANDEZ paged Dr. Friedman to inquire which IV meds will pt be needing at discharge and if pt is med ready for discharge.    @0949AM HERNANDEZ spoke with PT and they will attempt to see pt today before noon to evaluate.      @1005AM HERNANDEZ received a call from Dr. Friedman and was informed that pt will not be needing any IV medications at discharge and that pt is medically ready to go anytime.      @1312PM HERNANDEZ faxed updated PT/OT flowsheets to luis alberto [facility] at F: [phone] for review.    HERNANDEZ spoke with admissions at the [facility] and informed Zahraa that pt will not discharge with any IV medications.  Zahraa reported that she will send pts case for medical review and call back HERNANDEZ.     will continue to follow for discharge planning, support, and resources.    KAHLIL Jerez, [facility]  [address]   Office: [phone]   Pager: [phone]  [email or web address]

## 2022-08-15 NOTE — PLAN OF CARE
Goal Outcome Evaluation:    Plan of Care Reviewed With: patient     Overall Patient Progress: no change    Outcome Evaluation: No acute events overnight. VSS on RA. Denies pain & nausea.  Incontinenet of bowel x1 this shift. Urine leaking around keating- Rosy to pink tinge output. Mepilex on sacrum C/D/I. Regular diet, appetite improving. Awaiting placement.

## 2022-08-15 NOTE — PROGRESS NOTES
Care Management Follow Up    [facility]  [address]  [place]  [address]  P: [phone]  F: [phone]  8/11: CHW spoke with admissions and they wouldn't be able to take him until Monday due to the 14 days out of Covid. They would like updated notes sent over to them. SW faxed over updated notes and called and LVM to follow up with admissions.  8/12: SW called facility and LVM that pt is not an assist of two at baseline and pt is not currently receiving cancer treatment.         [facility]  [address] [facility] [place]  [address]  P: [phone]  F: [phone]  8/15: No bed openings for at least 2 weeks.     [address] [place] [address]  [phone]   8/15:CHW LVM for admissions to f/u on referral; requested they call SW back     Discontinued:  [facility]  [address]  [place] [address] [phone]  8/12: Too high acuity. Cannot accept pt.       [facility], [address] [place]  [address]  P: [phone]  F: [phone]   8/11: Declined. No appropriate bed.      [facility]  [address] [place]  [address]  P: [phone]  F: [phone]  8/9:Pt declined. No reason given on Epic.      [facility]   [address] [facility] [place] [address]   Phone: [phone]   Fax: [phone]   8/8: No current bed availability at this time and do not foresee an opening any time soon.     [facility]   [address] [place]  [address]  P: [phone]  P: [phone] - Admissions  F: [phone]  8/8: Declined via Epic. Unable to accept pt with COVID positive status and no available bed at this time.     [facility]  [address] [place]  [address]  P: [phone]  F: [phone]  8/8 Pt declined. No appropriate bed for pt.  No LTC/memory care bed at this time.     [facility]:  Liaison: [facility]  Bowel  8/9: Declined due to no beds. He also is still on special precautions so not medically ready per our TCU guidelines. Looks like his needs can be met at a community TCU.    Philip Stuart   Inpatient Community Health Worker  [facility]

## 2022-08-15 NOTE — PLAN OF CARE
Goal Outcome Evaluation:    Plan of Care Reviewed With: patient     Overall Patient Progress: no change    Outcome Evaluation: Assisted with bed mobility.  OT worked with patient at the edge of bed and sit to stand.  Assist with nursing 2, walker, gait belt.  Urine pink this morning.  Received orders to exchange it.  at 2pm urine clear, but has demonstrated still some leaking around catheter.  Ate 50% breakfast. Declined lunch.  Wound care due on sacrum this evening.

## 2022-08-15 NOTE — PLAN OF CARE
Goal Outcome Evaluation:      Plan of Care Reviewed With: Patient, spouse    Overall Patient Progress: improving    Outcome Evaluation: Patient working with therapy but declined OOB activity despite much encouragement and education. PIP interventions followed- patient agreeable to all except HOB <30. OSIEL gave thorough education on why HOB <30 can increase pressure injury risk, patient continued to decline but verbalized understanding. Good appetite, encouraged hydration. Orientation waxes and wanes, he is intermittently oriented. Keating with yellow output in beginning of shift, but around 2000 writer noticed pink/blood tinge to urine- VS checked at this time and were stable, pt states he is feeling well. Pt has had previous occurrences of blood tinged urine in keating before. Small amount of urine/blood leaking around urethra.

## 2022-08-15 NOTE — PROGRESS NOTES
"   08/15/22 1000   Quick Adds   Type of Visit Initial Occupational Therapy Evaluation   Living Environment   People in Home spouse;child(ashley), adult   Current Living Arrangements house   Home Accessibility stairs to enter home;stairs within home   Number of Stairs, Main Entrance 2   Stair Railings, Main Entrance none   Number of Stairs, Within Home, Primary greater than 10 stairs   Stair Railings, Within Home, Primary railing on left side (ascending)   Transportation Anticipated family or friend will provide   Living Environment Comments Pt poor historian at baseline, from pt report & PT note, appears that pt lives in 2 story home with basement, bedroom located on top floor. Pt has approx 2 PATRICIA. Pt uses cane/walker at baseline. Pt unable to describe bathroom stating \"we don't have a shower\", upon further questioning appears that pt takes sponge baths at home.   Self-Care   Usual Activity Tolerance moderate   Current Activity Tolerance poor   Equipment Currently Used at Home cane, straight;walker, rolling   Fall history within last six months yes   Number of times patient has fallen within last six months 6   Activity/Exercise/Self-Care Comment Pt poor historian at baseline, per PT note and pt report, pt recieves assist with I/ADL's. It appears that pt's wife picks out clothes and assists with dressing, pt takes sponge baths, and pt's wife completes IADL's.   Instrumental Activities of Daily Living (IADL)   IADL Comments Wife completes, per pt report   General Information   Onset of Illness/Injury or Date of Surgery 08/02/22   Referring Physician Ana Maria Horn MD   Additional Occupational Profile Info/Pertinent History of Current Problem \"William Almazan is a 81 year old male admitted on 8/2/2022. He has a history of CAD s/p CABG, HFrEF, pAfib on apixaban, HTN, HLD, DM2, KRISTIAN, prostate Ca, gout, and dementia and is admitted for weakness and hypotension.\"   Existing Precautions/Restrictions fall   Left Upper Extremity " (Weight-bearing Status) full weight-bearing (FWB)   Right Upper Extremity (Weight-bearing Status) full weight-bearing (FWB)   Left Lower Extremity (Weight-bearing Status) full weight-bearing (FWB)   Right Lower Extremity (Weight-bearing Status) full weight-bearing (FWB)   Heart Disease Risk Factors Age;Medical history   Cognitive Status Examination   Orientation Status orientation to person, place and time   Cognitive Status Comments Pt with low cognitive baseline, per MD note pt has history of dementia. Pt follows commands however demonstrates occasional difficulty with initiation of movements.   Visual Perception   Visual Impairment/Limitations WFL   Sensory   Sensory Quick Adds No deficits were identified   Pain Assessment   Patient Currently in Pain No   Integumentary/Edema   Integumentary/Edema no deficits were identifed   Range of Motion Comprehensive   General Range of Motion bilateral upper extremity ROM WFL   Strength Comprehensive (MMT)   General Manual Muscle Testing (MMT) Assessment no strength deficits identified   Comment, General Manual Muscle Testing (MMT) Assessment general deconditioning   Coordination   Coordination Comments Pt demonstrates some difficulty with coordination of movements, appears to be related to difficutly with initiation of tasks.   Bed Mobility   Bed Mobility supine-sit   Supine-Sit [place] (Bed Mobility) moderate assist (50% patient effort)   Assistive Device (Bed Mobility) bed rails;draw sheet   Activities of Daily Living   BADL Assessment/Intervention upper body dressing;lower body dressing;grooming;toileting   Upper Body Dressing Assessment/Training   [place] Level (Upper Body Dressing) moderate assist (50% patient effort)  (per clinical judgement)   Lower Body Dressing Assessment/Training   [place] Level (Lower Body Dressing) maximum assist (25% patient effort)  (per clinical judgement)   Grooming Assessment/Training   [place] Level (Grooming) minimum  assist (75% patient effort)  (per clincial judgement)   Toileting   Rogers Level (Toileting) dependent (less than 25% patient effort)  (per clinical judgement)   Clinical Impression   Criteria for Skilled Therapeutic Interventions Met (OT) Yes, treatment indicated   OT Diagnosis Pt would benefit from skilled therapies while admitted to address dec IND with ADL's, dec act jessica, overall dec strength, and dec safety with functional transfers.   Influenced by the following impairments dec strength, dec static sitting bal, dec strength, inc pain, overall dec act jessica, dec IND with ADL's   OT Problem List-Impairments impacting ADL problems related to;activity tolerance impaired;cognition;strength;pain;postural control   Assessment of Occupational Performance 3-5 Performance Deficits   Identified Performance Deficits Functional mobility/trx, dressing, toileting, bathing, functional cognition/safety   Planned Therapy Interventions (OT) ADL retraining;balance training;cognition;transfer training;home program guidelines;progressive activity/exercise   Clinical Decision Making Complexity (OT) moderate complexity   Anticipated Equipment Needs Upon Discharge (OT)   (TBD)   Risk & Benefits of therapy have been explained evaluation/treatment results reviewed;care plan/treatment goals reviewed;risks/benefits reviewed;current/potential barriers reviewed;participants voiced agreement with care plan;participants included;patient   OT Discharge Planning   OT Discharge Recommendation (DC Rec) Transitional Care Facility;home with assist;home with home care occupational therapy   OT Rationale for DC Rec Pt currently requires TCU stay given current function well below baseline. Recommend TCU stay to progress functional mobility, safety, and IND w/ADLs. Dual discharge recs in place 2/2 departmental guideliness, Pt would need to be min A for transfers, able to manage stairs, and have assist from family 24/7 if were to return home. If  unable to d/c to TCU would benefit from ongoing HHOT to addesss deficits.   OT Brief overview of current status Mod A sup<>sit   Total Evaluation Time (Minutes)   Total Evaluation Time (Minutes) 10   OT Goals   Therapy Frequency (OT) 4 times/wk   OT Predicted Duration/Target Date for Goal Attainment 08/29/22   OT: Hygiene/Grooming minimal assist;from wheelchair   OT: Upper Body Dressing Minimal assist;from wheelchair   OT: Lower Body Dressing Minimal assist;from wheelchair   OT: Toilet Transfer/Toileting Minimal assist;cleaning and garment management

## 2022-08-15 NOTE — PROGRESS NOTES
[facility]    Medicine Progress Note - Medicine Service, MAROON TEAM 1    Date of Admission:  8/2/2022    Assessment & Plan          William Almazan is a 81 year old male admitted on 8/2/2022. He has a history of CAD s/p CABG, HFrEF, pAfib on apixaban, HTN, HLD, DM2, KRISTIAN, prostate Ca, gout, and dementia and is admitted for weakness and hypotension.    Today's updates:  - Medically stable for discharge pending placement  - Cont lasix 40 mg daily    - Continue strict I/Os and daily weights  - Continue PT/OT  - hold bowel meds and mag ox given loose stools  - exchange keating   ----------------------------------------------------------------------------------------------------    Presyncope  Generalized weakness - improving   Acute hypotension 2/2 hypovolemia - resolved  Reported SBPs in 60s at home w/ inability to walk/ shower. No associated presyncopal, cardiac, or respiratory sxs. Did not fall or have LOC. Bps improved after 500 ml bolus PTA. Arrived lethargic but oriented x4. Initial concern for sepsis on arrival (given lymphopenia, lactate 3.1 and procal of 0.26) and started on IV Vanc and Zosyn. However, pt's lymphopenia appears to be chronic (unknown etiology) and no other SIRS criteria met (afebrile, normal RR, normal HR) so antibiotics discontinued. Lymphopenia, lactate, and procal improved with hydration (1.5 L total), which is reassuring. Suspect pre-syncope 2/2 hypovolemia (2/2 dehydration +hyperdiuresis) vs deconditioning after recent month-long hospitalization. As for infectious sources, is COVID positive but satting 97% on room air. Initial UA was dirty. Exchanged keating and repeat Ucx NGTD.   -cont 40 mg lasix daily   -I/Os and daily weights - net negative ~1.1 L yest   -BMP every few days    Loose stools  May be iatrogenic.   - hold miralax, mag ox today   - BMP+Mg in AM    Failure to thrive  Malnutrition  --PT and OT consulted--> recommending TCU    --Nutrition consulted-->rec Ensure Enlive x 2 for supplementation   --Calorie counts complete    Prerenal TONE, resolved  Cr 1.28 (BL 0.7) on arrival--> resolved to baseline after 1.5 L NS. Given this drastic improvement, TONE likely 2/2 prerenal hypovolemia, potentially from hyperdiuresis. Patient was on lasix 80 PO BID prior to last admission and was discharged on 40 mg BID out of concern dose was too high.   - Lasix to 40 mg daily as above     Chronic HFrEF  pAF s/p dual chamber ICD  Elevated Troponin - downtrending   First degree AV block  Hx dilated cardiomyopathy, most recent TTE 6/18/21 showing severely dilated LV with EF 20%. Currently euvolemic on exam. No hypoxia and CXR negative for cardiopulm concern. Apparently I&Os were in neg balance at recent hospitalization concerning for lasix dosing being too high. Troponin elevated on arrival, now downtrending on recheck. Likely 2/2 demand ischemia. Symptomatic bradycardic 8/4 AM w/ first degree AV block noted on EKG that appears to be chronic.   - Repeat ECHO 8/3 without change compared to 6/8/21  - Cards consulted regarding bradycardia, ICD interrogated, no further workup needed  - PTA coreg 12.5 mg PO BID   - Daily weights, I&Os, lasix as above     Urinary retention, chronic   C/f keating dysfunction   Chronic, and has chronic keating in place. Wife reports that it is changed monthly and is due for a change.   - Keating changed 8/3, UOP adequate since - now w/ c/f leaking around keating, d/w RN who will exchange it today and we will call urology if needed     Toe injury - left  Toenail injured in shower this AM. Some dried blood around the toenail and toe is painful on exam but no current bleeding or opened wounds.   - Ortho called to discuss pt. L foot XR ordered--negative for fracture  - Referral to podiatry outpatient on discharge     Lactic acidosis - resolved  Lactate 3.1, improved to 2.1 with IVF in ED. Suspect hypovolemia-driven.     Hypokalemia -  resolved  Improved with repletion, 2/2 lasix, loose stools.     COVID + - recovered  Acquired at recent admission (7/30 first positive), unclear significance to current presentation. Did not receive monoclonal antibodies during admission. Satting 97% on room air without respiratory distress, CXR unremarkable. COVID vaccinated x 3 (Pfizer). Never developed hypoxia.   - off precautions now       CHRONIC CONDITIONS:  Auditory hallucinations, Dementia with behavioral health disturbances.   Admitted 06/2022 with progressive auditory hallucinations, including self harm command hallucinations. Seen by neuro and psych, started on psychotropic meds and improved significantly. Started on risperdol, donepezil, gabapentin, and lexapro. Denies hallucinations currently. Required a 1:1 previously but was d/c'd two weeks prior to discharge; no current behavioral concerns requiring a 1:1.  - PTA risperdal  - PTA donepezil  - PTA gabapentin  - PTA lexapro    Full thickness rectal prolapse, initially noted 7/15  Concern on recent admission, full-thickness but reducible, intermitted bleeding with bowel movements but Hgb stable 10-11. CRS recommend fiber and avoiding constipation, follow up with surgery outpt in 4-6 weeks.  - PTA psyllium fiber 3 capsule daily, MiraLAX 17 g daily and senna 1-2 tabs bid pr    CAD s/p CABG (1992):    Not ASA or statin at home. No acute concerns. EKG dual paced rhythm with frequent PVCs, no evidence of ACS.   - PTA Coreg    Tremor, whole body  New during recent admission, neuro thought to be stress related.  - PTA B12     PAF: Not currently in Afib. PTA Eliquis 5 mg PO BI  T2DM: Diet controlled  KRISTIAN: Does not use CPAP at home  Gout: PTA allopurinol       Diet: Regular Diet Adult  Snacks/Supplements Adult: Ensure Enlive; With Meals  Room Service Regular  DVT Prophylaxis: PTA apixaban   Louie Catheter: PRESENT, indication: Retention  Central Lines: None  Cardiac Monitoring: None  Code Status: Full Code   "    Disposition Plan      Expected Discharge Date: approaching medical stability to discharge, would start TCU search process, pending TCU availability      Destination: TCU        The patient's care was discussed with the Attending Physician, Dr. Bernard and Patient.    Idalia Johns MD  Medicine Service, [facility] TEAM 1  [facility]  Securely message with the Vocera Web Console (learn more here)  Text page via [facility] Paging/Directory   Please see signed in provider for up to date coverage information      Clinically Significant Risk Factors Present on Admission                    ______________________________________________________________________    Interval History   NAEON. Episode of emesis this AM, no recurrence. States he feels \"fine.\" Blood-tinged urine in bag. Pt denies pain associated with keating.     4 pt ROS otherwise negative.     Data reviewed today: I reviewed all medications, new labs and imaging results over the last 24 hours.     Physical Exam   Vital Signs: Temp: 98.5  F (36.9  C) Temp src: Oral BP: 118/68 Pulse: 76   Resp: 18 SpO2: 96 % O2 Device: None (Room air)    Weight: 183 lbs 10.29 oz     Constitutional: lying in bed, NAD  Respiratory: non-labored breathing on RA  Cardiovascular: Regular rate and rhythm  Extremities: trace LE edema   Skin: warm and dry   : keating bag with dark yellow urine  Neuropsychiatric: calm, cooperative, oriented, restricted affect     Data   No results found for this or any previous visit (from the past 24 hour(s)).  "

## 2022-08-16 ENCOUNTER — APPOINTMENT (OUTPATIENT)
Dept: OCCUPATIONAL THERAPY | Facility: CLINIC | Age: 82
DRG: 640 | End: 2022-08-16
Payer: MEDICARE

## 2022-08-16 ENCOUNTER — APPOINTMENT (OUTPATIENT)
Dept: PHYSICAL THERAPY | Facility: CLINIC | Age: 82
DRG: 640 | End: 2022-08-16
Payer: MEDICARE

## 2022-08-16 PROCEDURE — 250N000013 HC RX MED GY IP 250 OP 250 PS 637

## 2022-08-16 PROCEDURE — 120N000002 HC R&B MED SURG/OB UMMC

## 2022-08-16 PROCEDURE — 250N000013 HC RX MED GY IP 250 OP 250 PS 637: Performed by: STUDENT IN AN ORGANIZED HEALTH CARE EDUCATION/TRAINING PROGRAM

## 2022-08-16 PROCEDURE — 97530 THERAPEUTIC ACTIVITIES: CPT | Mod: GO | Performed by: OCCUPATIONAL THERAPIST

## 2022-08-16 PROCEDURE — 97110 THERAPEUTIC EXERCISES: CPT | Mod: GP

## 2022-08-16 PROCEDURE — 97110 THERAPEUTIC EXERCISES: CPT | Mod: GO | Performed by: OCCUPATIONAL THERAPIST

## 2022-08-16 PROCEDURE — 97530 THERAPEUTIC ACTIVITIES: CPT | Mod: GP

## 2022-08-16 PROCEDURE — 99231 SBSQ HOSP IP/OBS SF/LOW 25: CPT | Performed by: INTERNAL MEDICINE

## 2022-08-16 RX ADMIN — ESCITALOPRAM OXALATE 10 MG: 10 TABLET ORAL at 08:00

## 2022-08-16 RX ADMIN — CARVEDILOL 12.5 MG: 12.5 TABLET, FILM COATED ORAL at 19:50

## 2022-08-16 RX ADMIN — DONEPEZIL HYDROCHLORIDE 5 MG: 5 TABLET, FILM COATED ORAL at 22:11

## 2022-08-16 RX ADMIN — RISPERIDONE 2 MG: 2 TABLET ORAL at 22:11

## 2022-08-16 RX ADMIN — RISPERIDONE 0.25 MG: 0.25 TABLET ORAL at 08:00

## 2022-08-16 RX ADMIN — Medication 1000 MCG: at 08:00

## 2022-08-16 RX ADMIN — ACETAMINOPHEN 325 MG: 325 TABLET, FILM COATED ORAL at 14:45

## 2022-08-16 RX ADMIN — SPIRONOLACTONE 25 MG: 25 TABLET ORAL at 08:00

## 2022-08-16 RX ADMIN — FUROSEMIDE 40 MG: 20 TABLET ORAL at 08:00

## 2022-08-16 RX ADMIN — GABAPENTIN 100 MG: 100 CAPSULE ORAL at 14:45

## 2022-08-16 RX ADMIN — GABAPENTIN 100 MG: 100 CAPSULE ORAL at 19:49

## 2022-08-16 RX ADMIN — ACETAMINOPHEN 325 MG: 325 TABLET, FILM COATED ORAL at 19:50

## 2022-08-16 RX ADMIN — APIXABAN 5 MG: 5 TABLET, FILM COATED ORAL at 08:00

## 2022-08-16 RX ADMIN — APIXABAN 5 MG: 5 TABLET, FILM COATED ORAL at 19:50

## 2022-08-16 RX ADMIN — CARVEDILOL 12.5 MG: 12.5 TABLET, FILM COATED ORAL at 08:00

## 2022-08-16 RX ADMIN — Medication 3 CAPSULE: at 07:59

## 2022-08-16 RX ADMIN — LORATADINE 10 MG: 10 TABLET ORAL at 08:01

## 2022-08-16 RX ADMIN — SENNOSIDES 1 TABLET: 8.6 TABLET, FILM COATED ORAL at 19:50

## 2022-08-16 RX ADMIN — Medication 25 MCG: at 08:01

## 2022-08-16 RX ADMIN — ALLOPURINOL 300 MG: 300 TABLET ORAL at 08:00

## 2022-08-16 RX ADMIN — GABAPENTIN 100 MG: 100 CAPSULE ORAL at 08:01

## 2022-08-16 ASSESSMENT — ACTIVITIES OF DAILY LIVING (ADL)
ADLS_ACUITY_SCORE: 57
ADLS_ACUITY_SCORE: 55
ADLS_ACUITY_SCORE: 57
ADLS_ACUITY_SCORE: 55

## 2022-08-16 NOTE — PROGRESS NOTES
[facility]    Medicine Progress Note - Medicine Service, MARBETTY TEAM 1    Date of Admission:  8/2/2022    Assessment & Plan          William Almazan is a 81 year old male admitted on 8/2/2022. He has a history of CAD s/p CABG, HFrEF, pAfib on apixaban, HTN, HLD, DM2, KRISTIAN, prostate Ca, gout, and dementia and is admitted for weakness and hypotension.    Today's updates:  - Medically stable for discharge pending placement  - Continue lasix 40 mg daily    - Continue strict I/Os and daily weights  - Continue PT/OT    ----------------------------------------------------------------------------------------------------    Presyncope  Generalized weakness - improving   Acute hypotension 2/2 hypovolemia - resolved  Reported SBPs in 60s at home w/ inability to walk/ shower. No associated presyncopal, cardiac, or respiratory sxs. Did not fall or have LOC. Bps improved after 500 ml bolus PTA. Arrived lethargic but oriented x4. Initial concern for sepsis on arrival (given lymphopenia, lactate 3.1 and procal of 0.26) and started on IV Vanc and Zosyn. However, pt's lymphopenia appears to be chronic (unknown etiology) and no other SIRS criteria met (afebrile, normal RR, normal HR) so antibiotics discontinued. Lymphopenia, lactate, and procal improved with hydration (1.5 L total), which is reassuring. Suspect pre-syncope 2/2 hypovolemia (2/2 dehydration +hyperdiuresis) vs deconditioning after recent month-long hospitalization. As for infectious sources, is COVID positive but satting 97% on room air. Initial UA was dirty. Exchanged keating and repeat Ucx NGTD.   -continue 40 mg lasix daily   -I/Os and daily weights - net negative 0.9 L yest   -BMP every few days    Loose stools  Improved with holding miralax, mg ox.   - continue to hold miralax, mag ox today   - BMP+Mg in AM    Failure to thrive  Malnutrition  --PT and OT consulted--> recommending TCU   --Nutrition consulted-->rec  Ensure Enlive x 2 for supplementation   --Calorie counts complete    Prerenal TONE, resolved  Cr 1.28 (BL 0.7) on arrival--> resolved to baseline after 1.5 L NS. Given this drastic improvement, TONE likely 2/2 prerenal hypovolemia, potentially from hyperdiuresis. Patient was on lasix 80 PO BID prior to last admission and was discharged on 40 mg BID out of concern dose was too high.   - Lasix as above     Chronic HFrEF  pAF s/p dual chamber ICD  Elevated Troponin - downtrending   First degree AV block  Hx dilated cardiomyopathy, most recent TTE 6/18/21 showing severely dilated LV with EF 20%. Currently euvolemic on exam. No hypoxia and CXR negative for cardiopulm concern. Apparently I&Os were in neg balance at recent hospitalization concerning for lasix dosing being too high. Troponin elevated on arrival, now downtrending on recheck. Likely 2/2 demand ischemia. Symptomatic bradycardic 8/4 AM w/ first degree AV block noted on EKG that appears to be chronic.   - Repeat ECHO 8/3 without change compared to 6/8/21  - Cards consulted regarding bradycardia, ICD interrogated, no further workup needed  - PTA coreg 12.5 mg PO BID   - Daily weights, I&Os, lasix as above     Urinary retention, chronic   C/f keating dysfunction   Chronic, and has chronic keating in place. Wife reports that it is changed monthly and is due for a change.   - Keating changed 8/3. Changed again 8/15 due to leaking around keating. Has since had some bloody urine intermittently--likely trauma from replacement. Will monitor and contact urology if it doesn't clear.     Toe injury - left  Toenail injured in shower. Some dried blood around the toenail and toe is painful on exam but no current bleeding or opened wounds.   - Ortho called to discuss pt. L foot XR ordered--negative for fracture  - Referral to podiatry outpatient on discharge     Lactic acidosis - resolved  Lactate 3.1, improved to 2.1 with IVF in ED. Suspect hypovolemia-driven.     Hypokalemia -  resolved  Improved with repletion, 2/2 lasix, loose stools.     COVID + - recovered  Acquired at recent admission (7/30 first positive), unclear significance to current presentation. Did not receive monoclonal antibodies during admission. Satting 97% on room air without respiratory distress, CXR unremarkable. COVID vaccinated x 3 (Pfizer). Never developed hypoxia.   - off precautions now       CHRONIC CONDITIONS:  Auditory hallucinations, Dementia with behavioral health disturbances.   Admitted 06/2022 with progressive auditory hallucinations, including self harm command hallucinations. Seen by neuro and psych, started on psychotropic meds and improved significantly. Started on risperdol, donepezil, gabapentin, and lexapro. Denies hallucinations currently. Required a 1:1 previously but was d/c'd two weeks prior to discharge; no current behavioral concerns requiring a 1:1.  - PTA risperdal  - PTA donepezil  - PTA gabapentin  - PTA lexapro    Full thickness rectal prolapse, initially noted 7/15  Concern on recent admission, full-thickness but reducible, intermitted bleeding with bowel movements but Hgb stable 10-11. CRS recommend fiber and avoiding constipation, follow up with surgery outpt in 4-6 weeks.  - PTA psyllium fiber 3 capsule daily, MiraLAX 17 g daily and senna 1-2 tabs bid pr    CAD s/p CABG (1992):    Not ASA or statin at home. No acute concerns. EKG dual paced rhythm with frequent PVCs, no evidence of ACS.   - PTA Coreg    Tremor, whole body  New during recent admission, neuro thought to be stress related.  - PTA B12     PAF: Not currently in Afib. PTA Eliquis 5 mg PO BI  T2DM: Diet controlled  KRISTAIN: Does not use CPAP at home  Gout: PTA allopurinol       Diet: Regular Diet Adult  Snacks/Supplements Adult: Ensure Enlive; With Meals  Room Service Regular  DVT Prophylaxis: PTA apixaban   Louie Catheter: PRESENT, indication: Retention, Retention  Central Lines: None  Cardiac Monitoring: None  Code Status: Full  Code      Disposition Plan      Expected Discharge Date: approaching medical stability to discharge, would start TCU search process, pending TCU availability      Destination: TCU        The patient's care was discussed with the Patient.    Leland Cottrell MD  Medicine Service, [facility] TEAM [address] [place] [address]  Securely message with the Vocera Web Console (learn more here)  Text page via [facility] Paging/Directory   Please see signed in provider for up to date coverage information      Clinically Significant Risk Factors Present on Admission                    ______________________________________________________________________    Interval History   Care team notes reviewed. No acute events. Diarrhea improved. No complaints today. Tolerating diet well. Denies pain. Slept well. .     4 pt ROS otherwise negative.     Data reviewed today: I reviewed all medications, new labs and imaging results over the last 24 hours.     Physical Exam   Vital Signs: Temp: 98.6  F (37  C) Temp src: Oral BP: 127/63 Pulse: 78   Resp: 16 SpO2: 99 % O2 Device: None (Room air)    Weight: 185 lbs 10.04 oz     Constitutional: lying in bed, NAD  Respiratory: non-labored breathing on RA  Cardiovascular: Regular rate and rhythm  Extremities: trace LE edema   Skin: warm and dry   : keating bag with dark yellow urine  Neuropsychiatric: calm, cooperative, oriented, restricted affect     Data   No results found for this or any previous visit (from the past 24 hour(s)).

## 2022-08-16 NOTE — PLAN OF CARE
Goal Outcome Evaluation:       Plan of Care Reviewed With: Patient      Overall Patient Progress: Ongoing      Outcome Evaluation: Pt is A & O x4. Mental status can change intermittently due to dementia. Affect very flat & pt withdrawn. Pt unmotivated to do activities. Declined lunch.  VSS on RA. Louie cares completed with output of 675 mL. Dried blood noted around meatus.

## 2022-08-16 NOTE — PLAN OF CARE
Goal Outcome Evaluation:    Plan of Care Reviewed With: patient     Overall Patient Progress: no change    Outcome Evaluation: Patient medically ready for discharge, awaiting tcu. No acute changes overnight. Denied pain, q2h repos, 1 bm overnight, light, pink tinged urine output noted, not leaking from keating insertion site. Continue POC.

## 2022-08-16 NOTE — PROGRESS NOTES
Care Management Follow Up      [facility]  [address]  [place]  [address]  P: [phone]  F: [phone]  8/15: SW received a call from Zahraa and she advised SW to send updated PT/OT notes from the weekend and also inquired if pt will need IV meds at discharge.       [facility]  [address]  [address]  P: [phone]  F: [phone]  8/16: No bed openings for at least 2 weeks.     [facility]  [address] [place] [address]  [phone]   8/16:CHW LVM for admissions to f/u on referral; requested they call SW back     Discontinued:  [facility]  [address]  [place], [place] [address] [phone]  8/12: Too high acuity. Cannot accept pt.       [facility]  [address] [place] [address] [place]  [address]  P: [phone]  F: [phone]   8/11: Declined. No appropriate bed.      [facility]  [address] [place]  [address]  P: [phone]  F: [phone]  8/9:Pt declined. No reason given on Epic.      [facility]   [address] [place] [address]   Phone: [phone]   Fax: [phone]   8/8: No current bed availability at this time and do not foresee an opening any time soon.     [facility]   [address] [place] [address].  [place]  [address]  P: [phone]  P: [phone] - Admissions  F: [phone]  8/8: Declined via Epic. Unable to accept pt with COVID positive status and no available bed at this time.     [facility]  [address] [place]  [address]  P: [phone]  F: [phone]  8/8 Pt declined. No appropriate bed for pt.  No LTC/memory care bed at this time.     [facility]:  Liaison: Nimco Fabian  8/9: Declined due to no beds. He also is still on special precautions so not medically ready per our TCU guidelines. Looks like his needs can be met at a community TCU    [facility]  Health Worker  [facility] 5A & 5B

## 2022-08-16 NOTE — PROGRESS NOTES
Care Management Follow Up    Length of Stay (days): 13    Expected Discharge Date: 08/19/2022     Concerns to be Addressed: discharge planning  Patient plan of care discussed at interdisciplinary rounds: Yes    Anticipated Discharge Disposition: TCU  Anticipated Discharge Services: TBD  Anticipated Discharge DME: TBD    Patient/family educated on Medicare website which has current facility and service quality ratings: Yes  Education Provided on the Discharge Plan:  yes  Patient/Family in Agreement with the Plan: yes    Referrals Placed by CM/SW:   SW followed up on referral:  [facility]  [address]  [place]  [address]  P: [phone]  F: [phone]  8/16: SW LVM with Zahraa in admissions to follow up on pts case.    [facility]  [address] [place]  [address]  P: [phone]  F: [phone]  8/16: No bed openings for at least 2 weeks.     [address] [place] [address]  [phone]   8/16:CHW LVM for admissions to f/u on referral; requested they call SW back.    [facility])   [address] [place] [address]   Phone: [phone]   Fax: [phone]   8/16: Previously denied but Arjun from admissions called SW to re-assess.  Asked SW to send updated clinical notes. @1619PM SW faxed updated clinical notes.     Discontinued:  [facility]  [address]  [place] [id number] [phone]  8/12: Too high acuity. Cannot accept pt.       [facility], [address] [place]  [address]  P: [phone]  F: [phone]   8/11: Declined. No appropriate bed.      [facility]  [address] [place]  [address]  P: [phone]  F: [phone]  8/9:Pt declined. No reason given on Epic.       [facility]   [address]  [place]  [address]  P: [phone]  P: [phone] - Admissions  F: [phone]  8/8: Declined  via Epic. Unable to accept pt with COVID positive status and no available bed at this time.     [facility]  [address] [place]  [address]  P: [phone]  F: [phone]  8/8 Pt declined. No appropriate bed for pt.  No LTC/memory care bed at this time.     [facility]:  Liaison: Nimco Bowel  8/9: Declined due to no beds. He also is still on special precautions so not medically ready per our TCU guidelines. Looks like his needs can be met at a community TCU     Private pay costs discussed: Not applicable    Additional Information:  @0900AM HERNANDEZ called and LVM for Zahraa in admissions to follow up on pts referral.     will continue to follow for discharge planning, support, and resources.    KAHLIL Jerez, [facility]  Unit 5A   Office: [phone]   Pager: [phone]  [email or web address]

## 2022-08-17 ENCOUNTER — APPOINTMENT (OUTPATIENT)
Dept: OCCUPATIONAL THERAPY | Facility: CLINIC | Age: 82
DRG: 640 | End: 2022-08-17
Payer: MEDICARE

## 2022-08-17 ENCOUNTER — APPOINTMENT (OUTPATIENT)
Dept: PHYSICAL THERAPY | Facility: CLINIC | Age: 82
DRG: 640 | End: 2022-08-17
Payer: MEDICARE

## 2022-08-17 PROCEDURE — 97530 THERAPEUTIC ACTIVITIES: CPT | Mod: GO

## 2022-08-17 PROCEDURE — 250N000013 HC RX MED GY IP 250 OP 250 PS 637: Performed by: INTERNAL MEDICINE

## 2022-08-17 PROCEDURE — 250N000013 HC RX MED GY IP 250 OP 250 PS 637: Performed by: STUDENT IN AN ORGANIZED HEALTH CARE EDUCATION/TRAINING PROGRAM

## 2022-08-17 PROCEDURE — 99231 SBSQ HOSP IP/OBS SF/LOW 25: CPT | Performed by: INTERNAL MEDICINE

## 2022-08-17 PROCEDURE — 250N000013 HC RX MED GY IP 250 OP 250 PS 637

## 2022-08-17 PROCEDURE — 97530 THERAPEUTIC ACTIVITIES: CPT | Mod: GP

## 2022-08-17 PROCEDURE — 120N000002 HC R&B MED SURG/OB UMMC

## 2022-08-17 PROCEDURE — 97116 GAIT TRAINING THERAPY: CPT | Mod: GP

## 2022-08-17 PROCEDURE — G0463 HOSPITAL OUTPT CLINIC VISIT: HCPCS

## 2022-08-17 RX ORDER — ACETAMINOPHEN 325 MG/1
650 TABLET ORAL EVERY 4 HOURS PRN
Status: DISCONTINUED | OUTPATIENT
Start: 2022-08-17 | End: 2022-08-24 | Stop reason: HOSPADM

## 2022-08-17 RX ADMIN — FUROSEMIDE 40 MG: 20 TABLET ORAL at 08:18

## 2022-08-17 RX ADMIN — DICLOFENAC SODIUM 2 G: 10 GEL TOPICAL at 13:31

## 2022-08-17 RX ADMIN — ACETAMINOPHEN 650 MG: 325 TABLET ORAL at 22:40

## 2022-08-17 RX ADMIN — DICLOFENAC SODIUM 2 G: 10 GEL TOPICAL at 16:02

## 2022-08-17 RX ADMIN — CARVEDILOL 12.5 MG: 12.5 TABLET, FILM COATED ORAL at 19:39

## 2022-08-17 RX ADMIN — Medication 25 MCG: at 08:22

## 2022-08-17 RX ADMIN — APIXABAN 5 MG: 5 TABLET, FILM COATED ORAL at 08:24

## 2022-08-17 RX ADMIN — GABAPENTIN 100 MG: 100 CAPSULE ORAL at 08:24

## 2022-08-17 RX ADMIN — DICLOFENAC SODIUM 2 G: 10 GEL TOPICAL at 10:25

## 2022-08-17 RX ADMIN — SPIRONOLACTONE 25 MG: 25 TABLET ORAL at 08:21

## 2022-08-17 RX ADMIN — ESCITALOPRAM OXALATE 10 MG: 10 TABLET ORAL at 08:22

## 2022-08-17 RX ADMIN — GABAPENTIN 100 MG: 100 CAPSULE ORAL at 19:39

## 2022-08-17 RX ADMIN — APIXABAN 5 MG: 5 TABLET, FILM COATED ORAL at 19:39

## 2022-08-17 RX ADMIN — Medication 3 CAPSULE: at 08:24

## 2022-08-17 RX ADMIN — ACETAMINOPHEN 650 MG: 325 TABLET ORAL at 13:30

## 2022-08-17 RX ADMIN — Medication 1000 MCG: at 08:22

## 2022-08-17 RX ADMIN — GABAPENTIN 100 MG: 100 CAPSULE ORAL at 13:30

## 2022-08-17 RX ADMIN — ALLOPURINOL 300 MG: 300 TABLET ORAL at 08:22

## 2022-08-17 RX ADMIN — DONEPEZIL HYDROCHLORIDE 5 MG: 5 TABLET, FILM COATED ORAL at 22:40

## 2022-08-17 RX ADMIN — RISPERIDONE 2 MG: 2 TABLET ORAL at 22:40

## 2022-08-17 RX ADMIN — CARVEDILOL 12.5 MG: 12.5 TABLET, FILM COATED ORAL at 08:18

## 2022-08-17 RX ADMIN — DICLOFENAC SODIUM 2 G: 10 GEL TOPICAL at 19:40

## 2022-08-17 RX ADMIN — RISPERIDONE 0.25 MG: 0.25 TABLET ORAL at 08:21

## 2022-08-17 RX ADMIN — LORATADINE 10 MG: 10 TABLET ORAL at 08:24

## 2022-08-17 RX ADMIN — ACETAMINOPHEN 325 MG: 325 TABLET, FILM COATED ORAL at 12:34

## 2022-08-17 RX ADMIN — ACETAMINOPHEN 325 MG: 325 TABLET, FILM COATED ORAL at 08:18

## 2022-08-17 ASSESSMENT — ACTIVITIES OF DAILY LIVING (ADL)
ADLS_ACUITY_SCORE: 58
ADLS_ACUITY_SCORE: 59
ADLS_ACUITY_SCORE: 55
ADLS_ACUITY_SCORE: 59
ADLS_ACUITY_SCORE: 58
ADLS_ACUITY_SCORE: 59
ADLS_ACUITY_SCORE: 58
ADLS_ACUITY_SCORE: 58
ADLS_ACUITY_SCORE: 59

## 2022-08-17 NOTE — CONSULTS
"Urology Consult    Name: William Almazan    MRN: [id number]   YOB: 1940               Chief Complaint:   Chronic keating, hematuria, leakage around cateter    History is obtained from the patient and chart review          History of Present Illness:      \"William Almazan is a 81 year old male admitted on 8/2/2022. He has a history of CAD s/p CABG, HFrEF, pAfib on apixaban, HTN, HLD, DM2, KRISTIAN, prostate Ca, gout, and dementia who was admitted for weakness and hypotension.\" Has urologic history of chronic keating catheter for urinary retention, follows with kanika urologist. Urology consulted for hematuria following catheter placement, leakage around catheter.    Patient's daughter called who participated in conversation.     Mr. Almazan had hematuria following catheter placement a few days ago. Has occurred previously, has self resolved, he has not had a recent cystoscopy. His catheter is draining well. He leaks occassionally around the catheter but this has not bothered him. His catheter is draining well.            Past Medical History:     Past Medical History:   Diagnosis Date     Infection due to 2019 novel coronavirus             Past Surgical History:   No past surgical history on file.         Social History:     Social History     Tobacco Use     Smoking status: Not on file     Smokeless tobacco: Not on file   Substance Use Topics     Alcohol use: Not on file            Family History:   No family history on file.         Allergies:   No Known Allergies         Medications:     Current Facility-Administered Medications   Medication     acetaminophen (TYLENOL) tablet 325 mg     allopurinol (ZYLOPRIM) tablet 300 mg     apixaban ANTICOAGULANT (ELIQUIS) tablet 5 mg     carvedilol (COREG) tablet 12.5 mg     cyanocobalamin (VITAMIN B-12) tablet 1,000 mcg     diclofenac (VOLTAREN) 1 % topical gel 2 g     donepezil (ARICEPT) tablet 5 mg     escitalopram (LEXAPRO) tablet 10 mg     furosemide (LASIX) tablet " "40 mg     gabapentin (NEURONTIN) capsule 100 mg     lidocaine (LMX4) cream     lidocaine 1 % 0.1-1 mL     loratadine (CLARITIN) tablet 10 mg     [Held by provider] magnesium oxide (MAG-OX) tablet 400 mg     melatonin tablet 1 mg     ondansetron (ZOFRAN) tablet 4 mg     Patient is already receiving anticoagulation with heparin, enoxaparin (LOVENOX), warfarin (COUMADIN)  or other anticoagulant medication     [Held by provider] polyethylene glycol (MIRALAX) Packet 17 g     psyllium (METAMUCIL/KONSYL) capsule 3 capsule     risperiDONE (risperDAL) tablet 0.25 mg     risperiDONE (risperDAL) tablet 2 mg     sennosides (SENOKOT) tablet 1 tablet     sodium chloride (PF) 0.9% PF flush 3 mL     sodium chloride (PF) 0.9% PF flush 3 mL     spironolactone (ALDACTONE) tablet 25 mg     Vitamin D3 (CHOLECALCIFEROL) tablet 25 mcg             Review of Systems:    ROS: 10 point ROS neg other than the symptoms noted above in the HPI           Physical Exam:   VS:  T: 98.2    HR: 81    BP: 117/74    RR: 18   GEN:  AOx3.  NAD.    CV:  RRR  LUNGS: Non-labored breathing.   ABD:  Soft.  NT.  ND.  No rebound or guarding.  No masses.  Louie draining clear yellow urine with some debris, red urine in bag            Data:   All laboratory data reviewed:    No lab results found in last 7 days.  Recent Labs   Lab 08/15/22  0723 08/13/22  0736 08/11/22  0705    141 139   POTASSIUM 4.5 4.5 4.6   CHLORIDE 101 104 103   CO2 29 31* 29   BUN 24.3* 24.9* 19.2   CR 0.58* 0.61* 0.62*   * 95 94   CRYS 8.9 9.1 8.9   MAG 1.9  --   --      No lab results found in last 7 days.    Invalid input(s): URINEBLOOD    All pertinent imaging reviewed:    CT scan of the abdomen:        Renal ultrasound:               Impression and Plan:   Impression:   \"William Almazan is a 81 year old male admitted on 8/2/2022. He has a history of CAD s/p CABG, HFrEF, pAfib on apixaban, HTN, HLD, DM2, KRISTIAN, prostate Ca, gout, and dementia who was admitted for weakness and " "hypotension.\" Has urologic history of chronic keating catheter for urinary retention, follows with kanika urologist. Urology consulted for hematuria following catheter placement, leakage around catheter.    1) Leakage  -this is normal with a catheter as long as it is still draining appropriately, if it is not bothering him there is no indication for medical management of his bladder spasms    2) Hematuria  -likely related to anticoagulation and trauma from catheter exchange, discussed option for cystoscopy, patient and daughter declined         Plan:  -follow with kanika urologist  -if he has symptomatic bladder spasms that are bothersome ensure catheter draining appropraitely and add B&O suppositories  -urology will s/o, please page/call with questions     This patient's exam findings, labs, and imaging discussed with urology staff surgeon Dr. Shiva Pearl MD, who developed the treatment plan.    Franklin Cordoba MD  Urology Resident             "

## 2022-08-17 NOTE — PROGRESS NOTES
"[facility] Nurse Inpatient Assessment     Consulted for: sacrum actually was the coccyx and bilateral lower buttocks     Patient History (according to provider note(s):      Per Dr Parish Flaherty on 6/30/2022: William Almazan is a 81 year old male with history of CAD s/p CABG, dilated cardiomyopathy, HFrEF, PAF on eliquis, HTN, HLD, DM2, KRISTIAN, prostate cancer, gout, and dementia who presented with worsening auditory hallucinations.    Areas Assessed:      Areas visualized during today's visit: Sacrum/coccyx    Wound Location:  Coccyx    8/4 and 8/11      Date of last photo 8/17  Wound History: pressure and IAD weakness present on admission  Stage 3  Wound Base: 100 % granulation tissue     Palpation of the wound bed: normal      Drainage: scant     Description of drainage: serous     Measurements (length x width x depth, in cm) 0.8 x 0.6 x 0.2 cm   Periwound skin: dry/scaly      Color: dusky      Temperature: normal   Odor: none  Pain: no grimacing or signs of discomfort, none     L great toe wound noted. Podiatry has been consulted and ordered x ray, per note planning on rounding on pt sometime today (Thursday)     Moisture associated skin damage noted to bilateral groin, macerated and pink, tender per pt--improving        Treatment Plan:     Coccyx wound(s): Every 3 days   Cleanse wound with saline or vashe and dry   Apply Cavilon barrier film to skin around wound and let dry   Cover with sacral mepilex, fits area better \"upside down\"   Turns q2hr from side to side, avoid supine position as much as possible  HOB less than 30 degrees as able   Lift foot of bed prior to lifting head of bed to reduce shearing to sacrum     Bilateral groin cares: Daily and PRN cleanse skin with saline and dry thoroughly, then place interdry AG ([id number]) into skin fold, ensure at least a 2 inch wick remains outside of fold to allow for wicking action of textile. Remove for cares and " replace same piece if not soiled, can use same piece up to 5 days if not soiled with urine or stool due to silver impregnated within textile.       Orders: Written    RECOMMEND PRIMARY TEAM ORDER: None, at this time  Education provided: plan of care, Moisture management and Off-loading pressure  Discussed plan of care with: Nurse  WOC nurse follow-up plan: weekly  Notify WOC if wound(s) deteriorate.  Nursing to notify the Provider(s) and re-consult the WOC Nurse if new skin concern.    DATA:     Current support surface: Standard  pulsate mattress  Containment of urine/stool: Incontinence Protocol  BMI: Body mass index is 27.25 kg/m .   Active diet order: Orders Placed This Encounter      Regular Diet Adult     Output: I/O last 3 completed shifts:  In: 720 [P.O.:720]  Out: 1425 [Urine:1425]     Labs:   No lab results found in last 7 days.    Invalid input(s): GLUCOMBO  Pressure injury risk assessment:   Sensory Perception: 3-->slightly limited  Moisture: 3-->occasionally moist  Activity: 2-->chairfast  Mobility: 2-->very limited  Nutrition: 3-->adequate  Friction and Shear: 1-->problem  John Score: 14     Dept. Pager: [phone]

## 2022-08-17 NOTE — PLAN OF CARE
Goal Outcome Evaluation:    Plan of Care Reviewed With: patient     Overall Patient Progress: no change    Outcome Evaluation: Pt medically ready for discharge to TCU. Urine maroon early in shift but cleared as shift progressed. No BM today, senna given. C/o knee pain, tylenol given. Ate well. Turning every 2 hours. Rooke boots on

## 2022-08-17 NOTE — PROGRESS NOTES
Care Management Follow Up    [facility]  [address]  [place]  [address]  P: [phone]  F: [phone]  8/16: SW LVM with Zahraa in admissions to follow up on pts case.     [facility]  [address] [place]  [address]  P: [phone]  F: [phone]  8/17: No bed openings for at least 2 weeks.     [facility]  [address] [place] [address]  [phone]   8/17:CHW LVM for admissions to f/u on referral; requested they call SW back.     [facility]   [address] [facility] [address]   Phone: [phone]   Fax: [phone]   8/16: Previously denied but Arjun from admissions called SW to re-assess.  Asked SW to send updated clinical notes. @1619PM SW faxed updated clinical notes.   8/17: CHW LVM for admissions to f/u on referral; requested they call SW back.       Discontinued:  [facility]  [address]  [place] [address]  [phone]  8/12: Too high acuity. Cannot accept pt.       [facility], [address] [place]  [address]  P: [phone]  F: [phone]   8/11: Declined. No appropriate bed.      [facility]  [address] [place]  [address]  P: [phone]  F: [phone]  8/9:Pt declined. No reason given on Epic.       [facility]   [address]  [place]  [address]  P: [phone]  P: [phone] - Admissions  F: [phone]  8/8: Declined via Epic. Unable to accept pt with COVID positive status and no available bed at this time.     [facility][1]  [address] [place]  [address]  P: [phone]  F: [phone]  8/8 Pt declined. No appropriate bed for pt.  No LTC/memory care bed at this time    Philip Stuart   Inpatient Community Health Worker  [facility] 5A & 5B

## 2022-08-17 NOTE — PROGRESS NOTES
Care Management Follow Up    Length of Stay (days): 14    Expected Discharge Date: 08/19/2022     Concerns to be Addressed: discharge planning    Patient plan of care discussed at interdisciplinary rounds: Yes    Anticipated Discharge Disposition: TCU  Anticipated Discharge Services: None  Anticipated Discharge DME: None    Patient/family educated on Medicare website which has current facility and service quality ratings: Yes  Education Provided on the Discharge Plan:  Yes  Patient/Family in Agreement with the Plan: Yes    Referrals Placed by CM/SW:  [facility]  [address]  [place]  [address]  P: [phone]  F: [phone]  8/17: SW LVM with Zahraa in admissions to follow up on pts case.     [facility]  [address] [place]  [address]  P: [phone]  F: [phone]  8/17: No bed openings for at least 2 weeks.     [place] [id number] [facility] [place] [address]  [phone]   8/17:CHW LVM for admissions to f/u on referral; requested they call SW back.     [facility]   [address] [facility] [address]   Phone: [phone]   Fax: [phone]   8/16: Previously denied but Arjun from admissions called SW to re-assess.  Asked SW to send updated clinical notes. @1619PM SW faxed updated clinical notes.   8/17: CHW LVM for admissions to f/u on referral; requested they call SW back.        Discontinued:  [facility]  [address]  [place] [address]  [phone]  8/12: Too high acuity. Cannot accept pt.       [facility] [place], [address] [place]  [address]  P: [phone]  F: [phone]   8/11: Declined. No appropriate bed.      [facility]  [address] [place]  [address]  P: [phone]  F: [phone]  8/9:Pt declined. No reason given on Epic.       [facility]   [address] [place] [address] [place]  [place]  [address]  P:  [phone]  P: [phone] - Admissions  F: [phone]  8/8: Declined via Epic. Unable to accept pt with COVID positive status and no available bed at this time.     [facility]  [address] [place]  [address]  P: [phone]  F: [phone]  8/8 Pt declined. No appropriate bed for pt.  No LTC/memory care bed at this time      Private pay costs discussed: Not applicable    Additional Information:  CHW/HERNANDEZ [place] followed up on referrals.     will continue to follow for discharge planning, support, and resources.    KAHLIL Jerez, [facility]  [address]   Office: [phone]   Pager: [phone]  [email or web address]

## 2022-08-17 NOTE — PLAN OF CARE
"Goal Outcome Evaluation:    Plan of Care Reviewed With: patient     Overall Patient Progress: no change    Outcome Evaluation: Pt has been A&O overnight, sleeping between cares.  Pleasant but quiet affect.  Denies pain, turned/repo done Q2hrs, rooke boots in place.  Keating patent, cares done this AM, UOP overnight has been pink/red in color, MD paged with update - see provider notification note, did note 1 medium sized clot when emptying keating bag this AM, pt denies any pain/discomfort at keating site.  PIV saline locked.  TCU placement pending.  Will continue to monitor     /74 (BP Location: Right arm)   Pulse 81   Temp 98.2  F (36.8  C) (Oral)   Resp 18   Ht 1.753 m (5' 9\")   Wt 78.8 kg (173 lb 11.2 oz)   SpO2 95%   BMI 25.65 kg/m          "

## 2022-08-17 NOTE — PLAN OF CARE
Goal Outcome Evaluation:    Plan of Care Reviewed With: patient     Overall Patient Progress: no change    Outcome Evaluation: Alert, withdrawn and quiet. A2 in bed. C/o L knee pain, voltaren gel applied. On regular diet with poor appetite. Mepilex in place on sacral wound. Pink tinged urine output from keating.

## 2022-08-17 NOTE — PROGRESS NOTES
[facility]    Medicine Progress Note - Medicine Service, MARBETTY TEAM 1    Date of Admission:  8/2/2022    Assessment & Plan        William Almazan is a 81 year old male admitted on 8/2/2022. He has a history of CAD s/p CABG, HFrEF, pAfib on apixaban, HTN, HLD, DM2, KRISTIAN, prostate Ca, gout, and dementia who was admitted for weakness and hypotension.    Today's updates:  - Urology consulted for help with management of hematuria   - Medically stable for discharge pending placement  - Continue lasix 40 mg daily    - Continue strict I/Os and daily weights  - Continue PT/OT    ----------------------------------------------------------------------------------------------------    Presyncope  Generalized weakness - improving   Acute hypotension 2/2 hypovolemia - resolved  Reported SBPs in 60s at home w/ inability to walk/ shower. No associated presyncopal, cardiac, or respiratory sxs. Did not fall or have LOC. Bps improved after 500 ml bolus PTA. Arrived lethargic but oriented x4. Initial concern for sepsis on arrival (given lymphopenia, lactate 3.1 and procal of 0.26) and started on IV Vanc and Zosyn. However, pt's lymphopenia appears to be chronic (unknown etiology) and no other SIRS criteria met (afebrile, normal RR, normal HR) so antibiotics discontinued. Lymphopenia, lactate, and procal improved with hydration (1.5 L total), which is reassuring. Suspect pre-syncope 2/2 hypovolemia (2/2 dehydration +hyperdiuresis) vs deconditioning after recent month-long hospitalization. As for infectious sources, is COVID positive but satting 97% on room air. Initial UA was dirty. Exchanged keating and repeat Ucx NGTD.   -continue 40 mg lasix daily   -I/Os and daily weights - net negative 800 ml 8/16  -BMP every few days    Urinary retention, chronic   C/f keating dysfunction   Chronic, and has chronic keating in place. Wife reports that it is changed monthly and is due for a change.   -  Keating changed 8/3. Changed again 8/15 due to hematuria and leaking around keating. Has since had consistent hematuria--sahra colored to dark maroon.   - Consult urology 8/17 for ongoing hematuria.     Loose stools  Improved with holding miralax, mg ox.   - continue to hold miralax, mag ox today   - BMP+Mg in AM    Failure to thrive  Malnutrition  --PT and OT consulted--> recommending TCU   --Nutrition consulted-->rec Ensure Enlive x 2 for supplementation   --Calorie counts complete    Prerenal TONE, resolved  Cr 1.28 (BL 0.7) on arrival--> resolved to baseline after 1.5 L NS. Given this drastic improvement, TONE likely 2/2 prerenal hypovolemia, potentially from hyperdiuresis. Patient was on lasix 80 PO BID prior to last admission and was discharged on 40 mg BID out of concern dose was too high.   - Lasix as above     Chronic HFrEF  pAF s/p dual chamber ICD  Elevated Troponin - downtrending   First degree AV block  Hx dilated cardiomyopathy, most recent TTE 6/18/21 showing severely dilated LV with EF 20%. Currently euvolemic on exam. No hypoxia and CXR negative for cardiopulm concern. Apparently I&Os were in neg balance at recent hospitalization concerning for lasix dosing being too high. Troponin elevated on arrival, now downtrending on recheck. Likely 2/2 demand ischemia. Symptomatic bradycardic 8/4 AM w/ first degree AV block noted on EKG that appears to be chronic.   - Repeat ECHO 8/3 without change compared to 6/8/21  - Cards consulted regarding bradycardia, ICD interrogated, no further workup needed  - PTA coreg 12.5 mg PO BID   - Daily weights, I&Os, lasix as above     Toe injury - left  Toenail injured in shower. Some dried blood around the toenail and toe is painful on exam but no current bleeding or opened wounds.   - Ortho called to discuss pt. L foot XR ordered--negative for fracture  - Referral to podiatry outpatient on discharge     Lactic acidosis - resolved  Lactate 3.1, improved to 2.1 with IVF in ED.  Suspect hypovolemia-driven.     Hypokalemia - resolved  Improved with repletion, 2/2 lasix, loose stools.     COVID + - recovered  Acquired at recent admission (7/30 first positive), unclear significance to current presentation. Did not receive monoclonal antibodies during admission. Satting 97% on room air without respiratory distress, CXR unremarkable. COVID vaccinated x 3 (Pfizer). Never developed hypoxia.   - off precautions now       CHRONIC CONDITIONS:  Auditory hallucinations, Dementia with behavioral health disturbances.   Admitted 06/2022 with progressive auditory hallucinations, including self harm command hallucinations. Seen by neuro and psych, started on psychotropic meds and improved significantly. Started on risperdol, donepezil, gabapentin, and lexapro. Denies hallucinations currently. Required a 1:1 previously but was d/c'd two weeks prior to discharge; no current behavioral concerns requiring a 1:1.  - PTA risperdal  - PTA donepezil  - PTA gabapentin  - PTA lexapro    Full thickness rectal prolapse, initially noted 7/15  Concern on recent admission, full-thickness but reducible, intermitted bleeding with bowel movements but Hgb stable 10-11. CRS recommend fiber and avoiding constipation, follow up with surgery outpt in 4-6 weeks.  - PTA psyllium fiber 3 capsule daily, MiraLAX 17 g daily and senna 1-2 tabs bid pr    CAD s/p CABG (1992):    Not ASA or statin at home. No acute concerns. EKG dual paced rhythm with frequent PVCs, no evidence of ACS.   - PTA Coreg    Tremor, whole body  New during recent admission, neuro thought to be stress related.  - PTA B12     PAF: Not currently in Afib. PTA Eliquis 5 mg PO BI  T2DM: Diet controlled  KRISTIAN: Does not use CPAP at home  Gout: PTA allopurinol       Diet: Regular Diet Adult  Snacks/Supplements Adult: Ensure Enlive; With Meals  Room Service Regular  DVT Prophylaxis: PTA apixaban   Louie Catheter: PRESENT, indication: Retention, Retention  Central Lines:  None  Cardiac Monitoring: None  Code Status: Full Code      Disposition Plan      Expected Discharge Date: approaching medical stability to discharge, would start TCU search process, pending TCU availability      Destination: TCU        The patient's care was discussed with the Patient.    Leland Cottrell MD  Medicine Service, MAROON TEAM 1  [facility]  Securely message with the Vocera Web Console (learn more here)  Text page via [facility] Paging/Directory   Please see signed in provider for up to date coverage information      Clinically Significant Risk Factors Present on Admission                    ______________________________________________________________________    Interval History   Care team notes reviewed. No acute events. Continues to have bloody urine. Complaining of some knee pain. Tolerating diet well. Denies pain. Slept well.     4 pt ROS otherwise negative.     Data reviewed today: I reviewed all medications, new labs and imaging results over the last 24 hours.     Physical Exam   Vital Signs: Temp: 98.2  F (36.8  C) Temp src: Oral BP: 117/74 Pulse: 81   Resp: 18 SpO2: 95 % O2 Device: None (Room air)    Weight: 173 lbs 11.2 oz     Constitutional: lying in bed, NAD  Respiratory: non-labored breathing on RA  Cardiovascular: Regular rate and rhythm  Extremities: trace LE edema   Skin: warm and dry   : keating bag with dark maroon urine  Neuropsychiatric: calm, cooperative, oriented, restricted affect     Data   No results found for this or any previous visit (from the past 24 hour(s)).

## 2022-08-17 NOTE — PLAN OF CARE
Goal Outcome Evaluation:    Plan of Care Reviewed With: patient     Overall Patient Progress: declining    Outcome Evaluation: Uine output today red in color with some small clots.  Flow does not appear to be impaired at this time.  Urology consult pending.  Did not tolerate activity very well today. Max assist of 2 for standing or lift.  Complained of knee pain.  Patient very focused on this today.  Ice applied, voltaen gel ordered, and tylenol dose increased.  Declined lunch today. Dressing on coccyx changed by [facility] this afternoon.  Rooke boots on bilateral feet.

## 2022-08-18 ENCOUNTER — APPOINTMENT (OUTPATIENT)
Dept: OCCUPATIONAL THERAPY | Facility: CLINIC | Age: 82
DRG: 640 | End: 2022-08-18
Payer: MEDICARE

## 2022-08-18 LAB
ANION GAP SERPL CALCULATED.3IONS-SCNC: 7 MMOL/L (ref 7–15)
BUN SERPL-MCNC: 23.6 MG/DL (ref 8–23)
CALCIUM SERPL-MCNC: 9 MG/DL (ref 8.8–10.2)
CHLORIDE SERPL-SCNC: 103 MMOL/L (ref 98–107)
CREAT SERPL-MCNC: 0.59 MG/DL (ref 0.67–1.17)
DEPRECATED HCO3 PLAS-SCNC: 28 MMOL/L (ref 22–29)
GFR SERPL CREATININE-BSD FRML MDRD: >90 ML/MIN/1.73M2
GLUCOSE SERPL-MCNC: 87 MG/DL (ref 70–99)
HOLD SPECIMEN: NORMAL
MAGNESIUM SERPL-MCNC: 1.9 MG/DL (ref 1.7–2.3)
POTASSIUM SERPL-SCNC: 4.3 MMOL/L (ref 3.4–5.3)
SODIUM SERPL-SCNC: 138 MMOL/L (ref 136–145)

## 2022-08-18 PROCEDURE — 36415 COLL VENOUS BLD VENIPUNCTURE: CPT | Performed by: INTERNAL MEDICINE

## 2022-08-18 PROCEDURE — 250N000013 HC RX MED GY IP 250 OP 250 PS 637: Performed by: STUDENT IN AN ORGANIZED HEALTH CARE EDUCATION/TRAINING PROGRAM

## 2022-08-18 PROCEDURE — 250N000013 HC RX MED GY IP 250 OP 250 PS 637: Performed by: INTERNAL MEDICINE

## 2022-08-18 PROCEDURE — 83735 ASSAY OF MAGNESIUM: CPT | Performed by: INTERNAL MEDICINE

## 2022-08-18 PROCEDURE — 250N000013 HC RX MED GY IP 250 OP 250 PS 637

## 2022-08-18 PROCEDURE — 99231 SBSQ HOSP IP/OBS SF/LOW 25: CPT | Performed by: INTERNAL MEDICINE

## 2022-08-18 PROCEDURE — 82310 ASSAY OF CALCIUM: CPT | Performed by: INTERNAL MEDICINE

## 2022-08-18 PROCEDURE — 97530 THERAPEUTIC ACTIVITIES: CPT | Mod: GO

## 2022-08-18 PROCEDURE — 97535 SELF CARE MNGMENT TRAINING: CPT | Mod: GO

## 2022-08-18 PROCEDURE — 120N000002 HC R&B MED SURG/OB UMMC

## 2022-08-18 RX ADMIN — DICLOFENAC SODIUM 2 G: 10 GEL TOPICAL at 14:49

## 2022-08-18 RX ADMIN — RISPERIDONE 0.25 MG: 0.25 TABLET ORAL at 08:24

## 2022-08-18 RX ADMIN — APIXABAN 5 MG: 5 TABLET, FILM COATED ORAL at 19:41

## 2022-08-18 RX ADMIN — ACETAMINOPHEN 650 MG: 325 TABLET ORAL at 08:24

## 2022-08-18 RX ADMIN — DICLOFENAC SODIUM 2 G: 10 GEL TOPICAL at 08:25

## 2022-08-18 RX ADMIN — RISPERIDONE 2 MG: 2 TABLET ORAL at 21:18

## 2022-08-18 RX ADMIN — GABAPENTIN 100 MG: 100 CAPSULE ORAL at 14:49

## 2022-08-18 RX ADMIN — Medication 25 MCG: at 08:24

## 2022-08-18 RX ADMIN — DICLOFENAC SODIUM 2 G: 10 GEL TOPICAL at 19:41

## 2022-08-18 RX ADMIN — Medication 3 CAPSULE: at 08:24

## 2022-08-18 RX ADMIN — SPIRONOLACTONE 25 MG: 25 TABLET ORAL at 08:24

## 2022-08-18 RX ADMIN — ACETAMINOPHEN 650 MG: 325 TABLET ORAL at 14:49

## 2022-08-18 RX ADMIN — ESCITALOPRAM OXALATE 10 MG: 10 TABLET ORAL at 08:24

## 2022-08-18 RX ADMIN — DONEPEZIL HYDROCHLORIDE 5 MG: 5 TABLET, FILM COATED ORAL at 21:18

## 2022-08-18 RX ADMIN — Medication 1000 MCG: at 08:24

## 2022-08-18 RX ADMIN — ALLOPURINOL 300 MG: 300 TABLET ORAL at 08:25

## 2022-08-18 RX ADMIN — DICLOFENAC SODIUM 2 G: 10 GEL TOPICAL at 17:07

## 2022-08-18 RX ADMIN — LORATADINE 10 MG: 10 TABLET ORAL at 08:24

## 2022-08-18 RX ADMIN — GABAPENTIN 100 MG: 100 CAPSULE ORAL at 08:25

## 2022-08-18 RX ADMIN — CARVEDILOL 12.5 MG: 12.5 TABLET, FILM COATED ORAL at 19:41

## 2022-08-18 RX ADMIN — GABAPENTIN 100 MG: 100 CAPSULE ORAL at 19:41

## 2022-08-18 RX ADMIN — APIXABAN 5 MG: 5 TABLET, FILM COATED ORAL at 08:24

## 2022-08-18 RX ADMIN — FUROSEMIDE 40 MG: 20 TABLET ORAL at 08:25

## 2022-08-18 RX ADMIN — CARVEDILOL 12.5 MG: 12.5 TABLET, FILM COATED ORAL at 08:25

## 2022-08-18 ASSESSMENT — ACTIVITIES OF DAILY LIVING (ADL)
ADLS_ACUITY_SCORE: 59

## 2022-08-18 NOTE — PROGRESS NOTES
Care Management Follow Up    Length of Stay (days): 15    Expected Discharge Date: 08/22/2022     Concerns to be Addressed:discharge planning  Patient plan of care discussed at interdisciplinary rounds: Yes    Anticipated Discharge Disposition: TCU  Anticipated Discharge Services: None  Anticipated Discharge DME: None    Patient/family educated on Medicare website which has current facility and service quality ratings: Yes  Education Provided on the Discharge Plan: Yes   Patient/Family in Agreement with the Plan: yes    Referrals Placed by CM/SW:   [facility]  [address]  [place]  [address]  P: [phone]  F: [phone]  8/18: SW LVM with Zahraa in admissions.    [facility]  [address] [facility] [place]  [address]  P: [phone]  F: [phone]  8/18: No bed openings for at least 2 weeks.     [facility]  [address] [place] [address]  [phone]   8/18:CHW LVM for admissions to f/u on referral; requested they call SW back.     Discontinued:  [facility]   [address] [facility] [address]   Phone: [phone]   Fax: [phone]   8/18: CHW spoke with admissions and they do not have any beds available currently. Zainab also mentioned they do not have any discharges in the foreseeable future.     [facility]  [address]  [place] [id number] [phone]  8/12: Too high acuity. Cannot accept pt.       [facility]  [address] [place]  [address]  P: [phone]  F: [phone]   8/11: Declined. No appropriate bed.      [facility]  [address] [place]  [address]  P: [phone]  F: [phone]  8/9:Pt declined. No reason given on Epic.       [facility]   [address] [place]  [address]  P: [phone]  P: [phone] - Admissions  F: [phone]  8/8: Declined via Epic. Unable to accept pt with COVID  positive status and no available bed at this time.     [facility]  [address] [place]  [address]  P: [phone]  F: [phone]  8/8 Pt declined. No appropriate bed for pt.  No LTC/memory care bed at this time    Private pay costs discussed: Not applicable    Additional Information:  CHW/SW followed up on referrals.    SW called pts spouse [place] to discuss discharge planning.  [place] asked pts daughter to be on the phone call as well and SW informed them of the facilities that have declined. SW informed them that we will need more TCU choices.  SW educated them again on the Medicare.gov site and how to navigate the website.  Pts daughter and spouse report that they will take a look at the website and make more choices.  SW advised them to attempt to have at least 10 choices.  They will call back SW with choices.     will continue to follow for discharge planning, support, and resources.    KAHLIL Jerez, LGSW  [address]   Office: [phone]   Pager: [phone]  [email or web address]

## 2022-08-18 NOTE — PROGRESS NOTES
[facility]    Medicine Progress Note - Medicine Service, MARBETTY TEAM 1    Date of Admission:  8/2/2022    Assessment & Plan        William Almazan is a 81 year old male admitted on 8/2/2022. He has a history of CAD s/p CABG, HFrEF, pAfib on apixaban, HTN, HLD, DM2, KRISTIAN, prostate Ca, gout, and dementia who was admitted for weakness and hypotension.    Today's updates:  - Medically stable for discharge pending placement  - Continue lasix 40 mg daily    - Continue strict I/Os and daily weights  - Continue PT/OT    ----------------------------------------------------------------------------------------------------    Presyncope  Generalized weakness - improving   Acute hypotension 2/2 hypovolemia - resolved  Reported SBPs in 60s at home w/ inability to walk/ shower. No associated presyncopal, cardiac, or respiratory sxs. Did not fall or have LOC. Bps improved after 500 ml bolus PTA. Arrived lethargic but oriented x4. Initial concern for sepsis on arrival (given lymphopenia, lactate 3.1 and procal of 0.26) and started on IV Vanc and Zosyn. However, pt's lymphopenia appears to be chronic (unknown etiology) and no other SIRS criteria met (afebrile, normal RR, normal HR) so antibiotics discontinued. Lymphopenia, lactate, and procal improved with hydration (1.5 L total), which is reassuring. Suspect pre-syncope 2/2 hypovolemia (2/2 dehydration +hyperdiuresis) vs deconditioning after recent month-long hospitalization. As for infectious sources, is COVID positive but satting 97% on room air. Initial UA was dirty. Exchanged keating and repeat Ucx NGTD.   -continue 40 mg lasix daily   -I/Os and daily weights - net negative 900 ml in past 24 hours  -BMP every few days    Urinary retention, chronic   C/f keating dysfunction   Chronic, and has chronic keating in place. Wife reports that it is changed monthly and is due for a change. Keating changed 8/3. Changed again 8/15 due to  hematuria and leaking around keating. Has since had consistent hematuria--sahra colored to dark maroon.   - Urology consulted 8/17. They felt leakage was normal for chronicity of keating and that hematuria was traumatic and would resolve. They recommended:     follow with [facility]rologist    if he has symptomatic bladder spasms that are bothersome ensure catheter draining appropraitely and add B&O suppositories    Loose stools  Improved with holding miralax, mg ox.   - continue to hold miralax, mag ox today   - BMP+Mg in AM    Failure to thrive  Malnutrition  --PT and OT consulted--> recommending TCU   --Nutrition consulted-->rec Ensure Enlive x 2 for supplementation   --Calorie counts complete    Prerenal TONE, resolved  Cr 1.28 (BL 0.7) on arrival--> resolved to baseline after 1.5 L NS. Given this drastic improvement, TONE likely 2/2 prerenal hypovolemia, potentially from hyperdiuresis. Patient was on lasix 80 PO BID prior to last admission and was discharged on 40 mg BID out of concern dose was too high.   - Lasix as above     Chronic HFrEF  pAF s/p dual chamber ICD  Elevated Troponin - downtrending   First degree AV block  Hx dilated cardiomyopathy, most recent TTE 6/18/21 showing severely dilated LV with EF 20%. Currently euvolemic on exam. No hypoxia and CXR negative for cardiopulm concern. Apparently I&Os were in neg balance at recent hospitalization concerning for lasix dosing being too high. Troponin elevated on arrival, now downtrending on recheck. Likely 2/2 demand ischemia. Symptomatic bradycardic 8/4 AM w/ first degree AV block noted on EKG that appears to be chronic.   - Repeat ECHO 8/3 without change compared to 6/8/21  - Cards consulted regarding bradycardia, ICD interrogated, no further workup needed  - PTA coreg 12.5 mg PO BID   - Daily weights, I&Os, lasix as above     Toe injury - left  Toenail injured in shower. Some dried blood around the toenail and toe is painful on exam but no current bleeding or  opened wounds.   - Ortho called to discuss pt. L foot XR ordered--negative for fracture  - Referral to podiatry outpatient on discharge     Lactic acidosis - resolved  Lactate 3.1, improved to 2.1 with IVF in ED. Suspect hypovolemia-driven.     Hypokalemia - resolved  Improved with repletion, 2/2 lasix, loose stools.     COVID + - recovered  Acquired at recent admission (7/30 first positive), unclear significance to current presentation. Did not receive monoclonal antibodies during admission. Satting 97% on room air without respiratory distress, CXR unremarkable. COVID vaccinated x 3 (Pfizer). Never developed hypoxia.   - off precautions now     Left knee pain  Chronic, intermittent pain. Previously had surgery on this knee. Responded to diclofenac gel and tylenol      CHRONIC CONDITIONS:  Auditory hallucinations, Dementia with behavioral health disturbances.   Admitted 06/2022 with progressive auditory hallucinations, including self harm command hallucinations. Seen by neuro and psych, started on psychotropic meds and improved significantly. Started on risperdol, donepezil, gabapentin, and lexapro. Denies hallucinations currently. Required a 1:1 previously but was d/c'd two weeks prior to discharge; no current behavioral concerns requiring a 1:1.  - PTA risperdal  - PTA donepezil  - PTA gabapentin  - PTA lexapro    Full thickness rectal prolapse, initially noted 7/15  Concern on recent admission, full-thickness but reducible, intermitted bleeding with bowel movements but Hgb stable 10-11. CRS recommend fiber and avoiding constipation, follow up with surgery outpt in 4-6 weeks.  - PTA psyllium fiber 3 capsule daily, MiraLAX 17 g daily and senna 1-2 tabs bid pr    CAD s/p CABG (1992):    Not ASA or statin at home. No acute concerns. EKG dual paced rhythm with frequent PVCs, no evidence of ACS.   - PTA Coreg    Tremor, whole body  New during recent admission, neuro thought to be stress related.  - PTA B12     PAF: Not  currently in Afib. PTA Eliquis 5 mg PO BI  T2DM: Diet controlled  KRISTIAN: Does not use CPAP at home  Gout: PTA allopurinol       Diet: Regular Diet Adult  Snacks/Supplements Adult: Ensure Enlive; With Meals  Room Service Regular  DVT Prophylaxis: PTA apixaban   [place] Catheter: PRESENT, indication: Retention, Retention  Central Lines: None  Cardiac Monitoring: None  Code Status: Full Code      Disposition Plan      Expected Discharge Date: approaching medical stability to discharge, would start TCU search process, pending TCU availability      Destination: TCU        The patient's care was discussed with the Patient.    Leland Cottrell MD  Medicine Service, MAROON TEAM [address] [place] [address]  Securely message with the Vocera Web Console (learn more here)  Text page via [employer] [facility] Paging/Directory   Please see signed in provider for up to date coverage information      Clinically Significant Risk Factors Present on Admission                    ______________________________________________________________________    Interval History   Care team notes reviewed. Pt complained of left knee pain yesterday. Responded to diclofenac gel and tylenol. Continues to have bloody urine.  Tolerating diet well. Denies pain. Slept well.     4 pt ROS otherwise negative.     Data reviewed today: I reviewed all medications, new labs and imaging results over the last 24 hours.     Physical Exam   Vital Signs: Temp: 97.8  F (36.6  C) Temp src: Oral BP: (!) 141/83 Pulse: 75   Resp: 16 SpO2: 97 % O2 Device: None (Room air)    Weight: 184 lbs 8.4 oz     Constitutional: lying in bed, NAD  Respiratory: non-labored breathing on RA  Cardiovascular: Regular rate and rhythm  Extremities: trace LE edema   Skin: warm and dry   : keating bag with pink urine  Neuropsychiatric: calm, cooperative, oriented, restricted affect     Data   No results found for this or any previous visit (from the past 24 hour(s)).

## 2022-08-18 NOTE — PLAN OF CARE
"Goal Outcome Evaluation:    Plan of Care Reviewed With: patient     Overall Patient Progress: no change    Outcome Evaluation: Flat and withdrawn.  Averse to activity.  Wife at bedisde this afternoon and said this is unusual for him and that he is usually more \"peppy\".  Patient states he feels depressed.   Ate 50% breakfast, no lunch.  Drinking ensure now with encouragement of wife.  Sacral dressing intact.  Repo'd q2-3 hours.  Up in chair for breakfast.  Assist 2, walker, and gait belt.        "

## 2022-08-18 NOTE — PROGRESS NOTES
Care Management Follow Up    [facility]  [address]  [place]  [address]  P: [phone]  F: [phone]  8/18: SW LVM with Zahraa in admissions to follow up on pts case.     [facility]  [address] [place]  [address]  P: [phone]  F: [phone]  8/18: No bed openings for at least 2 weeks.     [facility]  [address] [place] [address]  [phone]   8/18:CHW LVM for admissions to f/u on referral; requested they call SW back.     [facility] A[facility] - AT [facility]   [address] [facility] [address]   Phone: [phone]   Fax: [phone]   8/16: Previously denied but Arjun from admissions called SW to re-assess.  Asked SW to send updated clinical notes. @1619PM SW faxed updated clinical notes.   8/18: CHW spoke with admissions and they do not have any beds available currently. Zainab also mentioned they do not have any discharges in the foreseeable future.      Discontinued:  [facility]  [address] [place] [address]  [phone]  8/12: Too high acuity. Cannot accept pt.       [facility]  [address] [place]  [address]  P: [phone]  F: [phone]   8/11: Declined. No appropriate bed.      [facility]  [address] [place]  [address]  P: [phone]  F: [phone]  8/9:Pt declined. No reason given on Epic.       [facility]   [address] [place]  [address]  P: [phone]  P: [phone] - Admissions  F: [phone]  8/8: Declined via Epic. Unable to accept pt with COVID positive status and no available bed at this time.     [facility]  [address] [place]  [address]  P: [phone]  F: [phone]  8/8 Pt declined. No appropriate bed for pt.  No LTC/memory care bed at this time    Philip Stuart   Inpatient Community Health Worker  [facility] 5A & 5B

## 2022-08-19 ENCOUNTER — APPOINTMENT (OUTPATIENT)
Dept: PHYSICAL THERAPY | Facility: CLINIC | Age: 82
DRG: 640 | End: 2022-08-19
Payer: MEDICARE

## 2022-08-19 PROCEDURE — 250N000013 HC RX MED GY IP 250 OP 250 PS 637: Performed by: STUDENT IN AN ORGANIZED HEALTH CARE EDUCATION/TRAINING PROGRAM

## 2022-08-19 PROCEDURE — 250N000013 HC RX MED GY IP 250 OP 250 PS 637

## 2022-08-19 PROCEDURE — 250N000013 HC RX MED GY IP 250 OP 250 PS 637: Performed by: INTERNAL MEDICINE

## 2022-08-19 PROCEDURE — 97530 THERAPEUTIC ACTIVITIES: CPT | Mod: GP

## 2022-08-19 PROCEDURE — 99232 SBSQ HOSP IP/OBS MODERATE 35: CPT | Performed by: INTERNAL MEDICINE

## 2022-08-19 PROCEDURE — 120N000002 HC R&B MED SURG/OB UMMC

## 2022-08-19 PROCEDURE — 97116 GAIT TRAINING THERAPY: CPT | Mod: GP

## 2022-08-19 PROCEDURE — 99232 SBSQ HOSP IP/OBS MODERATE 35: CPT | Performed by: PSYCHIATRY & NEUROLOGY

## 2022-08-19 RX ADMIN — GABAPENTIN 100 MG: 100 CAPSULE ORAL at 20:32

## 2022-08-19 RX ADMIN — ALLOPURINOL 300 MG: 300 TABLET ORAL at 09:59

## 2022-08-19 RX ADMIN — APIXABAN 5 MG: 5 TABLET, FILM COATED ORAL at 09:59

## 2022-08-19 RX ADMIN — CARVEDILOL 12.5 MG: 12.5 TABLET, FILM COATED ORAL at 20:33

## 2022-08-19 RX ADMIN — Medication 25 MCG: at 09:59

## 2022-08-19 RX ADMIN — FUROSEMIDE 40 MG: 20 TABLET ORAL at 09:59

## 2022-08-19 RX ADMIN — ESCITALOPRAM OXALATE 10 MG: 10 TABLET ORAL at 09:59

## 2022-08-19 RX ADMIN — DICLOFENAC SODIUM 2 G: 10 GEL TOPICAL at 12:50

## 2022-08-19 RX ADMIN — APIXABAN 5 MG: 5 TABLET, FILM COATED ORAL at 20:33

## 2022-08-19 RX ADMIN — RISPERIDONE 0.25 MG: 0.25 TABLET ORAL at 09:58

## 2022-08-19 RX ADMIN — SPIRONOLACTONE 25 MG: 25 TABLET ORAL at 09:59

## 2022-08-19 RX ADMIN — GABAPENTIN 100 MG: 100 CAPSULE ORAL at 09:59

## 2022-08-19 RX ADMIN — LORATADINE 10 MG: 10 TABLET ORAL at 09:59

## 2022-08-19 RX ADMIN — DICLOFENAC SODIUM 2 G: 10 GEL TOPICAL at 10:04

## 2022-08-19 RX ADMIN — Medication 3 CAPSULE: at 09:58

## 2022-08-19 RX ADMIN — RISPERIDONE 2 MG: 2 TABLET ORAL at 20:46

## 2022-08-19 RX ADMIN — GABAPENTIN 100 MG: 100 CAPSULE ORAL at 14:02

## 2022-08-19 RX ADMIN — DONEPEZIL HYDROCHLORIDE 5 MG: 5 TABLET, FILM COATED ORAL at 20:45

## 2022-08-19 RX ADMIN — CARVEDILOL 12.5 MG: 12.5 TABLET, FILM COATED ORAL at 09:59

## 2022-08-19 RX ADMIN — Medication 1000 MCG: at 09:59

## 2022-08-19 RX ADMIN — ACETAMINOPHEN 650 MG: 325 TABLET ORAL at 20:41

## 2022-08-19 RX ADMIN — DICLOFENAC SODIUM 2 G: 10 GEL TOPICAL at 16:26

## 2022-08-19 RX ADMIN — DICLOFENAC SODIUM 2 G: 10 GEL TOPICAL at 20:47

## 2022-08-19 ASSESSMENT — ACTIVITIES OF DAILY LIVING (ADL)
ADLS_ACUITY_SCORE: 61
ADLS_ACUITY_SCORE: 59
ADLS_ACUITY_SCORE: 59
ADLS_ACUITY_SCORE: 61
ADLS_ACUITY_SCORE: 59
ADLS_ACUITY_SCORE: 61
ADLS_ACUITY_SCORE: 59
ADLS_ACUITY_SCORE: 61
ADLS_ACUITY_SCORE: 59
ADLS_ACUITY_SCORE: 61

## 2022-08-19 NOTE — PLAN OF CARE
"Goal Outcome Evaluation:    Plan of Care Reviewed With: patient     Overall Patient Progress: improving     Outcome Evaluation: Pt denies pain. Voltaren gel and ice packs applied to BL knees. Dressing changed on coccyx, writer tried to reinforce education on repositioning, pt refused but allowed pillows to be placed under bottom to offload weight on wound. Interdry applied to bilateral groin. Louie with good output - urine pink tinged, provider notified. Incontinence care provided. Pt appetite good, up in chair with physical therapy. Flat affect continued, pt slow to respond.     Vitals: /61 (BP Location: Right arm)   Pulse 77   Temp 98.2  F (36.8  C) (Oral)   Resp 16   Ht 1.753 m (5' 9\")   Wt 77.7 kg (171 lb 6.4 oz)   SpO2 98%   BMI 25.31 kg/m       "

## 2022-08-19 NOTE — PROGRESS NOTES
SPIRITUAL HEALTH SERVICES  [facility]  Unit: 5A    Referral Source: Follow up     Illness Narrative: Not discussed    Distress: William shared that he would like to be able to go home and be with his wife and his adult children that have come back to the area to care for him and his wife.    Coping: Pt shared that he is looking forward to progressing enough, gaining his strength back so to enable him to return home.  Pt welcomed prayer for his recovery and family members.    Plan: I will continue to follow William as long as he remains on unit 5A.         Duane F Bauer  Chaplain Resident  Pager number: [phone]  * [facility] remains available 24/7 for emergent requests/referrals, either by having the switchboard page the on-call  or by entering an ASAP/STAT consult in Epic (this will also page the on-call ).*

## 2022-08-19 NOTE — CONSULTS
Health Psychology                                                                                                                          Brianne Longoria, Ph.D., L.P [phone]  Karla Noe, Ph.D., L.P. [phone]  Lili Lyons, Ph.D, L..P. [phone]  Zainab Marroquin, Ph.D., L.P. [phone]  Joe Wilkerson, Ph.D., A.B.P.P., L.P. [phone]         Karley Puckett, Ph.D., L.P. [phone]       Erika Kyle, Ph.D., A.B.P.P., LP [phone]           [facility], 3rd Floor  [address] [place] [address]    Inpatient Health Psychology Consultation    Date of Service:  8/19/22    [facility] Chart reviewed. Provider next available on Monday, 8/22, will meet with William joshi.    PLAN: Meet with William 8/22/22.    Lili Lyons, PhD, LP  Clinical Health Psychologist  Phone: [phone]  Pager: [phone]

## 2022-08-19 NOTE — PLAN OF CARE
Goal Outcome Evaluation:    Plan of Care Reviewed With: patient     Overall Patient Progress: improving    Outcome Evaluation: Pt reports good appetite, drinking Ensures in addition to meals.  Eating mostly % of meals documented, but at times 25-50%. Increased Ensures to TID.

## 2022-08-19 NOTE — PLAN OF CARE
Goal Outcome Evaluation:    Plan of Care Reviewed With: patient     Overall Patient Progress: no change    Alert & oriented. Pt continues to have a flat affect, withdrawn, & at times, slow to respond. VSS on RA. C/o of knee pain, Voltaren gel applied to bilateral knees. Louie w/ good output. Pt rested peacefully overnight between cares. No acute changes this shift. Medically stable for discharge, awaiting placement.

## 2022-08-19 NOTE — CONSULTS
Patient seen and chart reviewed. Formal consult dictated.  Pt with elevated QTC  Will need to discuss psych medications before any changes  Joe Schaefer MD

## 2022-08-19 NOTE — PROGRESS NOTES
Care Management Follow Up    [facility]  [address]  [place]  [address]  P: [phone]  F: [phone]  8/18: SW LVM with Zahraa in admissions.  8/19: CHW LVM for admissions to f/u on referral; requested they call SW back.      [facility]  [address] [place]  [address]  P: [phone]  F: [phone]  8/19: No bed openings for at least 2 weeks.     [facility]  [address] [place] [address]  [phone]   8/19:CHW LVM for admissions to f/u on referral; requested they call SW back.     Discontinued:  [facility]   [address] [address] [facility] [place] [address]   Phone: [phone]   Fax: [phone]   8/18: CHW spoke with admissions and they do not have any beds available currently. Zainab also mentioned they do not have any discharges in the foreseeable future.      [facility]  [address]  [place] [id number] [phone]  8/12: Too high acuity. Cannot accept pt.       [facility]  [address] [place]  [address]  P: [phone]  F: [phone]   8/11: Declined. No appropriate bed.      [facility]  [address] [place] [address]  [address]  P: [phone]  F: [phone]  8/9:Pt declined. No reason given on Epic.       [facility]   [address]  [place]  [address]  P: [phone]  P: [phone] - Admissions  F: [phone]  8/8: Declined via Epic. Unable to accept pt with COVID positive status and no available bed at this time.     [facility]  [address] [place]  [address]  P: [phone]  F: [phone]  8/8 Pt declined. No appropriate bed for pt.  No LTC/memory care bed at this time    Philip Stuart   Inpatient Community Health Worker  [facility] 5A & 5B

## 2022-08-19 NOTE — PROGRESS NOTES
[facility]    Medicine Progress Note - Medicine Service, MARBETTY TEAM 1    Date of Admission:  8/2/2022    Assessment & Plan        William Almazan is a 81 year old male admitted on 8/2/2022. He has a history of CAD s/p CABG, HFrEF, pAfib on apixaban, HTN, HLD, DM2, KRISTIAN, prostate Ca, gout, and dementia who was admitted for weakness and hypotension.    Today's updates:  - Medically stable for discharge pending placement  - Health psychology consult  - Psychiatry consult    ----------------------------------------------------------------------------------------------------    Presyncope  Generalized weakness - improving   Acute hypotension 2/2 hypovolemia - resolved  Reported SBPs in 60s at home w/ inability to walk/ shower. No associated presyncopal, cardiac, or respiratory sxs. Did not fall or have LOC. Bps improved after 500 ml bolus PTA. Arrived lethargic but oriented x4. Initial concern for sepsis on arrival (given lymphopenia, lactate 3.1 and procal of 0.26) and started on IV Vanc and Zosyn. However, pt's lymphopenia appears to be chronic (unknown etiology) and no other SIRS criteria met (afebrile, normal RR, normal HR) so antibiotics discontinued. Lymphopenia, lactate, and procal improved with hydration (1.5 L total), which is reassuring. Suspect pre-syncope 2/2 hypovolemia (2/2 dehydration +hyperdiuresis) vs deconditioning after recent month-long hospitalization. As for infectious sources, is COVID positive but satting 97% on room air. Initial UA was dirty. Exchanged keating and repeat Ucx NGTD.   -continue 40 mg lasix daily   -I/Os and daily weights - net negative 900 ml in past 24 hours  -BMP every few days    Depressed mood  History of auditory hallucinations, resolved   Dementia with behavioral health disturbances  Baseline history of dementia. Admitted 06/2022 with progressive auditory hallucinations, including self harm command hallucinations. Seen by  neuro and psych, started on psychotropic meds and improved significantly. Started on risperdol, donepezil, gabapentin, and lexapro. Hallucinations resolved. 8/17 patient's wife commented that he is much more flat and withdrawn than usual. William stated that he feels depressed  - Consulted psychiatry for assessment of current medication regimen and help with dose adjustments as tolerated  - Consulted health psychiatry  - PTA risperdal  - PTA donepezil  - PTA gabapentin  - PTA lexapro    Urinary retention, chronic   C/f keating dysfunction   Chronic, and has chronic keating in place. Wife reports that it is changed monthly and is due for a change. Keating changed 8/3. Changed again 8/15 due to hematuria and leaking around keating.  Subsequently had consistent hematuria. Urology consulted 8/17. They felt leakage was normal for chronicity of keating and that hematuria was traumatic and would resolve.   They recommended:     follow with [facility]    if he has symptomatic bladder spasms that are bothersome ensure catheter draining appropraitely and add B&O suppositories    Loose stools  Improved with holding miralax, mg ox.   - continue to hold miralax, mag ox today   - BMP+Mg in AM    Failure to thrive  Malnutrition  --PT and OT consulted--> recommending TCU   --Nutrition consulted-->rec Ensure Enlive x 2 for supplementation   --Calorie counts complete    Prerenal TONE, resolved  Cr 1.28 (BL 0.7) on arrival--> resolved to baseline after 1.5 L NS. Given this drastic improvement, TONE likely 2/2 prerenal hypovolemia, potentially from hyperdiuresis. Patient was on lasix 80 PO BID prior to last admission and was discharged on 40 mg BID out of concern dose was too high.   - Lasix as above     Chronic HFrEF  pAF s/p dual chamber ICD  Elevated Troponin - downtrending   First degree AV block  Hx dilated cardiomyopathy, most recent TTE 6/18/21 showing severely dilated LV with EF 20%. Currently euvolemic on exam. No hypoxia and CXR  negative for cardiopulm concern. Apparently I&Os were in neg balance at recent hospitalization concerning for lasix dosing being too high. Troponin elevated on arrival, now downtrending on recheck. Likely 2/2 demand ischemia. Symptomatic bradycardic 8/4 AM w/ first degree AV block noted on EKG that appears to be chronic.   - Repeat ECHO 8/3 without change compared to 6/8/21  - Cards consulted regarding bradycardia, ICD interrogated, no further workup needed  - PTA coreg 12.5 mg PO BID   - Daily weights, I&Os, lasix as above     Toe injury - left  Toenail injured in shower. Some dried blood around the toenail and toe is painful on exam but no current bleeding or opened wounds.   - Ortho called to discuss pt. L foot XR ordered--negative for fracture  - Referral to podiatry outpatient on discharge     Lactic acidosis - resolved  Lactate 3.1, improved to 2.1 with IVF in ED. Suspect hypovolemia-driven.     Hypokalemia - resolved  Improved with repletion, 2/2 lasix, loose stools.     COVID + - recovered  Acquired at recent admission (7/30 first positive), unclear significance to current presentation. Did not receive monoclonal antibodies during admission. Satting 97% on room air without respiratory distress, CXR unremarkable. COVID vaccinated x 3 (Pfizer). Never developed hypoxia.   - off precautions now     Left knee pain  Chronic, intermittent pain. Previously had surgery on this knee. Responded to diclofenac gel and tylenol      CHRONIC CONDITIONS:    Full thickness rectal prolapse, initially noted 7/15  Concern on recent admission, full-thickness but reducible, intermitted bleeding with bowel movements but Hgb stable 10-11. CRS recommend fiber and avoiding constipation, follow up with surgery outpt in 4-6 weeks.  - PTA psyllium fiber 3 capsule daily, MiraLAX 17 g daily and senna 1-2 tabs bid pr    CAD s/p CABG (1992):    Not ASA or statin at home. No acute concerns. EKG dual paced rhythm with frequent PVCs, no  evidence of ACS.   - PTA Coreg    Tremor, whole body  New during recent admission, neuro thought to be stress related.  - PTA B12     PAF: Not currently in Afib. PTA Eliquis 5 mg PO BI  T2DM: Diet controlled  KRISTIAN: Does not use CPAP at home  Gout: PTA allopurinol       Diet: Regular Diet Adult  Snacks/Supplements Adult: Ensure Enlive; With Meals  Room Service Regular  DVT Prophylaxis: PTA apixaban   Keating Catheter: PRESENT, indication: Retention, Retention  Central Lines: None  Cardiac Monitoring: None  Code Status: Full Code      Disposition Plan      Expected Discharge Date: approaching medical stability to discharge, would start TCU search process, pending TCU availability      Destination: TCU        The patient's care was discussed with the Patient.    Leland Cottrell MD  Medicine Service, [facility]3] TEAM [address] [place] [address]  Securely message with the Vocera Web Console (learn more here)  Text page via [facility] Paging/Directory   Please see signed in provider for up to date coverage information      Clinically Significant Risk Factors Present on Admission                    ______________________________________________________________________    Interval History   Care team notes reviewed. Hematuria improved. Wife was visiting yesterday and noted that he seems flat and withdrawn--not like his usual self. William notes feeling depressed. Tolerating diet well. Denies pain. Slept well.     4 pt ROS otherwise negative.     Data reviewed today: I reviewed all medications, new labs and imaging results over the last 24 hours.     Physical Exam   Vital Signs: Temp: 97.7  F (36.5  C) Temp src: Oral BP: 123/72 Pulse: 73   Resp: 16 SpO2: 97 % O2 Device: None (Room air)    Weight: 183 lbs 6.76 oz     Constitutional: lying in bed, NAD  Respiratory: non-labored breathing on RA  Cardiovascular: Regular rate and rhythm  Extremities: trace LE edema   Skin: warm and dry   : keating bag with  pink urine  Neuropsychiatric: calm, cooperative, oriented, restricted affect     Data   No results found for this or any previous visit (from the past 24 hour(s)).

## 2022-08-19 NOTE — PLAN OF CARE
Goal Outcome Evaluation: Ongoing; Not progressing    Plan of Care Reviewed With: patient     Overall Patient Progress: no change    Outcome Evaluation: Flat affect, poor appetite, compliant with cares.    RN assumed cares at 1500, Pt alert and oriented though withdrawn,  VS stable this afternoon.  Pt denies pain this shift, voltaren gel applied to arms and back per request.  Louie catheter with 1L output this shift.  PIV saline locked.  Plan to discharge to TCU once accepted.  No acute incidents this shift.  Continue to monitor and notify MD of any changes.

## 2022-08-19 NOTE — PROGRESS NOTES
Care Management Follow Up    Length of Stay (days): 16    Expected Discharge Date: 08/22/2022     Concerns to be Addressed: discharge planning  Patient plan of care discussed at interdisciplinary rounds: Yes    Anticipated Discharge Disposition: Home Care     Anticipated Discharge Services: None  Anticipated Discharge DME: None    Patient/family educated on Medicare website which has current facility and service quality ratings: no  Education Provided on the Discharge Plan: yes   Patient/Family in Agreement with the Plan: yes    Referrals Placed by CM/SW:  SW made new initial referrals to:  [facility]  [address] [place]  [address]  P: [phone]  F: [phone]    [facility])  [address] [place], [place] [address] [phone]    [facility])  [address] [place]  [address]  P: [phone]  F: [phone]    [facility]  [address] [place]  [address]  P: [phone]  F: [phone]    [facility]  [address]  [place], [place] [address] [phone]    CHW Jacob followed up on referrals:  [facility]  [address] [place]  [address]  P: [phone]  F: [phone]  8/18: SW LVM with Zahraa in admissions.  8/19: CHW LVM for admissions to f/u on referral; requested they call SW back.      [facility]  [address] [place]  [address]  P: [phone]  F: [phone]  8/19: No bed openings for at least 2 weeks.     [facility]  [address] [place] [address]  [phone]   8/19:CHW LVM for admissions to f/u on referral; requested they call SW back.     Discontinued:  [facility]   [address] [address]   Phone: [phone]   Fax: [phone]   8/18: CHW spoke with admissions and they do not have any beds available  currently. Zainab also mentioned they do not have any discharges in the foreseeable future.      [facility]  [address]  [place], [place] [address] [phone]  8/12: Too high acuity. Cannot accept pt.       [facility] [address] [place]  [address]  P: [phone]  F: [phone]   8/11: Declined. No appropriate bed.      [facility]  [address] [place] [address]  [address]  P: [phone]  F: [phone]  8/9:Pt declined. No reason given on Epic.       [facility]   [address]  [place]  [address]  P: [phone]  P: [phone] - Admissions  F: [phone]  8/8: Declined via Epic. Unable to accept pt with COVID positive status and no available bed at this time.     [facility]  [address] [facility] [place]  [address]  P: [phone]  F: [phone]  8/8 Pt declined. No appropriate bed for pt.  No LTC/memory care bed at this time     Private pay costs discussed: Not applicable    Additional Information:  CHW Jacob followed up on referrals.    Pts spouse and daughter gave new TCU choices over the phone. SW sent new initial referrals.     will continue to follow for discharge planning, support, and resources.    KAHLIL Jerez, [facility]  Unit 5A   Office: [phone]   Pager: [phone]  [email or web address]

## 2022-08-19 NOTE — PROGRESS NOTES
CLINICAL NUTRITION SERVICES - REASSESSMENT NOTE     Nutrition Prescription    Malnutrition Status:    Severe malnutrition in the context of chronic illness    Recommendations already ordered by Registered Dietitian (RD):  Increase Ensure Enlive to TID with meals  Continue Room Service with Assist    Future/Additional Recommendations:  Monitor PO intake, wt trends.  Offer different/additional supplements and/or scheduled snacks if appetite low and/or if wt trends down. Consider multivitamin w/ minerals if appropriate if with ongoing poor or variable appetite.     EVALUATION OF THE PROGRESS TOWARD GOALS   Diet: Regular  - Room Service with Assist  - Ensure Enlive with lunch and dinner (kitchen may be subbing with Ensure Plus High Protein, same kcals and protein - 350 kcal and 20 g protein)    Intake: Pt reports good appetite and eating 3 meals per day (including protein at each meal) + Ensures that are being sent.  Per [employer], pt ordering 2 meals per day from kitchen since 8/14 (ordered 3 meals each on 8/12 and 8/13).  Unsure if pt getting any food brought in from home.  Pt denies any questions for RD.  Of note, pt seemed tired and had eyes shut during our visit.  Per flowsheets, pt eating mostly % of meals documented in flowsheets, but at times 25-50%.     NEW FINDINGS   Weight: lowest wts this admit 78.8 kg on 8/16 and 77.7 kg on 8/19. Overall down 17 lb from 8/7-8/19, suspect mostly fluid related given how quickly this wt loss occurred. Pt is on lasix and spironolactone so fluid related wt shifts involved.   08/19/22 0853 77.7 kg (171 lb 6.4 oz) Standing scale   08/18/22 0639 83.2 kg (183 lb 6.8 oz) Bed scale   08/17/22 0802 83.7 kg (184 lb 8.4 oz) Bed scale   08/16/22 1311 78.8 kg (173 lb 11.2 oz) --   08/15/22 0827 84.2 kg (185 lb 10 oz) Bed scale   08/11/22 1307 83.3 kg (183 lb 10.3 oz) Bed scale   08/10/22 1320 82.3 kg (181 lb 6.4 oz) Standing scale   08/09/22 1022 83.9 kg (184 lb 15.5 oz) Bed scale    08/07/22 1125 85.5 kg (188 lb 7.9 oz) Bed scale   08/06/22 1500 88.3 kg (194 lb 9.3 oz)      Labs: (8/18): BUN 23.6 (H), Cr 0.59 (L)    Skin: per WOC note yesterday, pt with stage 2 pressure injury on coccyx (previoulsy listed as unstageable on 8/11)    MALNUTRITION  % Intake: Decreased intake does not meet criteria  % Weight Loss: Difficult to assess  Subcutaneous Fat Loss: Facial region: severe (observed upper body only as pt was covered in blankets and appeared quite tired)  Muscle Loss: Temporal and Facial & jaw region: severe (observed upper body only as pt was covered in blankets and appeared quite tired)  Fluid Accumulation/Edema: Trace LE per provide note today, does not meet criteria  Malnutrition Diagnosis: Severe malnutrition in the context of chronic illness    Previous Goals   Patient to consume % of nutritionally adequate meal trays TID, or the equivalent with supplements/snacks.  Evaluation: Met    Previous Nutrition Diagnosis  Inadequate oral intake related to variable (but improving) appetite as evidenced by fausto counts meeting 53-75% kcal needs 56-80% protein needs (3rd day of fausto counts no PO intake documented - unsure if missing documentation)   Evaluation: Improving    CURRENT NUTRITION DIAGNOSIS  Inadequate oral intake related to variable appetite as evidenced by eating mostly % of meals documented in flowsheets but at times 25-50%      INTERVENTIONS  Implementation  Medical food supplement therapy: see above  Nutrition Education: encouraged adequate nutrition intake for ongoing wound healing and overall nutrition status    Goals  Patient to consume % of nutritionally adequate meal trays TID, or the equivalent with supplements/snacks.    Monitoring/Evaluation  Progress toward goals will be monitored and evaluated per protocol.     Kiesha Cabrera RD, , LD  Weekday Pager: [phone]  Weekday Units covered: 7C (all beds) and 5A (beds 3171 through 5611-2)  Weekend/Holiday MADELINE  Pager: [phone]

## 2022-08-19 NOTE — CONSULTS
Consult Date: 08/19/2022    PSYCHIATRIC CONSULTATION    IDENTIFICATION:  Mr. William Almazan is an 81-year-old -American male well known to me from previous psychiatric consultations.  I am asked to evaluate his psychiatric status by Dr. Cottrell.    HISTORY:  Prior to interviewing this patient, I had an opportunity to review my previous psychiatric consultation from 07/07/2022 as well as psychiatric consultations from Dr. Aryan Leyva and KELTON Trujillo.  [facility].  I felt that he had hallucinations, likely as a presenting symptom of dementia.  I did suggest that it was possible that he was experiencing psychotic depression, but I thought hallucinations secondary to early dementia was more likely.  He did end up on the antidepressant Lexapro as well as Risperdal 2 mg and donepezil 5.  He returned to the hospital feeling increased weakness and also an increase in auditory hallucinations.    Initially William was having hallucinations telling him to throw himself on the floor and hurt himself.  He tells me those hallucinations have gone away completely, but he still hears from Zuhair Lainez almost daily.  He never has liked Zuhair Trump and now it Zuhair Trump comes to him and interrupts him; for instance, if he is trying to sleep Trump will tell him to wake up.  He will then blow some type of mist that gets into his lungs and makes it hard for him to breathe.  It does seem possible that these are some type of hypnagogic hallucinations.  However, he tends to have them throughout the day and because he has had other hallucinations and there are delusions that go along with this,  If it was purely a parasomnia hallucination, it probably would not have been associated with previous hallucinations telling him to hurt himself.  He does show signs of mild depression, but I do not believe that he is having severe depression with psychotic features.  I still think it is likely  that this is part of an early dementia picture.  I note that on 2022,  he scored only 18 out of 30 on a MoCA.    MENTAL STATUS EXAMINATION:  As usual, the patient is pleasant and cooperative.  He does remember me and it is usually grateful for his care.  His mood is somewhat dysphoric and his affect, mildly restricted.  His speech is coherent and goal oriented.  Associations tight.  Thought processes logical and linear, except having to do with his hallucinations.  Content of thought does include visual and auditory hallucinations as well as some delusions.  Recent memory does seem to be impaired, as does fund of knowledge.  Remote memory is likely better preserved.  Use of language and concentration are within normal limits.  He is alert and oriented x3.  Muscle strength and tone appear to be at baseline.  He does use a walker to get around.  Recent vital signs include a temperature of 98.2, blood pressure 119/61, pulse of 77, respiration rate of 16 with 98% oxygen saturation.    IMPRESSION:  Dementia with psychosis.    RECOMMENDATIONS Given his QTC >500 we should re assess  his med regimen .Will discuss with treatment team and attempt to talk with family     Joe Schaefer MD        D: 2022   T: 2022   MT: DON    Name:     NIKKIE ARAUZ  MRN:      [id number]        Account:      [id number]   :      1940           Consult Date: 2022     Document: [id number]

## 2022-08-20 LAB
ANION GAP SERPL CALCULATED.3IONS-SCNC: 8 MMOL/L (ref 7–15)
BUN SERPL-MCNC: 23.3 MG/DL (ref 8–23)
CALCIUM SERPL-MCNC: 9.1 MG/DL (ref 8.8–10.2)
CHLORIDE SERPL-SCNC: 103 MMOL/L (ref 98–107)
CREAT SERPL-MCNC: 0.59 MG/DL (ref 0.67–1.17)
DEPRECATED HCO3 PLAS-SCNC: 28 MMOL/L (ref 22–29)
ERYTHROCYTE [DISTWIDTH] IN BLOOD BY AUTOMATED COUNT: 16.3 % (ref 10–15)
FERRITIN SERPL-MCNC: 76 NG/ML (ref 31–409)
GFR SERPL CREATININE-BSD FRML MDRD: >90 ML/MIN/1.73M2
GLUCOSE SERPL-MCNC: 91 MG/DL (ref 70–99)
HCT VFR BLD AUTO: 31.5 % (ref 40–53)
HGB BLD-MCNC: 9.5 G/DL (ref 13.3–17.7)
IRON BINDING CAPACITY (ROCHE): 284 UG/DL (ref 240–430)
IRON SATN MFR SERPL: 12 % (ref 15–46)
IRON SERPL-MCNC: 34 UG/DL (ref 61–157)
MAGNESIUM SERPL-MCNC: 1.7 MG/DL (ref 1.7–2.3)
MCH RBC QN AUTO: 27.1 PG (ref 26.5–33)
MCHC RBC AUTO-ENTMCNC: 30.2 G/DL (ref 31.5–36.5)
MCV RBC AUTO: 90 FL (ref 78–100)
PLATELET # BLD AUTO: 248 10E3/UL (ref 150–450)
POTASSIUM SERPL-SCNC: 4.2 MMOL/L (ref 3.4–5.3)
RBC # BLD AUTO: 3.5 10E6/UL (ref 4.4–5.9)
SODIUM SERPL-SCNC: 139 MMOL/L (ref 136–145)
WBC # BLD AUTO: 3.3 10E3/UL (ref 4–11)

## 2022-08-20 PROCEDURE — 250N000013 HC RX MED GY IP 250 OP 250 PS 637: Performed by: STUDENT IN AN ORGANIZED HEALTH CARE EDUCATION/TRAINING PROGRAM

## 2022-08-20 PROCEDURE — 120N000002 HC R&B MED SURG/OB UMMC

## 2022-08-20 PROCEDURE — 250N000013 HC RX MED GY IP 250 OP 250 PS 637

## 2022-08-20 PROCEDURE — 99232 SBSQ HOSP IP/OBS MODERATE 35: CPT | Performed by: INTERNAL MEDICINE

## 2022-08-20 PROCEDURE — 85027 COMPLETE CBC AUTOMATED: CPT | Performed by: INTERNAL MEDICINE

## 2022-08-20 PROCEDURE — 36415 COLL VENOUS BLD VENIPUNCTURE: CPT | Performed by: INTERNAL MEDICINE

## 2022-08-20 PROCEDURE — 99231 SBSQ HOSP IP/OBS SF/LOW 25: CPT | Performed by: PSYCHIATRY & NEUROLOGY

## 2022-08-20 PROCEDURE — 82310 ASSAY OF CALCIUM: CPT | Performed by: INTERNAL MEDICINE

## 2022-08-20 PROCEDURE — 82728 ASSAY OF FERRITIN: CPT | Performed by: INTERNAL MEDICINE

## 2022-08-20 PROCEDURE — 93010 ELECTROCARDIOGRAM REPORT: CPT | Performed by: INTERNAL MEDICINE

## 2022-08-20 PROCEDURE — 83735 ASSAY OF MAGNESIUM: CPT | Performed by: INTERNAL MEDICINE

## 2022-08-20 PROCEDURE — 83550 IRON BINDING TEST: CPT | Performed by: INTERNAL MEDICINE

## 2022-08-20 PROCEDURE — 250N000013 HC RX MED GY IP 250 OP 250 PS 637: Performed by: INTERNAL MEDICINE

## 2022-08-20 PROCEDURE — 93005 ELECTROCARDIOGRAM TRACING: CPT

## 2022-08-20 RX ORDER — RISPERIDONE 2 MG/1
4 TABLET ORAL AT BEDTIME
Status: DISCONTINUED | OUTPATIENT
Start: 2022-08-20 | End: 2022-08-20

## 2022-08-20 RX ORDER — ARIPIPRAZOLE 2 MG/1
2 TABLET ORAL 2 TIMES DAILY
Status: DISCONTINUED | OUTPATIENT
Start: 2022-08-20 | End: 2022-08-22

## 2022-08-20 RX ORDER — DONEPEZIL HYDROCHLORIDE 5 MG/1
10 TABLET, FILM COATED ORAL AT BEDTIME
Status: DISCONTINUED | OUTPATIENT
Start: 2022-08-20 | End: 2022-08-24 | Stop reason: HOSPADM

## 2022-08-20 RX ADMIN — FUROSEMIDE 40 MG: 20 TABLET ORAL at 08:11

## 2022-08-20 RX ADMIN — CARVEDILOL 12.5 MG: 12.5 TABLET, FILM COATED ORAL at 08:11

## 2022-08-20 RX ADMIN — DICLOFENAC SODIUM 2 G: 10 GEL TOPICAL at 08:15

## 2022-08-20 RX ADMIN — ALLOPURINOL 300 MG: 300 TABLET ORAL at 08:11

## 2022-08-20 RX ADMIN — DICLOFENAC SODIUM 2 G: 10 GEL TOPICAL at 18:17

## 2022-08-20 RX ADMIN — GABAPENTIN 100 MG: 100 CAPSULE ORAL at 14:40

## 2022-08-20 RX ADMIN — Medication 1000 MCG: at 08:11

## 2022-08-20 RX ADMIN — DICLOFENAC SODIUM 2 G: 10 GEL TOPICAL at 23:21

## 2022-08-20 RX ADMIN — SPIRONOLACTONE 25 MG: 25 TABLET ORAL at 08:11

## 2022-08-20 RX ADMIN — ESCITALOPRAM OXALATE 10 MG: 10 TABLET ORAL at 08:11

## 2022-08-20 RX ADMIN — GABAPENTIN 100 MG: 100 CAPSULE ORAL at 20:25

## 2022-08-20 RX ADMIN — DONEPEZIL HYDROCHLORIDE 10 MG: 5 TABLET, FILM COATED ORAL at 23:10

## 2022-08-20 RX ADMIN — GABAPENTIN 100 MG: 100 CAPSULE ORAL at 08:11

## 2022-08-20 RX ADMIN — APIXABAN 5 MG: 5 TABLET, FILM COATED ORAL at 08:11

## 2022-08-20 RX ADMIN — DICLOFENAC SODIUM 2 G: 10 GEL TOPICAL at 12:35

## 2022-08-20 RX ADMIN — Medication 3 CAPSULE: at 08:11

## 2022-08-20 RX ADMIN — Medication 25 MCG: at 08:12

## 2022-08-20 RX ADMIN — ARIPIPRAZOLE 2 MG: 2 TABLET ORAL at 20:25

## 2022-08-20 RX ADMIN — LORATADINE 10 MG: 10 TABLET ORAL at 08:11

## 2022-08-20 RX ADMIN — APIXABAN 5 MG: 5 TABLET, FILM COATED ORAL at 20:25

## 2022-08-20 RX ADMIN — RISPERIDONE 0.25 MG: 0.25 TABLET ORAL at 08:11

## 2022-08-20 RX ADMIN — CARVEDILOL 12.5 MG: 12.5 TABLET, FILM COATED ORAL at 20:25

## 2022-08-20 ASSESSMENT — ACTIVITIES OF DAILY LIVING (ADL)
ADLS_ACUITY_SCORE: 61

## 2022-08-20 NOTE — PLAN OF CARE
Goal Outcome Evaluation:    Plan of Care Reviewed With: patient     Overall Patient Progress: no change    Outcome Evaluation: Pt states pain in BL knees and back - voltaren gel applied with stated relief. Pt repositioned every 2hrs. Wound care provided on coccyx per POC. Keating in place with red output this AM, clear yellow output by end of shift. Pt denies pain at keating site. Urology following, team notified. Pt denies any auditory hallucinations this shift. Pt appetite minimal, drank 100% Ensure supplement, RN offered to order both breakfast and dinner - pt declined. EKG completed this AM.

## 2022-08-20 NOTE — PROGRESS NOTES
[facility]    Medicine Progress Note - Medicine Service, [facility] TEAM 1    Date of Admission:  8/2/2022    Assessment & Plan        William Almazan is a 81 year old male admitted on 8/2/2022. He has a history of CAD s/p CABG, HFrEF, pAfib on apixaban, HTN, HLD, DM2, KRISTIAN, prostate Ca, gout, and dementia who was admitted for weakness and hypotension.    Today's updates:  - Medically stable for discharge pending placement  - Increase donepezil and risperidone  - EKG for QTc check  - Check labs    ----------------------------------------------------------------------------------------------------    Presyncope  Generalized weakness - improving   Acute hypotension 2/2 hypovolemia - resolved  Reported SBPs in 60s at home w/ inability to walk/ shower. No associated presyncopal, cardiac, or respiratory sxs. Did not fall or have LOC. Bps improved after 500 ml bolus PTA. Arrived lethargic but oriented x4. Initial concern for sepsis on arrival (given lymphopenia, lactate 3.1 and procal of 0.26) and started on IV Vanc and Zosyn. However, pt's lymphopenia appears to be chronic (unknown etiology) and no other SIRS criteria met (afebrile, normal RR, normal HR) so antibiotics discontinued. Lymphopenia, lactate, and procal improved with hydration (1.5 L total), which is reassuring. Suspect pre-syncope 2/2 hypovolemia (2/2 dehydration +hyperdiuresis) vs deconditioning after recent month-long hospitalization. As for infectious sources, is COVID positive but satting 97% on room air. Initial UA was dirty. Exchanged keating and repeat Ucx NGTD.   -continue 40 mg lasix daily   -I/Os and daily weights   -BMP every few days    Depressed mood  Dementia with psychosis  Baseline history of dementia. Admitted 06/2022 with progressive auditory hallucinations, including self harm command hallucinations. Seen by neuro and psych, started on psychotropic meds and improved significantly. Started on  risperdol, donepezil, gabapentin, and lexapro. Hallucinations of self harm resolved. 8/17 patient's wife commented that he is much more flat and withdrawn than usual. William stated that he feels depressed.    Consulted psychiatry 8/19 for assessment of current medication regimen and help with dose adjustments. They note that command hallucinations have resolved but he still has some auditory halllucinations (Zuhair Lainez telling him to wake up). Suspect dementia with psychosis. Recommended increasing risperidone and donepezil.   - Current regimen includes:     Risperidone 0.25 mg QAM and 4 mg at bedtime (increased from 2 mg 8/20)    Donepezil 10 mg at bedtime  (increased from 5 mg 8/20)    Lexapro 10 mg daily  - Continue PTA gabapentin  - Repeat EKG to check QTc 8/20. Improved at 499.   - Consulted health psychology. They will see him on 8/22    Urinary retention, chronic   Hematuria  Chronic, and has chronic keating in place. Wife reports that it is changed monthly and is due for a change. Keating changed 8/3. Changed again 8/15 due to hematuria and leaking around keating.  Subsequently had consistent hematuria. Urology consulted 8/17. They felt leakage was normal for chronicity of keating and that hematuria was traumatic and would resolve.   They recommended:     follow with [facility] urologist    if he has symptomatic bladder spasms that are bothersome ensure catheter draining appropraitely and add B&O suppositories  - Hematuria persists. CTM    Failure to thrive  Severe malnutrition in the context of chronic illness  --PT and OT consulted--> recommending TCU   --Nutrition consulted-->rec Ensure Enlive TID for supplementation   --Calorie counts complete    Prerenal TONE, resolved  Cr 1.28 (BL 0.7) on arrival--> resolved to baseline after 1.5 L NS. Given this drastic improvement, TONE likely 2/2 prerenal hypovolemia, potentially from hyperdiuresis. Patient was on lasix 80 PO BID prior to last admission and was discharged on 40  mg BID out of concern dose was too high.   - Lasix as above     Chronic HFrEF  pAF s/p dual chamber ICD  NSTEMI  First degree AV block  Prolonged QTc  Hx dilated cardiomyopathy, most recent TTE 6/18/21 showing severely dilated LV with EF 20%. Currently euvolemic on exam. No hypoxia and CXR negative for cardiopulm concern. Apparently I&Os were in neg balance at recent hospitalization concerning for lasix dosing being too high. Troponin elevated on arrival, now downtrending on recheck. Likely 2/2 demand ischemia. Symptomatic bradycardic 8/4 AM w/ first degree AV block noted on EKG that appears to be chronic. Prolonged QTc as high as 562 on 8/3. Improving--down to 499 on 8/20.  - Repeat ECHO 8/3 without change compared to 6/8/21  - Cards consulted regarding bradycardia, ICD interrogated, no further workup needed  - PTA coreg 12.5 mg PO BID   - Daily weights, I&Os, lasix as above   - Monitor QTc with medication changes    Normocytic anemia  Hgb recently on the low end of normal (13 in June). Has trended down over the course of recent hospitalizations--down to 9.4 on 8/5. MCV 90. Likely due to combination of blood draws, blood loss with hematuria, chronic illness.   - repeat CBC  - Check iron panel    Toe injury - left  Toenail injured in shower. Some dried blood around the toenail and toe is painful on exam but no current bleeding or opened wounds.   - Ortho called to discuss pt. L foot XR ordered--negative for fracture  - Referral to podiatry outpatient on discharge     Lactic acidosis - resolved  Lactate 3.1, improved to 2.1 with IVF in ED. Suspect hypovolemia-driven.     Hypokalemia - resolved  Improved with repletion, 2/2 lasix, loose stools.     COVID + - recovered  Acquired at recent admission (7/30 first positive), unclear significance to current presentation. Did not receive monoclonal antibodies during admission. Satting 97% on room air without respiratory distress, CXR unremarkable. COVID vaccinated x 3  (Pfizer). Never developed hypoxia.   - off precautions now     Left knee pain  Chronic, intermittent pain. Previously had surgery on this knee. Responded to diclofenac gel and tylenol      CHRONIC CONDITIONS:    Full thickness rectal prolapse, initially noted 7/15  Concern on recent admission, full-thickness but reducible, intermitted bleeding with bowel movements but Hgb stable 10-11. CRS recommend fiber and avoiding constipation, follow up with surgery outpt in 4-6 weeks.  - PTA psyllium fiber 3 capsule daily, MiraLAX 17 g daily and senna 1-2 tabs bid pr    CAD s/p CABG (1992):    Not ASA or statin at home. No acute concerns. EKG dual paced rhythm with frequent PVCs, no evidence of ACS.   - PTA Coreg    Tremor, whole body  New during recent admission, neuro thought to be stress related.  - PTA B12     PAF: Not currently in Afib. PTA Eliquis 5 mg PO BI  T2DM: Diet controlled  KRISTIAN: Does not use CPAP at home  Gout: PTA allopurinol       Diet: Regular Diet Adult  Snacks/Supplements Adult: Ensure Enlive; With Meals  Room Service Regular  DVT Prophylaxis: PTA apixaban   Louie Catheter: PRESENT, indication: Retention, Retention  Central Lines: None  Cardiac Monitoring: None  Code Status: Full Code      Disposition Plan      Expected Discharge Date: approaching medical stability to discharge, would start TCU search process, pending TCU availability      Destination: TCU        The patient's care was discussed with the Patient.    Leland Cottrell MD  Medicine Service, [facility] TEAM [address] [place] [address]  Securely message with the Vocera Web Console (learn more here)  Text page via [facility] Paging/Directory   Please see signed in provider for up to date coverage information      Clinically Significant Risk Factors Present on Admission                    ______________________________________________________________________    Interval History   Care team notes reviewed. Continued  intermittent hematuria. Was seen by psychiatry yesterday who noted continued night time auditory hallucinations but no longer telling him to hurt himselg.     4 pt ROS otherwise negative.     Data reviewed today: I reviewed all medications, new labs and imaging results over the last 24 hours.     Physical Exam   Vital Signs: Temp: 98.3  F (36.8  C) Temp src: Oral BP: 120/64 Pulse: 75   Resp: 15 SpO2: 100 % O2 Device: None (Room air)    Weight: 171 lbs 6.4 oz     Constitutional: lying in bed, NAD  Respiratory: non-labored breathing on RA  Cardiovascular: Regular rate and rhythm  Extremities: trace LE edema   Skin: warm and dry   : keating bag with dark urine  Neuropsychiatric: calm, cooperative, oriented, restricted affect     Data   No results found for this or any previous visit (from the past 24 hour(s)).

## 2022-08-20 NOTE — PLAN OF CARE
Goal Outcome Evaluation:    Plan of Care Reviewed With: patient     Overall Patient Progress: no change    Outcome Evaluation: Pt A&O but with drawn/flat this shift.  VSS on RA, reporting GARCIA and infrequent cough.  C/O bilateral knee pain, scheduled voltaren gel applied.  continue turn/repo q2h.  fair appetite this shift and tolerating diet.  Wife and daughter at bedside and supportive. Ffoley in place with good UOP.  Pt declined offer to get out of bed and into chair this evening.

## 2022-08-20 NOTE — PLAN OF CARE
Goal Outcome Evaluation:    Plan of Care Reviewed With: patient     Overall Patient Progress: no change    Outcome Evaluation: Pt had fair appetite today. Allowed staff to reposition Q2 hrs.  Pt reported that he is having auditory hallucinations (please refer to psychiatry note from today 8/19).  Pt did not get out of bed this evening,but turns well in bed with some assistance. Plan for discharge to TCU once placcement found.

## 2022-08-20 NOTE — CONSULTS
Consult Date: 08/20/2022    PSYCHIATRIC CONSULTATION    IDENTIFICATION:  Mr. William Almazan is an 81-year-old  male who is hospitalized with hallucinations and dysphoria.  He also has memory issues.  I am asked to evaluate his medications by Dr. Cottrell.    Mr. Almazan wanted me to discuss any changes with his wife, and I was very fortunate that his wife and daughter were at the bedside this afternoon.  We discussed his hallucinations.  I have always been concerned that perhaps they represented some type of parasomnia, but the wife and daughter think that they happen randomly throughout the day and night and are not necessarily associated with sleep-wake changes.  Mr. Almazan thinks the hallucinations have improved somewhat, but they continue to be bothersome.  The family is most concerned about his lack of energy.  He was very energetic in his recent [facility] hospitalization, where he was up in his walker moving most of the day.  Now, he just lies in bed with very little energy or motivation.  Although the Risperdal may have been somewhat helpful for his hallucinations, I think it would be a good idea to stop the Risperdal and instead go to Abilify 2 mg in the morning and 2 mg at night.  I am hopeful that this will help with his energy.  His donepezil has been increased to 10 mg and for now, I recommend keeping his Lexapro at 10.  I note that donepezil, Risperdal, and Lexapro are all QTc prolonging medications, and he does have an elevated QTc at first reading.  However, I took his EKG to the rhythm people in cardiology who did calculated true QTC and in reality, his true QTc is only 450.  The extra 50 points are secondary to his right bundle branch block, but even though QTc is no longer an issue, I think it would be reasonable to try Abilify rather than Risperdal.    MENTAL STATUS EXAMINATION:  Today, the patient was lying in bed.  His mood is somewhat dysphoric.  His affect is restricted.  His  speech is soft and slow, but coherent and goal oriented.  His associations are tight.  His thought processes are generally logical and linear.  His content of thought includes both visual and auditory hallucinations.  In the past, he had command hallucinations telling him to fall to the floor.  He actually did do that one time and injured himself.  Since then, he has not acted on any of the hallucinations and for the most part, his hallucinations continue to be Zuhair Lainez, who bothers him and squirts some type of mist into his nostrils, which make it hard for him to breathe.  This description is certainly similar to being hagged.  However, I think it is unlikely to be a parasomnia.  Recent memory is impaired.  Remote memory, fund of knowledge, and use of language are generally intact as is concentration.  He is alert and oriented x3.  Insight and judgment are generally intact.  Muscle strength and tone appear to be somewhat decreased.  Recent vitals include blood pressure of 110/60, temperature of 98.3, pulse of 54, respiration rate of 16.    IMPRESSION:  Dementia with psychosis.    RECOMMENDATIONS:  Increase donepezil.  Discontinue Risperdal.  Start Abilify 2 mg twice a day; this can be titrated up if tolerated.    Joe Schaefer MD        D: 2022   T: 2022   MT: MOUSTAPHA    Name:     NIKKIE ARAUZ  MRN:      [id number]        Account:      [id number]   :      1940           Consult Date: 2022     Document: [id number]

## 2022-08-20 NOTE — PLAN OF CARE
Goal Outcome Evaluation:    Plan of Care Reviewed With: patient     Overall Patient Progress: no change    Outcome Evaluation: Pt slept well overnight between repositioning q2-4h. Louie in place with int red urine. Still endorse auditory hallcinations intermittently but denies any overnight. Mepilex remains on sacrum x2 over pressue injuries. Awaiting TCU placement, medically stable.

## 2022-08-21 PROCEDURE — 99231 SBSQ HOSP IP/OBS SF/LOW 25: CPT | Performed by: INTERNAL MEDICINE

## 2022-08-21 PROCEDURE — 250N000013 HC RX MED GY IP 250 OP 250 PS 637

## 2022-08-21 PROCEDURE — 250N000013 HC RX MED GY IP 250 OP 250 PS 637: Performed by: STUDENT IN AN ORGANIZED HEALTH CARE EDUCATION/TRAINING PROGRAM

## 2022-08-21 PROCEDURE — 120N000002 HC R&B MED SURG/OB UMMC

## 2022-08-21 PROCEDURE — 250N000013 HC RX MED GY IP 250 OP 250 PS 637: Performed by: INTERNAL MEDICINE

## 2022-08-21 RX ADMIN — ALLOPURINOL 300 MG: 300 TABLET ORAL at 09:19

## 2022-08-21 RX ADMIN — DICLOFENAC SODIUM 2 G: 10 GEL TOPICAL at 14:34

## 2022-08-21 RX ADMIN — SPIRONOLACTONE 25 MG: 25 TABLET ORAL at 09:19

## 2022-08-21 RX ADMIN — ARIPIPRAZOLE 2 MG: 2 TABLET ORAL at 20:40

## 2022-08-21 RX ADMIN — DICLOFENAC SODIUM 2 G: 10 GEL TOPICAL at 09:23

## 2022-08-21 RX ADMIN — CARVEDILOL 12.5 MG: 12.5 TABLET, FILM COATED ORAL at 20:40

## 2022-08-21 RX ADMIN — GABAPENTIN 100 MG: 100 CAPSULE ORAL at 14:34

## 2022-08-21 RX ADMIN — APIXABAN 5 MG: 5 TABLET, FILM COATED ORAL at 09:19

## 2022-08-21 RX ADMIN — LORATADINE 10 MG: 10 TABLET ORAL at 09:19

## 2022-08-21 RX ADMIN — ARIPIPRAZOLE 2 MG: 2 TABLET ORAL at 09:18

## 2022-08-21 RX ADMIN — ESCITALOPRAM OXALATE 10 MG: 10 TABLET ORAL at 09:19

## 2022-08-21 RX ADMIN — APIXABAN 5 MG: 5 TABLET, FILM COATED ORAL at 20:40

## 2022-08-21 RX ADMIN — CARVEDILOL 12.5 MG: 12.5 TABLET, FILM COATED ORAL at 09:18

## 2022-08-21 RX ADMIN — DICLOFENAC SODIUM 2 G: 10 GEL TOPICAL at 17:01

## 2022-08-21 RX ADMIN — DONEPEZIL HYDROCHLORIDE 10 MG: 5 TABLET, FILM COATED ORAL at 23:00

## 2022-08-21 RX ADMIN — GABAPENTIN 100 MG: 100 CAPSULE ORAL at 20:40

## 2022-08-21 RX ADMIN — FUROSEMIDE 40 MG: 20 TABLET ORAL at 09:19

## 2022-08-21 RX ADMIN — Medication 25 MCG: at 09:19

## 2022-08-21 RX ADMIN — Medication 1000 MCG: at 09:19

## 2022-08-21 RX ADMIN — GABAPENTIN 100 MG: 100 CAPSULE ORAL at 09:19

## 2022-08-21 RX ADMIN — DICLOFENAC SODIUM 2 G: 10 GEL TOPICAL at 23:09

## 2022-08-21 RX ADMIN — Medication 3 CAPSULE: at 09:19

## 2022-08-21 ASSESSMENT — ACTIVITIES OF DAILY LIVING (ADL)
ADLS_ACUITY_SCORE: 61

## 2022-08-21 NOTE — PROGRESS NOTES
[facility]    Medicine Progress Note - Medicine Service, MARBETTY TEAM 1    Date of Admission:  8/2/2022    Assessment & Plan          William Almazan is a 81 year old male admitted on 8/2/2022. He has a history of CAD s/p CABG, HFrEF, pAfib on apixaban, HTN, HLD, DM2, KRISTIAN, prostate Ca, gout, and dementia who was admitted for weakness and hypotension.    Today's updates:  - Medically stable for discharge pending placement  - Transitioned from risperidone to abilify  - podiatry consult    ----------------------------------------------------------------------------------------------------    Presyncope  Generalized weakness - improving   Acute hypotension 2/2 hypovolemia - resolved  Reported SBPs in 60s at home w/ inability to walk/ shower. No associated presyncopal, cardiac, or respiratory sxs. Did not fall or have LOC. Bps improved after 500 ml bolus PTA. Arrived lethargic but oriented x4. Initial concern for sepsis on arrival (given lymphopenia, lactate 3.1 and procal of 0.26) and started on IV Vanc and Zosyn. However, pt's lymphopenia appears to be chronic (unknown etiology) and no other SIRS criteria met (afebrile, normal RR, normal HR) so antibiotics discontinued. Lymphopenia, lactate, and procal improved with hydration (1.5 L total), which is reassuring. Suspect pre-syncope 2/2 hypovolemia (2/2 dehydration +hyperdiuresis) vs deconditioning after recent month-long hospitalization. As for infectious sources, is COVID positive but satting 97% on room air. Initial UA was dirty. Exchanged keating and repeat Ucx NGTD.   -continue 40 mg lasix daily   -I/Os and daily weights   -BMP every few days    Depressed mood  Dementia with psychosis  Baseline history of dementia. Admitted 06/2022 with progressive auditory hallucinations, including self harm command hallucinations. Seen by neuro and psych, started on psychotropic meds and improved significantly. Started on  risperdol, donepezil, gabapentin, and lexapro. Hallucinations of self harm resolved. 8/17 patient's wife commented that he is much more flat and withdrawn than usual. William stated that he feels depressed.    Consulted psychiatry 8/19 for assessment of current medication regimen and help with dose adjustments. They note that command hallucinations have resolved but he still has some auditory halllucinations (Zuhair Lainez telling him to wake up). Suspect dementia with psychosis. Recommended increasing donepezil and transition from risperdal to abilify.   - Current regimen includes:     Abilify 2 mg BID started 8/20. Per psychiatry can titrate up if tolerated    Donepezil 10 mg at bedtime  (increased from 5 mg 8/20)    Lexapro 10 mg daily  - Continue PTA gabapentin  - Monitor QTc with med changes  - Consulted health psychology. They will see him on 8/22    Urinary retention, chronic   Hematuria  Chronic, and has chronic keating in place. Wife reports that it is changed monthly and is due for a change. Keating changed 8/3. Changed again 8/15 due to hematuria and leaking around keating.  Subsequently had consistent hematuria. Urology consulted 8/17. They felt leakage was normal for chronicity of keating and that hematuria was traumatic and would resolve.   They recommended:     follow with [facility] urologist    if he has symptomatic bladder spasms that are bothersome ensure catheter draining appropraitely and add B&O suppositories  - Hematuria persists. CTM    Failure to thrive  Severe malnutrition in the context of chronic illness  --PT and OT consulted--> recommending TCU   --Nutrition consulted-->rec Ensure Enlive TID for supplementation   --Calorie counts complete    Prerenal TONE, resolved  Cr 1.28 (BL 0.7) on arrival--> resolved to baseline after 1.5 L NS. Given this drastic improvement, TONE likely 2/2 prerenal hypovolemia, potentially from hyperdiuresis. Patient was on lasix 80 PO BID prior to last admission and was  discharged on 40 mg BID out of concern dose was too high.   - Lasix as above     Chronic HFrEF  pAF s/p dual chamber ICD  NSTEMI  First degree AV block  Prolonged QTc  Hx dilated cardiomyopathy, most recent TTE 6/18/21 showing severely dilated LV with EF 20%. Currently euvolemic on exam. No hypoxia and CXR negative for cardiopulm concern. Apparently I&Os were in neg balance at recent hospitalization concerning for lasix dosing being too high. Troponin elevated on arrival, now downtrending on recheck. Likely 2/2 demand ischemia. Symptomatic bradycardic 8/4 AM w/ first degree AV block noted on EKG that appears to be chronic. Prolonged QTc as high as 562 on 8/3. Automatic read of EKG 8/20 with QTc down to 499. Psychiatry reviewed with cardiology--QTc falsely elevated by right bundle. Actual QTc only 450.  - Repeat ECHO 8/3 without change compared to 6/8/21  - Cards consulted regarding bradycardia, ICD interrogated, no further workup needed  - PTA coreg 12.5 mg PO BID   - Daily weights, I&Os, lasix as above   - Monitor QTc with medication changes    Normocytic anemia  Hgb recently on the low end of normal (13 in June). Has trended down over the course of recent hospitalizations--down to 9.4 on 8/5. MCV 90. Likely due to combination of blood draws, blood loss with hematuria, chronic illness.   - repeat CBC  - Check iron panel    Toe injury - left  Toenail injured in shower. Some dried blood around the toenail and toe is painful on exam but no current bleeding or opened wounds.   - Ortho called to discuss pt. L foot XR ordered--negative for fracture  - Referral to podiatry outpatient on discharge     Lactic acidosis - resolved  Lactate 3.1, improved to 2.1 with IVF in ED. Suspect hypovolemia-driven.     Hypokalemia - resolved  Improved with repletion, 2/2 lasix, loose stools.     COVID + - recovered  Acquired at recent admission (7/30 first positive), unclear significance to current presentation. Did not receive  monoclonal antibodies during admission. Satting 97% on room air without respiratory distress, CXR unremarkable. COVID vaccinated x 3 (Pfizer). Never developed hypoxia.   - off precautions now     Left knee pain  Chronic, intermittent pain. Previously had surgery on this knee. Responded to diclofenac gel and tylenol      CHRONIC CONDITIONS:    Full thickness rectal prolapse, initially noted 7/15  Concern on recent admission, full-thickness but reducible, intermitted bleeding with bowel movements but Hgb stable 10-11. CRS recommend fiber and avoiding constipation, follow up with surgery outpt in 4-6 weeks.  - PTA psyllium fiber 3 capsule daily, MiraLAX 17 g daily and senna 1-2 tabs bid pr    CAD s/p CABG (1992):    Not ASA or statin at home. No acute concerns. EKG dual paced rhythm with frequent PVCs, no evidence of ACS.   - PTA Coreg    Tremor, whole body  New during recent admission, neuro thought to be stress related.  - PTA B12     PAF: Not currently in Afib. PTA Eliquis 5 mg PO BI  T2DM: Diet controlled  KRISTIAN: Does not use CPAP at home  Gout: PTA allopurinol       Diet: Regular Diet Adult  Snacks/Supplements Adult: Ensure Enlive; With Meals  Room Service Regular  DVT Prophylaxis: PTA apixaban   Louie Catheter: PRESENT, indication: Retention, Retention  Central Lines: None  Cardiac Monitoring: None  Code Status: Full Code      Disposition Plan      Expected Discharge Date: approaching medical stability to discharge, would start TCU search process, pending TCU availability      Destination: TCU        The patient's care was discussed with the Patient.    Leland Cottrell MD  Medicine Service, [facility] TEAM [address] [place] [address]  Securely message with the Vocera Web Console (learn more here)  Text page via [facility] Paging/Directory   Please see signed in provider for up to date coverage information      Clinically Significant Risk Factors Present on Admission                     ______________________________________________________________________    Interval History   Care team notes reviewed. No acute events. Has some pain in his toes this am but no other concerns.     4 pt ROS otherwise negative.     Data reviewed today: I reviewed all medications, new labs and imaging results over the last 24 hours.     Physical Exam   Vital Signs: Temp: 97.7  F (36.5  C) Temp src: Oral BP: 122/65 Pulse: 73   Resp: 20 SpO2: 100 % O2 Device: None (Room air)    Weight: 188 lbs 11.42 oz     Constitutional: lying in bed, NAD  Respiratory: non-labored breathing on RA  Cardiovascular: Regular rate and rhythm  Extremities: trace LE edema   Skin: warm and dry   : keating bag with donna colored urine.   Neuropsychiatric: calm, cooperative, oriented, restricted affect     Data   No results found for this or any previous visit (from the past 24 hour(s)).

## 2022-08-21 NOTE — PLAN OF CARE
"Goal Outcome Evaluation:    Plan of Care Reviewed With: patient     Overall Patient Progress: no change    Outcome Evaluation: Pt more alert/responsive this shift, sat up in chair for 1 hr. Appetite good for breakfast, ate 75% of meal. Pt endorsed intermittent auditory hallucinations as \"voices in my head,\" occuring mostly at night. Bilateral knee pain and lower back pain continued, PRN voltaren gel applied. Pt repositioned Q2hrs. Coccyx dressing changed, wound care completed per POC. No blood noted in urine this shift. Pt able to make needs known.       "

## 2022-08-21 NOTE — PLAN OF CARE
Goal Outcome Evaluation:    Plan of Care Reviewed With: patient     Overall Patient Progress: no change    Outcome Evaluation: Slept well between cares. VSS on RA w/ infrequent cough reported but not witnessed. Bilateral knee pain remains controlled with voltaren gel. Turned q2h. Louie with good urine output. Int bloody urine noted. Pt remains with flat affect and little motivation to get OOB. Initiated first dose Abilify this shift.

## 2022-08-21 NOTE — PLAN OF CARE
Goal Outcome Evaluation: Ongoing progressing.   Alert and oriented. Able to make needs known. Voltaren gel applied to both knees and back. Refused reposition. No changes from previous shift.Continue to monitor.

## 2022-08-22 ENCOUNTER — APPOINTMENT (OUTPATIENT)
Dept: PHYSICAL THERAPY | Facility: CLINIC | Age: 82
DRG: 640 | End: 2022-08-22
Payer: MEDICARE

## 2022-08-22 LAB
ATRIAL RATE - MUSE: 71 BPM
DIASTOLIC BLOOD PRESSURE - MUSE: NORMAL MMHG
INTERPRETATION ECG - MUSE: NORMAL
P AXIS - MUSE: 9 DEGREES
PR INTERVAL - MUSE: 240 MS
QRS DURATION - MUSE: 166 MS
QT - MUSE: 460 MS
QTC - MUSE: 499 MS
R AXIS - MUSE: -68 DEGREES
SYSTOLIC BLOOD PRESSURE - MUSE: NORMAL MMHG
T AXIS - MUSE: 34 DEGREES
VENTRICULAR RATE- MUSE: 71 BPM

## 2022-08-22 PROCEDURE — 250N000011 HC RX IP 250 OP 636: Performed by: INTERNAL MEDICINE

## 2022-08-22 PROCEDURE — 99231 SBSQ HOSP IP/OBS SF/LOW 25: CPT | Mod: GC | Performed by: INTERNAL MEDICINE

## 2022-08-22 PROCEDURE — 258N000003 HC RX IP 258 OP 636: Performed by: INTERNAL MEDICINE

## 2022-08-22 PROCEDURE — 250N000013 HC RX MED GY IP 250 OP 250 PS 637

## 2022-08-22 PROCEDURE — 120N000002 HC R&B MED SURG/OB UMMC

## 2022-08-22 PROCEDURE — 90834 PSYTX W PT 45 MINUTES: CPT | Performed by: STUDENT IN AN ORGANIZED HEALTH CARE EDUCATION/TRAINING PROGRAM

## 2022-08-22 PROCEDURE — 250N000013 HC RX MED GY IP 250 OP 250 PS 637: Performed by: INTERNAL MEDICINE

## 2022-08-22 PROCEDURE — 999N000128 HC STATISTIC PERIPHERAL IV START W/O US GUIDANCE

## 2022-08-22 PROCEDURE — 250N000013 HC RX MED GY IP 250 OP 250 PS 637: Performed by: STUDENT IN AN ORGANIZED HEALTH CARE EDUCATION/TRAINING PROGRAM

## 2022-08-22 PROCEDURE — 97530 THERAPEUTIC ACTIVITIES: CPT | Mod: GP

## 2022-08-22 RX ORDER — ESCITALOPRAM OXALATE 10 MG/1
20 TABLET ORAL DAILY
Status: DISCONTINUED | OUTPATIENT
Start: 2022-08-23 | End: 2022-08-24 | Stop reason: HOSPADM

## 2022-08-22 RX ORDER — RISPERIDONE 2 MG/1
2 TABLET ORAL AT BEDTIME
Status: DISCONTINUED | OUTPATIENT
Start: 2022-08-22 | End: 2022-08-24 | Stop reason: HOSPADM

## 2022-08-22 RX ORDER — METHYLPREDNISOLONE SODIUM SUCCINATE 125 MG/2ML
125 INJECTION, POWDER, LYOPHILIZED, FOR SOLUTION INTRAMUSCULAR; INTRAVENOUS
Status: DISCONTINUED | OUTPATIENT
Start: 2022-08-22 | End: 2022-08-24 | Stop reason: HOSPADM

## 2022-08-22 RX ORDER — DIPHENHYDRAMINE HYDROCHLORIDE 50 MG/ML
50 INJECTION INTRAMUSCULAR; INTRAVENOUS
Status: DISCONTINUED | OUTPATIENT
Start: 2022-08-22 | End: 2022-08-24 | Stop reason: HOSPADM

## 2022-08-22 RX ORDER — RISPERIDONE 0.25 MG/1
0.25 TABLET ORAL
Status: DISCONTINUED | OUTPATIENT
Start: 2022-08-23 | End: 2022-08-24 | Stop reason: HOSPADM

## 2022-08-22 RX ADMIN — ESCITALOPRAM OXALATE 10 MG: 10 TABLET ORAL at 09:30

## 2022-08-22 RX ADMIN — Medication 1000 MCG: at 09:30

## 2022-08-22 RX ADMIN — APIXABAN 5 MG: 5 TABLET, FILM COATED ORAL at 19:32

## 2022-08-22 RX ADMIN — ARIPIPRAZOLE 2 MG: 2 TABLET ORAL at 09:29

## 2022-08-22 RX ADMIN — CARVEDILOL 12.5 MG: 12.5 TABLET, FILM COATED ORAL at 19:32

## 2022-08-22 RX ADMIN — GABAPENTIN 100 MG: 100 CAPSULE ORAL at 09:30

## 2022-08-22 RX ADMIN — GABAPENTIN 100 MG: 100 CAPSULE ORAL at 19:32

## 2022-08-22 RX ADMIN — DICLOFENAC SODIUM 2 G: 10 GEL TOPICAL at 11:58

## 2022-08-22 RX ADMIN — APIXABAN 5 MG: 5 TABLET, FILM COATED ORAL at 09:30

## 2022-08-22 RX ADMIN — DICLOFENAC SODIUM 2 G: 10 GEL TOPICAL at 19:35

## 2022-08-22 RX ADMIN — ALLOPURINOL 300 MG: 300 TABLET ORAL at 09:30

## 2022-08-22 RX ADMIN — SPIRONOLACTONE 25 MG: 25 TABLET ORAL at 09:30

## 2022-08-22 RX ADMIN — GABAPENTIN 100 MG: 100 CAPSULE ORAL at 15:53

## 2022-08-22 RX ADMIN — DONEPEZIL HYDROCHLORIDE 10 MG: 5 TABLET, FILM COATED ORAL at 21:00

## 2022-08-22 RX ADMIN — FUROSEMIDE 40 MG: 20 TABLET ORAL at 09:30

## 2022-08-22 RX ADMIN — Medication 3 CAPSULE: at 09:29

## 2022-08-22 RX ADMIN — IRON SUCROSE 300 MG: 20 INJECTION, SOLUTION INTRAVENOUS at 14:03

## 2022-08-22 RX ADMIN — Medication 25 MCG: at 09:30

## 2022-08-22 RX ADMIN — LORATADINE 10 MG: 10 TABLET ORAL at 09:30

## 2022-08-22 RX ADMIN — CARVEDILOL 12.5 MG: 12.5 TABLET, FILM COATED ORAL at 09:30

## 2022-08-22 RX ADMIN — DICLOFENAC SODIUM 2 G: 10 GEL TOPICAL at 09:40

## 2022-08-22 RX ADMIN — RISPERIDONE 2 MG: 2 TABLET ORAL at 21:00

## 2022-08-22 ASSESSMENT — ACTIVITIES OF DAILY LIVING (ADL)
ADLS_ACUITY_SCORE: 61

## 2022-08-22 NOTE — PROGRESS NOTES
[facility]    Medicine Progress Note - Medicine Service, MARBETTY TEAM 1    Date of Admission:  8/2/2022    Assessment & Plan      William Almazan is a 81 year old male admitted on 8/2/2022. He has a history of CAD s/p CABG, HFrEF, pAfib on apixaban, HTN, HLD, DM2, KRISTIAN, prostate Ca, gout, and dementia who was admitted for weakness and hypotension.     Today's updates:  - Medically stable for discharge pending placement  - Transitioned from risperidone to abilify  - Now with dizziness, possible reaction to abilify, will monitor  - podiatry consult for toenail care    ----------------------------------------------------------------------------------------------------    Presyncope  Generalized weakness - improving   Acute hypotension 2/2 hypovolemia - resolved  Reported SBPs in 60s at home w/ inability to walk/ shower. No associated presyncopal, cardiac, or respiratory sxs. Did not fall or have LOC. Bps improved after 500 ml bolus PTA. Arrived lethargic but oriented x4. Initial concern for sepsis on arrival (given lymphopenia, lactate 3.1 and procal of 0.26) and started on IV Vanc and Zosyn. However, pt's lymphopenia appears to be chronic (unknown etiology) and no other SIRS criteria met (afebrile, normal RR, normal HR) so antibiotics discontinued. Lymphopenia, lactate, and procal improved with hydration (1.5 L total), which is reassuring. Suspect pre-syncope 2/2 hypovolemia (2/2 dehydration +hyperdiuresis) vs deconditioning after recent month-long hospitalization. As for infectious sources, is COVID positive but satting 97% on room air. Initial UA was dirty. Exchanged keating and repeat Ucx NGTD.   -continue 40 mg lasix daily   -I/Os and daily weights   -BMP q3d    Depressed mood  Dementia with psychosis  Baseline history of dementia. Admitted 06/2022 with progressive auditory hallucinations, including self harm command hallucinations. Seen by neuro and psych, started  on psychotropic meds and improved significantly. Started on risperdol, donepezil, gabapentin, and lexapro. Hallucinations of self harm resolved. 8/17 patient's wife commented that he is much more flat and withdrawn than usual. William stated that he feels depressed.    Consulted psychiatry 8/19 for assessment of current medication regimen and help with dose adjustments. They note that command hallucinations have resolved but he still has some auditory halllucinations (Zuhair Lainez telling him to wake up). Suspect dementia with psychosis. Recommended increasing donepezil and transition from risperdal to abilify.   - Current regimen includes:     Abilify 2 mg BID started 8/20. Per psychiatry can titrate up if tolerated    Donepezil 10 mg at bedtime  (increased from 5 mg 8/20)    Lexapro 10 mg daily  - Continue PTA gabapentin  - Monitor QTc with med changes in AM  - Consulted health psychology. They will see him on 8/22    Dizziness  Spinning dizziness and lightheadedness while moving head, not just with changing position. BP has been normal. Possible new vertigo, though no nystagmus on exam. Possible side effect of abilify though dizziness caused by orthostatic hypotension more common. Will continue to monitor. Will not start medication for dizziness at this time as symptoms are mild without nausea/vomiting and all medication options are either serotonergic and/or QT prolonging.  - Continue to monitor    Urinary retention, chronic   Hematuria  Chronic, and has chronic keating in place. Wife reports that it is changed monthly and is due for a change. Keating changed 8/3. Changed again 8/15 due to hematuria and leaking around keating.  Subsequently had consistent hematuria. Urology consulted 8/17. They felt leakage was normal for chronicity of keating and that hematuria was traumatic and would resolve.   They recommended:     follow with [facility] urologist    if he has symptomatic bladder spasms that are bothersome ensure catheter  draining appropraitely and add B&O suppositories  - Hematuria intermittently persists. CTM    Failure to thrive  Severe malnutrition in the context of chronic illness  --PT and OT consulted--> recommending TCU   --Nutrition consulted-->rec Ensure Enlive TID for supplementation   --Calorie counts complete    Prerenal TONE, resolved  Cr 1.28 (BL 0.7) on arrival--> resolved to baseline after 1.5 L NS. Given this drastic improvement, TONE likely 2/2 prerenal hypovolemia, potentially from hyperdiuresis. Patient was on lasix 80 PO BID prior to last admission and was discharged on 40 mg BID out of concern dose was too high.   - Lasix as above     Chronic HFrEF  pAF s/p dual chamber ICD  NSTEMI  First degree AV block  Prolonged QTc  Hx dilated cardiomyopathy, most recent TTE 6/18/21 showing severely dilated LV with EF 20%. Currently euvolemic on exam. No hypoxia and CXR negative for cardiopulm concern. Apparently I&Os were in neg balance at recent hospitalization concerning for lasix dosing being too high. Troponin elevated on arrival, now downtrending on recheck. Likely 2/2 demand ischemia. Symptomatic bradycardic 8/4 AM w/ first degree AV block noted on EKG that appears to be chronic. Prolonged QTc as high as 562 on 8/3. Automatic read of EKG 8/20 with QTc down to 499. Psychiatry reviewed with cardiology--QTc falsely elevated by right bundle. Actual QTc only 450.  - Repeat ECHO 8/3 without change compared to 6/8/21  - Cards consulted regarding bradycardia, ICD interrogated, no further workup needed  - PTA coreg 12.5 mg PO BID   - Daily weights, I&Os, lasix as above   - Monitor QTc with medication changes    Normocytic anemia  Hgb recently on the low end of normal (13 in June). Has trended down over the course of recent hospitalizations--down to 9.4 on 8/5. MCV 90. Likely due to combination of blood draws, blood loss with hematuria, chronic illness.   - repeat CBC  - Check iron panel    Toe injury - left  Toenail injured in  shower. Some dried blood around the toenail and toe is painful on exam but no current bleeding or opened wounds.   - Ortho called to discuss pt. L foot XR ordered--negative for fracture  - Referral to podiatry placed, will see next time they are rounding in hospital    Lactic acidosis - resolved  Lactate 3.1, improved to 2.1 with IVF in ED. Suspect hypovolemia-driven.     Hypokalemia - resolved  Improved with repletion, 2/2 lasix, loose stools.     COVID + - recovered  Acquired at recent admission (7/30 first positive), unclear significance to current presentation. Did not receive monoclonal antibodies during admission. Satting 97% on room air without respiratory distress, CXR unremarkable. COVID vaccinated x 3 (Pfizer). Never developed hypoxia.   - off precautions now     Left knee pain  Chronic, intermittent pain. Previously had surgery on this knee. Responded to diclofenac gel and tylenol      CHRONIC CONDITIONS:    Full thickness rectal prolapse, initially noted 7/15  Concern on recent admission, full-thickness but reducible, intermitted bleeding with bowel movements but Hgb stable 10-11. CRS recommend fiber and avoiding constipation, follow up with surgery outpt in 4-6 weeks.  - PTA psyllium fiber 3 capsule daily, MiraLAX 17 g daily and senna 1-2 tabs bid pr    CAD s/p CABG (1992):    Not ASA or statin at home. No acute concerns. EKG dual paced rhythm with frequent PVCs, no evidence of ACS.   - PTA Coreg    Tremor, whole body  New during recent admission, neuro thought to be stress related.  - PTA B12     PAF: Not currently in Afib. PTA Eliquis 5 mg PO BI  T2DM: Diet controlled  KRISTIAN: Does not use CPAP at home  Gout: PTA allopurinol       Diet: Regular Diet Adult  Snacks/Supplements Adult: Ensure Enlive; With Meals  Room Service Regular  DVT Prophylaxis: PTA apixaban   Louie Catheter: PRESENT, indication: Retention, Retention  Central Lines: None  Cardiac Monitoring: None  Code Status: Full Code      Disposition  Plan      Expected Discharge Date: approaching medical stability to discharge, would start TCU search process, pending TCU availability      Destination: TCU        The patient's care was discussed with the Patient.    Joanan Mata MD  Medicine Service, [facility] TEAM [address] [place] [address]  Securely message with the Vocera Web Console (learn more here)  Text page via [facility] Paging/Directory   Please see signed in provider for up to date coverage information      Clinically Significant Risk Factors Present on Admission                    ______________________________________________________________________    Interval History   Care team notes reviewed. No acute events. Started having dizziness this morning (both room spinning and lightheadedness) that is worse with moving his head, did not want to get up from bed for PT or OT. Otherwise no new symptoms, no nausea or vomiting, no chest pain. Blood pressures have been normal. Ongoing intermittent hematuria in [place]. No other concerns or complaints.    4 pt ROS otherwise negative.     Data reviewed today: I reviewed all medications, new labs and imaging results over the last 24 hours.     Physical Exam   Vital Signs: Temp: 98.4  F (36.9  C) Temp src: Oral BP: 122/62 Pulse: 72   Resp: 14 SpO2: 94 % O2 Device: None (Room air)    Weight: 183 lbs 6.76 oz     Constitutional: lying in bed, NAD  Respiratory: non-labored breathing on RA  Cardiovascular: irregularly irregular rhythm, normal rate  Extremities: trace LE edema   Skin: warm and dry   : keaitng bag with donna colored urine.   Neuropsychiatric: calm, cooperative, oriented, restricted affect     Data   No results found for this or any previous visit (from the past 24 hour(s)).

## 2022-08-22 NOTE — PROGRESS NOTES
Care Management Follow Up    Length of Stay (days): 19    Expected Discharge Date: 08/25/2022     Concerns to be Addressed: discharge planning  Patient plan of care discussed at interdisciplinary rounds: Yes    Anticipated Discharge Disposition: TCU  Anticipated Discharge Services: Transportation  Anticipated Discharge DME: None    Patient/family educated on Medicare website which has current facility and service quality ratings: Yes  Education Provided on the Discharge Plan:Yes    Patient/Family in Agreement with the Plan: yes    Referrals Placed by CM/SW:  SW sent new initial referrals:  [facility]  Liasion: Azra Dietrich  P: [phone]  F: [phone]    [facility])  [address] [facility]  P: [phone]  F: [phone]    [facility] ([facility])  [address] [facility] [address]  [phone]    [facility]  Liaison: Rachana Santiago  P: [phone]  F: [phone]    [facility])  [address] [place]  [address]  P: [phone]  F: [phone]    [facility])  [address] [place] [address]  [address]  P: [phone]  F: [phone]    [facility])  [address]  [address]  P: [phone]  F: [phone]    CHW Jacob followed up on pending referrals:  [facility])  [address] [place]  [address]  P: [phone]  F: [phone]  Press 0:  8/22: CHW spoke with admissions they will review and call  back. They do not have any availability today.     [facility])  [address] [place], [place] [address] [phone]   8/22: CHW spoke with admissions they declined due to pt being an female facility.      [facility])  [address] [place]  [address]  P: [phone]  F: [phone]   8/22: CHW spoke with admissions and they are full for the whole week.      [facility])  [address] [place]  [address]  P: [phone]  F: [phone]   8/22:CHW LVM for admissions to f/u on referral; requested they call SW back.      [facility])  [address]  [place] [address]  [phone]   8/22:CHW spoke with admissions they will review pt and call back SW.      [facility]  [address]  [place]  [address]  P: [phone]  F: [phone]  8/18: SW LVM with Zahraa in admissions.  8/22: CHW LVM for admissions to f/u on referral; requested they call SW back.      [facility]  [address] [facility] [place]  [address]  P: [phone]  F: [phone]  8/22: No bed openings for at least 1 week.     [address] [place] [address]  [phone]   8/22:CHW spoke with admissions and they are full until next Monday 8/29.      Discontinued:  [facility] ([facility])   [address] [facility] [address]   Phone: [phone]   Fax: [phone]   8/18: CHW spoke with admissions and they do not have any beds available currently. Zainab also mentioned they do not have any discharges in the foreseeable future.      [facility]  [address]  [place] [id number] [phone]  8/12: Too high acuity. Cannot accept pt.       [facility], [address] [place]  [address]  P: [phone]  F: [phone]   8/11: Declined. No appropriate bed.      [facility]  [address] [place]  [address]  P: [phone]  F: [phone]  8/9:Pt declined. No reason given on Epic.       [facility]   [address] [place] [address].  [place]  [address]  P: [phone]  P: [phone] - Admissions  F: [phone]  8/8: Declined via Epic. Unable to accept pt with COVID positive status and no available bed at this time.     [facility]  [address] [facility] [place]  [address]  P:  [phone]  F: [phone]  8/8 Pt declined. No appropriate bed for pt.  No LTC/memory care bed at this time      Private pay costs discussed: Not applicable    Additional Information:  CHW Jacob followed up on referrals.    SW sent new initial referrals.     will continue to follow for discharge planning, support, and resources.    KAHLIL Jerez, [facility]  [address]   Office: [phone]   Pager: [phone]  [email or web address]

## 2022-08-22 NOTE — CONSULTS
Health Psychology                                                                                                                          Brianne Longoria, Ph.D., L.P [phone]  Karla Noe, Ph.D., L.P. [phone]  Lili Lyons, Ph.D, L..P. [phone]  Zainab Marroquin, Ph.D., L.P. [phone]  Joe Wilkerson, Ph.D., A.B.P.P., L.P. [phone]         Karley Puckett, Ph.D., L.P. [phone]       Erika Kyle, Ph.D., A.B.P.P., LP [phone]           [facility], 3rd Floor  [address] [place] [address]    Inpatient Health Psychology Consultation    Date of Service:  8/22/22    BACKGROUND:  Per EMR: William Almazan is a 81 year old male admitted on 8/2/2022. He has a history of CAD s/p CABG, HFrEF, pAfib on apixaban, HTN, HLD, DM2, KRISTIAN, prostate Ca, gout, and dementia who was admitted for weakness and hypotension.    SUBJECTIVE:  Met with William and his wife Ilsa today.  He reported feeling disappointed today due to dizziness that prevented participation in therapies. William said he is very motivated to walk and wants to participate in therapies.  He exhibited confusion throughout the visit primarily referencing events he had thought occurred recently which Ilsa confirmed had not.     William shared his experiences with current admission. When asked about his mood and how he feels about being here, he shared that the most frustrating thing is his hallucinations. His wife said that she thinks he is more confused today about events but that he is also more alert and brighter, more engaged in disucssion.  William was unable to identify the patterns in his hallucinations.     Confirmed that William has been taking Abilify based on the MAR. Ilsa wonders if his increased dizziness and perception of William's increased hallucinations.     Provided psychoeducation about strategies to avoid additional factors that can contribute to confusion while in the  hospital (I.e. cues for orientation). Discussed anxiety management strategies as well due to William's reports of fear of falling. Demonstrated strategies that family members can use to redirect William to the present moment and gently provide accurate information about recent events. Demonstrated a grounding activity to help William orient to the present moment (I.e. identifying things he can see and feel in his current environment).     William stated he would like to sit up more in his bed/chair and was agreeable to having lights on during the day to help him be more oriented to time of day.    Regarding depressive symptoms, William denied overt depression and said he feels hopeful for his recovery. Ilsa agreed he looked more alert and like himself today and was less worried about his mood.  William acknowledged the difficult nature of the recovery process and disappointment he can have when it is slower than he wants. There is certainly a depressive quality in how he talks about his experiences and current state.  He said he also feels guilty if he is not able to participate as much in therapies. Current mood/emotional state may be influencing participation.  After we had discussed how it is important for William to sit up in his chair this writer asked him if he was planning to get to his chair later today. He said no and that he would do it tomorrow. Encouraged him to revisit the goal after he and Ilsa talk and he rests.       OBJECTIVE:  William was lying in his bed during our visit. He was alert for the majority of our visit, some drowsiness towards the end.  He reported fair mood, disappointed.  Affect was somewhat flat although he did exhibit occasional brightening. Appropriate eye contact. Thought processes logical and linear, content positive for confusion. Insight and judgment limited due to dementia. Speech WNL. Denied suicidal ideation.        ASSESSMENT:  William has been experiencing hallucinations in the  context of dementia during admission. He is also demonstrating mild symptoms of depression in response to medical situation. He has been demonstrating improvement in hallucinations but they remain present and contributes to frustration for William.       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     Lili Lyons, PhD,   Clinical Health Psychologist  Phone: [phone]  Pager: [phone]    Time in: 2:30  Time out: 3:15

## 2022-08-22 NOTE — PLAN OF CARE
Goal Outcome Evaluation:    Plan of Care Reviewed With: patient     Overall Patient Progress: no change    Outcome Evaluation: Slept well between cares. VSS on RA w/ infrequent cough. Bilateral knee pain remains controlled with voltaren gel. Turned q2h. Louie with good urine output but dark red this shift. MD aware. Pt with improved mood/energy this shift. Will continue to montior while awaiting placement.

## 2022-08-22 NOTE — PLAN OF CARE
Goal Outcome Evaluation:    Plan of Care Reviewed With: patient     Overall Patient Progress: no change    Outcome Evaluation: VSS on RA, infrequent dry cough continued. BL knee pain and lower back pain controlled with voltaren gel. Pt repositioned every 2 hrs. Coccyx wound dressing changed. Pt had loose BM this shift, incontinent of bowel. Louie in place, some leaking and drainage noted. Urine output red - urology following. Iron infusion complete, pt tolerated. Pt mood improved this shift, able to make needs known.

## 2022-08-22 NOTE — PROGRESS NOTES
Care Management Follow Up    [facility])  [address] [place]  [address]  P: [phone]  F: [phone]  Press 0:  8/22: CHW spoke with admissions they will review and call SW back. They do not have any availability today.     [facility])  [address] [facility] [place], [place] [address] [phone]   8/22: CHW spoke with admissions they declined due to pt being an female facility.     [facility])  [address] [place]  [address]  P: [phone]  F: [phone]   8/22: CHW spoke with admissions and they are full for the whole week.     [facility])  [address] [place]  [address]  P: [phone]  F: [phone]   8/22:CHW LVM for admissions to f/u on referral; requested they call SW back.       [facility])  [address] [facility] [place] [address]  [phone]   8/22:CHW spoke with admissions they will review pt and call back SW.     [facility]  [address] [place]  [address]  P: [phone]  F: [phone]  8/18: SW LVM with Zahraa in admissions.  8/22: CHW LVM for admissions to f/u on referral; requested they call SW back.      [facility]  [address] [place]  [address]  P: [phone]  F: [phone]  8/22: No bed openings for at least 1 week.     [facility]  [address] [place] [address]  [phone]   8/22:CHW spoke with admissions and they are full until next Monday 8/29.      Discontinued:  [facility]   [address] [facility] [address]   Phone: [phone]   Fax: [phone]   8/18: CHW spoke with admissions and they do not have any beds available currently. Zainab also mentioned they do not have any discharges in the foreseeable future.      [address] [address] [phone]  8/12: Too high acuity.  Cannot accept pt.       [facility], [address] [place]  [address]  P: [phone]  F: [phone]   8/11: Declined. No appropriate bed.      [facility]  [address] [place]  [address]  P: [phone]  F: [phone]  8/9:Pt declined. No reason given on Epic.       [facility]   [address] [facility]  [place]  [address]  P: [phone]  P: [phone] - Admissions  F: [phone]  8/8: Declined via Epic. Unable to accept pt with COVID positive status and no available bed at this time.     [facility]  [address] [place]  [address]  P: [phone]  F: [phone]  8/8 Pt declined. No appropriate bed for pt.  No LTC/memory care bed at this time    Philip Stuart   Inpatient Community Health Worker  [facility]

## 2022-08-23 ENCOUNTER — APPOINTMENT (OUTPATIENT)
Dept: OCCUPATIONAL THERAPY | Facility: CLINIC | Age: 82
DRG: 640 | End: 2022-08-23
Payer: MEDICARE

## 2022-08-23 ENCOUNTER — APPOINTMENT (OUTPATIENT)
Dept: PHYSICAL THERAPY | Facility: CLINIC | Age: 82
DRG: 640 | End: 2022-08-23
Payer: MEDICARE

## 2022-08-23 LAB
ANION GAP SERPL CALCULATED.3IONS-SCNC: 8 MMOL/L (ref 7–15)
ATRIAL RATE - MUSE: 73 BPM
BUN SERPL-MCNC: 19.8 MG/DL (ref 8–23)
CALCIUM SERPL-MCNC: 9.4 MG/DL (ref 8.8–10.2)
CHLORIDE SERPL-SCNC: 102 MMOL/L (ref 98–107)
CREAT SERPL-MCNC: 0.59 MG/DL (ref 0.67–1.17)
DEPRECATED HCO3 PLAS-SCNC: 28 MMOL/L (ref 22–29)
DIASTOLIC BLOOD PRESSURE - MUSE: NORMAL MMHG
GFR SERPL CREATININE-BSD FRML MDRD: >90 ML/MIN/1.73M2
GLUCOSE SERPL-MCNC: 101 MG/DL (ref 70–99)
INTERPRETATION ECG - MUSE: NORMAL
P AXIS - MUSE: 45 DEGREES
POTASSIUM SERPL-SCNC: 4.5 MMOL/L (ref 3.4–5.3)
PR INTERVAL - MUSE: NORMAL MS
QRS DURATION - MUSE: 172 MS
QT - MUSE: 492 MS
QTC - MUSE: 542 MS
R AXIS - MUSE: -64 DEGREES
SODIUM SERPL-SCNC: 138 MMOL/L (ref 136–145)
SYSTOLIC BLOOD PRESSURE - MUSE: NORMAL MMHG
T AXIS - MUSE: 115 DEGREES
VENTRICULAR RATE- MUSE: 73 BPM

## 2022-08-23 PROCEDURE — 120N000002 HC R&B MED SURG/OB UMMC

## 2022-08-23 PROCEDURE — 99232 SBSQ HOSP IP/OBS MODERATE 35: CPT | Mod: GC | Performed by: STUDENT IN AN ORGANIZED HEALTH CARE EDUCATION/TRAINING PROGRAM

## 2022-08-23 PROCEDURE — 250N000013 HC RX MED GY IP 250 OP 250 PS 637: Performed by: STUDENT IN AN ORGANIZED HEALTH CARE EDUCATION/TRAINING PROGRAM

## 2022-08-23 PROCEDURE — 97530 THERAPEUTIC ACTIVITIES: CPT | Mod: GP

## 2022-08-23 PROCEDURE — 250N000013 HC RX MED GY IP 250 OP 250 PS 637

## 2022-08-23 PROCEDURE — 250N000013 HC RX MED GY IP 250 OP 250 PS 637: Performed by: INTERNAL MEDICINE

## 2022-08-23 PROCEDURE — 93010 ELECTROCARDIOGRAM REPORT: CPT | Performed by: INTERNAL MEDICINE

## 2022-08-23 PROCEDURE — 80048 BASIC METABOLIC PNL TOTAL CA: CPT | Performed by: INTERNAL MEDICINE

## 2022-08-23 PROCEDURE — 97535 SELF CARE MNGMENT TRAINING: CPT | Mod: GO

## 2022-08-23 PROCEDURE — 93005 ELECTROCARDIOGRAM TRACING: CPT

## 2022-08-23 PROCEDURE — 97530 THERAPEUTIC ACTIVITIES: CPT | Mod: GO

## 2022-08-23 PROCEDURE — 36415 COLL VENOUS BLD VENIPUNCTURE: CPT | Performed by: INTERNAL MEDICINE

## 2022-08-23 RX ORDER — FUROSEMIDE 40 MG
40 TABLET ORAL DAILY
DISCHARGE
Start: 2022-08-23

## 2022-08-23 RX ORDER — CARVEDILOL 12.5 MG/1
12.5 TABLET ORAL 2 TIMES DAILY
DISCHARGE
Start: 2022-08-23

## 2022-08-23 RX ORDER — ESCITALOPRAM OXALATE 20 MG/1
20 TABLET ORAL DAILY
Status: ON HOLD | DISCHARGE
Start: 2022-08-24 | End: 2022-09-12

## 2022-08-23 RX ORDER — DONEPEZIL HYDROCHLORIDE 10 MG/1
10 TABLET, FILM COATED ORAL AT BEDTIME
Qty: 90 TABLET | Refills: 3 | DISCHARGE
Start: 2022-08-23

## 2022-08-23 RX ORDER — POLYETHYLENE GLYCOL 3350 17 G/17G
17 POWDER, FOR SOLUTION ORAL DAILY PRN
Qty: 510 G | DISCHARGE
Start: 2022-08-23

## 2022-08-23 RX ORDER — SENNOSIDES 8.6 MG
1 TABLET ORAL 2 TIMES DAILY PRN
DISCHARGE
Start: 2022-08-23 | End: 2022-08-30

## 2022-08-23 RX ORDER — ACETAMINOPHEN 325 MG/1
650 TABLET ORAL EVERY 4 HOURS PRN
Qty: 30 TABLET | Refills: 3 | DISCHARGE
Start: 2022-08-23

## 2022-08-23 RX ADMIN — SPIRONOLACTONE 25 MG: 25 TABLET ORAL at 09:17

## 2022-08-23 RX ADMIN — FUROSEMIDE 40 MG: 20 TABLET ORAL at 09:17

## 2022-08-23 RX ADMIN — RISPERIDONE 0.25 MG: 0.25 TABLET ORAL at 09:17

## 2022-08-23 RX ADMIN — APIXABAN 5 MG: 5 TABLET, FILM COATED ORAL at 09:17

## 2022-08-23 RX ADMIN — LORATADINE 10 MG: 10 TABLET ORAL at 09:17

## 2022-08-23 RX ADMIN — APIXABAN 5 MG: 5 TABLET, FILM COATED ORAL at 19:21

## 2022-08-23 RX ADMIN — GABAPENTIN 100 MG: 100 CAPSULE ORAL at 19:20

## 2022-08-23 RX ADMIN — DONEPEZIL HYDROCHLORIDE 10 MG: 5 TABLET, FILM COATED ORAL at 19:20

## 2022-08-23 RX ADMIN — CARVEDILOL 12.5 MG: 12.5 TABLET, FILM COATED ORAL at 19:20

## 2022-08-23 RX ADMIN — Medication 1000 MCG: at 09:17

## 2022-08-23 RX ADMIN — ALLOPURINOL 300 MG: 300 TABLET ORAL at 09:18

## 2022-08-23 RX ADMIN — CARVEDILOL 12.5 MG: 12.5 TABLET, FILM COATED ORAL at 09:17

## 2022-08-23 RX ADMIN — DICLOFENAC SODIUM 2 G: 10 GEL TOPICAL at 09:18

## 2022-08-23 RX ADMIN — GABAPENTIN 100 MG: 100 CAPSULE ORAL at 09:17

## 2022-08-23 RX ADMIN — RISPERIDONE 2 MG: 2 TABLET ORAL at 19:20

## 2022-08-23 RX ADMIN — Medication 25 MCG: at 09:17

## 2022-08-23 RX ADMIN — DICLOFENAC SODIUM 2 G: 10 GEL TOPICAL at 19:21

## 2022-08-23 RX ADMIN — ESCITALOPRAM OXALATE 20 MG: 10 TABLET ORAL at 09:17

## 2022-08-23 RX ADMIN — Medication 3 CAPSULE: at 09:17

## 2022-08-23 ASSESSMENT — ACTIVITIES OF DAILY LIVING (ADL)
ADLS_ACUITY_SCORE: 61
ADLS_ACUITY_SCORE: 57

## 2022-08-23 NOTE — PROGRESS NOTES
[facility]    Medicine Progress Note - Medicine Service, MARBETTY TEAM 1    Date of Admission:  8/2/2022    Assessment & Plan      William Almazan is a 81 year old male admitted on 8/2/2022. He has a history of CAD s/p CABG, HFrEF, pAfib on apixaban, HTN, HLD, DM2, KRISTIAN, prostate Ca, gout, and dementia who was admitted for weakness and hypotension.     Today's updates:  - Medically stable for discharge pending placement, may go to TCU tomorrow  - Dizziness improving off of Abilify, but ongoing    ----------------------------------------------------------------------------------------------------    Presyncope  Generalized weakness - improving   Acute hypotension 2/2 hypovolemia - resolved  Reported SBPs in 60s at home w/ inability to walk/ shower. No associated presyncopal, cardiac, or respiratory sxs. Did not fall or have LOC. Bps improved after 500 ml bolus PTA. Arrived lethargic but oriented x4. Initial concern for sepsis on arrival (given lymphopenia, lactate 3.1 and procal of 0.26) and started on IV Vanc and Zosyn. However, pt's lymphopenia appears to be chronic (unknown etiology) and no other SIRS criteria met (afebrile, normal RR, normal HR) so antibiotics discontinued. Lymphopenia, lactate, and procal improved with hydration (1.5 L total), which is reassuring. Suspect pre-syncope 2/2 hypovolemia (2/2 dehydration +hyperdiuresis) vs deconditioning after recent month-long hospitalization. As for infectious sources, is COVID positive but satting 97% on room air. Initial UA was dirty. Exchanged keating and repeat Ucx NGTD.   -continue 40 mg lasix daily   -I/Os and daily weights   -BMP q3d    Depressed mood  Dementia with psychosis  Baseline history of dementia. Admitted 06/2022 with progressive auditory hallucinations, including self harm command hallucinations. Seen by neuro and psych, started on psychotropic meds and improved significantly. Started on risperdol,  donepezil, gabapentin, and lexapro. Hallucinations of self harm resolved. 8/17 patient's wife commented that he is much more flat and withdrawn than usual. William stated that he feels depressed.    Consulted psychiatry 8/19 for assessment of current medication regimen and help with dose adjustments. They note that command hallucinations have resolved but he still has some auditory halllucinations. Suspect dementia with psychosis. Recommended increasing donepezil and transition from risperdal to abilify. Developed dizziness after initiation of abilify, so switched back to risperdal 8/22.   - Current regimen includes:     Risperidone 0.25mg qAM, 2mg qHS    Donepezil 10 mg at bedtime  (increased from 5 mg 8/20)    Lexapro 10 mg daily    If mood becomes more depressed, can add back in Abilify 2mg qHS  - Continue PTA gabapentin  - Monitor QTc with med changes, next 8/24  - Consulted health psychology, they will follow  - Psychiatry follow up at discharge    Dizziness, improving  Spinning dizziness and lightheadedness while moving head, not just with changing position. BP has been normal. Possible new vertigo, intermittent nystagmus on exam. Possible side effect of abilify, stopped by psychiatry. Will continue to monitor. Will not start medication for dizziness at this time as symptoms are mild without nausea/vomiting and all medication options are either serotonergic and/or QT prolonging.  - Stopped abilify and switched back to risperidone 8/22 as above  - Continue to monitor    Urinary retention, chronic   Hematuria  Chronic, and has chronic keating in place. Wife reports that it is changed monthly and is due for a change. Keating changed 8/3. Changed again 8/15 due to hematuria and leaking around keating.  Subsequently had consistent hematuria. Urology consulted 8/17. They felt leakage was normal for chronicity of keating and that hematuria was traumatic and would resolve.   They recommended:     follow with kanika  urologist    if he has symptomatic bladder spasms that are bothersome ensure catheter draining appropraitely and add B&O suppositories  - Hematuria intermittently persists. CTM    Failure to thrive  Severe malnutrition in the context of chronic illness  --PT and OT consulted--> recommending TCU   --Nutrition consulted-->rec Ensure Enlive TID for supplementation   --Calorie counts complete    Prerenal TONE, resolved  Cr 1.28 (BL 0.7) on arrival--> resolved to baseline after 1.5 L NS. Given this drastic improvement, TONE likely 2/2 prerenal hypovolemia, potentially from hyperdiuresis. Patient was on lasix 80 PO BID prior to last admission and was discharged on 40 mg BID out of concern dose was too high.   - Lasix as above     Chronic HFrEF  pAF s/p dual chamber ICD  NSTEMI  First degree AV block  Prolonged QTc  Hx dilated cardiomyopathy, most recent TTE 6/18/21 showing severely dilated LV with EF 20%. Currently euvolemic on exam. No hypoxia and CXR negative for cardiopulm concern. Apparently I&Os were in neg balance at recent hospitalization concerning for lasix dosing being too high. Troponin elevated on arrival, now downtrending on recheck. Likely 2/2 demand ischemia. Symptomatic bradycardic 8/4 AM w/ first degree AV block noted on EKG that appears to be chronic. Prolonged QTc as high as 562 on 8/3. Automatic read of EKG 8/20 with QTc down to 499. Psychiatry reviewed with cardiology--QTc falsely elevated by right bundle. Actual QTc only 450.  - Repeat ECHO 8/3 without change compared to 6/8/21  - Cards consulted regarding bradycardia, ICD interrogated, no further workup needed  - PTA coreg 12.5 mg PO BID   - Daily weights, I&Os, lasix as above   - Monitor QTc with medication changes    Iron deficiency anemia  Hgb recently on the low end of normal (13 in June). Has trended down over the course of recent hospitalizations--down to 9.4 on 8/5. MCV 90. Iron low. Likely due to combination of blood draws, blood loss with  hematuria, chronic illness.   - IV Venofer 300mg q72H x3 doses (8/22-8/31)    Toe injury - left  Toenail injured in shower. Some dried blood around the toenail and toe is painful on exam but no current bleeding or opened wounds.   - Ortho called to discuss pt. L foot XR ordered--negative for fracture  - Referral to podiatry placed, will see next time they are rounding in hospital    Lactic acidosis - resolved  Lactate 3.1, improved to 2.1 with IVF in ED. Suspect hypovolemia-driven.     Hypokalemia - resolved  Improved with repletion, 2/2 lasix, loose stools.     COVID + - recovered  Acquired at recent admission (7/30 first positive), unclear significance to current presentation. Did not receive monoclonal antibodies during admission. Satting 97% on room air without respiratory distress, CXR unremarkable. COVID vaccinated x 3 (Pfizer). Never developed hypoxia.   - off precautions now     Left knee pain  Chronic, intermittent pain. Previously had surgery on this knee. Responded to diclofenac gel and tylenol      CHRONIC CONDITIONS:    Full thickness rectal prolapse, initially noted 7/15  Concern on recent admission, full-thickness but reducible, intermitted bleeding with bowel movements but Hgb stable 10-11. CRS recommend fiber and avoiding constipation, follow up with surgery outpt in 4-6 weeks.  - PTA psyllium fiber 3 capsule daily, MiraLAX 17 g daily and senna 1-2 tabs bid pr    CAD s/p CABG (1992):    Not ASA or statin at home. No acute concerns. EKG dual paced rhythm with frequent PVCs, no evidence of ACS.   - PTA Coreg    Tremor, whole body  New during recent admission, neuro thought to be stress related.  - PTA B12     PAF: Not currently in Afib. PTA Eliquis 5 mg PO BI  T2DM: Diet controlled  KRISTIAN: Does not use CPAP at home  Gout: PTA allopurinol       Diet: Regular Diet Adult  Snacks/Supplements Adult: Ensure Enlive; With Meals  Diet  Room Service Regular  DVT Prophylaxis: PTA apixaban   Louie Catheter:  PRESENT, indication: Retention, Retention  Central Lines: None  Cardiac Monitoring: None  Code Status: Full Code      Disposition Plan      Expected Discharge Date: 8/24/22     Destination: TCU        The patient's care was discussed with the attending physician, Dr. Rios.    Joanna Mata MD  Medicine Service, [facility] TEAM 1  [facility]  Securely message with the Vocera Web Console (learn more here)  Text page via [facility] Paging/Directory   Please see signed in provider for up to date coverage information      Clinically Significant Risk Factors Present on Admission                    ______________________________________________________________________    Interval History   Care team notes reviewed. No acute events. Ongoing dizziness, nystagmus noted by nurse. Otherwise no new symptoms, no nausea or vomiting, no chest pain. Blood pressures have been normal. Ongoing intermittent hematuria in [place]. No other concerns or complaints.    4 pt ROS otherwise negative.     Data reviewed today: I reviewed all medications, new labs and imaging results over the last 24 hours.     Physical Exam   Vital Signs: Temp: 98.5  F (36.9  C) Temp src: Oral BP: (!) 140/77 Pulse: 111   Resp: 20 SpO2: 94 % O2 Device: None (Room air)    Weight: 175 lbs .72 oz     Constitutional: lying in bed, NAD  Respiratory: non-labored breathing on RA  Cardiovascular: regular rhythm, normal rate  Extremities: trace LE edema   Skin: warm and dry   : keating bag with donna colored urine.   Neuropsychiatric: calm, cooperative, oriented, restricted affect     Data   No results found for this or any previous visit (from the past 24 hour(s)).

## 2022-08-23 NOTE — PROGRESS NOTES
Care Management Follow Up    Length of Stay (days): 20    Expected Discharge Date: 08/25/2022     Concerns to be Addressed: discharge planning   Patient plan of care discussed at interdisciplinary rounds: Yes    Anticipated Discharge Disposition: TCU  Anticipated Discharge Services: Transportation  Anticipated Discharge DME: None    Patient/family educated on Medicare website which has current facility and service quality ratings: Yes  Education Provided on the Discharge Plan:  Yes  Patient/Family in Agreement with the Plan:  Yes    Referrals Placed by CM/SW:   [facility]  [address] [place] [address]  [phone]  Liaison: Rachana Santiago(P: [phone] F: [phone])    [facility]  Liasion: Azra Dietrich  P: [phone]  F: [phone]   8/23:CHW spoke with a lady filling in for Azra and she thought we were looking at [place] however I mentioned we are looking at [place]. She will send the referral over to them and F/U with the SW.     [facility]  [address]  P: [phone]  F: [phone]   8/23:CHW spoke with Link in admissions and they do not have any beds available until Friday.      [facility] ([facility])  [address] [facility] [address]  [phone]   8/23:Declined. No available beds.     [facility]  Liaison: Rachana Santiago  P: [phone]  F: [phone]   8/23: Spoke with Rachana and she stated that the pt was accepted at [facility].     [facility])  [address] [place]  [address]  P: [phone]  F: [phone]     [facility])  [address] [place]  [address]  P: [phone]  F: [phone]     [facility]  [address] [place] [address]  [address]  P: [phone]  F: [phone]     [facility])  [address] [place] [address]   [address]  P: [phone]  F: [phone]  Press 0:  8/23: CHW spoke with admissions they will review and call SW back. They do not have any availability today.     [facility])  [address] [place]  [address]  P: [phone]  F: [phone]   8/23: CHW spoke with admissions and they are full for the whole week.      [facility])  [address] [place] [address]  [phone]   8/22:CHW spoke with admissions they will review pt and call back SW.      [facility]  [address] [place]  [address]  P: [phone]  F: [phone]  8/18: SW LVM with Zahraa in admissions.  8/23: CHW LVM for admissions to f/u on referral; requested they call SW back.      [facility]  [address] [place]  [address]  P: [phone]  F: [phone]  8/23: No bed openings for at least 1 week (8/29).      [facility]  [address] [place] [address]  [phone]   8/23:CHW spoke with admissions and they are full until next Monday 8/29.      Discontinued:  [facility])  [address] [place]  [address]  P: [phone]  F: [phone]   8/22: Declined. No bed available.    [facility] ([facility])  [address] [facility] [id number] [phone]   8/23:Declined. No available beds.     [facility]   [address] [facility] [address]   Phone: [phone]   Fax: [phone]   8/18: CHW spoke with admissions and they do not have any beds available currently. Zainab also mentioned they do not have any discharges in the foreseeable future.      [address]  [place] [address] [phone]  8/12: Too high acuity. Cannot accept pt.       [facility], [address] [place]  [address]  P: [phone]  F: [phone]   8/11: Declined. No appropriate bed.       [facility]  [address] [facility]  [place]  [address]  P: [phone]  F: [phone]  8/9:Pt declined. No reason given on Epic.       [facility]   [address] [facility]  [place]  [address]  P: [phone]  P: [phone] - Admissions  F: [phone]  8/8: Declined via Epic. Unable to accept pt with COVID positive status and no available bed at this time.     [facility]  [address] [facility] [place]  [address]  P: [phone]  F: [phone]  8/8 Pt declined. No appropriate bed for pt.  No LTC/memory care bed at this time     [facility])  [address] [place] [address]  [phone]   8/22: CHW spoke with admissions they declined due to pt being an female facility.       Private pay costs discussed: Not applicable    Additional Information:  @0800AM HERNANDEZ received a call from chris Santiago([phone]) and she reported that they have a bed opening at the [facility].  Pt can admit at anytime today.    [facility]  [address] [place] [address]  [phone]  Chris: Rachana Santiago(P: [phone] F: [phone])    @0805AM HERNANDEZ called and spoke with pts spouse Ilsa to inform her that there was bed availability at the [facility]. She reports that she will inform her daughter and call back .    @0858AM SW received a call from pts spouse Ilsa reporting that she and her daughter would like to speak more with the staff at the [facility] and hope to possible visit today.  Ilsa reports that they would also like pt to transfer today, if possible.      @0902AM HERNANDEZ called and spoke to Rachana Santiago and updated her that family would like to talk to staff and possible visit today. Rachana reports that would be fine.    @0908AM HERNANDEZ pageminnie Marshall to inform her of possible discharge plans.     @1217PM HERNANDEZ called pts spouse Ilsa to discuss discharge plans.  Ilsa reports that she will  visit the facility at 1330PM today. She reports that she will call back SW after meeting.    As of @1600PM SW has not received a call back from pts spouse Ilsa and SW will plan to follow up with her tomorrow morning.  Coordination for transfer to the TCU will not be able to be completed this late in the day.     will continue to follow for discharge planning, support, and resources.    KAHLIL Jerez, [facility]  [address] 5[address]   Office: [phone]   Pager: [phone]  [email or web address]

## 2022-08-23 NOTE — PLAN OF CARE
"Goal Outcome Evaluation:    Plan of Care Reviewed With: patient     Overall Patient Progress: improving     Pt denies pain overnight. Repositioned throughout night, pressure injury on coccyx dressing CDI. Louie in place with light pink output. Team aware and continuing to monitor. Pt did report he felt like he was rocking side to side this AM. Some nystagmus noted. MD paged and will continue to monitor for pt having ongoing dizziness and could be vertigo.VSS on RA.     /63 (BP Location: Right arm, Patient Position: Semi-Ruby's)   Pulse 69   Temp 98.3  F (36.8  C) (Oral)   Resp 20   Ht 1.753 m (5' 9\")   Wt 83.2 kg (183 lb 6.8 oz)   SpO2 97%   BMI 27.09 kg/m          "

## 2022-08-23 NOTE — PLAN OF CARE
Goal Outcome Evaluation:    Plan of Care Reviewed With: patient     Overall Patient Progress: no change    Outcome Evaluation: Repositioned pt q2h.  Coccyx wound dressing CDI. Louie in place with pink tinged output.  Pt reported improvement in dizziness.

## 2022-08-23 NOTE — PROGRESS NOTES
Care Management Follow Up    [facility]  Liasion: Azra Dietrich  P: [phone]  F: [phone]   8/23:CHW spoke with a lady filling in for Azra and she thought we were looking at [place] however I mentioned we are looking [place]. She will send the referral over to them and F/U with the [facility])  [address]  P: [phone]  F: [phone]   8/23:CHW spoke with abida in admissions and they do not have any beds available until Friday.     [facility] ([facility])  [address] [facility] [id number] [phone]   8/23:CHW LVM for admissions to f/u on referral; requested they call SW back.     [facility]  Liaison: Rachana Santiago  P: [phone]  F: [phone]   8/23: Spoke with Rachana and she stated that the pt was accepted at [facility])  [address] [place]  [address]  P: [phone]  F: [phone]     [facility])  [address] [place] [address]  [address]  P: [phone]  F: [phone]     [facility])  [address] [place] [address]  [address]  P: [phone]  F: [phone]     [facility])  [address] [place]  [address]  P: [phone]  F: [phone]  Press 0:  8/23: CHW spoke with admissions they will review and call SW back. They do not have any availability today.        [facility])  [address] [place]  [address]  P: [phone]  F: [phone]   8/23: CHW spoke with admissions and they are full for the whole week.      . [facility])  [address] [place]  [address]  P: [phone]  F: [phone]   8/22:CHW LVM for admissions to f/u on referral; requested they call SW back.      [facility])  [address]  [place] [address]  [phone]   8/22:CHW spoke with admissions they will review pt and call back SW.      [facility]  [address]  [place]  [address]  P: [phone]  F: [phone]  8/18: SW LVM with Zahraa in admissions.  8/23: CHW LVM for admissions to f/u on referral; requested they call SW back.      [facility]  [address] [place]  [address]  P: [phone]  F: [phone]  8/23: No bed openings for at least 1 week (8/29).      [facility]  [address] [place] [address]  [phone]   8/23:CHW spoke with admissions and they are full until next Monday 8/29.      Discontinued:  [facility])   [address] [facility] [facility] [place] [address]   Phone: [phone]   Fax: [phone]   8/18: CHW spoke with admissions and they do not have any beds available currently. Zainab also mentioned they do not have any discharges in the foreseeable future.      [facility]  [address]  [place] [address]  [phone]  8/12: Too high acuity. Cannot accept pt.       [facility]  [address] [place]  [address]  P: [phone]  F: [phone]   8/11: Declined. No appropriate bed.      [facility]  [address] [place]  [address]  P: [phone]  F: [phone]  8/9:Pt declined. No reason given on Epic.       [facility]   [address]  [place]  [address]  P: [phone]  P: [phone] - Admissions  F: [phone]  8/8: Declined via Epic. Unable to accept pt with COVID positive status and no available bed at this time.     [facility]  [address] [place]  [address]  P: [phone]  F: [phone]  8/8 Pt declined. No appropriate bed for pt.  No LTC/memory care bed at this time    [facility]  [address] [place] [address]  [phone]   8/22: CHW spoke with admissions they declined due to pt being an  female facility.     Philip Stuart   Inpatient Community Health Worker  [facility] 5A & 5B

## 2022-08-23 NOTE — PLAN OF CARE
Goal Outcome Evaluation:    A&O VSS on RA. PIV SL. Louie with red UOP (cares done). Incontinent of small BM this shift. Mepilex CDI. Reg diet, good appetite. Wife at bedside and helpful. Awaiting TCU.

## 2022-08-24 ENCOUNTER — MEDICAL CORRESPONDENCE (OUTPATIENT)
Dept: HEALTH INFORMATION MANAGEMENT | Facility: CLINIC | Age: 82
End: 2022-08-24

## 2022-08-24 ENCOUNTER — APPOINTMENT (OUTPATIENT)
Dept: PHYSICAL THERAPY | Facility: CLINIC | Age: 82
DRG: 640 | End: 2022-08-24
Payer: MEDICARE

## 2022-08-24 VITALS
HEIGHT: 69 IN | WEIGHT: 178.13 LBS | DIASTOLIC BLOOD PRESSURE: 62 MMHG | OXYGEN SATURATION: 95 % | RESPIRATION RATE: 16 BRPM | HEART RATE: 81 BPM | TEMPERATURE: 98 F | BODY MASS INDEX: 26.38 KG/M2 | SYSTOLIC BLOOD PRESSURE: 112 MMHG

## 2022-08-24 LAB
ATRIAL RATE - MUSE: 68 BPM
DIASTOLIC BLOOD PRESSURE - MUSE: NORMAL MMHG
INTERPRETATION ECG - MUSE: NORMAL
P AXIS - MUSE: 35 DEGREES
PR INTERVAL - MUSE: 186 MS
QRS DURATION - MUSE: 206 MS
QT - MUSE: 504 MS
QTC - MUSE: 535 MS
R AXIS - MUSE: -62 DEGREES
SYSTOLIC BLOOD PRESSURE - MUSE: NORMAL MMHG
T AXIS - MUSE: 136 DEGREES
VENTRICULAR RATE- MUSE: 68 BPM

## 2022-08-24 PROCEDURE — 250N000013 HC RX MED GY IP 250 OP 250 PS 637: Performed by: STUDENT IN AN ORGANIZED HEALTH CARE EDUCATION/TRAINING PROGRAM

## 2022-08-24 PROCEDURE — 250N000013 HC RX MED GY IP 250 OP 250 PS 637

## 2022-08-24 PROCEDURE — 97116 GAIT TRAINING THERAPY: CPT | Mod: GP

## 2022-08-24 PROCEDURE — 93005 ELECTROCARDIOGRAM TRACING: CPT

## 2022-08-24 PROCEDURE — 250N000013 HC RX MED GY IP 250 OP 250 PS 637: Performed by: INTERNAL MEDICINE

## 2022-08-24 PROCEDURE — 99239 HOSP IP/OBS DSCHRG MGMT >30: CPT | Mod: GC | Performed by: STUDENT IN AN ORGANIZED HEALTH CARE EDUCATION/TRAINING PROGRAM

## 2022-08-24 PROCEDURE — 93010 ELECTROCARDIOGRAM REPORT: CPT | Performed by: INTERNAL MEDICINE

## 2022-08-24 PROCEDURE — G0463 HOSPITAL OUTPT CLINIC VISIT: HCPCS

## 2022-08-24 PROCEDURE — 97530 THERAPEUTIC ACTIVITIES: CPT | Mod: GP

## 2022-08-24 RX ADMIN — LORATADINE 10 MG: 10 TABLET ORAL at 10:33

## 2022-08-24 RX ADMIN — GABAPENTIN 100 MG: 100 CAPSULE ORAL at 10:33

## 2022-08-24 RX ADMIN — APIXABAN 5 MG: 5 TABLET, FILM COATED ORAL at 10:33

## 2022-08-24 RX ADMIN — Medication 25 MCG: at 10:33

## 2022-08-24 RX ADMIN — RISPERIDONE 0.25 MG: 0.25 TABLET ORAL at 08:30

## 2022-08-24 RX ADMIN — FUROSEMIDE 40 MG: 20 TABLET ORAL at 10:34

## 2022-08-24 RX ADMIN — SPIRONOLACTONE 25 MG: 25 TABLET ORAL at 10:33

## 2022-08-24 RX ADMIN — ESCITALOPRAM OXALATE 20 MG: 10 TABLET ORAL at 10:33

## 2022-08-24 RX ADMIN — Medication 1000 MCG: at 10:34

## 2022-08-24 RX ADMIN — Medication 3 CAPSULE: at 10:34

## 2022-08-24 RX ADMIN — ALLOPURINOL 300 MG: 300 TABLET ORAL at 10:33

## 2022-08-24 RX ADMIN — CARVEDILOL 12.5 MG: 12.5 TABLET, FILM COATED ORAL at 10:33

## 2022-08-24 ASSESSMENT — ACTIVITIES OF DAILY LIVING (ADL)
ADLS_ACUITY_SCORE: 57
ADLS_ACUITY_SCORE: 61
ADLS_ACUITY_SCORE: 57
ADLS_ACUITY_SCORE: 61
ADLS_ACUITY_SCORE: 61
ADLS_ACUITY_SCORE: 57
ADLS_ACUITY_SCORE: 61
ADLS_ACUITY_SCORE: 57

## 2022-08-24 NOTE — PLAN OF CARE
Goal Outcome Evaluation:      A&O VSS on RA. PIV JOAN Louie with red UOP, cares done. Reg diet, declined dinner tray. Encouraging fluids. No BM this shift. Turned and repo. Quiet flat affect. Awaiting TCU.

## 2022-08-24 NOTE — PLAN OF CARE
Goal Outcome Evaluation:    Plan of Care Reviewed With: patient     Overall Patient Progress: no change    Outcome Evaluation: 2 BM's today.  Sacral dressing CDI.  Louie catheter in place with bright red output; team notified and aware.  PIV removed.  Pt picked up by [facility] transportation.  Left unit by stretcher with all belongings.

## 2022-08-24 NOTE — PROVIDER NOTIFICATION
Provider notified regarding pt's bright red keating output that has started to leak around catheter onto pads.

## 2022-08-24 NOTE — PROGRESS NOTES
"[facility] Nurse Inpatient Assessment     Consulted for: sacrum actually was the coccyx and bilateral lower buttocks     Patient History (according to provider note(s):      Per Dr Parish Flaherty on 6/30/2022: William Almazan is a 81 year old male with history of CAD s/p CABG, dilated cardiomyopathy, HFrEF, PAF on eliquis, HTN, HLD, DM2, KRISTIAN, prostate cancer, gout, and dementia who presented with worsening auditory hallucinations.    Areas Assessed:      Areas visualized during today's visit: Sacrum/coccyx    Wound Location:  Coccyx    8/4 and 8/11      Date of last photo 8/17  Wound History: pressure and IAD weakness present on admission  Stage 3  Wound Base: 100 % granulation tissue     Palpation of the wound bed: normal      Drainage: scant     Description of drainage: serous     Measurements (length x width x depth, in cm) 0.8 x 0.8 x 0.2 cm   Periwound skin: dry/scaly      Color: dusky      Temperature: normal   Odor: none  Pain: no grimacing or signs of discomfort, none     L fleshy buttock with pea sized superficial friction wound      Treatment Plan:     Coccyx wound(s): Every 3 days   Cleanse wound with saline or vashe and dry   Apply Cavilon barrier film to skin around wound and let dry   Cover with sacral mepilex, fits area better \"upside down\"   Turns q2hr from side to side, avoid supine position as much as possible  HOB less than 30 degrees as able   Lift foot of bed prior to lifting head of bed to reduce shearing to sacrum     Bilateral groin cares: Daily and PRN cleanse skin with saline and dry thoroughly, then place interdry AG (989277) into skin fold, ensure at least a 2 inch wick remains outside of fold to allow for wicking action of textile. Remove for cares and replace same piece if not soiled, can use same piece up to 5 days if not soiled with urine or stool due to silver impregnated within textile.       Orders: Written    RECOMMEND PRIMARY " TEAM ORDER: None, at this time  Education provided: plan of care, Moisture management and Off-loading pressure  Discussed plan of care with: Nurse  WOC nurse follow-up plan: weekly  Notify WOC if wound(s) deteriorate.  Nursing to notify the Provider(s) and re-consult the WOC Nurse if new skin concern.    DATA:     Current support surface: Standard  pulsate mattress  Containment of urine/stool: Incontinence Protocol  BMI: Body mass index is 26.31 kg/m .   Active diet order: Orders Placed This Encounter      Regular Diet Adult      Diet     Output: I/O last 3 completed shifts:  In: 480 [P.O.:480]  Out: 875 [Urine:875]     Labs:   Recent Labs   Lab 08/20/22  1010   HGB 9.5*   WBC 3.3*     Pressure injury risk assessment:   Sensory Perception: 3-->slightly limited  Moisture: 4-->rarely moist  Activity: 2-->chairfast  Mobility: 3-->slightly limited  Nutrition: 3-->adequate  Friction and Shear: 2-->potential problem  John Score: 17     Dept. Pager: [phone]

## 2022-08-24 NOTE — PLAN OF CARE
Physical Therapy Discharge Summary    Reason for therapy discharge:    Discharged to transitional care facility.    Progress towards therapy goal(s). See goals on Care Plan in [facility] electronic health record for goal details.  Goals not met.  Barriers to achieving goals:   limited tolerance for therapy and discharge from facility.    Therapy recommendation(s):    Continued therapy is recommended.  Rationale/Recommendations:  to improve functional strength, mobility, balance and activity tolerance.

## 2022-08-24 NOTE — PROGRESS NOTES
Care Management Discharge Note    Discharge Date: 08/24/2022       Discharge Disposition: TCU    Discharge Services: Transportation    Discharge DME: None    Discharge Transportation:Stretcher Transportation via [facility] due to pts dementia/safety/supervision    Private pay costs discussed: Not applicable    PAS Confirmation Code:  [id number]  Patient/family educated on Medicare website which has current facility and service quality ratings: Yes  Education Provided on the Discharge Plan:Yes    Persons Notified of Discharge Plans: pt, pts spouse, pts daughter, treatment team, bedside nurse, nursing staff,   Patient/Family in Agreement with the Plan: yes    Handoff Referral Completed: Yes    Referrals Placed by CM/SW:   Accepting Facility:  [facility]  [address] [place] [address]    [phone]  Liaison: Rachana Santiago  P: [phone]  F: [phone]  Nurse to Nurse:     Private pay costs discussed: Not applicable    Additional Information:  [facility]  [address] [place] [address]  [phone]   Liaison: Rachana Santiago(P: [phone] F: [phone])  8/24 Pt was accepted to [facility] for [facility] room.      [facility]  [address] [place] [address]    [phone]  Liaison: Rachana Santiago  P: [phone]  F: [phone]  8/24: Pt was accepted to [facility]-this was one of pts family's choices.  They have a shared room available.    @7205AM HERNANDEZ called and informed family that [facility] has a room available and family reports that they will attempt to visit this morning.     HERNANDEZ received a call back from Renuka, pts daughter, and was informed that they were pleased with the facility and were comfortable sending pt there.  IMM completed with Renuka via phone.  HERNANDEZ explained the IMM document and Renuka acknowledged/is aware and verbally signed document.    HERNANDEZ called [facility]  ([phone]) and arranged stretcher transport for pt at 1500PM due to pts dementia/supervision needs.    @1326PM HERNANDEZ called [facility] to inquire about a nurse to nurse phone number but was unable to reach anyone.    @1336 SW faxed discharge orders to liaison Rachana Santiago: F: [phone].    @1512PM HERNANDEZ faxed PCS form and provided completed form to EMS staff.     will continue to follow for discharge planning, support, and resources.    KAHLIL Jerez, [facility]  Unit 5A   Office: [phone]   Pager: [phone]  [email or web address]

## 2022-08-24 NOTE — DISCHARGE SUMMARY
[facility]  Discharge Summary - Medicine & Pediatrics       Date of Admission:  8/2/2022  Date of Discharge:  8/24/2022  Discharging Provider: Nestor Rios  Discharge Service: Medicine Service, SYDNEE TEAM 1    Discharge Diagnoses   Presyncope  Hypotension 2/2 hypovolemia  Depressed mood  Dementia with psychosis  Chronic urinary retention  Chronic hematuria  FTT/Severe malnutrition  Chronic HFrEF  pAF s/p dual chamber ICD  NSTEMI  First degree AV block  Prolonged QTc  Iron deficiency anemia  COVID + - recovered    Follow-ups Needed After Discharge   Follow-up Appointments     Follow Up and recommended labs and tests      Follow up with senior care physician.  The following labs/tests are   recommended: Mg, BMP to monitor lytes and kidney function while on lasix.   EKG to monitor QTc with psychiatric medication changes. CBC after course   of IV iron to monitor improvement to hemoglobin.    Cardiology, urology, colorectal, and psychiatry follow-ups as previously   scheduled.             Unresulted Labs Ordered in the Past 30 Days of this Admission     No orders found from 7/3/2022 to 8/3/2022.          Discharge Disposition   Discharged to short-term care facility  Condition at discharge: Stable      Hospital Course   William Almazan is an 81 year old male admitted on 8/2/2022. He has a history of CAD s/p CABG, HFrEF, pAfib on apixaban, HTN, HLD, DM2, KRISTIAN, prostate Ca, gout, and dementia who was admitted for weakness, hypotension, hypovolemic lactic acidosis, and an TONE which all improved with hydration alone. Other chronic conditions required acute management while admitted, including dementia with psychosis as well as chronic urinary retention, and FTT requiring placement in a TCU for strengthening prior to returning home.     Presyncope  Generalized weakness - improving   Acute hypotension 2/2 hypovolemia - resolved  Reported SBPs in 60s at home w/ inability to  walk/ shower. No associated presyncopal, cardiac, or respiratory sxs. Did not fall or have LOC. BPs improved after 500 ml bolus PTA. Arrived lethargic but oriented x4. Initial concern for sepsis on arrival (given lymphopenia, lactate 3.1 and procal of 0.26) and started on IV Vanc and Zosyn. However, pt's lymphopenia appears to be chronic (unknown etiology) and no other SIRS criteria met (afebrile, normal RR, normal HR) so antibiotics discontinued. Lymphopenia, lactate, and procal improved with hydration (1.5 L total), which is reassuring. Suspect pre-syncope 2/2 hypovolemia (2/2 dehydration +hyperdiuresis) vs deconditioning after recent month-long hospitalization. As for infectious sources, was COVID positive on arrival but satting 97% on room air. Initial UA was dirty. Exchanged keating and repeat Ucx NGTD. Lasix was decreased during admission from 80mg BID to 40mg daily.      Depressed mood  Dementia with psychosis  Baseline history of dementia. Admitted 06/2022 with progressive auditory hallucinations, including self harm command hallucinations. Seen by neuro and psych, started on psychotropic meds and improved significantly. Started on risperdol, donepezil, gabapentin, and lexapro. Hallucinations of self harm resolved. 8/17 patient's wife commented that he is much more flat and withdrawn than usual. William stated that he feels depressed.     Consulted psychiatry 8/19 for assessment of current medication regimen and help with dose adjustments. They noted that command hallucinations resolved but he still had some auditory halllucinations. Suspect dementia with psychosis. Recommended increasing donepezil and transition from risperdal to abilify. Developed dizziness after initiation of abilify, so switched back to risperdal 8/22. QTc monitored with these medication changes. Current regimen includes risperidone, donepezil, lexapro, and gabapentin. Psychiatry recommended that if mood becomes more depressed, can  add back in Abilify 2mg at bedtime. He will follow with psychiatry as an outpatient for ongoing medication management. He was also seen by health psychology while here.     Dizziness, improving  New spinning dizziness and lightheadedness while moving head, not just with changing position. BP was normal with these symptoms. Possible new vertigo, intermittent nystagmus on exam. Possible side effect of abilify given onset shortly after starting this medication, so abilify was stopped by psychiatry. Some improvement in following days but no complete resolution. Did not start medication for dizziness as symptoms were mild without nausea/vomiting and all medication options are either serotonergic and/or QT prolonging.     Urinary retention, chronic   Hematuria  Chronic issues, and has chronic keating in place. Wife reports that the keating is changed monthly, last changed 8/15 due to hematuria and leaking around keating. Subsequently had consistent hematuria. Urology consulted 8/17. They felt leakage was normal for chronicity of keating and that hematuria was traumatic and would resolve. They recommended to follow with his [facility] urologist, and if he has symptomatic bladder spasms that are bothersome to ensure catheter draining appropraitely and add B&O suppositories.     Failure to thrive  Severe malnutrition in the context of chronic illness  PT and OT evaluated patient and recommended TCU. Nutrition also consulted and recommended protein shake supplements.     Prerenal TONE, resolved  Cr 1.28 (BL 0.7) on arrival--> resolved to baseline after 1.5 L NS. Given this drastic improvement, TONE likely 2/2 prerenal hypovolemia, potentially from hyperdiuresis. Patient was on lasix 80 PO BID prior to last admission and was discharged on 40 mg daily out of concern dose was too high.      Chronic HFrEF  pAF s/p dual chamber ICD  NSTEMI  First degree AV block  Prolonged QTc  Hx dilated cardiomyopathy, most recent TTE 6/18/21 showing severely  dilated LV with EF 20%. Euvolemic on exam. No hypoxia and CXR negative for cardiopulm concern. Apparently I&Os were in neg balance at recent hospitalization concerning for lasix dosing being too high. Troponin elevated on arrival, downtrended on recheck. Likely 2/2 demand ischemia. Repeat ECHO 8/3 without change compared to 6/8/21. Symptomatic bradycardia 8/4 AM w/ first degree AV block noted on EKG that appeared to be chronic. Cards consulted regarding bradycardia, ICD interrogated, no further workup needed. Prolonged QTc as high as 562 on 8/3. Automatic read of EKG 8/20 with QTc down to 499. Psychiatry reviewed with cardiology--QTc falsely elevated by right bundle. Actual QTc only 450. PTA coreg 12.5 mg PO BID continued this admission.     Iron deficiency anemia  Hgb recently on the low end of normal (13 in June). Has trended down over the course of recent hospitalizations--down to 9.4 on 8/5. MCV 90. Iron low. Likely due to combination of blood draws, blood loss with hematuria, chronic illness. Started IV Venofer 300mg q72H x3 doses (8/22-8/31), will complete at the TCU.     Toe injury - left  Toenail injured in shower. Had some dried blood around the toenail and toe is painful on exam, improved over time. Ortho was called to discuss pt. L foot XR ordered--negative for fracture. Referral to podiatry placed but they were unable to see patient prior to discharge. He should follow up for nail care at the TCU.     Lactic acidosis - resolved  Lactate 3.1, improved to 2.1 with IVF in ED. Suspect hypovolemia-driven.      Hypokalemia - resolved  Improved with repletion, 2/2 lasix and loose stools.      COVID + - recovered  Acquired at recent admission (7/30 first positive), unclear significance to current presentation. Did not receive monoclonal antibodies during admission. On room air without respiratory distress throughout admission, CXR unremarkable on admission. COVID vaccinated x 3 (Pfizer). Never developed hypoxia.       Left knee pain  Chronic, intermittent pain. Previously had surgery on this knee. Responded to diclofenac gel and tylenol during admission.        CHRONIC CONDITIONS:     Full thickness rectal prolapse, initially noted 7/15  Concern on recent admission, full-thickness but reducible, intermitted bleeding with bowel movements but Hgb stable 10-11. CRS recommend fiber and avoiding constipation, follow up with surgery outpt in 4-6 weeks. Continued bowel regimen PTA psyllium fiber 3 capsule daily, MiraLAX 17 g daily and senna 1-2 tabs bid pr.     CAD s/p CABG (1992):    Not on ASA or statin at home. EKG dual paced rhythm with frequent PVCs, no evidence of ACS. Continued PTA Coreg.     Tremor, whole body  New during recent admission, neuro thought to be stress related. Continued PTA B12.     PAF: Not currently in Afib. PTA Eliquis 5 mg PO BID continued while admitted.  T2DM: Diet controlled, no antihyperglycemics required in hospital.  KRISTIAN: Does not use CPAP at home, not used in hospital.  Gout: PTA allopurinol continued this admission.      Consultations This Hospital Stay   PHARMACY TO DOSE VANCO  PHYSICAL THERAPY ADULT IP CONSULT  OCCUPATIONAL THERAPY ADULT IP CONSULT  WOUND OSTOMY CONTINENCE NURSE  IP CONSULT  CARE MANAGEMENT / SOCIAL WORK IP CONSULT  CARDIOLOGY GENERAL ADULT IP CONSULT  PODIATRY IP CONSULT  NUTRITION SERVICES ADULT IP CONSULT  PHYSICAL THERAPY ADULT IP CONSULT  OCCUPATIONAL THERAPY ADULT IP CONSULT  NUTRITION SERVICES ADULT IP CONSULT  UROLOGY IP CONSULT  PSYCHOLOGY ADULT IP CONSULT  PSYCHIATRY IP CONSULT  PSYCHIATRY IP CONSULT  PODIATRY IP CONSULT  NURSING TO CONSULT FOR VASCULAR ACCESS CARE IP CONSULT    Code Status   Full Code       The patient was discussed with Dr. Rios.    MD Saira Macias[address] [address]  Phone: [phone]  ______________________________________________________________________    Physical Exam    Vital Signs: Temp: 98  F (36.7  C) Temp src: Oral BP: 112/62 Pulse: 81   Resp: 16 SpO2: 95 % O2 Device: None (Room air)    Weight: 178 lbs 2.11 oz  Constitutional: lying in bed, NAD  HEENT: no nystagmus  Respiratory: non-labored breathing on RA  Cardiovascular: regular rhythm, normal rate  Extremities: trace LE edema, long toenails   Skin: warm and dry   : keating bag with donna colored urine.   Neuropsychiatric: calm, cooperative, oriented, restricted affect       Primary Care Physician   Victoriano Inveen    Discharge Orders      General info for SNF    Length of Stay Estimate: Short Term Care: Estimated # of Days <30  Condition at Discharge: Stable  Level of care:skilled   Rehabilitation Potential: Fair  Admission H&P remains valid and up-to-date: Yes  Recent Chemotherapy: N/A  Use Nursing Home Standing Orders: Yes     Mantoux instructions    Give two-step Mantoux (PPD) Per Facility Policy Yes     Follow Up and recommended labs and tests    Follow up with assisted physician.  The following labs/tests are recommended: Mg, BMP to monitor lytes and kidney function while on lasix. EKG to monitor QTc with psychiatric medication changes. CBC after course of IV iron to monitor improvement to hemoglobin.    Cardiology, urology, colorectal, and psychiatry follow-ups as previously scheduled.     Reason for your hospital stay    You were hospitalized with dizziness, low blood pressure, and functional decline. Your diuretics were held for a few days and you received a small amount of fluids with improvement in your symptoms. You were seen by physical therapy and occupational therapy who recommended a stay at a transitional care facility after discharge form the hospital to continue working on your ability to transfer and safety to go home.     Intake and output    Every shift     Daily weights    Call Provider for weight gain of more than 2 pounds per day or 5 pounds per week.     Keating catheter    To straight gravity  "drainage. Change catheter every 2 weeks and PRN for leaking or decreased uring output with signs of bladder distention. DO NOT change catheter without a specific MD order IF diagnosis of benign prostatic hypertrophy (BPH), neurogenic bladder, or other urological conditions     Activity - Up with nursing assistance    Currently requiring assist of 2     Wound care (specify)    Coccyx wound(s): Every 3 days   Cleanse wound with saline and dry  Apply Cavilon barrier film to skin around wound and let dry   Apply iodosorb gel #[id number] to wound bed  Cover with sacral mepilex, fits area better \"upside down\"   Turns q2hr from side to side, avoid supine position as much as possible  HOB less than 30 degrees as able   Lift foot of bed prior to lifting head of bed to reduce shearing to sacrum      Bilateral groin cares: Daily and PRN cleanse skin with saline and dry thoroughly, then place interdry AG ([id number]) into skin fold, ensure at least a 2 inch wick remains outside of fold to allow for wicking action of textile. Remove for cares and replace same piece if not soiled, can use same piece up to 5 days if not soiled with urine or stool due to silver impregnated within textile.     Full Code     Physical Therapy Adult Consult    Evaluate and treat as clinically indicated.    Reason:  decline in functional capacity with inpatient PT recommendations for TCU and ongoing PT at [facility]     Occupational Therapy Adult Consult    Evaluate and treat as clinically indicated.    Reason:  decline in functional capacity with inpatient OT recommendations for TCU and ongoing OT at [facility]     Nutrition Services Adult IP Consult    Reason:  continuing to progress with nutrition goals while hospitalized but would benefit from follow-up at [facility] for assessment and recommendations     Fall precautions     Diet    Follow this diet upon discharge: Orders Placed This Encounter      Calorie Counts      Snacks/Supplements Adult: Ensure Enlive; With Meals      " Room Service      Regular Diet Adult       Significant Results and Procedures   Most Recent 3 CBC's:Recent Labs   Lab Test 08/20/22  1010 08/08/22  0612 08/05/22  0634   WBC 3.3* 5.1 4.2   HGB 9.5* 9.9* 9.4*   MCV 90 90 90    283 190     Most Recent 3 BMP's:Recent Labs   Lab Test 08/23/22  0742 08/20/22  1010 08/18/22  0808    139 138   POTASSIUM 4.5 4.2 4.3   CHLORIDE 102 103 103   CO2 28 28 28   BUN 19.8 23.3* 23.6*   CR 0.59* 0.59* 0.59*   ANIONGAP 8 8 7   CRYS 9.4 9.1 9.0   * 91 87     Most Recent Urinalysis:Recent Labs   Lab Test 08/03/22  1407   COLOR Orange*   APPEARANCE Cloudy*   URINEGLC Negative   URINEBILI Negative   URINEKETONE Trace*   SG 1.017   UBLD Large*   URINEPH 5.5   PROTEIN 50 *   NITRITE Negative   LEUKEST Large*   RBCU >182*   WBCU >182*   ,   Results for orders placed or performed during the hospital encounter of 08/02/22   XR Chest Port 1 View    Narrative    EXAM: CHEST SINGLE VIEW PORTABLE  LOCATION: [facility]  DATE/TIME: 8/2/2022 11:18 PM    INDICATION: COVID infection. Hypotension. Near-syncope.  COMPARISON: None.    FINDINGS: No convincing pulmonary opacities. Normal-sized cardiac silhouette. Atherosclerotic calcification in the thoracic aorta. Prior median sternotomy. Left anterior chest wall cardiac device with lead tips in right atrium and ventricle. Moderate   elevation of the right hemidiaphragm with underlying gas-filled colon.      Impression    IMPRESSION: No convincing evidence of active cardiopulmonary disease.    XR Foot Left G/E 3 Views    Narrative    EXAM: XR FOOT LEFT G/E 3 VIEWS  LOCATION: [facility]  DATE/TIME: 8/5/2022 3:56 PM    INDICATION: Left foot and toe pain after trauma.  COMPARISON: None.      Impression    IMPRESSION:   1.  No fracture or joint malalignment.  2.  Moderate first MTP degenerative arthrosis.  3.  Moderate-advanced bone demineralization.    Echo Complete     Value    LVEF  15-20% (severely reduced)    Narrative    [id number]  [id number]  [id number]  [id number]^RANDALL^LUKAS     [facility]  Echocardiography Laboratory  [address] [place] [id number]     Name: NIKKIE ARAUZ  MRN: [id number]  : 1940  Study Date: 2022 10:42 AM  Age: 81 yrs  Gender: Male  Patient [facility]  Reason For Study: Heart Failure  Ordering Physician: LUKAS PEREIRA  Performed By: Zahraa Kasier     BSA: 2.0 m2  Height: 69 in  Weight: 183 lb  HR: 68  BP: 96/50 mmHg  ______________________________________________________________________________  Procedure  Complete Portable Echo Adult. Contrast Optison. Optison (NDC #4982-9998-47)  given intravenously. Patient was given 6 ml mixture of 3 ml Optison and 6 ml  saline. 3 ml wasted. The final echo results were communicated to ordering  provider via this report..  ______________________________________________________________________________  Interpretation Summary  Left ventricular function is severely reduced. The ejection fraction is 15-  20%. Severe diffuse hypokinesis.  Global right ventricular function is mildly reduced.  No hemodynamically significant valve abnormalities.  The inferior vena cava cannot be assessed.  No pericardial effusion.     Compared to the echo report in [facility] from [facility] in 2021, no significant change in ventricular function.  ______________________________________________________________________________  Left Ventricle  Mild left ventricular dilation is present. Eccentric LVH. Left ventricular  function is decreased. The ejection fraction is 15-20% (severely reduced).  Grade I or early diastolic dysfunction. Severe diffuse hypokinesis is present.  There is no thrombus.     Right Ventricle  The right ventricle is normal size. Global right ventricular function is  mildly reduced. A pacemaker lead is noted in the right ventricle.      Atria  Moderate left atrial enlargement is present.     Mitral Valve  The mitral valve is normal.     Aortic Valve  Aortic valve is normal in structure and function. The aortic valve is  tricuspid.     Tricuspid Valve  The tricuspid valve is normal. Mild tricuspid insufficiency is present.  Estimated pulmonary artery systolic pressure is 28 mmHg plus right atrial  pressure. Pulmonary artery systolic pressure is normal.     Pulmonic Valve  The pulmonic valve is normal. Trace pulmonic insufficiency is present.     Vessels  Normal diameter aortic root and proximal ascending aorta. The inferior vena  cava cannot be assessed. Sinuses of Valsalva 4.0 cm. Ascending aorta 3.9 cm.     Pericardium  No pericardial effusion is present.     ______________________________________________________________________________  MMode/2D Measurements & Calculations  IVSd: 0.97 cm  LVIDd: 6.3 cm  LVPWd: 1.00 cm  LV mass(C)d: 263.0 grams  LV mass(C)dI: 132.2 grams/m2     asc Aorta Diam: 3.9 cm  LVOT diam: 2.5 cm  LVOT area: 4.9 cm2  LA Volume Index (BP): 41.8 ml/m2  RWT: 0.32     Doppler Measurements & Calculations  MV E max susan: 53.9 cm/sec  MV A max susan: 80.2 cm/sec  MV E/A: 0.67  MV dec slope: 235.7 cm/sec2  MV dec time: 0.23 sec  TR max susan: 229.4 cm/sec  TR max P.1 mmHg  E/E' av.9     Lateral E/e': 5.6  Medial E/e': 12.2     ______________________________________________________________________________  Report approved by: Autumn Lundberg 2022 11:37 AM         Cardiac Device Check - Inpatient     Value    Date Time Interrogation Session [id number]    Implantable Pulse Generator  Medtronic    Implantable Pulse Generator Model [id number] Rayray MRI XT     Implantable Pulse Generator Serial Number [id number]    Type Interrogation Session In Clinic    Clinic Name [facility]    Implantable Pulse Generator Type Defibrillator    Implantable Pulse Generator Implant Date 2017     Implantable Lead  Medtronic    Implantable Lead Model 6947 Sprint Quattro Secure    Implantable Lead Serial Number [id number]    Implantable Lead Implant Date 20091116    Implantable Lead Polarity Type Quadripolar Lead    Implantable Lead Location Detail 1 UNKNOWN    Implantable Lead Special Function 65 CM    Implantable Lead Location Right Ventricle    Implantable Lead  Medtronic    Implantable Lead Model 5076 CapSureFix Novus MRI SureScan    Implantable Lead Serial Number [id number]    Implantable Lead Implant Date 20040610    Implantable Lead Polarity Type Bipolar Lead    Implantable Lead Location Detail 1 UNKNOWN    Implantable Lead Special Function 52 CM    Implantable Lead Location Right Atrium    Bryan Setting Mode (NBG Code) MVP_AAIR_DDDR    Bryan Setting Lower Rate Limit 60    Bryan Setting Maximum Tracking Rate 130    Bryan Setting Maximum Sensor Rate 130    Bryan Setting Hysterisis Rate DISABLED    Bryan Setting LONG Delay Low 150    Bryan Setting PAV Delay Low 180    Bryan Setting AT Mode Switch Rate 171    Lead Channel Setting Sensing Polarity Bipolar    Lead Channel Setting Sensing Anode Location Right Atrium    Lead Channel Setting Sensing Anode Terminal Ring    Lead Channel Setting Sensing Cathode Location Right Atrium    Lead Channel Setting Sensing Cathode Terminal Tip    Lead Channel Setting Sensing Sensitivity 0.3    Lead Channel Setting Sensing Polarity Bipolar    Lead Channel Setting Sensing Anode Location Right Ventricle    Lead Channel Setting Sensing Anode Terminal Coil    Lead Channel Setting Sensing Cathode Location Right Ventricle    Lead Channel Setting Sensing Cathode Terminal Tip    Lead Channel Setting Sensing Sensitivity 0.3    Lead Channel Setting Pacing Polarity Bipolar    Lead Channel Setting Pacing Anode Location Right Atrium    Lead Channel Setting Pacing Anode Terminal Ring    Lead Channel Setting Sensing Cathode Location Right Atrium    Lead Channel  Setting Sensing Cathode Terminal Tip    Lead Channel Setting Pacing Pulse Width 0.4    Lead Channel Setting Pacing Amplitude 1.5    Lead Channel Setting Pacing Capture Mode Adaptive    Lead Channel Setting Pacing Polarity Bipolar    Lead Channel Setting Pacing Anode Location Right Ventricle    Lead Channel Setting Pacing Anode Terminal Ring    Lead Channel Setting Sensing Cathode Location Right Ventricle    Lead Channel Setting Sensing Cathode Terminal Tip    Lead Channel Setting Pacing Pulse Width 0.4    Lead Channel Setting Pacing Amplitude 2    Lead Channel Setting Pacing Capture Mode Fixed Pacing    Zone Setting Type Category VF    Zone Setting Detection Interval 260    Zone Setting Detection Beats Numerator 30    Zone Setting Detection Beats Denominator 40    Zone Setting Type Category VT    Zone Setting Detection Interval Blank    Zone Setting Type Category VT    Zone Setting Detection Interval 300    Zone Setting Type Category VT    Zone Setting Detection Interval 400    Zone Setting Type Category ATRIAL_FIBRILLATION    Zone Setting Type Category AT/AF    Zone Setting Detection Interval 350    Lead Channel Impedance Value 399    Lead Channel Sensing Intrinsic Amplitude 2.1    Lead Channel Pacing Threshold Amplitude 0.75    Lead Channel Pacing Threshold Pulse Width 0.4    Lead Channel Impedance Value 323    Lead Channel Impedance Value 266    Lead Channel Sensing Intrinsic Amplitude 6    Lead Channel Pacing Threshold Amplitude 1    Lead Channel Pacing Threshold Pulse Width 0.4    Battery Date Time of Measurements [id number]    Battery Status OK    Battery RRT Trigger 2.727    Battery Remaining Longevity 64    Battery Voltage 2.97    Capacitor Charge Type Reformation    Capacitor Last Charge Date Time 90532516901488    Capacitor Charge Time 3.733    Capacitor Charge Energy 18    Bryan Statistic Date Time Start [id number]    Bryan Statistic Date Time End [id number]    Bryan Statistic RA Percent Paced  41.22    Bryan Statistic RV Percent Paced 6.46    Bryan Statistic AP  Percent 3.9    Bryan Statistic AS  Percent 3.1    Bryan Statistic AP VS Percent 40.73    Bryan Statistic AS VS Percent 52.26    Atrial Tachy Statistic Date Time Start [id number]    Atrial Tachy Statistic Date Time End [id number]    Atrial Tachy Statistic AT/AF [place] Percent 0    Therapy Statistic Recent Shocks Delivered 0    Therapy Statistic Recent Shocks Aborted 0    Therapy Statistic Recent ATP Delivered 0    Therapy Statistic Recent Date Time Start [id number]    Therapy Statistic Recent Date Time End [id number]    Therapy Statistic Total Shocks Delivered 1    Therapy Statistic Total Shocks Aborted 0    Therapy Statistic Total ATP Delivered 0    Therapy Statistic Total  Date Time Start [id number]    Therapy Statistic Total  Date Time End [id number]    Episode Statistic Recent Count 0    Episode Statistic Type Category AT/AF    Episode Statistic Recent Count 0    Episode Statistic Type Category SVT    Episode Statistic Recent Count 4    Episode Statistic Type Category VT    Episode Statistic Recent Count 0    Episode Statistic Type Category VF    Episode Statistic Recent Count 0    Episode Statistic Type Category VT    Episode Statistic Recent Count 0    Episode Statistic Type Category VT    Episode Statistic Recent Count 0    Episode Statistic Type Category VT    Episode Statistic Recent Date Time Start 16242568157580    Episode Statistic Recent Date Time End [id number]    Episode Statistic Recent Date Time Start 64025739537023    Episode Statistic Recent Date Time End 01183508931016    Episode Statistic Recent Date Time Start 32449785724929    Episode Statistic Recent Date Time End 17280823050964    Episode Statistic Recent Date Time Start 01931764171790    Episode Statistic Recent Date Time End 55701638636735    Episode Statistic Recent Date Time Start 19002720316693    Episode Statistic Recent Date Time End  47699839167075    Episode Statistic Recent Date Time Start 42607415345876    Episode Statistic Recent Date Time End 35817270098268    Episode Statistic Recent Date Time Start 40664366109432    Episode Statistic Recent Date Time End 71828639576275    Episode Statistic Total Count 1    Episode Statistic Type Category AT/AF    Episode Statistic Total Count 0    Episode Statistic Type Category SVT    Episode Statistic Total Count 20    Episode Statistic Type Category VT    Episode Statistic Total Count 1    Episode Statistic Type Category VF    Episode Statistic Total Count 0    Episode Statistic Type Category VT    Episode Statistic Total Count 0    Episode Statistic Type Category VT    Episode Statistic Total Count 1    Episode Statistic Type Category VT    Episode Statistic Total Date Time Start 32351945029703    Episode Statistic Total Date Time End 25176228709331    Episode Statistic Total Date Time Start 18945231927167    Episode Statistic Total Date Time End 70379581820263    Episode Statistic Total Date Time Start 11146620523067    Episode Statistic Total Date Time End 56551565460308    Episode Statistic Total Date Time Start 32474526536622    Episode Statistic Total Date Time End 78201234905638    Episode Statistic Total Date Time Start 71058816165047    Episode Statistic Total Date Time End 16566056511289    Episode Statistic Total Date Time Start 53572568160766    Episode Statistic Total Date Time End 17542264272421    Episode Statistic Total Date Time Start 44145849979119    Episode Statistic Total Date Time End 32321953839997    Episode Identifier 23    Episode Type Category VT    Episode Date Time 69334300827313    Episode Duration 1    Episode Identifier 22    Episode Type Category VT    Episode Date Time 02573496822775    Episode Duration 1    Episode Identifier 21    Episode Type Category VT    Episode Date Time 42381810069173    Episode Duration 3    Episode Identifier 20    Episode Type Category VT     Episode Date Time 20220705210907    Episode Duration 1    Episode Identifier 19    Episode Type Category VT    Episode Date Time 07681884641633    Episode Duration 2    Episode Identifier 18    Episode Type Category VT    Episode Date Time 15788396624288    Episode Duration 1    Episode Identifier 17    Episode Type Category VT    Episode Date Time 26055687501005    Episode Duration 1    Episode Identifier 16    Episode Type Category VT    Episode Date Time 20352804938096    Episode Duration 2    Episode Identifier 15    Episode Type Category VT    Episode Date Time 87586727327857    Episode Duration 2    Episode Identifier 14    Episode Type Category VT    Episode Date Time 30385319739456    Episode Duration 1    Episode Identifier 13    Episode Type Category VT    Episode Date Time 31791306026738    Episode Duration 1    Episode Identifier 12    Episode Type Category VT    Episode Date Time 37568492395592    Episode Duration 1    Episode Identifier 11    Episode Type Category VT    Episode Date Time 03608986132799    Episode Duration 1    Episode Identifier 10    Episode Type Category VT    Episode Date Time 62751983693644    Episode Duration 1    Episode Identifier 9    Episode Type Category Monitor    Episode Date Time 20190906001024    Episode Duration 10,222    Narrative    Patient seen in [address] [place] [address] for evaluation and iterative programming of their   ICD per MD orders for evaluation of sinus pauses and bradycardia.    Device: Medtronic [id number] Evera MRI XT DR  Normal Device Function.   Mode: AAIR<=>DDDR  bpm  AP: 44.6%  : 6.5%  Intrinsic rhythm: SR @ 80 bpm w/ freq PVCs  Short V-V intervals: 1072  Thoracic Impedance: Below reference line, suggesting possible fluid   accumulation.  Lead Trends Appear Stable: Yes  Estimated battery longevity to RRT = 5.3 years. Battery voltage = 2.98 V.  Atrial arrhythmia: None  AF burden: <0.1%  Anticoagulant: Eliquis  Ventricular Arrhythmia: 4 VT-NS episodes recorded  from 7/5/22 through   7/22/22. Episodes lasted 1-3 seconds in duration at 182-231 bpm. No   ventricular episodes recorded since.  PVC Runs: 44.2 per hour  PVC Singles: 300.3 per hour  Setting changes: None    Plan: It is likely that what has been seen on telemetry correlates with   PVCs and/or with normal function of programming. Pt is programmed   AAIR<=>DDDR to minimize ventricular pacing. Continue inpatient evaluation   and treatment.  KISHOR Rosenberg RN    Dual lead ICD    I have reviewed and interpreted the device interrogation, settings,   programming and nurse's summary. The device is functioning within normal   device parameters. I agree with the current findings, assessment and plan.       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; Dementia with psychosis (H)      furosemide (LASIX) 40 MG tablet Take 1 tablet (40 mg) by mouth daily    Associated Diagnoses: Chronic systolic heart failure (H)         CONTINUE these medications which have NOT CHANGED    Details   allopurinol (ZYLOPRIM) 300 MG tablet Take 1 tablet by mouth daily      apixaban ANTICOAGULANT (ELIQUIS) 5 MG tablet Take 5 mg by mouth 2 times daily      cholecalciferol (VITAMIN D3) 25 mcg (1000 units) capsule Take 1,000 Units by mouth daily      gabapentin (NEURONTIN) 100 MG capsule Take 1 capsule (100 mg) by mouth 3 times daily  Qty: 90 capsule, Refills: 3    Associated Diagnoses: Other chronic pain      loratadine 10 MG capsule Take 1 capsule by mouth daily      multivitamin w/minerals (THERA-VIT-M) tablet Take 1 tablet by mouth daily      !! risperiDONE (RISPERDAL) 0.25 MG tablet Take 1 tablet (0.25 mg) by mouth every morning for 30 days  Qty: 30 tablet, Refills: 0    Associated Diagnoses: Dementia with behavioral disturbance, unspecified dementia type (H)      spironolactone (ALDACTONE) 25 MG tablet Take 1 tablet by mouth daily      amoxicillin (AMOXIL) 500 MG capsule Take 1 capsule by mouth as needed PRN for dental procedures      cyanocobalamin (VITAMIN B-12) 1000 MCG tablet Take 1,000 mcg by mouth daily      !! risperiDONE (RISPERDAL) 2 MG tablet Take 1 tablet (2 mg) by mouth At Bedtime  Qty: 30 tablet, Refills: 0    Associated Diagnoses: Dementia with behavioral disturbance,  unspecified dementia type (H)       !! - Potential duplicate medications found. Please discuss with provider.      STOP taking these medications       Lidocaine (LIDOCARE) 4 % Patch Comments:   Reason for Stopping:             Allergies   No Known Allergies

## 2022-08-24 NOTE — PLAN OF CARE
Goal Outcome Evaluation:    Plan of Care Reviewed With: patient     Overall Patient Progress: no change    Outcome Evaluation: No bm overnight, Sacral dressing CDI. Louie inplace with dark red output overnight, Team aware. Continue POC. awaiting TCU.

## 2022-08-25 ENCOUNTER — PATIENT OUTREACH (OUTPATIENT)
Dept: CARE COORDINATION | Facility: CLINIC | Age: 82
End: 2022-08-25

## 2022-08-25 NOTE — PROGRESS NOTES
[facility]    Background: Transitional Care Management program auto-identified and prompting a chart review by [facility] team.    Assessment: Upon chart review, CCR Team member will cancel/close this episode of Transitional Care Management program due to reason below:    Patient is not established within [facility] and CCR team member noted patient discharged to TCU/ARU/LTACH.    Plan: Transitional Care Management episode closed per reason above.      Kori Nicole MA  [facility] [place], [facility]    *Connected Care Resource Team does NOT follow patient ongoing. Referrals are identified based on internal discharge reports and the outreach is to ensure patient has an understanding of their discharge instructions.

## 2022-08-30 ENCOUNTER — APPOINTMENT (OUTPATIENT)
Dept: CT IMAGING | Facility: CLINIC | Age: 82
DRG: 056 | End: 2022-08-30
Attending: EMERGENCY MEDICINE
Payer: MEDICARE

## 2022-08-30 ENCOUNTER — HOSPITAL ENCOUNTER (INPATIENT)
Facility: CLINIC | Age: 82
LOS: 29 days | Discharge: NURSING, REHAB OR LONG-TERM CARE | DRG: 056 | End: 2022-09-30
Attending: EMERGENCY MEDICINE | Admitting: INTERNAL MEDICINE
Payer: MEDICARE

## 2022-08-30 ENCOUNTER — APPOINTMENT (OUTPATIENT)
Dept: GENERAL RADIOLOGY | Facility: CLINIC | Age: 82
DRG: 056 | End: 2022-08-30
Attending: EMERGENCY MEDICINE
Payer: MEDICARE

## 2022-08-30 DIAGNOSIS — F03.91 DEMENTIA WITH BEHAVIORAL DISTURBANCE, UNSPECIFIED DEMENTIA TYPE: Primary | ICD-10-CM

## 2022-08-30 DIAGNOSIS — R29.898 BILATERAL LEG WEAKNESS: ICD-10-CM

## 2022-08-30 DIAGNOSIS — R11.0 NAUSEA: ICD-10-CM

## 2022-08-30 DIAGNOSIS — R29.898 WEAKNESS OF LEFT HAND: ICD-10-CM

## 2022-08-30 DIAGNOSIS — M79.642 PAIN OF LEFT HAND: ICD-10-CM

## 2022-08-30 DIAGNOSIS — E63.9 NUTRITIONAL DEFICIENCY: ICD-10-CM

## 2022-08-30 DIAGNOSIS — F06.30 DEPRESSIVE DISORDER DUE TO SEPARATE MEDICAL CONDITION: ICD-10-CM

## 2022-08-30 DIAGNOSIS — F02.818 LEWY BODY DEMENTIA WITH BEHAVIORAL DISTURBANCE (H): ICD-10-CM

## 2022-08-30 DIAGNOSIS — F03.92 DEMENTIA WITH PSYCHOSIS (H): ICD-10-CM

## 2022-08-30 DIAGNOSIS — G31.83 LEWY BODY DEMENTIA WITH BEHAVIORAL DISTURBANCE (H): ICD-10-CM

## 2022-08-30 LAB
ALBUMIN SERPL-MCNC: 2.6 G/DL (ref 3.4–5)
ALP SERPL-CCNC: 91 U/L (ref 40–150)
ALT SERPL W P-5'-P-CCNC: 18 U/L (ref 0–70)
ANION GAP SERPL CALCULATED.3IONS-SCNC: 4 MMOL/L (ref 3–14)
AST SERPL W P-5'-P-CCNC: 12 U/L (ref 0–45)
ATRIAL RATE - MUSE: 79 BPM
BASOPHILS # BLD AUTO: 0 10E3/UL (ref 0–0.2)
BASOPHILS NFR BLD AUTO: 0 %
BILIRUB SERPL-MCNC: 0.3 MG/DL (ref 0.2–1.3)
BUN SERPL-MCNC: 11 MG/DL (ref 7–30)
CALCIUM SERPL-MCNC: 8.8 MG/DL (ref 8.5–10.1)
CHLORIDE BLD-SCNC: 104 MMOL/L (ref 94–109)
CO2 SERPL-SCNC: 32 MMOL/L (ref 20–32)
CREAT SERPL-MCNC: 0.62 MG/DL (ref 0.66–1.25)
DIASTOLIC BLOOD PRESSURE - MUSE: NORMAL MMHG
EOSINOPHIL # BLD AUTO: 0.1 10E3/UL (ref 0–0.7)
EOSINOPHIL NFR BLD AUTO: 2 %
ERYTHROCYTE [DISTWIDTH] IN BLOOD BY AUTOMATED COUNT: 16.3 % (ref 10–15)
GFR SERPL CREATININE-BSD FRML MDRD: >90 ML/MIN/1.73M2
GLUCOSE BLD-MCNC: 151 MG/DL (ref 70–99)
HCT VFR BLD AUTO: 34.8 % (ref 40–53)
HGB BLD-MCNC: 10.5 G/DL (ref 13.3–17.7)
HOLD SPECIMEN: NORMAL
IMM GRANULOCYTES # BLD: 0 10E3/UL
IMM GRANULOCYTES NFR BLD: 0 %
INTERPRETATION ECG - MUSE: NORMAL
LACTATE SERPL-SCNC: 1.7 MMOL/L (ref 0.7–2)
LYMPHOCYTES # BLD AUTO: 0.4 10E3/UL (ref 0.8–5.3)
LYMPHOCYTES NFR BLD AUTO: 9 %
MAGNESIUM SERPL-MCNC: 1.6 MG/DL (ref 1.6–2.3)
MCH RBC QN AUTO: 27.3 PG (ref 26.5–33)
MCHC RBC AUTO-ENTMCNC: 30.2 G/DL (ref 31.5–36.5)
MCV RBC AUTO: 91 FL (ref 78–100)
MONOCYTES # BLD AUTO: 0.7 10E3/UL (ref 0–1.3)
MONOCYTES NFR BLD AUTO: 16 %
NEUTROPHILS # BLD AUTO: 3.3 10E3/UL (ref 1.6–8.3)
NEUTROPHILS NFR BLD AUTO: 73 %
NRBC # BLD AUTO: 0 10E3/UL
NRBC BLD AUTO-RTO: 0 /100
P AXIS - MUSE: 57 DEGREES
PLATELET # BLD AUTO: 214 10E3/UL (ref 150–450)
POTASSIUM BLD-SCNC: 3.5 MMOL/L (ref 3.4–5.3)
PR INTERVAL - MUSE: 230 MS
PROT SERPL-MCNC: 6.2 G/DL (ref 6.8–8.8)
QRS DURATION - MUSE: 158 MS
QT - MUSE: 436 MS
QTC - MUSE: 499 MS
R AXIS - MUSE: 268 DEGREES
RBC # BLD AUTO: 3.84 10E6/UL (ref 4.4–5.9)
SODIUM SERPL-SCNC: 140 MMOL/L (ref 133–144)
SYSTOLIC BLOOD PRESSURE - MUSE: NORMAL MMHG
T AXIS - MUSE: 107 DEGREES
TROPONIN I SERPL HS-MCNC: 44 NG/L
VENTRICULAR RATE- MUSE: 79 BPM
WBC # BLD AUTO: 4.5 10E3/UL (ref 4–11)

## 2022-08-30 PROCEDURE — 36415 COLL VENOUS BLD VENIPUNCTURE: CPT | Performed by: EMERGENCY MEDICINE

## 2022-08-30 PROCEDURE — 82040 ASSAY OF SERUM ALBUMIN: CPT | Performed by: EMERGENCY MEDICINE

## 2022-08-30 PROCEDURE — 80053 COMPREHEN METABOLIC PANEL: CPT | Performed by: EMERGENCY MEDICINE

## 2022-08-30 PROCEDURE — 83735 ASSAY OF MAGNESIUM: CPT | Performed by: EMERGENCY MEDICINE

## 2022-08-30 PROCEDURE — 83605 ASSAY OF LACTIC ACID: CPT | Performed by: EMERGENCY MEDICINE

## 2022-08-30 PROCEDURE — 96374 THER/PROPH/DIAG INJ IV PUSH: CPT

## 2022-08-30 PROCEDURE — 250N000011 HC RX IP 250 OP 636: Performed by: EMERGENCY MEDICINE

## 2022-08-30 PROCEDURE — 99223 1ST HOSP IP/OBS HIGH 75: CPT | Performed by: INTERNAL MEDICINE

## 2022-08-30 PROCEDURE — 73110 X-RAY EXAM OF WRIST: CPT | Mod: LT

## 2022-08-30 PROCEDURE — G1010 CDSM STANSON: HCPCS

## 2022-08-30 PROCEDURE — 73130 X-RAY EXAM OF HAND: CPT | Mod: LT

## 2022-08-30 PROCEDURE — 84484 ASSAY OF TROPONIN QUANT: CPT | Performed by: EMERGENCY MEDICINE

## 2022-08-30 PROCEDURE — 93005 ELECTROCARDIOGRAM TRACING: CPT

## 2022-08-30 PROCEDURE — 99292 CRITICAL CARE ADDL 30 MIN: CPT

## 2022-08-30 PROCEDURE — 99291 CRITICAL CARE FIRST HOUR: CPT | Mod: 25

## 2022-08-30 PROCEDURE — G0378 HOSPITAL OBSERVATION PER HR: HCPCS

## 2022-08-30 PROCEDURE — 85014 HEMATOCRIT: CPT | Performed by: EMERGENCY MEDICINE

## 2022-08-30 RX ORDER — ONDANSETRON 2 MG/ML
4 INJECTION INTRAMUSCULAR; INTRAVENOUS ONCE
Status: COMPLETED | OUTPATIENT
Start: 2022-08-30 | End: 2022-08-30

## 2022-08-30 RX ORDER — RISPERIDONE 0.25 MG/1
0.25 TABLET ORAL EVERY OTHER DAY
Status: ON HOLD | COMMUNITY
End: 2022-09-12

## 2022-08-30 RX ORDER — RISPERIDONE 0.25 MG/1
1.75 TABLET ORAL AT BEDTIME
Status: ON HOLD | COMMUNITY
End: 2022-09-12

## 2022-08-30 RX ORDER — SENNOSIDES 8.6 MG
1 TABLET ORAL 2 TIMES DAILY
Status: ON HOLD | COMMUNITY
End: 2022-09-12

## 2022-08-30 RX ADMIN — ONDANSETRON 4 MG: 2 INJECTION INTRAMUSCULAR; INTRAVENOUS at 22:43

## 2022-08-30 ASSESSMENT — ACTIVITIES OF DAILY LIVING (ADL)
ADLS_ACUITY_SCORE: 35
ADLS_ACUITY_SCORE: 35

## 2022-08-31 PROCEDURE — G0463 HOSPITAL OUTPT CLINIC VISIT: HCPCS

## 2022-08-31 PROCEDURE — 250N000013 HC RX MED GY IP 250 OP 250 PS 637: Performed by: INTERNAL MEDICINE

## 2022-08-31 PROCEDURE — 250N000013 HC RX MED GY IP 250 OP 250 PS 637

## 2022-08-31 PROCEDURE — L3908 WHO COCK-UP NONMOLDE PRE OTS: HCPCS

## 2022-08-31 PROCEDURE — G0378 HOSPITAL OBSERVATION PER HR: HCPCS

## 2022-08-31 PROCEDURE — 99232 SBSQ HOSP IP/OBS MODERATE 35: CPT | Performed by: HOSPITALIST

## 2022-08-31 RX ORDER — ONDANSETRON 4 MG/1
4 TABLET, ORALLY DISINTEGRATING ORAL EVERY 6 HOURS PRN
Status: DISCONTINUED | OUTPATIENT
Start: 2022-08-31 | End: 2022-09-30 | Stop reason: HOSPADM

## 2022-08-31 RX ORDER — PROCHLORPERAZINE 25 MG
12.5 SUPPOSITORY, RECTAL RECTAL EVERY 12 HOURS PRN
Status: DISCONTINUED | OUTPATIENT
Start: 2022-08-31 | End: 2022-09-16

## 2022-08-31 RX ORDER — RISPERIDONE 0.25 MG/1
0.25 TABLET ORAL EVERY OTHER DAY
Status: DISCONTINUED | OUTPATIENT
Start: 2022-08-31 | End: 2022-08-31

## 2022-08-31 RX ORDER — PROCHLORPERAZINE MALEATE 5 MG
5 TABLET ORAL EVERY 6 HOURS PRN
Status: DISCONTINUED | OUTPATIENT
Start: 2022-08-31 | End: 2022-09-16

## 2022-08-31 RX ORDER — ONDANSETRON 2 MG/ML
4 INJECTION INTRAMUSCULAR; INTRAVENOUS EVERY 6 HOURS PRN
Status: DISCONTINUED | OUTPATIENT
Start: 2022-08-31 | End: 2022-09-30 | Stop reason: HOSPADM

## 2022-08-31 RX ORDER — SENNOSIDES 8.6 MG
1 TABLET ORAL 2 TIMES DAILY
Status: DISCONTINUED | OUTPATIENT
Start: 2022-08-31 | End: 2022-09-30 | Stop reason: HOSPADM

## 2022-08-31 RX ORDER — GABAPENTIN 100 MG/1
100 CAPSULE ORAL 3 TIMES DAILY
Status: DISCONTINUED | OUTPATIENT
Start: 2022-08-31 | End: 2022-09-30 | Stop reason: HOSPADM

## 2022-08-31 RX ORDER — FUROSEMIDE 40 MG
40 TABLET ORAL DAILY
Status: DISCONTINUED | OUTPATIENT
Start: 2022-08-31 | End: 2022-09-30 | Stop reason: HOSPADM

## 2022-08-31 RX ORDER — LORATADINE 10 MG/1
10 TABLET ORAL DAILY
Status: DISCONTINUED | OUTPATIENT
Start: 2022-08-31 | End: 2022-09-30 | Stop reason: HOSPADM

## 2022-08-31 RX ORDER — POLYETHYLENE GLYCOL 3350 17 G/17G
17 POWDER, FOR SOLUTION ORAL DAILY PRN
Status: DISCONTINUED | OUTPATIENT
Start: 2022-08-31 | End: 2022-09-30 | Stop reason: HOSPADM

## 2022-08-31 RX ORDER — ARIPIPRAZOLE 2 MG/1
2 TABLET ORAL AT BEDTIME
Status: DISCONTINUED | OUTPATIENT
Start: 2022-08-31 | End: 2022-09-02

## 2022-08-31 RX ORDER — MULTIPLE VITAMINS W/ MINERALS TAB 9MG-400MCG
1 TAB ORAL DAILY
Status: DISCONTINUED | OUTPATIENT
Start: 2022-08-31 | End: 2022-09-30 | Stop reason: HOSPADM

## 2022-08-31 RX ORDER — ACETAMINOPHEN 325 MG/1
650 TABLET ORAL EVERY 4 HOURS PRN
Status: DISCONTINUED | OUTPATIENT
Start: 2022-08-31 | End: 2022-09-30 | Stop reason: HOSPADM

## 2022-08-31 RX ORDER — CARVEDILOL 12.5 MG/1
12.5 TABLET ORAL 2 TIMES DAILY
Status: DISCONTINUED | OUTPATIENT
Start: 2022-08-31 | End: 2022-09-30 | Stop reason: HOSPADM

## 2022-08-31 RX ORDER — ESCITALOPRAM OXALATE 20 MG/1
20 TABLET ORAL DAILY
Status: DISCONTINUED | OUTPATIENT
Start: 2022-08-31 | End: 2022-09-04

## 2022-08-31 RX ORDER — SPIRONOLACTONE 25 MG/1
25 TABLET ORAL DAILY
Status: DISCONTINUED | OUTPATIENT
Start: 2022-08-31 | End: 2022-09-30 | Stop reason: HOSPADM

## 2022-08-31 RX ORDER — DONEPEZIL HYDROCHLORIDE 10 MG/1
10 TABLET, FILM COATED ORAL AT BEDTIME
Status: DISCONTINUED | OUTPATIENT
Start: 2022-08-31 | End: 2022-09-30 | Stop reason: HOSPADM

## 2022-08-31 RX ORDER — ALLOPURINOL 300 MG/1
300 TABLET ORAL DAILY
Status: DISCONTINUED | OUTPATIENT
Start: 2022-08-31 | End: 2022-09-30 | Stop reason: HOSPADM

## 2022-08-31 RX ADMIN — DONEPEZIL HYDROCHLORIDE 10 MG: 10 TABLET ORAL at 01:26

## 2022-08-31 RX ADMIN — Medication 3 CAPSULE: at 08:04

## 2022-08-31 RX ADMIN — ARIPIPRAZOLE 2 MG: 2 TABLET ORAL at 21:09

## 2022-08-31 RX ADMIN — DONEPEZIL HYDROCHLORIDE 10 MG: 10 TABLET ORAL at 21:09

## 2022-08-31 RX ADMIN — GABAPENTIN 100 MG: 100 CAPSULE ORAL at 14:52

## 2022-08-31 RX ADMIN — CARVEDILOL 12.5 MG: 12.5 TABLET, FILM COATED ORAL at 21:09

## 2022-08-31 RX ADMIN — APIXABAN 5 MG: 5 TABLET, FILM COATED ORAL at 08:05

## 2022-08-31 RX ADMIN — ESCITALOPRAM OXALATE 20 MG: 20 TABLET ORAL at 08:05

## 2022-08-31 RX ADMIN — RISPERIDONE 1.75 MG: 1 TABLET ORAL at 01:25

## 2022-08-31 RX ADMIN — GABAPENTIN 100 MG: 100 CAPSULE ORAL at 21:09

## 2022-08-31 RX ADMIN — SPIRONOLACTONE 25 MG: 25 TABLET ORAL at 08:04

## 2022-08-31 RX ADMIN — LORATADINE 10 MG: 10 TABLET ORAL at 08:05

## 2022-08-31 RX ADMIN — APIXABAN 5 MG: 5 TABLET, FILM COATED ORAL at 21:10

## 2022-08-31 RX ADMIN — GABAPENTIN 100 MG: 100 CAPSULE ORAL at 08:04

## 2022-08-31 RX ADMIN — CARVEDILOL 12.5 MG: 12.5 TABLET, FILM COATED ORAL at 08:05

## 2022-08-31 RX ADMIN — SENNOSIDES 1 TABLET: 8.6 TABLET, FILM COATED ORAL at 21:09

## 2022-08-31 RX ADMIN — ALLOPURINOL 300 MG: 300 TABLET ORAL at 08:05

## 2022-08-31 RX ADMIN — SENNOSIDES 1 TABLET: 8.6 TABLET, FILM COATED ORAL at 08:05

## 2022-08-31 RX ADMIN — RISPERIDONE 0.25 MG: 0.25 TABLET ORAL at 08:05

## 2022-08-31 RX ADMIN — FUROSEMIDE 40 MG: 40 TABLET ORAL at 08:05

## 2022-08-31 ASSESSMENT — ACTIVITIES OF DAILY LIVING (ADL)
ADLS_ACUITY_SCORE: 43
ADLS_ACUITY_SCORE: 35
ADLS_ACUITY_SCORE: 43
ADLS_ACUITY_SCORE: 35
ADLS_ACUITY_SCORE: 43

## 2022-08-31 NOTE — PHARMACY-ADMISSION MEDICATION HISTORY
Pharmacy Medication History  Admission medication history interview status for the 8/30/2022  admission is complete. See EPIC admission navigator for prior to admission medications     Location of Interview: Outside patient room but on unit  Medication history sources: MAR ([facility])    Significant changes made to the medication list:  Modified risperidone dosing per MAR  -risperidone 0.25 mg daily at 0800 --> changed to every other day at 0800 (next dose due 8/31)  -risperidone 2 mg at 2000 --> changed to 1.75 mg at 2000 (1.75 mg dosing to start tonight 8/30)    In the past week, patient estimated taking medication this percent of the time: greater than 90%    Additional medication history information:   None    Medication reconciliation completed by provider prior to medication history? No    Time spent in this activity: 15 min     Prior to Admission medications    Medication Sig Last Dose Taking? Auth Provider Long Term End Date   acetaminophen (TYLENOL) 325 MG tablet Take 2 tablets (650 mg) by mouth every 4 hours as needed for mild pain  at prn Yes Nestor Rios MD     allopurinol (ZYLOPRIM) 300 MG tablet Take 1 tablet by mouth daily 8/30/2022 at 0800 Yes Reported, Patient     amoxicillin (AMOXIL) 500 MG capsule Take 1 capsule by mouth as needed PRN for dental procedures  at prn Yes Reported, Patient     apixaban ANTICOAGULANT (ELIQUIS) 5 MG tablet Take 5 mg by mouth 2 times daily 8/30/2022 at 0800 Yes Reported, Patient     carvedilol (COREG) 12.5 MG tablet Take 1 tablet (12.5 mg) by mouth 2 times daily 8/30/2022 at 0800 Yes Nestor Rios MD Yes    cholecalciferol 25 MCG (1000 UT) TABS Take 1,000 Units by mouth daily 8/30/2022 at 0800 Yes Reported, Patient     cyanocobalamin (VITAMIN B-12) 1000 MCG tablet Take 1,000 mcg by mouth daily 8/30/2022 at 0800 Yes Unknown, Entered By History     diclofenac (VOLTAREN) 1 % topical gel Apply 2 g topically 4 times daily 8/29/2022 at 2000 (refused today per MAR)  Yes Nestor Rios MD     donepezil (ARICEPT) 10 MG tablet Take 1 tablet (10 mg) by mouth At Bedtime 8/29/2022 at 2000 Yes Nestor Rios MD     escitalopram (LEXAPRO) 20 MG tablet Take 1 tablet (20 mg) by mouth daily 8/30/2022 at 0800 Yes Nestor Rios MD Yes    furosemide (LASIX) 40 MG tablet Take 1 tablet (40 mg) by mouth daily 8/30/2022 at 0800 Yes Nestor Rios MD Yes    gabapentin (NEURONTIN) 100 MG capsule Take 1 capsule (100 mg) by mouth 3 times daily 8/30/2022 at 1400 (x2 doses) Yes Rhiannon Peña MD Yes    loratadine (CLARITIN) 10 MG tablet Take 1 tablet by mouth daily 8/30/2022 at 0800 Yes Reported, Patient     polyethylene glycol (MIRALAX) 17 GM/Dose powder Take 17 g by mouth daily as needed for constipation  at prn Yes Nestor Rios MD     psyllium (METAMUCIL/KONSYL) capsule Take 3 capsules by mouth daily 8/30/2022 at 0800 Yes Nestor Rios MD  8/23/23   risperiDONE (RISPERDAL) 0.25 MG tablet Take 1.75 mg by mouth At Bedtime 8/29/2022 at took 2 mg; new order for 1.75 mg to start 8/30 pm Yes Unknown, Entered By History Yes    risperiDONE (RISPERDAL) 0.25 MG tablet Take 0.25 mg by mouth every other day 8/29/2022 at 0800  Yes Unknown, Entered By History Yes    sennosides (SENOKOT) 8.6 MG tablet Take 1 tablet by mouth 2 times daily 8/30/2022 at 0800 Yes Unknown, Entered By History     spironolactone (ALDACTONE) 25 MG tablet Take 1 tablet by mouth daily 8/30/2022 at 0800 Yes Reported, Patient Yes      The information provided in this note is only as accurate as the sources available at the time of update(s)     Kimberley Chau, PharmD, BCPS

## 2022-08-31 NOTE — PROGRESS NOTES
Observation goals  PRIOR TO DISCHARGE          Ortho consult complete, MRI complete, psych consult complete : Not met    Nurse to notify provider when observation goals have been met and patient is ready for discharge.

## 2022-08-31 NOTE — ED PROVIDER NOTES
History   Chief Complaint:  Stroke Symptoms (Pt. Brought from [place], [place], with left hand weakness, no arm weakness. Symptoms noticed during MD visit at 1630. Pt. Has dementia HX, RT=115. A-fib. HX taking Eloquis. Slight blood in urine last few days. Pt. States having left hand pain. )       The history is provided by the EMS personnel, the spouse and the patient. History limited by: dementia.      William Almazan is a 81 year old male with past medical of a history of CAD s/p CABG, HFrEF, pAfib on apixaban, HTN, HLD, DM2, KRISTIAN, prostate Ca, gout, and dementia. Who presents with concern of difficulty moving the left hand and fingers as well as increased weakness in the bilateral lower legs.  Patient moved to a new transitional care facility 4 days ago.  He was reportedly walking in the hallways of the hospital at [facility] where he was previously admitted prior to transfer to the TCU.  He was discharged from the hospital on August 24.  During his hospitalization he was treated for hypotension due to hypovolemia, dementia with psychosis, NSTEMI as well as COVID-19 infection.     Today the patient reported to his wife that he was having left hand pain and difficulty moving the hand as well as increased weakness in his legs.  There was concern that he may have suffered a stroke.  Wife reports he has not had any recent falls or head injuries.  Patient is anticoagulated on apixaban.  He has had ongoing hematuria which is unchanged. They deny any known injury to the left hand or arm.  Patient has recently been started on multiple psychotropic medications during his recent hospitalization including donezepil, risperidone. The patient and his wife denies any other concerns at this time. No known fevers since hospital discharge. No chest pain, abdominal pain, shortness of breath, cough.     Review of Systems   Unable to perform ROS: Dementia       Allergies:  No Known  "Allergies    Medications:  acetaminophen (TYLENOL) 325 MG tablet  allopurinol (ZYLOPRIM) 300 MG tablet  amoxicillin (AMOXIL) 500 MG capsule  apixaban ANTICOAGULANT (ELIQUIS) 5 MG tablet  carvedilol (COREG) 12.5 MG tablet  cholecalciferol 25 MCG (1000 UT) TABS  cyanocobalamin (VITAMIN B-12) 1000 MCG tablet  diclofenac (VOLTAREN) 1 % topical gel  donepezil (ARICEPT) 10 MG tablet  escitalopram (LEXAPRO) 20 MG tablet  furosemide (LASIX) 40 MG tablet  gabapentin (NEURONTIN) 100 MG capsule  loratadine (CLARITIN) 10 MG tablet  polyethylene glycol (MIRALAX) 17 GM/Dose powder  psyllium (METAMUCIL/KONSYL) capsule  risperiDONE (RISPERDAL) 0.25 MG tablet  risperiDONE (RISPERDAL) 0.25 MG tablet  sennosides (SENOKOT) 8.6 MG tablet  spironolactone (ALDACTONE) 25 MG tablet        Past Medical History:       Past Medical History:   Diagnosis Date     Infection due to 2019 novel coronavirus      Patient Active Problem List   Diagnosis     Psychosis, unspecified psychosis type (H)     Dementia with behavioral disturbance, unspecified dementia type (H)     Hypotension, unspecified hypotension type     Infection due to 2019 novel coronavirus     Weakness of left hand     Bilateral leg weakness     Pain of left hand         Past Surgical History:    No relevant past surgical history.     Family History:    Noncontributory    Social History:  Presents to the emergency department via EMS.  Wife at the bedside shortly after patient arrival.  Patient currently residing in a transitional care unit.    Physical Exam     Patient Vitals for the past 24 hrs:   BP Temp Temp src Pulse Resp SpO2 Height Weight   08/30/22 2300 104/71 -- -- 75 23 99 % -- --   08/30/22 2230 134/78 -- -- 77 22 98 % -- --   08/30/22 2155 -- -- -- -- -- -- 1.753 m (5' 9\") --   08/30/22 2150 122/74 97.9  F (36.6  C) Oral 76 22 100 % -- --   08/30/22 2125 (!) 131/90 -- -- 79 21 95 % -- --   08/30/22 2100 125/82 -- -- 79 24 99 % -- --   08/30/22 2050 -- -- -- 78 20 99 % -- -- "   08/30/22 2045 126/75 -- -- 77 20 -- -- --   08/30/22 2000 -- 100.1  F (37.8  C) Rectal -- -- -- -- --   08/30/22 1942 99/65 97.6  F (36.4  C) Skin 78 18 98 % -- 81.9 kg (180 lb 8.9 oz)       Physical Exam  General: Appears elderly and frail. Nontoxic.   Head:  Scalp, face, and head appear normal, atraumatic non tender to palpation  Eyes:  Right pupil is round and briskly reactive.  Left pupil is irregular and reactive.  EOMI.  No nystagmus.    Conjunctivae non-injected and sclerae white  ENT:    The external nose is normal.  Mucous membranes are dry.  No mucosal lesions.    Pinnae are normal  Neck:  Normal range of motion. Cervical spine nontender, no stepoffs    There is no rigidity noted    Trachea is in the midline  CV:  Regular rate and irregular rhythm     Normal S1/S2, no S3/S4    No murmur or rub. Radial pulses 2+ bilaterally.  Resp:  Lungs are clear and equal bilaterally  There is no tachypnea    No increased work of breathing    No rales, wheezing, or rhonchi  GI:  Abdomen is soft, no rigidity or guarding    No distension, or mass    No tenderness or rebound tenderness  :  Normal-appearing male genitalia with Louie catheter in place.  No leakage around the Louie catheter or blood at the urethral meatus.  Louie catheter with cherry red urine and some debris in the Louie tubing.  MS:  Normal muscular tone. Left hand held with fingers flexed at the MCPs but otherwise in extension at the PIP and DIP. Left thumb held opposed to the index finger. Patient expresses pain with movement of the fingers. No tenderness to palpation of the metacarpals, carpal bones, forearms. Painless ROM of all other extremities.    No lower extremity edema  Skin:  No rash or acute skin lesions noted  Neuro: Awake and alert, oriented x3 but has difficulty with remembering recent events and timeline leading to his ED presentation.    No pronator drift. Patient unable to lift legs up off of the stretcher. Moves toes normally  bilaterally. Plantar flexion 4/5 and symmetric bilaterally. Dorsiflexion 3/5 and symmetric bilaterally. Patient unable to hold legs up in air. Strength 5/5 and symmetric in bilateral shoulders, elbows and right hand. Patient able to grasp with the left hand but unable to extend or abduct the fingers of the left hand.  Speech is normal and fluent  Moves all extremities spontaneously  Psych:  Normal affect. Appropriate interactions.      Emergency Department Course   ECG  ECG obtained at 1944, ECG read at 1955  Sinus rhythm with first-degree AV block and frequent PVCs.  Right bundle branch block.  Abnormal EKG.  No significant change as compared to prior, dated 8/20/22  Rate 79 bpm. AL interval 230 ms. QRS duration 158 ms. QT/QTc 436/499 ms. P-R-T axes 57 268 107.     Imaging:  CT Head w/o Contrast   Final Result   IMPRESSION:   1.  No acute intracranial process.      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 Subchondral    cystic change distal radius with small loose body along the radial margin of the radioscaphoid joint. No acute left wrist fracture or dislocation identified. Volar left wrist soft tissue swelling. Slight widening of the distal radioulnar joint.      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 No acute displaced left hand fracture or dislocation is identified. Flexion at the index through small finger MCP joints.      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 Subchondral    cystic change distal radius with  small loose body along the radial margin of the radioscaphoid joint. No acute left wrist fracture or dislocation identified. Volar left wrist soft tissue swelling. Slight widening of the distal radioulnar joint.      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 No acute displaced left hand fracture or dislocation is identified. Flexion at the index through small finger MCP joints.        Report per radiology.    Laboratory:  Labs Ordered and Resulted from Time of ED Arrival to Time of ED Departure   COMPREHENSIVE METABOLIC PANEL - Abnormal       Result Value    Sodium 140      Potassium 3.5      Chloride 104      Carbon Dioxide (CO2) 32      Anion Gap 4      Urea Nitrogen 11      Creatinine 0.62 (*)     Calcium 8.8      Glucose 151 (*)     Alkaline Phosphatase 91      AST 12      ALT 18      Protein Total 6.2 (*)     Albumin 2.6 (*)     Bilirubin Total 0.3      GFR Estimate >90     CBC WITH PLATELETS AND DIFFERENTIAL - Abnormal    WBC Count 4.5      RBC Count 3.84 (*)     Hemoglobin 10.5 (*)     Hematocrit 34.8 (*)     MCV 91      MCH 27.3      MCHC 30.2 (*)     RDW 16.3 (*)     Platelet Count 214      % Neutrophils 73      % Lymphocytes 9      % Monocytes 16      % Eosinophils 2      % Basophils 0      % Immature Granulocytes 0      NRBCs per 100 WBC 0      Absolute Neutrophils 3.3      Absolute Lymphocytes 0.4 (*)     Absolute Monocytes 0.7      Absolute Eosinophils 0.1      Absolute Basophils 0.0      Absolute Immature Granulocytes 0.0      Absolute NRBCs 0.0     LACTIC ACID WHOLE BLOOD - Normal    Lactic Acid 1.7     TROPONIN I - Normal    Troponin I High Sensitivity 44     MAGNESIUM - Normal    Magnesium 1.6     GLUCOSE MONITOR NURSING POCT        Procedures      Emergency Department Course:             Reviewed:  I reviewed nursing notes, vitals and past medical history    Assessments/Consults:  ED Course as of 08/30/22 7018   Tue Aug 30, 2022   2222 Case discussed with Dr. Rangel, hospitalist  who accepts the patient for admission.       Interventions:  Medications   ondansetron (ZOFRAN) injection 4 mg (4 mg Intravenous Given 8/30/22 3662)       Disposition:  The patient was admitted to the hospital under the care of Dr. Rangel.     Impression & Plan     Medical Decision Making:  William Almazan is a 81 year old male who presents to the emergency department via EMS from his transitional care unit after he was noted to have difficulty using his left hand and his bilateral legs seem to be weaker than normal.  Patient reportedly was able to ambulate in the hallways of the hospital prior to discharge to the TCU.  Today he is unable to lift the legs off of the stretcher.  On my evaluation the patient is nontoxic, afebrile and hemodynamically stable.  He did not definitively have focal neurologic deficits on examination.  The left hand seem to be quite painful with range of motion of the fingers and wrist.  Patient had symmetric movement of the bilateral feet and ankles but was unable to lift the legs off of the stretcher.  This was symmetric bilaterally.  Patient's wife denies any known recent falls or injuries.  A broad differential diagnosis is considered.  CT scan of the head is obtained and negative for any evidence of acute cranial hemorrhage stroke or other central neurologic process.  Stroke is felt to be less likely given his clinical presentation although cannot be ruled out completely.  He may benefit from MRI of the brain however he has pacemaker and unfortunately this cannot be obtained urgently tonight.  Patient is not a an interventional stroke candidate given his anticoagulated status and unclear timing of symptom onset which has likely been greater than 24 hours.  Radiographs of the left hand and wrist were obtained.  He has advanced degenerative changes and SLAC deformity of the wrist.  He may have suffered an unknown/occult trauma causing acute wrist and hand pain limiting his left hand  function.  No evidence of any acute fractures, dislocations.  Laboratory studies are reassuring.  Lactic acid is normal.  Troponin is within normal limits.  CMP and CBC are unchanged from baseline.  EKG without evidence of acute ischemia or dysrhythmia.  In regards to his bilateral lower extremity weakness patient was recently started on multiple psychotropic medications due to his history of anxiety with agitation and psychosis.  These may be causing drowsiness and fatigue leading to inability to ambulate.  At this time there is no evidence of acute infectious process.  Patient is considered COVID recovered and was treated for COVID during his last hospitalization.  Would not repeat COVID PCR test at this time.  Given that patient is unable to ambulate and has persistent weakness he will be admitted for ongoing monitoring evaluation and treatment.  Case was discussed with the hospitalist and the patient was admitted in stable condition.      Diagnosis:    ICD-10-CM    1. Bilateral leg weakness  R29.898    2. Pain of left hand  M79.642    3. Weakness of left hand  R29.898        This note was created in part using medical voice dictation software and accidental undetected word errors, substitutions, or other automated transcription errors may occur.      Franklin Aguilera MD  08/31/22 0005     Need for prophylactic measure DM2 (diabetes mellitus, type 2)

## 2022-08-31 NOTE — CONSULTS
Care Management Initial Consult    General Information  Assessment completed with: Spouse or significant other, [place]  Type of CM/SW Visit: Initial Assessment    Primary Care Provider verified and updated as needed: Yes   Readmission within the last 30 days: current reason for admission unrelated to previous admission      Reason for Consult: discharge planning  Advance Care Planning: Advance Care Planning Reviewed: no concerns identified          Communication Assessment  Patient's communication style: spoken language (English or Bilingual)             Cognitive  Cognitive/Neuro/Behavioral: .WDL except  Level of Consciousness: alert  Arousal Level: opens eyes spontaneously  Orientation: disoriented to, time  Mood/Behavior: calm  Best Language: 0 - No aphasia  Speech: clear, slow (soft voice)    Living Environment:   People in home: spouse     Current living Arrangements: house      Able to return to prior arrangements: yes       Family/Social Support:  Care provided by: self, spouse/significant other  Provides care for: no one  Marital Status:   Wife  Ilsa       Description of Support System: Supportive, Involved         Current Resources:   Patient receiving home care services: No     Community Resources: Skilled Nursing Facility, Transitional Care  Equipment currently used at home: cane, straight  Supplies currently used at home: None    Employment/Financial:  Employment Status: retired        Financial Concerns: No concerns identified   Referral to Financial Worker: No  Finance Comments: [facility]    Lifestyle & Psychosocial Needs:  Social Determinants of Health     Tobacco Use: Not on file   Alcohol Use: Not on file   Financial Resource Strain: Not on file   Food Insecurity: Not on file   Transportation Needs: Not on file   Physical Activity: Not on file   Stress: Not on file   Social Connections: Not on file   Intimate Partner Violence: Not on file   Depression: Not on file   Housing Stability: Not  on file       Functional Status:  Prior to admission patient needed assistance:              Mental Health Status:          Chemical Dependency Status:                Values/Beliefs:  Spiritual, Cultural Beliefs, Muslim Practices, Values that affect care: no               Additional Information:  Per care management consult, chart was reviewed. Patient is a 81 year old male that was admitted on 8/30/22 from [facility] for left hand pain/decreased mobility and generalized weakness and lethargy. Writer reviewed chart and patient has a history of dementia. Writer called and spoke to spouse- Ilsa. [place] stated that patient had a previous hospitalization and was just discharged about 4 days ago to [facility]. Prior to patient's previous hospitalization, [place] reports that the last year patient has been using a cane for mobility and was able to complete ADL's on his own. Spouse assist with all cooking, cleaning and laundry. [place] stated that patient has not been driving for at least a year and she will drive. Patient is retired, no financial concerns at this time. 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 Spouse is hopeful of having patient discharge to TCU when medically ready and wants patient to return to [facility]. Spouse and writer discussed transport and spouse is wanting a transport ride set up when patient is medically ready. Patient has a bed hold with [facility] and we will need PT/OT notes as the TCU will need to submit for insurance auth prior to his return.     KAHLIL Mitchell, LGSW   Social Work   [facility]

## 2022-08-31 NOTE — PROGRESS NOTES
RECEIVING UNIT ED HANDOFF REVIEW    ED Nurse Handoff Report was reviewed by: Jesusita Huddleston RN on August 31, 2022 at 12:25 AM

## 2022-08-31 NOTE — ED TRIAGE NOTES
Triage Assessment     Row Name 08/30/22 1943       Triage Assessment (Adult)    Airway WDL WDL       Respiratory WDL    Respiratory WDL WDL       Skin Circulation/Temperature WDL    Skin Circulation/Temperature WDL WDL       Cardiac WDL    Cardiac WDL WDL       Peripheral/Neurovascular WDL    Peripheral Neurovascular WDL WDL       Cognitive/Neuro/Behavioral WDL    Cognitive/Neuro/Behavioral WDL arousability;level of consciousness;mood/behavior    Level of Consciousness alert    Arousal Level opens eyes spontaneously    Mood/Behavior cooperative            Pt. Brought from [place], [place], with left hand weakness, no arm weakness. Symptoms noticed during MD visit at 1630. Pt. Has dementia HX, IN=430. A-fib. HX taking Eloquis. Slight blood in urine last few days. Pt. States having left hand pain.

## 2022-08-31 NOTE — H&P
Admitted: 08/30/2022    PRIMARY CARE PHYSICIAN:  Dr. Victoriano Powers.    CODE STATUS:  FULL CODE.  Discussed with the patient and his family at bedside.    CHIEF COMPLAINT:  Left wrist and hand pain and leg weakness.    HISTORY OF PRESENT ILLNESS:  Mr. Almazan is an 81-year-old male with a past medical history significant for dementia, chronic indwelling Louie catheter with hematuria, paroxysmal atrial fibrillation with pacer, coronary artery disease, who presents to the Emergency Departments from his TCU with the above concerns.  History is obtained through discussion with the ED physician, the patient and his family at bedside.  The patient was recently admitted to [facility] from 08/02/2022 to 08/24/2022, discharging to TCU.  During that hospitalization, he had issues with inability to walk and decreased mobility and was found to be hypovolemic with acute kidney injury, elevated lactic acid and generalized failure to thrive.  The patient did respond to IV fluids and had improvement in his renal function and blood pressure.  He also was seen by psychiatry and had adjustment of his medication regimen.  Back in June, he actually was admitted for auditory hallucinations and commands of self-harm and so was started on Risperdal, Aricept, Lexapro and gabapentin.  He had some ongoing issues at this most recent hospitalization in August, not so much around hallucinations, which had improved, but regarding feeling more weak and lethargic.  He had an increase in his Aricept and change of Risperdal to Abilify, but got dizzy, so changed back to Risperdal.  There was some mention that if he had increased depression, they could consider either changing or adding in Abilify.  From that hospitalization, he had ongoing issues with weakness and in discussion with the family at bedside, he was very weak to the point where he really could not get out of bed on his own and had trouble sitting up in bed and so was sent to TCU for further  improvement in these areas.    Since the patient has been in TCU, he has had ongoing issues with weakness and actually seems slightly weaker now than prior.  The family states that during his first hospitalization when he left for TCU.  He was actually able to get around with the assist of others and a walker, but he is significantly more weak this time around.  The patient denies any shortness of breath at this point, but did have a few days back a very mild self-limited episode of shortness of breath.  No chest pain.  No fevers or chills.  He had some emesis and nausea today, but has responded to antiemetics.    The patient feels that his left wrist is uncomfortable and has difficulty moving it, which the family just noted today for the first time.  He also feels like his left leg is slightly more weak than the right and he is unable to lift these off the bed.  The family notes that earlier today, he seemed to be able to lift the right leg off the bed at least somewhat, but the left leg he could not.  No trauma or falling.    In the Emergency Department, the patient had a head CT, which was negative.  Plain films of the left hand and wrist showed evidence of fairly significant osteoarthritis and did show scapholunate dissociation with proximal migration of the capitate compatible with slack wrist with advanced polyarticular arthritis.    The patient does have an indwelling Louie catheter and was seen by urology during his last hospitalization due to ongoing issues with hematuria.  He is fully anticoagulated with apixaban due to a history of paroxysmal atrial fibrillation.  He is draining his urine well and was recommended to followup in the clinic.    PAST MEDICAL HISTORY:    1.  Dementia.  2.  Urinary retention with chronic indwelling Louie catheter and chronic hematuria, followed by Urology.  3.  Paroxysmal atrial fibrillation with a pacemaker, dual chamber.  4.  Iron deficiency anemia.  5.  Heart failure with  preserved ejection fraction.  6.  Depression.  7.  Coronary artery disease with CABG in 1992.  8.  Hypertension.  9.  Dyslipidemia.  10.  Diabetes type 2.  11.  KRISTIAN with use of CPAP.  12.  Prostate cancer.  13.  Gout.  14.  COVID-19 in 08/2022.  15.  Full body tremor.  16.  Full-thickness rectal prolapse.    MEDICATIONS:    Medications Prior to Admission   Medication Sig Dispense Refill Last Dose     acetaminophen (TYLENOL) 325 MG tablet Take 2 tablets (650 mg) by mouth every 4 hours as needed for mild pain 30 tablet 3  at prn     allopurinol (ZYLOPRIM) 300 MG tablet Take 1 tablet by mouth daily   8/30/2022 at 0800     amoxicillin (AMOXIL) 500 MG capsule Take 1 capsule by mouth as needed PRN for dental procedures    at prn     apixaban ANTICOAGULANT (ELIQUIS) 5 MG tablet Take 5 mg by mouth 2 times daily   8/30/2022 at 0800     carvedilol (COREG) 12.5 MG tablet Take 1 tablet (12.5 mg) by mouth 2 times daily   8/30/2022 at 0800     cholecalciferol 25 MCG (1000 UT) TABS Take 1,000 Units by mouth daily   8/30/2022 at 0800     cyanocobalamin (VITAMIN B-12) 1000 MCG tablet Take 1,000 mcg by mouth daily   8/30/2022 at 0800     diclofenac (VOLTAREN) 1 % topical gel Apply 2 g topically 4 times daily 350 g 0 8/29/2022 at 2000 (refused today per MAR)     donepezil (ARICEPT) 10 MG tablet Take 1 tablet (10 mg) by mouth At Bedtime 90 tablet 3 8/29/2022 at 2000     escitalopram (LEXAPRO) 20 MG tablet Take 1 tablet (20 mg) by mouth daily   8/30/2022 at 0800     furosemide (LASIX) 40 MG tablet Take 1 tablet (40 mg) by mouth daily   8/30/2022 at 0800     gabapentin (NEURONTIN) 100 MG capsule Take 1 capsule (100 mg) by mouth 3 times daily 90 capsule 3 8/30/2022 at 1400 (x2 doses)     loratadine (CLARITIN) 10 MG tablet Take 1 tablet by mouth daily   8/30/2022 at 0800     polyethylene glycol (MIRALAX) 17 GM/Dose powder Take 17 g by mouth daily as needed for constipation 510 g   at prn     psyllium (METAMUCIL/KONSYL) capsule Take 3  capsules by mouth daily 270 capsule 3 8/30/2022 at 0800     risperiDONE (RISPERDAL) 0.25 MG tablet Take 1.75 mg by mouth At Bedtime   8/29/2022 at took 2 mg; new order for 1.75 mg to start 8/30 pm     risperiDONE (RISPERDAL) 0.25 MG tablet Take 0.25 mg by mouth every other day   8/29/2022 at 0800     sennosides (SENOKOT) 8.6 MG tablet Take 1 tablet by mouth 2 times daily   8/30/2022 at 0800     spironolactone (ALDACTONE) 25 MG tablet Take 1 tablet by mouth daily   8/30/2022 at 0800         SOCIAL HISTORY:  The patient denies any use of tobacco or alcohol, currently residing in a transitional care unit.    FAMILY HISTORY:  Father had prostate cancer.  Sister had heart disease.    ALLERGIES:  NO KNOWN DRUG ALLERGIES.    REVIEW OF SYSTEMS:  A complete review of systems reviewed and negative, save for the pertinent positives recorded in HPI.    PHYSICAL EXAMINATION:    VITAL SIGNS:  Show blood pressure of 134/78, heart rate 77, respirations 22, satting 98% on room air, temperature 97.9 degrees Fahrenheit.  GENERAL:  The patient is lying in bed and appears comfortable, but weak.  HEENT:  Pupils equal, round, reactive to light.  Extraocular muscle function intact.  No scleral icterus.  Oropharynx is clear.  NECK:  No lymphadenopathy or thyromegaly.  CARDIOVASCULAR:  Heart is regular rate and rhythm without any murmur, rub or gallop.  PULMONARY:  Lungs are clear to auscultation bilaterally without wheeze or rhonchi.  GASTROINTESTINAL:  Positive bowel sounds, soft, nontender, nondistended.  SKIN:  No new rashes or lesions.  LYMPHATICS:  Trace edema of the bilateral lower extremities.  PSYCHIATRIC:  The patient is alert and oriented x 3 with a somewhat blunted affect.  Slow to answer questions, but are appropriate.  NEUROLOGIC:  Cranial nerves II though XII are grossly intact.  The patient has intact sensation in upper and lower extremities.  His left hand is somewhat swollen and he has difficulty moving the left hand in  extension, but better in flexion.  His hand  strength is worse on the left as compared to the right.  Lower extremities are weak in general and he does have a fairly decent dorsi plantar flexion, which is symmetric.  He has difficulty raising both legs off the bed, but is able to get a little more movement and lift on the right as compared to the left.    LABORATORY DATA:  BMP and LFTs are unremarkable.  CBC shows WBC count 4.5, hemoglobin 10.5 and platelets of 214.  A head CT is negative and hand imaging as above.    IMPRESSION AND PLAN:  Mr. Almazan is an 81-year-old male with a past medical history significant for dementia, urinary retention with chronic indwelling Louie catheter, paroxysmal atrial fibrillation, heart failure with preserved ejection fraction, coronary artery disease, who presents to the Emergency Department with left hand pain, decreased motion and increasing generalized weakness.  1.  Left hand and wrist discomfort and decreased mobility:  Initially there was concern for a stroke given the acuity of onset, but at this point, I am thinking less stroke and more a musculoskeletal issue.  It was fairly acute today per their recollection, but plain film imaging shows significant arthritic and degenerative change and evidence of slack or scapholunate advanced collapse, which seems to fit the description of what is going on.  At this point, we will plan for orthopedic consultation to determine if he needs a splint, injections or possibly surgery.  We will have Tylenol available as needed for pain control and if it is not working, we will consider low dose narcotics, but given dementia and already somewhat sedated due to his antipsychotics, would need to use narcotics judiciously.  2.  Generalized weakness:  As discussed above, had left wrist discomfort as well as bilateral lower extremities weakness, though seems a little bit worse on the left as compared to the right.  Head CT is without acute  abnormality, though while I think that this is more likely a musculoskeletal issue in the wrist and possibly generalized weakness from a prolonged hospital stay, compounded by his medications for psych issues, it is reasonable to get an MRI, which cannot be done until the morning as he has a pacer.  MRI of the brain has been ordered.  We will also check neuro checks and NIH scale every 4 hours and monitor on telemetry.  3.  Chronic indwelling Louie catheter due to urinary retention and chronic hematuria:  This appears to be roughly at baseline.  Can follow up with urology in the clinic unless he ends up having significant clotting, worsening hematuria or evidence of obstruction.  4.  Dementia, depression and hallucinations:  The patient has ongoing issues with hallucinations, noting that he is hearing and seeing Zuhair Lainez mimicking him and also hears voices, though not commands of self-harm.  He recently had a decrease in the Risperdal from the morning dose to every other day instead of daily, but family is interested in having a psych reevaluate to see if any further adjustments may be needed.  Of note, he is still fairly sedate and lethargic compared to where he is at his baseline, which has been attributed to his psychiatric regimen.  For now, we will continue with his current dosing of Risperdal, Aricept, Lexapro and gabapentin pending psych evaluation.  5.  Gout:  Continue with allopurinol.  6.  Heart failure with preserved ejection fraction, hypertension, coronary artery disease, dyslipidemia:  We will continue with his PTA regimen of Coreg, Lasix, Aldactone.  7.  KRISTIAN:  CPAP as needed.  8.  Paroxysmal atrial fibrillation:  He has a pacer in place.  We will continue with PTA Coreg and apixaban.  9.  Malnutrition:  This is ongoing from previous hospitalization.  Continue to optimize nutrition as able.   10.  Iron deficiency anemia:  Recently completed a course of Venofer.  Monitor periodically.  11.  Recent  COVID-19:  He had minimal symptoms with first positive on , considered COVID recovered.  12.  Deep venous thrombosis prophylaxis:  He is anticoagulated with apixaban.  13.  Disposition:  Brought under observation status for now as depending on MRI of the brain, ortho consult and psych consult, could potentially discharge back to TCU as early as tomorrow.  If he has any need for additional stay, would have a low threshold to advance to inpatient.    Jj Rangel DO        D: 2022   T: 2022   MT: CARL    Name:     NIKKIE ARAUZ  MRN:      [id number]        Account:     [id number]   :      1940           Admitted:    2022       Document: [id number]

## 2022-08-31 NOTE — ED NOTES
Patient states he is feeling nauseous, and as if he might throw up. Patient's head of bed elevated, and medicated with zofran. Admitting at bedside now.

## 2022-08-31 NOTE — CONSULTS
"[facility] Nurse Inpatient Assessment     Consulted for: buttock       Areas Assessed:      Areas visualized during today's visit: Focused: and Sacrum/coccyx    Wound location: buttock, sacrococcygeal and bilat buttock     Last photo: 8/31  Wound due to: Pressure Injury CAPI unstageable with components of MASD and friction and shear   Wound history/plan of care: found with sacral mepilex in use, patient unsure of prior care and reports \"scooting\"   Wound base: mixed % epidermis, dermis, granulation tissue and slough, tissue over coccyx slough      Palpation of the wound bed: mild firm      Drainage: moderate     Description of drainage: serosanguinous     Measurements (length x width x depth, in cm): cluster measurement of total area 7  x 8  x  0.2 cm      Tunneling: N/A     Undermining: N/A  Periwound skin: Superficial erosion and deep brown purple nonblanchable       Color: purple and deeper brown than periwound skin      Temperature: normal   Odor: none  Pain: moderate, aching  Pain interventions prior to dressing change: patient tolerated well and slow and gentle cares   Treatment goal: Heal   STATUS: initial assessment  Supplies ordered: supplies stored on unit       Treatment Plan:      Wound care  Start:  08/31/22 1049,   EVERY SHIFT,   Routine        Comments: Location: buttock   Care: provided by primary RN smitha   1. Cleanse perineal skin with incontinence protocol (quyen cleanser and soft dry wipes) every incontinence episode   2. Apply skin prep generously to perianal and buttock including wounds and periwound skin, let dry   3. Cover with mepilex dressings (sacral, or 4x4\" or combination to best keep all wounds covered). Mepilex can be used up to 5 days each, ok to lift for cleansing and assessment and reapply same mepilex         Skin care precautions  Start:  08/31/22 1048,   EFFECTIVE NOW,   Routine        Comments: Pressure Injury Prevention (PIP) Plan:   -If patient " "is declining pressure injury prevention interventions: Explore reason why and address patient's concerns, Educate on pressure injury risk and prevention intervention(s), If patient is still declining, document \"informed refusal\" , and Ensure Care team is aware ( provider, charge nurse, etc)   -Mattress: Follow bed algorithm, reassess daily and order specialty mattress, if indicated.   -HOB: Maintain at or below 30 degrees, unless contraindicated   -Repositioning in bed: Every 1-2 hours , Left/right positioning; avoid supine, and Raise foot of bed prior to raising head of bed, to reduce patient sliding down (shear)   -Heels: Keep elevated off mattress and Pillows under calves   -Protective Dressing: Sacral Mepilex for prevention (#234132),  especially for the agitated patient   -Positioning Equipment: TAPS wedges (#031457) to help maintain 30 degree side lying position   -Chair positioning: Chair cushion (#622157) , Assist patient to reposition hourly, and Do NOT use a donut for sitting (this increases pressure to smaller area and creates a higher potential for injury)     -Moisture Management: Perineal cleansing /protection: Follow Incontinence Protocol, Avoid brief in bed, Clean and dry skin folds with bathing , and Moisturize dry skin   -Under Devices: Inspect skin under all medical devices during skin inspection , Ensure tubes are stabilized without tension, and Ensure patient is not lying on medical devices or equipment when repositioned         Nutrition Services Adult IP Consult  Start:  08/31/22 1052,   End:  08/31/22 1052,   ONE TIME,   Routine                   Orders: Written    RECOMMEND PRIMARY TEAM ORDER: None, at this time  Education provided: importance of repositioning, plan of care, Moisture management, Hygiene and Off-loading pressure  Discussed plan of care with: Patient and Nurse  WOC nurse follow-up plan: weekly  Notify WOC if wound(s) deteriorate.  Nursing to notify the Provider(s) and " re-consult the [facility] Nurse if new skin concern.    DATA:     Current support surface: Standard  pulsate ordered after consult  Containment of urine/stool: Incontinence Protocol  BMI: Body mass index is 26.66 kg/m .   Active diet order: Orders Placed This Encounter      Combination Diet Low Saturated Fat Na <2400mg Diet, No Caffeine Diet     Output: No intake/output data recorded.     Labs: Recent Labs   Lab 08/30/22  1950   ALBUMIN 2.6*   HGB 10.5*   WBC 4.5     Pressure injury risk assessment:   Sensory Perception: 3-->slightly limited  Moisture: 3-->occasionally moist  Activity: 2-->chairfast  Mobility: 2-->very limited  Nutrition: 3-->adequate  Friction and Shear: 2-->potential problem  John Score: 15    Aracelis KOCHKELTON   Dept. Pager: [phone]  Dept. Office Number: [phone]

## 2022-08-31 NOTE — PROGRESS NOTES
SW:  D: Confirmed with Rachana at the [facility] that patient has a bed hold for Replaced by [facility]. Patient is able to return when medically ready, however the facility will have to submit for insurance auth and will need PT/OT.    P: Care manager continue to follow for safe discharge planning.

## 2022-08-31 NOTE — PROGRESS NOTES
[facility]    Medicine Progress Note - Hospitalist Service    Date of Admission:  8/30/2022    Assessment & Plan        William Almazan is an 81-year-old male with a past medical history significant for dementia, urinary retention with chronic indwelling Louie catheter, paroxysmal atrial fibrillation, heart failure with preserved ejection fraction, coronary artery disease, who presents to the Emergency Department with left hand pain, decreased motion and increasing generalized weakness.    Left hand and wrist discomfort and decreased mobility  Generalized weakness  Initially there was concern for a stroke given the acuity of onset, however this seems less likely now.    Registered to observation.    Orthopedics consulted, appreciate their assistance. See consult not for details.    Left wrist splint ordered. Elevate wrist when at rest. WBAT.    Outpatient follow-up with hand surgeon.    PRN acetaminophen for pain control.    CT head shows no acute changes.    MRI brain ordered to be thorough, however has not been completed yet (delayed due to pacemaker).    Continue neurochecks for now pending MRI results.    PT/OT consulted.    Social work consulted.    Chronic indwelling Louie catheter due to urinary retention and chronic hematuria  This appears to be roughly at baseline.    Follow up in Urology clinic as previously arranged unless he develops any acute issues.    Dementia, depression and hallucinations  The patient has ongoing issues with hallucinations, noting that he is hearing and seeing Zuhair Trkendrick mimicking him and also hears voices, though not commands of self-harm.  He recently had a decrease in the Risperdal from the morning dose to every other day instead of daily, but family is interested in having a psych reevaluate to see if any further adjustments may be needed.  Of note, he is still fairly sedate and lethargic compared to where he is at his baseline, which has been  attributed to his psychiatric regimen.    Psychiatry consulted, appreciate their assistance.    For now, continue with his current dosing of Risperdal, Aricept, Lexapro and gabapentin pending psych evaluation.    Gout    Continue PTA allopurinol.    Heart failure with preserved ejection fraction  Hypertension  Coronary artery disease  Dyslipidemia    Continue PTA regimen of Coreg, Lasix, Aldactone.    KRISTIAN    CPAP as needed.    Paroxysmal atrial fibrillation  He has a pacer in place.    Continue PTA Coreg and apixaban.    Malnutrition    This is ongoing from previous hospitalization.  Continue to optimize nutrition as able.     Iron deficiency anemia    Recently completed a course of Venofer.  Monitor periodically.    Recent COVID-19 infection    He had minimal symptoms with first positive on 07/30, considered COVID recovered.       Diet: Combination Diet Low Saturated Fat Na <2400mg Diet, No Caffeine Diet    DVT Prophylaxis: DOAC  Louie Catheter: PRESENT, indication: Retention  Central Lines: None  Cardiac Monitoring: ACTIVE order. Indication: Stroke, acute (48 hours)  Code Status: Full Code      Disposition Plan      Expected Discharge Date: 09/01/2022        Discharge Comments: MRI. Psych consult.Woc and ortho consults.        The patient's care was discussed with the Bedside Nurse and Patient.    Reji Granado MD  Hospitalist Service  [facility]  Securely message with the Vocera Web Console (learn more here)  Text page via [employer] Paging/Directory         Clinically Significant Risk Factors Present on Admission             # Hypoalbuminemia: Albumin = 2.6 g/dL (Ref range: 3.4 - 5.0 g/dL) on admission, will monitor as appropriate   # Coagulation Defect: home medication list includes an anticoagulant medication    # Chronic systolic heart failure: echo within the past year with EF <40%    # Overweight: Estimated body mass index is 26.66 kg/m  as calculated from the following:    Height  "as of this encounter: 1.753 m (5' 9\").    Weight as of this encounter: 81.9 kg (180 lb 8.9 oz).        ______________________________________________________________________    Interval History   William Almazan was seen this afternoon. He feels OK. Still has left wrist pain. No other complaints/concerns. Denies fevers, chest pain, shortness of breath, nausea, abdominal pain.    Data reviewed today: I reviewed all medications, new labs and imaging results over the last 24 hours. I personally reviewed no images or EKG's today.    Physical Exam   Vital Signs: Temp: 97.3  F (36.3  C) Temp src: Axillary BP: 128/75 Pulse: 74   Resp: 16 SpO2: 95 % O2 Device: None (Room air)    Weight: 180 lbs 8.91 oz  Constitutional: awake, drowsy, cooperative, no apparent distress, laying in the hospital bed  Respiratory: clear to auscultation bilaterally, no crackles or wheezing  Cardiovascular: regular rate and rhythm, normal S1 and S2, no murmur noted  GI: normal bowel sounds, soft, non-distended, non-tender  Skin: warm, dry  Musculoskeletal: no lower extremity pitting edema present  Neurologic: awake, drowsy, appropriate in conversation, moves all extremities, strength is globally decreased but symmetric    Data   Recent Labs   Lab 08/30/22  1950   WBC 4.5   HGB 10.5*   MCV 91         POTASSIUM 3.5   CHLORIDE 104   CO2 32   BUN 11   CR 0.62*   ANIONGAP 4   CRYS 8.8   *   ALBUMIN 2.6*   PROTTOTAL 6.2*   BILITOTAL 0.3   ALKPHOS 91   ALT 18   AST 12     Medications       allopurinol  300 mg Oral Daily     apixaban ANTICOAGULANT  5 mg Oral BID     carvedilol  12.5 mg Oral BID     donepezil  10 mg Oral At Bedtime     escitalopram  20 mg Oral Daily     furosemide  40 mg Oral Daily     gabapentin  100 mg Oral TID     loratadine  10 mg Oral Daily     psyllium  3 capsule Oral Daily     risperiDONE  0.25 mg Oral Every Other Day     risperiDONE  1.75 mg Oral At Bedtime     sennosides  1 tablet Oral BID     spironolactone  25 " mg Oral Daily

## 2022-08-31 NOTE — PROVIDER NOTIFICATION
MD Notification    Notified Person: MD    Notified Person Name: MD Rangel    Notification Date/Time: 08/31/2022 @ 05:20    Notification Interaction: AMCOM    Purpose of Notification:  pt came in with pre-existing wound to buttocks. Could you please put an order in for WOC.  Orders Received:    Comments:

## 2022-08-31 NOTE — PROGRESS NOTES
S: We received an order for a L wrist splint.  L wrist pain and swelling.  O:  I met with the patient in room 5510-01 and he is asleep.  I woke the patient and fit his L wrist with a size large wrist splint.    A:  The size large L wrist splint fits adequate and is splinting the L wrist.    P:  The patient will wear the splint following Physician guidelines.  Herbert CARNEY LO

## 2022-08-31 NOTE — H&P
"[facility]    History and Physical - Hospitalist Service       Date of Admission:  8/30/2022  Dictation #: [id number]  Brief Summary (see dictation for more details): 81M hx Dementia, CAD, chronic indwelling keating catheter with hematuria, HFpEF with recent admit at [facility] from 8/2-24 presents from TCU with left hand pain/decreased mobility and generalized weakness and lethargy.  Head CT neg.  Left hand/wrist X-ray shows advanced degenerative change with SLAC which is likely cause of hand/wrist issue.  Will ask ortho to see.  Cannot rule out stroke with leg weakness L>R so would like to obtain MRI in the am when this can be arranged with his pacer.  Given ongoing issues with hallucinations and somnolence he may need additional med adjustment so will ask psych to see as well.      Clinically Significant Risk Factors Present on Admission             # Hypoalbuminemia: Albumin = 2.6 g/dL (Ref range: 3.4 - 5.0 g/dL) on admission, will monitor as appropriate   # Coagulation Defect: home medication list includes an anticoagulant medication    # Chronic systolic heart failure: echo within the past year with EF <40%    # Overweight: Estimated body mass index is 26.66 kg/m  as calculated from the following:    Height as of this encounter: 1.753 m (5' 9\").    Weight as of this encounter: 81.9 kg (180 lb 8.9 oz).          Jj Rangel, DO  Hospitalist Service  [facility]  Securely message with the Vocera Web Console (learn more here)  Text page via [employer] 3D Paging/Directory       "

## 2022-08-31 NOTE — CONSULTS
[facility]    Orthopedic Consultation    William Almazan MRN# [id number]   Age: 81 year old YOB: 1940     Date of Admission:   8/30/2022    Reason for consult: Left wrist pain and swelling        Requesting provider: Jj Rangel        Level of consult: One-time consult to assist in determining a diagnosis, recommend an appropriate treatment plan and place orders           Assessment and Plan:   Assessment:   Left wrist pain and swelling with advanced arthritic change/SLAC       Plan:   Left wrist splint ordered, to be worn prn   Elevate when at rest  WBAT  See hand surgeon in clinic for definitive treatment.   Ortho will sign off, call if questions            Chief Complaint:   Left wrist pain          History of Present Illness:   HPI per medical records as patient unable to provide history.  Mr. Almazan is an 81-year-old male the patient reported to his wife that yesterday he was having left hand pain and difficulty moving the hand as well as increased weakness in his legs.  There was concern that he may have suffered a stroke.  Wife reports he has not had any recent falls or head injuries.  Patient is anticoagulated on apixaban.  Orthopedics consulted to evaluate left wrist pain.  X-rays negative for fracture but does reveal advanced arthritic change.            Past Medical History:     Past Medical History:   Diagnosis Date     Infection due to 2019 novel coronavirus              Past Surgical History:   No past surgical history on file.          Social History:     Social History     Tobacco Use     Smoking status: Not on file     Smokeless tobacco: Not on file   Substance Use Topics     Alcohol use: Not on file             Family History:   No family history on file.          Immunizations:     VACCINE/DOSE   Diptheria   DPT   DTAP   HBIG   Hepatitis A   Hepatitis B   HIB   Influenza   Measles   Meningococcal   MMR   Mumps   Pneumococcal   Polio   Rubella   Small  "Pox   TDAP   Varicella   Zoster             Allergies:   No Known Allergies          Medications:     Current Facility-Administered Medications   Medication     acetaminophen (TYLENOL) tablet 650 mg     allopurinol (ZYLOPRIM) tablet 300 mg     apixaban ANTICOAGULANT (ELIQUIS) tablet 5 mg     carvedilol (COREG) tablet 12.5 mg     donepezil (ARICEPT) tablet 10 mg     escitalopram (LEXAPRO) tablet 20 mg     furosemide (LASIX) tablet 40 mg     gabapentin (NEURONTIN) capsule 100 mg     loratadine (CLARITIN) tablet 10 mg     melatonin tablet 1 mg     ondansetron (ZOFRAN ODT) ODT tab 4 mg    Or     ondansetron (ZOFRAN) injection 4 mg     polyethylene glycol (MIRALAX) powder 17 g     prochlorperazine (COMPAZINE) injection 5 mg    Or     prochlorperazine (COMPAZINE) tablet 5 mg    Or     prochlorperazine (COMPAZINE) suppository 12.5 mg     psyllium (METAMUCIL/KONSYL) capsule 3 capsule     risperiDONE (risperDAL) tablet 0.25 mg     risperiDONE (risperDAL) tablet 1.75 mg     sennosides (SENOKOT) tablet 1 tablet     spironolactone (ALDACTONE) tablet 25 mg             Review of Systems:   ROS:  10 point ROS neg other than the symptoms noted above in the HPI.            Physical Exam:   All vitals have been reviewed  Patient Vitals for the past 24 hrs:   BP Temp Temp src Pulse Resp SpO2 Height Weight   08/31/22 1112 128/75 97.3  F (36.3  C) Axillary 74 16 95 % -- --   08/31/22 0736 117/60 98  F (36.7  C) Oral 85 16 98 % -- --   08/31/22 0400 125/80 97.7  F (36.5  C) Oral 74 16 -- -- --   08/31/22 0049 117/63 97.9  F (36.6  C) Oral 57 14 -- -- --   08/31/22 0000 139/84 -- -- 76 14 97 % -- --   08/30/22 2300 104/71 -- -- 75 23 99 % -- --   08/30/22 2230 134/78 -- -- 77 22 98 % -- --   08/30/22 2155 -- -- -- -- -- -- 1.753 m (5' 9\") --   08/30/22 2150 122/74 97.9  F (36.6  C) Oral 76 22 100 % -- --   08/30/22 2125 (!) 131/90 -- -- 79 21 95 % -- --   08/30/22 2100 125/82 -- -- 79 24 99 % -- --   08/30/22 2050 -- -- -- 78 20 99 % -- " --   08/30/22 2045 126/75 -- -- 77 20 -- -- --   08/30/22 2000 -- 100.1  F (37.8  C) Rectal -- -- -- -- --   08/30/22 1942 99/65 97.6  F (36.4  C) Skin 78 18 98 % -- 81.9 kg (180 lb 8.9 oz)       Intake/Output Summary (Last 24 hours) at 8/31/2022 1219  Last data filed at 8/31/2022 0700  Gross per 24 hour   Intake --   Output 325 ml   Net -325 ml         Physical Exam   Temp: 97.3  F (36.3  C) Temp src: Axillary BP: 128/75 Pulse: 74   Resp: 16 SpO2: 95 % O2 Device: None (Room air)    Vital Signs with Ranges  Temp:  [97.3  F (36.3  C)-100.1  F (37.8  C)] 97.3  F (36.3  C)  Pulse:  [57-85] 74  Resp:  [14-24] 16  BP: ()/(60-90) 128/75  SpO2:  [95 %-100 %] 95 %  180 lbs 8.91 oz    Constitutional: Does not participate in exam    Left wrist: No erythema.  Swelling present surrounding left wrist.  Unable to actively extend fingers on the left, able to passively extend fingers.  Limited motion left wrist with passive ROM.  Pulses present.              Data:   All laboratory data reviewed  Results for orders placed or performed during the hospital encounter of 08/30/22   CT Head w/o Contrast     Status: None    Narrative    EXAM: CT HEAD W/O CONTRAST  LOCATION: [facility]  DATE/TIME: 8/30/2022 8:38 PM    INDICATION: left hand and bilateral leg weakness, anticoagulated, dementia  COMPARISON: 07/02/2022  TECHNIQUE: Routine CT Head without IV contrast. Multiplanar reformats. Dose reduction techniques were used.    FINDINGS:  INTRACRANIAL CONTENTS: No intracranial hemorrhage, extraaxial collection, or mass effect.  No CT evidence of acute infarct. Moderate presumed chronic small vessel ischemic changes. Moderate generalized volume loss. No hydrocephalus.     VISUALIZED ORBITS/SINUSES/MASTOIDS: No intraorbital abnormality. No paranasal sinus mucosal disease. No middle ear or mastoid effusion.    BONES/SOFT TISSUES: No acute abnormality.      Impression    IMPRESSION:  1.  No acute intracranial  process.   XR Hand Left G/E 3 Views     Status: None    Narrative    EXAM: XR WRIST LEFT G/E 3 VIEWS, XR HAND LEFT G/E 3 VIEWS  LOCATION: [facility]  DATE/TIME: 8/30/2022 8:31 PM    INDICATION: Left wrist pain.  COMPARISON: None available.      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 Subchondral   cystic change distal radius with small loose body along the radial margin of the radioscaphoid joint. No acute left wrist fracture or dislocation identified. Volar left wrist soft tissue swelling. Slight widening of the distal radioulnar joint.    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 No acute displaced left hand fracture or dislocation is identified. Flexion at the index through small finger MCP joints.   XR Wrist Left G/E 3 Views     Status: None    Narrative    EXAM: XR WRIST LEFT G/E 3 VIEWS, XR HAND LEFT G/E 3 VIEWS  LOCATION: [facility]  DATE/TIME: 8/30/2022 8:31 PM    INDICATION: Left wrist pain.  COMPARISON: None available.      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 Subchondral   cystic change distal radius with small loose body along the radial margin of the radioscaphoid joint. No acute left wrist fracture or dislocation identified. Volar left wrist soft tissue swelling. Slight widening of the distal radioulnar joint.    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 No acute displaced left hand fracture or dislocation is identified. Flexion at the index through small finger MCP joints.   [place] Draw      Status: None    Narrative    The following orders were created for panel order [place] Draw.  Procedure                               Abnormality         Status                     ---------                               -----------         ------                     Extra Blue Top Tube[[id number]]                              Final result               Extra Red Top Tube[[id number]]                               Final result               Extra Green Top (Lithium...[[id number]]                      Final result               Extra Purple Top Tube[[id number]]                            Final result                 Please view results for these tests on the individual orders.   Extra Blue Top Tube     Status: None   Result Value Ref Range    Hold Specimen JIC    Extra Red Top Tube     Status: None   Result Value Ref Range    Hold Specimen JIC    Extra Green Top (Lithium Heparin) Tube     Status: None   Result Value Ref Range    Hold Specimen JIC    Extra Purple Top Tube     Status: None   Result Value Ref Range    Hold Specimen     Comprehensive metabolic panel     Status: Abnormal   Result Value Ref Range    Sodium 140 133 - 144 mmol/L    Potassium 3.5 3.4 - 5.3 mmol/L    Chloride 104 94 - 109 mmol/L    Carbon Dioxide (CO2) 32 20 - 32 mmol/L    Anion Gap 4 3 - 14 mmol/L    Urea Nitrogen 11 7 - 30 mg/dL    Creatinine 0.62 (L) 0.66 - 1.25 mg/dL    Calcium 8.8 8.5 - 10.1 mg/dL    Glucose 151 (H) 70 - 99 mg/dL    Alkaline Phosphatase 91 40 - 150 U/L    AST 12 0 - 45 U/L    ALT 18 0 - 70 U/L    Protein Total 6.2 (L) 6.8 - 8.8 g/dL    Albumin 2.6 (L) 3.4 - 5.0 g/dL    Bilirubin Total 0.3 0.2 - 1.3 mg/dL    GFR Estimate >90 >60 mL/min/1.73m2   Lactic acid whole blood     Status: Normal   Result Value Ref Range    Lactic Acid 1.7 0.7 - 2.0 mmol/L   Troponin I     Status: Normal   Result Value Ref Range    Troponin I High Sensitivity 44 <79 ng/L   Magnesium     Status: Normal   Result Value Ref Range    Magnesium 1.6 1.6 - 2.3  mg/dL   CBC with platelets and differential     Status: Abnormal   Result Value Ref Range    WBC Count 4.5 4.0 - 11.0 10e3/uL    RBC Count 3.84 (L) 4.40 - 5.90 10e6/uL    Hemoglobin 10.5 (L) 13.3 - 17.7 g/dL    Hematocrit 34.8 (L) 40.0 - 53.0 %    MCV 91 78 - 100 fL    MCH 27.3 26.5 - 33.0 pg    MCHC 30.2 (L) 31.5 - 36.5 g/dL    RDW 16.3 (H) 10.0 - 15.0 %    Platelet Count 214 150 - 450 10e3/uL    % Neutrophils 73 %    % Lymphocytes 9 %    % Monocytes 16 %    % Eosinophils 2 %    % Basophils 0 %    % Immature Granulocytes 0 %    NRBCs per 100 WBC 0 <1 /100    Absolute Neutrophils 3.3 1.6 - 8.3 10e3/uL    Absolute Lymphocytes 0.4 (L) 0.8 - 5.3 10e3/uL    Absolute Monocytes 0.7 0.0 - 1.3 10e3/uL    Absolute Eosinophils 0.1 0.0 - 0.7 10e3/uL    Absolute Basophils 0.0 0.0 - 0.2 10e3/uL    Absolute Immature Granulocytes 0.0 <=0.4 10e3/uL    Absolute NRBCs 0.0 10e3/uL   EKG 12-lead, tracing only     Status: None   Result Value Ref Range    Systolic Blood Pressure  mmHg    Diastolic Blood Pressure  mmHg    Ventricular Rate 79 BPM    Atrial Rate 79 BPM    LA Interval 230 ms    QRS Duration 158 ms     ms    QTc 499 ms    P Axis 57 degrees    R AXIS 268 degrees    T Axis 107 degrees    Interpretation ECG       Sinus rhythm with 1st degree A-V block with frequent Premature ventricular complexes  Right bundle branch block  Abnormal ECG  When compared with ECG of 24-AUG-2022 06:13,  Sinus rhythm has replaced Electronic ventricular pacemaker  Confirmed by GENERATED REPORT, COMPUTER ([id number]),  Candy Paige ([id number]) on 8/30/2022 8:53:42 PM     CBC with platelets differential     Status: Abnormal    Narrative    The following orders were created for panel order CBC with platelets differential.  Procedure                               Abnormality         Status                     ---------                               -----------         ------                     CBC with platelets and d...[[id number]]  Abnormal             Final result                 Please view results for these tests on the individual orders.          Attestation:  I have reviewed today's vital signs, notes, medications, labs and imaging with Dr. Blanton.  Amount of time performed on this consult: 40 minutes.    Merissa Mcginnis PA-C

## 2022-08-31 NOTE — ED NOTES
Bed: ST02  Expected date:   Expected time:   Means of arrival:   Comments:  HEMS 422 81M stroke alert

## 2022-08-31 NOTE — ED NOTES
[facility]  ED Nurse Handoff Report    ED Chief complaint: Stroke Symptoms (Pt. Brought from [place], [place], with left hand weakness, no arm weakness. Symptoms noticed during MD visit at 1630. Pt. Has dementia HX, SC=426. A-fib. HX taking Eloquis. Slight blood in urine last few days. Pt. States having left hand pain. )      ED Diagnosis:   Final diagnoses:   Bilateral leg weakness   Pain of left hand   Weakness of left hand       Code Status: to be discussed by admitting    Allergies: No Known Allergies    Patient Story:     Patient currently living at [facility] (recently in hospital for Covid, NSTEMI, hypotension, and dementia). Today it was noted that patient had L hand pain accompanied by weakness, and weakness in his legs. Concern for stroke, so patient came to ED. Patient with hx of dementia, Afib (on Eliquis), CABG, DM, prostate cancer.     Focused Assessment:      Patient with slow speech. Answers orientation questions mostly correct, off by a few weeks in timing.   Endorses dizziness and L hand pain.   Arms strong and equal. Unable to lift legs off the bed, but able to push down/pull back with feet. Sensation intact everywhere. Denies numbness/tingling.   Patient with indwelling keating catheter, with bloody urine. Family states the urine has been bloody, but this seems worse.     Treatments and/or interventions provided:     CT head:  IMPRESSION:  1.  No acute intracranial process.    XR Hand/Wrist:  IMPRESSION: Scapholunate dissociation with proximal migration of the capitate compatible with SLAC wrist. 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 Subchondral   cystic change distal radius with small loose body along the radial margin of the radioscaphoid joint. No acute left wrist fracture or dislocation identified. Volar left wrist soft tissue swelling. Slight widening of the distal  "radioulnar joint.     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 No acute displaced left hand fracture or dislocation is identified. Flexion at the index through small finger MCP joints.      Nauseous in ED, given zofran    Patient's response to treatments and/or interventions: WNL    To be done/followed up on inpatient unit:  N/A    Does this patient have any cognitive concerns?: Baseline dementia and Disoriented to time    Activity level - Baseline/Home:  Walker and Wheelchair  Activity Level - Current:   Total Care    Patient's Preferred language: English   Needed?: No    Isolation: None  Infection: Not Applicable  Patient tested for COVID 19 prior to admission: NO, covid recovered  Bariatric?: No    Vital Signs:   Vitals:    08/30/22 2100 08/30/22 2125 08/30/22 2150 08/30/22 2155   BP: 125/82 (!) 131/90 122/74    Pulse: 79 79 76    Resp: 24 21 22    Temp:   97.9  F (36.6  C)    TempSrc:   Oral    SpO2: 99% 95% 100%    Weight:       Height:    1.753 m (5' 9\")       Cardiac Rhythm: NSR, has pacemaker    Was the PSS-3 completed:   Yes  What interventions are required if any?               Family Comments: wife and daughter at bedside, currently [facility] at [facility]  OBS brochure/video discussed/provided to patient/family: Yes              Name of person given brochure if not patient: patient and family              Relationship to patient: N/A    For the majority of the shift this patient's behavior was Green.   Behavioral interventions performed were none.    ED NURSE PHONE NUMBER: [id number]         "

## 2022-08-31 NOTE — CONSULTS
CLINICAL NUTRITION SERVICES - BRIEF NOTE    Consult received for RN Consult - Consulting for wound healing support needs     A full Nutrition Assessment will be deferred at this time as patient is currently observation status.      Patient was recently seen by my colleague at the [facility] (8/19/22) where he was eating fairly well and taking Ensure supplements.     Will add Ensure TID with meals + MVI daily for wound healing needs.     Should patient's status change to Inpatient, we will be available for a full Nutrition Assessment with a consult.     Ninoska Moreno RD, LD, [facility]   Clinical Dietitian - [facility]

## 2022-08-31 NOTE — PLAN OF CARE
Orientation/Cognitive: A&O x3, disoriented to time, Neuro's intact except:  diana LE weakness, no numbness or tingling. Left hand weak, and painful.  Observation Goals (Met/ Not Met): Not met  Mobility Level/Assist Equipment: Lift assist. Baseline walker and Ax1  Fall Risk (Y/N): Yes  Behavior Concerns: none  Pain Management: denies  Tele/VS/O2: Tele: A-paced. VSS, on RA  ABNL Lab/BG: hgb 10.5, creatine 0.62  Diet: cardiac diet no caffeine  Bowel/Bladder: chronic keating, incontinent of stool  Skin Concerns: pre-existing wound to buttocks (Mepilex in place), left big toe dry bloody blister, GIANLUCA  Drains/Devices: PIV is sl'd  Tests/Procedures for next shift: MRI, pysch, PT, OT and ortho consult, SW/CM and WOC  Anticipated DC date & active delays: TBD  Patient Stated Goal for Today: rest

## 2022-08-31 NOTE — PLAN OF CARE
Goal Outcome Evaluation:      Plan of Care review with: patient    Orientation/Cognitive: A&O x3, disoriented to time, Neuro's intact except:  diana LE weakness, no numbness or tingling. Left hand weak, and painful.  Observation Goals (Met/ Not Met): Not met  Mobility Level/Assist Equipment: Lift assist. Baseline walker and Ax1  Fall Risk (Y/N): Yes  Behavior Concerns: none  Pain Management: declines prn tylenol for discomfort in hand  Tele/VS/O2: Tele: A-paced. VSS, on RA  ABNL Lab/BG: hgb 10.5, creatine 0.62  Diet: cardiac diet no caffeine  Bowel/Bladder: chronic keating, incontinent of stool  Skin Concerns: pre-existing wound to buttocks (Mepilex in place), left big toe dry bloody blister, GIANLUCA  Drains/Devices: PIV is sl'd  Tests/Procedures for next shift: MRI, pysch and ortho consult, SW/CM and WOC  Anticipated DC date & active delays: TBD  Patient Stated Goal for Today: rest

## 2022-08-31 NOTE — PROGRESS NOTES
Observation goals  PRIOR TO DISCHARGE        Comments:     Ortho consult complete, MRI complete, psych consult complete : Not met    Nurse to notify provider when observation goals have been met and patient is ready for discharge.

## 2022-08-31 NOTE — CONSULTS
"      Psychiatry Consultation; Follow up              Reason for Consult, requesting source:    Ongoing hallucinations  Requesting source: Reji Granado    Labs and imaging reviewed. Discussed with nursing.  William was recently hospitalized at [facility] and last seen by psych consult team on 8/22/22.               Interim history:    William Almazan is an 81 year old male with a history of dementia, a-fib, CAD, and chronic indwelling keating catheter who was sent to hospital by his TCU for leg weakness and left wrist/hand pain. He was recently hospitalized at [facility] from 8/2/22-8/24/22 for weakness. During that hospitalization, psychiatry was consulted for hallucinations which were felt to be related to dementia, with some mild depression as well. He was started on risperidone at that time, later switched to aripiprazole due to low energy on risperidone. Aripiprazole was stopped after only 4 doses due to dizziness, though it was noted that he had improved energy and mood while on aripiprazole.    I met with William in his room, he is seen laying in bed with untouched lunch tray at bedside. He asks me to raise the bed so he can sit up while talking with me. His speech is whisper soft and difficult to make out at times. Affect is flat. He states that his mood is \"not good\", endorses feeling low for the past couple of weeks with no energy, no desire to do anything, and no appetite. Did not eat breakfast or lunch today due to no appetite. He does report hallucinations are ongoing, both visual and auditory which are quite bothersome for him. States they come and go throughout the day and at night. Sometimes has command hallucinations telling him to hurt himself, but hasn't acted on these commands. Denies any current command hallucinations, denies suicidal ideation. I noted that he appears to be very stiff, so I asked him to move his arms and legs which he was able to do with limited range of motion but he denied any pain or " "stiffness, just weakness. He is oriented to self reliably; generally to time (August 2022), place (hospital in [place]), and situation (here for weakness and hallucinations but thinks he has been in the hospital for 2 weeks).        Current Medications:       allopurinol  300 mg Oral Daily     apixaban ANTICOAGULANT  5 mg Oral BID     carvedilol  12.5 mg Oral BID     donepezil  10 mg Oral At Bedtime     escitalopram  20 mg Oral Daily     furosemide  40 mg Oral Daily     gabapentin  100 mg Oral TID     loratadine  10 mg Oral Daily     psyllium  3 capsule Oral Daily     risperiDONE  0.25 mg Oral Every Other Day     risperiDONE  1.75 mg Oral At Bedtime     sennosides  1 tablet Oral BID     spironolactone  25 mg Oral Daily              MSE:   Appearance: awake, alert  Attitude:  cooperative  Eye Contact:  poor   Mood:  \"not good\"  Affect:  flat  Speech:  whisper soft; paucity of speech  Psychomotor Behavior:  evidence of dystonia  Thought Process:  linear and goal oriented  Associations:  no loose associations  Thought Content:  no evidence of suicidal ideation or homicidal ideation, auditory hallucinations present and visual hallucinations present  Insight:  limited  Judgement:  limited  Oriented to:  time, person, and place  Attention Span and Concentration:  limited  Recent and Remote Memory:  limited        Vital signs:  Temp: 97.3  F (36.3  C) Temp src: Axillary BP: 128/75 Pulse: 74   Resp: 16 SpO2: 95 % O2 Device: None (Room air)   Height: 175.3 cm (5' 9\") Weight: 81.9 kg (180 lb 8.9 oz)  Estimated body mass index is 26.66 kg/m  as calculated from the following:    Height as of this encounter: 1.753 m (5' 9\").    Weight as of this encounter: 81.9 kg (180 lb 8.9 oz).              DSM-5 Diagnosis:   Unspecified dementia by history  Possible dystonic reaction to risperidone  Unspecified depression          Assessment:   William Almazan is an 81 year old male with a history of dementia with psychosis who presents " "with ongoing hallucinations and depression.    He appears stiff overall in legs and arms, with appearance of dystonia in neck with head tilted back in fixed position which is concerning for possible dystonic reaction to risperidone. No repetitive, involuntary movements noted. Typically there is pain involved in dystonic reactions, but he denied this. Still, I think it is worth discontinuing risperidone due to possible dystonia and poor control of hallucinations.    Spoke with wife Ilsa and daughter Renuka, they report noticing muscle stiffness beginning around mid-August during previous hospitalization. They do not recall noticing any stiffness in his neck. They are in agreement with plan to discontinue risperidone, they haven't noticed much benefit from this either. I recommend trying aripiprazole at lower dose than was previously tried (during last hospitalization he was started on 2mg BID and experienced dizziness). Will start at 2mg nightly and plan to titrate up as tolerated. Hopefully this will address both hallucinations and help with mood.          Summary of Recommendations:   1. Discontinued risperidone    2. Started aripiprazole 2mg nightly    3. Please reconsult psychiatry as needed    OTONIEL [facility]  Consult/Liaison Psychiatry   [facility]    Contact information available via [facility] Paging/Directory  If I am not available, then [facility] CL line ([phone]) should know who is covering our consult service.   \"Much or all of the text in this note was generated through the use of Dragon Dictate voice to text software. Errors in spelling or words which appear to be out of contact are unintentional, may be present due having escaped editing\"           "

## 2022-08-31 NOTE — ED NOTES
Patient resting on cart. Endorses dizziness and L hand pain, but no other complaints.   Arms strong and equal. Unable to lift legs off the bed, but able to push down/pull back with feet. Sensation intact everywhere. Denies numbness/tingling.   Patient with indwelling keating catheter, with bloody urine. Family states the urine has been bloody, but this seems worse.

## 2022-09-01 ENCOUNTER — APPOINTMENT (OUTPATIENT)
Dept: PHYSICAL THERAPY | Facility: CLINIC | Age: 82
DRG: 056 | End: 2022-09-01
Attending: HOSPITALIST
Payer: MEDICARE

## 2022-09-01 ENCOUNTER — APPOINTMENT (OUTPATIENT)
Dept: OCCUPATIONAL THERAPY | Facility: CLINIC | Age: 82
DRG: 056 | End: 2022-09-01
Attending: HOSPITALIST
Payer: MEDICARE

## 2022-09-01 LAB — CK SERPL-CCNC: 69 U/L (ref 30–300)

## 2022-09-01 PROCEDURE — 97530 THERAPEUTIC ACTIVITIES: CPT | Mod: GP

## 2022-09-01 PROCEDURE — G0378 HOSPITAL OBSERVATION PER HR: HCPCS

## 2022-09-01 PROCEDURE — 36415 COLL VENOUS BLD VENIPUNCTURE: CPT | Performed by: PSYCHIATRY & NEUROLOGY

## 2022-09-01 PROCEDURE — 250N000013 HC RX MED GY IP 250 OP 250 PS 637: Performed by: INTERNAL MEDICINE

## 2022-09-01 PROCEDURE — 250N000013 HC RX MED GY IP 250 OP 250 PS 637: Performed by: HOSPITALIST

## 2022-09-01 PROCEDURE — 97166 OT EVAL MOD COMPLEX 45 MIN: CPT | Mod: GO

## 2022-09-01 PROCEDURE — 250N000013 HC RX MED GY IP 250 OP 250 PS 637: Performed by: PSYCHIATRY & NEUROLOGY

## 2022-09-01 PROCEDURE — 99232 SBSQ HOSP IP/OBS MODERATE 35: CPT | Performed by: HOSPITALIST

## 2022-09-01 PROCEDURE — 97530 THERAPEUTIC ACTIVITIES: CPT | Mod: GO

## 2022-09-01 PROCEDURE — 97161 PT EVAL LOW COMPLEX 20 MIN: CPT | Mod: GP

## 2022-09-01 PROCEDURE — 120N000001 HC R&B MED SURG/OB

## 2022-09-01 PROCEDURE — 250N000013 HC RX MED GY IP 250 OP 250 PS 637

## 2022-09-01 PROCEDURE — 97535 SELF CARE MNGMENT TRAINING: CPT | Mod: GO

## 2022-09-01 PROCEDURE — 82550 ASSAY OF CK (CPK): CPT | Performed by: PSYCHIATRY & NEUROLOGY

## 2022-09-01 RX ADMIN — CARBIDOPA AND LEVODOPA 1 HALF-TAB: 25; 100 TABLET ORAL at 20:19

## 2022-09-01 RX ADMIN — APIXABAN 5 MG: 5 TABLET, FILM COATED ORAL at 08:38

## 2022-09-01 RX ADMIN — CARVEDILOL 12.5 MG: 12.5 TABLET, FILM COATED ORAL at 08:39

## 2022-09-01 RX ADMIN — ESCITALOPRAM OXALATE 20 MG: 20 TABLET ORAL at 08:30

## 2022-09-01 RX ADMIN — DONEPEZIL HYDROCHLORIDE 10 MG: 10 TABLET ORAL at 21:31

## 2022-09-01 RX ADMIN — GABAPENTIN 100 MG: 100 CAPSULE ORAL at 15:20

## 2022-09-01 RX ADMIN — GABAPENTIN 100 MG: 100 CAPSULE ORAL at 20:18

## 2022-09-01 RX ADMIN — ALLOPURINOL 300 MG: 300 TABLET ORAL at 08:39

## 2022-09-01 RX ADMIN — CARVEDILOL 12.5 MG: 12.5 TABLET, FILM COATED ORAL at 20:19

## 2022-09-01 RX ADMIN — SPIRONOLACTONE 25 MG: 25 TABLET ORAL at 08:38

## 2022-09-01 RX ADMIN — Medication 3 CAPSULE: at 08:38

## 2022-09-01 RX ADMIN — LORATADINE 10 MG: 10 TABLET ORAL at 08:39

## 2022-09-01 RX ADMIN — FUROSEMIDE 40 MG: 40 TABLET ORAL at 08:39

## 2022-09-01 RX ADMIN — APIXABAN 5 MG: 5 TABLET, FILM COATED ORAL at 20:19

## 2022-09-01 RX ADMIN — GABAPENTIN 100 MG: 100 CAPSULE ORAL at 08:38

## 2022-09-01 RX ADMIN — SENNOSIDES 1 TABLET: 8.6 TABLET, FILM COATED ORAL at 08:38

## 2022-09-01 RX ADMIN — SENNOSIDES 1 TABLET: 8.6 TABLET, FILM COATED ORAL at 20:19

## 2022-09-01 RX ADMIN — ARIPIPRAZOLE 2 MG: 2 TABLET ORAL at 21:31

## 2022-09-01 RX ADMIN — MULTIPLE VITAMINS W/ MINERALS TAB 1 TABLET: TAB at 08:38

## 2022-09-01 ASSESSMENT — ACTIVITIES OF DAILY LIVING (ADL)
ADLS_ACUITY_SCORE: 47
ADLS_ACUITY_SCORE: 43
ADLS_ACUITY_SCORE: 51
ADLS_ACUITY_SCORE: 51
ADLS_ACUITY_SCORE: 43
ADLS_ACUITY_SCORE: 51
ADLS_ACUITY_SCORE: 47
ADLS_ACUITY_SCORE: 43
ADLS_ACUITY_SCORE: 51
ADLS_ACUITY_SCORE: 51
ADLS_ACUITY_SCORE: 43
ADLS_ACUITY_SCORE: 43

## 2022-09-01 NOTE — PROGRESS NOTES
09/01/22 1100   Quick Adds   Type of Visit Initial Occupational Therapy Evaluation   Living Environment   People in Home facility resident   Current Living Arrangements extended care facility   Living Environment Comments Patient admitted from TCU, prior to that he was living with spouse. Daughter lives with them 6 months out of the year, other wise lives in [place]. Patient was taking sponge baths prior to previous hospital stay. At TCU, patient states he was using lift for transfers.   Self-Care   Usual Activity Tolerance moderate   Current Activity Tolerance poor   Activity/Exercise/Self-Care Comment Was receiving assist for all ADL and mobility at TCU   General Information   Onset of Illness/Injury or Date of Surgery 08/30/22   Patient/Family Therapy Goal Statement (OT) Reji Granado MD   Additional Occupational Profile Info/Pertinent History of Current Problem 81M hx Dementia, CAD, chronic indwelling keating catheter with hematuria, HFpEF with recent admit at [facility] from 8/2-24 presents from TCU with left hand pain/decreased mobility and generalized weakness and lethargy   Existing Precautions/Restrictions fall   General Observations and Info wrist splint to L UE   Cognitive Status Examination   Orientation Status person;place   Range of Motion Comprehensive   Comment, General Range of Motion limited B UES shoulder flexion 0-90 with PROM R UE, very rigid bilaterally, unable to use L UE functionally   Strength Comprehensive (MMT)   Comment, General Manual Muscle Testing (MMT) Assessment 3/5 R UE, 2-/5 L UE   Muscle Tone Assessment   Muscle Tone Comments rigid and difficult to range L UE   Coordination   Upper Extremity Coordination Left UE impaired;Right UE impaired   Bed Mobility   Comment (Bed Mobility) max assist rolling L<>R   Transfer Skill: Bed to Chair/Chair to Bed   Bed-Chair [place] (Transfers) dependent (less than 25% patient effort)   Transfer Comments ceiling lift   Activities of Daily  Living   BADL Assessment/Intervention grooming   Upper Body Dressing Assessment/Training   [place] (Upper Body Dressing) maximum assist (25% patient effort)   Clinical Impression   Criteria for Skilled Therapeutic Interventions Met (OT) Yes, treatment indicated   OT Diagnosis below baseline with ADLS and mobility   OT Problem List-Impairments impacting ADL problems related to;activity tolerance impaired;cognition;strength;pain;postural control;range of motion (ROM);mobility   Assessment of Occupational Performance 5 or more Performance Deficits   Identified Performance Deficits Functional mobility/trx, dressing, toileting, bathing, feeding, grooming   Planned Therapy Interventions (OT) ADL retraining;IADL retraining;balance training;cognition;ROM;transfer training;strengthening;progressive activity/exercise   Clinical Decision Making Complexity (OT) moderate complexity   Risk & Benefits of therapy have been explained evaluation/treatment results reviewed;care plan/treatment goals reviewed;risks/benefits reviewed;current/potential barriers reviewed;participants voiced agreement with care plan;participants included;patient   OT Discharge Planning   OT Discharge Recommendation (DC Rec) Transitional Care Facility   OT Rationale for DC Rec Recommend TCU stay to progress functional mobility, safety, and IND w/ADLs, pt well below baseline ADL performance and requiring assist x 2 for mobility and ADLS. Patient with cognitive impairment which may limit carryover. May require long term placement post TCU stay if not able to return to OF.   OT Brief overview of current status Dep with lift for transfers   Therapy Certification   Start of Care Date 09/01/22   Certification date from 09/01/22   Certification date to 09/05/22   Medical Diagnosis Weakness/ Physical Deconditioning   Total Evaluation Time (Minutes)   Total Evaluation Time (Minutes) 10   OT Goals   Therapy Frequency (OT) 3 times/wk   OT: Hygiene/Grooming  supervision/stand-by assist   OT: Upper Body Dressing Minimal assist;from wheelchair   OT: Toilet Transfer/Toileting Moderate assist;toilet transfer

## 2022-09-01 NOTE — PLAN OF CARE
Summary:  stroke v/s dystonia from psych meds  Care Plan Summary Note:  Orientation: Alert and oriented x3, disoriented to time  Observation Goals (met & not met): Not met, still needs MRI  Activity Level: Total care, lift  Fall Risk: yes  Behavior & Aggression Tool Color: Green  Pain Management: Denied this shift  ABNL VS/O2: VSS on RA  ABNL Lab/BG: K 3.5  Diet: Low fat Na, no caffeine   Bowel/Bladder: Chronic Louie w/ dark tea colored urine. Incontinent of bowel  Drains/Devices: PIV saline locked. Louie.  Tests/Procedures for next shift: MRI w/ contrast, being delayed d/t  pt's pacemaker  Anticipated DC date: Pending MRI completion and result  Other Important Info: Q4h neuros intact.     Obs goals:Ortho consult complete, MRI complete, psych consult complete   Nurse to notify provider when observation goals have been met and patient is ready for discharge.

## 2022-09-01 NOTE — PROGRESS NOTES
[facility]      OUTPATIENT PHYSICAL THERAPY EVALUATION  PLAN OF TREATMENT FOR OUTPATIENT REHABILITATION  (COMPLETE FOR INITIAL CLAIMS ONLY)  Patient's Last Name, First Name, M.I.  YOB: 1940  William Almazan                           Provider's Name  [facility] Medical Record No.  [id number]                               Onset Date:  08/30/22   Start of Care Date:  09/01/22      Type:     _X_PT   ___OT   ___SLP Medical Diagnosis:  L wirst and hand pain, leg weakness                        PT Diagnosis:  Difficulty ambulating   Visits from SOC:  1   _________________________________________________________________________________  Plan of Treatment/Functional Goals    Planned Interventions: bed mobility training, neuromuscular re-education, patient/family education, strengthening, transfer training, gait training, postural re-education, ROM (range of motion)     Goals: See Physical Therapy Goals on Care Plan in [employer] electronic health record.    Therapy Frequency: 2x/week  Predicted Duration of Therapy Intervention: 09/06/22  _________________________________________________________________________________    I CERTIFY THE NEED FOR THESE SERVICES FURNISHED UNDER        THIS PLAN OF TREATMENT AND WHILE UNDER MY CARE     (Physician co-signature of this document indicates review and certification of the therapy plan).                Certification date from: 09/01/22, Certification date to: 09/06/22    Referring Physician: Reji Granado MD            Initial Assessment        See Physical Therapy evaluation dated 09/01/22 in Epic electronic health record.

## 2022-09-01 NOTE — PROGRESS NOTES
[facility]    Medicine Progress Note - Hospitalist Service    Date of Admission:  8/30/2022    Assessment & Plan        William Almazan is an 81-year-old male with a past medical history significant for dementia, urinary retention with chronic indwelling Louie catheter, paroxysmal atrial fibrillation, heart failure with preserved ejection fraction, coronary artery disease, who presents to the Emergency Department with left hand pain, decreased motion and increasing generalized weakness.    Left hand and wrist discomfort and decreased mobility  Generalized weakness  Initially there was concern for a stroke given the acuity of onset,   He appears to have generalized weakness, with approximately 4/5 upper extremities and 3/5 bilateral lower extremities  Unable to obtain an MRI with an abandoned RV lead  Marked deterioration in the last 2 months noted by wife Ilsa, with largest deterioration after starting aggressive antispychotic regimen about 1 month ago  Some rigidity noted. He had his respirdone stopped by psychiatry on 8/31 and abilify started, however he still appears more rigid on the left versus the right side  Noted last evaluation July 2022 by neurology who noted normal extremity strength  Overall suspect that his deterioration is due to general deconditioning with addition of possible dystonia, however, with questionable worse deficits on the left side, would ask neurology to reevaluate    Orthopedics consulted, appreciate their assistance. See consult not for details.    Left wrist splint ordered. Elevate wrist when at rest. WBAT.    Outpatient follow-up with hand surgeon.    PRN acetaminophen for pain control.    MRI brain cannot be completed due to abandoned RV lead.    Discussed with wife Ilsa, who reports 2 months ago he patient was relatively independent with walker, able to come downstairs, dress self, able to use cane for ambulation    PT/OT consulted.    Social work  consulted    Will ask neurology for additional opinion, not able to do MRI to rule out stroke    Discussed with wife Ilsa, if not much else can be offered then would suggest continued TCU and time, may need LTC    Chronic indwelling Louie catheter due to urinary retention and chronic hematuria  This appears to be roughly at baseline.    Follow up in Urology clinic as previously arranged unless he develops any acute issues.    Dementia, depression and hallucinations  The patient has ongoing issues with hallucinations, noting that he is hearing and seeing Zuhair Lainez mimicking him and also hears voices, though not commands of self-harm.  He recently had a decrease in the Risperdal from the morning dose to every other day instead of daily, but family is interested in having a psych reevaluate to see if any further adjustments may be needed.  Of note, he is still fairly sedate and lethargic compared to where he is at his baseline, which has been attributed to his psychiatric regimen.  Psych consulted on 8/31 and did stop the respirdal and started abilify    Psychiatry consulted, appreciate their assistance.    For now, continue with his current dosing of abilify , Aricept, Lexapro and gabapentin pending psych evaluation.    Gout    Continue PTA allopurinol.    Heart failure with preserved ejection fraction  Hypertension  Coronary artery disease  Dyslipidemia    Continue PTA regimen of Coreg, Lasix, Aldactone.    KRISTIAN    CPAP as needed.    Paroxysmal atrial fibrillation  He has a pacer in place.    Continue PTA Coreg and apixaban.    Malnutrition    This is ongoing from previous hospitalization.  Continue to optimize nutrition as able.     Iron deficiency anemia    Recently completed a course of Venofer.  Monitor periodically.    Recent COVID-19 infection    He had minimal symptoms with first positive on 07/30, considered COVID recovered.       Diet: Combination Diet Low Saturated Fat Na <2400mg Diet, No Caffeine  "Diet  Snacks/Supplements Pediatric: Ensure Enlive; With Meals    DVT Prophylaxis: DOAC  Louie Catheter: PRESENT, indication: Retention  Central Lines: None  Cardiac Monitoring: ACTIVE order. Indication: Stroke, acute (48 hours)  Code Status: Full Code      Disposition Plan      Expected Discharge Date: 09/02/2022        Discharge Comments: MRI  need results   Psych consult completed. Woc and ortho consults completed.        The patient's care was discussed with the Bedside Nurse and Patient.    Jose Law DO  Hospitalist Service  [facility]  Securely message with the Vocera Web Console (learn more here)  Text page via [employer] Paging/Directory         Clinically Significant Risk Factors Present on Admission               # Coagulation Defect: home medication list includes an anticoagulant medication    # Chronic systolic heart failure: echo within the past year with EF <40%    # Overweight: Estimated body mass index is 26.66 kg/m  as calculated from the following:    Height as of this encounter: 1.753 m (5' 9\").    Weight as of this encounter: 81.9 kg (180 lb 8.9 oz).        ______________________________________________________________________    Interval History   William Almazan was seen this afternoon. He feels very depressed.  Discussed in depth with Ilsa    Data reviewed today: I reviewed all medications, new labs and imaging results over the last 24 hours. I personally reviewed no images or EKG's today.    Physical Exam   Vital Signs: Temp: 97.6  F (36.4  C) Temp src: Oral BP: 105/65 Pulse: 75   Resp: 18 SpO2: 98 % O2 Device: None (Room air)    Weight: 180 lbs 8.91 oz  Constitutional: awake, drowsy, cooperative, no apparent distress, laying in the hospital bed  Respiratory: clear to auscultation bilaterally, no crackles or wheezing  Cardiovascular: regular rate and rhythm, normal S1 and S2, no murmur noted  GI: normal bowel sounds, soft, non-distended, non-tender  Skin: warm, " dry  Musculoskeletal: no lower extremity pitting edema present  Neurologic: awake, drowsy, appropriate in conversation, 4/5 upper extremity strength, potentially worse on the left. 3/5 bilateral lower, slightly worse on the left    Data   Recent Labs   Lab 08/30/22  1950   WBC 4.5   HGB 10.5*   MCV 91         POTASSIUM 3.5   CHLORIDE 104   CO2 32   BUN 11   CR 0.62*   ANIONGAP 4   CRYS 8.8   *   ALBUMIN 2.6*   PROTTOTAL 6.2*   BILITOTAL 0.3   ALKPHOS 91   ALT 18   AST 12     Medications       allopurinol  300 mg Oral Daily     apixaban ANTICOAGULANT  5 mg Oral BID     ARIPiprazole  2 mg Oral At Bedtime     carvedilol  12.5 mg Oral BID     donepezil  10 mg Oral At Bedtime     escitalopram  20 mg Oral Daily     furosemide  40 mg Oral Daily     gabapentin  100 mg Oral TID     loratadine  10 mg Oral Daily     multivitamin w/minerals  1 tablet Oral Daily     psyllium  3 capsule Oral Daily     sennosides  1 tablet Oral BID     spironolactone  25 mg Oral Daily

## 2022-09-01 NOTE — PROGRESS NOTES
Observation goals  PRIOR TO DISCHARGE          Ortho consult complete, MRI complete, psych consult complete : not met, MRI not approved    Nurse to notify provider when observation goals have been met and patient is ready for discharge.

## 2022-09-01 NOTE — PROGRESS NOTES
[facility]      OUTPATIENT OCCUPATIONAL THERAPY  EVALUATION  PLAN OF TREATMENT FOR OUTPATIENT REHABILITATION  (COMPLETE FOR INITIAL CLAIMS ONLY)  Patient's Last Name, First Name, M.I.  YOB: 1940  William Almazan                             Provider's Name  [facility] Medical Record No.  [id number]                               Onset Date:  08/30/22   Start of Care Date:  09/01/22     Type:     ___PT   _X_OT   ___SLP Medical Diagnosis:  Weakness/ Physical Deconditioning                        OT Diagnosis:  below baseline with ADLS and mobility   Visits from SOC:  1   _________________________________________________________________________________  Plan of Treatment/Functional Goals    Planned Interventions: ADL retraining, IADL retraining, balance training, cognition, ROM, transfer training, strengthening, progressive activity/exercise   Goals: See Occupational Therapy Goals on Care Plan in [employer] electronic health record.    Therapy Frequency: 3 times/wk  Predicted Duration of Therapy Intervention:    _________________________________________________________________________________    I CERTIFY THE NEED FOR THESE SERVICES FURNISHED UNDER        THIS PLAN OF TREATMENT AND WHILE UNDER MY CARE     (Physician co-signature of this document indicates review and certification of the therapy plan).                Certification date from: 09/01/22, Certification date to: 09/05/22                 Initial Assessment        See Occupational Therapy evaluation dated 09/01/22 in Epic electronic health record.

## 2022-09-01 NOTE — PROGRESS NOTES
Care Management Follow Up    Length of Stay (days): 0    Expected Discharge Date: 09/02/2022     Concerns to be Addressed:       Patient plan of care discussed at interdisciplinary rounds: Yes    Anticipated Discharge Disposition: Skilled Nursing Facility, Transitional Care     Anticipated Discharge Services: None  Anticipated Discharge DME: None    Patient/family educated on Medicare website which has current facility and service quality ratings: other (see comments) (has bed hold.)  Education Provided on the Discharge Plan:    Patient/Family in Agreement with the Plan: yes    Referrals Placed by CM/SW:    Private pay costs discussed: Not applicable    Additional Information:  PT/OT notes received. Writer left  for [facility] Liaison to determine if she needs other clinical information or just the PT/OT notes. Writer waiting to hear.     Spoke with Juan Luis Mckinnonison. She confirmed that she pulled notes. Patient can return at Any time as Rachana determined patient's primary insurance is straight Medicare not [employer]       KAHLIL Mitchell, LGSW   Social Work   [facility]

## 2022-09-01 NOTE — PROGRESS NOTES
Observation goals  PRIOR TO DISCHARGE        Comments: Ortho consult complete, MRI complete, psych consult complete: Goal Met.   Nurse to notify provider when observation goals have been met and patient is ready for discharge.  Disoriented to situation but follows command appropriately and speaks very softly. Is sleepy but easily arousable. Needs strong assist of 2 with turn and repo. Has wound on coccyx that is covered with mepilex. IV SL. Splint on (L) UE. Has (L) sided weakness. Chronic keating with adequate output with dark color urine.

## 2022-09-01 NOTE — CONSULTS
[facility]    Neurology Consultation    William Almazan MRN# [id number]   YOB: 1940 Age: 81 year old    Code Status:Full Code   Date of Admission: 8/30/2022  Date of Consult:09/01/2022                                                                                       Assessment and Plan:                                         #.  Rigidity in the context of chronic dementia, urinary dysfunction, psychosis/hallucinations is most suggestive of Lewy body dementia.  Generally patients with Lewy body dementia are very sensitive to antipsychotic agents with regard to extraparametal side effects. Monitor for NMS  --Agree with psychiatry plan to minimize antipsychotic agent.  Suggest reconsult psychiatry tomorrow to reevaluate the plan.  Question whether clozapine would be an option.  Parkinsonism is common component of Lewy body dementia.  Given the degree of rigidity, I think it might be worth trying carbidopa levodopa but unfortunately this can aggravate the psychosis.  We will start a low-dose tonight and review further with psychiatry tomorrow.  Check CK  Given the degree of the rigidity, benzodiazepines could be considered but of course this could significantly worsen his mental state/cognition  Continue donepezil  Recommend  EEG, although overall suspicion of seizures is low  #vitamin B12 deficiency-documented on recent hospitalization-longstanding history-continue replacement as this can contribute to neurologic symptoms  #. DVT Prophylaxis  --Mechanical + per hospitalist service  -Continue physical therapy as tolerated  #. Nutrition / GI Prophylaxis  --Per recommendations of speech therapy      ----------------------------------------------------------------------------------  ----------------------------------------------------------------------------------  Reason for consult: as above       Chief Complaint:   Problem with left arm  History is obtained from the patient /  chart       History of Present Illness:   This patient is a 81 year old male who presents with increased weakness and rigidity.    Hx of cognitive decline over several years.  First consulted at  in 6/21 8/21  Neurology  Dx dementia  12/21 neuropsych testing confirmed dementia.  Urinary incontinence with chronic catheter  6/28-8/2/22 admission for hallucination.  Started on multiple psych meds (Risperdal, Aricept, Lexapro, gabapentin  7/18/22 neurology consult: Since his hospitalization he has had intermittent whole body tremors for which neurology was consulted.    I discussed the potential relationship to mood/stress and both he and his wife (who I spoke with on the phone) agreed that this was very likely to be stress related.  That his intermittent tremoring occasionally involving whole body shaking with preserved consciousness/which he can recall fully and aggravated by urination and stress further points to this conclusion which I discussed with William and his wife.  They were reassured that this is something that we in neurology commonly see and that seizures do not present in this manner typically.  I reiterated that William likely cannot help when these occur but that they should dissipate as his mood/stress improves.  They were relieved to know this is something that is typical/ not unexpected in similar patients.     -Unrelated to tremor most likely but for memory/neurology care in general would continue B12 1000 mcg daily and encourage outpatient follow up of this level to see if long term injections are required to get this level >400 as this could improve memory long term.    8/2/22-8/24/22 hospitalized at [facility]-issues with walking and decreased mobility.  Acute kidney injury and failure to thrive.  Responded to IV fluid.  8/24 discharge to TCU.  Problems with nausea and emesis, newly identified left wrist stiffness. weakness/decreased use.    Overnight, left hand stiffness/rigidity pain  "continue, but also present left leg and to lesser extent on right side.    Patient unable to give clear onset of symptoms-reported that they started yesterday \"in a fight\" and then starts to report that it was with an old friend- started to hallucinate    Orthopedic evaluation:Assessment:    Left wrist pain and swelling with advanced arthritic change/SLAC       Plan:    Left wrist splint ordered, to be worn prn   Elevate when at rest  WBAT  See hand surgeon in clinic for definitive treatment     Psychiatry evaluation:  8/30/22:history of dementia with psychosis who presents with ongoing hallucinations and depression.     He appears stiff overall in legs and arms, with appearance of dystonia in neck with head tilted back in fixed position which is concerning for possible dystonic reaction to risperidone. No repetitive, involuntary movements noted. Typically there is pain involved in dystonic reactions, but he denied this. Still, I think it is worth discontinuing risperidone due to possible dystonia and poor control of hallucinations.     Spoke with wife Ilsa and daughter Renuka, they report noticing muscle stiffness beginning around mid-August during previous hospitalization. They do not recall noticing any stiffness in his neck. They are in agreement with plan to discontinue risperidone, they haven't noticed much benefit from this either. I recommend trying aripiprazole at lower dose than was previously tried (during last hospitalization he was started on 2mg BID and experienced dizziness). Will start at 2mg nightly and plan to titrate up as tolerated. Hopefully this will address both hallucinations and help with mood.      Low-grade fever of 100.1 on admission but since afebrile       Past Medical History:     Past Medical History:   Diagnosis Date     Infection due to 2019 novel coronavirus      Presbyopia 09/02/2021   Paroxysmal atrial fibrillation 01/08/2020   Overview:     Formatting of this note might be " different from the original.  On Eliquis chronic for CHADS-2 Vasc of 2.    PVT (paroxysmal ventricular tachycardia) 06/08/2018   CAREPLAN: ADVANCE DIRECTIVES/CODE STATUS 01/23/2014   Diabetes mellitus type 2 without retinopathy 05/07/2013   Obesity, Class III, BMI 40-49.9 (morbid obesity) 05/04/2012   Prostate CA 04/26/2012   Overview:     Formatting of this note might be different from the original.  2012, localized, begin hormonal / radiation therapy-plan 2 years hormonal therapy/lupron   Bleeding internal hemorrhoids 02/28/2012   Glucose intolerance (malabsorption) 02/16/2012   External hemorrhoids with other complication 02/14/2012   CPAP (continuous positive airway pressure) dependence 08/30/2011   Obstructive sleep apnea 08/30/2011   Overview:     Formatting of this note might be different from the original.  Epic    Psychosexual dysfunction with inhibited sexual excitement 02/27/2006   Automatic implantable cardioverter-defibrillator in situ 11/29/2004   Overview:     Formatting of this note might be different from the original.  Epic      Last Assessment & Plan:     Formatting of this note might be different from the original.  [employer] ICD   CHF (congestive heart failure) 07/02/2004   Other specified dermatoses 08/07/2003   Mixed hyperlipidemia 09/28/2001   Urticaria 05/02/2000   Overview:     Formatting of this note might be different from the original.  [facility]    Blood in stool 02/16/2000   Knee joint replacement status 11/30/1999   Overview:     Formatting of this note might be different from the original.  Knee joint replacement by other means   Essential hypertension 11/30/1999   Overview:     Formatting of this note might be different from the original.  Epic    Gout 11/30/1999   Overview:     Formatting of this note might be different from the original.  [facility]    Edema 11/30/1999   Postsurgical aortocoronary bypass status            Past Surgical History:   No past surgical history on file.   both  knees replaced twice          cabg approx 1996          tka 2003          T&A; UNDER AGE 11     age 30     wisdom teeth          implanted defibrillator               Social History:     Social History     Socioeconomic History     Marital status:      Patient denies smoking, no significant alcohol intake, denies illicit drugs use unreliable history      Family History:   No family history on file.  Reviewed and not felt to be contributory.  Unreliable history       Home Medications:     Prior to Admission Medications   Prescriptions Last Dose Informant Patient Reported? Taking?   acetaminophen (TYLENOL) 325 MG tablet  at prn  No Yes   Sig: Take 2 tablets (650 mg) by mouth every 4 hours as needed for mild pain   allopurinol (ZYLOPRIM) 300 MG tablet 8/30/2022 at 0800 Daughter Yes Yes   Sig: Take 1 tablet by mouth daily   amoxicillin (AMOXIL) 500 MG capsule  at prn Daughter Yes Yes   Sig: Take 1 capsule by mouth as needed PRN for dental procedures   apixaban ANTICOAGULANT (ELIQUIS) 5 MG tablet 8/30/2022 at 0800 Daughter Yes Yes   Sig: Take 5 mg by mouth 2 times daily   carvedilol (COREG) 12.5 MG tablet 8/30/2022 at 0800  No Yes   Sig: Take 1 tablet (12.5 mg) by mouth 2 times daily   cholecalciferol 25 MCG (1000 UT) TABS 8/30/2022 at 0800 Daughter Yes Yes   Sig: Take 1,000 Units by mouth daily   cyanocobalamin (VITAMIN B-12) 1000 MCG tablet 8/30/2022 at 0800 Daughter Yes Yes   Sig: Take 1,000 mcg by mouth daily   diclofenac (VOLTAREN) 1 % topical gel 8/29/2022 at 2000 (refused today per MAR)  No Yes   Sig: Apply 2 g topically 4 times daily   donepezil (ARICEPT) 10 MG tablet 8/29/2022 at 2000  No Yes   Sig: Take 1 tablet (10 mg) by mouth At Bedtime   escitalopram (LEXAPRO) 20 MG tablet 8/30/2022 at 0800  No Yes   Sig: Take 1 tablet (20 mg) by mouth daily   furosemide (LASIX) 40 MG tablet 8/30/2022 at 0800  No Yes   Sig: Take 1 tablet (40 mg) by mouth daily   gabapentin (NEURONTIN) 100 MG capsule 8/30/2022 at 1400  "(x2 doses) Daughter No Yes   Sig: Take 1 capsule (100 mg) by mouth 3 times daily   loratadine (CLARITIN) 10 MG tablet 8/30/2022 at 0800 Daughter Yes Yes   Sig: Take 1 tablet by mouth daily   polyethylene glycol (MIRALAX) 17 GM/Dose powder  at prn  No Yes   Sig: Take 17 g by mouth daily as needed for constipation   psyllium (METAMUCIL/KONSYL) capsule 8/30/2022 at 0800  No Yes   Sig: Take 3 capsules by mouth daily   risperiDONE (RISPERDAL) 0.25 MG tablet 8/29/2022 at took 2 mg; new order for 1.75 mg to start 8/30 pm  Yes Yes   Sig: Take 1.75 mg by mouth At Bedtime   risperiDONE (RISPERDAL) 0.25 MG tablet 8/29/2022 at 0800  Yes Yes   Sig: Take 0.25 mg by mouth every other day   sennosides (SENOKOT) 8.6 MG tablet 8/30/2022 at 0800  Yes Yes   Sig: Take 1 tablet by mouth 2 times daily   spironolactone (ALDACTONE) 25 MG tablet 8/30/2022 at 0800 Daughter Yes Yes   Sig: Take 1 tablet by mouth daily      Facility-Administered Medications: None          Allergy:   No Known Allergies       Inpatient Medications:   Scheduled Meds:    allopurinol  300 mg Oral Daily     apixaban ANTICOAGULANT  5 mg Oral BID     ARIPiprazole  2 mg Oral At Bedtime     carvedilol  12.5 mg Oral BID     donepezil  10 mg Oral At Bedtime     escitalopram  20 mg Oral Daily     furosemide  40 mg Oral Daily     gabapentin  100 mg Oral TID     loratadine  10 mg Oral Daily     multivitamin w/minerals  1 tablet Oral Daily     psyllium  3 capsule Oral Daily     sennosides  1 tablet Oral BID     spironolactone  25 mg Oral Daily     PRN Meds: acetaminophen, melatonin, ondansetron **OR** ondansetron, polyethylene glycol, prochlorperazine **OR** prochlorperazine **OR** prochlorperazine        Review of Systems    Noncontributory, patient unable to offer clear answer  NEURO: see HPI       Physical Exam:   Physical Exam   Vitals:  Height:5' 9\"  Weight:180 lbs 8.91 oz   Temp: 97.6  F (36.4  C) Temp src: Oral BP: 105/65 Pulse: 75   Resp: 18 SpO2: 98 % O2 Device: None " (Room air)    General Appearance:  No acute distress  Neuro:       Mental Status Exam:    Alert, oriented to month year date place and room number.  Hypokinetic speech.  Reduced volume.  Language hesitant.  Later on in the interview, patient became delusional, talking about how he was in a fight yesterday with an old friend       Cranial Nerves:   Vision/visual fields grossly intact.  Pupils are equal and reactive to light.  Extraocular movements intact.  Reduced facial expression but strength intact.  No jaw or tongue deviation.  Palate and shoulder elevation symmetrical          Motor:   Severe rigidity of the left upper extremity and lower extremity.  Moderate rigidity right lower extremity, mild right upper extremity.  Mild resting tremor   patient unable to voluntarily cooperate with strength testing on the left side.  Strength was intact in the right upper extremity, reduced movement in the right lower extremity       Reflexes: Difficult to elicit because of the degree of rigidity particularly on the left side.  Plantar signs neutral.  No clonus       Sensory: Vibration and pin intact x4                   Coordination:   Hypokinetic of the right hand.  Unable to cooperate on the left side, slight movement in the right leg       Gait: Unable to be tested  Neck: Neck is supple     Extremities: No clubbing, no cyanosis, no edema       Data:   ROUTINE IP LABS   CBC RESULTS:     Recent Labs   Lab 08/30/22 1950   WBC 4.5   RBC 3.84*   HGB 10.5*   HCT 34.8*        Basic Metabolic Panel:   Recent Labs   Lab Test 08/30/22  1950 08/23/22  0742 08/20/22  1010    138 139   POTASSIUM 3.5 4.5 4.2   CHLORIDE 104 102 103   CO2 32 28 28   BUN 11 19.8 23.3*   CR 0.62* 0.59* 0.59*   * 101* 91   CRYS 8.8 9.4 9.1     Liver panel:  Recent Labs   Lab Test 08/30/22  1950 08/02/22  2232 07/31/22  0608 07/06/22  0540 07/04/22  0701   PROTTOTAL 6.2* 5.9* 5.8* 5.6* 5.8*   ALBUMIN 2.6* 3.2* 2.5* 2.2* 2.1*   BILITOTAL  0.3 0.6 0.3 0.3 0.3   ALKPHOS 91 97 95 81 81   AST 12 35 16 17 15   ALT 18 15 15 16 15     Thyroid Panel:  Recent Labs   Lab Test 07/16/22 2056   TSH 1.49      Vitamin B12:   Recent Labs   Lab Test 07/16/22 2056   B12 178*           IMAGING:   All imaging studies were reviewed personally  CT head:   EXAM: CT HEAD W/O CONTRAST  LOCATION: [facility]  DATE/TIME: 8/30/2022 8:38 PM     INDICATION: left hand and bilateral leg weakness, anticoagulated, dementia  COMPARISON: 07/02/2022  TECHNIQUE: Routine CT Head without IV contrast. Multiplanar reformats. Dose reduction techniques were used.     FINDINGS:  INTRACRANIAL CONTENTS: No intracranial hemorrhage, extraaxial collection, or mass effect.  No CT evidence of acute infarct. Moderate presumed chronic small vessel ischemic changes. Moderate generalized volume loss. No hydrocephalus.      VISUALIZED ORBITS/SINUSES/MASTOIDS: No intraorbital abnormality. No paranasal sinus mucosal disease. No middle ear or mastoid effusion.     BONES/SOFT TISSUES: No acute abnormality.                                                                      IMPRESSION:  1.  No acute intracranial process    Time: 70minutes evaluation and management.     Ev Huerta M.D.  [facility]  Office [phone]

## 2022-09-01 NOTE — PROGRESS NOTES
09/01/22 1104   Quick Adds   Quick Adds Certification   Type of Visit Initial PT Evaluation   Living Environment   People in Home facility resident   Current Living Arrangements extended care facility  (TCU)   Home Accessibility no concerns   Self-Care   Usual Activity Tolerance moderate   Current Activity Tolerance poor   Equipment Currently Used at Home cane, straight   Fall history within last six months yes   Number of times patient has fallen within last six months 6   General Information   Onset of Illness/Injury or Date of Surgery 08/30/22   Referring Physician Reji Granado MD   Patient/Family Therapy Goals Statement (PT) Return to rehab   Pertinent History of Current Problem (include personal factors and/or comorbidities that impact the POC) Pt admitted under observation status with L wrist and hand pain, leg weakness. PMH: dementia, chronic keating, afib, ppm, CAD, admitted [facility] 8/2-8/24 with TONE and failure to thrive.   Existing Precautions/Restrictions fall   Weight-Bearing Status - LLE full weight-bearing   Weight-Bearing Status - RLE full weight-bearing   General Observations Voice is very quiet, slow to respond to questions.   Cognition   Orientation Status (Cognition) person;place;situation   Follows Commands (Cognition) follows one-step commands   Pain Assessment   Patient Currently in Pain Yes, see Vital Sign flowsheet  (L wrist and hand pain)   Integumentary/Edema   Integumentary/Edema Comments L wrist and hand edema present, has a vecro brace donned.   Posture    Posture Forward head position;Protracted shoulders   Posture Comments Pt is very stiff in his postures   Range of Motion (ROM)   ROM Comment Pt demos dec PROM to hips, knees, ankles.   Strength (Manual Muscle Testing)   Strength Comments B LEs globally weak. Unable to lift L LE off of the bed (1+), R LE able to lift (2+) but struggled.   Bed Mobility   Comment, (Bed Mobility) Supine rolling with max A   Transfers   Comment,  (Transfers) Used ceiling lift to transfer to the chair A2   Gait/Stairs (Locomotion)   Comment, (Gait/Stairs) NT   Balance   Balance Comments Sitting balance stable once positioned well in the chair.   Clinical Impression   Criteria for Skilled Therapeutic Intervention Yes, treatment indicated   PT Diagnosis (PT) Difficulty ambulating   Influenced by the following impairments Pain, dec strength, balance, activity tolerance, rigidity in legs and arms'   Functional limitations due to impairments Difficulty ambulating and transferring   Clinical Presentation (PT Evaluation Complexity) Stable/Uncomplicated   Clinical Presentation Rationale medically stable   Clinical Decision Making (Complexity) low complexity   Planned Therapy Interventions (PT) bed mobility training;neuromuscular re-education;patient/family education;strengthening;transfer training;gait training;postural re-education;ROM (range of motion)   Risk & Benefits of therapy have been explained evaluation/treatment results reviewed;care plan/treatment goals reviewed;risks/benefits reviewed;current/potential barriers reviewed;participants voiced agreement with care plan   PT Discharge Planning   PT Discharge Recommendation (DC Rec) Transitional Care Facility   PT Rationale for DC Rec Pt is far from his baseline mobility status of walking with a cane, currently requires A2 with the lift. Pt is at high risk for falls, has not been able to stand or walk due to weakness thus far. Pt's extremities are somewhat ridgid. Rec discharge back to TCU to improve his ROM, strength, balance, activity tolerance and improve his mobility and independence before returning home.   Therapy Certification   Start of care date 09/01/22   Certification date from 09/01/22   Certification date to 09/06/22   Medical Diagnosis L wirst and hand pain, leg weakness   Total Evaluation Time   Total Evaluation Time (Minutes) 10   Physical Therapy Goals   PT Frequency 2x/week   PT Predicted  Duration/Target Date for Goal Attainment 09/06/22   PT: Bed Mobility Rolling;Moderate assist;Supine to/from sit   PT: Transfers Moderate assist;Bed to/from chair;Assistive device   PT: Gait Moderate assist;5 feet;Rolling walker

## 2022-09-01 NOTE — UTILIZATION REVIEW
Admission Status; Secondary Review Determination    Under the authority of the Utilization Management Committee, the utilization review process indicated a secondary review on the above patient. The review outcome is based on review of the medical records, discussions with staff, and applying clinical experience noted on the date of the review.    (x) Inpatient Status Appropriate - This patient's medical care is consistent with medical management for inpatient care and reasonable inpatient medical practice.    RATIONALE FOR DETERMINATION: Complex 81-year-old male with history of mild dementia, chronic indwelling Louie catheter, paroxysmal atrial fibrillation with pacemaker, CAD, history of auditory hallucinations.  Patient multiple psychotropic medications including Resporal, Aricept, Lexapro and gabapentin with fluctuation of antipsychotics based on side effects.  Patient now presents to the hospital with significant acute weakness to the point he was unable to get out of bed as well as left hand pain and difficulties.  Patient has significant left wrist pain and swelling felt to be secondary to advanced arthritis changes.  Patient also has significant active hallucinations as well as concern for significant stiffness in legs and arms as well as dystonia in the neck which probably reflects patient's weakness and difficulty with gait.  Patient thus requiring multiple nights in the hospital with adjustment in psychotropic medications with moderate patient's neurological response appropriate for inpatient care.    At the time of admission with the information available to the attending physician more than 2 nights Hospital complex care was anticipated, based on patient risk of adverse outcome if treated as outpatient and complex care required. Inpatient admission is appropriate based on the Medicare guidelines.    This document was produced using voice recognition software    The information on this document is  developed by the utilization review team in order for the business office to ensure compliance. This only denotes the appropriateness of proper admission status and does not reflect the quality of care rendered.    The definitions of Inpatient Status and Observation Status used in making the determination above are those provided in the CMS Coverage Manual, Chapter 1 and Chapter 6, section 70.4.    Sincerely,    Stefan Russo MD  Utilization Review  Physician Advisor  [facility].

## 2022-09-01 NOTE — PLAN OF CARE
Orientation/Cognitive: O x3-4 (time), lethargic at times  Observation Goals (Met/ Not Met): n/a, IP  Mobility Level/Assist Equipment: A2 lift  Fall Risk (Y/N): yes  Behavior Concerns: no  Pain Management: no pain, tyl PRN  Tele/VS/O2: VSS RA, tele SR AV block, pacemaker  ABNL Lab/BG: labs wnl this shift  Diet: cardiac  Bowel/Bladder: inc stool, chronic keating  Skin Concerns: pressure wound sacrum  Drains/Devices: keating, PIV SL  Tests/Procedures for next shift:   Anticipated DC date & active delays: tbd, EEG pending  Patient Stated Goal for Today: rest

## 2022-09-01 NOTE — PROGRESS NOTES
Observation goals  PRIOR TO DISCHARGE          Ortho consult complete, MRI complete, psych consult complete : not met, PT/OT pending    Nurse to notify provider when observation goals have been met and patient is ready for discharge.

## 2022-09-02 ENCOUNTER — HOSPITAL ENCOUNTER (INPATIENT)
Dept: NEUROLOGY | Facility: CLINIC | Age: 82
Discharge: HOME | DRG: 056 | End: 2022-09-02
Attending: PSYCHIATRY & NEUROLOGY
Payer: MEDICARE

## 2022-09-02 LAB
CHOLEST SERPL-MCNC: 120 MG/DL
HDLC SERPL-MCNC: 43 MG/DL
LDLC SERPL CALC-MCNC: 67 MG/DL
NONHDLC SERPL-MCNC: 77 MG/DL
TRIGL SERPL-MCNC: 51 MG/DL

## 2022-09-02 PROCEDURE — 99232 SBSQ HOSP IP/OBS MODERATE 35: CPT | Performed by: NURSE PRACTITIONER

## 2022-09-02 PROCEDURE — 95816 EEG AWAKE AND DROWSY: CPT

## 2022-09-02 PROCEDURE — 80061 LIPID PANEL: CPT | Performed by: HOSPITALIST

## 2022-09-02 PROCEDURE — 250N000013 HC RX MED GY IP 250 OP 250 PS 637: Performed by: INTERNAL MEDICINE

## 2022-09-02 PROCEDURE — 250N000013 HC RX MED GY IP 250 OP 250 PS 637: Performed by: HOSPITALIST

## 2022-09-02 PROCEDURE — 120N000001 HC R&B MED SURG/OB

## 2022-09-02 PROCEDURE — 250N000013 HC RX MED GY IP 250 OP 250 PS 637: Performed by: NURSE PRACTITIONER

## 2022-09-02 PROCEDURE — 36415 COLL VENOUS BLD VENIPUNCTURE: CPT | Performed by: HOSPITALIST

## 2022-09-02 PROCEDURE — 99232 SBSQ HOSP IP/OBS MODERATE 35: CPT | Performed by: HOSPITALIST

## 2022-09-02 PROCEDURE — 250N000013 HC RX MED GY IP 250 OP 250 PS 637: Performed by: PSYCHIATRY & NEUROLOGY

## 2022-09-02 RX ADMIN — SPIRONOLACTONE 25 MG: 25 TABLET ORAL at 10:05

## 2022-09-02 RX ADMIN — PIMAVANSERIN TARTRATE 34 MG: 34 CAPSULE ORAL at 13:13

## 2022-09-02 RX ADMIN — GABAPENTIN 100 MG: 100 CAPSULE ORAL at 13:12

## 2022-09-02 RX ADMIN — APIXABAN 5 MG: 5 TABLET, FILM COATED ORAL at 20:32

## 2022-09-02 RX ADMIN — GABAPENTIN 100 MG: 100 CAPSULE ORAL at 10:05

## 2022-09-02 RX ADMIN — DONEPEZIL HYDROCHLORIDE 10 MG: 10 TABLET ORAL at 20:33

## 2022-09-02 RX ADMIN — APIXABAN 5 MG: 5 TABLET, FILM COATED ORAL at 10:06

## 2022-09-02 RX ADMIN — LORATADINE 10 MG: 10 TABLET ORAL at 10:05

## 2022-09-02 RX ADMIN — FUROSEMIDE 40 MG: 40 TABLET ORAL at 10:05

## 2022-09-02 RX ADMIN — SENNOSIDES 1 TABLET: 8.6 TABLET, FILM COATED ORAL at 20:32

## 2022-09-02 RX ADMIN — ESCITALOPRAM OXALATE 20 MG: 20 TABLET ORAL at 10:04

## 2022-09-02 RX ADMIN — MULTIPLE VITAMINS W/ MINERALS TAB 1 TABLET: TAB at 10:05

## 2022-09-02 RX ADMIN — CARVEDILOL 12.5 MG: 12.5 TABLET, FILM COATED ORAL at 10:06

## 2022-09-02 RX ADMIN — CARBIDOPA AND LEVODOPA 1 HALF-TAB: 25; 100 TABLET ORAL at 13:12

## 2022-09-02 RX ADMIN — Medication 3 CAPSULE: at 10:04

## 2022-09-02 RX ADMIN — ALLOPURINOL 300 MG: 300 TABLET ORAL at 10:05

## 2022-09-02 RX ADMIN — GABAPENTIN 100 MG: 100 CAPSULE ORAL at 20:33

## 2022-09-02 RX ADMIN — CARBIDOPA AND LEVODOPA 1 HALF-TAB: 25; 100 TABLET ORAL at 20:33

## 2022-09-02 RX ADMIN — CARBIDOPA AND LEVODOPA 1 HALF-TAB: 25; 100 TABLET ORAL at 10:05

## 2022-09-02 RX ADMIN — SENNOSIDES 1 TABLET: 8.6 TABLET, FILM COATED ORAL at 10:05

## 2022-09-02 ASSESSMENT — ACTIVITIES OF DAILY LIVING (ADL)
ADLS_ACUITY_SCORE: 51
ADLS_ACUITY_SCORE: 53
ADLS_ACUITY_SCORE: 51
ADLS_ACUITY_SCORE: 47
ADLS_ACUITY_SCORE: 51
ADLS_ACUITY_SCORE: 47

## 2022-09-02 NOTE — PLAN OF CARE
Goal Outcome Evaluation:      A/O x3. Disoriented to time. VSS on RA. Neuros intact. BLE weakness and LUE weakness. L wrist pain- splinted. Incontinent of bowel. Louie cath- retention. A2 w/ lift. Cardiac diet. Takes meds whole w/ water one at a time if bigger and 2 at a time if smaller. Sacrum wound/mepilex in place. Pending discharge back to [facility] TCU

## 2022-09-02 NOTE — PROGRESS NOTES
[facility]    Medicine Progress Note - Hospitalist Service    Date of Admission:  8/30/2022    Assessment & Plan        William Almazan is an 81-year-old male with a past medical history significant for dementia, urinary retention with chronic indwelling Louie catheter, paroxysmal atrial fibrillation, heart failure with preserved ejection fraction, coronary artery disease, who presents to the Emergency Department with left hand pain, decreased motion and increasing generalized weakness.    Possible lewy body dementia with rigidity  Patient admitted with generalized weakness and progressive inability to ambulate accompanied by extremity rigidity  Wife Ilsa reports worsening symptoms after recent hospitalization and start of antipsychotic medications to help treat underlying psychosis  However, since start, the patient has exhibited worsening rigidity concerning for parkinsonism  Neurology was consulted and overall there is concern for underlying Lewy body dementia, with dystonia that may have been worsened by starting antipsychotics  Psychiatry consulted and did stop the patient's risperdal and then abilify due to concern that they could be worsening motor symptoms  Plan  - consider nuplazid. It is not clear if this is going to be an option due to cost and need for prior authorization. Defer to psychiatry if they want to initiate a prior authorization  - started on low dose carbidopa/levadopa      Left hand and wrist discomfort due to advanced arthritic changes /SLAC  Initially there was concern for a stroke given the acuity of onset,   He appears to have generalized weakness, with approximately 4/5 upper extremities and 3/5 bilateral lower extremities  Unable to obtain an MRI with an abandoned RV lead  Marked deterioration in the last 2 months noted by wife Ilsa, with largest deterioration after starting aggressive antispychotic regimen about 1 month ago  Some rigidity noted. He had his  respirdone stopped by psychiatry on 8/31 and abilify started, however he still appears more rigid on the left versus the right side  Noted last evaluation July 2022 by neurology who noted normal extremity strength  Overall suspect that his deterioration is due to general deconditioning with addition of possible dystonia, however, with questionable worse deficits on the left side, would ask neurology to reevaluate    Orthopedics consulted, appreciate their assistance. See consult not for details.    Left wrist splint ordered. Elevate wrist when at rest. WBAT.    Outpatient follow-up with hand surgeon.    PRN acetaminophen for pain control      Chronic indwelling Louie catheter due to urinary retention and chronic hematuria  This appears to be roughly at baseline.    Follow up in Urology clinic as previously arranged unless he develops any acute issues.    Dementia, depression and hallucinations  The patient has ongoing issues with hallucinations, noting that he is hearing and seeing Zuhair Lainez mimicking him and also hears voices, though not commands of self-harm.  He recently had a decrease in the Risperdal from the morning dose to every other day instead of daily, but family is interested in having a psych reevaluate to see if any further adjustments may be needed.  Of note, he is still fairly sedate and lethargic compared to where he is at his baseline, which has been attributed to his psychiatric regimen.  Psych consulted on 8/31 and did stop the respirdal and started abilify    Psychiatry consulted, appreciate their assistance.    For now, continue with his current dosing of abilify , Aricept, Lexapro and gabapentin pending psych evaluation.    Gout    Continue PTA allopurinol.    Heart failure with preserved ejection fraction  Hypertension  Coronary artery disease  Dyslipidemia    Continue PTA regimen of Coreg, Lasix, Aldactone.    KRISTIAN    CPAP as needed.    Paroxysmal atrial fibrillation  He has a pacer in  "place.    Continue PTA Coreg and apixaban.    Malnutrition    This is ongoing from previous hospitalization.  Continue to optimize nutrition as able.     Iron deficiency anemia    Recently completed a course of Venofer.  Monitor periodically.    Recent COVID-19 infection    He had minimal symptoms with first positive on 07/30, considered COVID recovered.       Diet: Combination Diet Low Saturated Fat Na <2400mg Diet, No Caffeine Diet  Snacks/Supplements Pediatric: Ensure Enlive; With Meals    DVT Prophylaxis: DOAC  Louie Catheter: PRESENT, indication: Retention  Central Lines: None  Cardiac Monitoring: ACTIVE order. Indication: Stroke, acute (48 hours)  Code Status: Full Code      Disposition Plan      Expected Discharge Date: 09/02/2022        Discharge Comments: MRI  need results   Psych consult completed. Woc and ortho consults completed. EEG. back to [facility].        The patient's care was discussed with the Bedside Nurse and Patient.    Jose Law DO  Hospitalist Service  [facility]  Securely message with the Vocera Web Console (learn more here)  Text page via [employer] Paging/Directory         Clinically Significant Risk Factors Present on Admission               # Coagulation Defect: home medication list includes an anticoagulant medication    # Chronic systolic heart failure: echo within the past year with EF <40%    # Overweight: Estimated body mass index is 26.66 kg/m  as calculated from the following:    Height as of this encounter: 1.753 m (5' 9\").    Weight as of this encounter: 81.9 kg (180 lb 8.9 oz).        ______________________________________________________________________    Interval History   William Almazan was seen this morning. He feels ovearll better, feels less stiff, feels less depressed.    Data reviewed today: I reviewed all medications, new labs and imaging results over the last 24 hours. I personally reviewed no images or EKG's " today.    Physical Exam   Vital Signs: Temp: 98  F (36.7  C) Temp src: Axillary BP: 121/66 Pulse: 75   Resp: 16 SpO2: 97 % O2 Device: None (Room air)    Weight: 180 lbs 8.91 oz  Constitutional: awake, drowsy, cooperative, no apparent distress, laying in the hospital bed  Respiratory: clear to auscultation bilaterally, no crackles or wheezing  Cardiovascular: regular rate and rhythm, normal S1 and S2, no murmur noted  GI: normal bowel sounds, soft, non-distended, non-tender  Skin: warm, dry  Musculoskeletal: no lower extremity pitting edema present  Neurologic: awake, drowsy, appropriate in conversation, 4/5 upper extremity strength, potentially worse on the left. 3/5 bilateral lower, slightly worse on the left    Data   Recent Labs   Lab 08/30/22  1950   WBC 4.5   HGB 10.5*   MCV 91         POTASSIUM 3.5   CHLORIDE 104   CO2 32   BUN 11   CR 0.62*   ANIONGAP 4   CRYS 8.8   *   ALBUMIN 2.6*   PROTTOTAL 6.2*   BILITOTAL 0.3   ALKPHOS 91   ALT 18   AST 12     Medications       allopurinol  300 mg Oral Daily     apixaban ANTICOAGULANT  5 mg Oral BID     carbidopa-levodopa  1 half-tab Oral TID     carvedilol  12.5 mg Oral BID     donepezil  10 mg Oral At Bedtime     escitalopram  20 mg Oral Daily     furosemide  40 mg Oral Daily     gabapentin  100 mg Oral TID     loratadine  10 mg Oral Daily     multivitamin w/minerals  1 tablet Oral Daily     pimavanserin tartrate  34 mg Oral Daily     psyllium  3 capsule Oral Daily     sennosides  1 tablet Oral BID     spironolactone  25 mg Oral Daily

## 2022-09-02 NOTE — PROGRESS NOTES
[facility]  Neurology Daily Note      Admission Date:8/30/2022   Date of service: 09/02/2022   Hospital Day: 4                                                   Assessment and Plan:   #.  Rigidity in the context of chronic dementia, urinary dysfunction, psychosis/hallucinations is most suggestive of Lewy body dementia.  Generally patients with Lewy body dementia are very sensitive to antipsychotic agents with regard to extrapyrimidal side effects. Monitor for NMS  -- Appreciate psychiatry recommendation to discontinue Abilify and start Nuplazid.  He does seem somewhat better today with regard to the rigidity    Parkinsonism is common component of Lewy body dementia.    May consider increasing the dose of carbidopa levodopa further tomorrow.  CK is normal  Given the degree of the rigidity, benzodiazepines could be considered but of course this could significantly worsen his mental state/cognition  Continue donepezil  EEG revealed nonspecific slowing-no evidence of nonconvulsive seizures, significant  muscle artifact from rigidity  #vitamin B12 deficiency-documented on recent hospitalization-longstanding history-continue replacement as this can contribute to neurologic symptoms  #. DVT Prophylaxis  --Mechanical + per hospitalist service  -Continue physical therapy as tolerated  #. Nutrition / GI Prophylaxis  --Per recommendations of speech therapy     Contacted wife and daughter by telephone with an update-explained the diagnosis and the lack of predictability or ideal treatment.  They would like to be slow with any further changes in medication.  We will reevaluate tomorrow      Interval History:   Patient is a bit more lethargic, confused today, however his left arm is less rigid and he has more motor control of the left side compared to yesterday       Review of Systems:   Noncontributory       Medications:   Scheduled Meds:    allopurinol  300 mg Oral Daily     apixaban ANTICOAGULANT  5 mg Oral BID      carbidopa-levodopa  1 half-tab Oral TID     carvedilol  12.5 mg Oral BID     donepezil  10 mg Oral At Bedtime     escitalopram  20 mg Oral Daily     furosemide  40 mg Oral Daily     gabapentin  100 mg Oral TID     loratadine  10 mg Oral Daily     multivitamin w/minerals  1 tablet Oral Daily     pimavanserin tartrate  34 mg Oral Daily     psyllium  3 capsule Oral Daily     sennosides  1 tablet Oral BID     spironolactone  25 mg Oral Daily     PRN Meds: acetaminophen, melatonin, ondansetron **OR** ondansetron, polyethylene glycol, prochlorperazine **OR** prochlorperazine **OR** prochlorperazine        Physical Exam:   Vitals: Temp: 98  F (36.7  C) Temp src: Axillary BP: 121/66 Pulse: 75   Resp: 16 SpO2: 97 % O2 Device: None (Room air)    Vital Signs with Ranges: Temp:  [97.4  F (36.3  C)-98.1  F (36.7  C)] 98  F (36.7  C)  Pulse:  [41-81] 75  Resp:  [16-18] 16  BP: (121-145)/(64-81) 121/66  SpO2:  [94 %-97 %] 97 %    General Appearance:  No acute distress  Neuro:       Mental Status Exam:    Alert, not oriented to month, thinks he is at [facility]. Hypokinetic speech.  Reduced volume.  Language hesitant.         Cranial Nerves:   Vision/visual fields grossly intact.  Pupils are equal and reactive to light.  Extraocular movements intact.  Reduced facial expression but strength intact.  No jaw or tongue deviation.  Palate and shoulder elevation symmetrical          Motor:   Severe rigidity of the left upper extremity and lower extremity.  Moderate rigidity right lower extremity, mild right upper extremity.  Tremor less noticeable today.  Can actually open up the fingers on the left side and straighten out at the left arm which was unable to do yesterday Strength was intact in the right upper extremity, reduced movement in the right lower extremity       Reflexes: Difficult to elicit because of the degree of rigidity particularly on the left side.  Plantar signs neutral.  No clonus       Sensory: Vibration and  pin intact x4                   Coordination:   Hypokinetic right side.  Slight voluntary movement of the left arm and leg     Gait: Unable to be tested  Neck: Neck is supple   Extremities: No clubbing, no cyanosis, no edema       Data:   ROUTINE IP LABS (Last 3results)  CBC RESULTS:     Recent Labs   Lab Test 08/30/22 1950 08/20/22  1010 08/08/22  0612   WBC 4.5 3.3* 5.1   RBC 3.84* 3.50* 3.63*   HGB 10.5* 9.5* 9.9*   HCT 34.8* 31.5* 32.5*    248 283     Basic Metabolic Panel:  Recent Labs   Lab Test 08/30/22 1950 08/23/22  0742 08/20/22  1010    138 139   POTASSIUM 3.5 4.5 4.2   CHLORIDE 104 102 103   CO2 32 28 28   BUN 11 19.8 23.3*   CR 0.62* 0.59* 0.59*   * 101* 91   CRYS 8.8 9.4 9.1     Liver panel:  Recent Labs   Lab Test 08/30/22 1950 08/02/22  2232 07/31/22  0608 07/06/22  0540 07/04/22  0701   PROTTOTAL 6.2* 5.9* 5.8* 5.6* 5.8*   ALBUMIN 2.6* 3.2* 2.5* 2.2* 2.1*   BILITOTAL 0.3 0.6 0.3 0.3 0.3   ALKPHOS 91 97 95 81 81   AST 12 35 16 17 15   ALT 18 15 15 16 15     Thyroid Panel:  Recent Labs   Lab Test 07/16/22 2056   TSH 1.49      Vitamin B12:   Recent Labs   Lab Test 07/16/22 2056   B12 178*              TIME     35minutes Evaluation/management time     Ev Huerta M.D.  Neurologist  [facility]  Office [phone]

## 2022-09-02 NOTE — PROGRESS NOTES
[facility] EEG #  portable, ordered by Dr. Huerta.    Pt is awake, drowsy, appears to be mildly obtunded.    He does open and close eyes when I asked him to .   No activations.

## 2022-09-02 NOTE — CONSULTS
"    Psychiatry Consultation; Follow up          Reason for Consult, requesting source:    ? Lewy body dementia   Requesting source: Jose Law MD            Interim history:    William Almazan is an 81 year old male with a history of dementia, a-fib, CAD, and chronic indwelling keating catheter who was sent to hospital by his TCU for leg weakness and left wrist/hand pain. He was recently hospitalized at [facility] from 8/2/22-8/24/22 for weakness. During that hospitalization, psychiatry was consulted for hallucinations which were felt to be related to dementia, with some mild depression as well. He was started on risperidone at that time, later switched to aripiprazole due to low energy on risperidone. There was also concern for dystonic reaction from risperidone. Aripiprazole was stopped after only 4 doses due to dizziness, though it was noted that he had improved energy and mood while on aripiprazole. Abilify had been restarted at 2 mg at bedtime, patient still experiencing muscle stiffness and no improvement in hallucinations.     Patient seen for follow-up today. He is psychomotorically retarded. Has significant rigidity in left upper extremity and mild rigidity in right upper extremity. Endorses feelings of hopelessness currently. States this is due to the stress of being in the hospital. Responses are delayed and hesitant. Continues to endorse visual hallucinations, which he reports are bothersome. Also endorses ongoing auditory hallucinations of voices. Hearing Zuhair Trump. He states \"I know these are just part of my system.\" Endorses sleeping well overnight. Denies any suicidal ideation. Discussed transitioning from Abilify to a different medication which may be more effective for his hallucinations without worsening his motor symptoms.          Medications:     Current Facility-Administered Medications   Medication     acetaminophen (TYLENOL) tablet 650 mg     allopurinol (ZYLOPRIM) tablet 300 mg     " "apixaban ANTICOAGULANT (ELIQUIS) tablet 5 mg     carbidopa-levodopa half-tab 12.5-50 mg     carvedilol (COREG) tablet 12.5 mg     donepezil (ARICEPT) tablet 10 mg     escitalopram (LEXAPRO) tablet 20 mg     furosemide (LASIX) tablet 40 mg     gabapentin (NEURONTIN) capsule 100 mg     loratadine (CLARITIN) tablet 10 mg     melatonin tablet 1 mg     multivitamin w/minerals (THERA-VIT-M) tablet 1 tablet     ondansetron (ZOFRAN ODT) ODT tab 4 mg    Or     ondansetron (ZOFRAN) injection 4 mg     pimavanserin (NUPLAZID) tablet 34 mg     polyethylene glycol (MIRALAX) powder 17 g     prochlorperazine (COMPAZINE) injection 5 mg    Or     prochlorperazine (COMPAZINE) tablet 5 mg    Or     prochlorperazine (COMPAZINE) suppository 12.5 mg     psyllium (METAMUCIL/KONSYL) capsule 3 capsule     sennosides (SENOKOT) tablet 1 tablet     spironolactone (ALDACTONE) tablet 25 mg              MSE:     Appearance: age-appearing, dressed in hospital gown, thin, fair hygiene, in no acute distress  Behavior: calm  Eye contact: poor  Orientation: alert, oriented to self, place, month and year.   Movements: significant rigidity in bilateral upper extremities; mild resting tremor; no current evidence of dystonia  Mood: \"okay\"  Affect: flat  Speech: delayed, low volume, hypokinetic  Language: fluent in English, able to name objects appropriately  Memory: recent recall is impaired; remote recall fair  Intellectual capacity: Average for development based on word choice  Concentration: attentive  Thought process and content: linear, organized; did not elicit delusional beliefs, although noted to be experiencing delusions yesterday with neurology. Endorses auditory and visual hallucinations. Denies command auditory hallucinations.    Associations: no loosening noted  Insight: fair  Judgement: fair  Safety: denies suicidal or homicidal ideation    Vital signs:  Temp: 97.4  F (36.3  C) Temp src: Axillary BP: 123/64 Pulse: 79   Resp: 16 SpO2: 96 % O2 " "Device: None (Room air)   Height: 175.3 cm (5' 9\") Weight: 81.9 kg (180 lb 8.9 oz)  Estimated body mass index is 26.66 kg/m  as calculated from the following:    Height as of this encounter: 1.753 m (5' 9\").    Weight as of this encounter: 81.9 kg (180 lb 8.9 oz).              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 Almazan is an 81 year old male with likely Lewy Body Dementia who presented with ongoing hallucinations and depression. Neurology feel his symptoms are consistent with Lewy body dementia. Antipsychotic agents (risperidone, aripiprazole) have been trialed with no improvement thus far in his hallucinations, and possibly leading to worsening motor symptoms.     Randomized controlled trials suggest that quetiapine, although used frequently, tends not to be very effective in alleviating psychotic symptoms in the setting of dementia with lewy bodies or in parkinson's disease psychosis.     Neurology had suggested the possibility of using clozapine. Although this may be an effective drug, the risks may not outweigh the benefits in this case. Would suggest to avoid clozapine in the setting of patient's underlying heart disease.     Patient may benefit from a trial of pimavanserin. The efficacy for his hallucinations may be delayed by up to 4 weeks, but should not lead to any worsening motor symptoms.     Continue to monitor QTc - was 499 on 8/30. Pimavanserin may increase QTc by about 5-8 ms.           Summary of Recommendations:   1) Will stop aripiprazole due to potentially worsening motor symptoms, as well as lack of efficacy for his hallucinations.    2) Start pimavanserin 34 mg daily for treatment of hallucinations associated with Lewy body dementia. Efficacy may be delayed by about 4 weeks. Pharmacy liaison consult has been placed to initiate prior authorization.     3) Clozapine may not be a good option due to patient's underlying " cardiac disease    4) Continue escitalopram 20 mg daily for treatment of depression    5) Reconsult psychiatry as needed, thank you      Nestor Richardson, STANP-BC, APRN, CNP  Consult/Liaison Psychiatry  [facility]  Provider can be paged via [facility] Paging/Directory  If I am unavailable, please contact [facility] at [phone] to reach the on-call provider.

## 2022-09-02 NOTE — CONSULTS
Consulted by Ovidio Richardson to run a test claim for Nuplazid 45mg caps (once daily for Parkinson's disease psychosis).    Patient has pharmacy benefits through [employer] Part D with $480 of $480 remaining in RX deductible. Per insurance, this medication is not covered and requires prior authorization. (this process has not been started--please page if you would like me to initiate prior auth for this medication)    Additionally, due to drug being a specialty medication and limited distribution status, patient can only fill this medication via [facility] at the following copays per insurance (if PA is approved):        Patient Assistance Program (PAP) is also available via the drug  ([place]) for Nuplazid if patient meets income requirements. Eligible to receive a free 14 day supply without income verification via the following link while treatment options are being established:  [email or web address]    Before [employer] PAP will enroll patient in program, they will recommend other grants/forms of payment be pursue first:    [employer] Movement Disorders -Medicare Access [place]: CLOSED    [email or web address]/[email or web address]-[email or web address]-access/    PAN Foundation - Parkinson's Disease: CLOSED    https://www.panfoundation.org/    The Assistance Fund - Parkinson's Disease:  WAITLIST ONLY    https://enroll.[email or web address]/TAF_ProgramInformation?[email or web address]    Note: If fund open and patient does not meet criteria, [employer] requires patient to appeal this decision via phone. Appeal denial will allow eligiblity for [employer] PAP.    Please discuss with outpatient primary care team if any of this process needs to be started (if patient to initiate therapy on Nuplazid.) Liaison available to help initiate this process as  needed.      Please feel free to contact me with any other test claims, prior authorizations, or insurance questions regarding outpatient medications.     Thanks!      [facility] [employer] Discharge Pharmacy Liaison  Pronouns: She/Her/Hers  (covering for Jennifer Leon/[facility] Discharge Pharmacy Liaison through 9/7/22)    [facility]  [facility]ly [facility], [id number] [facility]  [address] [place] [address] [address], [place] [address]   [email or web address]  [email or web address]   Phone: [phone]  Pager: [phone]  Fax: [phone]

## 2022-09-02 NOTE — PLAN OF CARE
Orientation/Cognitive: Disoriented to time,   Observation Goals (Met/ Not Met): n/a, IP  Mobility Level/Assist Equipment: A2 lift, Neuro's intact except:  diana LE weakness. Left hand weak  Fall Risk (Y/N): yes  Behavior Concerns: no  Pain Management: denies   Tele/VS/O2: VSS RA, A-paced   ABNL Lab/BG: no   Diet: cardiac  Bowel/Bladder: keating patent, incontinent of BM   Skin Concerns: pressure wound sacrum, mepilex   Drains/Devices: keating, PIV SL  Tests/Procedures for next shift: EEG??   Anticipated DC date & active delays: TBD   Patient Stated Goal for Today: rest

## 2022-09-03 PROCEDURE — 250N000013 HC RX MED GY IP 250 OP 250 PS 637: Performed by: NURSE PRACTITIONER

## 2022-09-03 PROCEDURE — 250N000013 HC RX MED GY IP 250 OP 250 PS 637: Performed by: PSYCHIATRY & NEUROLOGY

## 2022-09-03 PROCEDURE — 99232 SBSQ HOSP IP/OBS MODERATE 35: CPT | Performed by: HOSPITALIST

## 2022-09-03 PROCEDURE — 250N000013 HC RX MED GY IP 250 OP 250 PS 637: Performed by: HOSPITALIST

## 2022-09-03 PROCEDURE — 120N000001 HC R&B MED SURG/OB

## 2022-09-03 PROCEDURE — 250N000013 HC RX MED GY IP 250 OP 250 PS 637: Performed by: INTERNAL MEDICINE

## 2022-09-03 RX ADMIN — GABAPENTIN 100 MG: 100 CAPSULE ORAL at 20:33

## 2022-09-03 RX ADMIN — ALLOPURINOL 300 MG: 300 TABLET ORAL at 08:21

## 2022-09-03 RX ADMIN — PIMAVANSERIN TARTRATE 34 MG: 34 CAPSULE ORAL at 08:27

## 2022-09-03 RX ADMIN — CARBIDOPA AND LEVODOPA 1 HALF-TAB: 25; 100 TABLET ORAL at 20:31

## 2022-09-03 RX ADMIN — GABAPENTIN 100 MG: 100 CAPSULE ORAL at 13:20

## 2022-09-03 RX ADMIN — GABAPENTIN 100 MG: 100 CAPSULE ORAL at 08:21

## 2022-09-03 RX ADMIN — APIXABAN 5 MG: 5 TABLET, FILM COATED ORAL at 08:21

## 2022-09-03 RX ADMIN — SENNOSIDES 1 TABLET: 8.6 TABLET, FILM COATED ORAL at 08:21

## 2022-09-03 RX ADMIN — CARVEDILOL 12.5 MG: 12.5 TABLET, FILM COATED ORAL at 20:32

## 2022-09-03 RX ADMIN — LORATADINE 10 MG: 10 TABLET ORAL at 08:22

## 2022-09-03 RX ADMIN — SENNOSIDES 1 TABLET: 8.6 TABLET, FILM COATED ORAL at 20:31

## 2022-09-03 RX ADMIN — FUROSEMIDE 40 MG: 40 TABLET ORAL at 08:21

## 2022-09-03 RX ADMIN — DONEPEZIL HYDROCHLORIDE 10 MG: 10 TABLET ORAL at 22:04

## 2022-09-03 RX ADMIN — APIXABAN 5 MG: 5 TABLET, FILM COATED ORAL at 20:33

## 2022-09-03 RX ADMIN — SPIRONOLACTONE 25 MG: 25 TABLET ORAL at 08:21

## 2022-09-03 RX ADMIN — ESCITALOPRAM OXALATE 20 MG: 20 TABLET ORAL at 08:21

## 2022-09-03 RX ADMIN — Medication 3 CAPSULE: at 08:21

## 2022-09-03 RX ADMIN — CARBIDOPA AND LEVODOPA 1 HALF-TAB: 25; 100 TABLET ORAL at 08:21

## 2022-09-03 RX ADMIN — CARBIDOPA AND LEVODOPA 1 HALF-TAB: 25; 100 TABLET ORAL at 13:20

## 2022-09-03 RX ADMIN — MULTIPLE VITAMINS W/ MINERALS TAB 1 TABLET: TAB at 08:21

## 2022-09-03 ASSESSMENT — ACTIVITIES OF DAILY LIVING (ADL)
ADLS_ACUITY_SCORE: 49
ADLS_ACUITY_SCORE: 53
ADLS_ACUITY_SCORE: 49
ADLS_ACUITY_SCORE: 53
ADLS_ACUITY_SCORE: 49
ADLS_ACUITY_SCORE: 49
ADLS_ACUITY_SCORE: 53
ADLS_ACUITY_SCORE: 53
ADLS_ACUITY_SCORE: 49
ADLS_ACUITY_SCORE: 53
ADLS_ACUITY_SCORE: 49
ADLS_ACUITY_SCORE: 49

## 2022-09-03 NOTE — PLAN OF CARE
Orientation/Cognitive: A&Ox2-3, disoriented to time and situation.  Speech is slow and soft.  Observation Goals (Met/ Not Met): Inpatient  Mobility Level/Assist Equipment: Asst 2, lift  Fall Risk (Y/N): Yes  Behavior Concerns: None  Pain Management: C/o L hip pain relieved with reposition.  Tele/VS/O2: VSS, on RA. Tele- SR w/ BBB  ABNL Lab/BG: Hgb 10.5  Diet: Cardiac, pills whole one at a time.  Bowel/Bladder: Chronic keating, inc of bowel.  Skin Concerns: Coccyx wound, mepilex. Left toe blister  Drains/Devices: PIV S.L.  Tests/Procedures for next shift: N/A  Anticipated DC date & active delays: Discharge pending back to [facility].  Patient Stated Goal for Today: Sleep  Other: Neuro checks unchanged, continues having BLE weakness and LUE weakness.  2+ BLE edema, 2+ LUE edema.

## 2022-09-03 NOTE — PROGRESS NOTES
[facility]    Medicine Progress Note - Hospitalist Service    Date of Admission:  8/30/2022    Assessment & Plan        William Almazan is an 81-year-old male with a past medical history significant for dementia, urinary retention with chronic indwelling Louie catheter, paroxysmal atrial fibrillation, heart failure with preserved ejection fraction, coronary artery disease, who presents to the Emergency Department with left hand pain, decreased motion and increasing generalized weakness.    Possible lewy body dementia with rigidity  Patient admitted with generalized weakness and progressive inability to ambulate accompanied by extremity rigidity  Wife Ilsa reports worsening symptoms after recent hospitalization and start of antipsychotic medications to help treat underlying psychosis  However, since start, the patient has exhibited worsening rigidity concerning for parkinsonism  Neurology was consulted and overall there is concern for underlying Lewy body dementia, with dystonia that may have been worsened by starting antipsychotics  Psychiatry consulted and did stop the patient's risperdal and then abilify due to concern that they could be worsening motor symptoms  Plan  - patient started nuplazid on 9/2. Some improvement noted, but from my perspective this may be from the carbidopa/levadopa with additional contribution of the stopping of the abilify and risperdal  - started on low dose carbidopa/levadopa  - unclear if nuplazid is going to be an option, please see the pharmacist note for cost. I attempted to page them this weekend to clarify, and to see if a prior authorization could be started. Realistically, perhaps he should try seroquel first. I will ask the care coordinator if discharging him to the rehab with nuplazid is going to be allowed, but my suspicion is that it will not be an option, so we may have to switch to seroquel. If this is the case, outpatient prior authorization could  be considered  - reconsult psych to see if they have thoughts about this, but to my understanding psychiatry is no longer available on the weekends at this facility      Left hand and wrist discomfort due to advanced arthritic changes /SLAC  Initially there was concern for a stroke given the acuity of onset,   He appears to have generalized weakness, with approximately 4/5 upper extremities and 3/5 bilateral lower extremities  Unable to obtain an MRI with an abandoned RV lead  Marked deterioration in the last 2 months noted by wife Ilsa, with largest deterioration after starting aggressive antispychotic regimen about 1 month ago  Some rigidity noted. He had his respirdone stopped by psychiatry on 8/31 and abilify started, however he still appears more rigid on the left versus the right side  Noted last evaluation July 2022 by neurology who noted normal extremity strength  Overall suspect that his deterioration is due to general deconditioning with addition of possible dystonia, however, with questionable worse deficits on the left side, would ask neurology to reevaluate    Orthopedics consulted, appreciate their assistance. See consult not for details.    Left wrist splint ordered. Elevate wrist when at rest. WBAT.    Outpatient follow-up with hand surgeon.    PRN acetaminophen for pain control      Chronic indwelling Louie catheter due to urinary retention and chronic hematuria  This appears to be roughly at baseline.    Follow up in Urology clinic as previously arranged unless he develops any acute issues.    Dementia, depression and hallucinations  The patient has ongoing issues with hallucinations, noting that he is hearing and seeing Zuhairsonja Lainez mimicking him and also hears voices, though not commands of self-harm.  He recently had a decrease in the Risperdal from the morning dose to every other day instead of daily, but family is interested in having a psych reevaluate to see if any further adjustments may be  needed.  Of note, he is still fairly sedate and lethargic compared to where he is at his baseline, which has been attributed to his psychiatric regimen.  Psych consulted on 8/31 and did stop the respirdal and started abilify    Psychiatry consulted, appreciate their assistance.    For now, continue with his current dosing of abilify , Aricept, Lexapro and gabapentin pending psych evaluation.    Gout    Continue PTA allopurinol.    Heart failure with preserved ejection fraction  Hypertension  Coronary artery disease  Dyslipidemia    Continue PTA regimen of Coreg, Lasix, Aldactone.    KRISTIAN    CPAP as needed.    Paroxysmal atrial fibrillation  He has a pacer in place.    Continue PTA Coreg and apixaban.    Malnutrition    This is ongoing from previous hospitalization.  Continue to optimize nutrition as able.     Iron deficiency anemia    Recently completed a course of Venofer.  Monitor periodically.    Recent COVID-19 infection    He had minimal symptoms with first positive on 07/30, considered COVID recovered.       Diet: Combination Diet Low Saturated Fat Na <2400mg Diet, No Caffeine Diet  Snacks/Supplements Pediatric: Ensure Enlive; With Meals    DVT Prophylaxis: DOAC  Louie Catheter: PRESENT, indication: Retention  Central Lines: None  Cardiac Monitoring: None  Code Status: Full Code      Disposition Plan      Expected Discharge Date: 09/04/2022        Discharge Comments: Message left for facility to return - no answer        The patient's care was discussed with the Bedside Nurse and Patient and his family. About 50 total minutes spent with him in all aspects, with 30 minutes of family conference over the phone.    Jose Law DO  Hospitalist Service  [facility]  Securely message with the Vocera Web Console (learn more here)  Text page via [employer] Paging/Directory         Clinically Significant Risk Factors Present on Admission                # Overweight: Estimated body mass index  "is 26.66 kg/m  as calculated from the following:    Height as of this encounter: 1.753 m (5' 9\").    Weight as of this encounter: 81.9 kg (180 lb 8.9 oz).        ______________________________________________________________________    Interval History   William Almazan was seen this morning. He feels ovearll better, he wants to get up and try to take a walk    Data reviewed today: I reviewed all medications, new labs and imaging results over the last 24 hours. I personally reviewed no images or EKG's today.    Physical Exam   Vital Signs: Temp: 97.3  F (36.3  C) Temp src: Axillary BP: 114/68 Pulse: 74   Resp: 16 SpO2: 97 % O2 Device: None (Room air)    Weight: 180 lbs 8.91 oz  Constitutional: awake, drowsy, cooperative, no apparent distress, laying in the hospital bed  Respiratory: clear to auscultation bilaterally, no crackles or wheezing  Cardiovascular: regular rate and rhythm, normal S1 and S2, no murmur noted  GI: normal bowel sounds, soft, non-distended, non-tender  Skin: warm, dry  Musculoskeletal: no lower extremity pitting edema present  Neurologic: awake, more alert, muscle strength about 4/5 bilaterally upper and 2/5 bilaterally lower    Data   Recent Labs   Lab 08/30/22  1950   WBC 4.5   HGB 10.5*   MCV 91         POTASSIUM 3.5   CHLORIDE 104   CO2 32   BUN 11   CR 0.62*   ANIONGAP 4   CRYS 8.8   *   ALBUMIN 2.6*   PROTTOTAL 6.2*   BILITOTAL 0.3   ALKPHOS 91   ALT 18   AST 12     Medications       allopurinol  300 mg Oral Daily     apixaban ANTICOAGULANT  5 mg Oral BID     carbidopa-levodopa  1 half-tab Oral TID     carvedilol  12.5 mg Oral BID     donepezil  10 mg Oral At Bedtime     escitalopram  20 mg Oral Daily     furosemide  40 mg Oral Daily     gabapentin  100 mg Oral TID     loratadine  10 mg Oral Daily     multivitamin w/minerals  1 tablet Oral Daily     pimavanserin tartrate  34 mg Oral Daily     psyllium  3 capsule Oral Daily     sennosides  1 tablet Oral BID     " spironolactone  25 mg Oral Daily

## 2022-09-03 NOTE — PROGRESS NOTES
[facility]  Neurology Daily Note      Admission Date:8/30/2022   Date of service: 09/03/2022   Hospital Day: 5                                                   Assessment and Plan:   #.  Rigidity in the context of chronic dementia, urinary dysfunction, psychosis/hallucinations is most consistent w/ Lewy body dementia.  Generally patients with Lewy body dementia are very sensitive to antipsychotic agents with regard to extrapyrimidal side effects.  --psychiatry recommendation to discontinue Abilify and start Nuplazid was helpful.  He does seem somewhat better over the last 2 days with regard to the rigidity    Parkinsonism is common component of Lewy body dementia.    May consider increasing the dose of carbidopa levodopa further in the future.  CK is normal    Continue donepezil  EEG revealed nonspecific slowing-no evidence of nonconvulsive seizures, significant  muscle artifact from rigidity  #vitamin B12 deficiency-documented on recent hospitalization-longstanding history-continue replacement as this can contribute to neurologic symptoms  #. DVT Prophylaxis  --Mechanical + per hospitalist service  -Continue physical therapy as tolerated  #. Nutrition / GI Prophylaxis  --Per recommendations of speech therapy     Note plans for transfer back to [facility].  If patient needs an antipsychotic in the future, and the Nuplazid cannot be continued, quetiapine may be an alternative.  Continue low-dose carbidopa/levodopa.  Patient will likely [facility].  However if needed, I can see in follow-up in my office with [facility]      Interval History:   Patient is more alert and is happy that he is able to move his feet.  Less tremors today  He denies hallucinations       Review of Systems:   Noncontributory       Medications:   Scheduled Meds:    allopurinol  300 mg Oral Daily     apixaban ANTICOAGULANT  5 mg Oral BID     carbidopa-levodopa  1  half-tab Oral TID     carvedilol  12.5 mg Oral BID     donepezil  10 mg Oral At Bedtime     escitalopram  20 mg Oral Daily     furosemide  40 mg Oral Daily     gabapentin  100 mg Oral TID     loratadine  10 mg Oral Daily     multivitamin w/minerals  1 tablet Oral Daily     pimavanserin tartrate  34 mg Oral Daily     psyllium  3 capsule Oral Daily     sennosides  1 tablet Oral BID     spironolactone  25 mg Oral Daily     PRN Meds: acetaminophen, melatonin, ondansetron **OR** ondansetron, polyethylene glycol, prochlorperazine **OR** prochlorperazine **OR** prochlorperazine        Physical Exam:   Vitals: Temp: 97.3  F (36.3  C) Temp src: Axillary BP: 114/68 Pulse: 74   Resp: 16 SpO2: 97 % O2 Device: None (Room air)    Vital Signs with Ranges: Temp:  [97.3  F (36.3  C)-97.8  F (36.6  C)] 97.3  F (36.3  C)  Pulse:  [56-78] 74  Resp:  [16] 16  BP: (108-119)/(63-70) 114/68  SpO2:  [95 %-97 %] 97 %    General Appearance:  No acute distress  Neuro:       Mental Status Exam:    Alert, oriented to place. Hypokinetic speech.  Reduced volume.  Language hesitant.  Does not seem to be diluted/hallucinating         Cranial Nerves:   Vision/visual fields grossly intact.  Pupils are equal and reactive to light.  Extraocular movements intact.  Reduced facial expression but strength intact.  No jaw or tongue deviation.  Palate and shoulder elevation symmetrical          Motor:    Much less rigidity of the left upper extremity and lower extremity.  Mild rigidity right lower extremity, mild right upper extremity.  No tremor  noticeable today.  Can actually open up the fingers on the left side and straighten out at the left arm which was unable to do 2 days ago.  Able to slightly lift the left arm and leg. Strength was intact in the right upper extremity, reduced movement in the right lower extremity       Reflexes: Difficult to elicit because of the degree of rigidity particularly on the left side.  Plantar signs neutral.  No clonus        Sensory: Vibration and pin intact x4                   Coordination:   Hypokinetic right side.  More voluntary movement of the left arm and leg     Gait: Unable to be tested  Neck: Neck is supple   Extremities: No clubbing, no cyanosis, no edema       Data:   ROUTINE IP LABS (Last 3results)  CBC RESULTS:     Recent Labs   Lab Test 08/30/22  1950 08/20/22  1010 08/08/22  0612   WBC 4.5 3.3* 5.1   RBC 3.84* 3.50* 3.63*   HGB 10.5* 9.5* 9.9*   HCT 34.8* 31.5* 32.5*    248 283     Basic Metabolic Panel:  Recent Labs   Lab Test 08/30/22 1950 08/23/22  0742 08/20/22  1010    138 139   POTASSIUM 3.5 4.5 4.2   CHLORIDE 104 102 103   CO2 32 28 28   BUN 11 19.8 23.3*   CR 0.62* 0.59* 0.59*   * 101* 91   CRYS 8.8 9.4 9.1     Liver panel:  Recent Labs   Lab Test 08/30/22 1950 08/02/22  2232 07/31/22  0608 07/06/22  0540 07/04/22  0701   PROTTOTAL 6.2* 5.9* 5.8* 5.6* 5.8*   ALBUMIN 2.6* 3.2* 2.5* 2.2* 2.1*   BILITOTAL 0.3 0.6 0.3 0.3 0.3   ALKPHOS 91 97 95 81 81   AST 12 35 16 17 15   ALT 18 15 15 16 15     Thyroid Panel:  Recent Labs   Lab Test 07/16/22 2056   TSH 1.49      Vitamin B12:   Recent Labs   Lab Test 07/16/22 2056   B12 178*              TIME     15minutes Evaluation/management time     Ev Huerta M.D.  Neurologist  [facility]  Office [phone]

## 2022-09-03 NOTE — PROGRESS NOTES
Care Management Follow Up    Length of Stay (days): 2    Expected Discharge Date: 09/03/2022     Concerns to be Addressed:       Patient plan of care discussed at interdisciplinary rounds: Yes    Anticipated Discharge Disposition: Skilled Nursing Facility, Transitional Care     Anticipated Discharge Services: None  Anticipated Discharge DME: None    Patient/family educated on Medicare website which has current facility and service quality ratings: other (see comments) (has bed hold.)  Education Provided on the Discharge Plan:    Patient/Family in Agreement with the Plan: yes    Referrals Placed by CM/SW:    Private pay costs discussed: Not applicable    Additional Information:  SW called [facility] and left a message requesting a call back to make plans for patient to return to their facility today. HERNANDEZ will continue to follow.      GONZALEZ Jacobsen    [facility]

## 2022-09-04 LAB
ANION GAP SERPL CALCULATED.3IONS-SCNC: 5 MMOL/L (ref 3–14)
BUN SERPL-MCNC: 17 MG/DL (ref 7–30)
CALCIUM SERPL-MCNC: 9 MG/DL (ref 8.5–10.1)
CHLORIDE BLD-SCNC: 104 MMOL/L (ref 94–109)
CO2 SERPL-SCNC: 31 MMOL/L (ref 20–32)
CREAT SERPL-MCNC: 0.48 MG/DL (ref 0.66–1.25)
ERYTHROCYTE [DISTWIDTH] IN BLOOD BY AUTOMATED COUNT: 16 % (ref 10–15)
GFR SERPL CREATININE-BSD FRML MDRD: >90 ML/MIN/1.73M2
GLUCOSE BLD-MCNC: 111 MG/DL (ref 70–99)
HCT VFR BLD AUTO: 38.5 % (ref 40–53)
HGB BLD-MCNC: 11.9 G/DL (ref 13.3–17.7)
HOLD SPECIMEN: NORMAL
MCH RBC QN AUTO: 26.9 PG (ref 26.5–33)
MCHC RBC AUTO-ENTMCNC: 30.9 G/DL (ref 31.5–36.5)
MCV RBC AUTO: 87 FL (ref 78–100)
PLATELET # BLD AUTO: 289 10E3/UL (ref 150–450)
POTASSIUM BLD-SCNC: 3.9 MMOL/L (ref 3.4–5.3)
RBC # BLD AUTO: 4.42 10E6/UL (ref 4.4–5.9)
SODIUM SERPL-SCNC: 140 MMOL/L (ref 133–144)
WBC # BLD AUTO: 6.9 10E3/UL (ref 4–11)

## 2022-09-04 PROCEDURE — 36415 COLL VENOUS BLD VENIPUNCTURE: CPT | Performed by: HOSPITALIST

## 2022-09-04 PROCEDURE — 80048 BASIC METABOLIC PNL TOTAL CA: CPT | Performed by: HOSPITALIST

## 2022-09-04 PROCEDURE — 99232 SBSQ HOSP IP/OBS MODERATE 35: CPT | Performed by: HOSPITALIST

## 2022-09-04 PROCEDURE — 250N000013 HC RX MED GY IP 250 OP 250 PS 637: Performed by: PSYCHIATRY & NEUROLOGY

## 2022-09-04 PROCEDURE — 85014 HEMATOCRIT: CPT | Performed by: HOSPITALIST

## 2022-09-04 PROCEDURE — 120N000001 HC R&B MED SURG/OB

## 2022-09-04 PROCEDURE — 250N000013 HC RX MED GY IP 250 OP 250 PS 637: Performed by: NURSE PRACTITIONER

## 2022-09-04 PROCEDURE — 250N000013 HC RX MED GY IP 250 OP 250 PS 637: Performed by: HOSPITALIST

## 2022-09-04 PROCEDURE — 250N000013 HC RX MED GY IP 250 OP 250 PS 637: Performed by: INTERNAL MEDICINE

## 2022-09-04 PROCEDURE — 99233 SBSQ HOSP IP/OBS HIGH 50: CPT | Performed by: PSYCHIATRY & NEUROLOGY

## 2022-09-04 RX ORDER — QUETIAPINE FUMARATE 25 MG/1
25 TABLET, FILM COATED ORAL AT BEDTIME
Status: DISCONTINUED | OUTPATIENT
Start: 2022-09-04 | End: 2022-09-30 | Stop reason: HOSPADM

## 2022-09-04 RX ORDER — CARBIDOPA AND LEVODOPA 25; 100 MG/1; MG/1
1 TABLET ORAL 3 TIMES DAILY
Status: DISCONTINUED | OUTPATIENT
Start: 2022-09-04 | End: 2022-09-30 | Stop reason: HOSPADM

## 2022-09-04 RX ORDER — ESCITALOPRAM OXALATE 10 MG/1
10 TABLET ORAL DAILY
Status: DISCONTINUED | OUTPATIENT
Start: 2022-09-05 | End: 2022-09-30 | Stop reason: HOSPADM

## 2022-09-04 RX ADMIN — SPIRONOLACTONE 25 MG: 25 TABLET ORAL at 08:55

## 2022-09-04 RX ADMIN — QUETIAPINE FUMARATE 25 MG: 25 TABLET ORAL at 22:24

## 2022-09-04 RX ADMIN — DONEPEZIL HYDROCHLORIDE 10 MG: 10 TABLET ORAL at 22:24

## 2022-09-04 RX ADMIN — ALLOPURINOL 300 MG: 300 TABLET ORAL at 08:56

## 2022-09-04 RX ADMIN — PIMAVANSERIN TARTRATE 34 MG: 34 CAPSULE ORAL at 09:20

## 2022-09-04 RX ADMIN — APIXABAN 5 MG: 5 TABLET, FILM COATED ORAL at 20:03

## 2022-09-04 RX ADMIN — APIXABAN 5 MG: 5 TABLET, FILM COATED ORAL at 11:38

## 2022-09-04 RX ADMIN — CARVEDILOL 12.5 MG: 12.5 TABLET, FILM COATED ORAL at 20:02

## 2022-09-04 RX ADMIN — GABAPENTIN 100 MG: 100 CAPSULE ORAL at 20:02

## 2022-09-04 RX ADMIN — CARBIDOPA AND LEVODOPA 1 TABLET: 25; 100 TABLET ORAL at 20:02

## 2022-09-04 RX ADMIN — LORATADINE 10 MG: 10 TABLET ORAL at 08:56

## 2022-09-04 RX ADMIN — SENNOSIDES 1 TABLET: 8.6 TABLET, FILM COATED ORAL at 20:02

## 2022-09-04 RX ADMIN — FUROSEMIDE 40 MG: 40 TABLET ORAL at 08:55

## 2022-09-04 RX ADMIN — GABAPENTIN 100 MG: 100 CAPSULE ORAL at 14:32

## 2022-09-04 RX ADMIN — MULTIPLE VITAMINS W/ MINERALS TAB 1 TABLET: TAB at 08:56

## 2022-09-04 RX ADMIN — SENNOSIDES 1 TABLET: 8.6 TABLET, FILM COATED ORAL at 08:56

## 2022-09-04 RX ADMIN — CARBIDOPA AND LEVODOPA 1 TABLET: 25; 100 TABLET ORAL at 14:32

## 2022-09-04 RX ADMIN — Medication 3 CAPSULE: at 08:56

## 2022-09-04 RX ADMIN — CARBIDOPA AND LEVODOPA 1 HALF-TAB: 25; 100 TABLET ORAL at 08:56

## 2022-09-04 RX ADMIN — ESCITALOPRAM OXALATE 20 MG: 20 TABLET ORAL at 08:55

## 2022-09-04 RX ADMIN — GABAPENTIN 100 MG: 100 CAPSULE ORAL at 08:56

## 2022-09-04 ASSESSMENT — ACTIVITIES OF DAILY LIVING (ADL)
ADLS_ACUITY_SCORE: 51
ADLS_ACUITY_SCORE: 57
ADLS_ACUITY_SCORE: 51
ADLS_ACUITY_SCORE: 53
ADLS_ACUITY_SCORE: 51
ADLS_ACUITY_SCORE: 57
ADLS_ACUITY_SCORE: 51
ADLS_ACUITY_SCORE: 57
ADLS_ACUITY_SCORE: 51
ADLS_ACUITY_SCORE: 53
ADLS_ACUITY_SCORE: 51
ADLS_ACUITY_SCORE: 57

## 2022-09-04 NOTE — PROGRESS NOTES
[facility]    Medicine Progress Note - Hospitalist Service    Date of Admission:  8/30/2022    Assessment & Plan        William Almazan is an 81-year-old male with a past medical history significant for dementia, urinary retention with chronic indwelling Louie catheter, paroxysmal atrial fibrillation, heart failure with preserved ejection fraction, coronary artery disease, who presents to the Emergency Department with left hand pain, decreased motion and increasing generalized weakness.    Possible lewy body dementia with rigidity  Patient admitted with generalized weakness and progressive inability to ambulate accompanied by extremity rigidity  Wife Ilsa reports worsening symptoms after recent hospitalization and start of antipsychotic medications to help treat underlying psychosis  However, since start, the patient has exhibited worsening rigidity concerning for parkinsonism  Neurology was consulted and overall there is concern for underlying Lewy body dementia, with dystonia that may have been worsened by starting antipsychotics  Psychiatry consulted and did stop the patient's risperdal and then abilify due to concern that they could be worsening motor symptoms  Plan  - stop nuplazid on 9/2, switch to seroquel per psychiatry  - increased carbidopa/levadopa 9/4  - consider nuplazid prior authorization as outpatient if seroquel is not an option  - appreciate psych and neuro seeing      Left hand and wrist discomfort due to advanced arthritic changes /SLAC  Initially there was concern for a stroke given the acuity of onset,   He appears to have generalized weakness, with approximately 4/5 upper extremities and 3/5 bilateral lower extremities  Unable to obtain an MRI with an abandoned RV lead  Marked deterioration in the last 2 months noted by wife Ilsa, with largest deterioration after starting aggressive antispychotic regimen about 1 month ago  Some rigidity noted. He had his respirdone  stopped by psychiatry on 8/31 and abilify started, however he still appears more rigid on the left versus the right side  Noted last evaluation July 2022 by neurology who noted normal extremity strength  Overall suspect that his deterioration is due to general deconditioning with addition of possible dystonia, however, with questionable worse deficits on the left side, would ask neurology to reevaluate    Orthopedics consulted, appreciate their assistance. See consult not for details.    Left wrist splint ordered. Elevate wrist when at rest. WBAT.    Outpatient follow-up with hand surgeon.    PRN acetaminophen for pain control      Chronic indwelling Louie catheter due to urinary retention and chronic hematuria  This appears to be roughly at baseline.    Follow up in Urology clinic as previously arranged unless he develops any acute issues.    Dementia, depression and hallucinations  The patient has ongoing issues with hallucinations, noting that he is hearing and seeing Zuhair Lainez mimicking him and also hears voices, though not commands of self-harm.  He recently had a decrease in the Risperdal from the morning dose to every other day instead of daily, but family is interested in having a psych reevaluate to see if any further adjustments may be needed.  Of note, he is still fairly sedate and lethargic compared to where he is at his baseline, which has been attributed to his psychiatric regimen.  Psych consulted on 8/31 and did stop the respirdal and started abilify    Psychiatry consulted, appreciate their assistance.    For now, continue with his current dosing of abilify , Aricept, gabapentin pending psych evaluation.    See psych plan to reduce lexapro, at some point SNRI would be preferred in Lewy Body    Gout    Continue PTA allopurinol.    Heart failure with preserved ejection fraction  Hypertension  Coronary artery disease  Dyslipidemia    Continue PTA regimen of Coreg, Lasix, Aldactone.    KRISTIAN    CPAP as  "needed.    Paroxysmal atrial fibrillation  He has a pacer in place.    Continue PTA Coreg and apixaban.    Malnutrition    This is ongoing from previous hospitalization.  Continue to optimize nutrition as able.     Iron deficiency anemia    Recently completed a course of Venofer.  Monitor periodically.    Recent COVID-19 infection    He had minimal symptoms with first positive on 07/30, considered COVID recovered.       Diet: Combination Diet Low Saturated Fat Na <2400mg Diet, No Caffeine Diet  Snacks/Supplements Pediatric: Ensure Enlive; With Meals    DVT Prophylaxis: DOAC  Louie Catheter: PRESENT, indication: Retention  Central Lines: None  Cardiac Monitoring: None  Code Status: Full Code      Disposition Plan      Expected Discharge Date: 09/06/2022    Discharge Delays: Specialist Consult (enter specialist & decision needed in comments)    Discharge Comments: Message left for facility to return - no answer  Psych Re-consulted- Nuplazid needs Prior Auth        The patient's care was discussed with the Bedside Nurse and Patient and his family. About 50 total minutes spent with him in all aspects, with 30 minutes of family conference over the phone.    Jose Law DO  Hospitalist Service  [facility]  Securely message with the Vocera Web Console (learn more here)  Text page via [employer] Paging/Directory         Clinically Significant Risk Factors Present on Admission                # Overweight: Estimated body mass index is 26.66 kg/m  as calculated from the following:    Height as of this encounter: 1.753 m (5' 9\").    Weight as of this encounter: 81.9 kg (180 lb 8.9 oz).        ______________________________________________________________________    Interval History   William Almazan was seen this afternoon. Little change today    Data reviewed today: I reviewed all medications, new labs and imaging results over the last 24 hours. I personally reviewed no images or EKG's " today.    Physical Exam   Vital Signs: Temp: 99.1  F (37.3  C) Temp src: Axillary BP: (!) 119/97 Pulse: 72   Resp: 16 SpO2: 99 % O2 Device: None (Room air)    Weight: 180 lbs 8.91 oz  Constitutional: awake, drowsy, cooperative, no apparent distress, laying in the hospital bed  Respiratory: clear to auscultation bilaterally, no crackles or wheezing  Cardiovascular: regular rate and rhythm, normal S1 and S2, no murmur noted  GI: normal bowel sounds, soft, non-distended, non-tender  Skin: warm, dry  Musculoskeletal: no lower extremity pitting edema present  Neurologic: awake, more alert, muscle strength about 4/5 bilaterally upper and 2/5 bilaterally lower    Data   Recent Labs   Lab 09/04/22  1500 09/04/22  0723 08/30/22  1950   WBC 6.9  --  4.5   HGB 11.9*  --  10.5*   MCV 87  --  91     --  214   NA  --  140 140   POTASSIUM  --  3.9 3.5   CHLORIDE  --  104 104   CO2  --  31 32   BUN  --  17 11   CR  --  0.48* 0.62*   ANIONGAP  --  5 4   CRYS  --  9.0 8.8   GLC  --  111* 151*   ALBUMIN  --   --  2.6*   PROTTOTAL  --   --  6.2*   BILITOTAL  --   --  0.3   ALKPHOS  --   --  91   ALT  --   --  18   AST  --   --  12     Medications       allopurinol  300 mg Oral Daily     apixaban ANTICOAGULANT  5 mg Oral BID     carbidopa-levodopa  1 tablet Oral TID     carvedilol  12.5 mg Oral BID     donepezil  10 mg Oral At Bedtime     [START ON 9/5/2022] escitalopram  10 mg Oral Daily     furosemide  40 mg Oral Daily     gabapentin  100 mg Oral TID     loratadine  10 mg Oral Daily     multivitamin w/minerals  1 tablet Oral Daily     psyllium  3 capsule Oral Daily     QUEtiapine  25 mg Oral At Bedtime     sennosides  1 tablet Oral BID     spironolactone  25 mg Oral Daily

## 2022-09-04 NOTE — CONSULTS
"    Psychiatry Consultation; Follow up          Reason for Consult, requesting source:    ? Lewy body dementia   Requesting source: Jose Law MD            Interim history:    William Almazan is an 81 year old male with a history of dementia, a-fib, CAD, and chronic indwelling keating catheter who was sent to hospital by his TCU for leg weakness and left wrist/hand pain. He was recently hospitalized at [facility] from 8/2/22-8/24/22 for weakness. During that hospitalization, psychiatry was consulted for hallucinations which were felt to be related to dementia, with some mild depression as well. He was started on risperidone at that time, later switched to aripiprazole due to low energy on risperidone. There was also concern for dystonic reaction from risperidone. Aripiprazole was stopped after only 4 doses due to dizziness, though it was noted that he had improved energy and mood while on aripiprazole. Abilify had been restarted at 2 mg at bedtime, patient still experiencing muscle stiffness and no improvement in hallucinations.     Patient seen for follow-up today. He is reporting good improvement in terms of how he is feeling physically. He reports that he is sleeping well. Appetite good. Energy improving. No pain or discomfort anywhere >Neurology is following and he is taking Sinamet. Dr. Law believes he may be doing better with starting the Sinamet. It is unclear if Nuplazid is leading to any benefit yet. Dr. Law wondered if Seroquel could be considered for his ongoing halluciantions, especially as pharmacy weighed in and it sounds like Nuplazid likely would be very expensive. Patient reports his mood remains very depressed, he says 8/10, equally bothersome as his anxiety, which he also rates 8/10. He states he has a lot of hopelessness. He just feels badly but struggles to explain why. He says his mental health is 'terrible.' He reports he is still hearing voices, sounding like \"Zuhair Lainez\" " saying various things including to harm himself. He is very clear that he would never act on these voices. He says he knows it is in his mind and not a real voice. He is incredulous when asked if he would ever act on the thoughts as if that is a ridiculous question is his mind. He is very open to making medication changes though. He would like to try Seroquel when we reviewed this could be considered for his voices, mood, and anxiety, with possible risk of worsening his symptoms and other risks including cardiac arrhythmia, movment disorder side effects and rare risks of life threatening NMS.           Medications:     Current Facility-Administered Medications   Medication     acetaminophen (TYLENOL) tablet 650 mg     allopurinol (ZYLOPRIM) tablet 300 mg     apixaban ANTICOAGULANT (ELIQUIS) tablet 5 mg     carbidopa-levodopa half-tab 12.5-50 mg     carvedilol (COREG) tablet 12.5 mg     donepezil (ARICEPT) tablet 10 mg     [START ON 9/5/2022] escitalopram (LEXAPRO) tablet 10 mg     furosemide (LASIX) tablet 40 mg     gabapentin (NEURONTIN) capsule 100 mg     loratadine (CLARITIN) tablet 10 mg     melatonin tablet 1 mg     multivitamin w/minerals (THERA-VIT-M) tablet 1 tablet     ondansetron (ZOFRAN ODT) ODT tab 4 mg    Or     ondansetron (ZOFRAN) injection 4 mg     polyethylene glycol (MIRALAX) powder 17 g     prochlorperazine (COMPAZINE) injection 5 mg    Or     prochlorperazine (COMPAZINE) tablet 5 mg    Or     prochlorperazine (COMPAZINE) suppository 12.5 mg     psyllium (METAMUCIL/KONSYL) capsule 3 capsule     QUEtiapine (SEROquel) half-tab 12.5 mg     QUEtiapine (SEROquel) tablet 25 mg     sennosides (SENOKOT) tablet 1 tablet     spironolactone (ALDACTONE) tablet 25 mg     ROS: 10 point review of systems negative other than noted above         MSE:     Appearance: age-appearing, dressed in hospital gown, thin, fair hygiene, in no acute distress, he is lying down appears frail and tired  Behavior: calm  Eye  "contact: poor, does not open his eyes  Orientation: alert, oriented to self, place, month and year.   Movements: currently calm, laying still. No overt involuntary movements or rigidity noticed on general movements.   Mood depressed, 8/10  Affect: flat  Speech: delayed, low volume, hypokinetic  Language: fluent in English   Memory: recent recall is impaired; remote recall fair  Intellectual capacity: Average for development based on word choice  Concentration: attentive  Thought process and content: linear, organized; did not elicit delusional beliefs, although noted to be experiencing delusions yesterday with neurology. Endorses auditory and visual hallucinations.Command auditory hallucinations but definitely states he knows these are not real and would never act on the voices.    Associations: no loosening noted  Insight: fair  Judgement: fair  Safety: denies suicidal or homicidal ideation    Vital signs:  Temp: 98  F (36.7  C) Temp src: Oral BP: 137/89 Pulse: 84   Resp: 16 SpO2: 99 % O2 Device: None (Room air)   Height: 175.3 cm (5' 9\") Weight: 81.9 kg (180 lb 8.9 oz)  Estimated body mass index is 26.66 kg/m  as calculated from the following:    Height as of this encounter: 1.753 m (5' 9\").    Weight as of this encounter: 81.9 kg (180 lb 8.9 oz).              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 Almazan is an 81 year old male with likely Lewy Body Dementia who presented with ongoing hallucinations and depression. Neurology feel his symptoms are consistent with Lewy body dementia. Antipsychotic agents (risperidone, aripiprazole) have been trialed with no improvement thus far in his hallucinations, and possibly leading to worsening motor symptoms.          Continue to monitor QTc - was 499 on 8/30. RBBB Present likely expalins the slightly prolonged QTc. Reviewed with Dr. Law and agreed cautious trial of quetiapine is reasonable       "     Summary of Recommendations:   1) Will stop aripiprazole due to potentially worsening motor symptoms, as well as lack of efficacy for his hallucinations.    2) Hold pimavanserin for now, as appears likely cost prohibitive. Quetiapine can sometimes be effective and well tolerated for some patients with parkinsonism and psychosis. I agree that clozapine would have higher risk and would prefer a trial of quetiapine before going to clozapine. We will start at 25 mg HS with 12.5 mg BID PRN for any agitation or anxiety or distressing hallucinations during the daytime. Unlikely will be much in the way of dopamine effect at this low dosing so would seem unlikely to have significant risk of NMS or any dystonia. Still, it might reduce the intensity or salience of the hallucinations, may improve depression and anxiety symptoms as well importantly. As tolerated further dose titration could be considered. My understanding as well as that with improving parkinsonism even with dopamine medication like Sinamet  can have an amelioration of psychosis symptoms as well counterintuitively so perhaps with improvement in overall well being from Sinamet he could expect further amelioration of his psychiatric symptoms. Generally dual mechanism antidepressants like SNRIs are better in parkinson's related depression, but for now would defer switching Lexapro to an SNRI to the outpatient setting. SNRIs would have higher cardiac risk though that would have have to be included in risk benefit assessment.     3 Lower escitalopram 10 mg daily for treatment of depression given age and given we will be starting quetiapine and there are warnings of QTc prolongation above 10 mg in elderly.   4 Reconsult psychiatry as needed, thank you      Campbell Acosta MD  Consult/Liaison Psychiatry  [facility]  Provider can be paged via [facility] Paging/Directory  If I am unavailable, please contact [facility] at [phone] to reach the on-call provider.

## 2022-09-04 NOTE — CONSULTS
Care Management Follow Up    Length of Stay (days): 3    Expected Discharge Date: 09/06/2022     Concerns to be Addressed:       Patient plan of care discussed at interdisciplinary rounds: Yes    Anticipated Discharge Disposition: Skilled Nursing Facility, Transitional Care     Anticipated Discharge Services: None  Anticipated Discharge DME: None    Patient/family educated on Medicare website which has current facility and service quality ratings: other (see comments) (has bed hold.)  Education Provided on the Discharge Plan:    Patient/Family in Agreement with the Plan: yes    Referrals Placed by CM/SW:    Private pay costs discussed: Not applicable    Additional Information:  Initial consult completed 08/31. SW called [facility] and left a message inquiring whether TCU will allow nuplazid (5000$ a month cost) versus sending 1 months supply on discharge from discharge pharmacy.   SW will continue to follow.      GONZALEZ Jacobsen    [facility]

## 2022-09-04 NOTE — CONSULTS
"Triage and Transition - Consult and Liaison     William Almazan  September 4, 2022    Psychiatry consult acknowledged. Providers available for medication consults again starting Tuesday, 9/6/22.     Reviewed chart. Reached out to SW GONZALEZ Jacobsen, via email, regarding status of care coordination for patient SHELLY Rx access to nuplazid.     Per chart review, SW and Discharge Pharmacy Liaison are in process of care coordination for patient SHE[facility] Rx access to nuplazid since 9/2, process potentially more fruitful following holiday weekend.     Per chart review, Psychiatry provider Ovidio Richardson CNP, recommended initiation of nuplazid while patient hospitalized due to exacerbation of dystonia and hypokinesia with Abilify and exacerbation of fatigue and somnolence with risperdone, even when lower doses trialed. CNP recommended again clozaril as mentioned by neurology due to patient's underlying heart condition.     Per chart review, while hospitalist notes that quetiapine may be a more accessible choice, Ovidio Richardson CNP, notes \"Randomized controlled trials suggest that quetiapine, although used frequently, tends not to be very effective in alleviating psychotic symptoms in the setting of dementia with lewy bodies or in parkinson's disease psychosis.\"    Per chart review, Ovidio Richardson CNP, notes there may be up to a 4 week delay in nuplazid effecting reduction of hallucinations.     IP Psychiatry Consult order remains active.     MARILEE ALVARENGA M.Ed., [facility], [facility]  Triage and Transition - Consult and Liaison   [phone]      "

## 2022-09-04 NOTE — PROGRESS NOTES
[facility]  Neurology Daily Note      Admission Date:8/30/2022   Date of service: 09/04/2022   Hospital Day: 6                                                   Assessment and Plan:   #.  Rigidity in the context of chronic dementia, urinary dysfunction, psychosis/hallucinations is most consistent w/ Lewy body dementia.  Generally patients with Lewy body dementia are very sensitive to antipsychotic agents with regard to extrapyrimidal side effects.  --psychiatry recommendation to discontinue Abilify and start Nuplazid was helpful.  He does seem somewhat better over the last 3 days with regard to the rigidity/  Change to quetiapine started as doubtful Nuplazid  will be covered on discharge.    Parkinsonism is common component of Lewy body dementia.    Will  the dose of carbidopa levodopa today 9/4/22 to 1 whole tablet tid.  CK is normal    Continue donepezil  EEG revealed nonspecific slowing-no evidence of nonconvulsive seizures, significant  muscle artifact from rigidity  #vitamin B12 deficiency-documented on recent hospitalization-longstanding history-continue replacement as this can contribute to neurologic symptoms  #. DVT Prophylaxis  --Mechanical + per hospitalist service  -Continue physical therapy as tolerated  #. Nutrition / GI Prophylaxis  --Per recommendations of speech therapy     Note plans for transfer back to [facility], once stablized.  Updated wife who agrees with plan.     Patient will likely need to follow-up with the neurologist at [facility] who has seen patient in the past.  Dr. Eagle to follow from General Neurology service tomorrow.      Interval History:   Patient is still very rigid but improved.  Reports some voices telling him to hurt himself. No visual hallucination. D/w Dr. Lu/.       Review of Systems:   Noncontributory       Medications:   Scheduled Meds:    allopurinol  300 mg Oral Daily     apixaban ANTICOAGULANT  5 mg Oral BID     carbidopa-levodopa  1 half-tab  Oral TID     carvedilol  12.5 mg Oral BID     donepezil  10 mg Oral At Bedtime     [START ON 9/5/2022] escitalopram  10 mg Oral Daily     furosemide  40 mg Oral Daily     gabapentin  100 mg Oral TID     loratadine  10 mg Oral Daily     multivitamin w/minerals  1 tablet Oral Daily     psyllium  3 capsule Oral Daily     QUEtiapine  25 mg Oral At Bedtime     sennosides  1 tablet Oral BID     spironolactone  25 mg Oral Daily     PRN Meds: acetaminophen, melatonin, ondansetron **OR** ondansetron, polyethylene glycol, prochlorperazine **OR** prochlorperazine **OR** prochlorperazine, QUEtiapine        Physical Exam:   Vitals: Temp: 98  F (36.7  C) Temp src: Oral BP: 137/89 Pulse: 84   Resp: 16 SpO2: 99 % O2 Device: None (Room air)    Vital Signs with Ranges: Temp:  [97.4  F (36.3  C)-98.4  F (36.9  C)] 98  F (36.7  C)  Pulse:  [51-84] 84  Resp:  [16-18] 16  BP: (108-143)/() 137/89  SpO2:  [96 %-99 %] 99 %    General Appearance:  No acute distress  Neuro:       Mental Status Exam:    Alert, oriented to Hospital (but [facility]), Not month ( but August) Oriented to president and room. Hypokinetic speech.  Reduced volume.  Language hesitant.  Does not seem to be /hallucinating         Cranial Nerves:   Vision/visual fields grossly intact.  Pupils are equal and reactive to light.  Extraocular movements intact.  Reduced facial expression but strength intact.  No jaw or tongue deviation.  Palate and shoulder elevation symmetrical          Motor:    Much less rigidity of the left upper extremity and lower extremity. Moderate rigidity of all extremities. No tremor  noticeable today.  Can actually open up the fingers on the left side and straighten out at the left arm which was unable to do 3 days ago.  Able to  lift the left arm and slightlyleg. Some dorsiflexion r>L Strength was intact in the right upper extremity, reduced movement in the right lower extremity       Reflexes: Difficult to elicit because of the degree of rigidity  particularly on the left side.  Plantar signs neutral.  No clonus       Sensory: Vibration and pin intact x4                   Coordination:   Hypokinetic right side.  More voluntary movement of the left arm and leg     Gait: Unable to be tested  Neck: Neck is supple   Extremities: No clubbing, no cyanosis, no edema       Data:   ROUTINE IP LABS (Last 3results)  CBC RESULTS:     Recent Labs   Lab Test 08/30/22  1950 08/20/22  1010 08/08/22  0612   WBC 4.5 3.3* 5.1   RBC 3.84* 3.50* 3.63*   HGB 10.5* 9.5* 9.9*   HCT 34.8* 31.5* 32.5*    248 283     Basic Metabolic Panel:  Recent Labs   Lab Test 09/04/22  0723 08/30/22 1950 08/23/22  0742    140 138   POTASSIUM 3.9 3.5 4.5   CHLORIDE 104 104 102   CO2 31 32 28   BUN 17 11 19.8   CR 0.48* 0.62* 0.59*   * 151* 101*   CRYS 9.0 8.8 9.4     Liver panel:  Recent Labs   Lab Test 08/30/22 1950 08/02/22  2232 07/31/22  0608 07/06/22  0540 07/04/22  0701   PROTTOTAL 6.2* 5.9* 5.8* 5.6* 5.8*   ALBUMIN 2.6* 3.2* 2.5* 2.2* 2.1*   BILITOTAL 0.3 0.6 0.3 0.3 0.3   ALKPHOS 91 97 95 81 81   AST 12 35 16 17 15   ALT 18 15 15 16 15     Thyroid Panel:  Recent Labs   Lab Test 07/16/22 2056   TSH 1.49      Vitamin B12:   Recent Labs   Lab Test 07/16/22 2056   B12 178*              TIME     35minutes Evaluation/management time     Ev Huerta M.D.  Neurologist  [facility]  Office [phone]

## 2022-09-04 NOTE — PLAN OF CARE
"Goal Outcome Evaluation:    Plan of Care Reviewed With: patient     Overall Patient Progress: no change    Outcome Evaluation: Slept, believes he has COVID and needs \"a shot\"    DATE & TIME: 9/3/22 2300 - 9/4/22 0700    COGNITION/BEHAVIOR: Alert, confused  Vital signs:  Temp: 97.8  F (36.6  C) Temp src: Axillary BP: 117/65 Pulse: 59   Resp: 16 SpO2: 97 % via RA  MOBILITY: Total, turn/repo  PAIN: Denies  DIET: Regular  RESP: Lung sounds clear, no reports of shortness of breath  CV: HRRR, no reports of chest pain  GI/: Incontinent of stool, chronic Louie with maroon/tea colored output  PV/NV: Moderate ankle edema  SKIN: Sacral wound  LINES: PIV saline locked  OTHER: Awaiting dispo back to care facility  "

## 2022-09-04 NOTE — PLAN OF CARE
Goal Outcome Evaluation:  Orientation/Cognitive: A but lethargic and confused, Hx of dementia  Observation Goals (Met/ Not Met): inpt  Mobility Level/Assist Equipment: Up with lift, has not been OOB this shift as pt very lethargic.  Global weakness to the extremities,  p noted to be moving his LUE independently today, wife noted LUE mobility little better today. Psych and Neuro adjusting meds. Please see their notes for more details.  Fall Risk (Y/N): Yes  Behavior Concerns: None, calm and cooperative  Pain Management: Denies pain  Tele/VS/O2: VSS on RA  except for low grade temp, pt is frequently found to be diaphoretic with frequent sweat stains on the pillow covers, bedside fan provided. no tele  ABNL Lab/BG: Hgb of 11.9  Diet: cardiac no caffeine, pt is a total feed.  Bowel/Bladder: incontinent, chronic keating with hxt of chronic hematuria  Skin Concerns: Sacral pressure injury and scabs  Drains/Devices: PIV Sled  Tests/Procedures for next shift: none  Anticipated DC date & active delays: Pending progress  Patient Stated Goal for Today: To rest

## 2022-09-05 LAB
ERYTHROCYTE [DISTWIDTH] IN BLOOD BY AUTOMATED COUNT: 15.8 % (ref 10–15)
HCT VFR BLD AUTO: 37.5 % (ref 40–53)
HGB BLD-MCNC: 11.3 G/DL (ref 13.3–17.7)
MCH RBC QN AUTO: 26.8 PG (ref 26.5–33)
MCHC RBC AUTO-ENTMCNC: 30.1 G/DL (ref 31.5–36.5)
MCV RBC AUTO: 89 FL (ref 78–100)
PLATELET # BLD AUTO: 274 10E3/UL (ref 150–450)
RBC # BLD AUTO: 4.21 10E6/UL (ref 4.4–5.9)
WBC # BLD AUTO: 7.2 10E3/UL (ref 4–11)

## 2022-09-05 PROCEDURE — 250N000013 HC RX MED GY IP 250 OP 250 PS 637: Performed by: HOSPITALIST

## 2022-09-05 PROCEDURE — 36415 COLL VENOUS BLD VENIPUNCTURE: CPT | Performed by: HOSPITALIST

## 2022-09-05 PROCEDURE — 250N000013 HC RX MED GY IP 250 OP 250 PS 637: Performed by: INTERNAL MEDICINE

## 2022-09-05 PROCEDURE — 250N000013 HC RX MED GY IP 250 OP 250 PS 637: Performed by: PSYCHIATRY & NEUROLOGY

## 2022-09-05 PROCEDURE — 120N000001 HC R&B MED SURG/OB

## 2022-09-05 PROCEDURE — 99232 SBSQ HOSP IP/OBS MODERATE 35: CPT | Performed by: HOSPITALIST

## 2022-09-05 PROCEDURE — 85014 HEMATOCRIT: CPT | Performed by: HOSPITALIST

## 2022-09-05 RX ORDER — CARBOXYMETHYLCELLULOSE SODIUM 5 MG/ML
1 SOLUTION/ DROPS OPHTHALMIC
Status: DISCONTINUED | OUTPATIENT
Start: 2022-09-05 | End: 2022-09-30 | Stop reason: HOSPADM

## 2022-09-05 RX ADMIN — APIXABAN 5 MG: 5 TABLET, FILM COATED ORAL at 08:13

## 2022-09-05 RX ADMIN — GABAPENTIN 100 MG: 100 CAPSULE ORAL at 21:01

## 2022-09-05 RX ADMIN — Medication 3 CAPSULE: at 08:12

## 2022-09-05 RX ADMIN — GABAPENTIN 100 MG: 100 CAPSULE ORAL at 15:46

## 2022-09-05 RX ADMIN — DONEPEZIL HYDROCHLORIDE 10 MG: 10 TABLET ORAL at 22:15

## 2022-09-05 RX ADMIN — CARBIDOPA AND LEVODOPA 1 TABLET: 25; 100 TABLET ORAL at 08:15

## 2022-09-05 RX ADMIN — CARBIDOPA AND LEVODOPA 1 TABLET: 25; 100 TABLET ORAL at 15:46

## 2022-09-05 RX ADMIN — CARVEDILOL 12.5 MG: 12.5 TABLET, FILM COATED ORAL at 08:13

## 2022-09-05 RX ADMIN — SPIRONOLACTONE 25 MG: 25 TABLET ORAL at 08:14

## 2022-09-05 RX ADMIN — LORATADINE 10 MG: 10 TABLET ORAL at 08:14

## 2022-09-05 RX ADMIN — ESCITALOPRAM OXALATE 10 MG: 10 TABLET ORAL at 08:13

## 2022-09-05 RX ADMIN — SENNOSIDES 1 TABLET: 8.6 TABLET, FILM COATED ORAL at 21:01

## 2022-09-05 RX ADMIN — SENNOSIDES 1 TABLET: 8.6 TABLET, FILM COATED ORAL at 08:12

## 2022-09-05 RX ADMIN — CARBIDOPA AND LEVODOPA 1 TABLET: 25; 100 TABLET ORAL at 21:01

## 2022-09-05 RX ADMIN — FUROSEMIDE 40 MG: 40 TABLET ORAL at 08:13

## 2022-09-05 RX ADMIN — GABAPENTIN 100 MG: 100 CAPSULE ORAL at 08:15

## 2022-09-05 RX ADMIN — ALLOPURINOL 300 MG: 300 TABLET ORAL at 08:15

## 2022-09-05 RX ADMIN — APIXABAN 5 MG: 5 TABLET, FILM COATED ORAL at 21:01

## 2022-09-05 RX ADMIN — CARVEDILOL 12.5 MG: 12.5 TABLET, FILM COATED ORAL at 21:01

## 2022-09-05 RX ADMIN — QUETIAPINE FUMARATE 25 MG: 25 TABLET ORAL at 22:15

## 2022-09-05 RX ADMIN — MULTIPLE VITAMINS W/ MINERALS TAB 1 TABLET: TAB at 08:14

## 2022-09-05 ASSESSMENT — ACTIVITIES OF DAILY LIVING (ADL)
ADLS_ACUITY_SCORE: 57
ADLS_ACUITY_SCORE: 55
ADLS_ACUITY_SCORE: 57
ADLS_ACUITY_SCORE: 55
ADLS_ACUITY_SCORE: 55
ADLS_ACUITY_SCORE: 57

## 2022-09-05 NOTE — PROVIDER NOTIFICATION
MD Notification    Notified Person: MD    Notified Person Name:Jose Law     Notification Date/Time:9-5 1550    Notification Interaction: web page    Purpose of Notification:Please order eye drops if approp.  Orders Received:    Comments:

## 2022-09-05 NOTE — PROGRESS NOTES
[facility]    Medicine Progress Note - Hospitalist Service    Date of Admission:  8/30/2022    Assessment & Plan        William Almazan is an 81-year-old male with a past medical history significant for dementia, urinary retention with chronic indwelling Louie catheter, paroxysmal atrial fibrillation, heart failure with preserved ejection fraction, coronary artery disease, who presents to the Emergency Department with left hand pain, decreased motion and increasing generalized weakness.    Possible lewy body dementia with rigidity  Patient admitted with generalized weakness and progressive inability to ambulate accompanied by extremity rigidity  Wife Ilsa reports worsening symptoms after recent hospitalization and start of antipsychotic medications to help treat underlying psychosis  However, since start, the patient has exhibited worsening rigidity concerning for parkinsonism  Neurology was consulted and overall there is concern for underlying Lewy body dementia, with dystonia that may have been worsened by starting antipsychotics  Psychiatry consulted and did stop the patient's risperdal and then abilify due to concern that they could be worsening motor symptoms  Plan  - continue seroquel  - increased carbidopa/levadopa 9/4  - consider nuplazid prior authorization as outpatient if seroquel is not an option  - appreciate psych and neuro seeing, they are adjusting medications. Once regimen finalized, they can return to TCU for further rehab.       Left hand and wrist discomfort due to advanced arthritic changes /SLAC  Initially there was concern for a stroke given the acuity of onset,   He appears to have generalized weakness, with approximately 4/5 upper extremities and 3/5 bilateral lower extremities  Unable to obtain an MRI with an abandoned RV lead  Marked deterioration in the last 2 months noted by wife Ilsa, with largest deterioration after starting aggressive antispychotic regimen  about 1 month ago  Some rigidity noted. He had his respirdone stopped by psychiatry on 8/31 and abilify started, however he still appears more rigid on the left versus the right side  Noted last evaluation July 2022 by neurology who noted normal extremity strength  Overall suspect that his deterioration is due to general deconditioning with addition of possible dystonia, however, with questionable worse deficits on the left side, would ask neurology to reevaluate    Orthopedics consulted, appreciate their assistance. See consult not for details.    Left wrist splint ordered. Elevate wrist when at rest. WBAT.    Outpatient follow-up with hand surgeon.    PRN acetaminophen for pain control      Chronic indwelling Louie catheter due to urinary retention and chronic hematuria  This appears to be roughly at baseline.    Follow up in Urology clinic as previously arranged unless he develops any acute issues.    Dementia, depression and hallucinations  The patient has ongoing issues with hallucinations, noting that he is hearing and seeing Zuhair Lainez mimicking him and also hears voices, though not commands of self-harm.  He recently had a decrease in the Risperdal from the morning dose to every other day instead of daily, but family is interested in having a psych reevaluate to see if any further adjustments may be needed.  Of note, he is still fairly sedate and lethargic compared to where he is at his baseline, which has been attributed to his psychiatric regimen.  Psych consulted on 8/31 and did stop the respirdal and started abilify    Psychiatry consulted, appreciate their assistance.    For now, continue with his current dosing of abilify , Aricept, gabapentin pending psych evaluation.    See psych plan to reduce lexapro, at some point SNRI would be preferred in Lewy Body    Gout    Continue PTA allopurinol.    Heart failure with preserved ejection fraction  Hypertension  Coronary artery  "disease  Dyslipidemia    Continue PTA regimen of Coreg, Lasix, Aldactone.    KRISTIAN    CPAP as needed.    Paroxysmal atrial fibrillation  He has a pacer in place.    Continue PTA Coreg and apixaban.    Malnutrition    This is ongoing from previous hospitalization.  Continue to optimize nutrition as able.     Iron deficiency anemia    Recently completed a course of Venofer.  Monitor periodically.    Recent COVID-19 infection    He had minimal symptoms with first positive on 07/30, considered COVID recovered.       Diet: Combination Diet Low Saturated Fat Na <2400mg Diet, No Caffeine Diet  Snacks/Supplements Pediatric: Ensure Enlive; With Meals    DVT Prophylaxis: DOAC  Louie Catheter: PRESENT, indication: Retention  Central Lines: None  Cardiac Monitoring: None  Code Status: Full Code      Disposition Plan      Expected Discharge Date: 09/06/2022    Discharge Delays: Specialist Consult (enter specialist & decision needed in comments)    Discharge Comments: Message left for facility to return - no answer        The patient's care was discussed with the Bedside Nurse and Patient. Will try wife Ilsa later this afternoon    Jose Law DO  Hospitalist Service  [facility]  Securely message with the Vocera Web Console (learn more here)  Text page via [employer] Paging/Directory         Clinically Significant Risk Factors Present on Admission                # Overweight: Estimated body mass index is 26.66 kg/m  as calculated from the following:    Height as of this encounter: 1.753 m (5' 9\").    Weight as of this encounter: 81.9 kg (180 lb 8.9 oz).        ______________________________________________________________________    Interval History   William Tha was seen this afternoon. Little change today. Movement in the upper extremities much improved versus admission    Data reviewed today: I reviewed all medications, new labs and imaging results over the last 24 hours. I personally reviewed no " images or EKG's today.    Physical Exam   Vital Signs: Temp: 97.5  F (36.4  C) Temp src: Oral BP: 100/64 Pulse: 69   Resp: 18 SpO2: 96 % O2 Device: None (Room air)    Weight: 180 lbs 8.91 oz  Constitutional: awake, drowsy, cooperative, no apparent distress, laying in the hospital bed  Respiratory: clear to auscultation bilaterally, no crackles or wheezing  Cardiovascular: regular rate and rhythm, normal S1 and S2, no murmur noted  GI: normal bowel sounds, soft, non-distended, non-tender  Skin: warm, dry  Musculoskeletal: no lower extremity pitting edema present  Neurologic: awake, more alert, muscle strength about 4/5 bilaterally upper and 2/5 bilaterally lower    Data   Recent Labs   Lab 09/05/22  0621 09/04/22  1500 09/04/22  0723 08/30/22  1950   WBC 7.2 6.9  --  4.5   HGB 11.3* 11.9*  --  10.5*   MCV 89 87  --  91    289  --  214   NA  --   --  140 140   POTASSIUM  --   --  3.9 3.5   CHLORIDE  --   --  104 104   CO2  --   --  31 32   BUN  --   --  17 11   CR  --   --  0.48* 0.62*   ANIONGAP  --   --  5 4   CRYS  --   --  9.0 8.8   GLC  --   --  111* 151*   ALBUMIN  --   --   --  2.6*   PROTTOTAL  --   --   --  6.2*   BILITOTAL  --   --   --  0.3   ALKPHOS  --   --   --  91   ALT  --   --   --  18   AST  --   --   --  12     Medications       allopurinol  300 mg Oral Daily     apixaban ANTICOAGULANT  5 mg Oral BID     carbidopa-levodopa  1 tablet Oral TID     carvedilol  12.5 mg Oral BID     donepezil  10 mg Oral At Bedtime     escitalopram  10 mg Oral Daily     furosemide  40 mg Oral Daily     gabapentin  100 mg Oral TID     loratadine  10 mg Oral Daily     multivitamin w/minerals  1 tablet Oral Daily     psyllium  3 capsule Oral Daily     QUEtiapine  25 mg Oral At Bedtime     sennosides  1 tablet Oral BID     spironolactone  25 mg Oral Daily

## 2022-09-05 NOTE — PROVIDER NOTIFICATION
MD Notification    Notified Person: MD    Notified Person Name:Jose Law DO    Notification Date/Time:9-5 1132    Notification Interaction: epi westg    Purpose of Notification:Fyi no mention of irregular shaped Left pupil in notes. Pt having interm tremor today. Pt is A&O, following direction approp. Also fyi red donna urine in keating.     Orders Received:    Comments:

## 2022-09-05 NOTE — PROGRESS NOTES
[facility]  Neurology Daily Note      Admission Date:8/30/2022   Date of service: 09/05/2022   Hospital Day: 7                                                   Assessment and Plan:   #.  Rigidity in the context of chronic dementia, urinary dysfunction, psychosis/hallucinations is most consistent w/ Lewy body dementia.  Generally patients with Lewy body dementia are very sensitive to antipsychotic agents with regard to extrapyrimidal side effects.  --psychiatry recommendation to discontinue Abilify and start Nuplazid was helpful.  He does seem somewhat better over the last 3 days with regard to the rigidity/  Change to quetiapine started as doubtful Nuplazid  will be covered on discharge.    Parkinsonism is common component of Lewy body dementia.    carbidopa levodopa 9/4/22 to 1 whole tablet tid.  CK is normal    Continue donepezil  EEG revealed nonspecific slowing-no evidence of nonconvulsive seizures, significant  muscle artifact from rigidity  #vitamin B12 deficiency-documented on recent hospitalization-longstanding history-continue replacement as this can contribute to neurologic symptoms  #. DVT Prophylaxis  --Mechanical + per hospitalist service  -Continue physical therapy as tolerated  #. Nutrition / GI Prophylaxis  --Per recommendations of speech therapy     Note plans for transfer back to [facility], once stablized.  Updated wife who agrees with plan.     Patient will likely need to follow-up with the neurologist at [facility] who has seen patient in the past.        Interval History:   Patient is less rigid improved. No visual hallucination.          Review of Systems:   Noncontributory       Medications:   Scheduled Meds:    allopurinol  300 mg Oral Daily     apixaban ANTICOAGULANT  5 mg Oral BID     carbidopa-levodopa  1 tablet Oral TID     carvedilol  12.5 mg Oral BID     donepezil  10 mg Oral At Bedtime     escitalopram  10 mg Oral Daily     furosemide  40 mg Oral Daily     gabapentin  100  mg Oral TID     loratadine  10 mg Oral Daily     multivitamin w/minerals  1 tablet Oral Daily     psyllium  3 capsule Oral Daily     QUEtiapine  25 mg Oral At Bedtime     sennosides  1 tablet Oral BID     spironolactone  25 mg Oral Daily     PRN Meds: acetaminophen, melatonin, ondansetron **OR** ondansetron, polyethylene glycol, prochlorperazine **OR** prochlorperazine **OR** prochlorperazine, QUEtiapine        Physical Exam:   Vitals: Temp: 99.4  F (37.4  C) Temp src: Axillary BP: (!) 152/79 Pulse: 90   Resp: 18 SpO2: 94 % O2 Device: None (Room air)    Vital Signs with Ranges: Temp:  [97.4  F (36.3  C)-99.5  F (37.5  C)] 99.4  F (37.4  C)  Pulse:  [59-90] 90  Resp:  [16-26] 18  BP: (103-152)/(73-97) 152/79  SpO2:  [94 %-99 %] 94 %    General Appearance:  No acute distress  Neuro:       Mental Status Exam:    Alert, oriented to Hospital (but [place]),  Oriented to president and room. Hypokinetic speech.  Reduced volume.  Language hesitant.  Does not seem to be /hallucinating         Cranial Nerves:   Vision/visual fields grossly intact.  Pupils are reactive to light, irregular left pupil.  Extraocular movements intact.  Reduced facial expression but strength intact.  No jaw or tongue deviation.  Palate and shoulder elevation symmetrical          Motor:    No tremor.  Able to  lift the left arm and slightly move left leg. Strength was intact in the right upper extremity, reduced movement in the right lower extremity       Reflexes: Difficult to elicit because of the degree of rigidity particularly on the left side.  Plantar signs neutral.  No clonus       Sensory: Vibration and light touch intact x4                   Coordination:   Hypokinetic.  More voluntary movement of the left arm and leg     Gait: Unable to be tested  Neck: Neck is supple   Extremities: No clubbing, no cyanosis, no edema       Data:   ROUTINE IP LABS (Last 3results)  CBC RESULTS:     Recent Labs   Lab Test 09/05/22  0621 09/04/22  1500  08/30/22 1950   WBC 7.2 6.9 4.5   RBC 4.21* 4.42 3.84*   HGB 11.3* 11.9* 10.5*   HCT 37.5* 38.5* 34.8*    289 214     Basic Metabolic Panel:  Recent Labs   Lab Test 09/04/22  0723 08/30/22  1950 08/23/22  0742    140 138   POTASSIUM 3.9 3.5 4.5   CHLORIDE 104 104 102   CO2 31 32 28   BUN 17 11 19.8   CR 0.48* 0.62* 0.59*   * 151* 101*   CRYS 9.0 8.8 9.4     Liver panel:  Recent Labs   Lab Test 08/30/22  1950 08/02/22  2232 07/31/22  0608 07/06/22  0540 07/04/22  0701   PROTTOTAL 6.2* 5.9* 5.8* 5.6* 5.8*   ALBUMIN 2.6* 3.2* 2.5* 2.2* 2.1*   BILITOTAL 0.3 0.6 0.3 0.3 0.3   ALKPHOS 91 97 95 81 81   AST 12 35 16 17 15   ALT 18 15 15 16 15     Thyroid Panel:  Recent Labs   Lab Test 07/16/22 2056   TSH 1.49      Vitamin B12:   Recent Labs   Lab Test 07/16/22 2056   B12 178*          Lorraine Eagle M.D.  Neurologist  [facility]  Office [phone]

## 2022-09-05 NOTE — PLAN OF CARE
Goal Outcome Evaluation:       Orientation/Cognitive: A&Ox4, forgetful, flat affect  Observation Goals (Met/ Not Met):Not met  Mobility Level/Assist Equipment: Lift T/R q2h pt is very rigid to R upper extremity.   Fall Risk (Y/N):Y  Behavior Concerns: Flat affect, cooperative   Pain Management: Denies pain   Tele/VS/O2:VSS on RA. Pt states that he sweats a lot at home and he uses fans to help  ABNL Lab/BG:Hgb:11.9 Creat:0.48  Diet: Cardiac no caffeine  Bowel/Bladder: Chronic Louie,  Pt can tell when he was to have BM  Skin Concerns: buttock wound covered / mepilex,  And scabs, Pt also had redness between his abdominal folds  Drains/Devices: Louie PIV:S.L  Tests/Procedures for next shift: NA  Anticipated DC date & active delays: Pending improvement   Patient Stated Goal for Today: To wash up

## 2022-09-05 NOTE — PLAN OF CARE
Orientation/Cognitive: Possible lewy body dementia with rigidity. A&O. Whispers. Follow direction. Hallucination mentioned in notes. Irregular pupil shape. Sensation intact. Symmetrical face. Tremor at times. Weakness bilateral, mostly Left side. Hx parkinsons  Observation Goals (Met/ Not Met): Inp  Mobility Level/Assist Equipment: Lift repo  Fall Risk (Y/N): Y  Behavior Concerns: N  Pain Management: denies. Except irritaion in eyes interm. Eye drops available prn. Arthritis discomfort L wrist  Tele/VS/O2: soft bp VSS on RA. Hx afib  ABNL Lab/BG: no  Diet: cardiac. Ensure. Feeder.   Bowel/Bladder: [place]. Red/donna urine  Skin Concerns: Wound coccyx, wound care done, mepilex in place. Toe wound open to air.   Drains/Devices: keating. Chronic hematuria.   Tests/Procedures for next shift: Psych and neuro following  Anticipated DC date & active delays: TBD  Patient Stated Goal for Today: eileen

## 2022-09-05 NOTE — PROVIDER NOTIFICATION
MD Notification    Notified Person: MD    Notified Person Name:O call neurologist    Notification Date/Time:9-5 1140    Notification Interaction: paged     Purpose of Notification:Fyi no mention of irregular shaped Left pupil in notes. Pt having interm tremor today. Pt is A&O, following direction approp.     Orders Received: no further recommendation    Comments:

## 2022-09-06 LAB
ANION GAP SERPL CALCULATED.3IONS-SCNC: 6 MMOL/L (ref 3–14)
BUN SERPL-MCNC: 25 MG/DL (ref 7–30)
CALCIUM SERPL-MCNC: 9.2 MG/DL (ref 8.5–10.1)
CHLORIDE BLD-SCNC: 107 MMOL/L (ref 94–109)
CO2 SERPL-SCNC: 28 MMOL/L (ref 20–32)
CREAT SERPL-MCNC: 0.5 MG/DL (ref 0.66–1.25)
ERYTHROCYTE [DISTWIDTH] IN BLOOD BY AUTOMATED COUNT: 16.3 % (ref 10–15)
GFR SERPL CREATININE-BSD FRML MDRD: >90 ML/MIN/1.73M2
GLUCOSE BLD-MCNC: 112 MG/DL (ref 70–99)
HCT VFR BLD AUTO: 35 % (ref 40–53)
HGB BLD-MCNC: 10.7 G/DL (ref 13.3–17.7)
MCH RBC QN AUTO: 27 PG (ref 26.5–33)
MCHC RBC AUTO-ENTMCNC: 30.6 G/DL (ref 31.5–36.5)
MCV RBC AUTO: 88 FL (ref 78–100)
PLATELET # BLD AUTO: 219 10E3/UL (ref 150–450)
POTASSIUM BLD-SCNC: 3.7 MMOL/L (ref 3.4–5.3)
RBC # BLD AUTO: 3.96 10E6/UL (ref 4.4–5.9)
SODIUM SERPL-SCNC: 141 MMOL/L (ref 133–144)
WBC # BLD AUTO: 5.5 10E3/UL (ref 4–11)

## 2022-09-06 PROCEDURE — 250N000013 HC RX MED GY IP 250 OP 250 PS 637: Performed by: PSYCHIATRY & NEUROLOGY

## 2022-09-06 PROCEDURE — 85027 COMPLETE CBC AUTOMATED: CPT | Performed by: HOSPITALIST

## 2022-09-06 PROCEDURE — 36415 COLL VENOUS BLD VENIPUNCTURE: CPT | Performed by: HOSPITALIST

## 2022-09-06 PROCEDURE — 250N000013 HC RX MED GY IP 250 OP 250 PS 637: Performed by: HOSPITALIST

## 2022-09-06 PROCEDURE — 120N000001 HC R&B MED SURG/OB

## 2022-09-06 PROCEDURE — 80048 BASIC METABOLIC PNL TOTAL CA: CPT | Performed by: HOSPITALIST

## 2022-09-06 PROCEDURE — 250N000013 HC RX MED GY IP 250 OP 250 PS 637: Performed by: INTERNAL MEDICINE

## 2022-09-06 PROCEDURE — 99232 SBSQ HOSP IP/OBS MODERATE 35: CPT | Performed by: HOSPITALIST

## 2022-09-06 RX ADMIN — APIXABAN 5 MG: 5 TABLET, FILM COATED ORAL at 08:40

## 2022-09-06 RX ADMIN — FUROSEMIDE 40 MG: 40 TABLET ORAL at 08:40

## 2022-09-06 RX ADMIN — GABAPENTIN 100 MG: 100 CAPSULE ORAL at 08:40

## 2022-09-06 RX ADMIN — SPIRONOLACTONE 25 MG: 25 TABLET ORAL at 08:40

## 2022-09-06 RX ADMIN — DONEPEZIL HYDROCHLORIDE 10 MG: 10 TABLET ORAL at 21:00

## 2022-09-06 RX ADMIN — CARBIDOPA AND LEVODOPA 1 TABLET: 25; 100 TABLET ORAL at 14:23

## 2022-09-06 RX ADMIN — LORATADINE 10 MG: 10 TABLET ORAL at 08:40

## 2022-09-06 RX ADMIN — CARVEDILOL 12.5 MG: 12.5 TABLET, FILM COATED ORAL at 08:40

## 2022-09-06 RX ADMIN — ALLOPURINOL 300 MG: 300 TABLET ORAL at 08:40

## 2022-09-06 RX ADMIN — ESCITALOPRAM OXALATE 10 MG: 10 TABLET ORAL at 08:40

## 2022-09-06 RX ADMIN — CARBIDOPA AND LEVODOPA 1 TABLET: 25; 100 TABLET ORAL at 08:40

## 2022-09-06 RX ADMIN — QUETIAPINE FUMARATE 25 MG: 25 TABLET ORAL at 21:00

## 2022-09-06 RX ADMIN — Medication 3 CAPSULE: at 08:40

## 2022-09-06 RX ADMIN — GABAPENTIN 100 MG: 100 CAPSULE ORAL at 14:23

## 2022-09-06 RX ADMIN — CARVEDILOL 12.5 MG: 12.5 TABLET, FILM COATED ORAL at 20:58

## 2022-09-06 RX ADMIN — GABAPENTIN 100 MG: 100 CAPSULE ORAL at 20:59

## 2022-09-06 RX ADMIN — MULTIPLE VITAMINS W/ MINERALS TAB 1 TABLET: TAB at 08:40

## 2022-09-06 RX ADMIN — APIXABAN 5 MG: 5 TABLET, FILM COATED ORAL at 20:58

## 2022-09-06 RX ADMIN — CARBIDOPA AND LEVODOPA 1 TABLET: 25; 100 TABLET ORAL at 20:59

## 2022-09-06 ASSESSMENT — ACTIVITIES OF DAILY LIVING (ADL)
ADLS_ACUITY_SCORE: 50
ADLS_ACUITY_SCORE: 55
ADLS_ACUITY_SCORE: 50

## 2022-09-06 NOTE — PROGRESS NOTES
Care Management Follow Up    Length of Stay (days): 5    Expected Discharge Date: 09/06/2022     Concerns to be Addressed:       Patient plan of care discussed at interdisciplinary rounds: Yes    Anticipated Discharge Disposition: Skilled Nursing Facility, Transitional Care     Anticipated Discharge Services: None  Anticipated Discharge DME: None    Patient/family educated on Medicare website which has current facility and service quality ratings: other (see comments) (has bed hold.)  Education Provided on the Discharge Plan:    Patient/Family in Agreement with the Plan: yes    Referrals Placed by CM/SW:    Private pay costs discussed: not discussed     Additional Information:  SW followed up with Rachana Vences about nuplazid. Funmilayo Chapman DON reviewing.     Addendum: Funmilayo Chapman had two concerns  1. Expense of medication  2. Patient has not been seen by PT since 9/1    SW will send additional referrals to  facilities, may need a financial agreement.     Shazia De Jesus, MSW, LGSW

## 2022-09-06 NOTE — PROGRESS NOTES
[facility]    Medicine Progress Note - Hospitalist Service    Date of Admission:  8/30/2022    Assessment & Plan        William Almazan is an 81-year-old male with a past medical history significant for dementia, urinary retention with chronic indwelling Louie catheter, paroxysmal atrial fibrillation, heart failure with preserved ejection fraction, coronary artery disease, who presents to the Emergency Department with left hand pain, decreased motion and increasing generalized weakness.    Possible lewy body dementia with rigidity  Patient admitted with generalized weakness and progressive inability to ambulate accompanied by extremity rigidity  Wife Ilsa reports worsening symptoms after recent hospitalization and start of antipsychotic medications to help treat underlying psychosis  However, since start, the patient has exhibited worsening rigidity concerning for parkinsonism  Neurology was consulted and overall there is concern for underlying Lewy body dementia, with dystonia that may have been worsened by starting antipsychotics  Psychiatry consulted and did stop the patient's risperdal and then abilify due to concern that they could be worsening motor symptoms.  - Continue seroquel.  - Increased carbidopa/levadopa 9/4.  - Consider nuplazid prior authorization as outpatient if seroquel is not an option.  - Appreciate psych and neuro seeing, they are adjusting medications. Will see if they have any further recommendations prior to discharge.  - Awaiting TCU placement for further therapies.    Left hand and wrist discomfort due to advanced arthritic changes /SLAC  Initially there was concern for a stroke given the acuity of onset,   He appears to have generalized weakness, with approximately 4/5 upper extremities and 3/5 bilateral lower extremities  Unable to obtain an MRI with an abandoned RV lead  Marked deterioration in the last 2 months noted by wife Ilsa, with largest deterioration  after starting aggressive antispychotic regimen about 1 month ago  Some rigidity noted. He had his respirdone stopped by psychiatry on 8/31 and abilify started, however he still appears more rigid on the left versus the right side  Noted last evaluation July 2022 by neurology who noted normal extremity strength  Overall suspect that his deterioration is due to general deconditioning with addition of possible dystonia, however, with questionable worse deficits on the left side, would ask neurology to reevaluate    Orthopedics consulted, appreciate their assistance. See consult not for details.    Left wrist splint ordered. Elevate wrist when at rest. WBAT.    Outpatient follow-up with hand surgeon.    PRN acetaminophen for pain control.    Chronic indwelling Louie catheter due to urinary retention and chronic hematuria  This appears to be roughly at baseline.    Follow up in Urology clinic as previously arranged unless he develops any acute issues.    Dementia, depression and hallucinations  The patient has ongoing issues with hallucinations, noting that he is hearing and seeing Zuhair Lainez mimicking him and also hears voices, though not commands of self-harm.  He recently had a decrease in the Risperdal from the morning dose to every other day instead of daily, but family is interested in having a psych reevaluate to see if any further adjustments may be needed.  Of note, he is still fairly sedate and lethargic compared to where he is at his baseline, which has been attributed to his psychiatric regimen.  Psych consulted on 8/31 and did stop the respirdal and started abilify    Psychiatry consulted, appreciate their assistance.    For now, continue with his current dosing of abilify , Aricept, gabapentin pending psych evaluation.    See psych plan to reduce lexapro, at some point SNRI would be preferred in Lewy Body.    Will ask psychiatry to evaluate again, appreciate their assistance.    Gout    Continue PTA  allopurinol.    Heart failure with preserved ejection fraction  Hypertension  Coronary artery disease  Dyslipidemia    Continue PTA regimen of Coreg, Lasix, Aldactone.    KRISTIAN    CPAP as needed.    Paroxysmal atrial fibrillation  He has a pacer in place.    Continue PTA Coreg and apixaban.    Malnutrition    This is ongoing from previous hospitalization.  Continue to optimize nutrition as able.     Iron deficiency anemia    Recently completed a course of Venofer.  Monitor periodically.    Recent COVID-19 infection    He had minimal symptoms with first positive on 07/30, considered COVID recovered.       Diet: Combination Diet Low Saturated Fat Na <2400mg Diet, No Caffeine Diet  Snacks/Supplements Pediatric: Ensure Enlive; With Meals    DVT Prophylaxis: DOAC  Louie Catheter: PRESENT, indication: Retention  Central Lines: None  Cardiac Monitoring: None  Code Status: Full Code      Disposition Plan      Expected Discharge Date: 09/06/2022    Discharge Delays: Specialist Consult (enter specialist & decision needed in comments)    Discharge Comments: Message left for facility to return - no answer        The patient's care was discussed with the Bedside Nurse and Patient.    Reji Granado MD  Hospitalist Service  [facility]  Securely message with the Vocera Web Console (learn more here)  Text page via [facility] Paging/Directory         Clinically Significant Risk Factors Present on Admission                      ______________________________________________________________________    Interval History   William Almazan was seen this morning. He feels a little better today. No new complaints/concerns. Denies fevers, chest pain, shortness of breath, nausea.    Data reviewed today: I reviewed all medications, new labs and imaging results over the last 24 hours. I personally reviewed no images or EKG's today.    Physical Exam   Vital Signs: Temp: 98.2  F (36.8  C) Temp src: Axillary BP: 123/61 Pulse:  (!) 40   Resp: 17 SpO2: 94 % O2 Device: None (Room air)    Weight: 180 lbs 8.91 oz  Constitutional: awake, alert, cooperative, no apparent distress, laying in the hospital bed  Respiratory: clear to auscultation bilaterally, no crackles or wheezing  Cardiovascular: regular rate and rhythm, normal S1 and S2, no murmur noted  GI: normal bowel sounds, soft, non-distended, non-tender  Skin: warm, dry  Musculoskeletal: no lower extremity pitting edema present  Neurologic: awake, alert, answers questions appropriately, moves all extremities, strength decreased in lower extremities    Data   Recent Labs   Lab 09/06/22  0748 09/05/22  0621 09/04/22  1500 09/04/22  0723   WBC 5.5 7.2 6.9  --    HGB 10.7* 11.3* 11.9*  --    MCV 88 89 87  --     274 289  --      --   --  140   POTASSIUM 3.7  --   --  3.9   CHLORIDE 107  --   --  104   CO2 28  --   --  31   BUN 25  --   --  17   CR 0.50*  --   --  0.48*   ANIONGAP 6  --   --  5   CRYS 9.2  --   --  9.0   *  --   --  111*     Medications       allopurinol  300 mg Oral Daily     apixaban ANTICOAGULANT  5 mg Oral BID     carbidopa-levodopa  1 tablet Oral TID     carvedilol  12.5 mg Oral BID     donepezil  10 mg Oral At Bedtime     escitalopram  10 mg Oral Daily     furosemide  40 mg Oral Daily     gabapentin  100 mg Oral TID     loratadine  10 mg Oral Daily     multivitamin w/minerals  1 tablet Oral Daily     psyllium  3 capsule Oral Daily     QUEtiapine  25 mg Oral At Bedtime     sennosides  1 tablet Oral BID     sodium chloride (PF)  3 mL Intracatheter Q8H     spironolactone  25 mg Oral Daily

## 2022-09-06 NOTE — PLAN OF CARE
"[id number]:  Neuros unchanged overnight. Appeared to sleep well between cares.    Orientation/Cognitive: A&Ox4, forgetful, flat affect. Speech clear and appropriate albeit slow and soft. Pt able to express needs if extra time is allowed for responses. Pt displayed no signs of hallucinations this shift and denied having any.  Observation Goals (Met/ Not Met): N/A, inpatient  Mobility Level/Assist Equipment: Lift, T/R q2h. L side weaker than R side. BUE tremor. Rolled wash cloth placed in L hand to help w/ rigidity. Unable to lift legs off of bed, but can flex entire leg and has moderate strength dorsal and plantarflexion. Pt has L wrist splint ordered (to be worn PRN) and should have L wrist elevated at rest.     Fall Risk (Y/N):Y  Behavior Concerns: None. Cooperative and pleasant.  Pain Management: Pt reports butt is sore at times (has pre-existing coccyx wound) but declines need for pain medications. Pain controlled w/ repositioning.   Tele/VS/O2: VSS on RA. Pt states that he sweats a lot at home and he uses fans to help  ABNL Lab/BG: Hgb:11.9 Creat:0.48  Diet: Cardiac no caffeine, Total feed.  Bowel/Bladder: Chronic Louie, adequate output. Medium, soft BM this AM, incontinent.   Skin Concerns: buttock wound covered / mepilex, R great toe scab. Some scattered bruises. Redness between his abdominal folds  Drains/Devices: Louie patent, brown urine. PIV SL.   Tests/Procedures for next shift: Ortho has seen and signed off. Neuro and psych following.  Anticipated DC date & active delays: Pending clincial progress. Per Dr. Law's note 9/5 \"psych and neuro seeing, they are adjusting medications. Once regimen finalized, they can return to TCU for further rehab.\"      "

## 2022-09-06 NOTE — PLAN OF CARE
"Orientation/Cognitive: A&Ox4, flat affect. Speech clear but is slow to respond. Pt allowed extra time to answer questions  Observation Goals (Met/ Not Met): Inpatient  Mobility Level/Assist Equipment: assist of 2 with a lift. L sided weakness, BUE tremor. Repositioned q2h with pillows, legs and arms elevated with pillows  Fall Risk (Y/N): Y  Behavior Concerns: none  Pain Management: Pt c/o soreness but declining pain meds  Tele/VS/O2: VSS  ABNL Lab/BG: WDL  Diet: tolerating a cardiac diet with no caffiene, total feed  Bowel/Bladder: Chronic keating, urine output is brown and clear. BM this AM. This evening was put on a bedpan and had another small BM  Skin Concerns: wound to sacrum covered with mepilex, scattered bruising, redness between skin folds  Drains/Devices: PIV SL, keating draining well  Tests/Procedures for next shift: Neuro and psych following to adjust meds  Anticipated DC date & active delays: TBD pending placement and med changes  /60 (BP Location: Right arm)   Pulse 78   Temp 98.2  F (36.8  C) (Axillary)   Resp 17   Ht 1.753 m (5' 9\")   Wt 81.9 kg (180 lb 8.9 oz)   SpO2 94%   BMI 26.66 kg/m        "

## 2022-09-07 ENCOUNTER — APPOINTMENT (OUTPATIENT)
Dept: PHYSICAL THERAPY | Facility: CLINIC | Age: 82
DRG: 056 | End: 2022-09-07
Attending: PSYCHIATRY & NEUROLOGY
Payer: MEDICARE

## 2022-09-07 ENCOUNTER — APPOINTMENT (OUTPATIENT)
Dept: OCCUPATIONAL THERAPY | Facility: CLINIC | Age: 82
DRG: 056 | End: 2022-09-07
Attending: PSYCHIATRY & NEUROLOGY
Payer: MEDICARE

## 2022-09-07 PROCEDURE — 97530 THERAPEUTIC ACTIVITIES: CPT | Mod: GP

## 2022-09-07 PROCEDURE — 99232 SBSQ HOSP IP/OBS MODERATE 35: CPT | Performed by: HOSPITALIST

## 2022-09-07 PROCEDURE — 97535 SELF CARE MNGMENT TRAINING: CPT | Mod: GO | Performed by: OCCUPATIONAL THERAPIST

## 2022-09-07 PROCEDURE — 97110 THERAPEUTIC EXERCISES: CPT | Mod: GP

## 2022-09-07 PROCEDURE — 250N000013 HC RX MED GY IP 250 OP 250 PS 637: Performed by: INTERNAL MEDICINE

## 2022-09-07 PROCEDURE — 120N000001 HC R&B MED SURG/OB

## 2022-09-07 PROCEDURE — 250N000013 HC RX MED GY IP 250 OP 250 PS 637: Performed by: HOSPITALIST

## 2022-09-07 PROCEDURE — 97110 THERAPEUTIC EXERCISES: CPT | Mod: GO | Performed by: OCCUPATIONAL THERAPIST

## 2022-09-07 PROCEDURE — 250N000013 HC RX MED GY IP 250 OP 250 PS 637: Performed by: PSYCHIATRY & NEUROLOGY

## 2022-09-07 RX ADMIN — APIXABAN 5 MG: 5 TABLET, FILM COATED ORAL at 09:13

## 2022-09-07 RX ADMIN — ACETAMINOPHEN 650 MG: 325 TABLET ORAL at 13:10

## 2022-09-07 RX ADMIN — CARBIDOPA AND LEVODOPA 1 TABLET: 25; 100 TABLET ORAL at 09:14

## 2022-09-07 RX ADMIN — SENNOSIDES 1 TABLET: 8.6 TABLET, FILM COATED ORAL at 09:13

## 2022-09-07 RX ADMIN — CARBIDOPA AND LEVODOPA 1 TABLET: 25; 100 TABLET ORAL at 13:10

## 2022-09-07 RX ADMIN — CARBIDOPA AND LEVODOPA 1 TABLET: 25; 100 TABLET ORAL at 19:36

## 2022-09-07 RX ADMIN — ACETAMINOPHEN 650 MG: 325 TABLET ORAL at 16:33

## 2022-09-07 RX ADMIN — FUROSEMIDE 40 MG: 40 TABLET ORAL at 09:14

## 2022-09-07 RX ADMIN — ACETAMINOPHEN 650 MG: 325 TABLET ORAL at 20:57

## 2022-09-07 RX ADMIN — MULTIPLE VITAMINS W/ MINERALS TAB 1 TABLET: TAB at 09:13

## 2022-09-07 RX ADMIN — ESCITALOPRAM OXALATE 10 MG: 10 TABLET ORAL at 09:13

## 2022-09-07 RX ADMIN — GABAPENTIN 100 MG: 100 CAPSULE ORAL at 13:10

## 2022-09-07 RX ADMIN — QUETIAPINE FUMARATE 25 MG: 25 TABLET ORAL at 20:58

## 2022-09-07 RX ADMIN — GABAPENTIN 100 MG: 100 CAPSULE ORAL at 09:13

## 2022-09-07 RX ADMIN — SPIRONOLACTONE 25 MG: 25 TABLET ORAL at 09:14

## 2022-09-07 RX ADMIN — QUETIAPINE 12.5 MG: 25 TABLET, FILM COATED ORAL at 19:43

## 2022-09-07 RX ADMIN — DONEPEZIL HYDROCHLORIDE 10 MG: 10 TABLET ORAL at 20:58

## 2022-09-07 RX ADMIN — ACETAMINOPHEN 650 MG: 325 TABLET ORAL at 09:26

## 2022-09-07 RX ADMIN — Medication 3 CAPSULE: at 09:13

## 2022-09-07 RX ADMIN — Medication 1 DROP: at 16:33

## 2022-09-07 RX ADMIN — LORATADINE 10 MG: 10 TABLET ORAL at 09:13

## 2022-09-07 RX ADMIN — GABAPENTIN 100 MG: 100 CAPSULE ORAL at 19:36

## 2022-09-07 RX ADMIN — ALLOPURINOL 300 MG: 300 TABLET ORAL at 09:14

## 2022-09-07 RX ADMIN — SENNOSIDES 1 TABLET: 8.6 TABLET, FILM COATED ORAL at 19:35

## 2022-09-07 RX ADMIN — CARVEDILOL 12.5 MG: 12.5 TABLET, FILM COATED ORAL at 09:14

## 2022-09-07 RX ADMIN — APIXABAN 5 MG: 5 TABLET, FILM COATED ORAL at 19:35

## 2022-09-07 ASSESSMENT — ACTIVITIES OF DAILY LIVING (ADL)
ADLS_ACUITY_SCORE: 63
ADLS_ACUITY_SCORE: 55
ADLS_ACUITY_SCORE: 65
ADLS_ACUITY_SCORE: 55
ADLS_ACUITY_SCORE: 65
ADLS_ACUITY_SCORE: 55
ADLS_ACUITY_SCORE: 55
ADLS_ACUITY_SCORE: 65
ADLS_ACUITY_SCORE: 65
ADLS_ACUITY_SCORE: 55

## 2022-09-07 NOTE — PROGRESS NOTES
Care Management Follow Up    Length of Stay (days): 6    Expected Discharge Date: 09/07/2022     Concerns to be Addressed:       Patient plan of care discussed at interdisciplinary rounds: Yes    Anticipated Discharge Disposition: Skilled Nursing Facility, Transitional Care     Anticipated Discharge Services: None  Anticipated Discharge DME: None    Patient/family educated on Medicare website which has current facility and service quality ratings: other (see comments) (has bed hold.)  Education Provided on the Discharge Plan:    Patient/Family in Agreement with the Plan: yes    Referrals Placed by CM/SW:    Private pay costs discussed: Not applicable    Additional Information:  SW spoke to [facility] and was informed they can not accept patient back on nuplazid medication. [facility] is reviewing patient for possible 9/9 admission with a financial agreement.     SW will continue to follow.    Shazia De Jesus, MSW, LGSW

## 2022-09-07 NOTE — PROGRESS NOTES
SPIRITUAL HEALTH SERVICES  SPIRITUAL ASSESSMENT Progress Note  [facility]. [address] short stay     REFERRAL SOURCE: Consult placed for pt asking for spiritual/emotional support.    Brief visit with William, his wife and daughter. Shared prayer at bedside at William's request; daughter joined in prayers also.    Plan: Spiritual Health remains available at patient's request for the duration of admission.    Susy Salgado MDiv    Pager [phone]  Mobile [phone]  [facility] remains available 24/7 for emergent requests/referrals, either by having the on-call  paged or by entering an ASAP/STAT consult in Epic (this will also page the on-call ). Routine Epic consults receive an initial response within 24 hours.

## 2022-09-07 NOTE — PLAN OF CARE
Orientation/Cognitive: A&Ox4; slow to respond; whispers.   Observation Goals (Met/ Not Met): Inpatient  Mobility Level/Assist Equipment: A2 with a lift. L sided weakness, JEANNE UE weakness, turn and repo Q2, Arms and legs elevated; sat at edge of bed with PT today.   Fall Risk (Y/N): Yes  Behavior Concerns: Calm  Pain Management: prn tylenol given x2 for pain in L leg and buttocks; repositioning   Tele/VS/O2: VSS on RA  ABNL Lab/BG: n/a  Diet: Cardiac diet with no caffiene, Total feed; takes pills whole with applesauce. Poor appetite today.   Bowel/Bladder: Chronic keating, UO tea colored  Skin Concerns: wound to sacrum covered with mepilex (wound cares completed today), scattered bruising, redness between skin folds  Drains/Devices: PIV SL, Keating  Tests/Procedures for next shift: n/a  Anticipated DC date & active delays: TBD; SW following for TCU placement. May be able to return to the [place],

## 2022-09-07 NOTE — PROGRESS NOTES
Care Management Follow Up    Length of Stay (days): 6    Expected Discharge Date: 09/09/2022     Concerns to be Addressed:       Patient plan of care discussed at interdisciplinary rounds: Yes    Anticipated Discharge Disposition: Skilled Nursing Facility, Transitional Care     Anticipated Discharge Services: None  Anticipated Discharge DME: None    Patient/family educated on Medicare website which has current facility and service quality ratings: other (see comments) (has bed hold.)  Education Provided on the Discharge Plan:    Patient/Family in Agreement with the Plan: yes    Referrals Placed by CM/SW:    Private pay costs discussed: Not applicable    Additional Information:  SW was notified patient is not on nuplazid. SW sent medication discontinued notes and updated pt note to [facility].     Shazia De Jesus, MSW, LGSW

## 2022-09-07 NOTE — PROVIDER NOTIFICATION
09/07/22 1100   Signing Clinician's Name / Credentials   Signing clinician's name / credentials Eun Gerber, PT, DPT   Quick Adds   Rehab Discipline PT   Therapeutic Activity   Minutes of Treatment 11 minutes   Symptoms Noted During/After Treatment Fatigue;Dizziness   Treatment Detail PT:  Patient greeted supine in bed.  Patient agreeble to PT session, states he wants to walk.  Patient cued for supine > sit transfer with HOB elevated and right bed rail.  Patient needing assist to bring bilateral lower extremities to EOB, needing increased assistance on L LE.  Patient needing hand-over-hand assistance to bring L UE across body to rail.  Patient needing mod-maxAx2 for supine > sit, needing maxAx1-2 to maintain static sitting balance at EOB x5 minutes.  Patient with reports of increased dizziness with sitting, cued for pursed lip breathing to facilitate fluid return.  Patient's trunk very rigid in sitting, assisted for lateral and anterior-posterior weight shifting.  Patient returning to bed at end of session, maxAx2 for sit > supine transfer and repositioning higher in bed.  Patient left with call light in reach and bed alarm activated.   Therapeutic Exercise   Minutes of Treatment 12   Symptoms Noted During/After Treatment increased pain   Treatment Detail PT:  Patient engaged in supine lower extremity exercises to promote circulatory, strength, and ROM benefits.  Patient's lower extremities very stiff, increased tone in left compared to right.  Bilateral LE AAROM for ankle plantar/dorsiflexon, knee flexion/extension, hip flexion, and hip ab/adduction.  Patient with better strength on right lower extremity but significant weakness noted bilaterally.  Patient intermittently reporting increased discomfort with increased ROM, feels like a stretch.   PT Discharge Planning   PT Discharge Recommendation (DC Rec) Transitional Care Facility   PT Rationale for DC Rec Pt is far from his baseline mobility status of walking  with a cane, currently requires A2 with the lift. Pt is at high risk for falls, has not been able to stand or walk due to weakness thus far. Pt's extremities are somewhat ridgid. Rec discharge back to TCU to improve his ROM, strength, balance, activity tolerance and improve his mobility and independence before returning home.   Additional Documentation   PT Plan PT: Bed mobility, sitting balance, LE ROM and strengthening   Total Session Time   Total Session Time (minutes) 23 minutes

## 2022-09-07 NOTE — PROGRESS NOTES
[facility]    Medicine Progress Note - Hospitalist Service    Date of Admission:  8/30/2022    Assessment & Plan        William Almazan is an 81-year-old male with a past medical history significant for dementia, urinary retention with chronic indwelling Louie catheter, paroxysmal atrial fibrillation, heart failure with preserved ejection fraction, coronary artery disease, who presents to the Emergency Department with left hand pain, decreased motion and increasing generalized weakness.    Possible lewy body dementia with rigidity  Patient admitted with generalized weakness and progressive inability to ambulate accompanied by extremity rigidity  Wife Ilsa reports worsening symptoms after recent hospitalization and start of antipsychotic medications to help treat underlying psychosis  However, since start, the patient has exhibited worsening rigidity concerning for parkinsonism  Neurology was consulted and overall there is concern for underlying Lewy body dementia, with dystonia that may have been worsened by starting antipsychotics  Psychiatry consulted and did stop the patient's risperdal and then abilify due to concern that they could be worsening motor symptoms.  - Continue seroquel. Was started on Nuplazid during this admission, but then discontinued due to cost.  - Increased carbidopa/levadopa 9/4.  - Appreciate psych and neuro seeing, they are adjusting medications. Will see if they have any further recommendations prior to discharge.  - Awaiting TCU placement for further therapies. Was declined due to cost of Nuplazid, however he is not on the medication anymore and there are no plans to restart it at this time.    Left hand and wrist discomfort due to advanced arthritic changes /SLAC  Initially there was concern for a stroke given the acuity of onset,   He appears to have generalized weakness, with approximately 4/5 upper extremities and 3/5 bilateral lower extremities  Unable to  obtain an MRI with an abandoned RV lead  Marked deterioration in the last 2 months noted by wife Ilsa, with largest deterioration after starting aggressive antispychotic regimen about 1 month ago  Some rigidity noted. He had his respirdone stopped by psychiatry on 8/31 and abilify started, however he still appears more rigid on the left versus the right side  Noted last evaluation July 2022 by neurology who noted normal extremity strength  Overall suspect that his deterioration is due to general deconditioning with addition of possible dystonia, however, with questionable worse deficits on the left side, would ask neurology to reevaluate    Orthopedics consulted, appreciate their assistance. See consult not for details.    Left wrist splint ordered. Elevate wrist when at rest. WBAT.    Outpatient follow-up with hand surgeon.    PRN acetaminophen for pain control.    Chronic indwelling Louie catheter due to urinary retention and chronic hematuria  This appears to be roughly at baseline.    Follow up in Urology clinic as previously arranged unless he develops any acute issues.    Dementia, depression and hallucinations  The patient has ongoing issues with hallucinations, noting that he is hearing and seeing Zuhair Lainez mimicking him and also hears voices, though not commands of self-harm.  He recently had a decrease in the Risperdal from the morning dose to every other day instead of daily, but family is interested in having a psych reevaluate to see if any further adjustments may be needed.  Of note, he is still fairly sedate and lethargic compared to where he is at his baseline, which has been attributed to his psychiatric regimen.  Psych consulted on 8/31 and did stop the respirdal and started abilify    Psychiatry consulted, appreciate their assistance.    For now, continue with his current dosing of abilify , Aricept, gabapentin pending psych evaluation.    See psych plan to reduce lexapro, at some point SNRI  would be preferred in Lewy Body.    Was started on Nuplazid by psychiatry, however subsequently transitioned to Seroquel as Nuplazid was found to be cost prohibitive.    Psychiatry to evaluate again, appreciate their assistance.    Gout    Continue PTA allopurinol.    Heart failure with preserved ejection fraction  Hypertension  Coronary artery disease  Dyslipidemia    Continue PTA regimen of Coreg, Lasix, Aldactone.    KRISTIAN    CPAP as needed.    Paroxysmal atrial fibrillation  He has a pacer in place.    Continue PTA Coreg and apixaban.    Malnutrition    This is ongoing from previous hospitalization.  Continue to optimize nutrition as able.     Iron deficiency anemia    Recently completed a course of Venofer.  Monitor periodically.    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 nurse consulted, appreciate their assistance.    Continue wound cares per [facility] nurse.    Recent COVID-19 infection    He had minimal symptoms with first positive on 07/30, considered COVID recovered.       Diet: Combination Diet Low Saturated Fat Na <2400mg Diet, No Caffeine Diet  Snacks/Supplements Pediatric: Ensure Enlive; With Meals    DVT Prophylaxis: DOAC  Louie Catheter: PRESENT, indication: Retention  Central Lines: None  Cardiac Monitoring: None  Code Status: Full Code      Disposition Plan      Expected Discharge Date: 09/09/2022    Discharge Delays: Specialist Consult (enter specialist & decision needed in comments)    Discharge Comments: Needs new PT notes and to see if faciltiy will take with new med.        The patient's care was discussed with the Bedside Nurse, Patient and Patient's Family.    Reji Granado MD  Hospitalist Service  [facility]  Securely message with the Vocera Web Console (learn more here)  Text page via [employer] Paging/Directory         Clinically Significant Risk Factors Present on Admission                       ______________________________________________________________________    Interval History   William Almazan was seen today. Overall feels OK. Complains of some pain in his butt and would like to be repositioned in the bed. Denies fevers, chest pain, shortness of breath, nausea, abdominal pain. Discussed plan of care with his wife and daughter in the room today.    Data reviewed today: I reviewed all medications, new labs and imaging results over the last 24 hours. I personally reviewed no images or EKG's today.    Physical Exam   Vital Signs: Temp: 98  F (36.7  C) Temp src: Axillary BP: (!) 145/92 Pulse: 68   Resp: 14 SpO2: 92 % O2 Device: None (Room air)    Weight: 180 lbs 8.91 oz  Constitutional: awake, alert, cooperative, no apparent distress, laying in the hospital bed  Respiratory: clear to auscultation bilaterally, no crackles or wheezing  Cardiovascular: regular rate and rhythm, normal S1 and S2, no murmur noted  GI: normal bowel sounds, soft, non-distended, non-tender  Skin: warm, dry  Musculoskeletal: no lower extremity pitting edema present  Neurologic: awake, alert, answers questions appropriately, moves all extremities    Data   Recent Labs   Lab 09/06/22  0748 09/05/22  0621 09/04/22  1500 09/04/22  0723   WBC 5.5 7.2 6.9  --    HGB 10.7* 11.3* 11.9*  --    MCV 88 89 87  --     274 289  --      --   --  140   POTASSIUM 3.7  --   --  3.9   CHLORIDE 107  --   --  104   CO2 28  --   --  31   BUN 25  --   --  17   CR 0.50*  --   --  0.48*   ANIONGAP 6  --   --  5   CRYS 9.2  --   --  9.0   *  --   --  111*     Medications       allopurinol  300 mg Oral Daily     apixaban ANTICOAGULANT  5 mg Oral BID     carbidopa-levodopa  1 tablet Oral TID     carvedilol  12.5 mg Oral BID     donepezil  10 mg Oral At Bedtime     escitalopram  10 mg Oral Daily     furosemide  40 mg Oral Daily     gabapentin  100 mg Oral TID     loratadine  10 mg Oral Daily     multivitamin w/minerals  1 tablet Oral  Daily     psyllium  3 capsule Oral Daily     QUEtiapine  25 mg Oral At Bedtime     sennosides  1 tablet Oral BID     sodium chloride (PF)  3 mL Intracatheter Q8H     spironolactone  25 mg Oral Daily

## 2022-09-07 NOTE — PLAN OF CARE
Orientation/Cognitive: AO X4, slow to respond  Observation Goals (Met/ Not Met): Inpatient  Mobility Level/Assist Equipment: A2 with a lift. L sided weakness, JEANNE UE weakness, T and R Q2, Arms and legs elevated  Fall Risk (Y/N): Y  Behavior Concerns: None  Pain Management: Repositioned   Tele/VS/O2: VSS on RA  ABNL Lab/BG: Creatinine .50  Diet: Cardiac diet with no caffiene, Total feed  Bowel/Bladder: Chronic keating, UO tea colored, BM X1 this shift medium soft brown in color  Skin Concerns: wound to sacrum covered with mepilex, scattered bruising, redness between skin folds  Drains/Devices: PIV SL, Keating  Tests/Procedures for next shift: Neuro and Psych for medication adjustment, Need PT revaluation   Anticipated DC date & active delays: TBD pending placement

## 2022-09-08 PROCEDURE — 93010 ELECTROCARDIOGRAM REPORT: CPT | Performed by: INTERNAL MEDICINE

## 2022-09-08 PROCEDURE — 99232 SBSQ HOSP IP/OBS MODERATE 35: CPT | Mod: 25

## 2022-09-08 PROCEDURE — 250N000013 HC RX MED GY IP 250 OP 250 PS 637: Performed by: HOSPITALIST

## 2022-09-08 PROCEDURE — 250N000013 HC RX MED GY IP 250 OP 250 PS 637: Performed by: PSYCHIATRY & NEUROLOGY

## 2022-09-08 PROCEDURE — 250N000011 HC RX IP 250 OP 636: Performed by: INTERNAL MEDICINE

## 2022-09-08 PROCEDURE — G0463 HOSPITAL OUTPT CLINIC VISIT: HCPCS

## 2022-09-08 PROCEDURE — 120N000001 HC R&B MED SURG/OB

## 2022-09-08 PROCEDURE — 250N000013 HC RX MED GY IP 250 OP 250 PS 637: Performed by: INTERNAL MEDICINE

## 2022-09-08 PROCEDURE — 93005 ELECTROCARDIOGRAM TRACING: CPT

## 2022-09-08 PROCEDURE — 99232 SBSQ HOSP IP/OBS MODERATE 35: CPT | Performed by: HOSPITALIST

## 2022-09-08 RX ADMIN — ESCITALOPRAM OXALATE 10 MG: 10 TABLET ORAL at 09:10

## 2022-09-08 RX ADMIN — PROCHLORPERAZINE EDISYLATE 5 MG: 5 INJECTION INTRAMUSCULAR; INTRAVENOUS at 15:32

## 2022-09-08 RX ADMIN — CARVEDILOL 12.5 MG: 12.5 TABLET, FILM COATED ORAL at 09:16

## 2022-09-08 RX ADMIN — Medication 3 CAPSULE: at 09:10

## 2022-09-08 RX ADMIN — APIXABAN 5 MG: 5 TABLET, FILM COATED ORAL at 20:18

## 2022-09-08 RX ADMIN — FUROSEMIDE 40 MG: 40 TABLET ORAL at 09:05

## 2022-09-08 RX ADMIN — ALLOPURINOL 300 MG: 300 TABLET ORAL at 09:05

## 2022-09-08 RX ADMIN — MULTIPLE VITAMINS W/ MINERALS TAB 1 TABLET: TAB at 09:05

## 2022-09-08 RX ADMIN — CARBIDOPA AND LEVODOPA 1 TABLET: 25; 100 TABLET ORAL at 09:05

## 2022-09-08 RX ADMIN — ONDANSETRON 4 MG: 4 TABLET, ORALLY DISINTEGRATING ORAL at 15:03

## 2022-09-08 RX ADMIN — SPIRONOLACTONE 25 MG: 25 TABLET ORAL at 09:10

## 2022-09-08 RX ADMIN — ACETAMINOPHEN 650 MG: 325 TABLET ORAL at 03:18

## 2022-09-08 RX ADMIN — CARVEDILOL 12.5 MG: 12.5 TABLET, FILM COATED ORAL at 20:18

## 2022-09-08 RX ADMIN — SENNOSIDES 1 TABLET: 8.6 TABLET, FILM COATED ORAL at 09:10

## 2022-09-08 RX ADMIN — ACETAMINOPHEN 650 MG: 325 TABLET ORAL at 09:07

## 2022-09-08 RX ADMIN — APIXABAN 5 MG: 5 TABLET, FILM COATED ORAL at 09:05

## 2022-09-08 RX ADMIN — LORATADINE 10 MG: 10 TABLET ORAL at 09:05

## 2022-09-08 RX ADMIN — CARBIDOPA AND LEVODOPA 1 TABLET: 25; 100 TABLET ORAL at 14:07

## 2022-09-08 RX ADMIN — GABAPENTIN 100 MG: 100 CAPSULE ORAL at 09:10

## 2022-09-08 RX ADMIN — QUETIAPINE FUMARATE 25 MG: 25 TABLET ORAL at 22:20

## 2022-09-08 RX ADMIN — CARBIDOPA AND LEVODOPA 1 TABLET: 25; 100 TABLET ORAL at 20:18

## 2022-09-08 RX ADMIN — GABAPENTIN 100 MG: 100 CAPSULE ORAL at 14:07

## 2022-09-08 RX ADMIN — GABAPENTIN 100 MG: 100 CAPSULE ORAL at 20:18

## 2022-09-08 RX ADMIN — DONEPEZIL HYDROCHLORIDE 10 MG: 10 TABLET ORAL at 22:20

## 2022-09-08 ASSESSMENT — ACTIVITIES OF DAILY LIVING (ADL)
ADLS_ACUITY_SCORE: 65

## 2022-09-08 NOTE — PROGRESS NOTES
CLINICAL NUTRITION SERVICES  -  ASSESSMENT NOTE      RECOMMENDATIONS FOR MD/PROVIDER TO ORDER:   Recommend liberalize diet to < 2400 mg Na (omit Low Saturated Fat, omit No Caffeine).   Recommendations Ordered by Registered Dietitian (RD):   Continue vanilla Ensure Enlive with meals for nutrition push  Continue daily MVI-M - per PI protocol   Malnutrition:   % Weight Loss:  Weight loss does not meet criteria for malnutrition   % Intake:  <75% for > 7 days (moderate malnutrition)  Subcutaneous Fat Loss:  Orbital region severe depletion and Upper arm region moderate depletion  Muscle Loss:  Temporal region severe depletion, Clavicle bone region severe depletion, Acromion bone region severe depletion and Dorsal hand region moderate depletion  Fluid Retention:  Mild     Malnutrition Diagnosis: Severe malnutrition  In Context of:  Chronic illness or disease        REASON FOR ASSESSMENT  William Almazan is a 81 year old male seen by Registered Dietitian for LOS    DX:   Left hand and wrist discomfort and decreased mobility   Generalized weakness   Buttocks wound   Recent COVID (recovered)     NUTRITION HISTORY  - Information obtained from wife Ilsa and Epic records.  - Patient is on a Regular diet at home.  - Typical food/fluid intake is fair.  - Diet history: At home, patient has 3 meals per day. He likes cereal and eggs for breakfast, a light lunch, and full dinner.     - Supplements: While recently at [facility] with COVID in August, he received Ensure Enlive BID.   - No food allergies/intolerances.  Pt is weak and deconditioned.    CURRENT NUTRITION ORDERS  Diet Order:     Low Saturated Fat/2400 mg Sodium/No Caffeine   Vanilla Ensure Enlive wieth meals (started 8/31).    Current Intake/Tolerance:  Patient is a total feed.  Variable oral intake since admission, ranging from 9-63-04-%.   Per wife, yesterday he ate very little, but he did better today.  Wife thought he might prefer chocolate flavor Ensure, but I explained  "while on the \"No Caffeine\" restriction, chocolate isn't allowed.  When we asked the patient, he was okay with continuing with vanilla flavor.  Another family member noted he's been denied potato chips when requested.     NUTRITION FOCUSED PHYSICAL ASSESSMENT FOR DIAGNOSING MALNUTRITION)  Yes                Observed:    Muscle wasting (refer to documentation in Malnutrition section)  Subcutaneous fat loss (refer to documentation in Malnutrition section)  Fluid retention (refer to documentation in Malnutrition section)   Lackluster hair    Obtained from Chart/Interdisciplinary Team:  Pressure Injury - Per WOCN 8/31:  - Pressure Injury CAPI unstageable with components of MASD and friction and shear   Edema - 2+ mild BUE and BLE    ANTHROPOMETRICS  Height: 5' 9\"  Admit Weight: 81.9 kg (180 lbs 8.91 oz) - 8/31  Current Weight:  No record  Body mass index is 26.66 kg/m .  Weight Status:  Overweight BMI 25-29.9  IBW: 72.7 kg  % IBW: 113%  Weight History: Weight loss 1.3 kg (2%) over past month.    Wt Readings from Last 10 Encounters:   08/30/22 81.9 kg (180 lb 8.9 oz)   08/24/22 80.8 kg (178 lb 2.1 oz)   07/28/22 83.2 kg (183 lb 6.4 oz)     LABS  Labs reviewed  9/2 Lipid Panel noted:  Chol 120  HDL 43  LDL 67  TG 51    MEDICATIONS  Medications reviewed  Lasix and Spinronolactone  MVI-M    ASSESSED NUTRITION NEEDS PER APPROVED PRACTICE GUIDELINES:    Dosing Weight:  81.9 kg (8/31)  Estimated Energy Needs: 0109-6226 kcals (25-30 Kcal/Kg)  Justification: maintenance  Estimated Protein Needs: 106-123 grams protein (1.3-1.5 g pro/Kg)  Justification: Repletion, wound healing and hypercatabolism with acute illness  Estimated Fluid Needs: 2446-6860 mL (1 mL/Kcal)  Justification: maintenance    MALNUTRITION:  % Weight Loss:  Weight loss does not meet criteria for malnutrition   % Intake:  <75% for > 7 days (moderate malnutrition)  Subcutaneous Fat Loss:  Orbital region severe depletion and Upper arm region moderate " depletion  Muscle Loss:  Temporal region severe depletion, Clavicle bone region severe depletion, Acromion bone region severe depletion and Dorsal hand region moderate depletion  Fluid Retention:  Mild     Malnutrition Diagnosis: Severe malnutrition  In Context of:  Chronic illness or disease    NUTRITION DIAGNOSIS:  Inadequate oral intake related to decreased appetite, early satiety, and increased needs with wound healing as evidenced by variable oral intake since admission (overall fair at best), loss of subcutaneous fat and muscle mass, and presence of unstageable Pressure Injury on buttocks.    NUTRITION INTERVENTIONS  Recommendations / Nutrition Prescription  Continue vanilla Ensure Enlive with meals for nutrition push  Continue daily MVI-M - per PI protocol    Recommend liberalize diet to < 2400 mg Na (omit Low Saturated Fat, omit No Caffeine).    Implementation  Nutrition education: Provided education to wife on the continued use of supplements to optimize intake.    Nutrition Goals  Patient will consume > 75% meals and > 50% supplements in 3-5 days.    MONITORING AND EVALUATION:  Progress towards goals will be monitored and evaluated per protocol and Practice Guidelines    Deana Mantilla, MADELINE, LD, CNSC

## 2022-09-08 NOTE — PLAN OF CARE
Goal Outcome Evaluation:    Plan of Care Reviewed With: patient     Overall Patient Progress: no change    Orientation/Cognitive: A&Ox4; slow to respond; whispers.   Observation Goals (Met/ Not Met): Inpatient  Mobility Level/Assist Equipment: A2 with a lift. L sided weakness, turn and repo Q2, Arms and legs elevated  Fall Risk (Y/N): Yes  Behavior Concerns: none  Pain Management: prn tylenol given x2  for pain in L hand/arm and buttocks; repositioning   Tele/VS/O2: VSS on RA  ABNL Lab/BG: n/a  Diet: Cardiac diet with no caffiene, Total feed; takes pills whole with applesauce. Poor appetite. Only several bite of fish.  Bowel/Bladder: Chronic keating, UO tea colored  Skin Concerns: wound to sacrum, dressing CDI, scattered bruising, redness between skin folds, powder applied  Drains/Devices: PIV SL, Keating  Tests/Procedures for next shift: n/a  Anticipated DC date & active delays: TBD; SW following for TCU placement. May return to the [facility],

## 2022-09-08 NOTE — CONSULTS
"      Psychiatry Consultation; Follow up              Reason for Consult, requesting source:    Depression, hallucinations, rigidity  Requesting source: Reji Granado    Labs and imaging reviewed. Discussed with nursing.               Interim history:    William Almazan is an 81 year old male with a history of dementia, a-fib, CAD, and chronic indwelling keating catheter who was sent to hospital by his TCU for leg weakness and left wrist/hand pain. He was recently hospitalized at [facility] from 8/2/22-8/24/22 for weakness. During that hospitalization, psychiatry was consulted for hallucinations which were felt to be related to dementia, with some mild depression as well. He was started on risperidone at that time, later switched to aripiprazole due to low energy on risperidone. There was also concern for dystonic reaction from risperidone. Aripiprazole was stopped after only 4 doses due to dizziness, though it was noted that he had improved energy and mood while on aripiprazole. Abilify had been restarted at 2 mg at bedtime, patient still experiencing muscle stiffness and no improvement in hallucinations. Pimavanserin was started but stopped once found to be prohibitively expensive; he is now prescribed quetiapine 25mg nightly with daytime PRNs.     I met with William for a follow up today. He states his mood is \"good\" and that he feels he is doing better overall. States that his anxiety is more manageable, states he no longer feels as overwhelmed with anxiety. He does state that his hallucinations are \"about the same\", denies any change in frequency or intensity. States they come and go throughout the day, no specific pattern to them. Hasn't noticed any significant different with quetiapine. He denies any command hallucinations to harm himself or others but does have some command hallucinations that tell him what to do, but states he is able to ignore these. Overall reports he is sleeping well but periodically has some " "nights with frequent awakenings, estimates this to occur \"every few days\" but generally reports improved energy levels. Appetite is good. Denies SI/HI.         Current Medications:       allopurinol  300 mg Oral Daily     apixaban ANTICOAGULANT  5 mg Oral BID     carbidopa-levodopa  1 tablet Oral TID     carvedilol  12.5 mg Oral BID     donepezil  10 mg Oral At Bedtime     escitalopram  10 mg Oral Daily     furosemide  40 mg Oral Daily     gabapentin  100 mg Oral TID     loratadine  10 mg Oral Daily     multivitamin w/minerals  1 tablet Oral Daily     psyllium  3 capsule Oral Daily     QUEtiapine  25 mg Oral At Bedtime     sennosides  1 tablet Oral BID     sodium chloride (PF)  3 mL Intracatheter Q8H     spironolactone  25 mg Oral Daily              MSE:   Appearance: awake, alert  Attitude:  cooperative  Eye Contact:  good  Mood:  \"good\"  Affect:  flat  Speech:  clear, coherent and delayed speech  Psychomotor Behavior:  physical retardation  Thought Process:  logical, linear and goal oriented  Associations:  no loose associations  Thought Content:  no evidence of suicidal ideation or homicidal ideation and auditory hallucinations present  Insight:  fair  Judgement:  fair  Oriented to:  time, person, and place  Attention Span and Concentration:  fair  Recent and Remote Memory:  fair        Vital signs:  Temp: 97.5  F (36.4  C) Temp src: Axillary BP: 128/71 Pulse: 71   Resp: 16 SpO2: 98 % O2 Device: None (Room air)   Height: 175.3 cm (5' 9\") Weight: 81.9 kg (180 lb 8.9 oz)  Estimated body mass index is 26.66 kg/m  as calculated from the following:    Height as of this encounter: 1.753 m (5' 9\").    Weight as of this encounter: 81.9 kg (180 lb 8.9 oz).              DSM-5 Diagnosis:   #1 Likely lewy body dementia  #2 Unspecified depressive disorder          Assessment:   William Almazan is an 81 year old male with probable Lewy Body dementia who presents with ongoing hallucinations and depression. He reports that mood " "and energy levels are improving, though hallucinations have not changed with addition of quetiapine at bedtime (added on 9/4). Can try increase quetiapine to 37.5mg nightly, monitoring for worsening of motor symptoms/rigidity. Recommend slow, cautious titration of antipsychotic- as discussed in neurology note, pts with LBD are at increased risk of experiencing EPS. Recommend repeat EKG, most recent on 8/30 shows QTc 499.          Summary of Recommendations:   1. Recommend increasing quetiapine to 37.5mg nightly    2. Recommend repeat EKG to monitor QTc    3. Will need to follow up with outpatient psychiatric provider at discharge for ongoing management    4. Please reconsult psychiatry as needed    OTONIEL Hess [facility]  Consult/Liaison Psychiatry   [facility]    Contact information available via [facility] Paging/Directory  If I am not available, then JOSE RAMESH line ([phone]) should know who is covering our consult service.   \"Much or all of the text in this note was generated through the use of Dragon Dictate voice to text software. Errors in spelling or words which appear to be out of contact are unintentional, may be present due having escaped editing\"           "

## 2022-09-08 NOTE — PROVIDER NOTIFICATION
MD Notification    Notified Person: MD    Notified Person Name: Dr. Granado    Notification Date/Time: 9/8 @ 8342    Notification Interaction: Telephone    Purpose of Notification: Pt with lots of secretions. Sounding congested. Respiratory consult?

## 2022-09-08 NOTE — PROGRESS NOTES
[facility]    Medicine Progress Note - Hospitalist Service    Date of Admission:  8/30/2022    Assessment & Plan        William Almazan is an 81-year-old male with a past medical history significant for dementia, urinary retention with chronic indwelling Louie catheter, paroxysmal atrial fibrillation, heart failure with preserved ejection fraction, coronary artery disease, who presents to the Emergency Department with left hand pain, decreased motion and increasing generalized weakness.    Possible lewy body dementia with rigidity  Patient admitted with generalized weakness and progressive inability to ambulate accompanied by extremity rigidity  Wife Ilsa reports worsening symptoms after recent hospitalization and start of antipsychotic medications to help treat underlying psychosis  However, since start, the patient has exhibited worsening rigidity concerning for parkinsonism  Neurology was consulted and overall there is concern for underlying Lewy body dementia, with dystonia that may have been worsened by starting antipsychotics  Psychiatry consulted and did stop the patient's risperdal and then abilify due to concern that they could be worsening motor symptoms.  - Continue seroquel. Was started on Nuplazid during this admission, but then discontinued due to cost.  - Increased carbidopa/levadopa 9/4.  - Appreciate psych and neuro seeing, they are adjusting medications. Will see if they have any further recommendations prior to discharge.  - Awaiting TCU placement for further therapies. Was declined due to cost of Nuplazid, however he is not on the medication anymore and there are no plans to restart it at this time.    Left hand and wrist discomfort due to advanced arthritic changes /SLAC  Initially there was concern for a stroke given the acuity of onset,   He appears to have generalized weakness, with approximately 4/5 upper extremities and 3/5 bilateral lower extremities  Unable to  obtain an MRI with an abandoned RV lead  Marked deterioration in the last 2 months noted by wife Ilsa, with largest deterioration after starting aggressive antispychotic regimen about 1 month ago  Some rigidity noted. He had his respirdone stopped by psychiatry on 8/31 and abilify started, however he still appears more rigid on the left versus the right side  Noted last evaluation July 2022 by neurology who noted normal extremity strength  Overall suspect that his deterioration is due to general deconditioning with addition of possible dystonia, however, with questionable worse deficits on the left side, would ask neurology to reevaluate    Orthopedics consulted, appreciate their assistance. See consult not for details.    Left wrist splint ordered. Elevate wrist when at rest. WBAT.    Outpatient follow-up with hand surgeon.    PRN acetaminophen for pain control.    Chronic indwelling Louie catheter due to urinary retention and chronic hematuria  This appears to be roughly at baseline.    Follow up in Urology clinic as previously arranged unless he develops any acute issues.    Dementia, depression and hallucinations  The patient has ongoing issues with hallucinations, noting that he is hearing and seeing Zuhair Lainez mimicking him and also hears voices, though not commands of self-harm.  He recently had a decrease in the Risperdal from the morning dose to every other day instead of daily, but family is interested in having a psych reevaluate to see if any further adjustments may be needed.  Of note, he is still fairly sedate and lethargic compared to where he is at his baseline, which has been attributed to his psychiatric regimen.  Psych consulted on 8/31 and did stop the respirdal and started abilify    Psychiatry consulted, appreciate their assistance.    For now, continue with his current dosing of abilify , Aricept, gabapentin pending psych evaluation.    See psych plan to reduce lexapro, at some point SNRI  would be preferred in Lewy Body.    Was started on Nuplazid by psychiatry, however subsequently transitioned to Seroquel as Nuplazid was found to be cost prohibitive.    Psychiatry to evaluate again, appreciate their assistance.    Gout    Continue PTA allopurinol.    Heart failure with preserved ejection fraction  Hypertension  Coronary artery disease  Dyslipidemia    Continue PTA regimen of Coreg, Lasix, Aldactone.    KRISTIAN    CPAP as needed.    Paroxysmal atrial fibrillation  He has a pacer in place.    Continue PTA Coreg and apixaban.    Malnutrition    This is ongoing from previous hospitalization.  Continue to optimize nutrition as able.     Iron deficiency anemia    Recently completed a course of Venofer.  Monitor periodically.    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    Continue wound cares per [facility] nurse.    Recent COVID-19 infection    He had minimal symptoms with first positive on 07/30, considered COVID recovered.       Diet: Combination Diet Low Saturated Fat Na <2400mg Diet, No Caffeine Diet  Snacks/Supplements Pediatric: Ensure Enlive; With Meals    DVT Prophylaxis: DOAC  Louie Catheter: PRESENT, indication: Neurogenic Bladder  Central Lines: None  Cardiac Monitoring: None  Code Status: Full Code      Disposition Plan      Expected Discharge Date: 09/09/2022    Discharge Delays: Specialist Consult (enter specialist & decision needed in comments)  Insurance Authorization needed    Discharge Comments: Pt NOT starting this new med. [facility]        The patient's care was discussed with the Bedside Nurse, Care Coordinator/, Patient and Psychiatry Consultant.    Reji Granado MD  Hospitalist Service  [facility]  Securely message with the Vocera Web Console (learn more here)  Text page via [employer] Paging/Directory         Clinically Significant  Risk Factors Present on Admission                      ______________________________________________________________________    Interval History   William Almazan was seen this morning. He feels OK. Still having some hallucinations. Does not feel stiff/rigid. Denies fevers, chest pain, shortness of breath, nausea, abdominal pain.    Data reviewed today: I reviewed all medications, new labs and imaging results over the last 24 hours. I personally reviewed no images or EKG's today.    Physical Exam   Vital Signs: Temp: 97.3  F (36.3  C) Temp src: Oral BP: 133/65 Pulse: 53   Resp: 20 SpO2: 98 % O2 Device: None (Room air)    Weight: 180 lbs 8.91 oz  Constitutional: awake, alert, cooperative, no apparent distress, laying in the hospital bed  Respiratory: clear to auscultation anteriorly, no crackles or wheezing  Cardiovascular: regular rate and rhythm, normal S1 and S2, no murmur noted  GI: normal bowel sounds, soft, non-distended, non-tender  Skin: warm, dry  Musculoskeletal: no lower extremity pitting edema present  Neurologic: awake, alert, answers questions appropriately, moves all extremities, can lift both arms off the bed, can move legs but can't lift them off the bed    Data   Recent Labs   Lab 09/06/22  0748 09/05/22  0621 09/04/22  1500 09/04/22  0723   WBC 5.5 7.2 6.9  --    HGB 10.7* 11.3* 11.9*  --    MCV 88 89 87  --     274 289  --      --   --  140   POTASSIUM 3.7  --   --  3.9   CHLORIDE 107  --   --  104   CO2 28  --   --  31   BUN 25  --   --  17   CR 0.50*  --   --  0.48*   ANIONGAP 6  --   --  5   CRYS 9.2  --   --  9.0   *  --   --  111*     Medications       allopurinol  300 mg Oral Daily     apixaban ANTICOAGULANT  5 mg Oral BID     carbidopa-levodopa  1 tablet Oral TID     carvedilol  12.5 mg Oral BID     donepezil  10 mg Oral At Bedtime     escitalopram  10 mg Oral Daily     furosemide  40 mg Oral Daily     gabapentin  100 mg Oral TID     loratadine  10 mg Oral Daily      multivitamin w/minerals  1 tablet Oral Daily     psyllium  3 capsule Oral Daily     QUEtiapine  25 mg Oral At Bedtime     sennosides  1 tablet Oral BID     sodium chloride (PF)  3 mL Intracatheter Q8H     spironolactone  25 mg Oral Daily

## 2022-09-08 NOTE — PLAN OF CARE
Orientation/Cognitive: A&Ox4; slow to respond; whispers.   Observation Goals (Met/ Not Met): Inpatient  Mobility Level/Assist Equipment: A2 with a lift. L sided weakness, turn and repo Q2, Arms and legs elevated  Fall Risk (Y/N): Yes  Behavior Concerns: none  Pain Management: prn tylenol given for pain in L hand/arm and buttocks; repositioning   Tele/VS/O2: VSS on RA  ABNL Lab/BG: n/a  Diet: Cardiac diet with no caffiene, Total feed; takes pills one at a time whole with applesauce. Appetite adequate.   Bowel/Bladder: Chronic keating, UA donna tea color  Skin Concerns: wound to sacrum, dressing CDI, scattered bruising, redness between skin folds. WOC nurse assessed pt.  Drains/Devices: PIV SL, Keating  Tests/Procedures for next shift: n/a  Anticipated DC date & active delays: TBD; SW following for TCU placement. May return to the [facility], possibly 9/8.    Shortly after lunch pt became nauseous and vomited. Emesis was mucous secretions. Pt given Zofran and vomited shortly after administration. Pt then was given compazine and has been resting since. Respiratory consult was order.    Will need education on IS and Flutter valve instruction once more awake.

## 2022-09-08 NOTE — PROGRESS NOTES
Care Management Follow Up    Length of Stay (days): 7    Expected Discharge Date: 09/08/2022     Concerns to be Addressed:       Patient plan of care discussed at interdisciplinary rounds: Yes    Anticipated Discharge Disposition: Skilled Nursing Facility, Transitional Care     Anticipated Discharge Services: None  Anticipated Discharge DME: None    Patient/family educated on Medicare website which has current facility and service quality ratings: other (see comments) (has bed hold.)  Education Provided on the Discharge Plan:    Patient/Family in Agreement with the Plan: yes    Referrals Placed by CM/SW:    Private pay costs discussed: Not applicable    Additional Information:  [facility] called [facility]) asking if they can accept patient today. Asked for a call back.    Will continue to follow.    GONZALEZ Vee

## 2022-09-08 NOTE — CONSULTS
"[facility]  WO Nurse Inpatient Assessment     Follow up for: buttock, sacrococcygeal      9/8/2022 buttock, sacral-coccygeal  Much improved    9/8/2022 Left sticky note for MD- Asked by RN to deepak.  Left great toe and lateral foot wounds dry,  Intact, no loal signs of infection. Standard of care for stable eschar and dark discolored intact tissue is to keep clean, dry and protected. No orders written, need another WOC consult from MD if you want WO Nurse to follow.      Areas Assessed:      Areas visualized during today's visit: Focused: and Sacrum/coccyx    Wound location: buttock, sacralcoccygeal     buttock, sacral-coccygeal  8/31   buttock, sacral-coccygeal 9/ 8/2022    Wound due to: Pressure Injury CAPI unstageable with components of MASD and friction and shear   Wound history/plan of care: found with sacral mepilex in use, patient unsure of prior care and reports \"scooting\"   Wound base: mixed % epidermis, dermis, granulation tissue and slough, tissue over coccyx slough      Palpation of the wound bed: mild firm      Drainage: moderate     Description of drainage: serosanguinous     Measurements (length x width x depth, in cm): cluster measurement of total area 7  x 8  x  0.2 cm      Tunneling: N/A     Undermining: N/A  Periwound skin: Superficial erosion and deep brown purple nonblanchable       Color: purple and deeper brown than periwound skin      Temperature: normal   Odor: none  Pain: moderate, aching  Pain interventions prior to dressing change: patient tolerated well and slow and gentle cares   Treatment goal: Heal   STATUS: initial assessment  Supplies ordered: supplies stored on unit       9/8/2022 Left great toe and lateral foot wounds dry,  Intact, no loal signs of infection.Standard of care for stable eschar and dark discolored intact tissue is to keep clean, dry and protected.    Asked by RN to deepak. Left note for MD to consult for ongoing follow up and " "recommendations.     Treatment Plan:      Wound care  Start:  08/31/22 1049,   EVERY SHIFT,   Routine        Comments: Location: buttock   Care: provided by primary RN smitha   1. Cleanse perineal skin with incontinence protocol (quyen cleanser and soft dry wipes) every incontinence episode   2. Apply skin prep generously to perianal and buttock including wounds and periwound skin, let dry   3. Cover with mepilex dressings (sacral, or 4x4\" or combination to best keep all wounds covered). Mepilex can be used up to 5 days each, ok to lift for cleansing and assessment and reapply same mepilex         Skin care precautions  Start:  08/31/22 1048,   EFFECTIVE NOW,   Routine        Comments: Pressure Injury Prevention (PIP) Plan:   -If patient is declining pressure injury prevention interventions: Explore reason why and address patient's concerns, Educate on pressure injury risk and prevention intervention(s), If patient is still declining, document \"informed refusal\" , and Ensure Care team is aware ( provider, charge nurse, etc)   -Mattress: Follow bed algorithm, reassess daily and order specialty mattress, if indicated.   -HOB: Maintain at or below 30 degrees, unless contraindicated   -Repositioning in bed: Every 1-2 hours , Left/right positioning; avoid supine, and Raise foot of bed prior to raising head of bed, to reduce patient sliding down (shear)   -Heels: Keep elevated off mattress and Pillows under calves   -Protective Dressing: Sacral Mepilex for prevention (#563485),  especially for the agitated patient   -Positioning Equipment: TAPS wedges (#416751) to help maintain 30 degree side lying position   -Chair positioning: Chair cushion (#856025) , Assist patient to reposition hourly, and Do NOT use a donut for sitting (this increases pressure to smaller area and creates a higher potential for injury)     -Moisture Management: Perineal cleansing /protection: Follow Incontinence Protocol, Avoid brief in bed, Clean and " dry skin folds with bathing , and Moisturize dry skin   -Under Devices: Inspect skin under all medical devices during skin inspection , Ensure tubes are stabilized without tension, and Ensure patient is not lying on medical devices or equipment when repositioned         Nutrition Services Adult IP Consult  Start:  08/31/22 1052,   End:  08/31/22 1052,   ONE TIME,   Routine                   Orders: Written    RECOMMEND PRIMARY TEAM ORDER: None, at this time  Education provided: importance of repositioning, plan of care, Moisture management, Hygiene and Off-loading pressure  Discussed plan of care with: Patient and Nurse  WOC nurse follow-up plan: weekly  Notify WOC if wound(s) deteriorate.  Nursing to notify the Provider(s) and re-consult the WOC Nurse if new skin concern.    DATA:     Current support surface: Standard  pulsate ordered after consult  Containment of urine/stool: Incontinence Protocol  BMI: Body mass index is 26.66 kg/m .   Active diet order: Orders Placed This Encounter      Combination Diet Low Saturated Fat Na <2400mg Diet, No Caffeine Diet     Output: I/O last 3 completed shifts:  In: 720 [P.O.:720]  Out: 500 [Urine:500]     Labs:   Recent Labs   Lab 09/06/22  0748   HGB 10.7*   WBC 5.5     Pressure injury risk assessment:   Sensory Perception: 3-->slightly limited  Moisture: 3-->occasionally moist  Activity: 2-->chairfast  Mobility: 2-->very limited  Nutrition: 2-->probably inadequate  Friction and Shear: 2-->potential problem  John Score: 14    Gely Pizarro MS RN CWOCN    Dept. Pager: [phone]  Dept. Office Number: [phone]

## 2022-09-08 NOTE — PLAN OF CARE
Orientation/Cognitive: Alert, oriented x4  Observation Goals (Met/ Not Met): n/a - inpatient  Mobility Level/Assist Equipment: TC, up with lift  Fall Risk (Y/N): Y  Behavior Concerns: none, patient pleasant  Pain Management: PRN Tylenol given for pain to buttocks/hip  Tele/VS/O2: VSS, RA, no tele  ABNL Lab/BG: n/a  Diet: cardiac, no caffeine, total feed  Bowel/Bladder: chronic keating, no BM this shift  Skin Concerns: sacral dressing CDI  Drains/Devices: PIV SL, keating  Tests/Procedures for next shift: none  Anticipated DC date & active delays: pending TCU placement

## 2022-09-08 NOTE — PLAN OF CARE
Goal Outcome Evaluation:    Plan of Care Reviewed With: spouse     Overall Patient Progress: no change    Outcome Evaluation: Patient is a total feed with variable oral intake since admission, but overall intake is fair at best. He is tolerating the vanilla Ensure with meals. He is on a daily MVI-M per PI protocol. Recommend liberalize diet to < 2400 mg Na only (omit Low Saturated Fat, omit No Caffeine) in light of decreased oral intake (He may wish to order chocolate flavor Ensure on occasion in future, which No Caffeine prohibits him from receiving).

## 2022-09-09 ENCOUNTER — APPOINTMENT (OUTPATIENT)
Dept: PHYSICAL THERAPY | Facility: CLINIC | Age: 82
DRG: 056 | End: 2022-09-09
Attending: PSYCHIATRY & NEUROLOGY
Payer: MEDICARE

## 2022-09-09 ENCOUNTER — APPOINTMENT (OUTPATIENT)
Dept: OCCUPATIONAL THERAPY | Facility: CLINIC | Age: 82
DRG: 056 | End: 2022-09-09
Attending: PSYCHIATRY & NEUROLOGY
Payer: MEDICARE

## 2022-09-09 PROCEDURE — 250N000013 HC RX MED GY IP 250 OP 250 PS 637: Performed by: HOSPITALIST

## 2022-09-09 PROCEDURE — 97535 SELF CARE MNGMENT TRAINING: CPT | Mod: GO

## 2022-09-09 PROCEDURE — 250N000013 HC RX MED GY IP 250 OP 250 PS 637: Performed by: PSYCHIATRY & NEUROLOGY

## 2022-09-09 PROCEDURE — 97530 THERAPEUTIC ACTIVITIES: CPT | Mod: GP | Performed by: PHYSICAL THERAPIST

## 2022-09-09 PROCEDURE — 99232 SBSQ HOSP IP/OBS MODERATE 35: CPT | Performed by: HOSPITALIST

## 2022-09-09 PROCEDURE — 250N000013 HC RX MED GY IP 250 OP 250 PS 637: Performed by: INTERNAL MEDICINE

## 2022-09-09 PROCEDURE — 120N000001 HC R&B MED SURG/OB

## 2022-09-09 RX ADMIN — Medication 3 CAPSULE: at 08:27

## 2022-09-09 RX ADMIN — CARVEDILOL 12.5 MG: 12.5 TABLET, FILM COATED ORAL at 08:27

## 2022-09-09 RX ADMIN — CARBIDOPA AND LEVODOPA 1 TABLET: 25; 100 TABLET ORAL at 08:27

## 2022-09-09 RX ADMIN — APIXABAN 5 MG: 5 TABLET, FILM COATED ORAL at 08:27

## 2022-09-09 RX ADMIN — CARVEDILOL 12.5 MG: 12.5 TABLET, FILM COATED ORAL at 19:55

## 2022-09-09 RX ADMIN — LORATADINE 10 MG: 10 TABLET ORAL at 08:27

## 2022-09-09 RX ADMIN — ACETAMINOPHEN 650 MG: 325 TABLET ORAL at 08:32

## 2022-09-09 RX ADMIN — FUROSEMIDE 40 MG: 40 TABLET ORAL at 08:27

## 2022-09-09 RX ADMIN — CARBIDOPA AND LEVODOPA 1 TABLET: 25; 100 TABLET ORAL at 15:26

## 2022-09-09 RX ADMIN — ALLOPURINOL 300 MG: 300 TABLET ORAL at 08:27

## 2022-09-09 RX ADMIN — SENNOSIDES 1 TABLET: 8.6 TABLET, FILM COATED ORAL at 19:55

## 2022-09-09 RX ADMIN — GABAPENTIN 100 MG: 100 CAPSULE ORAL at 19:55

## 2022-09-09 RX ADMIN — CARBIDOPA AND LEVODOPA 1 TABLET: 25; 100 TABLET ORAL at 19:55

## 2022-09-09 RX ADMIN — GABAPENTIN 100 MG: 100 CAPSULE ORAL at 08:27

## 2022-09-09 RX ADMIN — SPIRONOLACTONE 25 MG: 25 TABLET ORAL at 08:27

## 2022-09-09 RX ADMIN — GABAPENTIN 100 MG: 100 CAPSULE ORAL at 15:26

## 2022-09-09 RX ADMIN — DONEPEZIL HYDROCHLORIDE 10 MG: 10 TABLET ORAL at 22:06

## 2022-09-09 RX ADMIN — APIXABAN 5 MG: 5 TABLET, FILM COATED ORAL at 19:55

## 2022-09-09 RX ADMIN — ESCITALOPRAM OXALATE 10 MG: 10 TABLET ORAL at 08:27

## 2022-09-09 RX ADMIN — ACETAMINOPHEN 650 MG: 325 TABLET ORAL at 15:43

## 2022-09-09 RX ADMIN — QUETIAPINE FUMARATE 25 MG: 25 TABLET ORAL at 22:06

## 2022-09-09 RX ADMIN — MULTIPLE VITAMINS W/ MINERALS TAB 1 TABLET: TAB at 08:27

## 2022-09-09 ASSESSMENT — ACTIVITIES OF DAILY LIVING (ADL)
ADLS_ACUITY_SCORE: 65

## 2022-09-09 NOTE — PROGRESS NOTES
"Orientation/Cognitive: A&Ox3-4  Observation Goals (Met/ Not Met): Inpatient  Mobility Level/Assist Equipment: assist of 2 with a lift  Fall Risk (Y/N): Y  Behavior Concerns: none  Pain Management: PRN tylenol for L great toe pain, site is mildly bleeding after working with PT, covered with gauze and kerlix  Tele/VS/O2: VSS  ABNL Lab/BG: WDL  Diet: tolerating a regular diet, somewhat needs to be fed. Pt worked with OT today and they were able to cut pieces of foam to put around utensils so the pt can hopefully try to feed himself  Bowel/Bladder: Chronic keating, moderate-large BM this shift  Skin Concerns: wound on sacrum, dressing changed after BM, dressing changes to be done qshift. L great toe dressed d/t bleeding after working with PT  Drains/Devices: chronic keating, PIV SL  Tests/Procedures for next shift: n/a  Anticipated DC date & active delays: back to [facility] once prior auth completed  Patient Stated Goal for Today: Pain control    /66 (BP Location: Right arm)   Pulse 77   Temp 98.2  F (36.8  C) (Oral)   Resp 16   Ht 1.753 m (5' 9\")   Wt 81.9 kg (180 lb 8.9 oz)   SpO2 98%   BMI 26.66 kg/m        "

## 2022-09-09 NOTE — PLAN OF CARE
Orientation/Cognitive: A&Ox4; slow to respond; soft.   Observation Goals (Met/ Not Met): Inpatient  Mobility Level/Assist Equipment: A2 with a lift. L sided weakness, T/R  Fall Risk (Y/N): Yes  Behavior Concerns: none  Pain Management:C/o butt pain, relieved with reposition.  Tele/VS/O2: VSS on RA  ABNL Lab/BG: n/a  Diet: Cardiac diet with no caffeine, Total feed; takes pills whole with applesauce.   Bowel/Bladder: Chronic keating, urine tea colored. Incontinent of bowel, medium stool this shift.  Skin Concerns: wound to sacrum, dressing CDI, scattered bruising, redness between skin folds, powder applied. Dry scab to L great toe.  Drains/Devices: PIV SL, Keating  Tests/Procedures for next shift: n/a  Anticipated DC date & active delays: TBD; SW following, plan to return back to [facility] once place can accept patient back.  Other: 2+ BLE edema, 2+ L hand edema. Neuros unchanged.  Rigid and stiff extremities. LS diminished with weak infrequent cough. Slept well between cares.

## 2022-09-09 NOTE — PROGRESS NOTES
Care Management Follow Up    Length of Stay (days): 8    Expected Discharge Date: 09/09/2022     Concerns to be Addressed:       Patient plan of care discussed at interdisciplinary rounds: Yes    Anticipated Discharge Disposition: Skilled Nursing Facility, Transitional Care     Anticipated Discharge Services: None  Anticipated Discharge DME: None    Patient/family educated on Medicare website which has current facility and service quality ratings: other (see comments) (has bed hold.)  Education Provided on the Discharge Plan:    Patient/Family in Agreement with the Plan: yes    Referrals Placed by CM/SW:    Private pay costs discussed: Not applicable    Additional Information:  HERNANDEZ followed up with Rachana from [facility] and patient is still paying for bed hold. Rachana is still waiting for insurance Auth but once it's received patient can go back.     GONZALEZ Bunch

## 2022-09-09 NOTE — PROGRESS NOTES
[facility]    Medicine Progress Note - Hospitalist Service    Date of Admission:  8/30/2022    Assessment & Plan        William Almazan is an 81-year-old male with a past medical history significant for dementia, urinary retention with chronic indwelling Louie catheter, paroxysmal atrial fibrillation, heart failure with preserved ejection fraction, coronary artery disease, who presents to the Emergency Department with left hand pain, decreased motion and increasing generalized weakness.    Possible lewy body dementia with rigidity  Patient admitted with generalized weakness and progressive inability to ambulate accompanied by extremity rigidity  Wife Ilsa reports worsening symptoms after recent hospitalization and start of antipsychotic medications to help treat underlying psychosis  However, since start, the patient has exhibited worsening rigidity concerning for parkinsonism  Neurology was consulted and overall there is concern for underlying Lewy body dementia, with dystonia that may have been worsened by starting antipsychotics  Psychiatry consulted and did stop the patient's risperdal and then abilify due to concern that they could be worsening motor symptoms.  - Continue seroquel. Was started on Nuplazid during this admission, but then discontinued due to cost.  - Increased carbidopa/levadopa 9/4.  - Appreciate psych and neuro seeing, they have adjusted medications.  - Still awaiting TCU placement for further therapies. Was declined due to cost of Nuplazid, however he is not on the medication anymore and there are no plans to restart it at this time.    Left hand and wrist discomfort due to advanced arthritic changes /SLAC  Initially there was concern for a stroke given the acuity of onset,   He appears to have generalized weakness, with approximately 4/5 upper extremities and 3/5 bilateral lower extremities  Unable to obtain an MRI with an abandoned RV lead  Marked deterioration in  the last 2 months noted by wife Ilsa, with largest deterioration after starting aggressive antispychotic regimen about 1 month ago  Some rigidity noted. He had his respirdone stopped by psychiatry on 8/31 and abilify started, however he still appears more rigid on the left versus the right side  Noted last evaluation July 2022 by neurology who noted normal extremity strength  Overall suspect that his deterioration is due to general deconditioning with addition of possible dystonia, however, with questionable worse deficits on the left side, would ask neurology to reevaluate    Orthopedics consulted, appreciate their assistance. See consult not for details.    Left wrist splint ordered. Elevate wrist when at rest. WBAT.    Outpatient follow-up with hand surgeon.    PRN acetaminophen for pain control.    Chronic indwelling Louie catheter due to urinary retention and chronic hematuria  This appears to be roughly at baseline.    Follow up in Urology clinic as previously arranged unless he develops any acute issues.    Dementia, depression and hallucinations  The patient has ongoing issues with hallucinations, noting that he is hearing and seeing Zuhair Lainez mimicking him and also hears voices, though not commands of self-harm.  He recently had a decrease in the Risperdal from the morning dose to every other day instead of daily, but family is interested in having a psych reevaluate to see if any further adjustments may be needed.  Of note, he is still fairly sedate and lethargic compared to where he is at his baseline, which has been attributed to his psychiatric regimen.  Psych consulted on 8/31 and did stop the respirdal and started abilify    Psychiatry consulted, appreciate their assistance.    For now, continue with his current dosing of abilify , Aricept, gabapentin pending psych evaluation.    See psych plan to reduce lexapro, at some point SNRI would be preferred in Lewy Body.    Was started on Nuplazid by  psychiatry, however subsequently transitioned to Seroquel as Nuplazid was found to be cost prohibitive.    Psychiatry re-consulted 9/8/22, increased seroquel to 37.5 mg, see consult note.    Gout    Continue PTA allopurinol.    Heart failure with preserved ejection fraction  Hypertension  Coronary artery disease  Dyslipidemia    Continue PTA regimen of Coreg, Lasix, Aldactone.    KRISTIAN    CPAP as needed.    Paroxysmal atrial fibrillation  He has a pacer in place.    Continue PTA Coreg and apixaban.    Malnutrition    This is ongoing from previous hospitalization.  Continue to optimize nutrition as able.     Iron deficiency anemia    Recently completed a course of Venofer.  Monitor periodically.    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    Continue wound cares per [facility] nurse.    Recent COVID-19 infection    He had minimal symptoms with first positive on 07/30, considered COVID recovered.       Diet: Snacks/Supplements Pediatric: Ensure Enlive; With Meals  Regular Diet Adult    DVT Prophylaxis: DOAC  Louie Catheter: PRESENT, indication: Retention  Central Lines: None  Cardiac Monitoring: None  Code Status: Full Code      Disposition Plan      Expected Discharge Date: 09/09/2022    Discharge Delays: Specialist Consult (enter specialist & decision needed in comments)  Insurance Authorization needed    Discharge Comments: Pt NOT starting this new med. He could go back to  [facility]        The patient's care was discussed with the Bedside Nurse and Patient.    Reji Granado MD  Hospitalist Service  [facility]  Securely message with the Vocera Web Console (learn more here)  Text page via [employer] Paging/Directory         Clinically Significant Risk Factors Present on Admission                      ______________________________________________________________________    Interval History   William  Tha was seen this morning. He was sitting up in a chair. Some discomfort in his left knee and foot. Occasional nausea. Was able to eat breakfast this morning. Denies fevers, chest pain, shortness of breath, abdominal pain.    Data reviewed today: I reviewed all medications, new labs and imaging results over the last 24 hours. I personally reviewed no images or EKG's today.    Physical Exam   Vital Signs: Temp: 97.5  F (36.4  C) Temp src: Oral BP: 110/69 Pulse: 80   Resp: 16 SpO2: 98 % O2 Device: None (Room air)    Weight: 180 lbs 8.91 oz  Constitutional: awake, alert, cooperative, no apparent distress, sitting up in a chair  Respiratory: clear to auscultation bilaterally, no crackles or wheezing  Cardiovascular: regular rate and rhythm, normal S1 and S2, no murmur noted  GI: normal bowel sounds, soft, non-distended, non-tender  Skin: warm, dry  Musculoskeletal: 2+ lower extremity pitting edema present  Neurologic: awake, alert, answered questions appropriately, moves all extremities, strength 4/5 in bilateral upper extremities, 2/5 in bilateral lower extremities, rigidity still present but improved    Data   Recent Labs   Lab 09/06/22  0748 09/05/22  0621 09/04/22  1500 09/04/22  0723   WBC 5.5 7.2 6.9  --    HGB 10.7* 11.3* 11.9*  --    MCV 88 89 87  --     274 289  --      --   --  140   POTASSIUM 3.7  --   --  3.9   CHLORIDE 107  --   --  104   CO2 28  --   --  31   BUN 25  --   --  17   CR 0.50*  --   --  0.48*   ANIONGAP 6  --   --  5   CRYS 9.2  --   --  9.0   *  --   --  111*     Medications       allopurinol  300 mg Oral Daily     apixaban ANTICOAGULANT  5 mg Oral BID     carbidopa-levodopa  1 tablet Oral TID     carvedilol  12.5 mg Oral BID     donepezil  10 mg Oral At Bedtime     escitalopram  10 mg Oral Daily     furosemide  40 mg Oral Daily     gabapentin  100 mg Oral TID     loratadine  10 mg Oral Daily     multivitamin w/minerals  1 tablet Oral Daily     psyllium  3 capsule Oral  Daily     QUEtiapine  25 mg Oral At Bedtime     sennosides  1 tablet Oral BID     sodium chloride (PF)  3 mL Intracatheter Q8H     spironolactone  25 mg Oral Daily

## 2022-09-10 LAB
ATRIAL RATE - MUSE: 72 BPM
DIASTOLIC BLOOD PRESSURE - MUSE: NORMAL MMHG
INTERPRETATION ECG - MUSE: NORMAL
P AXIS - MUSE: 60 DEGREES
PR INTERVAL - MUSE: 232 MS
QRS DURATION - MUSE: 160 MS
QT - MUSE: 440 MS
QTC - MUSE: 481 MS
R AXIS - MUSE: -53 DEGREES
SYSTOLIC BLOOD PRESSURE - MUSE: NORMAL MMHG
T AXIS - MUSE: -48 DEGREES
VENTRICULAR RATE- MUSE: 72 BPM

## 2022-09-10 PROCEDURE — 250N000013 HC RX MED GY IP 250 OP 250 PS 637: Performed by: PSYCHIATRY & NEUROLOGY

## 2022-09-10 PROCEDURE — 250N000013 HC RX MED GY IP 250 OP 250 PS 637: Performed by: INTERNAL MEDICINE

## 2022-09-10 PROCEDURE — 99233 SBSQ HOSP IP/OBS HIGH 50: CPT | Performed by: INTERNAL MEDICINE

## 2022-09-10 PROCEDURE — 250N000013 HC RX MED GY IP 250 OP 250 PS 637: Performed by: HOSPITALIST

## 2022-09-10 PROCEDURE — 120N000001 HC R&B MED SURG/OB

## 2022-09-10 RX ADMIN — CARVEDILOL 12.5 MG: 12.5 TABLET, FILM COATED ORAL at 20:04

## 2022-09-10 RX ADMIN — DONEPEZIL HYDROCHLORIDE 10 MG: 10 TABLET ORAL at 22:30

## 2022-09-10 RX ADMIN — Medication 3 CAPSULE: at 09:11

## 2022-09-10 RX ADMIN — APIXABAN 5 MG: 5 TABLET, FILM COATED ORAL at 09:11

## 2022-09-10 RX ADMIN — FUROSEMIDE 40 MG: 40 TABLET ORAL at 09:10

## 2022-09-10 RX ADMIN — QUETIAPINE FUMARATE 25 MG: 25 TABLET ORAL at 22:29

## 2022-09-10 RX ADMIN — ESCITALOPRAM OXALATE 10 MG: 10 TABLET ORAL at 09:10

## 2022-09-10 RX ADMIN — GABAPENTIN 100 MG: 100 CAPSULE ORAL at 14:22

## 2022-09-10 RX ADMIN — GABAPENTIN 100 MG: 100 CAPSULE ORAL at 20:04

## 2022-09-10 RX ADMIN — SENNOSIDES 1 TABLET: 8.6 TABLET, FILM COATED ORAL at 20:04

## 2022-09-10 RX ADMIN — ACETAMINOPHEN 650 MG: 325 TABLET ORAL at 04:30

## 2022-09-10 RX ADMIN — ALLOPURINOL 300 MG: 300 TABLET ORAL at 09:10

## 2022-09-10 RX ADMIN — GABAPENTIN 100 MG: 100 CAPSULE ORAL at 09:10

## 2022-09-10 RX ADMIN — CARBIDOPA AND LEVODOPA 1 TABLET: 25; 100 TABLET ORAL at 20:04

## 2022-09-10 RX ADMIN — SPIRONOLACTONE 25 MG: 25 TABLET ORAL at 09:10

## 2022-09-10 RX ADMIN — APIXABAN 5 MG: 5 TABLET, FILM COATED ORAL at 20:04

## 2022-09-10 RX ADMIN — SENNOSIDES 1 TABLET: 8.6 TABLET, FILM COATED ORAL at 09:11

## 2022-09-10 RX ADMIN — CARBIDOPA AND LEVODOPA 1 TABLET: 25; 100 TABLET ORAL at 09:10

## 2022-09-10 RX ADMIN — LORATADINE 10 MG: 10 TABLET ORAL at 09:10

## 2022-09-10 RX ADMIN — CARBIDOPA AND LEVODOPA 1 TABLET: 25; 100 TABLET ORAL at 14:22

## 2022-09-10 RX ADMIN — MULTIPLE VITAMINS W/ MINERALS TAB 1 TABLET: TAB at 09:10

## 2022-09-10 RX ADMIN — ACETAMINOPHEN 650 MG: 325 TABLET ORAL at 09:25

## 2022-09-10 ASSESSMENT — ACTIVITIES OF DAILY LIVING (ADL)
ADLS_ACUITY_SCORE: 65
ADLS_ACUITY_SCORE: 63
ADLS_ACUITY_SCORE: 65
ADLS_ACUITY_SCORE: 61
ADLS_ACUITY_SCORE: 61
ADLS_ACUITY_SCORE: 65
ADLS_ACUITY_SCORE: 65
ADLS_ACUITY_SCORE: 61
ADLS_ACUITY_SCORE: 63
ADLS_ACUITY_SCORE: 65
ADLS_ACUITY_SCORE: 61
ADLS_ACUITY_SCORE: 63

## 2022-09-10 NOTE — PROGRESS NOTES
[facility]    Internal Medicine Hospitalist Progress Note  09/10/2022  I evaluated patient on the above date.    Harrison Zurita Jr., MD  [phone] (p)  Text Page  Vocera        Assessment & Plan New actions/orders today (09/10/2022) are underlined.    William Almazan is an 81-year-old male with a past medical history significant for dementia, HTN, CHF (HFrEF), CAD, PAF, gout, urinary retention with chronic indwelling Louie catheter, h/o prostate cancer; with recent hospitalization at [facility] (8/2-8/24/2022) for issues including failure to thrive with hypotension and TONE as well as issues with psychosis. He presented from TCU 8/30/2022 with left hand pain, decreased motion and increasing generalized weakness.    On initially evaluation 8/30, was afebrile, hemodynamically stable; WBC normal, hgb 10.5; BMP unremarkable; LFT's showed low albumin and protein, otherwise normal. Head CT 8/30 negative. 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       Suspected Lewy body dementia.  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   Rigidity with motor symptoms/extrapyramidal side effects suspect related to above with worsening by anti-psychotics.  Depression/anxiety.  * Recent hospitalization at [facility] 8/2022 with issues including psychosis. Was started on risperidone. PTA also on donepezil, escitalopram and gabapentin.  * Initial presentation as above. Ongoing issues with hallucinations noted on admit.  * Patient admitted with generalized weakness and progressive inability to ambulate accompanied by extremity rigidity. Wife Ilsa reported worsening symptoms after recent hospitalization and start of antipsychotic medications to help treat underlying psychosis. However, since starting, the patient exhibited worsening rigidity  concerning for parkinsonism. Neurology and Psychiatry consulted on admit.  * Unable to obtain an MRI due to PPM/abandoned RV lead.  * Neurology evaluated and overall there was concern for underlying Lewy body dementia, with dystonia that may have been worsened by starting antipsychotics.  * Psychiatry did stop the patient's risperidone and started aripiprazole 8/31.  * Carbidopa-levodopa started 9/1.  * Given continued rigidity, aripiprazole stopped 9/2. Pimavanserin started 9/2.  * Quetiapine started 9/4. Increased carbidopa/levadopa 9/4. Pimavanserin stopped 9/4 due to long term cost issues.  * Escitalopram decreased 9/5.  - Continue carbidopa-levodopa  mg TID.  - Continue donepezil 10 mg at bedtime; quetiapine 25 mg at bedtime; PRN quetiapine 12.5 mg BID.  - Continue escitalopram 10 mg daily, gabapentin 100 mg TID.    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  * Initial presentation as above. Orthopedics consulted on admit; WBAT and elevation recommended; PRN left wrist splint ordered.  - Continue WBAT.  - Elevated left upper extremity when at rest.  - Continue PRN left wrist splint.  - Continue PRN acetaminophen.  - Outpatient follow-up with hand surgeon.    Weakness and physical deconditioning due to multiple acute and chronic medical issues.  - Continue PT and OT.  - Still awaiting TCU placement for further therapies. Was declined due to cost of pimavanserin, however he is not on the medication anymore and there are no plans to restart it at this time.    CAD with h/o CABG (1992).  CHF (HFrEF, biventricular).  Hypertension (benign essential).  [PTA: carvedilol 12.5 mg BID; furosemide 40 mg daily; spironolactone 25 mg daily.]  * Echo 8/3/2022 showed LVEF 15-20%, grade 1 diastolic dysfunction, severe diffuse hypokinesis; RV global function mildly reduced.  - Continue carvedilol; furosemide; spironolactone.  - Monitor i/o's, daily  wts.    Malnutrition related to acute and chronic medical issues.  * Noted previous hospitalization; see their documentation.  - Continue diet as tolerated.  - Continue supplements.    Iron deficiency anemia  * Recently completed a course of IV iron sucrose at [facility] 8/2022.  Recent Labs   Lab 09/06/22  0748 09/05/22  0621 09/04/22  1500   HGB 10.7* 11.3* 11.9*   - Monitor CBC.  - Consider prbc transfusion if hgb </= 7.0 or if significant bleeding with hemodynamic instability or if symptomatic.    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  * Noted by [facility] RN.  - Continue local wound cares per [facility] nurse rec's.    Chronic indwelling Louie catheter due to urinary retention and chronic hematuria.  * Pt with chronic Louie; catheter changed every month.   * Appears was last changed 8/15 at [facility] with subsequent hematuria. Seen by Urology.  - Follow-up in Urology clinic (Lou) as previously arranged unless he develops any acute issues.    Paroxysmal atrial fibrillation.  S/p PPM.  - Continue apixaban, carvedilol.    Gout.  - Continue allopurinol.    KRISTIAN.  * Does not use CPAP.  - Follow-up outpatient.    Recent COVID-19 infection, recovered.  * He had minimal symptoms with first positive on 7/30, considered COVID recovered.  - Monitor clinically.      Clinically Significant Risk Factors Present on Admission                        COVID-19 testing.  COVID-19 PCR Results    COVID-19 PCR Results 6/28/22 7/7/22 7/17/22 7/30/22   SARS CoV2 PCR Negative Negative Negative Positive (A)   (A) Abnormal value       Comments are available for some flowsheets but are not being displayed.         COVID-19 Antibody Results, Testing for Immunity    COVID-19 Antibody Results, Testing for Immunity   No data to display.             Diet: Snacks/Supplements Pediatric: Ensure Enlive; With Meals  Regular Diet Adult    Prophylaxis: PCD's, ambulation. On apixaban.  Louie Catheter: PRESENT, indication:  "Retention  Central Lines: None  Code Status: Full Code    Disposition Plan   Expected discharge: 1-2d recommended to transitional care unit pending bed availability.  Entered: Harrison Zurita MD 09/10/2022, 2:42 PM       I spent approximately 40 minutes bedside and on the inpatient unit today managing the care of patient. Over 50% of my time on the unit was spent in extensive chart review, counseling the patient/family and/or coordinating care regarding services listed in this note.    Interval History   Doing OK.  Feels better overall since admit.  No chest pain or dyspnea.    -Data reviewed today: I reviewed all new labs and imaging over the last 24 hours. I personally reviewed no images or EKG's today.    Physical Exam    , Blood pressure 122/66, pulse 68, temperature 97.8  F (36.6  C), temperature source Axillary, resp. rate 16, height 1.753 m (5' 9\"), weight 81.9 kg (180 lb 8.9 oz), SpO2 98 %. O2 Device: None (Room air)    Vitals:    08/30/22 1942   Weight: 81.9 kg (180 lb 8.9 oz)     Vital Signs with Ranges  Temp:  [97.5  F (36.4  C)-98.2  F (36.8  C)] 97.8  F (36.6  C)  Pulse:  [46-77] 68  Resp:  [16-18] 16  BP: (105-122)/(57-73) 122/66  SpO2:  [96 %-100 %] 98 %  Patient Vitals for the past 24 hrs:   BP Temp Temp src Pulse Resp SpO2   09/10/22 1103 122/66 97.8  F (36.6  C) Axillary 68 16 98 %   09/10/22 0716 114/57 97.5  F (36.4  C) Oral (!) 46 16 97 %   09/10/22 0413 121/62 97.7  F (36.5  C) Oral 67 16 96 %   09/09/22 2300 119/67 98  F (36.7  C) Oral 76 18 96 %   09/09/22 1947 119/73 98  F (36.7  C) Oral 74 18 100 %   09/09/22 1546 105/66 98.2  F (36.8  C) Oral 77 16 98 %     I/O's Last 24 hours  I/O last 3 completed shifts:  In: 473 [P.O.:473]  Out: 725 [Urine:725]    Constitutional: Awake, alert, frail, cachectic.  Respiratory: Diminished in bases. No crackles or wheezes.  Cardiovascular: RRR, no m/r/g.  GI: Soft, nt, nd, +BS.  Skin/Integumen:   Other:        Data   Recent Labs   Lab 09/06/22  0748 " 09/05/22  0621 09/04/22  1500 09/04/22  0723   WBC 5.5 7.2 6.9  --    HGB 10.7* 11.3* 11.9*  --    MCV 88 89 87  --     274 289  --      --   --  140   POTASSIUM 3.7  --   --  3.9   CHLORIDE 107  --   --  104   CO2 28  --   --  31   BUN 25  --   --  17   CR 0.50*  --   --  0.48*   ANIONGAP 6  --   --  5   CRYS 9.2  --   --  9.0   *  --   --  111*     Recent Labs   Lab Test 09/06/22  0748 09/04/22  0723 08/30/22  1950 08/23/22  0742 08/20/22  1010   * 111* 151* 101* 91     Recent Labs   Lab 09/06/22  0748 09/05/22  0621 09/04/22  1500   WBC 5.5 7.2 6.9         No results found for this or any previous visit (from the past 24 hour(s)).    Medications   All medications were reviewed.      allopurinol  300 mg Oral Daily     apixaban ANTICOAGULANT  5 mg Oral BID     carbidopa-levodopa  1 tablet Oral TID     carvedilol  12.5 mg Oral BID     donepezil  10 mg Oral At Bedtime     escitalopram  10 mg Oral Daily     furosemide  40 mg Oral Daily     gabapentin  100 mg Oral TID     loratadine  10 mg Oral Daily     multivitamin w/minerals  1 tablet Oral Daily     psyllium  3 capsule Oral Daily     QUEtiapine  25 mg Oral At Bedtime     sennosides  1 tablet Oral BID     sodium chloride (PF)  3 mL Intracatheter Q8H     spironolactone  25 mg Oral Daily     acetaminophen, artificial tears, melatonin, ondansetron **OR** ondansetron, polyethylene glycol, prochlorperazine **OR** prochlorperazine **OR** prochlorperazine, QUEtiapine, sodium chloride (PF)

## 2022-09-10 NOTE — PLAN OF CARE
Goal Outcome Evaluation:  Orientation/Cognitive: A&OX4  Observation Goals (Met/ Not Met): not met  Mobility Level/Assist Equipment: AX2 with lift, up on chair for meals  Fall Risk (Y/N): Yes  Behavior Concerns: None, pleasant  Pain Management: Tylenol for R foot and ankle pain, effective  Tele/VS/O2: VSS on RA, no tele  ABNL Lab/BG: none ordered  Diet: Regular, feeder  Bowel/Bladder: Incontinent, 1 BM today, chronic keating with chronic hematuria, urine color range anywhere from yellow straw to pink tinged  Skin Concerns: PI in the buttocks, dressing change done, L toe wound, wound care done as well  Drains/Devices: Keating catheter, PIV SLed  Tests/Procedures for next shift: None  Anticipated DC date & active delays: pending TCU placement and insurance auth  Patient Stated Goal for Today: To rest

## 2022-09-10 NOTE — PLAN OF CARE
Goal Outcome Evaluation:    Plan of Care Reviewed With: patient   Overall Patient Progress: improving       Orientation/Cognitive: A&Ox3-4  Observation Goals (Met/ Not Met): Inpatient  Mobility Level/Assist Equipment: Ax2, lift, Turn and repoq2    Fall Risk (Y/N): Yes   Behavior Concerns: none, flat affect   Pain Management: Left great toe pain at 2/10, relief adv after intake of PRN Tylenol   Tele/VS/O2: VSS on RA   ABNL Lab/BG: no labs this shift   Diet: tolerating a regular diet, +protein supplements TID, pt needs assistance w/ placing orders/feeding   Bowel/Bladder: Chronic keating-cloudy yellow, UO: 300cc+425; Small BM this shift   Skin Concerns: Sacral wound-dressing qShift done-mepilex in place, L great toe wound w/ dressing reinforced-CDI   Drains/Devices: chronic keating, PIV SL  Tests/Procedures for next shift: n/a  Anticipated DC date & active delays: TBD, pt to DC back to [facility] TCU pending Insurance auth  Patient Stated Goal for Today: sleep well

## 2022-09-11 LAB
ANION GAP SERPL CALCULATED.3IONS-SCNC: 4 MMOL/L (ref 3–14)
BASOPHILS # BLD AUTO: 0 10E3/UL (ref 0–0.2)
BASOPHILS NFR BLD AUTO: 0 %
BUN SERPL-MCNC: 20 MG/DL (ref 7–30)
CALCIUM SERPL-MCNC: 9.2 MG/DL (ref 8.5–10.1)
CHLORIDE BLD-SCNC: 100 MMOL/L (ref 94–109)
CO2 SERPL-SCNC: 33 MMOL/L (ref 20–32)
CREAT SERPL-MCNC: 0.56 MG/DL (ref 0.66–1.25)
EOSINOPHIL # BLD AUTO: 0.1 10E3/UL (ref 0–0.7)
EOSINOPHIL NFR BLD AUTO: 2 %
ERYTHROCYTE [DISTWIDTH] IN BLOOD BY AUTOMATED COUNT: 16.3 % (ref 10–15)
GFR SERPL CREATININE-BSD FRML MDRD: >90 ML/MIN/1.73M2
GLUCOSE BLD-MCNC: 107 MG/DL (ref 70–99)
HCT VFR BLD AUTO: 35.9 % (ref 40–53)
HGB BLD-MCNC: 10.9 G/DL (ref 13.3–17.7)
IMM GRANULOCYTES # BLD: 0 10E3/UL
IMM GRANULOCYTES NFR BLD: 0 %
LYMPHOCYTES # BLD AUTO: 0.6 10E3/UL (ref 0.8–5.3)
LYMPHOCYTES NFR BLD AUTO: 13 %
MCH RBC QN AUTO: 27.6 PG (ref 26.5–33)
MCHC RBC AUTO-ENTMCNC: 30.4 G/DL (ref 31.5–36.5)
MCV RBC AUTO: 91 FL (ref 78–100)
MONOCYTES # BLD AUTO: 0.6 10E3/UL (ref 0–1.3)
MONOCYTES NFR BLD AUTO: 12 %
NEUTROPHILS # BLD AUTO: 3.5 10E3/UL (ref 1.6–8.3)
NEUTROPHILS NFR BLD AUTO: 73 %
NRBC # BLD AUTO: 0 10E3/UL
NRBC BLD AUTO-RTO: 0 /100
PLATELET # BLD AUTO: 268 10E3/UL (ref 150–450)
POTASSIUM BLD-SCNC: 4.4 MMOL/L (ref 3.4–5.3)
RBC # BLD AUTO: 3.95 10E6/UL (ref 4.4–5.9)
SODIUM SERPL-SCNC: 137 MMOL/L (ref 133–144)
WBC # BLD AUTO: 4.9 10E3/UL (ref 4–11)

## 2022-09-11 PROCEDURE — 85025 COMPLETE CBC W/AUTO DIFF WBC: CPT | Performed by: INTERNAL MEDICINE

## 2022-09-11 PROCEDURE — 250N000013 HC RX MED GY IP 250 OP 250 PS 637: Performed by: PSYCHIATRY & NEUROLOGY

## 2022-09-11 PROCEDURE — 250N000013 HC RX MED GY IP 250 OP 250 PS 637: Performed by: INTERNAL MEDICINE

## 2022-09-11 PROCEDURE — 80048 BASIC METABOLIC PNL TOTAL CA: CPT | Performed by: INTERNAL MEDICINE

## 2022-09-11 PROCEDURE — 36415 COLL VENOUS BLD VENIPUNCTURE: CPT | Performed by: INTERNAL MEDICINE

## 2022-09-11 PROCEDURE — 120N000001 HC R&B MED SURG/OB

## 2022-09-11 PROCEDURE — 250N000013 HC RX MED GY IP 250 OP 250 PS 637: Performed by: HOSPITALIST

## 2022-09-11 PROCEDURE — 99231 SBSQ HOSP IP/OBS SF/LOW 25: CPT | Performed by: INTERNAL MEDICINE

## 2022-09-11 RX ADMIN — CARBIDOPA AND LEVODOPA 1 TABLET: 25; 100 TABLET ORAL at 14:47

## 2022-09-11 RX ADMIN — ACETAMINOPHEN 650 MG: 325 TABLET ORAL at 01:42

## 2022-09-11 RX ADMIN — APIXABAN 5 MG: 5 TABLET, FILM COATED ORAL at 09:23

## 2022-09-11 RX ADMIN — APIXABAN 5 MG: 5 TABLET, FILM COATED ORAL at 21:51

## 2022-09-11 RX ADMIN — GABAPENTIN 100 MG: 100 CAPSULE ORAL at 09:22

## 2022-09-11 RX ADMIN — FUROSEMIDE 40 MG: 40 TABLET ORAL at 09:21

## 2022-09-11 RX ADMIN — CARBIDOPA AND LEVODOPA 1 TABLET: 25; 100 TABLET ORAL at 21:51

## 2022-09-11 RX ADMIN — QUETIAPINE FUMARATE 25 MG: 25 TABLET ORAL at 21:51

## 2022-09-11 RX ADMIN — DONEPEZIL HYDROCHLORIDE 10 MG: 10 TABLET ORAL at 21:52

## 2022-09-11 RX ADMIN — CARVEDILOL 12.5 MG: 12.5 TABLET, FILM COATED ORAL at 09:22

## 2022-09-11 RX ADMIN — ACETAMINOPHEN 650 MG: 325 TABLET ORAL at 21:51

## 2022-09-11 RX ADMIN — LORATADINE 10 MG: 10 TABLET ORAL at 09:23

## 2022-09-11 RX ADMIN — SENNOSIDES 1 TABLET: 8.6 TABLET, FILM COATED ORAL at 21:51

## 2022-09-11 RX ADMIN — GABAPENTIN 100 MG: 100 CAPSULE ORAL at 14:47

## 2022-09-11 RX ADMIN — MULTIPLE VITAMINS W/ MINERALS TAB 1 TABLET: TAB at 09:22

## 2022-09-11 RX ADMIN — ALLOPURINOL 300 MG: 300 TABLET ORAL at 09:22

## 2022-09-11 RX ADMIN — SENNOSIDES 1 TABLET: 8.6 TABLET, FILM COATED ORAL at 09:21

## 2022-09-11 RX ADMIN — CARBIDOPA AND LEVODOPA 1 TABLET: 25; 100 TABLET ORAL at 09:22

## 2022-09-11 RX ADMIN — SPIRONOLACTONE 25 MG: 25 TABLET ORAL at 09:22

## 2022-09-11 RX ADMIN — ESCITALOPRAM OXALATE 10 MG: 10 TABLET ORAL at 09:22

## 2022-09-11 RX ADMIN — GABAPENTIN 100 MG: 100 CAPSULE ORAL at 21:51

## 2022-09-11 ASSESSMENT — ACTIVITIES OF DAILY LIVING (ADL)
ADLS_ACUITY_SCORE: 67
ADLS_ACUITY_SCORE: 65
ADLS_ACUITY_SCORE: 65
ADLS_ACUITY_SCORE: 67
ADLS_ACUITY_SCORE: 65
ADLS_ACUITY_SCORE: 67
ADLS_ACUITY_SCORE: 67
ADLS_ACUITY_SCORE: 65
ADLS_ACUITY_SCORE: 67

## 2022-09-11 NOTE — PLAN OF CARE
Goal Outcome Evaluation:    Plan of Care Reviewed With: patient     Overall Patient Progress: no change       Goal Outcome Evaluation:     Plan of Care Reviewed With: patient   Overall Patient Progress: improving     Orientation/Cognitive: A&Ox3-4  Observation Goals (Met/ Not Met): Inpatient  Mobility Level/Assist Equipment: Ax2, lift, Turn and repo q 2    Fall Risk (Y/N): Yes   Behavior Concerns:None  Pain Management: PRN Tylenol given x1  Tele/VS/O2: VSS on RA   ABNL Lab/BG: none this shift , urine is blood tinged and brownish,no BM this shift  Skin Concerns: Sacral wound, L great toe wound dressing-CDI   Drains/Devices: chronic keating, PIV SL  Tests/Procedures for next shift: n/a  Anticipated DC date & active delays:DC back to [facility] TCU pending Insurance auth  Patient Stated Goal for Today: sleep well , pain control, encouraged to hydrate

## 2022-09-11 NOTE — PROGRESS NOTES
[facility]    Internal Medicine Hospitalist Progress Note  09/11/2022  I evaluated patient on the above date.    Harrison Zurita Jr., MD  [phone] (p)  Text Page  Vocera        Assessment & Plan New actions/orders today (09/11/2022) are underlined.    William Almazan is an 81-year-old male with a past medical history significant for dementia, HTN, CHF (HFrEF), CAD, PAF, gout, urinary retention with chronic indwelling Louie catheter, h/o prostate cancer; with recent hospitalization at [facility] (8/2-8/24/2022) for issues including failure to thrive with hypotension and TONE as well as issues with psychosis. He presented from TCU 8/30/2022 with left hand pain, decreased motion and increasing generalized weakness.    On initially evaluation 8/30, was afebrile, hemodynamically stable; WBC normal, hgb 10.5; BMP unremarkable; LFT's showed low albumin and protein, otherwise normal. Head CT 8/30 negative. 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       Suspected Lewy body dementia.  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   Rigidity with motor symptoms/extrapyramidal side effects suspect related to above with worsening by anti-psychotics.  Depression/anxiety.  * Recent hospitalization at [facility] 8/2022 with issues including psychosis. Was started on risperidone. PTA also on donepezil, escitalopram and gabapentin.  * Initial presentation as above. Ongoing issues with hallucinations noted on admit.  * Patient admitted with generalized weakness and progressive inability to ambulate accompanied by extremity rigidity. Wife Ilsa reported worsening symptoms after recent hospitalization and start of antipsychotic medications to help treat underlying psychosis. However, since starting, the patient exhibited worsening rigidity  concerning for parkinsonism. Neurology and Psychiatry consulted on admit.  * Unable to obtain an MRI due to PPM/abandoned RV lead.  * Neurology evaluated and overall there was concern for underlying Lewy body dementia, with dystonia that may have been worsened by starting antipsychotics.  * Psychiatry stopped the patient's risperidone and started aripiprazole 8/31.  * Carbidopa-levodopa started 9/1.  * Given continued rigidity, aripiprazole stopped 9/2. Pimavanserin started 9/2.  * Quetiapine started 9/4. Increased carbidopa-levadopa 9/4. Pimavanserin stopped 9/4 due to long term cost issues.  * Escitalopram decreased 9/5.  - Continue carbidopa-levodopa  mg TID.  - Continue donepezil 10 mg at bedtime; quetiapine 25 mg at bedtime; PRN quetiapine 12.5 mg BID.  - Continue escitalopram 10 mg daily, gabapentin 100 mg TID.    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  * Initial presentation as above. Orthopedics consulted on admit; WBAT and elevation recommended; PRN left wrist splint ordered.  - Continue WBAT.  - Elevated left upper extremity when at rest.  - Continue PRN left wrist splint.  - Continue PRN acetaminophen.  - Outpatient follow-up with hand surgeon.    Weakness and physical deconditioning due to multiple acute and chronic medical issues.  - Continue PT and OT.  - Still awaiting TCU placement for further therapies. Was declined due to cost of pimavanserin, however he is not on the medication anymore and there are no plans to restart it at this time.    CAD with h/o CABG (1992).  CHF (HFrEF, biventricular).  Hypertension (benign essential).  [PTA: carvedilol 12.5 mg BID; furosemide 40 mg daily; spironolactone 25 mg daily.]  * Echo 8/3/2022 showed LVEF 15-20%, grade 1 diastolic dysfunction, severe diffuse hypokinesis; RV global function mildly reduced.  - Continue carvedilol; furosemide; spironolactone.  - Monitor i/o's, daily  wts.    Malnutrition related to acute and chronic medical issues.  * Noted previous hospitalization; see their documentation.  - Continue diet as tolerated.  - Continue supplements.    Iron deficiency anemia  * Recently completed a course of IV iron sucrose at [facility] 8/2022.  Recent Labs   Lab 09/11/22  0611 09/06/22  0748 09/05/22  0621 09/04/22  1500   HGB 10.9* 10.7* 11.3* 11.9*   - Monitor CBC.  - Consider prbc transfusion if hgb </= 7.0 or if significant bleeding with hemodynamic instability or if symptomatic.    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  * Noted by [facility] RN.  - Continue local wound cares per [facility] nurse rec's.    Chronic indwelling Louie catheter due to urinary retention and chronic hematuria.  * Pt with chronic Louie; catheter changed every month.   * Appears was last changed 8/15 at [facility] with subsequent hematuria. Seen by Urology.  - Follow-up in Urology clinic (Lou) as previously arranged unless he develops any acute issues.    Paroxysmal atrial fibrillation.  S/p PPM.  - Continue apixaban, carvedilol.    Gout.  - Continue allopurinol.    KRISTIAN.  * Does not use CPAP.  - Follow-up outpatient.    Recent COVID-19 infection, recovered.  * He had minimal symptoms with first positive on 7/30, considered COVID recovered.  - Monitor clinically.      Clinically Significant Risk Factors Present on Admission                        COVID-19 testing.  COVID-19 PCR Results    COVID-19 PCR Results 6/28/22 7/7/22 7/17/22 7/30/22   SARS CoV2 PCR Negative Negative Negative Positive (A)   (A) Abnormal value       Comments are available for some flowsheets but are not being displayed.         COVID-19 Antibody Results, Testing for Immunity    COVID-19 Antibody Results, Testing for Immunity   No data to display.             Diet: Snacks/Supplements Pediatric: Ensure Enlive; With Meals  Regular Diet Adult    Prophylaxis: PCD's, ambulation. On apixaban.  Louie Catheter:  "PRESENT, indication: Retention  Central Lines: None  Code Status: Full Code    Disposition Plan   Expected discharge: 1-2d recommended to transitional care unit pending bed availability.  Entered: Harrison Zurita MD 09/11/2022, 9:22 AM         Interval History   Doing OK.  Feels better than yesterday.  Had some nausea earlier, better now.  No chest pain or dyspnea.    -Data reviewed today: I reviewed all new labs and imaging over the last 24 hours. I personally reviewed no images or EKG's today.    Physical Exam    , Blood pressure 122/73, pulse 70, temperature 97.6  F (36.4  C), temperature source Axillary, resp. rate 16, height 1.753 m (5' 9\"), weight 81.9 kg (180 lb 8.9 oz), SpO2 96 %. O2 Device: None (Room air)    Vitals:    08/30/22 1942   Weight: 81.9 kg (180 lb 8.9 oz)     Vital Signs with Ranges  Temp:  [97.4  F (36.3  C)-98.3  F (36.8  C)] 97.6  F (36.4  C)  Pulse:  [68-81] 70  Resp:  [16-18] 16  BP: (120-138)/(66-73) 122/73  SpO2:  [94 %-98 %] 96 %  Patient Vitals for the past 24 hrs:   BP Temp Temp src Pulse Resp SpO2   09/11/22 0726 122/73 97.6  F (36.4  C) Axillary 70 16 96 %   09/11/22 0227 -- -- -- -- 16 --   09/11/22 0142 137/70 97.6  F (36.4  C) Oral 73 18 94 %   09/10/22 1942 120/71 98.3  F (36.8  C) Oral 81 16 97 %   09/10/22 1531 138/66 97.4  F (36.3  C) Oral 72 16 98 %   09/10/22 1103 122/66 97.8  F (36.6  C) Axillary 68 16 98 %     I/O's Last 24 hours  I/O last 3 completed shifts:  In: 510 [P.O.:510]  Out: 1750 [Urine:1750]    Constitutional: Awake, alert, appropriate.  Respiratory: Diminished in bases. No crackles or wheezes.  Cardiovascular: RRR, no m/r/g.  GI: Soft, nt, nd, +BS.  Skin/Integumen:   Other:        Data   Recent Labs   Lab 09/11/22  0611 09/06/22  0748 09/05/22  0621   WBC 4.9 5.5 7.2   HGB 10.9* 10.7* 11.3*   MCV 91 88 89    219 274    141  --    POTASSIUM 4.4 3.7  --    CHLORIDE 100 107  --    CO2 33* 28  --    BUN 20 25  --    CR 0.56* 0.50*  --    ANIONGAP " 4 6  --    CRYS 9.2 9.2  --    * 112*  --      Recent Labs   Lab Test 09/11/22  0611 09/06/22  0748 09/04/22  0723 08/30/22  1950 08/23/22  0742   * 112* 111* 151* 101*     Recent Labs   Lab 09/11/22  0611 09/06/22  0748 09/05/22  0621 09/04/22  1500   WBC 4.9 5.5 7.2 6.9         No results found for this or any previous visit (from the past 24 hour(s)).    Medications   All medications were reviewed.      allopurinol  300 mg Oral Daily     apixaban ANTICOAGULANT  5 mg Oral BID     carbidopa-levodopa  1 tablet Oral TID     carvedilol  12.5 mg Oral BID     donepezil  10 mg Oral At Bedtime     escitalopram  10 mg Oral Daily     furosemide  40 mg Oral Daily     gabapentin  100 mg Oral TID     loratadine  10 mg Oral Daily     multivitamin w/minerals  1 tablet Oral Daily     psyllium  3 capsule Oral Daily     QUEtiapine  25 mg Oral At Bedtime     sennosides  1 tablet Oral BID     sodium chloride (PF)  3 mL Intracatheter Q8H     spironolactone  25 mg Oral Daily     acetaminophen, artificial tears, melatonin, ondansetron **OR** ondansetron, polyethylene glycol, prochlorperazine **OR** prochlorperazine **OR** prochlorperazine, QUEtiapine, sodium chloride (PF)

## 2022-09-11 NOTE — PLAN OF CARE
Goal Outcome Evaluation:  A&O, can make his needs known, Q2 T and R, keating in use, No BM this shift, with pinkish output MD knows, on regular diet, with poor appetite, prefers to drink Ensure, needs assistance in feeding, discharge pending placement.

## 2022-09-12 PROCEDURE — 120N000001 HC R&B MED SURG/OB

## 2022-09-12 PROCEDURE — 250N000013 HC RX MED GY IP 250 OP 250 PS 637: Performed by: PSYCHIATRY & NEUROLOGY

## 2022-09-12 PROCEDURE — 250N000013 HC RX MED GY IP 250 OP 250 PS 637: Performed by: INTERNAL MEDICINE

## 2022-09-12 PROCEDURE — 99239 HOSP IP/OBS DSCHRG MGMT >30: CPT | Performed by: INTERNAL MEDICINE

## 2022-09-12 PROCEDURE — 250N000013 HC RX MED GY IP 250 OP 250 PS 637: Performed by: HOSPITALIST

## 2022-09-12 RX ORDER — ESCITALOPRAM OXALATE 10 MG/1
10 TABLET ORAL DAILY
DISCHARGE
Start: 2022-09-12

## 2022-09-12 RX ORDER — ONDANSETRON 4 MG/1
4 TABLET, ORALLY DISINTEGRATING ORAL EVERY 6 HOURS PRN
DISCHARGE
Start: 2022-09-12

## 2022-09-12 RX ORDER — QUETIAPINE FUMARATE 25 MG/1
25 TABLET, FILM COATED ORAL AT BEDTIME
DISCHARGE
Start: 2022-09-12

## 2022-09-12 RX ORDER — MULTIPLE VITAMINS W/ MINERALS TAB 9MG-400MCG
1 TAB ORAL DAILY
DISCHARGE
Start: 2022-09-13

## 2022-09-12 RX ORDER — CARBIDOPA AND LEVODOPA 25; 100 MG/1; MG/1
1 TABLET ORAL 3 TIMES DAILY
DISCHARGE
Start: 2022-09-12

## 2022-09-12 RX ORDER — QUETIAPINE FUMARATE 25 MG/1
12.5 TABLET, FILM COATED ORAL 2 TIMES DAILY PRN
DISCHARGE
Start: 2022-09-12

## 2022-09-12 RX ADMIN — Medication 3 CAPSULE: at 09:16

## 2022-09-12 RX ADMIN — QUETIAPINE FUMARATE 25 MG: 25 TABLET ORAL at 21:49

## 2022-09-12 RX ADMIN — MULTIPLE VITAMINS W/ MINERALS TAB 1 TABLET: TAB at 09:14

## 2022-09-12 RX ADMIN — CARVEDILOL 12.5 MG: 12.5 TABLET, FILM COATED ORAL at 21:49

## 2022-09-12 RX ADMIN — SPIRONOLACTONE 25 MG: 25 TABLET ORAL at 09:15

## 2022-09-12 RX ADMIN — ESCITALOPRAM OXALATE 10 MG: 10 TABLET ORAL at 09:15

## 2022-09-12 RX ADMIN — GABAPENTIN 100 MG: 100 CAPSULE ORAL at 13:38

## 2022-09-12 RX ADMIN — GABAPENTIN 100 MG: 100 CAPSULE ORAL at 21:49

## 2022-09-12 RX ADMIN — ACETAMINOPHEN 650 MG: 325 TABLET ORAL at 12:09

## 2022-09-12 RX ADMIN — GABAPENTIN 100 MG: 100 CAPSULE ORAL at 09:15

## 2022-09-12 RX ADMIN — LORATADINE 10 MG: 10 TABLET ORAL at 09:16

## 2022-09-12 RX ADMIN — CARBIDOPA AND LEVODOPA 1 TABLET: 25; 100 TABLET ORAL at 13:38

## 2022-09-12 RX ADMIN — FUROSEMIDE 40 MG: 40 TABLET ORAL at 09:15

## 2022-09-12 RX ADMIN — CARVEDILOL 12.5 MG: 12.5 TABLET, FILM COATED ORAL at 09:14

## 2022-09-12 RX ADMIN — SENNOSIDES 1 TABLET: 8.6 TABLET, FILM COATED ORAL at 09:15

## 2022-09-12 RX ADMIN — CARBIDOPA AND LEVODOPA 1 TABLET: 25; 100 TABLET ORAL at 21:49

## 2022-09-12 RX ADMIN — APIXABAN 5 MG: 5 TABLET, FILM COATED ORAL at 09:15

## 2022-09-12 RX ADMIN — ALLOPURINOL 300 MG: 300 TABLET ORAL at 09:15

## 2022-09-12 RX ADMIN — CARBIDOPA AND LEVODOPA 1 TABLET: 25; 100 TABLET ORAL at 09:14

## 2022-09-12 RX ADMIN — APIXABAN 5 MG: 5 TABLET, FILM COATED ORAL at 21:50

## 2022-09-12 RX ADMIN — DONEPEZIL HYDROCHLORIDE 10 MG: 10 TABLET ORAL at 21:50

## 2022-09-12 RX ADMIN — SENNOSIDES 1 TABLET: 8.6 TABLET, FILM COATED ORAL at 21:50

## 2022-09-12 ASSESSMENT — ACTIVITIES OF DAILY LIVING (ADL)
ADLS_ACUITY_SCORE: 67

## 2022-09-12 NOTE — DISCHARGE SUMMARY
[facility]  Discharge Summary        William Almazan MRN# [id number]   YOB: 1940 Age: 81 year old     Date of Admission: 8/30/2022  Date of Discharge: 9/12/2022  Admitting Physician: Jose Law DO  Discharge Physician: Harrison Zurita MD     Primary Provider: Victoriano Powers  Primary Care Physician Phone Number: [phone]         Discharge Diagnoses:   1. Suspected Lewy body dementia.  2. 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   3. Rigidity with motor symptoms/extrapyramidal side effects suspect related to above with worsening by anti-psychotics.  4. 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  5. Weakness and physical deconditioning due to multiple acute and chronic medical issues.  6. Malnutrition related to acute and chronic medical issues.  7. 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          Other Chronic Medical Problems:      1. CAD with h/o CABG (1992).  2. CHF (HFrEF, biventricular).  3. Hypertension (benign essential).  4. Paroxysmal atrial fibrillation.  5. S/p PPM.  6. Depression/anxiety.  7. Iron deficiency anemia.  8. Chronic indwelling Louie catheter due to urinary retention and chronic hematuria.  9. Gout.  10. KRISTIAN.  11. Recent COVID-19 infection, recovered.       Allergies:       No Known Allergies        Discharge Medications:        Current Discharge Medication List      START taking these medications    Details   carbidopa-levodopa (SINEMET)  MG tablet Take 1 tablet by mouth 3 times daily    Associated Diagnoses: Lewy body dementia with behavioral disturbance (H)      multivitamin w/minerals (THERA-VIT-M) tablet Take 1 tablet by mouth daily    Associated Diagnoses: Nutritional deficiency      ondansetron (ZOFRAN ODT) 4 MG ODT tab Take 1 tablet (4 mg) by mouth every 6 hours as needed for nausea or vomiting    Associated  Diagnoses: Nausea      !! QUEtiapine (SEROQUEL) 25 MG tablet Take 1 tablet (25 mg) by mouth At Bedtime    Associated Diagnoses: Dementia with behavioral disturbance, unspecified dementia type (H)      !! QUEtiapine (SEROQUEL) 25 MG tablet Take 0.5 tablets (12.5 mg) by mouth 2 times daily as needed (Or anxiety or reported hallucinations)    Associated Diagnoses: Dementia with behavioral disturbance, unspecified dementia type (H)       !! - Potential duplicate medications found. Please discuss with provider.      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         CONTINUE these medications which have NOT CHANGED    Details   acetaminophen (TYLENOL) 325 MG tablet Take 2 tablets (650 mg) by mouth every 4 hours as needed for mild pain  Qty: 30 tablet, Refills: 3    Associated Diagnoses: Chronic pain of both knees      allopurinol (ZYLOPRIM) 300 MG tablet Take 1 tablet by mouth daily      amoxicillin (AMOXIL) 500 MG capsule Take 1 capsule by mouth as needed PRN for dental procedures      apixaban ANTICOAGULANT (ELIQUIS) 5 MG tablet Take 5 mg by mouth 2 times daily      carvedilol (COREG) 12.5 MG tablet Take 1 tablet (12.5 mg) by mouth 2 times daily    Associated Diagnoses: Chronic systolic heart failure (H)      cholecalciferol 25 MCG (1000 UT) TABS Take 1,000 Units by mouth daily      cyanocobalamin (VITAMIN B-12) 1000 MCG tablet Take 1,000 mcg by mouth daily      diclofenac (VOLTAREN) 1 % topical gel Apply 2 g topically 4 times daily  Qty: 350 g, Refills: 0    Associated Diagnoses: Chronic pain of both knees      donepezil (ARICEPT) 10 MG tablet Take 1 tablet (10 mg) by mouth At Bedtime  Qty: 90 tablet, Refills: 3    Associated Diagnoses: 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; Dementia with psychosis (H)       furosemide (LASIX) 40 MG tablet Take 1 tablet (40 mg) by mouth daily    Associated Diagnoses: Chronic systolic heart failure (H)      gabapentin (NEURONTIN) 100 MG capsule Take 1 capsule (100 mg) by mouth 3 times daily  Qty: 90 capsule, Refills: 3    Associated Diagnoses: Other chronic pain      loratadine (CLARITIN) 10 MG tablet Take 1 tablet by mouth daily      polyethylene glycol (MIRALAX) 17 GM/Dose powder Take 17 g by mouth daily as needed for constipation  Qty: 510 g    Associated Diagnoses: Constipation, unspecified constipation type      psyllium (METAMUCIL/KONSYL) capsule Take 3 capsules by mouth daily  Qty: 270 capsule, Refills: 3    Comments: Pharmacy to dispense capsule size that is available.  Associated Diagnoses: Constipation, unspecified constipation type      spironolactone (ALDACTONE) 25 MG tablet Take 1 tablet by mouth daily         STOP taking these medications       risperiDONE (RISPERDAL) 0.25 MG tablet Comments:   Reason for Stopping:         risperiDONE (RISPERDAL) 0.25 MG tablet Comments:   Reason for Stopping:         sennosides (SENOKOT) 8.6 MG tablet Comments:   Reason for Stopping:                   Discharge Instructions and Follow-Up:      Discharge Orders      General info for SNF    Length of Stay Estimate: Short Term Care: Estimated # of Days <30  Condition at Discharge: Improving  Level of care:skilled   Rehabilitation Potential: Good  Admission H&P remains valid and up-to-date: Yes  Recent Chemotherapy: N/A  Use Nursing Home Standing Orders: Yes     Mantoux instructions    Give two-step Mantoux (PPD) Per Facility Policy Yes     Activity - Up with nursing assistance     Follow Up and recommended labs and tests    Follow-up with Nursing home physician.  The following labs/tests are recommended: CBC, BMP.  Follow-up with primary care provider after TCU discharge.  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  Follow-up with primary Urologist through [facility] in 1-2 weeks.  Follow-up with [facility], Dr. Huerta, in 1 month.     Reason for your hospital stay    1. Suspected Lewy body dementia.  2. 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   3. Rigidity with motor symptoms/extrapyramidal side effects suspect related to above with worsening by anti-psychotics.  4. 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  5. Weakness and physical deconditioning due to multiple acute and chronic medical issues.  6. Malnutrition related to acute and chronic medical issues.  7. 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     Monitor and record    Daily weights. Notify MD if weight increases by >=3 lbs/day or >/= 5 lbs/week.     Physical Therapy Adult Consult    Evaluate and treat as clinically indicated.    Reason:  weakness and deconditioning     Occupational Therapy Adult Consult    Evaluate and treat as clinically indicated.    Reason:  weakness and deconditioning     Advance Diet as Tolerated    Follow this diet upon discharge: Orders Placed This Encounter      Snacks/Supplements Pediatric: Ensure Enlive; With Meals      Regular Diet Adult             Consultations This Hospital Stay:      CARE MANAGEMENT / SOCIAL WORK IP CONSULT  PSYCHIATRY IP CONSULT  ORTHOPEDIC SURGERY IP CONSULT  WOUND OSTOMY CONTINENCE NURSE  IP CONSULT  NUTRITION SERVICES ADULT IP CONSULT  OCCUPATIONAL THERAPY ADULT IP CONSULT  PHYSICAL THERAPY ADULT IP CONSULT  ORTHOSIS EXTREMITY UPPER REFERRAL IP CONSULT  NEUROLOGY IP CONSULT  PSYCHIATRY IP CONSULT  PHARMACY LIAISON FOR MEDICATION COVERAGE CONSULT  PSYCHIATRY IP CONSULT  CARE MANAGEMENT / SOCIAL WORK IP CONSULT  PSYCHIATRY IP CONSULT  SPIRITUAL HEALTH SERVICES IP CONSULT  RESPIRATORY CARE IP CONSULT  PHYSICAL THERAPY ADULT IP CONSULT  OCCUPATIONAL  THERAPY ADULT IP CONSULT        Admission History:      Please see the H&P by Jose Law DO on 8/30/2022 for complete details. Briefly, William Almazan is an 81-year-old male with a past medical history significant for dementia, HTN, CHF (HFrEF), CAD, PAF, gout, urinary retention with chronic indwelling Louie catheter, h/o prostate cancer; with recent hospitalization at [facility] (8/2-8/24/2022) for issues including failure to thrive with hypotension and TONE as well as issues with psychosis. He presented from TCU 8/30/2022 with left hand pain, decreased motion and increasing generalized weakness.    On initially evaluation 8/30, was afebrile, hemodynamically stable; WBC normal, hgb 10.5; BMP unremarkable; LFT's showed low albumin and protein, otherwise normal. Head CT 8/30 negative. 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         Problem Oriented Hospital Course:        Suspected Lewy body dementia.  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   Rigidity with motor symptoms/extrapyramidal side effects suspect related to above with worsening by anti-psychotics.  Depression/anxiety.  * Recent hospitalization at [facility] 8/2022 with issues including psychosis. Was started on risperidone. PTA also on donepezil, escitalopram and gabapentin.  * Initial presentation as above. Ongoing issues with hallucinations noted on admit.  * Patient admitted with generalized weakness and progressive inability to ambulate accompanied by extremity rigidity. Wife Ilsa reported worsening symptoms after recent hospitalization and start of antipsychotic medications to help treat underlying psychosis. However, since starting, the patient exhibited worsening rigidity concerning for parkinsonism. Neurology and Psychiatry consulted on admit.  * Unable to  obtain an MRI due to PPM/abandoned RV lead.  * Neurology evaluated and overall there was concern for underlying Lewy body dementia, with dystonia that may have been worsened by starting antipsychotics.  * Psychiatry stopped the patient's risperidone and started aripiprazole 8/31.  * Carbidopa-levodopa started 9/1.  * Given continued rigidity, aripiprazole stopped 9/2. Pimavanserin started 9/2.  * Quetiapine started 9/4. Increased carbidopa-levadopa 9/4. Pimavanserin stopped 9/4 due to long term cost issues.  * Escitalopram decreased 9/5.  * 9/10-9/12, pt stable.  - Continue carbidopa-levodopa  mg TID.  - Continue donepezil 10 mg at bedtime; quetiapine 25 mg at bedtime; PRN quetiapine 12.5 mg BID.  - Continue escitalopram 10 mg daily, gabapentin 100 mg TID.    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  * Initial presentation as above. Orthopedics consulted on admit; WBAT and elevation recommended; PRN left wrist splint ordered.  - Continue WBAT.  - Elevated left upper extremity when at rest.  - Continue PRN left wrist splint.  - Continue PRN acetaminophen.  - Outpatient follow-up with hand surgeon.    Weakness and physical deconditioning due to multiple acute and chronic medical issues.  - Continue PT and OT.  - Plan TCU.    CAD with h/o CABG (1992).  CHF (HFrEF, biventricular).  Hypertension (benign essential).  [PTA: carvedilol 12.5 mg BID; furosemide 40 mg daily; spironolactone 25 mg daily.]  * Echo 8/3/2022 showed LVEF 15-20%, grade 1 diastolic dysfunction, severe diffuse hypokinesis; RV global function mildly reduced.  - Continue carvedilol; furosemide; spironolactone.  - Monitor daily weights.  - Follow-up with Cardiology.    Malnutrition related to acute and chronic medical issues.  * Noted previous hospitalization; see their documentation.  - Continue diet as tolerated.  - Continue supplements.    Iron deficiency anemia  * Recently  completed a course of IV iron sucrose at [facility] 8/2022.  Recent Labs   Lab 09/11/22  0611 09/06/22  0748   HGB 10.9* 10.7*   - Monitor CBC.    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  * Noted by [facility] RN.  - Continue local wound cares per [facility] nurse rec's.    Chronic indwelling Louie catheter due to urinary retention and chronic hematuria.  * Pt with chronic Louie; catheter changed every month.   * Appears was last changed 8/15 at [facility] with subsequent hematuria. Seen by Urology.  - Follow-up in Urology clinic ([facility]) as previously arranged unless he develops any acute issues.    Paroxysmal atrial fibrillation.  S/p PPM.  - Continue apixaban, carvedilol.    Gout.  - Continue allopurinol.    KRISTIAN.  * Does not use CPAP.  - Follow-up outpatient.    Recent COVID-19 infection, recovered.  * He had minimal symptoms with first positive on 7/30, considered COVID recovered.  - Monitor clinically.                     COVID-19 testing.  COVID-19 PCR Results    COVID-19 PCR Results 6/28/22 7/7/22 7/17/22 7/30/22   SARS CoV2 PCR Negative Negative Negative Positive (A)   (A) Abnormal value       Comments are available for some flowsheets but are not being displayed.         COVID-19 Antibody Results, Testing for Immunity    COVID-19 Antibody Results, Testing for Immunity   No data to display.                   Pending Results:        Unresulted Labs Ordered in the Past 30 Days of this Admission     No orders found from 7/31/2022 to 8/31/2022.                Discharge Disposition:      Discharged to TCU.        Discharge Time:      Approximately 40 minutes.          Condition and Physical on Discharge:    See progress note on the same date as this discharge summary.          Key Imaging Studies, Lab Findings and Procedures/Surgeries:        Results for orders placed or performed during the hospital encounter of 08/30/22   CT Head w/o Contrast    Narrative    EXAM: CT HEAD W/O  CONTRAST  LOCATION: [facility]  DATE/TIME: 8/30/2022 8:38 PM    INDICATION: left hand and bilateral leg weakness, anticoagulated, dementia  COMPARISON: 07/02/2022  TECHNIQUE: Routine CT Head without IV contrast. Multiplanar reformats. Dose reduction techniques were used.    FINDINGS:  INTRACRANIAL CONTENTS: No intracranial hemorrhage, extraaxial collection, or mass effect.  No CT evidence of acute infarct. Moderate presumed chronic small vessel ischemic changes. Moderate generalized volume loss. No hydrocephalus.     VISUALIZED ORBITS/SINUSES/MASTOIDS: No intraorbital abnormality. No paranasal sinus mucosal disease. No middle ear or mastoid effusion.    BONES/SOFT TISSUES: No acute abnormality.      Impression    IMPRESSION:  1.  No acute intracranial process.   XR Hand Left G/E 3 Views    Narrative    EXAM: XR WRIST LEFT G/E 3 VIEWS, XR HAND LEFT G/E 3 VIEWS  LOCATION: [facility]  DATE/TIME: 8/30/2022 8:31 PM    INDICATION: Left wrist pain.  COMPARISON: None available.      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 Subchondral   cystic change distal radius with small loose body along the radial margin of the radioscaphoid joint. No acute left wrist fracture or dislocation identified. Volar left wrist soft tissue swelling. Slight widening of the distal radioulnar joint.    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 No acute displaced left hand fracture or dislocation is identified. Flexion at the index through small finger MCP joints.   XR Wrist Left G/E 3 Views    Narrative    EXAM: XR WRIST LEFT G/E 3 VIEWS, XR HAND LEFT G/E 3 VIEWS  LOCATION: [facility]  DATE/TIME: 8/30/2022 8:31 PM    INDICATION: Left wrist  pain.  COMPARISON: None available.      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 Subchondral   cystic change distal radius with small loose body along the radial margin of the radioscaphoid joint. No acute left wrist fracture or dislocation identified. Volar left wrist soft tissue swelling. Slight widening of the distal radioulnar joint.    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 No acute displaced left hand fracture or dislocation is identified. Flexion at the index through small finger MCP joints.

## 2022-09-12 NOTE — PROGRESS NOTES
Care Management Follow Up    Length of Stay (days): 11    Expected Discharge Date: 09/12/2022     Concerns to be Addressed:       Patient plan of care discussed at interdisciplinary rounds: Yes    Anticipated Discharge Disposition: Skilled Nursing Facility, Transitional Care     Anticipated Discharge Services: None  Anticipated Discharge DME: None    Patient/family educated on Medicare website which has current facility and service quality ratings: other (see comments) (has bed hold.)  Education Provided on the Discharge Plan:    Patient/Family in Agreement with the Plan: yes    Referrals Placed by CM/SW:    Private pay costs discussed: Not applicable    Additional Information:  Writer talked to patient's spouse, Ilsa, and she said that she talked to [facility] and doesn't want patient to go back to their tcu today. He said that it took them an hour and a half to get to the call light. She said that patient developed stroke like tendencies at the facility. She said that patient can't discharge today and she would like to appeal the discharge.    Writer talked to supervisor and patient's spouse will need to appeal discharge.  Ilsa said that she's now wanting to take patient home instead of going to a tcu. She said she would want home care and they have two family members that could stay with them. She said that they are trying to get a hospital bed that would come tomorrow. Writer paged hospitalist about delaying discharge until tomorrow. Hospitalist said he would like writer to try to find patient a different tcu and family is in agreement.    Addendum: Hospitalist talked to writer and said that he would like writer to try to find patient a different tcu. He asked for writer to send out referrals. Writer sent referrals via [facility], [facility], [facility], [facility], [facility], [facility], [facility], [facility], [facility], [facility], and [facility]    Niya Bajwa,  LSW

## 2022-09-12 NOTE — PROGRESS NOTES
[facility]    Internal Medicine Hospitalist Progress Note  09/12/2022  I evaluated patient on the above date.    Harrison Zurita Jr., MD  [phone] (p)  Text Page  Vocera        Assessment & Plan New actions/orders today (09/12/2022) are underlined.    William Almazan is an 81-year-old male with a past medical history significant for dementia, HTN, CHF (HFrEF), CAD, PAF, gout, urinary retention with chronic indwelling Louie catheter, h/o prostate cancer; with recent hospitalization at [facility] (8/2-8/24/2022) for issues including failure to thrive with hypotension and TONE as well as issues with psychosis. He presented from TCU 8/30/2022 with left hand pain, decreased motion and increasing generalized weakness.    On initially evaluation 8/30, was afebrile, hemodynamically stable; WBC normal, hgb 10.5; BMP unremarkable; LFT's showed low albumin and protein, otherwise normal. Head CT 8/30 negative. 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       Suspected Lewy body dementia.  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   Rigidity with motor symptoms/extrapyramidal side effects suspect related to above with worsening by anti-psychotics.  Depression/anxiety.  * Recent hospitalization at [facility] 8/2022 with issues including psychosis. Was started on risperidone. PTA also on donepezil, escitalopram and gabapentin.  * Initial presentation as above. Ongoing issues with hallucinations noted on admit.  * Patient admitted with generalized weakness and progressive inability to ambulate accompanied by extremity rigidity. Wife Ilsa reported worsening symptoms after recent hospitalization and start of antipsychotic medications to help treat underlying psychosis. However, since starting, the patient exhibited worsening rigidity  concerning for parkinsonism. Neurology and Psychiatry consulted on admit.  * Unable to obtain an MRI due to PPM/abandoned RV lead.  * Neurology evaluated and overall there was concern for underlying Lewy body dementia, with dystonia that may have been worsened by starting antipsychotics.  * Psychiatry stopped the patient's risperidone and started aripiprazole 8/31.  * Carbidopa-levodopa started 9/1.  * Given continued rigidity, aripiprazole stopped 9/2. Pimavanserin started 9/2.  * Quetiapine started 9/4. Increased carbidopa-levadopa 9/4. Pimavanserin stopped 9/4 due to long term cost issues.  * Escitalopram decreased 9/5.  * 9/10-9/12, pt stable.  - Continue carbidopa-levodopa  mg TID.  - Continue donepezil 10 mg at bedtime; quetiapine 25 mg at bedtime; PRN quetiapine 12.5 mg BID.  - Continue escitalopram 10 mg daily, gabapentin 100 mg TID.    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  * Initial presentation as above. Orthopedics consulted on admit; WBAT and elevation recommended; PRN left wrist splint ordered.  - Continue WBAT.  - Elevated left upper extremity when at rest.  - Continue PRN left wrist splint.  - Continue PRN acetaminophen.  - Outpatient follow-up with hand surgeon.    Weakness and physical deconditioning due to multiple acute and chronic medical issues.  - Continue PT and OT.  - Still awaiting TCU placement for further therapies. Was declined due to cost of pimavanserin, however he is not on the medication anymore and there are no plans to restart it at this time.    CAD with h/o CABG (1992).  CHF (HFrEF, biventricular).  Hypertension (benign essential).  [PTA: carvedilol 12.5 mg BID; furosemide 40 mg daily; spironolactone 25 mg daily.]  * Echo 8/3/2022 showed LVEF 15-20%, grade 1 diastolic dysfunction, severe diffuse hypokinesis; RV global function mildly reduced.  - Continue carvedilol; furosemide; spironolactone.  - Monitor  i/o's, daily wts.    Malnutrition related to acute and chronic medical issues.  * Noted previous hospitalization; see their documentation.  - Continue diet as tolerated.  - Continue supplements.    Iron deficiency anemia  * Recently completed a course of IV iron sucrose at [facility] 8/2022.  Recent Labs   Lab 09/11/22  0611 09/06/22  0748   HGB 10.9* 10.7*   - Monitor CBC.  - Consider prbc transfusion if hgb </= 7.0 or if significant bleeding with hemodynamic instability or if symptomatic.    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  * Noted by [facility] RN.  - Continue local wound cares per [facility] nurse rec's.    Chronic indwelling Louie catheter due to urinary retention and chronic hematuria.  * Pt with chronic Louie; catheter changed every month.   * Appears was last changed 8/15 at [facility] with subsequent hematuria. Seen by Urology.  - Follow-up in Urology clinic (Lou) as previously arranged unless he develops any acute issues.    Paroxysmal atrial fibrillation.  S/p PPM.  - Continue apixaban, carvedilol.    Gout.  - Continue allopurinol.    KRISTIAN.  * Does not use CPAP.  - Follow-up outpatient.    Recent COVID-19 infection, recovered.  * He had minimal symptoms with first positive on 7/30, considered COVID recovered.  - Monitor clinically.      Clinically Significant Risk Factors Present on Admission                        COVID-19 testing.  COVID-19 PCR Results    COVID-19 PCR Results 6/28/22 7/7/22 7/17/22 7/30/22   SARS CoV2 PCR Negative Negative Negative Positive (A)   (A) Abnormal value       Comments are available for some flowsheets but are not being displayed.         COVID-19 Antibody Results, Testing for Immunity    COVID-19 Antibody Results, Testing for Immunity   No data to display.             Diet: Snacks/Supplements Pediatric: Ensure Enlive; With Meals  Regular Diet Adult    Prophylaxis: PCD's, ambulation. On apixaban.  Louie Catheter: PRESENT, indication:  "Retention  Central Lines: None  Code Status: Full Code    Disposition Plan   Expected discharge: Recommended to transitional care unit pending bed availability (awaiting insurance authorization).  Entered: Harrison Zurita MD 09/12/2022, 11:14 AM         Interval History   Feeling \"fine\".  Offers no complaints.    -Data reviewed today: I reviewed all new labs and imaging over the last 24 hours. I personally reviewed no images or EKG's today.    Physical Exam    , Blood pressure 114/73, pulse 75, temperature 98.2  F (36.8  C), temperature source Axillary, resp. rate 16, height 1.753 m (5' 9\"), weight 81.9 kg (180 lb 8.9 oz), SpO2 96 %. O2 Device: None (Room air)    Vitals:    08/30/22 1942   Weight: 81.9 kg (180 lb 8.9 oz)     Vital Signs with Ranges  Temp:  [97.7  F (36.5  C)-98.5  F (36.9  C)] 98.2  F (36.8  C)  Pulse:  [74-81] 75  Resp:  [14-16] 16  BP: ()/(66-74) 114/73  SpO2:  [96 %-100 %] 96 %  Patient Vitals for the past 24 hrs:   BP Temp Temp src Pulse Resp SpO2   09/12/22 0748 114/73 98.2  F (36.8  C) Axillary 75 16 96 %   09/12/22 0033 116/68 97.7  F (36.5  C) Oral 74 14 98 %   09/11/22 2152 95/67 -- -- 81 -- --   09/11/22 1908 108/66 98.5  F (36.9  C) Oral 80 16 100 %   09/11/22 1505 117/74 98  F (36.7  C) Oral 77 16 98 %     I/O's Last 24 hours  I/O last 3 completed shifts:  In: 960 [P.O.:960]  Out: 1400 [Urine:1400]    Constitutional: Awake, alert, fatigued, frail.  Respiratory: Diminished in bases. No crackles or wheezes.  Cardiovascular: RRR, no m/r/g.  GI: Soft, nt, nd, +BS.  Skin/Integumen:   Other:        Data   Recent Labs   Lab 09/11/22  0611 09/06/22  0748   WBC 4.9 5.5   HGB 10.9* 10.7*   MCV 91 88    219    141   POTASSIUM 4.4 3.7   CHLORIDE 100 107   CO2 33* 28   BUN 20 25   CR 0.56* 0.50*   ANIONGAP 4 6   CRYS 9.2 9.2   * 112*     Recent Labs   Lab Test 09/11/22  0611 09/06/22  0748 09/04/22  0723 08/30/22  1950 08/23/22  0742   * 112* 111* 151* 101* "     Recent Labs   Lab 09/11/22  0611 09/06/22  0748   WBC 4.9 5.5         No results found for this or any previous visit (from the past 24 hour(s)).    Medications   All medications were reviewed.      allopurinol  300 mg Oral Daily     apixaban ANTICOAGULANT  5 mg Oral BID     carbidopa-levodopa  1 tablet Oral TID     carvedilol  12.5 mg Oral BID     donepezil  10 mg Oral At Bedtime     escitalopram  10 mg Oral Daily     furosemide  40 mg Oral Daily     gabapentin  100 mg Oral TID     loratadine  10 mg Oral Daily     multivitamin w/minerals  1 tablet Oral Daily     psyllium  3 capsule Oral Daily     QUEtiapine  25 mg Oral At Bedtime     sennosides  1 tablet Oral BID     sodium chloride (PF)  3 mL Intracatheter Q8H     spironolactone  25 mg Oral Daily     acetaminophen, artificial tears, melatonin, ondansetron **OR** ondansetron, polyethylene glycol, prochlorperazine **OR** prochlorperazine **OR** prochlorperazine, QUEtiapine, sodium chloride (PF)

## 2022-09-12 NOTE — PROGRESS NOTES
Care Management Follow Up    Length of Stay (days): 11    Expected Discharge Date: 09/12/2022     Concerns to be Addressed:       Patient plan of care discussed at interdisciplinary rounds: Yes    Anticipated Discharge Disposition: Transitional Care     Anticipated Discharge Services: None  Anticipated Discharge DME: None    Patient/family educated on Medicare website which has current facility and service quality ratings: other (see comments) (has bed hold.)  Education Provided on the Discharge Plan:    Patient/Family in Agreement with the Plan: yes    Referrals Placed by CM/SW:    Private pay costs discussed: Not applicable    Additional Information:  Writer called Babita at [facility] and she said that patient can come to the TCU today. Writer called [facility] a stretcher ride for today at 1530. Patient needs a stretcher ride as he's forgetful, assist of 2, lift, difficult to sit up for the ride. Writer let Babita know of ride time for today. Writer called Ilsa (spouse) and let her know of ride time for today. Ilsa said that she's concerned that she's going to have to pay a lot of money for the transitional care. She said that she wants to talk to [facility] about this. Writer called Babita and she said that the facility will call patient's spouse.    Will continue to follow.    GONZALEZ Vee

## 2022-09-12 NOTE — PLAN OF CARE
Goal Outcome Evaluation:  Orientation/Cognitive: A&OX3-4, redirectable  Observation Goals (Met/ Not Met): NA  Mobility Level/Assist Equipment: AX2 with lift, refusing to get OOB this entire day  Fall Risk (Y/N): Yes  Behavior Concerns: None, pleasant  Pain Management: Tylenol for L foot and hip pain, effective  Tele/VS/O2: VSS on RA, BP soft, Coreg held, no tele  ABNL Lab/BG: none ordered  Diet: Regular, feeder  Bowel/Bladder: Incontinent, no BM this shift, a lot of  flatus, chronic keating with chronic hematuria, urine color range anywhere from yellow straw to pink tinged  Skin Concerns: PI in the buttocks, dressing change done, L toe wound, wound care done as well  Drains/Devices: Keating catheter, PIV SLed  Tests/Procedures for next shift: None  Anticipated DC date & active delays: pending TCU placement and insurance auth  Patient Stated Goal for Today: To rest

## 2022-09-12 NOTE — PLAN OF CARE
Goal Outcome Evaluation:    Plan of Care Reviewed With: patient     Overall Patient Progress: no change     Orientation/Cognitive: A&Ox3-4, forgetful and slow to respond at times  Observation Goals (Met/ Not Met): N/A-Inpatient status  Mobility Level/Assist Equipment: Baseline Ax2, lift, Turn and repo q 2    Fall Risk (Y/N): Yes   Behavior Concerns: none, flat affect   Pain Management: Denies pain this shift,PRN Tylenol available  Tele/VS/O2: VSS on RA   ABNL Lab/BG: none this shift   Diet: Tolerating  regular diet, +protein supplements TID, pt needs assistance w/ placing orders/feeding   Bowel/Bladder: Incontinent of BMs, chronic Keating  Skin Concerns: Sacral /lt great toe wounds. Dressing changes per order, both dressings C/D/I this shift  Drains/Devices: Chronic keating, PIV SL  Tests/Procedures for next shift: n/a  Anticipated DC date & active delays: TBD, pt to DC back to [facility] TCU pending Insurance auth  Patient Stated Goal for Today: sleep well

## 2022-09-13 ENCOUNTER — APPOINTMENT (OUTPATIENT)
Dept: PHYSICAL THERAPY | Facility: CLINIC | Age: 82
DRG: 056 | End: 2022-09-13
Attending: PSYCHIATRY & NEUROLOGY
Payer: MEDICARE

## 2022-09-13 PROCEDURE — 97530 THERAPEUTIC ACTIVITIES: CPT | Mod: GP | Performed by: PHYSICAL THERAPIST

## 2022-09-13 PROCEDURE — 99232 SBSQ HOSP IP/OBS MODERATE 35: CPT | Performed by: INTERNAL MEDICINE

## 2022-09-13 PROCEDURE — 250N000013 HC RX MED GY IP 250 OP 250 PS 637: Performed by: PSYCHIATRY & NEUROLOGY

## 2022-09-13 PROCEDURE — 250N000013 HC RX MED GY IP 250 OP 250 PS 637: Performed by: HOSPITALIST

## 2022-09-13 PROCEDURE — 250N000013 HC RX MED GY IP 250 OP 250 PS 637: Performed by: INTERNAL MEDICINE

## 2022-09-13 PROCEDURE — 120N000001 HC R&B MED SURG/OB

## 2022-09-13 RX ADMIN — SPIRONOLACTONE 25 MG: 25 TABLET ORAL at 09:01

## 2022-09-13 RX ADMIN — QUETIAPINE FUMARATE 25 MG: 25 TABLET ORAL at 21:05

## 2022-09-13 RX ADMIN — DONEPEZIL HYDROCHLORIDE 10 MG: 10 TABLET ORAL at 21:05

## 2022-09-13 RX ADMIN — CARVEDILOL 12.5 MG: 12.5 TABLET, FILM COATED ORAL at 09:00

## 2022-09-13 RX ADMIN — GABAPENTIN 100 MG: 100 CAPSULE ORAL at 09:01

## 2022-09-13 RX ADMIN — FUROSEMIDE 40 MG: 40 TABLET ORAL at 09:01

## 2022-09-13 RX ADMIN — SENNOSIDES 1 TABLET: 8.6 TABLET, FILM COATED ORAL at 21:05

## 2022-09-13 RX ADMIN — Medication 3 CAPSULE: at 09:00

## 2022-09-13 RX ADMIN — APIXABAN 5 MG: 5 TABLET, FILM COATED ORAL at 09:01

## 2022-09-13 RX ADMIN — CARBIDOPA AND LEVODOPA 1 TABLET: 25; 100 TABLET ORAL at 21:05

## 2022-09-13 RX ADMIN — MULTIPLE VITAMINS W/ MINERALS TAB 1 TABLET: TAB at 09:00

## 2022-09-13 RX ADMIN — APIXABAN 5 MG: 5 TABLET, FILM COATED ORAL at 21:05

## 2022-09-13 RX ADMIN — CARVEDILOL 12.5 MG: 12.5 TABLET, FILM COATED ORAL at 21:05

## 2022-09-13 RX ADMIN — LORATADINE 10 MG: 10 TABLET ORAL at 09:01

## 2022-09-13 RX ADMIN — CARBIDOPA AND LEVODOPA 1 TABLET: 25; 100 TABLET ORAL at 15:24

## 2022-09-13 RX ADMIN — ESCITALOPRAM OXALATE 10 MG: 10 TABLET ORAL at 09:01

## 2022-09-13 RX ADMIN — CARBIDOPA AND LEVODOPA 1 TABLET: 25; 100 TABLET ORAL at 09:00

## 2022-09-13 RX ADMIN — SENNOSIDES 1 TABLET: 8.6 TABLET, FILM COATED ORAL at 09:01

## 2022-09-13 RX ADMIN — GABAPENTIN 100 MG: 100 CAPSULE ORAL at 15:24

## 2022-09-13 RX ADMIN — ALLOPURINOL 300 MG: 300 TABLET ORAL at 09:01

## 2022-09-13 RX ADMIN — GABAPENTIN 100 MG: 100 CAPSULE ORAL at 21:05

## 2022-09-13 ASSESSMENT — ACTIVITIES OF DAILY LIVING (ADL)
ADLS_ACUITY_SCORE: 67
ADLS_ACUITY_SCORE: 63
ADLS_ACUITY_SCORE: 63
ADLS_ACUITY_SCORE: 67
ADLS_ACUITY_SCORE: 63
ADLS_ACUITY_SCORE: 63
ADLS_ACUITY_SCORE: 67

## 2022-09-13 NOTE — PLAN OF CARE
Goal Outcome Evaluation:    Plan of Care Reviewed With: patient     Overall Patient Progress: no change    Outcome Evaluation: Patient continues to have variable oral intake, still overall fair at best. Needs meal ordering and feeding assistance. Will alternate chocolate and vanilla flavors for Ensure supplements per his request and add room service w/ assist for meal ordering needs.    Neris Ford, RD, LD

## 2022-09-13 NOTE — PROGRESS NOTES
Care Management Follow Up    Length of Stay (days): 12    Expected Discharge Date: 09/13/2022     Concerns to be Addressed:       Patient plan of care discussed at interdisciplinary rounds: Yes    Anticipated Discharge Disposition: Skilled Nursing Facility, Transitional Care     Anticipated Discharge Services: None  Anticipated Discharge DME: None    Patient/family educated on Medicare website which has current facility and service quality ratings: other (see comments) (has bed hold.)  Education Provided on the Discharge Plan:    Patient/Family in Agreement with the Plan: yes    Referrals Placed by CM/SW:    Private pay costs discussed: Not applicable    Additional Information:  Writer called [facility] and canceled the stretcher ride to home as hospitalist is wanting writer to find patient a tcu other than [facility]. Family is in agreement.    Will continue to follow.     GONZALEZ Vee

## 2022-09-13 NOTE — PROGRESS NOTES
[facility]    Internal Medicine Hospitalist Progress Note  09/13/2022  I evaluated patient on the above date.    Harrison Zurita Jr., MD  [phone] (p)  Text Page  Vocera        Assessment & Plan New actions/orders today (09/13/2022) are underlined.    William Almazan is an 81-year-old male with a past medical history significant for dementia, HTN, CHF (HFrEF), CAD, PAF, gout, urinary retention with chronic indwelling Louie catheter, h/o prostate cancer; with recent hospitalization at [facility] (8/2-8/24/2022) for issues including failure to thrive with hypotension and TONE as well as issues with psychosis. He presented from TCU 8/30/2022 with left hand pain, decreased motion and increasing generalized weakness.    On initially evaluation 8/30, was afebrile, hemodynamically stable; WBC normal, hgb 10.5; BMP unremarkable; LFT's showed low albumin and protein, otherwise normal. Head CT 8/30 negative. 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       Suspected Lewy body dementia.  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   Rigidity with motor symptoms/extrapyramidal side effects suspect related to above with worsening by anti-psychotics.  Depression/anxiety.  * Recent hospitalization at [facility] 8/2022 with issues including psychosis. Was started on risperidone. PTA also on donepezil, escitalopram and gabapentin.  * Initial presentation as above. Ongoing issues with hallucinations noted on admit.  * Patient admitted with generalized weakness and progressive inability to ambulate accompanied by extremity rigidity. Wife Ilsa reported worsening symptoms after recent hospitalization and start of antipsychotic medications to help treat underlying psychosis. However, since starting, the patient exhibited worsening rigidity  concerning for parkinsonism. Neurology and Psychiatry consulted on admit.  * Unable to obtain an MRI due to PPM/abandoned RV lead.  * Neurology evaluated and overall there was concern for underlying Lewy body dementia, with dystonia that may have been worsened by starting antipsychotics.  * Psychiatry stopped the patient's risperidone and started aripiprazole 8/31.  * Carbidopa-levodopa started 9/1.  * Given continued rigidity, aripiprazole stopped 9/2. Pimavanserin started 9/2.  * Quetiapine started 9/4. Increased carbidopa-levadopa 9/4. Pimavanserin stopped 9/4 due to long term cost issues.  * Escitalopram decreased 9/5.  * 9/10-9/12, pt stable.  - Continue carbidopa-levodopa  mg TID.  - Continue donepezil 10 mg at bedtime; quetiapine 25 mg at bedtime; PRN quetiapine 12.5 mg BID.  - Continue escitalopram 10 mg daily, gabapentin 100 mg TID.    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  * Initial presentation as above. Orthopedics consulted on admit; WBAT and elevation recommended; PRN left wrist splint ordered.  - Continue WBAT.  - Elevated left upper extremity when at rest.  - Continue PRN left wrist splint.  - Continue PRN acetaminophen.  - Outpatient follow-up with hand surgeon.    Weakness and physical deconditioning due to multiple acute and chronic medical issues.  * On 9/12, planned to discharge back to TCU pt came from; however, pt's family was upset and did not want him to go back and after further discussion, decided to hold on discharge until satisfactory (to pt/family) TCU was found.  - Continue PT and OT.  - Plan TCU.    CAD with h/o CABG (1992).  CHF (HFrEF, biventricular).  Hypertension (benign essential).  [PTA: carvedilol 12.5 mg BID; furosemide 40 mg daily; spironolactone 25 mg daily.]  * Echo 8/3/2022 showed LVEF 15-20%, grade 1 diastolic dysfunction, severe diffuse hypokinesis; RV global function mildly reduced.  - Continue  carvedilol; furosemide; spironolactone.  - Monitor i/o's, daily wts.    Malnutrition related to acute and chronic medical issues.  * Noted previous hospitalization; see their documentation.  - Continue diet as tolerated.  - Continue supplements.    Iron deficiency anemia  * Recently completed a course of IV iron sucrose at [facility] 8/2022.  Recent Labs   Lab 09/11/22  0611   HGB 10.9*   - Monitor CBC.  - Consider prbc transfusion if hgb </= 7.0 or if significant bleeding with hemodynamic instability or if symptomatic.    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  * Noted by [facility] RN.  - Continue local wound cares per [facility] nurse rec's.    Chronic indwelling Louie catheter due to urinary retention and chronic hematuria.  * Pt with chronic Louie; catheter changed every month.   * Appears was last changed 8/15 at [facility] with subsequent hematuria. Seen by Urology.  * On 9/13, urine with some blood noted.  - Change Louie as getting close to 1 month.  - Follow-up in Urology clinic (Lou) as previously arranged unless he develops any acute issues.    Paroxysmal atrial fibrillation.  S/p PPM.  - Continue apixaban, carvedilol.    Gout.  - Continue allopurinol.    KRISTIAN.  * Does not use CPAP.  - Follow-up outpatient.    Recent COVID-19 infection, recovered.  * He had minimal symptoms with first positive on 7/30, considered COVID recovered.  - Monitor clinically.      Clinically Significant Risk Factors Present on Admission                        COVID-19 testing.  COVID-19 PCR Results    COVID-19 PCR Results 6/28/22 7/7/22 7/17/22 7/30/22   SARS CoV2 PCR Negative Negative Negative Positive (A)   (A) Abnormal value       Comments are available for some flowsheets but are not being displayed.         COVID-19 Antibody Results, Testing for Immunity    COVID-19 Antibody Results, Testing for Immunity   No data to display.             Diet: Snacks/Supplements Pediatric: Ensure Enlive; With  "Meals  Regular Diet Adult  Advance Diet as Tolerated    Prophylaxis: PCD's, ambulation. On apixaban.  Louie Catheter: PRESENT, indication: Other (Comment) (Chronic Louie)  Central Lines: None  Code Status: Full Code    Disposition Plan   Expected discharge: Recommended to transitional care unit pending bed availability.  Entered: Harrison Zurita MD 09/13/2022, 10:10 AM         Interval History   Doing OK.  Offers no complaints.    -Data reviewed today: I reviewed all new labs and imaging over the last 24 hours. I personally reviewed no images or EKG's today.    Physical Exam    , Blood pressure 138/82, pulse 80, temperature 98  F (36.7  C), temperature source Axillary, resp. rate 18, height 1.753 m (5' 9\"), weight 81.9 kg (180 lb 8.9 oz), SpO2 98 %. O2 Device: None (Room air)    Vitals:    08/30/22 1942   Weight: 81.9 kg (180 lb 8.9 oz)     Vital Signs with Ranges  Temp:  [97.5  F (36.4  C)-98.1  F (36.7  C)] 98  F (36.7  C)  Pulse:  [38-82] 80  Resp:  [18] 18  BP: (116-159)/(67-82) 138/82  SpO2:  [98 %-100 %] 98 %  Patient Vitals for the past 24 hrs:   BP Temp Temp src Pulse Resp SpO2   09/13/22 0745 138/82 98  F (36.7  C) Axillary 80 18 98 %   09/13/22 0448 138/77 97.6  F (36.4  C) Oral 79 18 98 %   09/12/22 1930 -- -- -- 82 -- --   09/12/22 1922 (!) 159/72 97.9  F (36.6  C) Oral (!) 38 18 98 %   09/12/22 1530 116/70 98.1  F (36.7  C) Axillary 80 18 100 %   09/12/22 1209 122/67 97.5  F (36.4  C) Axillary 72 18 98 %     I/O's Last 24 hours  I/O last 3 completed shifts:  In: 420 [P.O.:420]  Out: 750 [Urine:750]    Constitutional: Awake, alert, fatigued, frail, answers questions appropriately.  Respiratory: Diminished in bases. No crackles or wheezes.  Cardiovascular: RRR, no m/r/g.  GI: Soft, nt, nd, +BS.  Skin/Integumen:   Other:        Data   Recent Labs   Lab 09/11/22  0611   WBC 4.9   HGB 10.9*   MCV 91         POTASSIUM 4.4   CHLORIDE 100   CO2 33*   BUN 20   CR 0.56*   ANIONGAP 4   CRYS 9.2 "   *     Recent Labs   Lab Test 09/11/22  0611 09/06/22  0748 09/04/22  0723 08/30/22  1950 08/23/22  0742   * 112* 111* 151* 101*     Recent Labs   Lab 09/11/22  0611   WBC 4.9         No results found for this or any previous visit (from the past 24 hour(s)).    Medications   All medications were reviewed.      allopurinol  300 mg Oral Daily     apixaban ANTICOAGULANT  5 mg Oral BID     carbidopa-levodopa  1 tablet Oral TID     carvedilol  12.5 mg Oral BID     donepezil  10 mg Oral At Bedtime     escitalopram  10 mg Oral Daily     furosemide  40 mg Oral Daily     gabapentin  100 mg Oral TID     loratadine  10 mg Oral Daily     multivitamin w/minerals  1 tablet Oral Daily     psyllium  3 capsule Oral Daily     QUEtiapine  25 mg Oral At Bedtime     sennosides  1 tablet Oral BID     sodium chloride (PF)  3 mL Intracatheter Q8H     spironolactone  25 mg Oral Daily     acetaminophen, artificial tears, melatonin, ondansetron **OR** ondansetron, polyethylene glycol, prochlorperazine **OR** prochlorperazine **OR** prochlorperazine, QUEtiapine, sodium chloride (PF)

## 2022-09-13 NOTE — PROGRESS NOTES
"/77 (BP Location: Left arm, Patient Position: Semi-Ruby's, Cuff Size: Adult Regular)   Pulse 79   Temp 97.6  F (36.4  C) (Oral)   Resp 18   Ht 1.753 m (5' 9\")   Wt 81.9 kg (180 lb 8.9 oz)   SpO2 98%   BMI 26.66 kg/m    Iso:  No active isolations  Diet: Snacks/Supplements Pediatric: Ensure Enlive; With Meals  Regular Diet Adult  Advance Diet as Tolerated  Mental Status:     Main Acuity Score: 216  K:  4.4 (09/11 0611)  PLT: 268 (09/11 0611)  HGB: 10.9 (09/11 0611)  BS: 107 (09/11 0611)  No components found for: TROPL   Infusions:   N/A     Orientation/Cognitive: AAOX3  Observation Goals (Met/ Not Met): Not Met  Mobility Level/Assist Equipment: Bedrest  Fall Risk (Y/N): Yes  Behavior Concerns: No  Pain Management: No  Tele/VS/O2: Non-tele  ABNL Lab/BG: No  Diet: Regular  Bowel/Bladder: Chronic Louie  Skin Concerns: Reposition Q2H  Drains/Devices: No  Tests/Procedures for next shift: No  Anticipated DC date & active delays: Pending  Patient Stated Goal for Today: Increase strength    "

## 2022-09-13 NOTE — PROGRESS NOTES
PATIENT LYING IN BED RESTING.  RESP EVEN AND UNLABORED. CONDITION STABLE, NO DISTRESS. DENIES NEEDS.  SBAR HANDOFF GIVEN TO BRIAN HARRISON RN.

## 2022-09-13 NOTE — PROGRESS NOTES
CLINICAL NUTRITION SERVICES - REASSESSMENT NOTE      Recommendations Ordered by Registered Dietitian (RD):   Room Service w/ Assist ordered for meal ordering   Alternate vanilla and chocolate flavors for Ensure supplements    Malnutrition: (9/8)  % Weight Loss:  Weight loss does not meet criteria for malnutrition   % Intake:  <75% for > 7 days (moderate malnutrition)  Subcutaneous Fat Loss:  Orbital region severe depletion and Upper arm region moderate depletion  Muscle Loss:  Temporal region severe depletion, Clavicle bone region severe depletion, Acromion bone region severe depletion and Dorsal hand region moderate depletion  Fluid Retention:  Mild      Malnutrition Diagnosis: Severe malnutrition  In Context of:  Chronic illness or disease       EVALUATION OF PROGRESS TOWARD GOALS   Diet:  Regular Diet (libearlized on 9/9 from cardiac diet)    Supplements: Ensure (vanilla) TID with meals     Intake/Tolerance:    Continues to have variable PO intakes, mostly 25-50% of meals documented though some meals documented at 100% intake.   Patient has been receiving 2-3 nutritionally adequate meals/day in addition to supplements.   Needs meal ordering and feeding assistance per RN notes.   Visited patient at bedside this morning who states his appetite has been improving some. Likes the Ensure shakes but would like to have a mix of chocolate and vanilla flavors. Already has lunch ordered and waiting on its arrival.       ASSESSED NUTRITION NEEDS:  Dosing Weight:  81.9 kg (8/31)  Estimated Energy Needs: 3583-6507 kcals (25-30 Kcal/Kg)  Justification: maintenance  Estimated Protein Needs: 106-123 grams protein (1.3-1.5 g pro/Kg)  Justification: Repletion, wound healing and hypercatabolism with acute illness  Estimated Fluid Needs: 7061-9354 mL (1 mL/Kcal)  Justification: maintenance      NEW FINDINGS:   Chart reviewed.   Medically ready for discharge - waiting on TCU placement   Receiving daily MVI+M and metamucil 3  capsules/day  Diuresing on lasix and spironolactone   BM x 1-2 most days   No new weight since admission - 81.9 kg (180 lb) - BMI 26.65 kg/m^2    Previous Goals (9/8):   Patient will consume > 75% meals and > 50% supplements in 3-5 days.  Evaluation: Not met consistently     Previous Nutrition Diagnosis (9/8):   Inadequate oral intake related to decreased appetite, early satiety, and increased needs with wound healing as evidenced by variable oral intake since admission (overall fair at best), loss of subcutaneous fat and muscle mass, and presence of unstageable Pressure Injury on buttocks.  Evaluation: No change      CURRENT NUTRITION DIAGNOSIS  Inadequate oral intake related to decreased appetite, early satiety, and increased needs with wound healing as evidenced by variable oral intake since admission (overall fair at best), loss of subcutaneous fat and muscle mass, and presence of unstageable Pressure Injury on buttocks.    INTERVENTIONS  Recommendations / Nutrition Prescription  Room Service w/ Assist ordered for meal ordering   Alternate vanilla and chocolate flavors for Ensure supplements     Implementation  Medical Food Supplement - modified order  Collaboration and Referral of Nutrition care - nutrition associate to assist with meal ordering     Goals  Patient will consistently consume >/= 50% meals + supplements TID       MONITORING AND EVALUATION:  Progress towards goals will be monitored and evaluated per protocol and Practice Guidelines      Neris Ford RD, LD

## 2022-09-13 NOTE — PROGRESS NOTES
Care Management Follow Up    Length of Stay (days): 12    Expected Discharge Date: 09/14/2022     Concerns to be Addressed:       Patient plan of care discussed at interdisciplinary rounds: Yes    Anticipated Discharge Disposition: Skilled Nursing Facility, Transitional Care     Anticipated Discharge Services: None  Anticipated Discharge DME: None    Patient/family educated on Medicare website which has current facility and service quality ratings: other (see comments) (has bed hold.)  Education Provided on the Discharge Plan:    Patient/Family in Agreement with the Plan: yes    Referrals Placed by CM/SW:    Private pay costs discussed: Not applicable    Additional Information:  [facility]: No bed available until the end of the week  [facility] and no beds until Saturday  [place]: VM left with Zainab in admissions   [facility]: No beds until later in the week  [place]: VM left with Cortney in admissions   [facility]: Resent referral         GONZALEZ Quiñones

## 2022-09-14 LAB
ALBUMIN SERPL-MCNC: 2.9 G/DL (ref 3.4–5)
ALBUMIN UR-MCNC: 100 MG/DL
ALP SERPL-CCNC: 87 U/L (ref 40–150)
ALT SERPL W P-5'-P-CCNC: 14 U/L (ref 0–70)
ANION GAP SERPL CALCULATED.3IONS-SCNC: 3 MMOL/L (ref 3–14)
APPEARANCE UR: ABNORMAL
AST SERPL W P-5'-P-CCNC: 29 U/L (ref 0–45)
BACTERIA #/AREA URNS HPF: ABNORMAL /HPF
BASOPHILS # BLD AUTO: 0 10E3/UL (ref 0–0.2)
BASOPHILS NFR BLD AUTO: 1 %
BILIRUB SERPL-MCNC: 0.6 MG/DL (ref 0.2–1.3)
BILIRUB UR QL STRIP: NEGATIVE
BUN SERPL-MCNC: 29 MG/DL (ref 7–30)
CALCIUM SERPL-MCNC: 9.7 MG/DL (ref 8.5–10.1)
CHLORIDE BLD-SCNC: 103 MMOL/L (ref 94–109)
CO2 SERPL-SCNC: 34 MMOL/L (ref 20–32)
COLOR UR AUTO: ABNORMAL
CREAT SERPL-MCNC: 0.58 MG/DL (ref 0.66–1.25)
EOSINOPHIL # BLD AUTO: 0.1 10E3/UL (ref 0–0.7)
EOSINOPHIL NFR BLD AUTO: 1 %
ERYTHROCYTE [DISTWIDTH] IN BLOOD BY AUTOMATED COUNT: 16.7 % (ref 10–15)
GFR SERPL CREATININE-BSD FRML MDRD: >90 ML/MIN/1.73M2
GLUCOSE BLD-MCNC: 137 MG/DL (ref 70–99)
GLUCOSE UR STRIP-MCNC: NEGATIVE MG/DL
HCT VFR BLD AUTO: 38.2 % (ref 40–53)
HGB BLD-MCNC: 11.6 G/DL (ref 13.3–17.7)
HGB BLD-MCNC: 11.8 G/DL (ref 13.3–17.7)
HGB UR QL STRIP: ABNORMAL
IMM GRANULOCYTES # BLD: 0 10E3/UL
IMM GRANULOCYTES NFR BLD: 0 %
KETONES UR STRIP-MCNC: ABNORMAL MG/DL
LEUKOCYTE ESTERASE UR QL STRIP: ABNORMAL
LYMPHOCYTES # BLD AUTO: 0.6 10E3/UL (ref 0.8–5.3)
LYMPHOCYTES NFR BLD AUTO: 11 %
MCH RBC QN AUTO: 26.9 PG (ref 26.5–33)
MCHC RBC AUTO-ENTMCNC: 30.9 G/DL (ref 31.5–36.5)
MCV RBC AUTO: 87 FL (ref 78–100)
MONOCYTES # BLD AUTO: 0.6 10E3/UL (ref 0–1.3)
MONOCYTES NFR BLD AUTO: 12 %
MUCOUS THREADS #/AREA URNS LPF: PRESENT /LPF
NEUTROPHILS # BLD AUTO: 3.8 10E3/UL (ref 1.6–8.3)
NEUTROPHILS NFR BLD AUTO: 75 %
NITRATE UR QL: NEGATIVE
NRBC # BLD AUTO: 0 10E3/UL
NRBC BLD AUTO-RTO: 0 /100
PH UR STRIP: 5.5 [PH] (ref 5–7)
PLATELET # BLD AUTO: 284 10E3/UL (ref 150–450)
POTASSIUM BLD-SCNC: 4.4 MMOL/L (ref 3.4–5.3)
PROT SERPL-MCNC: 6.9 G/DL (ref 6.8–8.8)
RBC # BLD AUTO: 4.38 10E6/UL (ref 4.4–5.9)
RBC URINE: >182 /HPF
SODIUM SERPL-SCNC: 140 MMOL/L (ref 133–144)
SP GR UR STRIP: 1.02 (ref 1–1.03)
UROBILINOGEN UR STRIP-MCNC: NORMAL MG/DL
WBC # BLD AUTO: 5.1 10E3/UL (ref 4–11)
WBC URINE: >182 /HPF

## 2022-09-14 PROCEDURE — G0463 HOSPITAL OUTPT CLINIC VISIT: HCPCS

## 2022-09-14 PROCEDURE — 36415 COLL VENOUS BLD VENIPUNCTURE: CPT | Performed by: HOSPITALIST

## 2022-09-14 PROCEDURE — 87086 URINE CULTURE/COLONY COUNT: CPT | Performed by: HOSPITALIST

## 2022-09-14 PROCEDURE — 81001 URINALYSIS AUTO W/SCOPE: CPT | Performed by: HOSPITALIST

## 2022-09-14 PROCEDURE — 250N000013 HC RX MED GY IP 250 OP 250 PS 637: Performed by: HOSPITALIST

## 2022-09-14 PROCEDURE — 250N000013 HC RX MED GY IP 250 OP 250 PS 637: Performed by: PSYCHIATRY & NEUROLOGY

## 2022-09-14 PROCEDURE — 250N000013 HC RX MED GY IP 250 OP 250 PS 637: Performed by: INTERNAL MEDICINE

## 2022-09-14 PROCEDURE — 99232 SBSQ HOSP IP/OBS MODERATE 35: CPT | Performed by: HOSPITALIST

## 2022-09-14 PROCEDURE — 85018 HEMOGLOBIN: CPT | Performed by: HOSPITALIST

## 2022-09-14 PROCEDURE — 80053 COMPREHEN METABOLIC PANEL: CPT | Performed by: HOSPITALIST

## 2022-09-14 PROCEDURE — 36415 COLL VENOUS BLD VENIPUNCTURE: CPT | Performed by: INTERNAL MEDICINE

## 2022-09-14 PROCEDURE — 85025 COMPLETE CBC W/AUTO DIFF WBC: CPT | Performed by: INTERNAL MEDICINE

## 2022-09-14 PROCEDURE — 120N000001 HC R&B MED SURG/OB

## 2022-09-14 RX ADMIN — MULTIPLE VITAMINS W/ MINERALS TAB 1 TABLET: TAB at 09:26

## 2022-09-14 RX ADMIN — QUETIAPINE FUMARATE 25 MG: 25 TABLET ORAL at 21:00

## 2022-09-14 RX ADMIN — ACETAMINOPHEN 650 MG: 325 TABLET ORAL at 04:42

## 2022-09-14 RX ADMIN — ALLOPURINOL 300 MG: 300 TABLET ORAL at 09:26

## 2022-09-14 RX ADMIN — CARVEDILOL 12.5 MG: 12.5 TABLET, FILM COATED ORAL at 21:00

## 2022-09-14 RX ADMIN — LORATADINE 10 MG: 10 TABLET ORAL at 09:27

## 2022-09-14 RX ADMIN — GABAPENTIN 100 MG: 100 CAPSULE ORAL at 14:00

## 2022-09-14 RX ADMIN — SENNOSIDES 1 TABLET: 8.6 TABLET, FILM COATED ORAL at 21:00

## 2022-09-14 RX ADMIN — SPIRONOLACTONE 25 MG: 25 TABLET ORAL at 09:26

## 2022-09-14 RX ADMIN — APIXABAN 5 MG: 5 TABLET, FILM COATED ORAL at 12:12

## 2022-09-14 RX ADMIN — DONEPEZIL HYDROCHLORIDE 10 MG: 10 TABLET ORAL at 21:00

## 2022-09-14 RX ADMIN — GABAPENTIN 100 MG: 100 CAPSULE ORAL at 21:00

## 2022-09-14 RX ADMIN — CARBIDOPA AND LEVODOPA 1 TABLET: 25; 100 TABLET ORAL at 09:26

## 2022-09-14 RX ADMIN — GABAPENTIN 100 MG: 100 CAPSULE ORAL at 09:27

## 2022-09-14 RX ADMIN — ACETAMINOPHEN 650 MG: 325 TABLET ORAL at 18:57

## 2022-09-14 RX ADMIN — FUROSEMIDE 40 MG: 40 TABLET ORAL at 09:27

## 2022-09-14 RX ADMIN — APIXABAN 5 MG: 5 TABLET, FILM COATED ORAL at 21:00

## 2022-09-14 RX ADMIN — Medication 3 CAPSULE: at 09:26

## 2022-09-14 RX ADMIN — CARBIDOPA AND LEVODOPA 1 TABLET: 25; 100 TABLET ORAL at 14:00

## 2022-09-14 RX ADMIN — ESCITALOPRAM OXALATE 10 MG: 10 TABLET ORAL at 09:27

## 2022-09-14 RX ADMIN — CARBIDOPA AND LEVODOPA 1 TABLET: 25; 100 TABLET ORAL at 21:00

## 2022-09-14 RX ADMIN — SENNOSIDES 1 TABLET: 8.6 TABLET, FILM COATED ORAL at 09:27

## 2022-09-14 ASSESSMENT — ACTIVITIES OF DAILY LIVING (ADL)
ADLS_ACUITY_SCORE: 67

## 2022-09-14 NOTE — PROGRESS NOTES
Orientation/Cognitive: AAOX3  Observation Goals (Met/ Not Met): Not met  Mobility Level/Assist Equipment: Bedbond  Fall Risk (Y/N): Yes  Behavior Concerns: No  Pain Management: Tylenol given once for bilateral ankle pain  Tele/VS/O2: Vital signs stable, Room air  ABNL Lab/BG: AM lab results pending  Diet: Regular  Bowel/Bladder: Incontinent  Skin Concerns: Left great toe wound, Sacral wound  Drains/Devices: N/A  Tests/Procedures for next shift: PT/OT  Anticipated DC date & active delays: Pending  Patient Stated Goal for Today: No pain

## 2022-09-14 NOTE — PROGRESS NOTES
[facility]  WO Nurse Inpatient Assessment     Follow up for: buttock, left toes     Areas Assessed:      Areas visualized during today's visit: Focused: and Sacrum/coccyx, left toes     Wound location: buttock, sacralcoccygeal     Wound due to: Pressure Injury CAPI unstageable with components of MASD and friction and shear   Wound history/plan of care: found with sacral mepilex in use  Wound base: mixed % epidermis and dermis     Palpation of the wound bed: normal      Drainage: small     Description of drainage: serosanguinous     Measurements (length x width x depth, in cm): cluster measurement of total area 7  x 8  x  0.1 cm      Tunneling: N/A     Undermining: N/A  Periwound skin: Superficial erosion and deep brown purple nonblanchable       Color: purple and deeper brown than periwound skin      Temperature: normal   Odor: none  Pain: moderate, aching  Pain interventions prior to dressing change: patient tolerated well and slow and gentle cares   Treatment goal: Heal   STATUS: improving  Supplies ordered: supplies stored on unit     Wound location: left great toe     Last photo: 9/14  Wound due to: Trauma with arterial component, patient confirms this injury was present prior to admission at hospital   Wound history/plan of care: found covered with dry dressing   Wound base: 100 % eschar     Palpation of the wound bed: firm      Drainage: none     Description of drainage: none     Measurements (length x width x depth, in cm): 2  x 2  x  0.1 cm      Tunneling: N/A     Undermining: N/A  Periwound skin: Dry/scaly      Color: normal and consistent with surrounding tissue      Temperature: normal   Odor: none  Pain: no grimacing or signs of discomfort, none  Pain interventions prior to dressing change: patient tolerated well  Treatment goal: Keep dry eschar stable to prevent wet gangrene  STATUS: stable  Supplies ordered: supplies stored on unit      Treatment Plan:      Wound care  Start:  " 08/31/22 1049,   EVERY SHIFT,   Routine        Comments: Location: buttock   Care: provided by primary RN smitha   1. Cleanse perineal skin with incontinence protocol (quyen cleanser and soft dry wipes) every incontinence episode   2. Apply skin prep generously to perianal and buttock including wounds and periwound skin, let dry   3. Cover with mepilex dressings (sacral, or 4x4\" or combination to best keep all wounds covered). Mepilex can be used up to 5 days each, ok to lift for cleansing and assessment and reapply same mepilex         Skin care precautions  Start:  08/31/22 1048,   EFFECTIVE NOW,   Routine        Comments: Pressure Injury Prevention (PIP) Plan:   -If patient is declining pressure injury prevention interventions: Explore reason why and address patient's concerns, Educate on pressure injury risk and prevention intervention(s), If patient is still declining, document \"informed refusal\" , and Ensure Care team is aware ( provider, charge nurse, etc)   -Mattress: Follow bed algorithm, reassess daily and order specialty mattress, if indicated.   -HOB: Maintain at or below 30 degrees, unless contraindicated   -Repositioning in bed: Every 1-2 hours , Left/right positioning; avoid supine, and Raise foot of bed prior to raising head of bed, to reduce patient sliding down (shear)   -Heels: Keep elevated off mattress and Pillows under calves   -Protective Dressing: Sacral Mepilex for prevention (#411132),  especially for the agitated patient   -Positioning Equipment: TAPS wedges (#925614) to help maintain 30 degree side lying position   -Chair positioning: Chair cushion (#[id number]) , Assist patient to reposition hourly, and Do NOT use a donut for sitting (this increases pressure to smaller area and creates a higher potential for injury)     -Moisture Management: Perineal cleansing /protection: Follow Incontinence Protocol, Avoid brief in bed, Clean and dry skin folds with bathing , and Moisturize dry skin "   -Under Devices: Inspect skin under all medical devices during skin inspection , Ensure tubes are stabilized without tension, and Ensure patient is not lying on medical devices or equipment when repositioned         Wound care  Start:  09/15/22 0600,   DAILY,   Routine        Comments: Location: left great toe   Care: provided daily by primary RN   1. Paint / apply betadine swabs to wound and periwound skin, let dry   2. Cover with dry dressing (such as gauze or 1/4 of ABD pad), then wrap with roll gauze or kerlix, secure with tape          Orders: Reviewed and Written    RECOMMEND PRIMARY TEAM ORDER: None, at this time  Education provided: importance of repositioning, plan of care, Moisture management, Hygiene and Off-loading pressure  Discussed plan of care with: Patient and Nurse  WOC nurse follow-up plan: weekly  Notify WOC if wound(s) deteriorate.  Nursing to notify the Provider(s) and re-consult the WOC Nurse if new skin concern.    DATA:     Current support surface: Standard  pulsate ordered after consult  Containment of urine/stool: Incontinence Protocol  BMI: Body mass index is 26.66 kg/m .   Active diet order: Orders Placed This Encounter      Regular Diet Adult      Advance Diet as Tolerated     Output: I/O last 3 completed shifts:  In: 700 [P.O.:700]  Out: 950 [Urine:950]     Labs:   Recent Labs   Lab 09/14/22  1056 09/14/22  0643   ALBUMIN 2.9*  --    HGB 11.6* 11.8*   WBC  --  5.1     Pressure injury risk assessment:   Sensory Perception: 4-->no impairment  Moisture: 3-->occasionally moist  Activity: 2-->chairfast  Mobility: 2-->very limited  Nutrition: 2-->probably inadequate  Friction and Shear: 2-->potential problem  John Score: 15    [facility]   Dept. Pager: [phone]  Dept. Office Number: [phone]

## 2022-09-14 NOTE — PROGRESS NOTES
[facility]  Hospitalist Progress Note   09/14/2022          Assessment and Plan:       William Almazan is an 81-year-old male with dementia, HTN, CHF (HFrEF), CAD, PAF, gout, urinary retention with chronic indwelling Louie catheter, h/o prostate cancer; with recent hospitalization at [facility] (8/2-8/24/2022) for issues including failure to thrive with hypotension and TONE, psychosis. He presented from TCU 8/30/2022 with left hand pain, decreased motion and increasing generalized weakness.     On initially evaluation 8/30, was afebrile, hemodynamically stable; WBC normal, hgb 10.5; BMP unremarkable; LFT's showed low albumin and protein, otherwise normal.   Head CT 8/30 negative. 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      Suspected Lewy body dementia.  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   Rigidity with motor symptoms/extrapyramidal side effects suspect related to above with worsening by anti-psychotics.  Depression/anxiety.  * Recent hospitalization at [facility] 8/2 - 8/242022 with presyncope, hypotension, dementia with psychosis.   PTA also on donepezil, escitalopram, risperidone and gabapentin.  * Patient admitted with generalized weakness and progressive inability to ambulate accompanied by extremity rigidity. Wife Ilsa reported worsening symptoms after recent hospitalization and start of antipsychotic medications to help treat underlying psychosis. However, since starting, the patient exhibited worsening rigidity concerning for parkinsonism.   * Unable to obtain an MRI due to PPM/abandoned RV lead.  * Neurology followed,  overall there was concern for underlying Lewy body dementia, with dystonia that may have been worsened by starting antipsychotics.  * Psychiatry stopped the patient's risperidone and started  aripiprazole 8/31.  * Carbidopa-levodopa started 9/1.   *Given continued rigidity, aripiprazole stopped 9/2. Pimavanserin started 9/2.  * Quetiapine started 9/4. Increased carbidopa-levadopa 9/4. Pimavanserin stopped 9/4 due to long term cost issues.  * Escitalopram decreased 9/5.  - Continue carbidopa-levodopa  mg TID.  - Continue donepezil 10 mg at bedtime; quetiapine 25 mg at bedtime; PRN quetiapine 12.5 mg BID.  - Continue escitalopram 10 mg daily, gabapentin 100 mg TID.     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  Orthopedics recommend WBAT and elevation recommended; PRN left wrist splint ordered.  - Continue WBAT.  - Elevated left upper extremity when at rest.  - Continue PRN left wrist splint.  - Continue PRN acetaminophen.  - Outpatient follow-up with hand surgeon.     Physical deconditioning due to senile fragility, medical issues.    * On 9/12, planned to discharge back to TCU pt came from; however, pt's family was upset and did not want him to go back and after further discussion, decided to hold on discharge until satisfactory (to pt/family) TCU was found.  - Continue PT and OT.   assisting with transition plans.     CAD with h/o CABG (1992).  CHF (HFrEF, biventricular).  Hypertension (benign essential).  [PTA: carvedilol 12.5 mg BID; furosemide 40 mg daily; spironolactone 25 mg daily.]  * Echo 8/3/2022 showed LVEF 15-20%, grade 1 diastolic dysfunction, severe diffuse hypokinesis; RV global function mildly reduced.  - Continue carvedilol; furosemide; spironolactone.  On Eliquis as below for anticoagulation.  If any drop in hemoglobin will consider holding Eliquis.  - Monitor i/o's, daily wts.    Paroxysmal atrial fibrillation.  S/p PPM.  - Continue apixaban, carvedilol.    Chronic indwelling Louie catheter due to urinary retention and chronic hematuria.  * Pt with chronic Louie; catheter changed every month.   * Appears  was last changed 8/15 at [facility] with subsequent hematuria.   Urology consult requested given ongoing hematuria, on anticoagulation.  Also due to change Louie catheter.     Iron deficiency anemia  * Recently completed a course of IV iron sucrose at [facility] 8/2022.  Hemoglobin stable around 10-11.  Monitor in AM.     Buttock pressure injury CAPI, unstageable with components of MASD and friction and shear.Continue local wound cares per WOC nurse rec's    Malnutrition related to acute and chronic medical issues.  Nutrition followed, continue dietary supplements.     Gout.  Continue allopurinol.     KRISTIAN.  Does not use CPAP.Follow-up outpatient.     Recent COVID-19 infection, recovered.  * He had minimal symptoms with first positive on 7/30, considered COVID recovered.  - Monitor clinically.     Orders Placed This Encounter      Regular Diet Adult      Advance Diet as Tolerated      DVT Prophylaxis: On Eliquis.  Code Status: Full Code  Disposition: Expected discharge  pending safe discharge plan in place    Discussed with patient, bedside RN  Total time greater than 30 minutes.  More than 70% of time spent in direct patient care, care coordination, patient counseling, and formalizing plan of care.     Vladimir Craig MD        Interval History:      Patient appears comfortable lying in bed.  Denies any complaints this morning.  Bedside nursing reporting blood-tinged urine.-On Eliquis.  Social work assisting with transition plans.         Physical Exam:        Physical Exam   Temp:  [97.4  F (36.3  C)-98.1  F (36.7  C)] 97.4  F (36.3  C)  Pulse:  [41-85] 41  Resp:  [18] 18  BP: (118-143)/(70-89) 133/84  SpO2:  [96 %-100 %] 96 %    Intake/Output Summary (Last 24 hours) at 9/14/2022 0914  Last data filed at 9/14/2022 0405  Gross per 24 hour   Intake 520 ml   Output 950 ml   Net -430 ml       Admission Weight: 81.9 kg (180 lb 8.9 oz)  Current Weight: 81.9 kg (180 lb 8.9 oz)    PHYSICAL EXAM  GENERAL: Patient awake,  frail-appearing.  Able to answer simple questions.  HEART: Regular rate and rhythm. S1S2. No murmurs  LUNGS:Respirations unlabored  ABDOMEN: Soft, no abdominal tenderness, bowel sounds heard   Genitourinary dark-colored urine.  NEURO: Moving all extremities.  EXTREMITIES: No pedal edema.  PSYCHIATRY Cooperative       Medications:          allopurinol  300 mg Oral Daily     apixaban ANTICOAGULANT  5 mg Oral BID     carbidopa-levodopa  1 tablet Oral TID     carvedilol  12.5 mg Oral BID     donepezil  10 mg Oral At Bedtime     escitalopram  10 mg Oral Daily     furosemide  40 mg Oral Daily     gabapentin  100 mg Oral TID     loratadine  10 mg Oral Daily     multivitamin w/minerals  1 tablet Oral Daily     psyllium  3 capsule Oral Daily     QUEtiapine  25 mg Oral At Bedtime     sennosides  1 tablet Oral BID     sodium chloride (PF)  3 mL Intracatheter Q8H     spironolactone  25 mg Oral Daily     acetaminophen, artificial tears, melatonin, ondansetron **OR** ondansetron, polyethylene glycol, prochlorperazine **OR** prochlorperazine **OR** prochlorperazine, QUEtiapine, sodium chloride (PF)         Data:      All new lab and imaging data was reviewed.

## 2022-09-14 NOTE — PROGRESS NOTES
Care Management Follow Up    Length of Stay (days): 13    Expected Discharge Date: 09/15/2022     Concerns to be Addressed:       Patient plan of care discussed at interdisciplinary rounds: Yes    Anticipated Discharge Disposition: Skilled Nursing Facility, Transitional Care     Anticipated Discharge Services: None  Anticipated Discharge DME: None    Patient/family educated on Medicare website which has current facility and service quality ratings: other (see comments) (has bed hold.)  Education Provided on the Discharge Plan:    Patient/Family in Agreement with the Plan: yes    Referrals Placed by CM/SW:    Private pay costs discussed: Not applicable    Additional Information:  Writer sent out more tcu referrals via DOD to [facility], [facility], [facility] [place], and [facility].    Will continue to follow.    GONZALEZ Vee

## 2022-09-14 NOTE — PROGRESS NOTES
Report given to day shift nurse. Patient awake and alert with no s/s of acute distress. Nurse will continue to monitor.

## 2022-09-14 NOTE — CONSULTS
Urology Consult    Name: William Almazan    MRN: [id number]   YOB: 1940               Chief Complaint:   Hematuria, on anticoagulation    History is obtained from the patient and chart review          History of Present Illness:   William Almazan is a 81 year old male with a history of suspected Lewy Body dementia, AFib (on apixaban), prostate cancer s/p radiation at [facility] > 10 years ago. His last PSA was 2.8 in August 2021. He recieves his primary urology care at [facility], [place]. 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 His catheter was last exchanged on 8/15 and at the time of this exchange, mild intermittent hematuria was noted.     Urology was consulted during this hospitalization for possible blood tinged urine noted by the RN and upcoming catheter exchange     He is currently admitted to the hospital from his TCU for acute psychosis in the setting of his known dementia.           Past Medical History:     Past Medical History:   Diagnosis Date     Infection due to 2019 novel coronavirus             Past Surgical History:   No past surgical history on file.         Social History:     Social History     Tobacco Use     Smoking status: Not on file     Smokeless tobacco: Not on file   Substance Use Topics     Alcohol use: Not on file            Family History:   No family history on file.         Allergies:   No Known Allergies         Medications:     Current Facility-Administered Medications   Medication     acetaminophen (TYLENOL) tablet 650 mg     allopurinol (ZYLOPRIM) tablet 300 mg     apixaban ANTICOAGULANT (ELIQUIS) tablet 5 mg     carbidopa-levodopa (SINEMET)  MG per tablet 1 tablet     carboxymethylcellulose PF (REFRESH PLUS) 0.5 % ophthalmic solution 1 drop     carvedilol (COREG) tablet 12.5 mg     donepezil (ARICEPT) tablet 10 mg     escitalopram (LEXAPRO) tablet 10 mg      furosemide (LASIX) tablet 40 mg     gabapentin (NEURONTIN) capsule 100 mg     loratadine (CLARITIN) tablet 10 mg     melatonin tablet 1 mg     multivitamin w/minerals (THERA-VIT-M) tablet 1 tablet     ondansetron (ZOFRAN ODT) ODT tab 4 mg    Or     ondansetron (ZOFRAN) injection 4 mg     polyethylene glycol (MIRALAX) powder 17 g     prochlorperazine (COMPAZINE) injection 5 mg    Or     prochlorperazine (COMPAZINE) tablet 5 mg    Or     prochlorperazine (COMPAZINE) suppository 12.5 mg     psyllium (METAMUCIL/KONSYL) capsule 3 capsule     QUEtiapine (SEROquel) half-tab 12.5 mg     QUEtiapine (SEROquel) tablet 25 mg     sennosides (SENOKOT) tablet 1 tablet     sodium chloride (PF) 0.9% PF flush 3 mL     sodium chloride (PF) 0.9% PF flush 3 mL     spironolactone (ALDACTONE) tablet 25 mg             Review of Systems:    ROS: 10 point ROS neg other than the symptoms noted above in the HPI           Physical Exam:   VS:  T: 98.1    HR: 89    BP: 113/68    RR: 18   GEN:  Sleepy, able to communicate with me  CV: Well perfused  LUNGS: Non-labored breathing.   ABD:  Soft.  NT.  ND.  No rebound or guarding.   : Yellow urine with rust-colored sediment in the tubing.   EXT:  Warm, well perfused. No edema.  SKIN:  Warm.  Dry.  No rashes.  NEURO:  CN grossly intact.            Data:   All laboratory data reviewed:    Recent Labs   Lab 09/14/22  1056 09/14/22  0643 09/11/22  0611   WBC  --  5.1 4.9   HGB 11.6* 11.8* 10.9*   PLT  --  284 268     Recent Labs   Lab 09/14/22  1056 09/11/22  0611    137   POTASSIUM 4.4 4.4   CHLORIDE 103 100   CO2 34* 33*   BUN 29 20   CR 0.58* 0.56*   * 107*   CRYS 9.7 9.2     Recent Labs   Lab 09/14/22  1053   COLOR Orange*   APPEARANCE Cloudy*   URINEGLC Negative   URINEBILI Negative   URINEKETONE Trace*   SG 1.024   URINEPH 5.5   PROTEIN 100 *   NITRITE Negative   LEUKEST Moderate*   RBCU >182*   WBCU >182*            Impression and Plan:   Impression:   William Almazan is a 81 year  old male with PMH of prostate cancer s/p XRT > 10 years ago with known radiation cystitis and a chronic indwelling keating catheter for severe urinary frequency and incontinence. He is currently admitted for acute psychosis in the setting of Lewy Body dementia. He also takes Apixaban for Afib and was noted by nursing today to have possible blood tinged urine.     Given his known radiation cystitis, occasional blood in the urine is expected. If urine becomes cherry or maroon in color with clots or if the catheter stops draining, would consider bladder irrigation- his current degree of hematuria does not warrant intervention.    UA obtained today with +LE. Given his new hematuria, would be reasonable to treat for UTI (although a chronic indwelling catheter will also give the appearance of infected UA).     Given the patient's overall health and risk for hematuria given his radiation cystitis, the risks and benefits of continuing anticoagulation should be discussed with his family per his primary team. No indication from our standpoint to hold the anticoagulation at this time.      Plan:     - Antibiotics per primary team, recommend treating empirically for complicated UTI    - No bladder irrigation or further urologic intervention indicated    - OK for nursing to perform routine catheter exchange  - Patient should follow-up with his primary urologist at [facility] as scheduled after discharge    - Urology will sign off. Please contact resident/PA on call with any questions or concerns.     This patient's exam findings, labs, and imaging discussed with urology staff surgeon Brianna, who developed the treatment plan.    Brianne Valdes MD  PGY-5 Urology  Pager [phone]

## 2022-09-14 NOTE — PROVIDER NOTIFICATION
Upon assessment urine notices that patient urine is darker than usual. Seems blood tinged. Nurse notifies MD for clarification of Eliquis orders. Nurse awake response. Will continue to monitor.

## 2022-09-15 ENCOUNTER — APPOINTMENT (OUTPATIENT)
Dept: OCCUPATIONAL THERAPY | Facility: CLINIC | Age: 82
DRG: 056 | End: 2022-09-15
Attending: PSYCHIATRY & NEUROLOGY
Payer: MEDICARE

## 2022-09-15 LAB — HGB BLD-MCNC: 10.7 G/DL (ref 13.3–17.7)

## 2022-09-15 PROCEDURE — 120N000001 HC R&B MED SURG/OB

## 2022-09-15 PROCEDURE — 99232 SBSQ HOSP IP/OBS MODERATE 35: CPT | Performed by: HOSPITALIST

## 2022-09-15 PROCEDURE — 250N000013 HC RX MED GY IP 250 OP 250 PS 637: Performed by: PSYCHIATRY & NEUROLOGY

## 2022-09-15 PROCEDURE — 250N000013 HC RX MED GY IP 250 OP 250 PS 637: Performed by: HOSPITALIST

## 2022-09-15 PROCEDURE — 97535 SELF CARE MNGMENT TRAINING: CPT | Mod: GO

## 2022-09-15 PROCEDURE — 85018 HEMOGLOBIN: CPT | Performed by: HOSPITALIST

## 2022-09-15 PROCEDURE — 250N000013 HC RX MED GY IP 250 OP 250 PS 637: Performed by: INTERNAL MEDICINE

## 2022-09-15 PROCEDURE — 250N000011 HC RX IP 250 OP 636: Performed by: HOSPITALIST

## 2022-09-15 PROCEDURE — 36415 COLL VENOUS BLD VENIPUNCTURE: CPT | Performed by: HOSPITALIST

## 2022-09-15 RX ORDER — CEFTRIAXONE 1 G/1
1 INJECTION, POWDER, FOR SOLUTION INTRAMUSCULAR; INTRAVENOUS EVERY 24 HOURS
Status: DISCONTINUED | OUTPATIENT
Start: 2022-09-15 | End: 2022-09-17

## 2022-09-15 RX ADMIN — MULTIPLE VITAMINS W/ MINERALS TAB 1 TABLET: TAB at 09:36

## 2022-09-15 RX ADMIN — SENNOSIDES 1 TABLET: 8.6 TABLET, FILM COATED ORAL at 09:36

## 2022-09-15 RX ADMIN — GABAPENTIN 100 MG: 100 CAPSULE ORAL at 14:06

## 2022-09-15 RX ADMIN — SENNOSIDES 1 TABLET: 8.6 TABLET, FILM COATED ORAL at 21:49

## 2022-09-15 RX ADMIN — CARBIDOPA AND LEVODOPA 1 TABLET: 25; 100 TABLET ORAL at 09:36

## 2022-09-15 RX ADMIN — Medication 3 CAPSULE: at 09:36

## 2022-09-15 RX ADMIN — FUROSEMIDE 40 MG: 40 TABLET ORAL at 09:36

## 2022-09-15 RX ADMIN — APIXABAN 5 MG: 5 TABLET, FILM COATED ORAL at 21:49

## 2022-09-15 RX ADMIN — ESCITALOPRAM OXALATE 10 MG: 10 TABLET ORAL at 09:36

## 2022-09-15 RX ADMIN — ALLOPURINOL 300 MG: 300 TABLET ORAL at 09:36

## 2022-09-15 RX ADMIN — CARVEDILOL 12.5 MG: 12.5 TABLET, FILM COATED ORAL at 09:36

## 2022-09-15 RX ADMIN — CEFTRIAXONE SODIUM 1 G: 1 INJECTION, POWDER, FOR SOLUTION INTRAMUSCULAR; INTRAVENOUS at 13:49

## 2022-09-15 RX ADMIN — CARVEDILOL 12.5 MG: 12.5 TABLET, FILM COATED ORAL at 21:49

## 2022-09-15 RX ADMIN — DONEPEZIL HYDROCHLORIDE 10 MG: 10 TABLET ORAL at 21:49

## 2022-09-15 RX ADMIN — LORATADINE 10 MG: 10 TABLET ORAL at 09:36

## 2022-09-15 RX ADMIN — SPIRONOLACTONE 25 MG: 25 TABLET ORAL at 09:36

## 2022-09-15 RX ADMIN — CARBIDOPA AND LEVODOPA 1 TABLET: 25; 100 TABLET ORAL at 14:07

## 2022-09-15 RX ADMIN — GABAPENTIN 100 MG: 100 CAPSULE ORAL at 21:49

## 2022-09-15 RX ADMIN — QUETIAPINE FUMARATE 25 MG: 25 TABLET ORAL at 21:49

## 2022-09-15 RX ADMIN — APIXABAN 5 MG: 5 TABLET, FILM COATED ORAL at 09:36

## 2022-09-15 RX ADMIN — CARBIDOPA AND LEVODOPA 1 TABLET: 25; 100 TABLET ORAL at 21:49

## 2022-09-15 RX ADMIN — GABAPENTIN 100 MG: 100 CAPSULE ORAL at 09:36

## 2022-09-15 ASSESSMENT — ACTIVITIES OF DAILY LIVING (ADL)
ADLS_ACUITY_SCORE: 67
ADLS_ACUITY_SCORE: 60
ADLS_ACUITY_SCORE: 67
ADLS_ACUITY_SCORE: 60
ADLS_ACUITY_SCORE: 67

## 2022-09-15 NOTE — PROGRESS NOTES
Care Management Follow Up    Length of Stay (days): 14    Expected Discharge Date: 09/16/2022     Concerns to be Addressed:       Patient plan of care discussed at interdisciplinary rounds: Yes    Anticipated Discharge Disposition: Skilled Nursing Facility, Transitional Care     Anticipated Discharge Services: None  Anticipated Discharge DME: None    Patient/family educated on Medicare website which has current facility and service quality ratings: other (see comments) (has bed hold.)  Education Provided on the Discharge Plan:    Patient/Family in Agreement with the Plan: yes    Referrals Placed by CM/SW:    Private pay costs discussed: Not applicable    Additional Information:  Hospitalist told writer that she is wanting to see if patient can go home with home care. Writer talked to Ilsa (spouse) and she said that she has the hospital bed now, but is interviewing private duty home care agencies today to see if they can come daily. She said she's not ready for patient to discharge home yet as she's trying to get private duty nurses hired. She asked for CC to call her about home care. Writer emailed CC to call Ilsa.    Will continue to follow.    GONZALEZ Vee

## 2022-09-15 NOTE — PROGRESS NOTES
Report given to RN on st66. Patient belongings brought up to 621. Louie changed. Wound care completed. RN notified patient is coming up in the bed.

## 2022-09-15 NOTE — DISCHARGE INSTRUCTIONS
" Wound care     Location: buttock Every 3 days and PRN  1. Cleanse perineal skin with incontinence protocol (incontinence cleansers and barriers) every incontinence episode   2. Apply skin prep generously to perianal and buttock including wounds and periwound skin, let dry   3. Cover with multilayer foam dressing (e.g. Mepilex sacral, or 4x4\" )                   Pressure Injury Prevention (PIP) Plan:   -If patient is declining pressure injury prevention interventions: Explore reason why and address patient's concerns, Educate on pressure injury risk and prevention intervention(s), If patient is still declining, document and inform provider  -Mattress: Pressure redistribution  -HOB: Maintain at or below 30 degrees, unless contraindicated   -Repositioning in bed: Every 1-2 hours , Left/right positioning; avoid supine, and Raise foot of bed prior to raising head of bed, to reduce patient sliding down (shear)   -Heels: Keep elevated off mattress and Pillows under calves or offloading boots  -Chair positioning and pressure redistribution cushion Assist patient to reposition hourly, and Do NOT use a donut for sitting (this increases pressure to smaller area and creates a higher potential for injury)     -Under Devices: Inspect skin under all medical devices during skin inspection , Ensure tubes are stabilized without tension, and Ensure patient is not lying on medical devices or equipment when repositioned               Location: left great toe wound care:    1. Paint / apply betadine swabs to wound and periwound skin, let dry   2. Cover with dry dressing then wrap with roll gauze or kerlix, secure with tape        "

## 2022-09-15 NOTE — PROGRESS NOTES
MD Notification    Notified Person: MD    Notified Person Name: Kiara    Notification Date/Time: 11:45am    Notification Interaction: vocUC CEIN messaging    Purpose of Notification: keating need to be changed when?    Orders Received:    Comments:   No

## 2022-09-15 NOTE — PROGRESS NOTES
Orientation/Cognitive: AAOX3  Observation Goals (Met/ Not Met): Not Met  Mobility Level/Assist Equipment: Bedbond  Fall Risk (Y/N): Yes  Behavior Concerns: No  Pain Management: No  Tele/VS/O2: Vital signs WNL  ABNL Lab/BG: AM lab results pending  Diet: Regular  Bowel/Bladder: Chronic keating, Incontinent bowels  Skin Concerns: Left great toe wound, Sacral Wound  Drains/Devices: N/A  Tests/Procedures for next shift: PT/OT  Anticipated DC date & active delays: Pending  Patient Stated Goal for Today: Increase strength

## 2022-09-15 NOTE — PLAN OF CARE
Goal Outcome Evaluation:    Cognitive Concerns/ Orientation : A&Ox2-3, forget ,slow speaking  BEHAVIOR & AGGRESSION TOOL COLOR: green   CIWA SCORE: NA   ABNL VS/O2: VSS, afebrile  MOBILITY: up with a lift, turned and repositioned  PAIN MANAGMENT: denies  DIET: regular, needs feeding assistance, declined lunch but drank an ensure  BOWEL/BLADDER: chronic keating-changed today, incontinent of bowel  ABNL LAB/BG: NA  DRAIN/DEVICES: right PIV and keating  TELEMETRY RHYTHM: NA  SKIN: coccyx and toe wounds, dry skin  TESTS/PROCEDURES: none  Discharge Barriers: TCU vs home with home care  OTHER IMPORTANT INFO: transferred to [facility] at 1330, was turned and repo prior to arriving in bed with pulsate mattress. Legs elevated on pillows. Keating changed earlier today by morning RN. Dressing on coccyx c/d/i. Sleeping between cares.

## 2022-09-15 NOTE — PROGRESS NOTES
[facility]  Hospitalist Progress Note   09/15/2022          Assessment and Plan:       William Almazan is an 81-year-old male with dementia, HTN, CHF (HFrEF), CAD, PAF, gout, urinary retention with chronic indwelling Louie catheter, h/o prostate cancer; with recent hospitalization at [facility] (8/2-8/24/2022) for issues including failure to thrive with hypotension and TONE, psychosis. He presented from TCU 8/30/2022 with left hand pain, decreased motion and increasing generalized weakness.     On initially evaluation 8/30, was afebrile, hemodynamically stable; WBC normal, hgb 10.5; BMP unremarkable; LFT's showed low albumin and protein, otherwise normal.   Head CT 8/30 negative. 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      Suspected Lewy body dementia.  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   Rigidity with motor symptoms/extrapyramidal side effects suspect related to above with worsening by anti-psychotics.  Depression/anxiety.  * Recent hospitalization at [facility] 8/2 - 8/242022 with presyncope, hypotension, dementia with psychosis.   PTA also on donepezil, escitalopram, risperidone and gabapentin.  * Patient admitted with generalized weakness and progressive inability to ambulate accompanied by extremity rigidity. Wife Ilsa reported worsening symptoms after recent hospitalization and start of antipsychotic medications to help treat underlying psychosis. However, since starting, the patient exhibited worsening rigidity concerning for parkinsonism.   * Unable to obtain an MRI due to PPM/abandoned RV lead.  * Neurology followed,  overall there was concern for underlying Lewy body dementia, with dystonia that may have been worsened by starting antipsychotics.  * Psychiatry stopped the patient's risperidone and started  aripiprazole 8/31.  * Carbidopa-levodopa started 9/1.   * Given continued rigidity, aripiprazole stopped 9/2. Pimavanserin started 9/2.  * Quetiapine started 9/4. Increased carbidopa-levadopa 9/4. Pimavanserin stopped 9/4 due to long term cost issues.  * Escitalopram decreased 9/5.  - Continue carbidopa-levodopa  mg TID.  - Continue donepezil 10 mg at bedtime; quetiapine 25 mg at bedtime; PRN quetiapine 12.5 mg BID.  - Continue escitalopram 10 mg daily, gabapentin 100 mg TID.     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  Orthopedics recommend WBAT and elevation recommended; PRN left wrist splint ordered.  - Continue WBAT.  - Elevated left upper extremity when at rest.  - Continue PRN left wrist splint.  - Continue PRN acetaminophen.  - Outpatient follow-up with hand surgeon.     Physical deconditioning due to senile fragility, medical issues.    PT, OT recommend TCU for rehabilitation.   assisting with transition plans.  If unable to find TCU will need to consider home with possible home care.     CAD with h/o CABG (1992).  CHF (HFrEF, biventricular).  Hypertension (benign essential).  [PTA: carvedilol 12.5 mg BID; furosemide 40 mg daily; spironolactone 25 mg daily.]  * Echo 8/3/2022 showed LVEF 15-20%, grade 1 diastolic dysfunction, severe diffuse hypokinesis; RV global function mildly reduced.  - Continue carvedilol; furosemide; spironolactone.  On Eliquis as below for anticoagulation.  If any drop in hemoglobin will consider holding Eliquis.  - Monitor i/o's, daily wts.    Paroxysmal atrial fibrillation.  S/p PPM.  - Continue apixaban, carvedilol.    Complicated urinary tract infection.  Chronic indwelling Louie catheter due to urinary retention.   Chronic hematuria.  Prostate cancer s/p XRT > 10 years ago with known radiation cystitis  Chronic Louie; catheter changed every month.   Urology impression, Given his known radiation  cystitis, occasional blood in the urine is expected. If urine becomes cherry or maroon in color with clots or if the catheter stops draining, would consider bladder irrigation.  Appreciate input.  Will treat with IV ceftriaxone [9/15] follow urine cultures.  Louie catheter changed today 9/15.  Follow-up with primary urologist at [facility] after discharge.     Iron deficiency anemia  * Recently completed a course of IV iron sucrose at [facility] 8/2022.  Hemoglobin stable around 10-11.  Monitor in AM.     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  Continue local wound cares per [facility] nurse rec's    Malnutrition related to acute and chronic medical issues.  Nutrition followed, continue dietary supplements.     Gout.  Continue allopurinol.     KRISTIAN.  Does not use CPAP.Follow-up outpatient.     Recent COVID-19 infection, recovered.  * He had minimal symptoms with first positive on 7/30, considered COVID recovered.  - Monitor clinically.     Orders Placed This Encounter      Regular Diet Adult      Advance Diet as Tolerated      DVT Prophylaxis: On Eliquis.  Code Status: Full Code  Disposition: Expected discharge  pending safe discharge plan in place    Discussed with patient, bedside RN, charge RN.  Total time greater than 25 minutes.  More than 70% of time spent in direct patient care, care coordination, patient counseling, and formalizing plan of care.     Vladimir Craig MD        Interval History:      Patient appears comfortable lying in bed.  Denies any complaints this morning.  Continues to have blood-tinged urine.  On Eliquis.  Hemoglobin stable.  Social work assisting with transition plans.         Physical Exam:        Physical Exam   Temp:  [97.2  F (36.2  C)-98.1  F (36.7  C)] 97.5  F (36.4  C)  Pulse:  [63-89] 74  Resp:  [16-18] 16  BP: (107-136)/(60-80) 134/79  SpO2:  [94 %-99 %] 94 %    Intake/Output Summary (Last 24 hours) at 9/14/2022 0934  Last data filed  at 9/14/2022 0405  Gross per 24 hour   Intake 520 ml   Output 950 ml   Net -430 ml       Admission Weight: 81.9 kg (180 lb 8.9 oz)  Current Weight: 81.9 kg (180 lb 8.9 oz)    PHYSICAL EXAM  GENERAL: Patient awake, frail-appearing.  Able to answer simple questions.  HEART: Regular rate and rhythm. S1S2. No murmurs  LUNGS:Respirations unlabored  ABDOMEN: Soft, no abdominal tenderness, bowel sounds heard   Genitourinary dark-colored urine.  NEURO: Moving all extremities.  EXTREMITIES: No pedal edema.  PSYCHIATRY Cooperative       Medications:          allopurinol  300 mg Oral Daily     apixaban ANTICOAGULANT  5 mg Oral BID     carbidopa-levodopa  1 tablet Oral TID     carvedilol  12.5 mg Oral BID     donepezil  10 mg Oral At Bedtime     escitalopram  10 mg Oral Daily     furosemide  40 mg Oral Daily     gabapentin  100 mg Oral TID     loratadine  10 mg Oral Daily     multivitamin w/minerals  1 tablet Oral Daily     psyllium  3 capsule Oral Daily     QUEtiapine  25 mg Oral At Bedtime     sennosides  1 tablet Oral BID     sodium chloride (PF)  3 mL Intracatheter Q8H     spironolactone  25 mg Oral Daily     acetaminophen, artificial tears, melatonin, ondansetron **OR** ondansetron, polyethylene glycol, prochlorperazine **OR** prochlorperazine **OR** prochlorperazine, QUEtiapine, sodium chloride (PF)         Data:      All new lab and imaging data was reviewed.

## 2022-09-15 NOTE — PROGRESS NOTES
[facility] Nursing note:    Objective Noted social service note. Expected discharge to TCU 9/16/2022   Assessment Seen by [facility] nurse 9/14/2022, RN changed dressings today   Intervention Transcribed skin and wound plan in AVS   Plan Plan to see weekly while patient is in the hospital       Gely Pizarro MS RN CWOCN    Dept. Pager: [phone]  Dept. Office Number: [phone]

## 2022-09-16 ENCOUNTER — APPOINTMENT (OUTPATIENT)
Dept: OCCUPATIONAL THERAPY | Facility: CLINIC | Age: 82
DRG: 056 | End: 2022-09-16
Attending: PSYCHIATRY & NEUROLOGY
Payer: MEDICARE

## 2022-09-16 ENCOUNTER — APPOINTMENT (OUTPATIENT)
Dept: PHYSICAL THERAPY | Facility: CLINIC | Age: 82
DRG: 056 | End: 2022-09-16
Attending: PSYCHIATRY & NEUROLOGY
Payer: MEDICARE

## 2022-09-16 LAB
BACTERIA UR CULT: NORMAL
HGB BLD-MCNC: 10.9 G/DL (ref 13.3–17.7)

## 2022-09-16 PROCEDURE — 97530 THERAPEUTIC ACTIVITIES: CPT | Mod: GO

## 2022-09-16 PROCEDURE — 99232 SBSQ HOSP IP/OBS MODERATE 35: CPT | Performed by: HOSPITALIST

## 2022-09-16 PROCEDURE — 250N000013 HC RX MED GY IP 250 OP 250 PS 637: Performed by: PSYCHIATRY & NEUROLOGY

## 2022-09-16 PROCEDURE — 97166 OT EVAL MOD COMPLEX 45 MIN: CPT | Mod: GO

## 2022-09-16 PROCEDURE — 250N000011 HC RX IP 250 OP 636: Performed by: HOSPITALIST

## 2022-09-16 PROCEDURE — 250N000013 HC RX MED GY IP 250 OP 250 PS 637: Performed by: INTERNAL MEDICINE

## 2022-09-16 PROCEDURE — 120N000001 HC R&B MED SURG/OB

## 2022-09-16 PROCEDURE — 97530 THERAPEUTIC ACTIVITIES: CPT | Mod: GP

## 2022-09-16 PROCEDURE — 36415 COLL VENOUS BLD VENIPUNCTURE: CPT | Performed by: HOSPITALIST

## 2022-09-16 PROCEDURE — 250N000013 HC RX MED GY IP 250 OP 250 PS 637: Performed by: HOSPITALIST

## 2022-09-16 PROCEDURE — 85018 HEMOGLOBIN: CPT | Performed by: HOSPITALIST

## 2022-09-16 RX ADMIN — CARBIDOPA AND LEVODOPA 1 TABLET: 25; 100 TABLET ORAL at 21:52

## 2022-09-16 RX ADMIN — GABAPENTIN 100 MG: 100 CAPSULE ORAL at 21:52

## 2022-09-16 RX ADMIN — GABAPENTIN 100 MG: 100 CAPSULE ORAL at 08:40

## 2022-09-16 RX ADMIN — QUETIAPINE FUMARATE 25 MG: 25 TABLET ORAL at 21:52

## 2022-09-16 RX ADMIN — APIXABAN 5 MG: 5 TABLET, FILM COATED ORAL at 21:53

## 2022-09-16 RX ADMIN — CARVEDILOL 12.5 MG: 12.5 TABLET, FILM COATED ORAL at 08:40

## 2022-09-16 RX ADMIN — FUROSEMIDE 40 MG: 40 TABLET ORAL at 08:40

## 2022-09-16 RX ADMIN — CEFTRIAXONE SODIUM 1 G: 1 INJECTION, POWDER, FOR SOLUTION INTRAMUSCULAR; INTRAVENOUS at 13:45

## 2022-09-16 RX ADMIN — CARBIDOPA AND LEVODOPA 1 TABLET: 25; 100 TABLET ORAL at 08:40

## 2022-09-16 RX ADMIN — CARVEDILOL 12.5 MG: 12.5 TABLET, FILM COATED ORAL at 21:56

## 2022-09-16 RX ADMIN — SENNOSIDES 1 TABLET: 8.6 TABLET, FILM COATED ORAL at 21:53

## 2022-09-16 RX ADMIN — CARBIDOPA AND LEVODOPA 1 TABLET: 25; 100 TABLET ORAL at 14:15

## 2022-09-16 RX ADMIN — ALLOPURINOL 300 MG: 300 TABLET ORAL at 08:40

## 2022-09-16 RX ADMIN — MULTIPLE VITAMINS W/ MINERALS TAB 1 TABLET: TAB at 08:40

## 2022-09-16 RX ADMIN — SENNOSIDES 1 TABLET: 8.6 TABLET, FILM COATED ORAL at 08:40

## 2022-09-16 RX ADMIN — APIXABAN 5 MG: 5 TABLET, FILM COATED ORAL at 08:40

## 2022-09-16 RX ADMIN — SPIRONOLACTONE 25 MG: 25 TABLET ORAL at 08:40

## 2022-09-16 RX ADMIN — GABAPENTIN 100 MG: 100 CAPSULE ORAL at 14:15

## 2022-09-16 RX ADMIN — Medication 3 CAPSULE: at 08:41

## 2022-09-16 RX ADMIN — DONEPEZIL HYDROCHLORIDE 10 MG: 10 TABLET ORAL at 21:52

## 2022-09-16 RX ADMIN — LORATADINE 10 MG: 10 TABLET ORAL at 08:40

## 2022-09-16 RX ADMIN — ESCITALOPRAM OXALATE 10 MG: 10 TABLET ORAL at 08:40

## 2022-09-16 ASSESSMENT — ACTIVITIES OF DAILY LIVING (ADL)
ADLS_ACUITY_SCORE: 67

## 2022-09-16 NOTE — PLAN OF CARE
Goal Outcome Evaluation:        DATE & TIME: 9/15/22-9/16/22 [id number]  Cognitive Concerns/ Orientation : A&Ox3, disorientated to situation, forgetful ,slow speaking, sleeping most of shift  BEHAVIOR & AGGRESSION TOOL COLOR: green   CIWA SCORE: NA   ABNL VS/O2: VSS on RA  MOBILITY: Total, t/r, up w/ lift  PAIN MANAGMENT: denies  DIET: regular, needs feeding assistance,   BOWEL/BLADDER: chronic keating-changed 9/15 AM, incontinent of bowel.   ABNL LAB/BG: NA  DRAIN/DEVICES: right PIV and keating  TELEMETRY RHYTHM: NA  SKIN: coccyx and toe wounds, dry skin, scattered bruises and abrasions  TESTS/PROCEDURES: none  Discharge Barriers: TCU vs home with home care  OTHER IMPORTANT INFO: Keating changed 9/15 in AM by RN. Dressing on coccyx c/d/i.

## 2022-09-16 NOTE — PROGRESS NOTES
09/16/22 1538   Quick Adds   Type of Visit Initial Occupational Therapy Evaluation   Living Environment   People in Home spouse   Current Living Arrangements house   Home Accessibility stairs to enter home;stairs within home   Number of Stairs, Main Entrance 2   Stair Railings, Main Entrance none   Number of Stairs, Within Home, Primary greater than 10 stairs   Transportation Anticipated family or friend will provide   Living Environment Comments Patient was admitted to OBS unit initially from TCU, however, spouse reports she has hired private caregivers and between them and family they will be providing 24/7 assist.   Self-Care   Current Activity Tolerance poor   Equipment Currently Used at Home cane, straight   Fall history within last six months yes   Number of times patient has fallen within last six months 6   Activity/Exercise/Self-Care Comment Per spouse, up until recently patient was indep with mob and ADLs. Per TCU report pt was lift dependent for mobility and receiving assist with all ADLs.   General Information   Onset of Illness/Injury or Date of Surgery 09/30/22   Referring Physician Dr. Granado   Patient/Family Therapy Goal Statement (OT) spouse wants to take patient home w/private caregivers   Additional Occupational Profile Info/Pertinent History of Current Problem 81M hx Dementia, CAD, chronic indwelling keating catheter with hematuria, HFpEF with recent admit at [facility] from 8/2-24 presents from TCU to [facility] OBS with left hand pain/decreased mobility and generalized weakness and lethargy, then made inpatient and transferred to inpatient floor.   Existing Precautions/Restrictions fall   General Observations and Info L wrist splint   Cognitive Status Examination   Orientation Status orientation to person, place and time   Follows Commands follows one-step commands;over 90% accuracy   Visual Perception   Impact of Vision Impairment on Function (Vision) Denies deficits   Pain Assessment   Patient Currently  in Pain No   Posture   Posture Comments Rigidity (trunk, LE's)   Range of Motion Comprehensive   Comment, General Range of Motion B blair flex approx  actively and has full active elbow flex. B hand contractures.   Coordination   Upper Extremity Coordination Left UE impaired;Right UE impaired   Fine Motor Coordination Impaired   Bed Mobility   Comment (Bed Mobility) Max A of 2   Transfers   Transfer Comments Dependent via mechanical lift   Activities of Daily Living   BADL Assessment/Intervention   (Dependent feeding, dressing, toileting)   Clinical Impression   Criteria for Skilled Therapeutic Interventions Met (OT) Yes, treatment indicated   OT Diagnosis Impaired ADL/IADL skills   OT Problem List-Impairments impacting ADL problems related to;activity tolerance impaired;balance;cognition;mobility;motor control;muscle tone;range of motion (ROM);strength;postural control   Assessment of Occupational Performance 5 or more Performance Deficits   Identified Performance Deficits Functional mobility/trx, dressing, toileting, bathing, feeding, grooming   Planned Therapy Interventions (OT) ADL retraining;IADL retraining;balance training;cognition;ROM;transfer training;strengthening;progressive activity/exercise   Clinical Decision Making Complexity (OT) moderate complexity   Anticipated Equipment Needs Upon Discharge (OT) lift device;dressing equipment;wheelchair;shower chair   Risk & Benefits of therapy have been explained evaluation/treatment results reviewed;care plan/treatment goals reviewed;risks/benefits reviewed;current/potential barriers reviewed;participants voiced agreement with care plan;participants included;patient;spouse/significant other   OT Discharge Planning   OT Discharge Recommendation (DC Rec) Transitional Care Facility   OT Rationale for DC Rec Patient is well below PLOF and will need ongoing therapy to maximize indep with mobility and ADLs. Currently requiring A of 2 with bed mobility and is  mechanical lift dependent with transfers. Spouse prefers pt return home and reports she has hired private caregivers. If to return home, will need lift equipment, bedside commode, ramp entry, wheelchair and A of 1-2 with all cares as well as home health OT for continued strengthening, ADL retraining, equipment assessment/caregiver training.   OT Brief overview of current status Pt O to self, place, time and followed 1-step commands consistently. Mobility and self-care skillls severely limited due to gross rigidity and B hand contractures. Dependent feeding, toileting, dressing as can not use either hand to assist.   Total Evaluation Time (Minutes)   Total Evaluation Time (Minutes) 15   OT Goals   Therapy Frequency (OT) 3 times/wk   OT Predicted Duration/Target Date for Goal Attainment 09/22/22   OT Goals Toilet Transfer/Toileting;Hygiene/Grooming   OT: Hygiene/Grooming moderate assist;using adaptive equipment  (2 tasks with set-up sitting up in bed using adaptive cuff for utensils)   OT: Toilet Transfer/Toileting Moderate assist;toilet transfer  (bedside commode transfer, A of 2)   Psychosocial Support   Trust Relationship/Rapport care explained;choices provided;emotional support provided;questions answered;questions encouraged

## 2022-09-16 NOTE — PLAN OF CARE
DATE & TIME: 9/15/22 [id number]   Cognitive Concerns/ Orientation : A&Ox3, disorientated to situation, forgetful ,slow speaking, sleeping most of shift  BEHAVIOR & AGGRESSION TOOL COLOR: green   CIWA SCORE: NA   ABNL VS/O2: VSS on RA  MOBILITY: Total, t/r, up w/ lift  PAIN MANAGMENT: denies  DIET: regular, needs feeding assistance,   BOWEL/BLADDER: chronic keating-changed in AM, incontinent of bowel, no BM  ABNL LAB/BG: NA  DRAIN/DEVICES: right PIV and keating  TELEMETRY RHYTHM: NA  SKIN: coccyx and toe wounds, dry skin, scattered bruises and abrasions  TESTS/PROCEDURES: none  Discharge Barriers: TCU vs home with home care  OTHER IMPORTANT INFO:  Legs elevated on pillows. Keating changed earlier today by morning RN. Dressing on coccyx c/d/i. Sleeping between cares. Updated family

## 2022-09-16 NOTE — PLAN OF CARE
Goal Outcome Evaluation:    Cognitive Concerns/ Orientation:  A&Ox2-3, forgetful ,slow speaking  BEHAVIOR & AGGRESSION TOOL COLOR: green   CIWA SCORE: NA   ABNL VS/O2: VSS on room air  MOBILITY: Total, turned and repositioned, up w/ lift, contracted hands  PAIN MANAGMENT: denies  DIET: regular, needs feeding assistance-fair appetite   BOWEL/BLADDER: chronic keating-changed 9/15 AM, incontinent of bowel.   ABNL LAB/BG: WBC 10.63  DRAIN/DEVICES: right PIV and keating  TELEMETRY RHYTHM: NA  SKIN: coccyx and toe wounds-cares completed.  TESTS/PROCEDURES: none  Discharge Barriers: TCU vs home with home care  OTHER IMPORTANT INFO: sleeps between cares. Keating draining tea colored urine. prevalone boots on both feet, heels are pink and slightly boggy, on a pulse mattress. Legs elevated on 2 pillows

## 2022-09-16 NOTE — PROGRESS NOTES
[facility]  Hospitalist Progress Note   09/16/2022          Assessment and Plan:       William Almazan is an 81-year-old male with dementia, HTN, CHF (HFrEF), CAD, PAF, gout, urinary retention with chronic indwelling Louie catheter, h/o prostate cancer; with recent hospitalization at [facility] (8/2-8/24/2022) for issues including failure to thrive with hypotension and TONE, psychosis. He presented from TCU 8/30/2022 with left hand pain, decreased motion and increasing generalized weakness.     On initially evaluation 8/30, was afebrile, hemodynamically stable; WBC normal, hgb 10.5; BMP unremarkable; LFT's showed low albumin and protein, otherwise normal.   Head CT 8/30 negative. 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      Suspected Lewy body dementia.  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   Rigidity with motor symptoms/extrapyramidal side effects suspect related to above with worsening by anti-psychotics.  Depression/anxiety.  * Recent hospitalization at [facility] 8/2 - 8/242022 with presyncope, hypotension, dementia with psychosis.   PTA also on donepezil, escitalopram, risperidone and gabapentin.  * Patient admitted with generalized weakness and progressive inability to ambulate accompanied by extremity rigidity. Wife Ilsa reported worsening symptoms after recent hospitalization and start of antipsychotic medications to help treat underlying psychosis. However, since starting, the patient exhibited worsening rigidity concerning for parkinsonism.   * Unable to obtain an MRI due to PPM/abandoned RV lead.  * Neurology followed, overall there was concern for underlying Lewy body dementia, with dystonia that may have been worsened by starting antipsychotics.  * Psychiatry stopped the patient's risperidone and started  aripiprazole 8/31.  * Carbidopa-levodopa started 9/1.   * Given continued rigidity, aripiprazole stopped 9/2. Pimavanserin started 9/2.  * Quetiapine started 9/4. Increased carbidopa-levadopa 9/4. Pimavanserin stopped 9/4 due to long term cost issues.  * Escitalopram decreased 9/5.  - Continue carbidopa-levodopa  mg TID.  - Continue donepezil 10 mg at bedtime; quetiapine 25 mg at bedtime; PRN quetiapine 12.5 mg BID.  - Continue escitalopram 10 mg daily, gabapentin 100 mg TID.     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  Orthopedics recommend WBAT and elevation recommended; PRN left wrist splint ordered.  - Continue WBAT.  - Elevated left upper extremity when at rest.  - Continue PRN left wrist splint.  - Continue PRN acetaminophen.  - Outpatient follow-up with hand surgeon.     Physical deconditioning due to senile fragility, medical issues.    PT, OT recommend TCU for rehabilitation.   assisting with transition plans.  If unable to find TCU will need to consider home with possible home care.     CAD with h/o CABG (1992).  CHF (HFrEF, biventricular).  Hypertension (benign essential).  [PTA: carvedilol 12.5 mg BID; furosemide 40 mg daily; spironolactone 25 mg daily.]  * Echo 8/3/2022 showed LVEF 15-20%, grade 1 diastolic dysfunction, severe diffuse hypokinesis; RV global function mildly reduced.  - Continue carvedilol; furosemide; spironolactone.  On Eliquis as below for anticoagulation.  If any drop in hemoglobin will consider holding Eliquis.  - Monitor i/o's, daily wts.     Paroxysmal atrial fibrillation.  S/p PPM.  - Continue apixaban, carvedilol.     Complicated urinary tract infection.  Chronic indwelling Louie catheter due to urinary retention.   Chronic hematuria.  Prostate cancer s/p XRT > 10 years ago with known radiation cystitis  Louie catheter changed every month.  Urology impression, Given his known radiation cystitis,  occasional blood in the urine is expected. If urine becomes cherry or maroon in color with clots or if the catheter stops draining, would consider bladder irrigation.  Appreciate input.  UA 9/14 with moderate leukocyte esterase, greater than 182 WBCs  Urine cultures 9/14 less than 10,000 urogenital qasim.  Urology recommends to treat complicated UTI given indwelling catheter, On IV ceftriaxone [9/15] - 3 days.   Louie catheter changed 9/15.  Follow-up with primary urologist at [facility] after discharge.     Iron deficiency anemia  * Recently completed a course of IV iron sucrose at [facility] 8/2022.  Hemoglobin stable around 10-11.  Monitor in AM.     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  Continue local wound cares per [facility] nurse rec's     Malnutrition related to acute and chronic medical issues.  Nutrition followed, continue dietary supplements.     Gout.  Continue allopurinol.     KRISTIAN.  Does not use CPAP.Follow-up outpatient.     Recent COVID-19 infection, recovered.  Had minimal symptoms with first positive on 7/30, considered COVID recovered.     Orders Placed This Encounter      Regular Diet Adult      Advance Diet as Tolerated      DVT Prophylaxis: On Eliquis.  Code Status: Full Code  Disposition: Expected discharge pending safe discharge plan in place     Discussed with patient, bedside RN, care team.  More than 70% of time spent in direct patient care, care coordination, patient counseling, and formalizing plan of care.     Vladimir Craig MD        Interval History:      Patient appears comfortable lying in bed.  Denies any complaints this morning.  Continues to have blood-tinged urine.  On Eliquis.  Hemoglobin stable.  Social work assisting with transition plans.         Physical Exam:        Physical Exam   Temp:  [97.3  F (36.3  C)-98.2  F (36.8  C)] 97.4  F (36.3  C)  Pulse:  [78-85] 85  Resp:  [16-20] 20  BP: (114-124)/(67-80) 123/80  SpO2:  [93  %-98 %] 93 %    Intake/Output Summary (Last 24 hours) at 9/16/2022 1410  Last data filed at 9/16/2022 1300  Gross per 24 hour   Intake 240 ml   Output 700 ml   Net -460 ml       Admission Weight: 81.9 kg (180 lb 8.9 oz)  Current Weight: 81.9 kg (180 lb 8.9 oz)    PHYSICAL EXAM  GENERAL: Patient awake, frail-appearing.  Able to answer simple questions.  HEART: Regular rate and rhythm. S1S2. No murmurs  LUNGS:Respirations unlabored  ABDOMEN: Soft, no abdominal tenderness, bowel sounds heard   Genitourinary dark-colored urine.  NEURO: Moving all extremities.  EXTREMITIES: No pedal edema.  PSYCHIATRY Cooperative       Medications:          allopurinol  300 mg Oral Daily     apixaban ANTICOAGULANT  5 mg Oral BID     carbidopa-levodopa  1 tablet Oral TID     carvedilol  12.5 mg Oral BID     cefTRIAXone  1 g Intravenous Q24H     donepezil  10 mg Oral At Bedtime     escitalopram  10 mg Oral Daily     furosemide  40 mg Oral Daily     gabapentin  100 mg Oral TID     loratadine  10 mg Oral Daily     multivitamin w/minerals  1 tablet Oral Daily     psyllium  3 capsule Oral Daily     QUEtiapine  25 mg Oral At Bedtime     sennosides  1 tablet Oral BID     sodium chloride (PF)  3 mL Intracatheter Q8H     spironolactone  25 mg Oral Daily     acetaminophen, artificial tears, melatonin, ondansetron **OR** ondansetron, polyethylene glycol, QUEtiapine, sodium chloride (PF)         Data:      All new lab and imaging data was reviewed.

## 2022-09-17 PROCEDURE — 250N000013 HC RX MED GY IP 250 OP 250 PS 637: Performed by: INTERNAL MEDICINE

## 2022-09-17 PROCEDURE — 250N000013 HC RX MED GY IP 250 OP 250 PS 637: Performed by: PSYCHIATRY & NEUROLOGY

## 2022-09-17 PROCEDURE — 250N000013 HC RX MED GY IP 250 OP 250 PS 637: Performed by: HOSPITALIST

## 2022-09-17 PROCEDURE — 250N000011 HC RX IP 250 OP 636: Performed by: HOSPITALIST

## 2022-09-17 PROCEDURE — 120N000001 HC R&B MED SURG/OB

## 2022-09-17 PROCEDURE — 99231 SBSQ HOSP IP/OBS SF/LOW 25: CPT | Performed by: HOSPITALIST

## 2022-09-17 PROCEDURE — 97535 SELF CARE MNGMENT TRAINING: CPT | Mod: GO

## 2022-09-17 PROCEDURE — 250N000011 HC RX IP 250 OP 636: Performed by: INTERNAL MEDICINE

## 2022-09-17 RX ORDER — CEFTRIAXONE 1 G/1
1 INJECTION, POWDER, FOR SOLUTION INTRAMUSCULAR; INTRAVENOUS EVERY 24 HOURS
Status: COMPLETED | OUTPATIENT
Start: 2022-09-18 | End: 2022-09-18

## 2022-09-17 RX ADMIN — LORATADINE 10 MG: 10 TABLET ORAL at 08:50

## 2022-09-17 RX ADMIN — CARBIDOPA AND LEVODOPA 1 TABLET: 25; 100 TABLET ORAL at 15:06

## 2022-09-17 RX ADMIN — CARBIDOPA AND LEVODOPA 1 TABLET: 25; 100 TABLET ORAL at 19:39

## 2022-09-17 RX ADMIN — DONEPEZIL HYDROCHLORIDE 10 MG: 10 TABLET ORAL at 21:11

## 2022-09-17 RX ADMIN — GABAPENTIN 100 MG: 100 CAPSULE ORAL at 15:06

## 2022-09-17 RX ADMIN — SENNOSIDES 1 TABLET: 8.6 TABLET, FILM COATED ORAL at 19:40

## 2022-09-17 RX ADMIN — ONDANSETRON 4 MG: 4 TABLET, ORALLY DISINTEGRATING ORAL at 08:49

## 2022-09-17 RX ADMIN — CEFTRIAXONE SODIUM 1 G: 1 INJECTION, POWDER, FOR SOLUTION INTRAMUSCULAR; INTRAVENOUS at 12:14

## 2022-09-17 RX ADMIN — GABAPENTIN 100 MG: 100 CAPSULE ORAL at 08:49

## 2022-09-17 RX ADMIN — SPIRONOLACTONE 25 MG: 25 TABLET ORAL at 08:50

## 2022-09-17 RX ADMIN — CARVEDILOL 12.5 MG: 12.5 TABLET, FILM COATED ORAL at 19:39

## 2022-09-17 RX ADMIN — GABAPENTIN 100 MG: 100 CAPSULE ORAL at 19:39

## 2022-09-17 RX ADMIN — APIXABAN 5 MG: 5 TABLET, FILM COATED ORAL at 19:39

## 2022-09-17 RX ADMIN — QUETIAPINE FUMARATE 25 MG: 25 TABLET ORAL at 21:11

## 2022-09-17 RX ADMIN — FUROSEMIDE 40 MG: 40 TABLET ORAL at 08:50

## 2022-09-17 RX ADMIN — CARBIDOPA AND LEVODOPA 1 TABLET: 25; 100 TABLET ORAL at 08:50

## 2022-09-17 RX ADMIN — ALLOPURINOL 300 MG: 300 TABLET ORAL at 08:49

## 2022-09-17 RX ADMIN — Medication 3 CAPSULE: at 08:49

## 2022-09-17 RX ADMIN — ESCITALOPRAM OXALATE 10 MG: 10 TABLET ORAL at 08:49

## 2022-09-17 RX ADMIN — SENNOSIDES 1 TABLET: 8.6 TABLET, FILM COATED ORAL at 08:50

## 2022-09-17 RX ADMIN — CARVEDILOL 12.5 MG: 12.5 TABLET, FILM COATED ORAL at 08:50

## 2022-09-17 RX ADMIN — MULTIPLE VITAMINS W/ MINERALS TAB 1 TABLET: TAB at 08:50

## 2022-09-17 RX ADMIN — APIXABAN 5 MG: 5 TABLET, FILM COATED ORAL at 08:50

## 2022-09-17 ASSESSMENT — ACTIVITIES OF DAILY LIVING (ADL)
ADLS_ACUITY_SCORE: 59
ADLS_ACUITY_SCORE: 63
ADLS_ACUITY_SCORE: 67
ADLS_ACUITY_SCORE: 67
ADLS_ACUITY_SCORE: 63
ADLS_ACUITY_SCORE: 67
ADLS_ACUITY_SCORE: 67
ADLS_ACUITY_SCORE: 63
ADLS_ACUITY_SCORE: 67
ADLS_ACUITY_SCORE: 67
ADLS_ACUITY_SCORE: 63
ADLS_ACUITY_SCORE: 67

## 2022-09-17 NOTE — PLAN OF CARE
Goal Outcome Evaluation:    Plan of Care Reviewed With: spouse, daughter     DATE & TIME: 9/16/22, 0970-2465       Cognitive Concerns/ Orientation : A&O x 2. Forgetful, speech slow w/ flat affect. Confused on situation and place  BEHAVIOR & AGGRESSION TOOL COLOR: Green     ABNL VS/O2: VSS on room air  MOBILITY: Total, up with lift. Turned and repositioned  PAIN MANAGMENT: Denied, complained of nausea- zofran affective  DIET: Regular  BOWEL/BLADDER: Chronic keating in place for retention. Urine remain tea colored. Incontinent of bowels- smear during day shift  ABNL LAB/BG: CO2 34, creat 0.58, alb 2.9, hgb 10.9  DRAIN/DEVICES: PIV SL w/ intermittent antibiotic, keating for retention  TELEMETRY RHYTHM: NA  SKIN: Dressing changed and in place to coccyx and toe wounds  TESTS/PROCEDURES: NA  D/C DATE: TCU vs home with home care  OTHER IMPORTANT INFO: Splint in place to left hand/wrist. Arm elevated. Can weight bear as tolerated. Heels mildly pink. Prevalon boots in place to BLE. Elevated on pillows. On a pulsate matress

## 2022-09-17 NOTE — PROGRESS NOTES
MD Notification    Notified Person: MD    Notified Person Name: Dr. Gutierrezkari     Notification Date/Time: 9/17/22 4704    Notification Interaction: Roni     Purpose of Notification: to notify you that pt keating is a bit darker today and when emptied noted a few small and medium clots as well as leaking.    Orders Received: recheck b/p and hgb check tomorrow AM    Comments:

## 2022-09-17 NOTE — PLAN OF CARE
DATE & TIME: 9/16/22, 7706 - 8785   Cognitive Concerns/ Orientation : A&O x self only. Forgetful, speech slow   BEHAVIOR & AGGRESSION TOOL COLOR: Green   ABNL VS/O2: VSS on room air  MOBILITY: Total, up with lift. Turned and repositioned  PAIN MANAGMENT: Denied  DIET: Regular  BOWEL/BLADDER: Chronic keating in place for retention. Urine remain tea colored. Incontinent of bowels  ABNL LAB/BG: NA  DRAIN/DEVICES: PIV SL, keating  TELEMETRY RHYTHM: NA  SKIN: Dressing in place to coccyx and toe wounds  TESTS/PROCEDURES: NA  D/C DATE: TCU vs home with home care  OTHER IMPORTANT INFO: Splint in place to left hand/wrist. Arm elevated. Can weight bear as tolerated. Heels mildly pink. Prevalon boots in place to BLE. Elevated on pillows. On a pulsate matress    Goal Outcome Evaluation:    Plan of Care Reviewed With: patient     Overall Patient Progress: no change

## 2022-09-17 NOTE — PLAN OF CARE
Goal Outcome Evaluation:  DATE & TIME: 9/16 9297-6256  Cognitive Concerns/ Orientation:  A&Ox4, forgetful ,slow speaking  BEHAVIOR & AGGRESSION TOOL COLOR: green   CIWA SCORE: NA   ABNL VS/O2: VSS on room air  MOBILITY: Total, turned and repositioned, up w/ lift, contracted hands  PAIN MANAGMENT: denies  DIET: regular, needs feeding assistance-poor appetite (25% dinner), Ensure 120ml.  BOWEL/BLADDER: chronic keatnig-changed. incontinent of bowel X1 small.  ABNL LAB/BG: NA  DRAIN/DEVICES: right PIV and keating  TELEMETRY RHYTHM: NA  SKIN: coccyx and toe wounds-cares completed.  TESTS/PROCEDURES: none  Discharge Barriers: TCU vs home with home care. SW fallowing.  OTHER IMPORTANT INFO: sleeps between cares. Chronic hematuria. Pt does not call appropriately. Pt needs help with ordering food.

## 2022-09-17 NOTE — PROGRESS NOTES
Care Management Follow Up    Length of Stay (days): 16    Expected Discharge Date: 09/20/2022     Concerns to be Addressed:    Patient transferred to [facility] from Short Stay.  Plan was TCU but when unable to locate a TCU, discussion occurred by hospitalist to look at patient discharging home.  Wife evidently has begun looking into bringing patient home with home care services.   Writer ill be meeting with wife today at 1000 to discuss TCU vs home with 24 hour care.   Occupational therapy does recommend TCU or if going home needs assistance of 1- 2 assistance with lift, bedside commode, w/c and ramp into home. PT recommends TCU and documents patient is dependent upon a device for transfers.   Patient plan of care discussed at interdisciplinary rounds: Yes    Anticipated Discharge Disposition: Skilled Nursing Facility, Transitional Care     Anticipated Discharge Services: None  Anticipated Discharge DME: None    Patient/family educated on Medicare website which has current facility and service quality ratings: other (see comments) (has bed hold.)  Education Provided on the Discharge Plan:    Patient/Family in Agreement with the Plan: yes    Referrals Placed by CM/SW:    Private pay costs discussed:     Additional Information:  RN care coordinator spoke with wife yesterday.  She has questions about cost for care if she brings patient home and hires caregivers.      ROBB Ovalle

## 2022-09-17 NOTE — PROGRESS NOTES
Care Management Follow Up    Length of Stay (days): 16    Expected Discharge Date: 09/20/2022     Concerns to be Addressed:       Patient plan of care discussed at interdisciplinary rounds: Yes    Anticipated Discharge Disposition: Skilled Nursing Facility, Transitional Care     Anticipated Discharge Services: None  Anticipated Discharge DME: None    Patient/family educated on Medicare website which has current facility and service quality ratings: other (see comments) (has bed hold.)  Education Provided on the Discharge Plan:    Patient/Family in Agreement with the Plan: yes    Referrals Placed by CM/SW:    Private pay costs discussed:     Additional Information:  Met with patient, his wife and daughter.  Wife reports while patient was on [facility] [address], she was told the physician wanted patient discharged home as no TCU's had been found for patient and wife did not feel she/patient had any choice in the matter.    Writer did review the services which are covered under Medicare for home care such as skilled RN,PT, OT and HHA and the frequency of the visits.  Discussed private duty HHA care cost which wife was familiar with.   Reviewed current recommendation by PT and OT is for TCU.    Wife stated she feels it is in patient's best interest to transfer to a TCU for therapies.   Wife and daughter have the medicare SNF list and have toured a few facilities.  They are requesting referrals be made to TCU's which have a 4 or 5 rating with Medicare.  Writer reviewed that patient has high care needs due to his physical dependency and his wound care.  Explained when referrals are made TCU's may decline patient if they already have a number of other patients which high care needs. Explained the availability of TCU's can change from day to day.  Explained writer will contact the facilities we have made referrals to and follow up with their status.     Daughter and wife made it clear they do not want patient to return to [facility], and are not interested in [facility] which they toured  Writer explained as long as patient remains stable for discharge, once a facility has accepted patient Medicare will expect patient to discharge.  Did express our goal is to find a TCU for patient that family is comfortable with however did want wife/daughter and patient to understand expectations for discharge once a TCU is secured.    Wife also explains, at baseline patient was able to dress himself, walk and transfer in and out of bed on his own.  She manages the meals and housework.  Daughter, whom lives with patient and wife, would set up his medications and wife would make sure patient took the medications.     Plan:  Continue referrals to TCU.   Keep wife updated. Wife has phone number also to reach this writer or covering SW.       JORDI OvalleSW

## 2022-09-17 NOTE — PROGRESS NOTES
[facility]  Hospitalist Progress Note   09/17/2022          Assessment and Plan:       William Almazan is an 81-year-old male with dementia, HTN, CHF (HFrEF), CAD, PAF, gout, urinary retention with chronic indwelling Louie catheter, h/o prostate cancer; with recent hospitalization at [facility] (8/2-8/24/2022) for issues including failure to thrive with hypotension and TONE, psychosis. Transferred from TCU 8/30/2022 with left hand pain, decreased motion and increasing generalized weakness.        Suspected Lewy body dementia.  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   Rigidity with motor symptoms/extrapyramidal side effects suspect related to above with worsening by anti-psychotics.  Depression/anxiety.  Physical deconditioning from senile fragility, medical illness.  * Recent hospitalization at [facility] 8/2 - 8/24/ 2022 with presyncope, hypotension, dementia with psychosis. PTA donepezil, escitalopram, risperidone and gabapentin.  --Admitted with generalized weakness and progressive inability to ambulate accompanied by extremity rigidity. Wife reported worsening symptoms after recent hospitalization and start of antipsychotic medications to help treat underlying psychosis. However, since starting, the patient exhibited worsening rigidity concerning for parkinsonism.   --On admission afebrile, hemodynamically stable, WBC normal, hgb 10.5; BMP unremarkable; LFT's showed low albumin and protein, otherwise normal.  Head CT 8/30 negative.   * Unable to obtain an MRI due to PPM/abandoned RV lead.  * Neurology followed, overall there was concern for underlying Lewy body dementia, with dystonia that may have been worsened by starting antipsychotics.  * Psychiatry stopped PTA risperidone and started aripiprazole 8/31.  * Carbidopa-levodopa started 9/1.   * Given continued rigidity, aripiprazole stopped 9/2. Pimavanserin started 9/2.  * Quetiapine started 9/4. Increased carbidopa-levadopa 9/4. Pimavanserin stopped 9/4 due  to long term cost issues.  * Escitalopram decreased 9/5.  - Continue carbidopa-levodopa  mg TID.  - Continue donepezil 10 mg at bedtime; quetiapine 25 mg at bedtime; PRN quetiapine 12.5 mg BID.  - Continue escitalopram 10 mg daily, gabapentin 100 mg TID.  PT, OT recommend TCU for rehabilitation.   assisting with transition plans -Hospital course prolonged with placement issues.     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   Orthopedics recommend WBAT and elevation recommended; PRN left wrist splint ordered.  Continue WBAT.  Elevated left upper extremity when at rest.  Continue PRN left wrist splint.  Continue PRN acetaminophen.  Outpatient follow-up with hand surgeon.      CAD with h/o CABG (1992).  CHF (HFrEF, biventricular).  Hypertension (benign essential).  [PTA: carvedilol 12.5 mg BID; furosemide 40 mg daily; spironolactone 25 mg daily.]  * Echo 8/3/2022 showed LVEF 15-20%, grade 1 diastolic dysfunction, severe diffuse hypokinesis; RV global function mildly reduced.  - Continue carvedilol; furosemide; spironolactone.  On Eliquis as below for anticoagulation.  If any drop in hemoglobin will consider holding Eliquis.  - Monitor i/o's, daily wts.     Paroxysmal atrial fibrillation.  S/p PPM.  - Continue apixaban, carvedilol.     Complicated urinary tract infection.  Chronic indwelling Louie catheter due to urinary retention.   Chronic hematuria.  Prostate cancer s/p XRT > 10 years ago with known radiation cystitis  Louie catheter changed every month.  Urology impression, Given his known radiation cystitis, occasional blood in the urine is expected. If urine becomes cherry or maroon in color with  clots or if the catheter stops draining, would consider bladder irrigation.  Appreciate input.  UA 9/14 with moderate leukocyte esterase, greater than 182 WBCs  Urine cultures 9/14 less than 10,000 urogenital qasim.  Urology recommends to treat complicated UTI given indwelling catheter, On IV ceftriaxone [9/15] - 3 days.   Louie catheter changed 9/15.  Follow-up with primary urologist at [facility] after discharge.     Iron deficiency anemia  Recently completed a course of IV iron sucrose at [facility] 8/2022.  Hemoglobin stable around 10-11.  Monitor on Monday ( 9/19) or earlier if symptomatic.    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  Continue local wound cares per [facility] nurse rec's     Malnutrition related to acute and chronic medical issues.  Nutrition followed, continue dietary supplements.     Gout.  Continue allopurinol.     KRISTIAN.  Does not use CPAP.Follow-up outpatient.     Recent COVID-19 infection, recovered.  Had minimal symptoms with first positive on 7/30, considered COVID recovered.     Orders Placed This Encounter      Regular Diet Adult      Advance Diet as Tolerated      DVT Prophylaxis: On Eliquis.  Code Status: Full Code  Disposition: Expected discharge pending safe discharge plan in place     Discussed with patient, charge RN.  More than 70% of time spent in direct patient care, care coordination, patient counseling, and formalizing plan of care.     Vladimir Craig MD        Interval History:      Patient appears comfortable lying in bed.  Awake, slow to communicate   Denies any complaints this morning.  Continues to have blood-tinged urine, hemoglobin stable.  Ambulating out of bed with lift.    Social work assisting with transition plans.         Physical Exam:        Physical Exam   Temp:  [97.5  F (36.4  C)-98  F (36.7  C)] 97.5  F (36.4  C)  Pulse:  [73-85] 73  Resp:  [16-18] 16  BP: (117-125)/(69-77) 121/73  SpO2:  [96 %-99 %] 99  %    Intake/Output Summary (Last 24 hours) at 9/16/2022 1410  Last data filed at 9/16/2022 1300  Gross per 24 hour   Intake 240 ml   Output 700 ml   Net -460 ml       Admission Weight: 81.9 kg (180 lb 8.9 oz)  Current Weight: 81.9 kg (180 lb 8.9 oz)    PHYSICAL EXAM  GENERAL: Patient awake, frail-appearing.  Able to answer simple questions.  LUNGS:Respirations unlabored  ABDOMEN: Soft, no abdominal tenderness, bowel sounds heard   Genitourinary dark-colored urine.  NEURO: Moving all extremities.  EXTREMITIES: No pedal edema.  PSYCHIATRY Cooperative       Medications:          allopurinol  300 mg Oral Daily     apixaban ANTICOAGULANT  5 mg Oral BID     carbidopa-levodopa  1 tablet Oral TID     carvedilol  12.5 mg Oral BID     cefTRIAXone  1 g Intravenous Q24H     donepezil  10 mg Oral At Bedtime     escitalopram  10 mg Oral Daily     furosemide  40 mg Oral Daily     gabapentin  100 mg Oral TID     loratadine  10 mg Oral Daily     multivitamin w/minerals  1 tablet Oral Daily     psyllium  3 capsule Oral Daily     QUEtiapine  25 mg Oral At Bedtime     sennosides  1 tablet Oral BID     sodium chloride (PF)  3 mL Intracatheter Q8H     spironolactone  25 mg Oral Daily     acetaminophen, artificial tears, melatonin, ondansetron **OR** ondansetron, polyethylene glycol, QUEtiapine, sodium chloride (PF)         Data:      All new lab and imaging data was reviewed.

## 2022-09-18 LAB — HGB BLD-MCNC: 11.3 G/DL (ref 13.3–17.7)

## 2022-09-18 PROCEDURE — 250N000013 HC RX MED GY IP 250 OP 250 PS 637: Performed by: PSYCHIATRY & NEUROLOGY

## 2022-09-18 PROCEDURE — 250N000013 HC RX MED GY IP 250 OP 250 PS 637: Performed by: HOSPITALIST

## 2022-09-18 PROCEDURE — 120N000001 HC R&B MED SURG/OB

## 2022-09-18 PROCEDURE — 250N000011 HC RX IP 250 OP 636: Performed by: HOSPITALIST

## 2022-09-18 PROCEDURE — 85018 HEMOGLOBIN: CPT | Performed by: HOSPITALIST

## 2022-09-18 PROCEDURE — 99231 SBSQ HOSP IP/OBS SF/LOW 25: CPT | Performed by: HOSPITALIST

## 2022-09-18 PROCEDURE — 36415 COLL VENOUS BLD VENIPUNCTURE: CPT | Performed by: HOSPITALIST

## 2022-09-18 PROCEDURE — 250N000013 HC RX MED GY IP 250 OP 250 PS 637: Performed by: INTERNAL MEDICINE

## 2022-09-18 RX ADMIN — ESCITALOPRAM OXALATE 10 MG: 10 TABLET ORAL at 10:28

## 2022-09-18 RX ADMIN — MULTIPLE VITAMINS W/ MINERALS TAB 1 TABLET: TAB at 10:28

## 2022-09-18 RX ADMIN — SENNOSIDES 1 TABLET: 8.6 TABLET, FILM COATED ORAL at 20:18

## 2022-09-18 RX ADMIN — CARVEDILOL 12.5 MG: 12.5 TABLET, FILM COATED ORAL at 20:18

## 2022-09-18 RX ADMIN — SENNOSIDES 1 TABLET: 8.6 TABLET, FILM COATED ORAL at 10:33

## 2022-09-18 RX ADMIN — CARBIDOPA AND LEVODOPA 1 TABLET: 25; 100 TABLET ORAL at 14:47

## 2022-09-18 RX ADMIN — CEFTRIAXONE SODIUM 1 G: 1 INJECTION, POWDER, FOR SOLUTION INTRAMUSCULAR; INTRAVENOUS at 13:01

## 2022-09-18 RX ADMIN — GABAPENTIN 100 MG: 100 CAPSULE ORAL at 20:18

## 2022-09-18 RX ADMIN — ALLOPURINOL 300 MG: 300 TABLET ORAL at 10:28

## 2022-09-18 RX ADMIN — APIXABAN 5 MG: 5 TABLET, FILM COATED ORAL at 10:28

## 2022-09-18 RX ADMIN — GABAPENTIN 100 MG: 100 CAPSULE ORAL at 14:47

## 2022-09-18 RX ADMIN — APIXABAN 5 MG: 5 TABLET, FILM COATED ORAL at 20:18

## 2022-09-18 RX ADMIN — CARBIDOPA AND LEVODOPA 1 TABLET: 25; 100 TABLET ORAL at 20:18

## 2022-09-18 RX ADMIN — GABAPENTIN 100 MG: 100 CAPSULE ORAL at 10:28

## 2022-09-18 RX ADMIN — Medication 3 CAPSULE: at 10:28

## 2022-09-18 RX ADMIN — DONEPEZIL HYDROCHLORIDE 10 MG: 10 TABLET ORAL at 21:37

## 2022-09-18 RX ADMIN — QUETIAPINE FUMARATE 25 MG: 25 TABLET ORAL at 21:37

## 2022-09-18 RX ADMIN — FUROSEMIDE 40 MG: 40 TABLET ORAL at 10:28

## 2022-09-18 RX ADMIN — LORATADINE 10 MG: 10 TABLET ORAL at 10:28

## 2022-09-18 RX ADMIN — SPIRONOLACTONE 25 MG: 25 TABLET ORAL at 10:28

## 2022-09-18 RX ADMIN — CARVEDILOL 12.5 MG: 12.5 TABLET, FILM COATED ORAL at 10:28

## 2022-09-18 RX ADMIN — ACETAMINOPHEN 650 MG: 325 TABLET ORAL at 10:43

## 2022-09-18 RX ADMIN — CARBIDOPA AND LEVODOPA 1 TABLET: 25; 100 TABLET ORAL at 10:28

## 2022-09-18 ASSESSMENT — ACTIVITIES OF DAILY LIVING (ADL)
ADLS_ACUITY_SCORE: 59
ADLS_ACUITY_SCORE: 59
ADLS_ACUITY_SCORE: 63
ADLS_ACUITY_SCORE: 63
ADLS_ACUITY_SCORE: 59
ADLS_ACUITY_SCORE: 65
ADLS_ACUITY_SCORE: 58
ADLS_ACUITY_SCORE: 63
ADLS_ACUITY_SCORE: 59
ADLS_ACUITY_SCORE: 59
ADLS_ACUITY_SCORE: 63
ADLS_ACUITY_SCORE: 59

## 2022-09-18 NOTE — PLAN OF CARE
Goal Outcome Evaluation:    Plan of Care Reviewed With: patient, spouse     DATE & TIME: 9/18/22, [id number]  Cognitive Concerns/ Orientation : A&O x 3. Forgetful, speech slow w/ flat affect. Confused on situation.  BEHAVIOR & AGGRESSION TOOL COLOR: Green     ABNL VS/O2: VSS on room air  MOBILITY: Total, up with lift. Turned and repositioned q2.    PAIN MANAGMENT: Left wrist pain  DIET: Regular, needs assistance with feeding.    BOWEL/BLADDER: Chronic keating in place for retention, red/tea colored- blood leaking around penis and tubing   DRAIN/DEVICES: PIV SL   TELEMETRY RHYTHM: NA  SKIN: Dressing in place to coccyx. Wound to left great toe  TESTS/PROCEDURES: NA  D/C DATE: TCU vs home with home care  OTHER IMPORTANT INFO: Splint in place to left hand/wrist. Arm elevated. Can weight bear as tolerated. Heels mildly pink. Prevalon boots in place to BLE. Elevated on pillows. On a pulsate matress

## 2022-09-18 NOTE — PLAN OF CARE
Goal Outcome Evaluation:  DATE & TIME: 9/17/22, 8250-7028       Cognitive Concerns/ Orientation : A&O x 3. Forgetful, speech slow w/ flat affect. Confused on situation.  BEHAVIOR & AGGRESSION TOOL COLOR: Green     ABNL VS/O2: VSS on room air  MOBILITY: Total, up with lift. Turned and repositioned  PAIN MANAGMENT: Denied.  DIET: Regular. Ate 75% of dinner.  BOWEL/BLADDER: Chronic keating in place for retention. Urine remain tea colored.   ABNL LAB/BG: NA  DRAIN/DEVICES: PIV SL w/ intermittent antibiotic.  TELEMETRY RHYTHM: NA  SKIN: Dressing in place to coccyx.  TESTS/PROCEDURES: NA  D/C DATE: TCU vs home with home care  OTHER IMPORTANT INFO: Splint in place to left hand/wrist. Arm elevated. Can weight bear as tolerated. Heels mildly pink. Prevalon boots in place to BLE. Elevated on pillows. On a pulsate matress

## 2022-09-18 NOTE — PLAN OF CARE
DATE & TIME: 9/17/22, 8738-1010    Cognitive Concerns/ Orientation : A&O x 3. Forgetful, speech slow w/ flat affect. Confused on situation.  BEHAVIOR & AGGRESSION TOOL COLOR: Green     ABNL VS/O2: VSS on room air  MOBILITY: Total, up with lift. Turned and repositioned q2.    PAIN MANAGMENT: Denied.  DIET: Regular, needs assistance with feeding.    BOWEL/BLADDER: Chronic keating in place for retention, urine is donna in color, no signs or symptoms of hematuria.    ABNL LAB/BG: NA  DRAIN/DEVICES: PIV SL   TELEMETRY RHYTHM: NA  SKIN: Dressing in place to coccyx.  Wound to left great toe-GIANLUCA   TESTS/PROCEDURES: NA  D/C DATE: TCU vs home with home care  OTHER IMPORTANT INFO: Splint in place to left hand/wrist. Arm elevated. Can weight bear as tolerated. Heels mildly pink. Prevalon boots in place to BLE. Elevated on pillows. On a pulsate matress

## 2022-09-18 NOTE — PROGRESS NOTES
[facility]  Hospitalist Progress Note   09/18/2022          Assessment and Plan:       William Almazan is an 81-year-old male with dementia, HTN, CHF (HFrEF), CAD, PAF, gout, urinary retention with chronic indwelling Louie catheter, h/o prostate cancer; with recent hospitalization at [facility] (8/2-8/24/2022) for issues including failure to thrive with hypotension and TONE, psychosis. Transferred from TCU 8/30/2022 with left hand pain, decreased motion and increasing generalized weakness.     Suspected Lewy body dementia.  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   Rigidity with motor symptoms/extrapyramidal side effects suspect related to above with worsening by anti-psychotics.  Depression/anxiety.  Physical deconditioning from senile fragility, medical illness.  * Recent hospitalization at [facility] 8/2 - 8/24/ 2022 with presyncope, hypotension, dementia with psychosis. PTA donepezil, escitalopram, risperidone and gabapentin.  --Admitted with generalized weakness and progressive inability to ambulate accompanied by extremity rigidity. Wife reported worsening symptoms after recent hospitalization and start of antipsychotic medications to help treat underlying psychosis. However, since starting, the patient exhibited worsening rigidity concerning for parkinsonism.   --On admission afebrile, hemodynamically stable, WBC normal, hgb 10.5; BMP unremarkable; LFT's showed low albumin and protein, otherwise normal.  Head CT 8/30 negative.   * Unable to obtain an MRI due to PPM/abandoned RV lead.  * Neurology followed, overall there was concern for underlying Lewy body dementia, with dystonia that may have been worsened by starting antipsychotics.  * Psychiatry stopped PTA risperidone and started aripiprazole 8/31.  Carbidopa-levodopa started 9/1.   Given continued rigidity, aripiprazole stopped 9/2. Pimavanserin started 9/2.  Quetiapine started 9/4. Increased carbidopa-levadopa 9/4. Pimavanserin stopped 9/4 due to long  term cost issues.  Escitalopram decreased 9/5.    - Continue carbidopa-levodopa  mg TID.  - Continue donepezil 10 mg at bedtime; quetiapine 25 mg at bedtime; PRN quetiapine 12.5 mg BID.  - Continue escitalopram 10 mg daily, gabapentin 100 mg TID.  PT, OT recommend TCU for rehabilitation.   assisting with transition plans -Hospital course prolonged with placement issues.     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   Orthopedics recommend WBAT and elevation recommended; PRN left wrist splint ordered.  Continue WBAT.  Elevated left upper extremity when at rest.  Continue PRN left wrist splint.  Continue PRN acetaminophen.  Outpatient follow-up with hand surgeon.      CAD with h/o CABG (1992).  CHF (HFrEF, biventricular).  Hypertension (benign essential).  [PTA: carvedilol 12.5 mg BID; furosemide 40 mg daily; spironolactone 25 mg daily.]  * Echo 8/3/2022 showed LVEF 15-20%, grade 1 diastolic dysfunction, severe diffuse hypokinesis; RV global function mildly reduced.  - Continue carvedilol; furosemide; spironolactone.  On Eliquis as below for anticoagulation.  If any drop in hemoglobin will consider holding Eliquis.  - Monitor i/o's, daily wts.     Paroxysmal atrial fibrillation.  S/p PPM.  - Continue apixaban, carvedilol.     Complicated urinary tract infection.  Chronic indwelling Louie catheter due to urinary retention.   Chronic hematuria.  Prostate cancer s/p XRT > 10 years ago with known radiation cystitis  Louie catheter changed every month.  Urology impression, Given his known radiation cystitis, occasional blood in the urine is expected. If urine becomes cherry or maroon in color with clots or  if the catheter stops draining, would consider bladder irrigation.  Appreciate input.  UA 9/14 with moderate leukocyte esterase, greater than 182 WBCs  Urine cultures 9/14 less than 10,000 urogenital qasim.  Urology recommends to treat complicated UTI given indwelling catheter, On IV ceftriaxone [9/15] - 3 days.   Louie catheter changed 9/15.  Follow-up with primary urologist at [facility] after discharge.     Iron deficiency anemia  Recently completed a course of IV iron sucrose at [facility] 8/2022.  Hemoglobin stable around 10-11.  Monitor on Monday ( 9/19) or earlier if symptomatic.    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  Continue local wound cares per [facility] nurse rec's     Malnutrition related to acute and chronic medical issues.  Nutrition followed, continue dietary supplements.     Gout.  Continue allopurinol.     KRISTIAN.  Does not use CPAP. Follow-up outpatient.     Recent COVID-19 infection, recovered.  Had minimal symptoms (positive on 7/30) considered COVID recovered.     Orders Placed This Encounter      Regular Diet Adult      Advance Diet as Tolerated      DVT Prophylaxis: On Eliquis.  Code Status: Full Code  Disposition: Expected discharge pending safe discharge plan in place     Discussed with patient, bedside RN, .  More than 70% of time spent in direct patient care, care coordination, patient counseling, and formalizing plan of care.     Vladimir Craig MD        Interval History:      Patient appears comfortable lying in bed.  Awake, slow to communicate   Denies any complaints this morning.  Continues to have blood-tinged urine, hemoglobin stable.  Ambulating out of bed with lift.  Social work assisting with transition plans.         Physical Exam:        Physical Exam   Temp:  [97.3  F (36.3  C)-98.4  F (36.9  C)] 98.4  F (36.9  C)  Pulse:  [74-85] 85  Resp:  [16] 16  BP: (117-139)/(60-85) 139/85  SpO2:  [96 %-100 %] 96  %    Intake/Output Summary (Last 24 hours) at 9/16/2022 1410  Last data filed at 9/16/2022 1300  Gross per 24 hour   Intake 240 ml   Output 700 ml   Net -460 ml       Admission Weight: 81.9 kg (180 lb 8.9 oz)  Current Weight: 81.9 kg (180 lb 8.9 oz)    PHYSICAL EXAM  GENERAL: Patient awake, frail-appearing.  Able to answer simple questions.  LUNGS:Respirations unlabored  ABDOMEN: Soft, no abdominal tenderness, bowel sounds heard   Genitourinary dark-colored urine.  NEURO: Moving all extremities.  EXTREMITIES: No pedal edema.  PSYCHIATRY Cooperative       Medications:          allopurinol  300 mg Oral Daily     apixaban ANTICOAGULANT  5 mg Oral BID     carbidopa-levodopa  1 tablet Oral TID     carvedilol  12.5 mg Oral BID     cefTRIAXone  1 g Intravenous Q24H     donepezil  10 mg Oral At Bedtime     escitalopram  10 mg Oral Daily     furosemide  40 mg Oral Daily     gabapentin  100 mg Oral TID     loratadine  10 mg Oral Daily     multivitamin w/minerals  1 tablet Oral Daily     psyllium  3 capsule Oral Daily     QUEtiapine  25 mg Oral At Bedtime     sennosides  1 tablet Oral BID     sodium chloride (PF)  3 mL Intracatheter Q8H     spironolactone  25 mg Oral Daily     acetaminophen, artificial tears, melatonin, ondansetron **OR** ondansetron, polyethylene glycol, QUEtiapine, sodium chloride (PF)         Data:      All new lab and imaging data was reviewed.

## 2022-09-19 PROCEDURE — 99232 SBSQ HOSP IP/OBS MODERATE 35: CPT | Performed by: HOSPITALIST

## 2022-09-19 PROCEDURE — 250N000013 HC RX MED GY IP 250 OP 250 PS 637: Performed by: INTERNAL MEDICINE

## 2022-09-19 PROCEDURE — 250N000013 HC RX MED GY IP 250 OP 250 PS 637: Performed by: PSYCHIATRY & NEUROLOGY

## 2022-09-19 PROCEDURE — 120N000001 HC R&B MED SURG/OB

## 2022-09-19 PROCEDURE — 250N000013 HC RX MED GY IP 250 OP 250 PS 637: Performed by: HOSPITALIST

## 2022-09-19 RX ADMIN — FUROSEMIDE 40 MG: 40 TABLET ORAL at 09:41

## 2022-09-19 RX ADMIN — DONEPEZIL HYDROCHLORIDE 10 MG: 10 TABLET ORAL at 21:28

## 2022-09-19 RX ADMIN — GABAPENTIN 100 MG: 100 CAPSULE ORAL at 21:29

## 2022-09-19 RX ADMIN — SENNOSIDES 1 TABLET: 8.6 TABLET, FILM COATED ORAL at 21:28

## 2022-09-19 RX ADMIN — APIXABAN 5 MG: 5 TABLET, FILM COATED ORAL at 21:29

## 2022-09-19 RX ADMIN — APIXABAN 5 MG: 5 TABLET, FILM COATED ORAL at 09:41

## 2022-09-19 RX ADMIN — GABAPENTIN 100 MG: 100 CAPSULE ORAL at 09:41

## 2022-09-19 RX ADMIN — SENNOSIDES 1 TABLET: 8.6 TABLET, FILM COATED ORAL at 09:39

## 2022-09-19 RX ADMIN — QUETIAPINE FUMARATE 25 MG: 25 TABLET ORAL at 21:28

## 2022-09-19 RX ADMIN — CARVEDILOL 12.5 MG: 12.5 TABLET, FILM COATED ORAL at 21:28

## 2022-09-19 RX ADMIN — SPIRONOLACTONE 25 MG: 25 TABLET ORAL at 09:40

## 2022-09-19 RX ADMIN — LORATADINE 10 MG: 10 TABLET ORAL at 09:41

## 2022-09-19 RX ADMIN — CARBIDOPA AND LEVODOPA 1 TABLET: 25; 100 TABLET ORAL at 13:44

## 2022-09-19 RX ADMIN — CARVEDILOL 12.5 MG: 12.5 TABLET, FILM COATED ORAL at 09:40

## 2022-09-19 RX ADMIN — CARBIDOPA AND LEVODOPA 1 TABLET: 25; 100 TABLET ORAL at 09:40

## 2022-09-19 RX ADMIN — CARBIDOPA AND LEVODOPA 1 TABLET: 25; 100 TABLET ORAL at 21:29

## 2022-09-19 RX ADMIN — GABAPENTIN 100 MG: 100 CAPSULE ORAL at 13:44

## 2022-09-19 RX ADMIN — Medication 3 CAPSULE: at 09:39

## 2022-09-19 RX ADMIN — MULTIPLE VITAMINS W/ MINERALS TAB 1 TABLET: TAB at 09:40

## 2022-09-19 RX ADMIN — ESCITALOPRAM OXALATE 10 MG: 10 TABLET ORAL at 09:41

## 2022-09-19 RX ADMIN — ALLOPURINOL 300 MG: 300 TABLET ORAL at 09:40

## 2022-09-19 ASSESSMENT — ACTIVITIES OF DAILY LIVING (ADL)
ADLS_ACUITY_SCORE: 65
ADLS_ACUITY_SCORE: 65
ADLS_ACUITY_SCORE: 67
ADLS_ACUITY_SCORE: 65
ADLS_ACUITY_SCORE: 67
ADLS_ACUITY_SCORE: 65
ADLS_ACUITY_SCORE: 67
ADLS_ACUITY_SCORE: 65
ADLS_ACUITY_SCORE: 67
ADLS_ACUITY_SCORE: 67
ADLS_ACUITY_SCORE: 65
ADLS_ACUITY_SCORE: 67

## 2022-09-19 NOTE — PLAN OF CARE
DATE & TIME: 9/18/22 1415-4846  Cognitive Concerns/ Orientation : A&O x 3. Forgetful, speech slow w/ flat affect. Confused on situation.  BEHAVIOR & AGGRESSION TOOL COLOR: Green     ABNL VS/O2: VSS on room air  MOBILITY: Total, up with lift. Turned and repositioned q2.    PAIN MANAGMENT: Denied  DIET: Regular, needs assistance with feeding. Poor appetite.    BOWEL/BLADDER: Chronic keating in place for retention, red colored- No blood leaking noted at penis and tubing.  Patient attempted to have a BM in bedpan, unsuccesful.  Requesting stool softeners in the morning if he doesn't go before then.    DRAIN/DEVICES: PIV SL   TELEMETRY RHYTHM: NA  SKIN: Dressing in place to coccyx. Wound to left great toe, dressing intact.   TESTS/PROCEDURES: NA  D/C DATE: TCU vs home with home care  OTHER IMPORTANT INFO: Splint in place to left hand/wrist. Arm elevated. Can weight bear as tolerated. Heels mildly pink. Prevalon boots in place to BLE. Elevated on pillows. On a pulsate matress

## 2022-09-19 NOTE — PLAN OF CARE
Goal Outcome Evaluation:    Plan of Care Reviewed With: patient     Overall Patient Progress: no change    Outcome Evaluation: Variable intake continues, patient self reports a good appetite. [place] to eat breakfast this morning. Likes Ensure

## 2022-09-19 NOTE — PROGRESS NOTES
CLINICAL NUTRITION SERVICES - REASSESSMENT NOTE      Malnutrition: (9/8)  % Weight Loss:  Weight loss does not meet criteria for malnutrition   % Intake:  <75% for > 7 days (moderate malnutrition)  Subcutaneous Fat Loss:  Orbital region severe depletion and Upper arm region moderate depletion  Muscle Loss:  Temporal region severe depletion, Clavicle bone region severe depletion, Acromion bone region severe depletion and Dorsal hand region moderate depletion  Fluid Retention:  Mild      Malnutrition Diagnosis: Severe malnutrition  In Context of:  Chronic illness or disease       EVALUATION OF PROGRESS TOWARD GOALS   Diet:  Regular  Ensure w/ meals     Intake/Tolerance:  Met with patient, he is being fed breakfast  States appetite is good and that he continues to like and drink the Ensure (appetite recorded as poor in chart)  Denies any other nutrition related concerns at this time   Per flowsheet documentation, has been consistently consuming between 25-75% meals     ASSESSED NUTRITION NEEDS:  Dosing Weight:  81.9 kg (8/31)  Estimated Energy Needs: 3826-7797 kcals (25-30 Kcal/Kg)  Justification: maintenance  Estimated Protein Needs: 106-123 grams protein (1.3-1.5 g pro/Kg)  Justification: Repletion, wound healing and hypercatabolism with acute illness  Estimated Fluid Needs: 7399-8171 mL (1 mL/Kcal)  Justification: maintenance      NEW FINDINGS:   Referrals sent to TCU    Vitals:    08/30/22 1942 09/17/22 0700   Weight: 81.9 kg (180 lb 8.9 oz) 68.4 kg (150 lb 12.7 oz)       Previous Goals:   Patient will consistently consume >/= 50% meals + supplements TID   Evaluation: Not likely met    Previous Nutrition Diagnosis:   Inadequate oral intake related to decreased appetite, early satiety, and increased needs with wound healing as evidenced by variable oral intake since admission (overall fair at best), loss of subcutaneous fat and muscle mass, and presence of unstageable Pressure Injury on buttocks.  Evaluation: No  change      CURRENT NUTRITION DIAGNOSIS  Inadequate oral intake related to decreased appetite, early satiety, and increased needs with wound healing as evidenced by variable oral intake since admission (overall fair at best), loss of subcutaneous fat and muscle mass, and presence of unstageable Pressure Injury on buttocks.    INTERVENTIONS  Recommendations / Nutrition Prescription  Continue diet and supplements     Implementation  None new     Goals  Patient will consistently consume >/= 50% meals + supplements TID       MONITORING AND EVALUATION:  Progress towards goals will be monitored and evaluated per protocol and Practice Guidelines      Natalie Harding RD, LD  Clinical Dietitian   [place] [id number], [facility], Ortho, Ortho spine  Pager: [phone]

## 2022-09-19 NOTE — PLAN OF CARE
Goal Outcome Evaluation:                DATE & TIME: 9/19/22 1500  Cognitive Concerns/ Orientation : A&O x 4 Forgetful, speech slow w/ flat affect. Forgetful.  BEHAVIOR & AGGRESSION TOOL COLOR: Green     ABNL VS/O2: BP elevated, other VSS on room air  MOBILITY: Total, up with lift. Pt refused to get up in chair this morning. Turned and repositioned q2. Pt able to lift his arms off the bed.   PAIN MANAGMENT: Denied. Baseline numbness in hands and feet.  DIET: Regular, needs assistance with feeding. Poor appetite.    BOWEL/BLADDER: Chronic keating in place for retention, red colored- No blood leaking noted at penis and tubing.  Patient attempted to have a BM in bedpan, Passing mainly gas.  DRAIN/DEVICES: PIV SL   TELEMETRY RHYTHM: NA  SKIN: Dressing in place to coccyx. Wound to left great toe,no drainage, dressing changed.Egorge prevalon boots.  TESTS/PROCEDURES: NA  D/C DATE: TCU for rehab needs.  OTHER IMPORTANT INFO: Splint in place to left hand/wrist. Arm elevated. Can weight bear as tolerated. Heels mildly pink. Prevalon boots in place to BLE. Elevated on pillows. On a pulsate matress.

## 2022-09-19 NOTE — PROGRESS NOTES
[facility]  Hospitalist Progress Note   09/19/2022          Assessment and Plan:       William Almazan is an 81-year-old male with dementia, HTN, CHF (HFrEF), CAD, PAF, gout, urinary retention with chronic indwelling Louie catheter, h/o prostate cancer; with recent hospitalization at [facility] (8/2-8/24/2022) for issues including failure to thrive with hypotension and TONE, psychosis. Transferred from TCU 8/30/2022 with left hand pain, decreased motion and increasing generalized weakness.     Suspected Lewy body dementia.  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   Rigidity with motor symptoms/extrapyramidal side effects suspect related to above with worsening by anti-psychotics.  Depression/anxiety.  Physical deconditioning from senile fragility, medical illness.  * Recent hospitalization at [facility] 8/2 - 8/24/ 2022 with presyncope, hypotension, dementia with psychosis. PTA donepezil, escitalopram, risperidone and gabapentin.  --Admitted with generalized weakness and progressive inability to ambulate accompanied by extremity rigidity. Wife reported worsening symptoms after recent hospitalization and start of antipsychotic medications to help treat underlying psychosis. However, since starting, the patient exhibited worsening rigidity concerning for parkinsonism.   --On admission afebrile, hemodynamically stable, WBC normal, hgb 10.5; BMP unremarkable; LFT's showed low albumin and protein, otherwise normal.  Head CT 8/30 negative.   * Unable to obtain an MRI due to PPM/abandoned RV lead.  * Neurology followed, overall there was concern for underlying Lewy body dementia, with dystonia that may have been worsened by starting antipsychotics.  * Psychiatry stopped PTA risperidone and started aripiprazole 8/31.  Carbidopa-levodopa started 9/1.   Given continued rigidity, aripiprazole stopped 9/2. Pimavanserin started 9/2.  Quetiapine started 9/4. Increased carbidopa-levadopa 9/4. Pimavanserin stopped 9/4 due to long  term cost issues.  Escitalopram decreased 9/5.    - Continue carbidopa-levodopa  mg TID.  - Continue donepezil 10 mg at bedtime; quetiapine 25 mg at bedtime; PRN quetiapine 12.5 mg BID.  - Continue escitalopram 10 mg daily, gabapentin 100 mg TID.  PT, OT recommend TCU for rehabilitation.   assisting with transition plans -Hospital course prolonged with placement issues.     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   Orthopedics recommend WBAT and elevation recommended; PRN left wrist splint ordered.  Continue WBAT.  Elevated left upper extremity when at rest.  Continue PRN left wrist splint.  Continue PRN acetaminophen.  Outpatient follow-up with hand surgeon.      CAD with h/o CABG (1992).  CHF (HFrEF, biventricular).  Hypertension (benign essential).  [PTA: carvedilol 12.5 mg BID; furosemide 40 mg daily; spironolactone 25 mg daily.]  * Echo 8/3/2022 showed LVEF 15-20%, grade 1 diastolic dysfunction, severe diffuse hypokinesis; RV global function mildly reduced.  - Continue carvedilol; furosemide; spironolactone.  On Eliquis as below for anticoagulation.  If any drop in hemoglobin will consider holding Eliquis.  - Monitor i/o's, daily wts.     Paroxysmal atrial fibrillation.  S/p PPM.  - Continue apixaban, carvedilol.     Complicated urinary tract infection.  Chronic indwelling Louie catheter due to urinary retention.   Chronic hematuria.  Prostate cancer s/p XRT > 10 years ago with known radiation cystitis  Louie catheter changed every month.  Urology impression, Given his known radiation cystitis, occasional blood in the urine is expected. If urine becomes cherry or maroon in color with clots or  if the catheter stops draining, would consider bladder irrigation.  Appreciate input.  UA 9/14 with moderate leukocyte esterase, greater than 182 WBCs  Urine cultures 9/14 less than 10,000 urogenital qasim.  Urology recommends to treat complicated UTI given indwelling catheter, On IV ceftriaxone [9/15] - 3 days.   Louie catheter changed 9/15.  Follow-up with primary urologist at [facility] after discharge.     Iron deficiency anemia  Recently completed a course of IV iron sucrose at [facility] 8/2022.  Hemoglobin stable around 10-11.  Monitor on Monday ( 9/19) or earlier if symptomatic.    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  Continue local wound cares per [facility] nurse rec's     Malnutrition related to acute and chronic medical issues.  Nutrition followed, continue dietary supplements.     Gout.  Continue allopurinol.     KRISTIAN.  Does not use CPAP. Follow-up outpatient.     Recent COVID-19 infection, recovered.  Had minimal symptoms (positive on 7/30) considered COVID recovered.     Orders Placed This Encounter      Regular Diet Adult      Advance Diet as Tolerated      DVT Prophylaxis: On Eliquis.  Code Status: Full Code  Disposition: Expected discharge pending safe discharge plan in place     Discussed with patient    Jose Law, DO        Interval History:      Patient appears comfortable lying in bed.  Moving much better then when I saw him 2 weeks ago.   Discussed need for rehab and he is agreeable          Physical Exam:        Physical Exam   Temp:  [97.5  F (36.4  C)-97.9  F (36.6  C)] 97.8  F (36.6  C)  Pulse:  [57-77] 72  Resp:  [16-18] 18  BP: (112-125)/(66-77) 123/70  SpO2:  [96 %-100 %] 100 %    Intake/Output Summary (Last 24 hours) at 9/16/2022 1410  Last data filed at 9/16/2022 1300  Gross per 24 hour   Intake 240 ml   Output 700 ml   Net -460 ml       Admission Weight: 81.9 kg (180 lb 8.9 oz)  Current Weight: 81.9 kg (180 lb 8.9 oz)    PHYSICAL  EXAM  GENERAL: Patient awake, frail-appearing.  Able to answer simple questions.  LUNGS:Respirations unlabored  ABDOMEN: Soft, no abdominal tenderness, bowel sounds heard   Genitourinary dark-colored urine.  NEURO: Moving all extremities.  EXTREMITIES: No pedal edema.  PSYCHIATRY Cooperative       Medications:          allopurinol  300 mg Oral Daily     apixaban ANTICOAGULANT  5 mg Oral BID     carbidopa-levodopa  1 tablet Oral TID     carvedilol  12.5 mg Oral BID     donepezil  10 mg Oral At Bedtime     escitalopram  10 mg Oral Daily     furosemide  40 mg Oral Daily     gabapentin  100 mg Oral TID     loratadine  10 mg Oral Daily     multivitamin w/minerals  1 tablet Oral Daily     psyllium  3 capsule Oral Daily     QUEtiapine  25 mg Oral At Bedtime     sennosides  1 tablet Oral BID     sodium chloride (PF)  3 mL Intracatheter Q8H     spironolactone  25 mg Oral Daily     acetaminophen, artificial tears, melatonin, ondansetron **OR** ondansetron, polyethylene glycol, QUEtiapine, sodium chloride (PF)         Data:      All new lab and imaging data was reviewed.

## 2022-09-19 NOTE — PLAN OF CARE
Goal Outcome Evaluation:  DATE & TIME: 9/18/22, 5361-3418  Cognitive Concerns/ Orientation : A&O x 3. Forgetful, speech slow w/ flat affect. Confused on situation.  BEHAVIOR & AGGRESSION TOOL COLOR: Green     ABNL VS/O2: VSS on room air  MOBILITY: Total, up with lift. Turned and repositioned q2.    PAIN MANAGMENT: Denied  DIET: Regular, needs assistance with feeding. Poor appetite.    BOWEL/BLADDER: Chronic keating in place for retention, red colored- No blood leaking noted at penis and tubing   DRAIN/DEVICES: PIV SL   TELEMETRY RHYTHM: NA  SKIN: Dressing in place to coccyx. Wound to left great toe, dressing intact.   TESTS/PROCEDURES: NA  D/C DATE: TCU vs home with home care  OTHER IMPORTANT INFO: Splint in place to left hand/wrist. Arm elevated. Can weight bear as tolerated. Heels mildly pink. Prevalon boots in place to BLE. Elevated on pillows. On a pulsate matress

## 2022-09-20 ENCOUNTER — APPOINTMENT (OUTPATIENT)
Dept: PHYSICAL THERAPY | Facility: CLINIC | Age: 82
DRG: 056 | End: 2022-09-20
Attending: PSYCHIATRY & NEUROLOGY
Payer: MEDICARE

## 2022-09-20 PROCEDURE — 250N000013 HC RX MED GY IP 250 OP 250 PS 637: Performed by: PSYCHIATRY & NEUROLOGY

## 2022-09-20 PROCEDURE — 250N000013 HC RX MED GY IP 250 OP 250 PS 637: Performed by: INTERNAL MEDICINE

## 2022-09-20 PROCEDURE — 99232 SBSQ HOSP IP/OBS MODERATE 35: CPT | Performed by: HOSPITALIST

## 2022-09-20 PROCEDURE — 97530 THERAPEUTIC ACTIVITIES: CPT | Mod: GP

## 2022-09-20 PROCEDURE — 250N000011 HC RX IP 250 OP 636: Performed by: INTERNAL MEDICINE

## 2022-09-20 PROCEDURE — 120N000001 HC R&B MED SURG/OB

## 2022-09-20 PROCEDURE — 97110 THERAPEUTIC EXERCISES: CPT | Mod: GP

## 2022-09-20 PROCEDURE — 250N000013 HC RX MED GY IP 250 OP 250 PS 637: Performed by: HOSPITALIST

## 2022-09-20 RX ADMIN — ESCITALOPRAM OXALATE 10 MG: 10 TABLET ORAL at 09:28

## 2022-09-20 RX ADMIN — CARBIDOPA AND LEVODOPA 1 TABLET: 25; 100 TABLET ORAL at 21:16

## 2022-09-20 RX ADMIN — CARBIDOPA AND LEVODOPA 1 TABLET: 25; 100 TABLET ORAL at 15:34

## 2022-09-20 RX ADMIN — FUROSEMIDE 40 MG: 40 TABLET ORAL at 09:29

## 2022-09-20 RX ADMIN — Medication 3 CAPSULE: at 09:28

## 2022-09-20 RX ADMIN — QUETIAPINE FUMARATE 25 MG: 25 TABLET ORAL at 21:16

## 2022-09-20 RX ADMIN — SENNOSIDES 1 TABLET: 8.6 TABLET, FILM COATED ORAL at 21:16

## 2022-09-20 RX ADMIN — ALLOPURINOL 300 MG: 300 TABLET ORAL at 09:29

## 2022-09-20 RX ADMIN — MULTIPLE VITAMINS W/ MINERALS TAB 1 TABLET: TAB at 09:29

## 2022-09-20 RX ADMIN — CARVEDILOL 12.5 MG: 12.5 TABLET, FILM COATED ORAL at 21:17

## 2022-09-20 RX ADMIN — GABAPENTIN 100 MG: 100 CAPSULE ORAL at 21:16

## 2022-09-20 RX ADMIN — CARBIDOPA AND LEVODOPA 1 TABLET: 25; 100 TABLET ORAL at 09:29

## 2022-09-20 RX ADMIN — GABAPENTIN 100 MG: 100 CAPSULE ORAL at 09:28

## 2022-09-20 RX ADMIN — LORATADINE 10 MG: 10 TABLET ORAL at 09:30

## 2022-09-20 RX ADMIN — SPIRONOLACTONE 25 MG: 25 TABLET ORAL at 09:30

## 2022-09-20 RX ADMIN — APIXABAN 5 MG: 5 TABLET, FILM COATED ORAL at 21:17

## 2022-09-20 RX ADMIN — CARVEDILOL 12.5 MG: 12.5 TABLET, FILM COATED ORAL at 09:29

## 2022-09-20 RX ADMIN — ONDANSETRON 4 MG: 2 INJECTION INTRAMUSCULAR; INTRAVENOUS at 09:45

## 2022-09-20 RX ADMIN — GABAPENTIN 100 MG: 100 CAPSULE ORAL at 15:34

## 2022-09-20 RX ADMIN — APIXABAN 5 MG: 5 TABLET, FILM COATED ORAL at 09:30

## 2022-09-20 RX ADMIN — SENNOSIDES 1 TABLET: 8.6 TABLET, FILM COATED ORAL at 09:28

## 2022-09-20 RX ADMIN — DONEPEZIL HYDROCHLORIDE 10 MG: 10 TABLET ORAL at 21:17

## 2022-09-20 ASSESSMENT — ACTIVITIES OF DAILY LIVING (ADL)
ADLS_ACUITY_SCORE: 67
ADLS_ACUITY_SCORE: 65
ADLS_ACUITY_SCORE: 67
ADLS_ACUITY_SCORE: 65
ADLS_ACUITY_SCORE: 67
ADLS_ACUITY_SCORE: 65
ADLS_ACUITY_SCORE: 67
ADLS_ACUITY_SCORE: 65
ADLS_ACUITY_SCORE: 67
ADLS_ACUITY_SCORE: 65

## 2022-09-20 NOTE — PLAN OF CARE
Goal Outcome Evaluation:    Plan of Care Reviewed With: patient     Overall Patient Progress: no change    DATE & TIME: 9/19/22 4070-1574  Cognitive Concerns/ Orientation : A&O x4 Forgetful   BEHAVIOR & AGGRESSION TOOL COLOR: Green     ABNL VS/O2: VSS on RA   MOBILITY: Total, up with lift. T&R Q2h. Prevalon boots in place, pulsate mattress. L wrist brace   PAIN MANAGMENT: Denies  DIET: Regular, total feed  BOWEL/BLADDER: Chronic keating in place for retention, red colored urine with scant amount of blood leaking from urethra. No BM  DRAIN/DEVICES: PIV SL   SKIN: Dressing in place to coccyx. Wound to left great toe, dressing CDI. Heels blanchable  TESTS/PROCEDURES: NA  D/C DATE: Pending TCU placement  OTHER IMPORTANT INFO:

## 2022-09-20 NOTE — PLAN OF CARE
Goal Outcome Evaluation:                    DATE & TIME: 9/20/22 1430  Cognitive Concerns/ Orientation : A&O x4 Forgetful   BEHAVIOR & AGGRESSION TOOL COLOR: Green     ABNL VS/O2: VSS on RA   MOBILITY: Total, up with lift. Refuses chair. T&R Q2h. Prevalon boots in place, pulsate mattress. L wrist brace   PAIN MANAGMENT: Denies  DIET: Regular, total feed. One episode of nausea after breakfast, relieved with zofran.  BOWEL/BLADDER: Chronic keating in place for retention, red colored urine with scant amount of blood leaking from urethra. BM x1.  DRAIN/DEVICES: PIV SL   SKIN: Dressing changed to coccyx, has a couple small superficial open areas. Wound to left great toe, dressing changed. Heels blanchable  TESTS/PROCEDURES: NA  D/C DATE: Pending TCU placement  OTHER IMPORTANT INFO:

## 2022-09-20 NOTE — PROGRESS NOTES
[facility]  Hospitalist Progress Note   09/20/2022          Assessment and Plan:       William Almazan is an 81-year-old male with dementia, HTN, CHF (HFrEF), CAD, PAF, gout, urinary retention with chronic indwelling Louie catheter, h/o prostate cancer; with recent hospitalization at [facility] (8/2-8/24/2022) for issues including failure to thrive with hypotension and TONE, psychosis. Transferred from TCU 8/30/2022 with left hand pain, decreased motion and increasing generalized weakness.     Suspected Lewy body dementia.  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   Rigidity with motor symptoms/extrapyramidal side effects suspect related to above with worsening by anti-psychotics.  Depression/anxiety.  Physical deconditioning from senile fragility, medical illness.  * Recent hospitalization at [facility] 8/2 - 8/24/ 2022 with presyncope, hypotension, dementia with psychosis. PTA donepezil, escitalopram, risperidone and gabapentin.  --Admitted with generalized weakness and progressive inability to ambulate accompanied by extremity rigidity. Wife reported worsening symptoms after recent hospitalization and start of antipsychotic medications to help treat underlying psychosis. However, since starting, the patient exhibited worsening rigidity concerning for parkinsonism.   --On admission afebrile, hemodynamically stable, WBC normal, hgb 10.5; BMP unremarkable; LFT's showed low albumin and protein, otherwise normal.  Head CT 8/30 negative.   * Unable to obtain an MRI due to PPM/abandoned RV lead.  * Neurology followed, overall there was concern for underlying Lewy body dementia, with dystonia that may have been worsened by starting antipsychotics.  * Psychiatry stopped PTA risperidone and started aripiprazole 8/31.  Carbidopa-levodopa started 9/1.   Given continued rigidity, aripiprazole stopped 9/2. Pimavanserin started 9/2.  Quetiapine started 9/4. Increased carbidopa-levadopa 9/4. Pimavanserin stopped 9/4 due to long  term cost issues.  Escitalopram decreased 9/5.    - Continue carbidopa-levodopa  mg TID.  - Continue donepezil 10 mg at bedtime; quetiapine 25 mg at bedtime; PRN quetiapine 12.5 mg BID.  - Continue escitalopram 10 mg daily, gabapentin 100 mg TID.  - PT, OT recommend TCU for rehabilitation.  -  assisting with transition plans -Hospital course prolonged with placement issues.     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   Orthopedics recommend WBAT and elevation recommended; PRN left wrist splint ordered.  Continue WBAT.  Elevated left upper extremity when at rest.  Continue PRN left wrist splint.  Continue PRN acetaminophen.  Outpatient follow-up with hand surgeon.      CAD with h/o CABG (1992).  CHF (HFrEF, biventricular).  Hypertension (benign essential).  [PTA: carvedilol 12.5 mg BID; furosemide 40 mg daily; spironolactone 25 mg daily.]  * Echo 8/3/2022 showed LVEF 15-20%, grade 1 diastolic dysfunction, severe diffuse hypokinesis; RV global function mildly reduced.  - Continue carvedilol; furosemide; spironolactone.  On Eliquis as below for anticoagulation.  If any drop in hemoglobin will consider holding Eliquis.  - Monitor i/o's, daily wts.     Paroxysmal atrial fibrillation.  S/p PPM.  - Continue apixaban, carvedilol.     Complicated urinary tract infection.  Chronic indwelling Louie catheter due to urinary retention.   Chronic hematuria.  Prostate cancer s/p XRT > 10 years ago with known radiation cystitis  Louie catheter changed every month.  Urology impression, Given his known radiation cystitis, occasional blood in the urine is expected. If urine becomes cherry or maroon in color with clots  or if the catheter stops draining, would consider bladder irrigation.  Appreciate input.  UA 9/14 with moderate leukocyte esterase, greater than 182 WBCs  Urine cultures 9/14 less than 10,000 urogenital qasim.  Urology recommends to treat complicated UTI given indwelling catheter, On IV ceftriaxone [9/15] - 3 days.   Louie catheter changed 9/15.  Follow-up with primary urologist at [facility] after discharge.     Iron deficiency anemia  Recently completed a course of IV iron sucrose at [facility] 8/2022.  Hemoglobin stable around 10-11.  Monitor on Monday ( 9/19) or earlier if symptomatic.    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  Continue local wound cares per [facility] nurse rec's     Malnutrition related to acute and chronic medical issues.  Nutrition followed, continue dietary supplements.     Gout.  Continue allopurinol.     KRISTIAN.  Does not use CPAP. Follow-up outpatient.     Recent COVID-19 infection, recovered.  Had minimal symptoms (positive on 7/30) considered COVID recovered.     Orders Placed This Encounter      Regular Diet Adult      Advance Diet as Tolerated      DVT Prophylaxis: On Eliquis.  Code Status: Full Code  Disposition: Expected discharge pending safe discharge plan in place     Discussed with patient    Jose Law,         Interval History:      Patient appears comfortable lying in bed.  No acute change, still awaiting safe disposition         Physical Exam:        Physical Exam   Temp:  [97.2  F (36.2  C)-98  F (36.7  C)] (P) 97.4  F (36.3  C)  Pulse:  [67-79] (P) 79  Resp:  [18] (P) 18  BP: (120-153)/(70-86) (P) 141/78  SpO2:  [97 %-100 %] (P) 97 %    Intake/Output Summary (Last 24 hours) at 9/16/2022 1410  Last data filed at 9/16/2022 1300  Gross per 24 hour   Intake 240 ml   Output 700 ml   Net -460 ml       Admission Weight: 81.9 kg (180 lb 8.9 oz)  Current Weight: 81.9 kg (180 lb 8.9 oz)    PHYSICAL EXAM  GENERAL: Patient awake,  frail-appearing.  Able to answer simple questions.  LUNGS:Respirations unlabored  ABDOMEN: Soft, no abdominal tenderness, bowel sounds heard   Genitourinary dark-colored urine.  NEURO: Moving all extremities.  EXTREMITIES: No pedal edema.  PSYCHIATRY Cooperative       Medications:          allopurinol  300 mg Oral Daily     apixaban ANTICOAGULANT  5 mg Oral BID     carbidopa-levodopa  1 tablet Oral TID     carvedilol  12.5 mg Oral BID     donepezil  10 mg Oral At Bedtime     escitalopram  10 mg Oral Daily     furosemide  40 mg Oral Daily     gabapentin  100 mg Oral TID     loratadine  10 mg Oral Daily     multivitamin w/minerals  1 tablet Oral Daily     psyllium  3 capsule Oral Daily     QUEtiapine  25 mg Oral At Bedtime     sennosides  1 tablet Oral BID     sodium chloride (PF)  3 mL Intracatheter Q8H     spironolactone  25 mg Oral Daily     acetaminophen, artificial tears, melatonin, ondansetron **OR** ondansetron, polyethylene glycol, QUEtiapine, sodium chloride (PF)         Data:      All new lab and imaging data was reviewed.

## 2022-09-21 LAB
ANION GAP SERPL CALCULATED.3IONS-SCNC: 5 MMOL/L (ref 3–14)
BUN SERPL-MCNC: 21 MG/DL (ref 7–30)
CALCIUM SERPL-MCNC: 9.3 MG/DL (ref 8.5–10.1)
CHLORIDE BLD-SCNC: 100 MMOL/L (ref 94–109)
CO2 SERPL-SCNC: 31 MMOL/L (ref 20–32)
CREAT SERPL-MCNC: 0.49 MG/DL (ref 0.66–1.25)
GFR SERPL CREATININE-BSD FRML MDRD: >90 ML/MIN/1.73M2
GLUCOSE BLD-MCNC: 96 MG/DL (ref 70–99)
POTASSIUM BLD-SCNC: 4.3 MMOL/L (ref 3.4–5.3)
SODIUM SERPL-SCNC: 136 MMOL/L (ref 133–144)

## 2022-09-21 PROCEDURE — 120N000001 HC R&B MED SURG/OB

## 2022-09-21 PROCEDURE — 250N000013 HC RX MED GY IP 250 OP 250 PS 637: Performed by: PSYCHIATRY & NEUROLOGY

## 2022-09-21 PROCEDURE — 99232 SBSQ HOSP IP/OBS MODERATE 35: CPT | Performed by: HOSPITALIST

## 2022-09-21 PROCEDURE — 250N000013 HC RX MED GY IP 250 OP 250 PS 637: Performed by: HOSPITALIST

## 2022-09-21 PROCEDURE — 80048 BASIC METABOLIC PNL TOTAL CA: CPT | Performed by: HOSPITALIST

## 2022-09-21 PROCEDURE — 250N000013 HC RX MED GY IP 250 OP 250 PS 637: Performed by: INTERNAL MEDICINE

## 2022-09-21 PROCEDURE — G0463 HOSPITAL OUTPT CLINIC VISIT: HCPCS

## 2022-09-21 PROCEDURE — 36415 COLL VENOUS BLD VENIPUNCTURE: CPT | Performed by: HOSPITALIST

## 2022-09-21 RX ADMIN — QUETIAPINE FUMARATE 25 MG: 25 TABLET ORAL at 21:12

## 2022-09-21 RX ADMIN — APIXABAN 5 MG: 5 TABLET, FILM COATED ORAL at 08:42

## 2022-09-21 RX ADMIN — ESCITALOPRAM OXALATE 10 MG: 10 TABLET ORAL at 08:41

## 2022-09-21 RX ADMIN — GABAPENTIN 100 MG: 100 CAPSULE ORAL at 21:11

## 2022-09-21 RX ADMIN — GABAPENTIN 100 MG: 100 CAPSULE ORAL at 08:41

## 2022-09-21 RX ADMIN — ALLOPURINOL 300 MG: 300 TABLET ORAL at 08:41

## 2022-09-21 RX ADMIN — APIXABAN 5 MG: 5 TABLET, FILM COATED ORAL at 21:11

## 2022-09-21 RX ADMIN — DONEPEZIL HYDROCHLORIDE 10 MG: 10 TABLET ORAL at 21:11

## 2022-09-21 RX ADMIN — FUROSEMIDE 40 MG: 40 TABLET ORAL at 08:41

## 2022-09-21 RX ADMIN — MULTIPLE VITAMINS W/ MINERALS TAB 1 TABLET: TAB at 08:40

## 2022-09-21 RX ADMIN — CARBIDOPA AND LEVODOPA 1 TABLET: 25; 100 TABLET ORAL at 08:41

## 2022-09-21 RX ADMIN — CARBIDOPA AND LEVODOPA 1 TABLET: 25; 100 TABLET ORAL at 14:53

## 2022-09-21 RX ADMIN — Medication 3 CAPSULE: at 11:41

## 2022-09-21 RX ADMIN — GABAPENTIN 100 MG: 100 CAPSULE ORAL at 14:52

## 2022-09-21 RX ADMIN — SPIRONOLACTONE 25 MG: 25 TABLET ORAL at 08:42

## 2022-09-21 RX ADMIN — LORATADINE 10 MG: 10 TABLET ORAL at 08:41

## 2022-09-21 RX ADMIN — SENNOSIDES 1 TABLET: 8.6 TABLET, FILM COATED ORAL at 08:41

## 2022-09-21 RX ADMIN — CARVEDILOL 12.5 MG: 12.5 TABLET, FILM COATED ORAL at 08:41

## 2022-09-21 RX ADMIN — SENNOSIDES 1 TABLET: 8.6 TABLET, FILM COATED ORAL at 21:11

## 2022-09-21 RX ADMIN — CARVEDILOL 12.5 MG: 12.5 TABLET, FILM COATED ORAL at 21:12

## 2022-09-21 RX ADMIN — CARBIDOPA AND LEVODOPA 1 TABLET: 25; 100 TABLET ORAL at 21:11

## 2022-09-21 ASSESSMENT — ACTIVITIES OF DAILY LIVING (ADL)
ADLS_ACUITY_SCORE: 63
ADLS_ACUITY_SCORE: 65
ADLS_ACUITY_SCORE: 63
ADLS_ACUITY_SCORE: 65
ADLS_ACUITY_SCORE: 63
ADLS_ACUITY_SCORE: 65
ADLS_ACUITY_SCORE: 63
ADLS_ACUITY_SCORE: 63
ADLS_ACUITY_SCORE: 65
ADLS_ACUITY_SCORE: 63

## 2022-09-21 NOTE — PLAN OF CARE
Goal Outcome Evaluation:    Plan of Care Reviewed With: patient     Overall Patient Progress: no change    DATE & TIME: 9/20/22 5590-7350  Cognitive Concerns/ Orientation : A&O x4. Forgetful   BEHAVIOR & AGGRESSION TOOL COLOR: Green     ABNL VS/O2: VSS on RA   MOBILITY: Total, up with lift. T&R Q2h. Prevalon boots in place, pulsate mattress. L wrist brace   PAIN MANAGMENT: Denies  DIET: Regular, total feed. Poor appetite  BOWEL/BLADDER: Chronic keating in place for retention, red colored urine (not new, urology aware). Small BM in AM  DRAIN/DEVICES: PIV SL   SKIN: Mepiplex in place on coccyx, has a couple small superficial open areas. Wound to left great toe. Heels blanchable. Clammy at times  TESTS/PROCEDURES: None  D/C DATE: Pending TCU placement  OTHER IMPORTANT INFO:

## 2022-09-21 NOTE — PLAN OF CARE
DATE & TIME: 9/20/22 8714-9355  Cognitive Concerns/ Orientation : A&O x4 Forgetful   BEHAVIOR & AGGRESSION TOOL COLOR: Green     ABNL VS/O2: VSS on RA   MOBILITY: Total, up with lift. T&R Q2h. Prevalon boots in place, pulsate mattress. L wrist brace   PAIN MANAGMENT: Denies  DIET: Regular, total feed. Poor appetite  BOWEL/BLADDER: Chronic ekating in place for retention, red colored urine (pt states this is normal and it happens intermittently d/t history of prostate cancer and radiation cystitis)  DRAIN/DEVICES: PIV SL   SKIN: Mepiplex in place on coccyx, has a couple small superficial open areas. Wound to left great toe, Heels blanchable  TESTS/PROCEDURES: None  D/C DATE: Pending TCU placement  OTHER IMPORTANT INFO:

## 2022-09-21 NOTE — PROGRESS NOTES
"[facility] Nurse Inpatient Assessment     Follow up for: buttock, left toes     Areas Assessed:      Areas visualized during today's visit: Focused: and Sacrum/coccyx, left toes     Wound location: buttock, sacralcoccygeal     Wound due to: Pressure Injury CAPI unstageable with components of MASD and friction and shear   Wound history/plan of care: found with sacral mepilex in use  9/21 noted healed      Wound location: left great toe     Last photo: 9/21  Wound due to: Trauma with arterial component, patient confirms this injury was present prior to admission at hospital   9/21 much improved, most eschar lifted revealing new skin under     Treatment Plan:      Wound care  Start:  08/31/22 1049,   EVERY SHIFT,   Routine        Comments: Location: buttock   Care: provided by primary RN qshift   1. Cleanse perineal skin with incontinence protocol (quyen cleanser and soft dry wipes) every incontinence episode   2. Apply skin prep generously to perianal and buttock including wounds and periwound skin, let dry   3. Cover with mepilex dressings (sacral, or 4x4\" or combination to best keep all wounds covered). Mepilex can be used up to 5 days each, ok to lift for cleansing and assessment and reapply same mepilex         Skin care precautions  Start:  08/31/22 1048,   EFFECTIVE NOW,   Routine        Comments: Pressure Injury Prevention (PIP) Plan:   -If patient is declining pressure injury prevention interventions: Explore reason why and address patient's concerns, Educate on pressure injury risk and prevention intervention(s), If patient is still declining, document \"informed refusal\" , and Ensure Care team is aware ( provider, charge nurse, etc)   -Mattress: Follow bed algorithm, reassess daily and order specialty mattress, if indicated.   -HOB: Maintain at or below 30 degrees, unless contraindicated   -Repositioning in bed: Every 1-2 hours , Left/right positioning; avoid supine, and Raise " foot of bed prior to raising head of bed, to reduce patient sliding down (shear)   -Heels: Keep elevated off mattress and Pillows under calves   -Protective Dressing: Sacral Mepilex for prevention (#116338),  especially for the agitated patient   -Positioning Equipment: TAPS wedges (#789013) to help maintain 30 degree side lying position   -Chair positioning: Chair cushion (#[id number]) , Assist patient to reposition hourly, and Do NOT use a donut for sitting (this increases pressure to smaller area and creates a higher potential for injury)     -Moisture Management: Perineal cleansing /protection: Follow Incontinence Protocol, Avoid brief in bed, Clean and dry skin folds with bathing , and Moisturize dry skin   -Under Devices: Inspect skin under all medical devices during skin inspection , Ensure tubes are stabilized without tension, and Ensure patient is not lying on medical devices or equipment when repositioned         Wound care  Start:  09/15/22 0600,   DAILY,   Routine        Comments: Location: left great toe   Care: provided daily by primary RN   1. Paint / apply betadine swabs to wound and periwound skin, let dry   2. Cover with dry dressing (such as gauze or 1/4 of ABD pad), then wrap with roll gauze or kerlix, secure with tape          Orders: Reviewed, continue plans of care for prevention of deterioration     RECOMMEND PRIMARY TEAM ORDER: None, at this time  Education provided: importance of repositioning, plan of care, Moisture management, Hygiene and Off-loading pressure  Discussed plan of care with: Patient and Nurse  WOC nurse follow-up plan: signing off  Notify WOC if wound(s) deteriorate.  Nursing to notify the Provider(s) and re-consult the WOC Nurse if new skin concern.    DATA:     Current support surface: Standard  pulsate ordered after consult  Containment of urine/stool: Incontinence Protocol  BMI: Body mass index is 22.27 kg/m .   Active diet order: Orders Placed This Encounter      Regular  Diet Adult      Advance Diet as Tolerated     Output: I/O last 3 completed shifts:  In: 120 [P.O.:120]  Out: 1275 [Urine:1275]     Labs:   Recent Labs   Lab 09/18/22  0850   HGB 11.3*     Pressure injury risk assessment:   Sensory Perception: 3-->slightly limited  Moisture: 4-->rarely moist  Activity: 1-->bedfast  Mobility: 2-->very limited  Nutrition: 1-->very poor  Friction and Shear: 1-->problem  John Score: 12    Aracelis KOCHKELTON   Dept. Pager: [phone]  Dept. Office Number: [phone]

## 2022-09-21 NOTE — PROGRESS NOTES
[facility]  Hospitalist Progress Note   09/21/2022          Assessment and Plan:       William Almazan is an 81-year-old male with dementia, HTN, CHF (HFrEF), CAD, PAF, gout, urinary retention with chronic indwelling Louie catheter, h/o prostate cancer; with recent hospitalization at [facility] (8/2-8/24/2022) for issues including failure to thrive with hypotension and TONE, psychosis. Transferred from TCU 8/30/2022 with left hand pain, decreased motion and increasing generalized weakness.     Suspected Lewy body dementia.  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   Rigidity with motor symptoms/extrapyramidal side effects suspect related to above with worsening by anti-psychotics.  Depression/anxiety.  Physical deconditioning from senile fragility, medical illness.  * Recent hospitalization at [facility] 8/2 - 8/24/ 2022 with presyncope, hypotension, dementia with psychosis. PTA donepezil, escitalopram, risperidone and gabapentin.  --Admitted with generalized weakness and progressive inability to ambulate accompanied by extremity rigidity. Wife reported worsening symptoms after recent hospitalization and start of antipsychotic medications to help treat underlying psychosis. However, since starting, the patient exhibited worsening rigidity concerning for parkinsonism.   --On admission afebrile, hemodynamically stable, WBC normal, hgb 10.5; BMP unremarkable; LFT's showed low albumin and protein, otherwise normal.  Head CT 8/30 negative.   * Unable to obtain an MRI due to PPM/abandoned RV lead.  * Neurology followed, overall there was concern for underlying Lewy body dementia, with dystonia that may have been worsened by starting antipsychotics.  * Psychiatry stopped PTA risperidone and started aripiprazole 8/31.  Carbidopa-levodopa started 9/1.   Given continued rigidity, aripiprazole stopped 9/2. Pimavanserin started 9/2.  Quetiapine started 9/4. Increased carbidopa-levadopa 9/4. Pimavanserin stopped 9/4 due to long  term cost issues.  Escitalopram decreased 9/5.    - Continue carbidopa-levodopa  mg TID.  - Continue donepezil 10 mg at bedtime; quetiapine 25 mg at bedtime; PRN quetiapine 12.5 mg BID.  - Continue escitalopram 10 mg daily, gabapentin 100 mg TID.  - PT, OT recommend TCU for rehabilitation.  -  assisting with transition plans -Hospital course prolonged with placement issues.     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   Orthopedics recommend WBAT and elevation recommended; PRN left wrist splint ordered.  Continue WBAT.  Elevated left upper extremity when at rest.  Continue PRN left wrist splint.  Continue PRN acetaminophen.  Outpatient follow-up with hand surgeon.      CAD with h/o CABG (1992).  CHF (HFrEF, biventricular).  Hypertension (benign essential).  [PTA: carvedilol 12.5 mg BID; furosemide 40 mg daily; spironolactone 25 mg daily.]  * Echo 8/3/2022 showed LVEF 15-20%, grade 1 diastolic dysfunction, severe diffuse hypokinesis; RV global function mildly reduced.  - Continue carvedilol; furosemide; spironolactone.  On Eliquis as below for anticoagulation.  If any drop in hemoglobin will consider holding Eliquis.  - Monitor i/o's, daily wts.     Paroxysmal atrial fibrillation.  S/p PPM.  - Continue apixaban, carvedilol.     Complicated urinary tract infection.  Chronic indwelling Louie catheter due to urinary retention.   Chronic hematuria.  Prostate cancer s/p XRT > 10 years ago with known radiation cystitis  Louie catheter changed every month.  Urology impression, Given his known radiation cystitis, occasional blood in the urine is expected. If urine becomes cherry or maroon in color with clots  or if the catheter stops draining, would consider bladder irrigation.  Appreciate input.  UA 9/14 with moderate leukocyte esterase, greater than 182 WBCs  Urine cultures 9/14 less than 10,000 urogenital qasim.  Urology recommends to treat complicated UTI given indwelling catheter, On IV ceftriaxone [9/15] - 3 days.   Louie catheter changed 9/15.  Follow-up with primary urologist at [facility] after discharge.     Iron deficiency anemia  Recently completed a course of IV iron sucrose at [facility] 8/2022.  Hemoglobin stable around 10-11.  Monitor on Monday ( 9/19) or earlier if symptomatic.    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  Continue local wound cares per [facility] nurse rec's     Malnutrition related to acute and chronic medical issues.  Nutrition followed, continue dietary supplements.     Gout.  Continue allopurinol.     KRISTIAN.  Does not use CPAP. Follow-up outpatient.     Recent COVID-19 infection, recovered.  Had minimal symptoms (positive on 7/30) considered COVID recovered.     Orders Placed This Encounter      Regular Diet Adult      Advance Diet as Tolerated      DVT Prophylaxis: On Eliquis.  Code Status: Full Code  Disposition: Expected discharge pending safe discharge plan in place     Discussed with patient    Jose Law, DO        Interval History:      Patient appears comfortable lying in bed.  No acute change, still awaiting safe disposition         Physical Exam:        Physical Exam   Temp:  [97.2  F (36.2  C)-97.8  F (36.6  C)] 97.2  F (36.2  C)  Pulse:  [72-78] 72  Resp:  [18] 18  BP: (100-148)/(64-78) 148/78  SpO2:  [98 %-99 %] 98 %    Intake/Output Summary (Last 24 hours) at 9/16/2022 1410  Last data filed at 9/16/2022 1300  Gross per 24 hour   Intake 240 ml   Output 700 ml   Net -460 ml       Admission Weight: 81.9 kg (180 lb 8.9 oz)  Current Weight: 81.9 kg (180 lb 8.9 oz)    PHYSICAL EXAM  GENERAL: Patient awake, frail-appearing.  Able  to answer simple questions.  LUNGS:Respirations unlabored  ABDOMEN: Soft, no abdominal tenderness, bowel sounds heard   Genitourinary dark-colored urine.  NEURO: Moving all extremities.  EXTREMITIES: No pedal edema.  PSYCHIATRY Cooperative       Medications:          allopurinol  300 mg Oral Daily     apixaban ANTICOAGULANT  5 mg Oral BID     carbidopa-levodopa  1 tablet Oral TID     carvedilol  12.5 mg Oral BID     donepezil  10 mg Oral At Bedtime     escitalopram  10 mg Oral Daily     furosemide  40 mg Oral Daily     gabapentin  100 mg Oral TID     loratadine  10 mg Oral Daily     multivitamin w/minerals  1 tablet Oral Daily     psyllium  3 capsule Oral Daily     QUEtiapine  25 mg Oral At Bedtime     sennosides  1 tablet Oral BID     sodium chloride (PF)  3 mL Intracatheter Q8H     spironolactone  25 mg Oral Daily     acetaminophen, artificial tears, melatonin, ondansetron **OR** ondansetron, polyethylene glycol, QUEtiapine, sodium chloride (PF)         Data:      All new lab and imaging data was reviewed.

## 2022-09-22 ENCOUNTER — APPOINTMENT (OUTPATIENT)
Dept: OCCUPATIONAL THERAPY | Facility: CLINIC | Age: 82
DRG: 056 | End: 2022-09-22
Attending: PSYCHIATRY & NEUROLOGY
Payer: MEDICARE

## 2022-09-22 ENCOUNTER — APPOINTMENT (OUTPATIENT)
Dept: PHYSICAL THERAPY | Facility: CLINIC | Age: 82
DRG: 056 | End: 2022-09-22
Attending: PSYCHIATRY & NEUROLOGY
Payer: MEDICARE

## 2022-09-22 PROCEDURE — 97530 THERAPEUTIC ACTIVITIES: CPT | Mod: GP | Performed by: PHYSICAL THERAPIST

## 2022-09-22 PROCEDURE — 250N000013 HC RX MED GY IP 250 OP 250 PS 637: Performed by: HOSPITALIST

## 2022-09-22 PROCEDURE — 120N000001 HC R&B MED SURG/OB

## 2022-09-22 PROCEDURE — 250N000013 HC RX MED GY IP 250 OP 250 PS 637: Performed by: INTERNAL MEDICINE

## 2022-09-22 PROCEDURE — 99232 SBSQ HOSP IP/OBS MODERATE 35: CPT | Performed by: HOSPITALIST

## 2022-09-22 PROCEDURE — 250N000013 HC RX MED GY IP 250 OP 250 PS 637: Performed by: PSYCHIATRY & NEUROLOGY

## 2022-09-22 PROCEDURE — 97535 SELF CARE MNGMENT TRAINING: CPT | Mod: GO

## 2022-09-22 RX ADMIN — CARBIDOPA AND LEVODOPA 1 TABLET: 25; 100 TABLET ORAL at 20:26

## 2022-09-22 RX ADMIN — CARBIDOPA AND LEVODOPA 1 TABLET: 25; 100 TABLET ORAL at 14:37

## 2022-09-22 RX ADMIN — ALLOPURINOL 300 MG: 300 TABLET ORAL at 09:42

## 2022-09-22 RX ADMIN — ACETAMINOPHEN 650 MG: 325 TABLET ORAL at 14:37

## 2022-09-22 RX ADMIN — CARBIDOPA AND LEVODOPA 1 TABLET: 25; 100 TABLET ORAL at 09:43

## 2022-09-22 RX ADMIN — FUROSEMIDE 40 MG: 40 TABLET ORAL at 09:44

## 2022-09-22 RX ADMIN — Medication 3 CAPSULE: at 09:44

## 2022-09-22 RX ADMIN — QUETIAPINE FUMARATE 25 MG: 25 TABLET ORAL at 21:54

## 2022-09-22 RX ADMIN — APIXABAN 5 MG: 5 TABLET, FILM COATED ORAL at 09:42

## 2022-09-22 RX ADMIN — ESCITALOPRAM OXALATE 10 MG: 10 TABLET ORAL at 09:42

## 2022-09-22 RX ADMIN — GABAPENTIN 100 MG: 100 CAPSULE ORAL at 20:26

## 2022-09-22 RX ADMIN — GABAPENTIN 100 MG: 100 CAPSULE ORAL at 14:37

## 2022-09-22 RX ADMIN — MULTIPLE VITAMINS W/ MINERALS TAB 1 TABLET: TAB at 09:42

## 2022-09-22 RX ADMIN — APIXABAN 5 MG: 5 TABLET, FILM COATED ORAL at 20:26

## 2022-09-22 RX ADMIN — LORATADINE 10 MG: 10 TABLET ORAL at 09:43

## 2022-09-22 RX ADMIN — SENNOSIDES 1 TABLET: 8.6 TABLET, FILM COATED ORAL at 20:26

## 2022-09-22 RX ADMIN — GABAPENTIN 100 MG: 100 CAPSULE ORAL at 09:43

## 2022-09-22 RX ADMIN — DONEPEZIL HYDROCHLORIDE 10 MG: 10 TABLET ORAL at 21:55

## 2022-09-22 RX ADMIN — SPIRONOLACTONE 25 MG: 25 TABLET ORAL at 09:43

## 2022-09-22 RX ADMIN — SENNOSIDES 1 TABLET: 8.6 TABLET, FILM COATED ORAL at 09:41

## 2022-09-22 ASSESSMENT — ACTIVITIES OF DAILY LIVING (ADL)
ADLS_ACUITY_SCORE: 59
ADLS_ACUITY_SCORE: 65
ADLS_ACUITY_SCORE: 63
ADLS_ACUITY_SCORE: 65
ADLS_ACUITY_SCORE: 59
ADLS_ACUITY_SCORE: 63
ADLS_ACUITY_SCORE: 65
ADLS_ACUITY_SCORE: 63
ADLS_ACUITY_SCORE: 59
ADLS_ACUITY_SCORE: 59

## 2022-09-22 NOTE — PROGRESS NOTES
[facility]  Hospitalist Progress Note   09/22/2022          Assessment and Plan:       William Almazan is an 81-year-old male with dementia, HTN, CHF (HFrEF), CAD, PAF, gout, urinary retention with chronic indwelling Louie catheter, h/o prostate cancer; with recent hospitalization at [facility] (8/2-8/24/2022) for issues including failure to thrive with hypotension and TONE, psychosis. Transferred from TCU 8/30/2022 with left hand pain, decreased motion and increasing generalized weakness.     Suspected Lewy body dementia.  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   Rigidity with motor symptoms/extrapyramidal side effects suspect related to above with worsening by anti-psychotics.  Depression/anxiety.  Physical deconditioning from senile fragility, medical illness.  * Recent hospitalization at [facility] 8/2 - 8/24/ 2022 with presyncope, hypotension, dementia with psychosis. PTA donepezil, escitalopram, risperidone and gabapentin.  --Admitted with generalized weakness and progressive inability to ambulate accompanied by extremity rigidity. Wife reported worsening symptoms after recent hospitalization and start of antipsychotic medications to help treat underlying psychosis. However, since starting, the patient exhibited worsening rigidity concerning for parkinsonism.   --On admission afebrile, hemodynamically stable, WBC normal, hgb 10.5; BMP unremarkable; LFT's showed low albumin and protein, otherwise normal.  Head CT 8/30 negative.   * Unable to obtain an MRI due to PPM/abandoned RV lead.  * Neurology followed, overall there was concern for underlying Lewy body dementia, with dystonia that may have been worsened by starting antipsychotics.  * Psychiatry stopped PTA risperidone and started aripiprazole 8/31.  Carbidopa-levodopa started 9/1.   Given continued rigidity, aripiprazole stopped 9/2. Pimavanserin started 9/2.  Quetiapine started 9/4. Increased carbidopa-levadopa 9/4. Pimavanserin stopped 9/4 due to long  term cost issues.  Escitalopram decreased 9/5.  plan  - Continue carbidopa-levodopa  mg TID.  - Continue donepezil 10 mg at bedtime; quetiapine 25 mg at bedtime; PRN quetiapine 12.5 mg BID.  - Continue escitalopram 10 mg daily, gabapentin 100 mg TID.  - PT, OT recommend TCU for rehabilitation.  -  assisting with transition plans -Hospital course prolonged with placement issues.     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   Orthopedics recommend WBAT and elevation recommended; PRN left wrist splint ordered.  Continue WBAT.  Elevated left upper extremity when at rest.  Continue PRN left wrist splint.  Continue PRN acetaminophen.  Outpatient follow-up with hand surgeon.      CAD with h/o CABG (1992).  CHF (HFrEF, biventricular).  Hypertension (benign essential).  [PTA: carvedilol 12.5 mg BID; furosemide 40 mg daily; spironolactone 25 mg daily.]  * Echo 8/3/2022 showed LVEF 15-20%, grade 1 diastolic dysfunction, severe diffuse hypokinesis; RV global function mildly reduced.  - Continue carvedilol; furosemide; spironolactone.  On Eliquis as below for anticoagulation.  If any drop in hemoglobin will consider holding Eliquis.  - Monitor i/o's, daily wts.     Paroxysmal atrial fibrillation.  S/p PPM.  - Continue apixaban, carvedilol.     Complicated urinary tract infection.  Chronic indwelling Louie catheter due to urinary retention.   Chronic hematuria.  Prostate cancer s/p XRT > 10 years ago with known radiation cystitis  Louie catheter changed every month.  Urology impression, Given his known radiation cystitis, occasional blood in the urine is expected. If urine becomes cherry or maroon in color with  clots or if the catheter stops draining, would consider bladder irrigation.  Appreciate input.  UA 9/14 with moderate leukocyte esterase, greater than 182 WBCs  Urine cultures 9/14 less than 10,000 urogenital qasim.  Urology recommends to treat complicated UTI given indwelling catheter, On IV ceftriaxone [9/15] - 3 days.   Louie catheter changed 9/15.  Follow-up with primary urologist at [facility] after discharge.     Iron deficiency anemia  Recently completed a course of IV iron sucrose at [facility] 8/2022.  Hemoglobin stable around 10-11.  Monitor on Monday ( 9/19) or earlier if symptomatic.    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  Continue local wound cares per [facility] nurse rec's     Malnutrition related to acute and chronic medical issues.  Nutrition followed, continue dietary supplements.     Gout.  Continue allopurinol.     KRISTIAN.  Does not use CPAP. Follow-up outpatient.     Recent COVID-19 infection, recovered.  Had minimal symptoms (positive on 7/30) considered COVID recovered.     Orders Placed This Encounter      Regular Diet Adult      Advance Diet as Tolerated      DVT Prophylaxis: On Eliquis.  Code Status: Full Code  Disposition: Expected discharge pending safe discharge plan in place     Discussed with patient. Discussed with wife yesterday. We are awaiting rehab    Jose Law,         Interval History:      Patient appears comfortable lying in bed.  No acute change, still awaiting safe disposition         Physical Exam:        Physical Exam   Temp:  [97.6  F (36.4  C)-98  F (36.7  C)] 97.6  F (36.4  C)  Pulse:  [72-79] 74  Resp:  [16-18] 17  BP: (105-129)/(59-69) 105/65  SpO2:  [96 %-98 %] 97 %    Intake/Output Summary (Last 24 hours) at 9/16/2022 1410  Last data filed at 9/16/2022 1300  Gross per 24 hour   Intake 240 ml   Output 700 ml   Net -460 ml       Admission Weight: 81.9 kg (180 lb 8.9 oz)  Current Weight: 81.9 kg (180 lb 8.9  oz)    PHYSICAL EXAM  GENERAL: Patient awake, frail-appearing.  Able to answer simple questions.  LUNGS:Respirations unlabored  ABDOMEN: Soft, no abdominal tenderness, bowel sounds heard   Genitourinary dark-colored urine.  NEURO: Moving all extremities.  EXTREMITIES: No pedal edema.  PSYCHIATRY Cooperative       Medications:          allopurinol  300 mg Oral Daily     apixaban ANTICOAGULANT  5 mg Oral BID     carbidopa-levodopa  1 tablet Oral TID     carvedilol  12.5 mg Oral BID     donepezil  10 mg Oral At Bedtime     escitalopram  10 mg Oral Daily     furosemide  40 mg Oral Daily     gabapentin  100 mg Oral TID     loratadine  10 mg Oral Daily     multivitamin w/minerals  1 tablet Oral Daily     psyllium  3 capsule Oral Daily     QUEtiapine  25 mg Oral At Bedtime     sennosides  1 tablet Oral BID     sodium chloride (PF)  3 mL Intracatheter Q8H     spironolactone  25 mg Oral Daily     acetaminophen, artificial tears, melatonin, ondansetron **OR** ondansetron, polyethylene glycol, QUEtiapine, sodium chloride (PF)         Data:      All new lab and imaging data was reviewed.

## 2022-09-22 NOTE — PLAN OF CARE
Goal Outcome Evaluation:    Plan of Care Reviewed With: patient     Overall Patient Progress: improving    DATE & TIME: 9/21/22 1054-8926  Cognitive Concerns/ Orientation : A&O x4. Forgetful. Slow speech  BEHAVIOR & AGGRESSION TOOL COLOR: Green     ABNL VS/O2: VSS on RA   MOBILITY: Total, up with lift. T&R Q2h. Prevalon boots in place, pulsate mattress.   PAIN MANAGMENT: Denies  DIET: Regular, total feed. Fair appetite  BOWEL/BLADDER: Chronic keating in place for retention. No BM, passing gas   DRAIN/DEVICES: PIV SL   SKIN: Mepilex on coccyx wound CDI. Dressing on left great toe wound CDI. All extremities elevated on pillows.  TESTS/PROCEDURES: None  D/C DATE: Pending TCU placement. SW following    OTHER IMPORTANT INFO:

## 2022-09-22 NOTE — PLAN OF CARE
Goal Outcome Evaluation:    Plan of Care Reviewed With: patient     Overall Patient Progress: no change         DATE & TIME: 9/21/22 pm shift  Cognitive Concerns/ Orientation : A&O x4 Forgetful   BEHAVIOR & AGGRESSION TOOL COLOR: Green     ABNL VS/O2: VSS on RA   MOBILITY: Total, up with lift. T&R Q2h. Prevalon boots in place, pulsate mattress.   PAIN MANAGMENT: Denies  DIET: Regular, total feed. Fair appetite  BOWEL/BLADDER: Chronic keating in place for retention, good ouput. Had small bm  DRAIN/DEVICES: PIV SL   SKIN: Mepilex on coccyx wound CDI. Dressing on left great toe wound CDI. All extremities elevated on pillows.  TESTS/PROCEDURES: None  D/C DATE: Pending TCU placement. SW following for TCU placement.  OTHER IMPORTANT INFO: Wound nurse signed off.

## 2022-09-22 NOTE — PLAN OF CARE
Goal Outcome Evaluation:    Plan of Care Reviewed With: patient     Overall Patient Progress: no change    DATE & TIME: 9/22/22 2976-7949  Cognitive Concerns/ Orientation: A&Ox4, forgetful, slow speech.  BEHAVIOR & AGGRESSION TOOL COLOR: Green     ABNL VS/O2: VSS on RA   MOBILITY: Total, up with lift. T&R Q2h. Prevalon boots in place, pulsate mattress.   PAIN MANAGMENT: Denies  DIET: Regular, total feed. Poor appetite.  BOWEL/BLADDER: Chronic keating in place for retention. No BM this shift, passing gas.  DRAIN/DEVICES: R PIV SL   SKIN: Mepilex on coccyx wound CDI. Dressing on left great toe wound CDI. All extremities elevated on pillows.  TESTS/PROCEDURES: None  D/C DATE: Pending TCU placement. SW following.

## 2022-09-23 PROCEDURE — 250N000013 HC RX MED GY IP 250 OP 250 PS 637: Performed by: HOSPITALIST

## 2022-09-23 PROCEDURE — 120N000001 HC R&B MED SURG/OB

## 2022-09-23 PROCEDURE — 250N000013 HC RX MED GY IP 250 OP 250 PS 637: Performed by: PSYCHIATRY & NEUROLOGY

## 2022-09-23 PROCEDURE — 99232 SBSQ HOSP IP/OBS MODERATE 35: CPT | Performed by: HOSPITALIST

## 2022-09-23 PROCEDURE — 250N000013 HC RX MED GY IP 250 OP 250 PS 637: Performed by: INTERNAL MEDICINE

## 2022-09-23 RX ORDER — AMOXICILLIN 250 MG
1 CAPSULE ORAL 2 TIMES DAILY
Status: DISCONTINUED | OUTPATIENT
Start: 2022-09-23 | End: 2022-09-30 | Stop reason: HOSPADM

## 2022-09-23 RX ADMIN — DONEPEZIL HYDROCHLORIDE 10 MG: 10 TABLET ORAL at 21:08

## 2022-09-23 RX ADMIN — LORATADINE 10 MG: 10 TABLET ORAL at 08:29

## 2022-09-23 RX ADMIN — APIXABAN 5 MG: 5 TABLET, FILM COATED ORAL at 08:29

## 2022-09-23 RX ADMIN — CARVEDILOL 12.5 MG: 12.5 TABLET, FILM COATED ORAL at 08:28

## 2022-09-23 RX ADMIN — CARBIDOPA AND LEVODOPA 1 TABLET: 25; 100 TABLET ORAL at 13:28

## 2022-09-23 RX ADMIN — ALLOPURINOL 300 MG: 300 TABLET ORAL at 08:28

## 2022-09-23 RX ADMIN — GABAPENTIN 100 MG: 100 CAPSULE ORAL at 20:48

## 2022-09-23 RX ADMIN — ACETAMINOPHEN 650 MG: 325 TABLET ORAL at 21:08

## 2022-09-23 RX ADMIN — APIXABAN 5 MG: 5 TABLET, FILM COATED ORAL at 20:48

## 2022-09-23 RX ADMIN — ACETAMINOPHEN 650 MG: 325 TABLET ORAL at 16:42

## 2022-09-23 RX ADMIN — SPIRONOLACTONE 25 MG: 25 TABLET ORAL at 08:28

## 2022-09-23 RX ADMIN — CARBIDOPA AND LEVODOPA 1 TABLET: 25; 100 TABLET ORAL at 20:48

## 2022-09-23 RX ADMIN — MULTIPLE VITAMINS W/ MINERALS TAB 1 TABLET: TAB at 08:28

## 2022-09-23 RX ADMIN — GABAPENTIN 100 MG: 100 CAPSULE ORAL at 13:33

## 2022-09-23 RX ADMIN — SENNOSIDES AND DOCUSATE SODIUM 1 TABLET: 50; 8.6 TABLET ORAL at 08:28

## 2022-09-23 RX ADMIN — ESCITALOPRAM OXALATE 10 MG: 10 TABLET ORAL at 08:29

## 2022-09-23 RX ADMIN — GABAPENTIN 100 MG: 100 CAPSULE ORAL at 08:29

## 2022-09-23 RX ADMIN — SENNOSIDES 1 TABLET: 8.6 TABLET, FILM COATED ORAL at 08:28

## 2022-09-23 RX ADMIN — SENNOSIDES AND DOCUSATE SODIUM 1 TABLET: 50; 8.6 TABLET ORAL at 20:48

## 2022-09-23 RX ADMIN — QUETIAPINE FUMARATE 25 MG: 25 TABLET ORAL at 21:08

## 2022-09-23 RX ADMIN — SENNOSIDES 1 TABLET: 8.6 TABLET, FILM COATED ORAL at 20:48

## 2022-09-23 RX ADMIN — Medication 3 CAPSULE: at 08:28

## 2022-09-23 RX ADMIN — FUROSEMIDE 40 MG: 40 TABLET ORAL at 08:29

## 2022-09-23 RX ADMIN — CARBIDOPA AND LEVODOPA 1 TABLET: 25; 100 TABLET ORAL at 08:28

## 2022-09-23 ASSESSMENT — ACTIVITIES OF DAILY LIVING (ADL)
ADLS_ACUITY_SCORE: 59

## 2022-09-23 NOTE — PLAN OF CARE
DATE & TIME: 9/22/22 6442-6951  Cognitive Concerns/ Orientation: Alert and Oriented x4, forgetful, slow speech.  BEHAVIOR & AGGRESSION TOOL COLOR: Green     ABNL VS/O2: VSS on RA   MOBILITY: Total, up with lift. T&R Q2h. Prevalon boots in place, pulsate mattress.   PAIN MANAGMENT: Denies  DIET: Regular, total feed. Poor appetite.  BOWEL/BLADDER: Chronic keating in place for retention. No BM this shift, passing gas.  DRAIN/DEVICES: R PIV SL   SKIN: Mepilex on coccyx wound CDI. Dressing on left great toe wound CDI. All extremities elevated on pillows.  TESTS/PROCEDURES: None  D/C DATE: Pending TCU placement. SW following.

## 2022-09-23 NOTE — PROGRESS NOTES
[facility]  Hospitalist Progress Note   09/23/2022          Assessment and Plan:       William Almazan is an 81-year-old male with dementia, HTN, CHF (HFrEF), CAD, PAF, gout, urinary retention with chronic indwelling Louie catheter, h/o prostate cancer; with recent hospitalization at [facility] (8/2-8/24/2022) for issues including failure to thrive with hypotension and TONE, psychosis. Transferred from TCU 8/30/2022 with left hand pain, decreased motion and increasing generalized weakness.     Suspected Lewy body dementia.  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   Rigidity with motor symptoms/extrapyramidal side effects suspect related to above with worsening by anti-psychotics.  Depression/anxiety.  Physical deconditioning from senile fragility, medical illness.  * Recent hospitalization at [facility] 8/2 - 8/24/ 2022 with presyncope, hypotension, dementia with psychosis. PTA donepezil, escitalopram, risperidone and gabapentin.  --Admitted with generalized weakness and progressive inability to ambulate accompanied by extremity rigidity. Wife reported worsening symptoms after recent hospitalization and start of antipsychotic medications to help treat underlying psychosis. However, since starting, the patient exhibited worsening rigidity concerning for parkinsonism.   --On admission afebrile, hemodynamically stable, WBC normal, hgb 10.5; BMP unremarkable; LFT's showed low albumin and protein, otherwise normal.  Head CT 8/30 negative.   * Unable to obtain an MRI due to PPM/abandoned RV lead.  * Neurology followed, overall there was concern for underlying Lewy body dementia, with dystonia that may have been worsened by starting antipsychotics.  * Psychiatry stopped PTA risperidone and started aripiprazole 8/31.  Carbidopa-levodopa started 9/1.   Given continued rigidity, aripiprazole stopped 9/2. Pimavanserin started 9/2.  Quetiapine started 9/4. Increased carbidopa-levadopa 9/4. Pimavanserin stopped 9/4 due to long  term cost issues.  Escitalopram decreased 9/5.  plan  - Continue carbidopa-levodopa  mg TID.  - Continue donepezil 10 mg at bedtime; quetiapine 25 mg at bedtime; PRN quetiapine 12.5 mg BID.  - Continue escitalopram 10 mg daily, gabapentin 100 mg TID.  - PT, OT recommend TCU for rehabilitation.  -  assisting with transition plans -Hospital course prolonged with placement issues.     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   Orthopedics recommend WBAT and elevation recommended; PRN left wrist splint ordered.  Continue WBAT.  Elevated left upper extremity when at rest.  Continue PRN left wrist splint.  Continue PRN acetaminophen.  Outpatient follow-up with hand surgeon.      CAD with h/o CABG (1992).  CHF (HFrEF, biventricular).  Hypertension (benign essential).  [PTA: carvedilol 12.5 mg BID; furosemide 40 mg daily; spironolactone 25 mg daily.]  * Echo 8/3/2022 showed LVEF 15-20%, grade 1 diastolic dysfunction, severe diffuse hypokinesis; RV global function mildly reduced.  - Continue carvedilol; furosemide; spironolactone.  On Eliquis as below for anticoagulation.  If any drop in hemoglobin will consider holding Eliquis.  - Monitor i/o's, daily wts.     Paroxysmal atrial fibrillation.  S/p PPM.  - Continue apixaban, carvedilol.     Complicated urinary tract infection.  Chronic indwelling Louie catheter due to urinary retention.   Chronic hematuria.  Prostate cancer s/p XRT > 10 years ago with known radiation cystitis  Louie catheter changed every month.  Urology impression, Given his known radiation cystitis, occasional blood in the urine is expected. If urine becomes cherry or maroon in color with  clots or if the catheter stops draining, would consider bladder irrigation.  Appreciate input.  UA 9/14 with moderate leukocyte esterase, greater than 182 WBCs  Urine cultures 9/14 less than 10,000 urogenital qasim.  Urology recommends to treat complicated UTI given indwelling catheter, On IV ceftriaxone [9/15] - 3 days.   Louie catheter changed 9/15.  Follow-up with primary urologist at [facility] after discharge.     Iron deficiency anemia  Recently completed a course of IV iron sucrose at [facility] 8/2022.  Hemoglobin stable around 10-11.  Monitor on Monday ( 9/19) or earlier if symptomatic.    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  Continue local wound cares per [facility] nurse rec's     Malnutrition related to acute and chronic medical issues.  Nutrition followed, continue dietary supplements.     Gout.  Continue allopurinol.     KRISTIAN.  Does not use CPAP. Follow-up outpatient.     Recent COVID-19 infection, recovered.  Had minimal symptoms (positive on 7/30) considered COVID recovered.     Orders Placed This Encounter      Regular Diet Adult      Advance Diet as Tolerated      DVT Prophylaxis: On Eliquis.  Code Status: Full Code  Disposition: Expected discharge pending safe discharge plan in place     Discussed with patient. Discussed with wife yesterday. We are awaiting rehab    Jose Law, DO        Interval History:      Patient appears comfortable lying in bed.  No acute change, still awaiting safe disposition  Feels more constipated, will add pericolace         Physical Exam:        Physical Exam   Temp:  [97.4  F (36.3  C)-98.5  F (36.9  C)] 97.5  F (36.4  C)  Pulse:  [51-87] 87  Resp:  [14-17] 16  BP: (117-140)/(71-80) 131/80  SpO2:  [97 %-98 %] 97 %    Intake/Output Summary (Last 24 hours) at 9/16/2022 1410  Last data filed at 9/16/2022 1300  Gross per 24 hour   Intake 240 ml   Output 700 ml   Net -460 ml       Admission Weight: 81.9 kg (180 lb 8.9  oz)  Current Weight: 81.9 kg (180 lb 8.9 oz)    PHYSICAL EXAM  GENERAL: Patient awake, frail-appearing.  Able to answer simple questions.  LUNGS:Respirations unlabored  ABDOMEN: Soft, no abdominal tenderness, bowel sounds heard   Genitourinary dark-colored urine.  NEURO: Moving all extremities.  EXTREMITIES: No pedal edema.  PSYCHIATRY Cooperative       Medications:          allopurinol  300 mg Oral Daily     apixaban ANTICOAGULANT  5 mg Oral BID     carbidopa-levodopa  1 tablet Oral TID     carvedilol  12.5 mg Oral BID     donepezil  10 mg Oral At Bedtime     escitalopram  10 mg Oral Daily     furosemide  40 mg Oral Daily     gabapentin  100 mg Oral TID     loratadine  10 mg Oral Daily     multivitamin w/minerals  1 tablet Oral Daily     psyllium  3 capsule Oral Daily     QUEtiapine  25 mg Oral At Bedtime     senna-docusate  1 tablet Oral BID     sennosides  1 tablet Oral BID     sodium chloride (PF)  3 mL Intracatheter Q8H     spironolactone  25 mg Oral Daily     acetaminophen, artificial tears, melatonin, ondansetron **OR** ondansetron, polyethylene glycol, QUEtiapine, sodium chloride (PF)         Data:      All new lab and imaging data was reviewed.

## 2022-09-23 NOTE — PROGRESS NOTES
Shift Summary [id number]    Admitting Diagnosis: Weakness of left hand.  Bilateral leg weakness.  Pain of left hand.    Patient A&Ox4. VSS. C/O of BLE pain PRN Tylenol given was effective. PIV on RA SL. Feeder with poor appetite refused break fast/Lunch, did drink haft of ensure, Pt requesting to eat dinner  later. Turn/repo Q2hrs.  No BM on this shift Stool softener given per orders w/ no results. Mepilex on coccyx wound changed CDI. Dressing on left great toe wound changed CDI. Prevalon Boots in place on BLE and elevated. Pending TCU placement. Will continue to monitor.

## 2022-09-23 NOTE — PLAN OF CARE
Goal Outcome Evaluation:    DATE & TIME: 9/23/22 5948-8920  Cognitive Concerns/ Orientation: A&Ox4, forgetful, slow speech.  BEHAVIOR & AGGRESSION TOOL COLOR: Green     ABNL VS/O2: VSS on RA; HR dips into 30's; coreg held per orders  MOBILITY: Total, up with lift. T&R Q2h. Prevalon boots in place, pulsate mattress.   PAIN MANAGMENT: Denies  DIET: Regular, total feed. Poor appetite. Drank majority of Ensure, refused supper.  BOWEL/BLADDER: Chronic keating in place for retention. Urine cloudy donna w/ clots. Small smear of BM during shift; Senna given per orders.  DRAIN/DEVICES: R PIV SL   SKIN: Superficial open areas to coccyx/sacrum; New Mepilex applied. Dressing on left great toe wound CDI. Observed w/ skin tear to R groin fold, B/L abdominal folds also appearing pink/red. Foam drsg applied to R groin fold. B/L abdominal folds cleansed, powder applied. All extremities elevated on pillows.  TESTS/PROCEDURES: None  D/C DATE: Pending TCU placement. SW following. SALENA noted w/ +2 edema; mainly in hands, elevated on pillows. Trialing soft push call-light as patient's B/L hands more contracted.

## 2022-09-24 LAB
ERYTHROCYTE [DISTWIDTH] IN BLOOD BY AUTOMATED COUNT: 16.5 % (ref 10–15)
HCT VFR BLD AUTO: 39.2 % (ref 40–53)
HGB BLD-MCNC: 11.9 G/DL (ref 13.3–17.7)
MCH RBC QN AUTO: 27.4 PG (ref 26.5–33)
MCHC RBC AUTO-ENTMCNC: 30.4 G/DL (ref 31.5–36.5)
MCV RBC AUTO: 90 FL (ref 78–100)
PLATELET # BLD AUTO: 242 10E3/UL (ref 150–450)
RBC # BLD AUTO: 4.34 10E6/UL (ref 4.4–5.9)
WBC # BLD AUTO: 4.2 10E3/UL (ref 4–11)

## 2022-09-24 PROCEDURE — 120N000001 HC R&B MED SURG/OB

## 2022-09-24 PROCEDURE — 250N000013 HC RX MED GY IP 250 OP 250 PS 637: Performed by: HOSPITALIST

## 2022-09-24 PROCEDURE — 99232 SBSQ HOSP IP/OBS MODERATE 35: CPT | Performed by: HOSPITALIST

## 2022-09-24 PROCEDURE — 36415 COLL VENOUS BLD VENIPUNCTURE: CPT | Performed by: HOSPITALIST

## 2022-09-24 PROCEDURE — 85027 COMPLETE CBC AUTOMATED: CPT | Performed by: HOSPITALIST

## 2022-09-24 PROCEDURE — 250N000013 HC RX MED GY IP 250 OP 250 PS 637: Performed by: PSYCHIATRY & NEUROLOGY

## 2022-09-24 PROCEDURE — 250N000013 HC RX MED GY IP 250 OP 250 PS 637: Performed by: INTERNAL MEDICINE

## 2022-09-24 RX ADMIN — SENNOSIDES 1 TABLET: 8.6 TABLET, FILM COATED ORAL at 21:29

## 2022-09-24 RX ADMIN — SENNOSIDES AND DOCUSATE SODIUM 1 TABLET: 50; 8.6 TABLET ORAL at 21:29

## 2022-09-24 RX ADMIN — SENNOSIDES AND DOCUSATE SODIUM 1 TABLET: 50; 8.6 TABLET ORAL at 10:01

## 2022-09-24 RX ADMIN — SPIRONOLACTONE 25 MG: 25 TABLET ORAL at 10:03

## 2022-09-24 RX ADMIN — ESCITALOPRAM OXALATE 10 MG: 10 TABLET ORAL at 10:01

## 2022-09-24 RX ADMIN — FUROSEMIDE 40 MG: 40 TABLET ORAL at 10:01

## 2022-09-24 RX ADMIN — GABAPENTIN 100 MG: 100 CAPSULE ORAL at 10:02

## 2022-09-24 RX ADMIN — MULTIPLE VITAMINS W/ MINERALS TAB 1 TABLET: TAB at 10:02

## 2022-09-24 RX ADMIN — APIXABAN 5 MG: 5 TABLET, FILM COATED ORAL at 10:03

## 2022-09-24 RX ADMIN — GABAPENTIN 100 MG: 100 CAPSULE ORAL at 21:29

## 2022-09-24 RX ADMIN — Medication 3 CAPSULE: at 10:02

## 2022-09-24 RX ADMIN — QUETIAPINE FUMARATE 25 MG: 25 TABLET ORAL at 21:29

## 2022-09-24 RX ADMIN — CARBIDOPA AND LEVODOPA 1 TABLET: 25; 100 TABLET ORAL at 10:02

## 2022-09-24 RX ADMIN — DONEPEZIL HYDROCHLORIDE 10 MG: 10 TABLET ORAL at 21:29

## 2022-09-24 RX ADMIN — GABAPENTIN 100 MG: 100 CAPSULE ORAL at 14:28

## 2022-09-24 RX ADMIN — APIXABAN 5 MG: 5 TABLET, FILM COATED ORAL at 21:29

## 2022-09-24 RX ADMIN — LORATADINE 10 MG: 10 TABLET ORAL at 10:01

## 2022-09-24 RX ADMIN — ALLOPURINOL 300 MG: 300 TABLET ORAL at 10:01

## 2022-09-24 RX ADMIN — CARVEDILOL 12.5 MG: 12.5 TABLET, FILM COATED ORAL at 10:02

## 2022-09-24 RX ADMIN — CARBIDOPA AND LEVODOPA 1 TABLET: 25; 100 TABLET ORAL at 14:28

## 2022-09-24 RX ADMIN — CARVEDILOL 12.5 MG: 12.5 TABLET, FILM COATED ORAL at 21:30

## 2022-09-24 RX ADMIN — CARBIDOPA AND LEVODOPA 1 TABLET: 25; 100 TABLET ORAL at 21:29

## 2022-09-24 ASSESSMENT — ACTIVITIES OF DAILY LIVING (ADL)
ADLS_ACUITY_SCORE: 63
ADLS_ACUITY_SCORE: 65
ADLS_ACUITY_SCORE: 67
ADLS_ACUITY_SCORE: 63
ADLS_ACUITY_SCORE: 63
ADLS_ACUITY_SCORE: 65
ADLS_ACUITY_SCORE: 63
ADLS_ACUITY_SCORE: 61

## 2022-09-24 NOTE — PLAN OF CARE
Goal Outcome Evaluation:      DATE & TIME: 9/23/22-9/24/22 5548-2292  Cognitive Concerns/ Orientation: Alert and Oriented x4, forgetful to situation at times, slow speech. Declines many cares, needs encouragement to perform cares.  BEHAVIOR & AGGRESSION TOOL COLOR: Green     ABNL VS/O2: VSS on RA, bradycardic  MOBILITY: Total, up with lift. T&R Q2h. Prevalon boots in place, pulsate mattress.   PAIN MANAGMENT: 5/10 in L great toe and bilateral hands  DIET: Regular, total feed. Poor appetite. Likes orange juice and cold ensure.  BOWEL/BLADDER: Chronic keating in place for retention. Incontinent. 1 BM this shift. Urine dark pink/red, no clots.  DRAIN/DEVICES: R PIV SL   SKIN: Mepilex on coccyx wound CDI. Dressing on left great toe wound CDI. Bandage on R groin wound CDI. All extremities elevated on pillows. +1 edema bilateral hands and feet/ankles.  TESTS/PROCEDURES: None  D/C DATE: Pending TCU placement. SW following.

## 2022-09-24 NOTE — PROGRESS NOTES
[facility]  Hospitalist Progress Note   09/24/2022          Assessment and Plan:       William Almazan is an 81-year-old male with dementia, HTN, CHF (HFrEF), CAD, PAF, gout, urinary retention with chronic indwelling Louie catheter, h/o prostate cancer; with recent hospitalization at [facility] (8/2-8/24/2022) for issues including failure to thrive with hypotension and TONE, psychosis. Transferred from TCU 8/30/2022 with left hand pain, decreased motion and increasing generalized weakness.     Suspected Lewy body dementia.  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   Rigidity with motor symptoms/extrapyramidal side effects suspect related to above with worsening by anti-psychotics.  Depression/anxiety.  Physical deconditioning from senile fragility, medical illness.  * Recent hospitalization at [facility] 8/2 - 8/24/ 2022 with presyncope, hypotension, dementia with psychosis. PTA donepezil, escitalopram, risperidone and gabapentin.  --Admitted with generalized weakness and progressive inability to ambulate accompanied by extremity rigidity. Wife reported worsening symptoms after recent hospitalization and start of antipsychotic medications to help treat underlying psychosis. However, since starting, the patient exhibited worsening rigidity concerning for parkinsonism.   --On admission afebrile, hemodynamically stable, WBC normal, hgb 10.5; BMP unremarkable; LFT's showed low albumin and protein, otherwise normal.  Head CT 8/30 negative.   * Unable to obtain an MRI due to PPM/abandoned RV lead.  * Neurology followed, overall there was concern for underlying Lewy body dementia, with dystonia that may have been worsened by starting antipsychotics.  * Psychiatry stopped PTA risperidone and started aripiprazole 8/31.  Carbidopa-levodopa started 9/1.   Given continued rigidity, aripiprazole stopped 9/2. Pimavanserin started 9/2.  Quetiapine started 9/4. Increased carbidopa-levadopa 9/4. Pimavanserin stopped 9/4 due to long  term cost issues.  Escitalopram decreased 9/5.  plan  - Continue carbidopa-levodopa  mg TID.  - Continue donepezil 10 mg at bedtime; quetiapine 25 mg at bedtime; PRN quetiapine 12.5 mg BID.  - Continue escitalopram 10 mg daily, gabapentin 100 mg TID.  - PT, OT recommend TCU for rehabilitation.  -  assisting with transition plans -Hospital course prolonged with placement issues.     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   Orthopedics recommend WBAT and elevation recommended; PRN left wrist splint ordered.  Continue WBAT.  Elevated left upper extremity when at rest.  Continue PRN left wrist splint.  Continue PRN acetaminophen.  Outpatient follow-up with hand surgeon.      CAD with h/o CABG (1992).  CHF (HFrEF, biventricular).  Hypertension (benign essential).  [PTA: carvedilol 12.5 mg BID; furosemide 40 mg daily; spironolactone 25 mg daily.]  * Echo 8/3/2022 showed LVEF 15-20%, grade 1 diastolic dysfunction, severe diffuse hypokinesis; RV global function mildly reduced.  - Continue carvedilol; furosemide; spironolactone.  On Eliquis as below for anticoagulation.  If any drop in hemoglobin will consider holding Eliquis.  - Monitor i/o's, daily wts.     Paroxysmal atrial fibrillation.  S/p PPM.  - Continue apixaban, carvedilol.     Complicated urinary tract infection.  Chronic indwelling Louie catheter due to urinary retention.   Chronic hematuria.  Prostate cancer s/p XRT > 10 years ago with known radiation cystitis  Louie catheter changed every month.  Urology impression, Given his known radiation cystitis, occasional blood in the urine is expected. If urine becomes cherry or maroon in color with  clots or if the catheter stops draining, would consider bladder irrigation.  Appreciate input.  UA 9/14 with moderate leukocyte esterase, greater than 182 WBCs  Urine cultures 9/14 less than 10,000 urogenital qasim.  Urology recommends to treat complicated UTI given indwelling catheter, On IV ceftriaxone [9/15] - 3 days.   Louie catheter changed 9/15.  Follow-up with primary urologist at [facility] after discharge.     Iron deficiency anemia  Recently completed a course of IV iron sucrose at [facility] 8/2022.  Hemoglobin stable around 10-11.  Monitor on Monday ( 9/19) or earlier if symptomatic.    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  Continue local wound cares per [facility] nurse rec's     Malnutrition related to acute and chronic medical issues.  Nutrition followed, continue dietary supplements.     Gout.  Continue allopurinol.     KRISTIAN.  Does not use CPAP. Follow-up outpatient.     Recent COVID-19 infection, recovered.  Had minimal symptoms (positive on 7/30) considered COVID recovered.     Orders Placed This Encounter      Regular Diet Adult      Advance Diet as Tolerated      DVT Prophylaxis: On Eliquis.  Code Status: Full Code  Disposition: Expected discharge pending safe discharge plan in place     Discussed with patient,  awaiting rehab    Brandon Ross MD   Hospitalist        Interval History:        Denied pain or dyspnea, patient being fed by nursing aid.            Physical Exam:        Physical Exam   Temp:  [97.7  F (36.5  C)-98  F (36.7  C)] 97.7  F (36.5  C)  Pulse:  [42-78] 78  Resp:  [16-18] 18  BP: (117-127)/(64-75) 127/72  SpO2:  [94 %-97 %] 97 %    Intake/Output Summary (Last 24 hours) at 9/16/2022 1410  Last data filed at 9/16/2022 1300  Gross per 24 hour   Intake 240 ml   Output 700 ml   Net -460 ml       Admission Weight: 81.9 kg (180 lb 8.9 oz)  Current Weight: 81.9 kg (180 lb 8.9 oz)    PHYSICAL EXAM  GENERAL: Patient awake, frail-appearing.   Able to answer simple questions.  LUNGS:Respirations unlabored  ABDOMEN: Soft, no abdominal tenderness, bowel sounds heard   Genitourinary dark-colored urine.  NEURO: Moving all extremities.  EXTREMITIES: No pedal edema.  PSYCHIATRY Cooperative       Medications:          allopurinol  300 mg Oral Daily     apixaban ANTICOAGULANT  5 mg Oral BID     carbidopa-levodopa  1 tablet Oral TID     carvedilol  12.5 mg Oral BID     donepezil  10 mg Oral At Bedtime     escitalopram  10 mg Oral Daily     furosemide  40 mg Oral Daily     gabapentin  100 mg Oral TID     loratadine  10 mg Oral Daily     multivitamin w/minerals  1 tablet Oral Daily     psyllium  3 capsule Oral Daily     QUEtiapine  25 mg Oral At Bedtime     senna-docusate  1 tablet Oral BID     sennosides  1 tablet Oral BID     sodium chloride (PF)  3 mL Intracatheter Q8H     spironolactone  25 mg Oral Daily     acetaminophen, artificial tears, melatonin, ondansetron **OR** ondansetron, polyethylene glycol, QUEtiapine, sodium chloride (PF)         Data:      All new lab and imaging data was reviewed.

## 2022-09-24 NOTE — PLAN OF CARE
DATE & TIME: 9/24/22 3897-5250   Cognitive Concerns/ Orientation : A&Ox2, disoriented to time and situation.  BEHAVIOR & AGGRESSION TOOL COLOR: Green   ABNL VS/O2: VSS on RA  MOBILITY: Total T/R  PAIN MANAGMENT: Denies  DIET: Reg, assist with eating.  BOWEL/BLADDER: Inc of bowel, Louie  ABNL LAB/BG: Hgb 11.9  DRAIN/DEVICES: PIV SL. Louie in place.  SKIN: Wounds to toe and coccyx  D/C DATE: Pending  OTHER IMPORTANT INFO: Jacy has had red output and MD ordered a 50ml saline flush. Flush tolerated and no blood clots noted. Will continue to monitor.

## 2022-09-24 NOTE — PLAN OF CARE
Goal Outcome Evaluation:                  Patient A&0x 2. Up with Lift. On pulsate mattress.  Tolerating regular diet. Total feed. Takes pills well with OJ.  He has a chronic keating cathter Urine is red with no clots noted,   His hands are contracted bilaterally. He has good plantar and dorsi flexion bilaterally but is unable to lift his legs off the bed.  Radial pulses +2. He denies numbness and tingling. He denies pain. Lungs diminished.  No IV access.  Full code. Plan for TCU

## 2022-09-25 LAB
HCT VFR BLD AUTO: 40.1 % (ref 40–53)
HGB BLD-MCNC: 12.1 G/DL (ref 13.3–17.7)

## 2022-09-25 PROCEDURE — 85018 HEMOGLOBIN: CPT | Performed by: HOSPITALIST

## 2022-09-25 PROCEDURE — 36415 COLL VENOUS BLD VENIPUNCTURE: CPT | Performed by: HOSPITALIST

## 2022-09-25 PROCEDURE — 99232 SBSQ HOSP IP/OBS MODERATE 35: CPT | Performed by: HOSPITALIST

## 2022-09-25 PROCEDURE — 99231 SBSQ HOSP IP/OBS SF/LOW 25: CPT | Performed by: UROLOGY

## 2022-09-25 PROCEDURE — 250N000013 HC RX MED GY IP 250 OP 250 PS 637: Performed by: HOSPITALIST

## 2022-09-25 PROCEDURE — 120N000001 HC R&B MED SURG/OB

## 2022-09-25 PROCEDURE — 250N000013 HC RX MED GY IP 250 OP 250 PS 637: Performed by: INTERNAL MEDICINE

## 2022-09-25 PROCEDURE — 250N000013 HC RX MED GY IP 250 OP 250 PS 637: Performed by: PSYCHIATRY & NEUROLOGY

## 2022-09-25 RX ADMIN — SPIRONOLACTONE 25 MG: 25 TABLET ORAL at 08:28

## 2022-09-25 RX ADMIN — SENNOSIDES 1 TABLET: 8.6 TABLET, FILM COATED ORAL at 08:28

## 2022-09-25 RX ADMIN — CARVEDILOL 12.5 MG: 12.5 TABLET, FILM COATED ORAL at 20:44

## 2022-09-25 RX ADMIN — Medication 3 CAPSULE: at 08:28

## 2022-09-25 RX ADMIN — ALLOPURINOL 300 MG: 300 TABLET ORAL at 08:28

## 2022-09-25 RX ADMIN — CARBIDOPA AND LEVODOPA 1 TABLET: 25; 100 TABLET ORAL at 14:28

## 2022-09-25 RX ADMIN — LORATADINE 10 MG: 10 TABLET ORAL at 08:28

## 2022-09-25 RX ADMIN — FUROSEMIDE 40 MG: 40 TABLET ORAL at 08:28

## 2022-09-25 RX ADMIN — SENNOSIDES 1 TABLET: 8.6 TABLET, FILM COATED ORAL at 20:45

## 2022-09-25 RX ADMIN — APIXABAN 5 MG: 5 TABLET, FILM COATED ORAL at 08:28

## 2022-09-25 RX ADMIN — GABAPENTIN 100 MG: 100 CAPSULE ORAL at 08:28

## 2022-09-25 RX ADMIN — ESCITALOPRAM OXALATE 10 MG: 10 TABLET ORAL at 08:28

## 2022-09-25 RX ADMIN — SENNOSIDES AND DOCUSATE SODIUM 1 TABLET: 50; 8.6 TABLET ORAL at 08:28

## 2022-09-25 RX ADMIN — GABAPENTIN 100 MG: 100 CAPSULE ORAL at 20:45

## 2022-09-25 RX ADMIN — GABAPENTIN 100 MG: 100 CAPSULE ORAL at 14:28

## 2022-09-25 RX ADMIN — APIXABAN 5 MG: 5 TABLET, FILM COATED ORAL at 20:44

## 2022-09-25 RX ADMIN — MULTIPLE VITAMINS W/ MINERALS TAB 1 TABLET: TAB at 08:28

## 2022-09-25 RX ADMIN — CARBIDOPA AND LEVODOPA 1 TABLET: 25; 100 TABLET ORAL at 20:45

## 2022-09-25 RX ADMIN — CARBIDOPA AND LEVODOPA 1 TABLET: 25; 100 TABLET ORAL at 08:28

## 2022-09-25 RX ADMIN — DONEPEZIL HYDROCHLORIDE 10 MG: 10 TABLET ORAL at 22:12

## 2022-09-25 RX ADMIN — QUETIAPINE FUMARATE 25 MG: 25 TABLET ORAL at 22:12

## 2022-09-25 RX ADMIN — CARVEDILOL 12.5 MG: 12.5 TABLET, FILM COATED ORAL at 08:28

## 2022-09-25 ASSESSMENT — ACTIVITIES OF DAILY LIVING (ADL)
ADLS_ACUITY_SCORE: 67
ADLS_ACUITY_SCORE: 63
ADLS_ACUITY_SCORE: 67
ADLS_ACUITY_SCORE: 63
ADLS_ACUITY_SCORE: 67
ADLS_ACUITY_SCORE: 63
ADLS_ACUITY_SCORE: 63

## 2022-09-25 NOTE — PLAN OF CARE
Goal Outcome Evaluation:    Patient is A&O x4, forgetful @ times. Up with mechanical lift. Turn and repo.  Dressing changed on L foot great toe. Mepilex changed on coccyx x2. Louie catheter patent and flushed, urine red in color with tiny clots noticed, MD notified. Assist with feeding, poor appetite. Had large BM x1. Will continue to monitor.

## 2022-09-25 NOTE — PROGRESS NOTES
[facility] Urology Consult Progress Note          Assessment and Plan:     Active Problems:  81-year-old man with past medical history of prostate cancer status post radiation therapy with known radiation cystitis and a chronic indwelling Louie catheter with complex medical history including Lewy body dementia and atrial fibrillation.    Intermittent hematuria  - His Louie catheter is draining light red to pink urine  - Catheter may be irrigated as needed  - No indication for CBI or other interventions at this time  - He can follow-up with his primary urologist as an outpatient      Matty Santiago MD   [facility]  Office: [phone]               Interval History:     Resting comfortably in bed.  He denies any abdominal pain.              Review of Systems:     The 5 point Review of Systems is negative other than noted in the HPI             Medications:     Current Facility-Administered Medications Ordered in Epic   Medication Dose Route Frequency Last Rate Last Admin     acetaminophen (TYLENOL) tablet 650 mg  650 mg Oral Q4H PRN   650 mg at 09/23/22 2108     allopurinol (ZYLOPRIM) tablet 300 mg  300 mg Oral Daily   300 mg at 09/25/22 0828     apixaban ANTICOAGULANT (ELIQUIS) tablet 5 mg  5 mg Oral BID   5 mg at 09/25/22 0828     carbidopa-levodopa (SINEMET)  MG per tablet 1 tablet  1 tablet Oral TID   1 tablet at 09/25/22 0828     carboxymethylcellulose PF (REFRESH PLUS) 0.5 % ophthalmic solution 1 drop  1 drop Both Eyes Q1H PRN   1 drop at 09/07/22 1633     carvedilol (COREG) tablet 12.5 mg  12.5 mg Oral BID   12.5 mg at 09/25/22 0828     donepezil (ARICEPT) tablet 10 mg  10 mg Oral At Bedtime   10 mg at 09/24/22 2129     escitalopram (LEXAPRO) tablet 10 mg  10 mg Oral Daily   10 mg at 09/25/22 0828     furosemide (LASIX) tablet 40 mg  40 mg Oral Daily   40 mg at 09/25/22 0828     gabapentin (NEURONTIN) capsule 100 mg  100 mg Oral TID   100 mg at 09/25/22 0828     loratadine  (CLARITIN) tablet 10 mg  10 mg Oral Daily   10 mg at 09/25/22 0828     melatonin tablet 1 mg  1 mg Oral At Bedtime PRN         multivitamin w/minerals (THERA-VIT-M) tablet 1 tablet  1 tablet Oral Daily   1 tablet at 09/25/22 0828     ondansetron (ZOFRAN ODT) ODT tab 4 mg  4 mg Oral Q6H PRN   4 mg at 09/17/22 0849    Or     ondansetron (ZOFRAN) injection 4 mg  4 mg Intravenous Q6H PRN         polyethylene glycol (MIRALAX) powder 17 g  17 g Oral Daily PRN         psyllium (METAMUCIL/KONSYL) capsule 3 capsule  3 capsule Oral Daily   3 capsule at 09/25/22 0828     QUEtiapine (SEROquel) half-tab 12.5 mg  12.5 mg Oral BID PRN   12.5 mg at 09/07/22 1943     QUEtiapine (SEROquel) tablet 25 mg  25 mg Oral At Bedtime   25 mg at 09/24/22 2129     senna-docusate (SENOKOT-S/PERICOLACE) 8.6-50 MG per tablet 1 tablet  1 tablet Oral BID   1 tablet at 09/25/22 0828     sennosides (SENOKOT) tablet 1 tablet  1 tablet Oral BID   1 tablet at 09/25/22 0828     sodium chloride (PF) 0.9% PF flush 3 mL  3 mL Intracatheter Q8H   3 mL at 09/25/22 0655     sodium chloride (PF) 0.9% PF flush 3 mL  3 mL Intracatheter q1 min prn         spironolactone (ALDACTONE) tablet 25 mg  25 mg Oral Daily   25 mg at 09/25/22 0828     Current Outpatient Medications Ordered in Epic   Medication     carbidopa-levodopa (SINEMET)  MG tablet     escitalopram (LEXAPRO) 10 MG tablet     multivitamin w/minerals (THERA-VIT-M) tablet     ondansetron (ZOFRAN ODT) 4 MG ODT tab     QUEtiapine (SEROQUEL) 25 MG tablet     QUEtiapine (SEROQUEL) 25 MG tablet                  Physical Exam:   Vitals were reviewed  Patient Vitals for the past 8 hrs:   BP Temp Temp src Pulse Resp SpO2   09/25/22 0900 -- -- -- -- 16 --   09/25/22 0755 (!) 141/91 97.4  F (36.3  C) Oral 65 18 95 %     GEN: NAD, lying in bed  HEENT: EOMI  NECK: Supple  ABD: soft  EXT: No LE edema  : Louie draining light red to pink urine           Data:     Lab Results   Component Value Date    CR 0.49 (L)  09/21/2022    CR 0.58 (L) 09/14/2022    CR 0.56 (L) 09/11/2022    CR 0.50 (L) 09/06/2022    CR 0.48 (L) 09/04/2022     Lab Results   Component Value Date    WBC 4.2 09/24/2022    WBC 5.1 09/14/2022    WBC 4.9 09/11/2022    HGB 11.9 (L) 09/24/2022    HGB 11.3 (L) 09/18/2022    HGB 10.9 (L) 09/16/2022    HCT 39.2 (L) 09/24/2022    HCT 38.2 (L) 09/14/2022    HCT 35.9 (L) 09/11/2022    MCV 90 09/24/2022    MCV 87 09/14/2022    MCV 91 09/11/2022     09/24/2022     09/14/2022     09/11/2022     No results found for: INR

## 2022-09-25 NOTE — PLAN OF CARE
Goal Outcome Evaluation:      Summary: L hand pain, decreased motion, and increased generalized weakness; Suspected Lewy Body Dementia  DATE & TIME: 9/24/22-9/25/22 2590-6018   Cognitive Concerns/ Orientation : A&Ox2, disoriented to time and situation.  BEHAVIOR & AGGRESSION TOOL COLOR: Green   ABNL VS/O2: VSS on RA  MOBILITY: Total, T/R q2h, moderately impaired movement  PAIN MANAGMENT: Denies  DIET: Reg, needs full assist with eating.  BOWEL/BLADDER: Incontinent of bowel. Chronic keating with chronic hematuria, no clots identified this shift.  ABNL LAB/BG: Hgb 11.9  DRAIN/DEVICES: RPIV SL. Keating in place.  SKIN: Wounds to L great toe, bandage changed and CDI. Mepilex in place on coccyx, CDI. Mepilex in place on R groin site, CDI.  D/C DATE: awaiting safe discharge plan, possibly to rehab  OTHER IMPORTANT INFO: [place] has had red output and MD ordered a 50ml saline flush for 9/24 evening- flush tolerated. No blood clots noted during night shift. Continue to monitor.

## 2022-09-25 NOTE — PLAN OF CARE
Goal Outcome Evaluation:    Plan of Care Reviewed With: patient     Overall Patient Progress: no change    Outcome Evaluation: Diet: Regular + Ensure with meals.  Continues with decreased meal intake - pt states he is always taking the Ensure.

## 2022-09-25 NOTE — PROVIDER NOTIFICATION
MD Notification    Notified Person: MD    Notified Person Name:  Cody    Notification Date/Time:  09/25/2022 @ 0952    Notification Interaction: epi    Purpose of Notification: flushed Catheter 50 with 50 mL, continue to have hematuria, a very tiny clot noticed.      Orders Received:  @1631: hospitalist will request Urology to follow.     Comments:

## 2022-09-25 NOTE — PROGRESS NOTES
CLINICAL NUTRITION SERVICES - REASSESSMENT NOTE      Future/Additional Recommendations:     Continue to send Ensure with meals (350 cals and 20 gm pro each)  Obtain current wt     Malnutrition: (9/8)  % Weight Loss:  Weight loss does not meet criteria for malnutrition   % Intake:  <75% for > 7 days (moderate malnutrition)  Subcutaneous Fat Loss:  Orbital region severe depletion and Upper arm region moderate depletion  Muscle Loss:  Temporal region severe depletion, Clavicle bone region severe depletion, Acromion bone region severe depletion and Dorsal hand region moderate depletion  Fluid Retention:  Mild      Malnutrition Diagnosis: Severe malnutrition  In Context of:  Chronic illness or disease       EVALUATION OF PROGRESS TOWARD GOALS   Diet:    Regular  Room Service with Assist  Ensure with meals    Intake/Tolerance:    Chart reviewed  Visited with pt this afternoon as RN was providing cares  Pt tells me that he still likes the Ensure and takes that at meals  RN states he had a late breakfast - ate 25% + his Ensure  Pt currently having his Ensure from lunch tray    Daily multivitamin     ASSESSED NUTRITION NEEDS:  Dosing Weight:  81.9 kg (8/31)  Estimated Energy Needs: 0572-0924 kcals (25-30 Kcal/Kg)  Justification: maintenance  Estimated Protein Needs: 106-123 grams protein (1.3-1.5 g pro/Kg)  Justification: Repletion, wound healing and hypercatabolism with acute illness      NEW FINDINGS:   9/21: WOCN  Wound location: buttock, sacralcoccygeal   Due to: Pressure Injury CAPI unstageable with components of MASD and friction and shear   Status: noted healed     Question wt hx - 30# drop in 2 weeks  Will order current wt    09/17/22 0700 68.4 kg (150 lb 12.7 oz) Bed scale   08/30/22 1942 81.9 kg (180 lb 8.9 oz)        TCU pending placement    Previous Goals (9/19):   Pt will consistently consume >/= 50% meals + supplements TID  Evaluation: Not met - taking Ensure, but not 50% meals consistently    Previous  Nutrition Diagnosis (9/19):   Inadequate oral intake related to decreased appetite, early satiety, and increased needs with wound healing as evidenced by variable oral intake since admission (overall fair at best), loss of subcutaneous fat and muscle mass, and presence of unstageable Pressure Injury on buttocks  Evaluation: No change, modified below        CURRENT NUTRITION DIAGNOSIS  Inadequate oral intake related to decreased appetite as evidenced by pt eating small amounts of meals and mainly taking Ensure at mealtimes    INTERVENTIONS  Recommendations / Nutrition Prescription  Regular diet    Continue to send Ensure with meals (350 cals and 20 gm pro each)  Obtain current wt        Goals  Pt to consume >50% meals and take 3 Ensure per day      MONITORING AND EVALUATION:  Progress towards goals will be monitored and evaluated per protocol and Practice Guidelines

## 2022-09-25 NOTE — PROGRESS NOTES
[facility]  Hospitalist Progress Note     When I evaluated patient: 09/25/2022          Assessment and Plan:       William Almazan is an 81-year-old male with dementia, HTN, CHF (HFrEF), CAD, PAF, gout, urinary retention with chronic indwelling Louie catheter, h/o prostate cancer; with recent hospitalization at [facility] (8/2-8/24/2022) for issues including failure to thrive with hypotension and TONE, psychosis. Transferred from TCU 8/30/2022 with left hand pain, decreased motion and increasing generalized weakness.     Suspected Lewy body dementia.  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   Rigidity with motor symptoms/extrapyramidal side effects suspect related to above with worsening by anti-psychotics.  Depression/anxiety.  Physical deconditioning from senile fragility, medical illness.  * Recent hospitalization at [facility] 8/2 - 8/24/ 2022 with presyncope, hypotension, dementia with psychosis. PTA donepezil, escitalopram, risperidone and gabapentin.  --Admitted with generalized weakness and progressive inability to ambulate accompanied by extremity rigidity. Wife reported worsening symptoms after recent hospitalization and start of antipsychotic medications to help treat underlying psychosis. However, since starting, the patient exhibited worsening rigidity concerning for parkinsonism.   --On admission afebrile, hemodynamically stable, WBC normal, hgb 10.5; BMP unremarkable; LFT's showed low albumin and protein, otherwise normal.  Head CT 8/30 negative.   * Unable to obtain an MRI due to PPM/abandoned RV lead.  * Neurology followed, overall there was concern for underlying Lewy body dementia, with dystonia that may have been worsened by starting antipsychotics.  * Psychiatry stopped PTA risperidone and started aripiprazole 8/31.  -Carbidopa-levodopa started 9/1.   -Given continued rigidity, aripiprazole stopped 9/2. Pimavanserin started 9/2.  -Quetiapine started 9/4. Increased carbidopa-levadopa 9/4.  Pimavanserin stopped 9/4 due to long term cost issues.  -Escitalopram decreased 9/5.  plan  - Continue carbidopa-levodopa  mg TID.  - Continue donepezil 10 mg at bedtime; quetiapine 25 mg at bedtime; PRN quetiapine 12.5 mg BID.  - Continue escitalopram 10 mg daily, gabapentin 100 mg TID.  - PT, OT recommend TCU for rehabilitation.  -  assisting with transition plans -Hospital course prolonged with placement issues.     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   -Orthopedics recommend WBAT and elevation recommended; PRN left wrist splint ordered.  -Continue WBAT.  -Elevated left upper extremity when at rest.  -Continue PRN left wrist splint.  -Continue PRN acetaminophen.  -Outpatient follow-up with hand surgeon.      CAD with h/o CABG (1992).  CHF (HFrEF, biventricular).  Hypertension (benign essential).  [PTA: carvedilol 12.5 mg BID; furosemide 40 mg daily; spironolactone 25 mg daily.]  * Echo 8/3/2022 showed LVEF 15-20%, grade 1 diastolic dysfunction, severe diffuse hypokinesis; RV global function mildly reduced.  - Continue carvedilol; furosemide; spironolactone.  - On Eliquis as below for anticoagulation.         Paroxysmal atrial fibrillation.  S/p PPM.  -On Coreg, continue.  Rate controlled.    - On apixaban for anticoagulation   -Patient has intermittent hematuria but hemoglobin fairly stable, monitor.     Complicated urinary tract infection.  Chronic indwelling Louie catheter due to urinary retention.   Chronic/ intermittent hematuria.  Prostate cancer s/p XRT > 10 years ago with known radiation cystitis  -Louie catheter changed every month.  -Intermittent hematuria, urology has been  consulted evaluated multiple times, per urology- Given his known radiation cystitis, occasional blood in the urine is expected. If urine becomes cherry or maroon in color with clots or if the catheter stops draining, would consider bladder irrigation.  Appreciate input.  -UA 9/14 with moderate leukocyte esterase, greater than 182 WBCs, cultures < 10,000 urogenital qasim.  -Urology recommends to treat complicated UTI given indwelling catheter, completed IV ceftriaxone [9/15] - 3 days.   -Louie catheter changed 9/15.  -Follow-up with primary urologist at [facility] after discharge.     Iron deficiency anemia  Recently completed a course of IV iron sucrose at [facility] 8/2022.  -Hemoglobin stable around 10-11.  -Hb remained stable despite intermittent hematuria.    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  Continue local wound cares per [facility] nurse rec's     Malnutrition related to acute and chronic medical issues.  Nutrition followed, continue dietary supplements.     Gout.  Continue allopurinol.     KRISTIAN.  Does not use CPAP. Follow-up outpatient.     Recent COVID-19 infection, recovered.  Had minimal symptoms (positive on 7/30) considered COVID recovered.     Orders Placed This Encounter      Regular Diet Adult      Advance Diet as Tolerated      DVT Prophylaxis: On Eliquis.  Code Status: Full Code  Disposition: Expected discharge pending safe discharge plan in place     Discussed with patient,  awaiting rehab    Brandon Ross MD   Hospitalist        Interval History:        Nursing noted blood-tinged urine in Louie catheter late yesterday.  Few cc of urine in bag, appears to have dark old blood.  No clot.   -Patient denies any new symptom including pain, dyspnea, nausea, suprapubic discomfort.              Physical Exam:        Physical Exam   Temp:  [97.4  F (36.3  C)-98.3  F (36.8  C)] 97.4  F (36.3  C)  Pulse:  [65-81] 65  Resp:  [16-20] 16  BP: (122-141)/(75-91)  141/91  SpO2:  [95 %-97 %] 95 %    Intake/Output Summary (Last 24 hours) at 9/16/2022 1410  Last data filed at 9/16/2022 1300  Gross per 24 hour   Intake 240 ml   Output 700 ml   Net -460 ml       Admission Weight: 81.9 kg (180 lb 8.9 oz)  Current Weight: 81.9 kg (180 lb 8.9 oz)    PHYSICAL EXAM  GENERAL: Patient awake, frail-appearing.  Able to answer simple questions.  LUNGS:Respirations unlabored  ABDOMEN: Soft, no abdominal tenderness, bowel sounds heard   Genitourinary dark-colored urine.  NEURO: Weakness on Rt.  EXTREMITIES: No pedal edema.  : Louie catheter in place, bag shows few cc of urine with dark old appearing blood.          Medications:          allopurinol  300 mg Oral Daily     apixaban ANTICOAGULANT  5 mg Oral BID     carbidopa-levodopa  1 tablet Oral TID     carvedilol  12.5 mg Oral BID     donepezil  10 mg Oral At Bedtime     escitalopram  10 mg Oral Daily     furosemide  40 mg Oral Daily     gabapentin  100 mg Oral TID     loratadine  10 mg Oral Daily     multivitamin w/minerals  1 tablet Oral Daily     psyllium  3 capsule Oral Daily     QUEtiapine  25 mg Oral At Bedtime     senna-docusate  1 tablet Oral BID     sennosides  1 tablet Oral BID     sodium chloride (PF)  3 mL Intracatheter Q8H     spironolactone  25 mg Oral Daily     acetaminophen, artificial tears, melatonin, ondansetron **OR** ondansetron, polyethylene glycol, QUEtiapine, sodium chloride (PF)         Data:      All new lab and imaging data was reviewed.

## 2022-09-26 ENCOUNTER — APPOINTMENT (OUTPATIENT)
Dept: PHYSICAL THERAPY | Facility: CLINIC | Age: 82
DRG: 056 | End: 2022-09-26
Attending: PSYCHIATRY & NEUROLOGY
Payer: MEDICARE

## 2022-09-26 LAB
HCT VFR BLD AUTO: 38.3 % (ref 40–53)
HCT VFR BLD AUTO: 41.4 % (ref 40–53)
HGB BLD-MCNC: 11.5 G/DL (ref 13.3–17.7)
HGB BLD-MCNC: 12.4 G/DL (ref 13.3–17.7)

## 2022-09-26 PROCEDURE — 250N000013 HC RX MED GY IP 250 OP 250 PS 637: Performed by: INTERNAL MEDICINE

## 2022-09-26 PROCEDURE — 85018 HEMOGLOBIN: CPT | Performed by: HOSPITALIST

## 2022-09-26 PROCEDURE — 120N000001 HC R&B MED SURG/OB

## 2022-09-26 PROCEDURE — 250N000013 HC RX MED GY IP 250 OP 250 PS 637: Performed by: PSYCHIATRY & NEUROLOGY

## 2022-09-26 PROCEDURE — 250N000013 HC RX MED GY IP 250 OP 250 PS 637: Performed by: HOSPITALIST

## 2022-09-26 PROCEDURE — 97530 THERAPEUTIC ACTIVITIES: CPT | Mod: GP | Performed by: PHYSICAL THERAPIST

## 2022-09-26 PROCEDURE — 99232 SBSQ HOSP IP/OBS MODERATE 35: CPT | Performed by: STUDENT IN AN ORGANIZED HEALTH CARE EDUCATION/TRAINING PROGRAM

## 2022-09-26 PROCEDURE — 36415 COLL VENOUS BLD VENIPUNCTURE: CPT | Performed by: HOSPITALIST

## 2022-09-26 RX ADMIN — CARBIDOPA AND LEVODOPA 1 TABLET: 25; 100 TABLET ORAL at 08:32

## 2022-09-26 RX ADMIN — SENNOSIDES 1 TABLET: 8.6 TABLET, FILM COATED ORAL at 08:32

## 2022-09-26 RX ADMIN — GABAPENTIN 100 MG: 100 CAPSULE ORAL at 08:32

## 2022-09-26 RX ADMIN — CARBIDOPA AND LEVODOPA 1 TABLET: 25; 100 TABLET ORAL at 21:08

## 2022-09-26 RX ADMIN — QUETIAPINE FUMARATE 25 MG: 25 TABLET ORAL at 21:08

## 2022-09-26 RX ADMIN — ACETAMINOPHEN 650 MG: 325 TABLET ORAL at 03:10

## 2022-09-26 RX ADMIN — ESCITALOPRAM OXALATE 10 MG: 10 TABLET ORAL at 08:32

## 2022-09-26 RX ADMIN — APIXABAN 5 MG: 5 TABLET, FILM COATED ORAL at 21:08

## 2022-09-26 RX ADMIN — GABAPENTIN 100 MG: 100 CAPSULE ORAL at 14:30

## 2022-09-26 RX ADMIN — CARVEDILOL 12.5 MG: 12.5 TABLET, FILM COATED ORAL at 21:08

## 2022-09-26 RX ADMIN — ALLOPURINOL 300 MG: 300 TABLET ORAL at 08:32

## 2022-09-26 RX ADMIN — MULTIPLE VITAMINS W/ MINERALS TAB 1 TABLET: TAB at 08:32

## 2022-09-26 RX ADMIN — CARVEDILOL 12.5 MG: 12.5 TABLET, FILM COATED ORAL at 08:32

## 2022-09-26 RX ADMIN — FUROSEMIDE 40 MG: 40 TABLET ORAL at 08:32

## 2022-09-26 RX ADMIN — SENNOSIDES AND DOCUSATE SODIUM 1 TABLET: 50; 8.6 TABLET ORAL at 08:32

## 2022-09-26 RX ADMIN — DONEPEZIL HYDROCHLORIDE 10 MG: 10 TABLET ORAL at 21:08

## 2022-09-26 RX ADMIN — LORATADINE 10 MG: 10 TABLET ORAL at 08:32

## 2022-09-26 RX ADMIN — Medication 3 CAPSULE: at 08:32

## 2022-09-26 RX ADMIN — GABAPENTIN 100 MG: 100 CAPSULE ORAL at 21:08

## 2022-09-26 RX ADMIN — SPIRONOLACTONE 25 MG: 25 TABLET ORAL at 08:32

## 2022-09-26 RX ADMIN — SENNOSIDES AND DOCUSATE SODIUM 1 TABLET: 50; 8.6 TABLET ORAL at 21:08

## 2022-09-26 RX ADMIN — CARBIDOPA AND LEVODOPA 1 TABLET: 25; 100 TABLET ORAL at 14:30

## 2022-09-26 RX ADMIN — APIXABAN 5 MG: 5 TABLET, FILM COATED ORAL at 08:32

## 2022-09-26 RX ADMIN — SENNOSIDES 1 TABLET: 8.6 TABLET, FILM COATED ORAL at 21:08

## 2022-09-26 ASSESSMENT — ACTIVITIES OF DAILY LIVING (ADL)
ADLS_ACUITY_SCORE: 67
ADLS_ACUITY_SCORE: 67
ADLS_ACUITY_SCORE: 63
ADLS_ACUITY_SCORE: 67
ADLS_ACUITY_SCORE: 63
ADLS_ACUITY_SCORE: 67
ADLS_ACUITY_SCORE: 63
ADLS_ACUITY_SCORE: 67

## 2022-09-26 NOTE — PLAN OF CARE
Goal Outcome Evaluation:                    BEHAVIOR & AGGRESSION TOOL COLOR: Green   ABNL VS/O2: VSS on RA  MOBILITY: Total, T/R q2h, moderately impaired movement, contracted hands.Up in chair with lift, refused to go back to bed this am. Up for 2 hours this afternoon.  PAIN MANAGMENT: Denies  DIET: Reg, needs full assist with eating. Has refused to eat anything today, only had juice and water.  BOWEL/BLADDER: Incontinent of bowel. Chronic keating with chronic hematuria  ABNL LAB/BG: hgb 11.5  DRAIN/DEVICES: RPIV SL. Keating in place. I  SKIN: Wounds to L great toe, bandage CDI. Mepilex in place on excoriation on coccyx, CDI.  Paste to Right abdominal fold open site, CDI.  D/C DATE: awaiting safe discharge plan, possibly to rehab  OTHER IMPORTANT INFO: Keating with dark red hematuria-clears with increased water intake.  CONSULTS: Respiratory, Urology, and PT following

## 2022-09-26 NOTE — PLAN OF CARE
Summary: L hand pain, decreased motion, and increased generalized weakness; Suspected Lewy Body Dementia  DATE & TIME: 9/25/22 PM shift  Cognitive Concerns/ Orientation : A&Ox4 although appears confused/forgetful at times.  BEHAVIOR & AGGRESSION TOOL COLOR: Green   ABNL VS/O2: VSS on RA  MOBILITY: Total, T/R q2h, moderately impaired movement, rigidity  PAIN MANAGMENT: Denies  DIET: Reg, needs full assist with eating.  BOWEL/BLADDER: Incontinent of bowel. Chronic keating with chronic hematuria  ABNL LAB/BG: Hgb 11.9  DRAIN/DEVICES: RPIV SL. Keating in place. Irrigated x1  SKIN: Wounds to L great toe, bandage changed on days and CDI. Mepilex in place on coccyx, CDI. Mepilex in place on R groin site, CDI.  D/C DATE: awaiting safe discharge plan, possibly to rehab  OTHER IMPORTANT INFO: Keating with dark red hematuria

## 2022-09-26 NOTE — PROGRESS NOTES
[facility]  Hospitalist Progress Note     When I evaluated patient: 09/26/2022          Assessment and Plan:       William Almazan is an 81-year-old male with dementia, HTN, CHF (HFrEF), CAD, PAF, gout, urinary retention with chronic indwelling Louie catheter, h/o prostate cancer; with recent hospitalization at [facility] (8/2-8/24/2022) for issues including failure to thrive with hypotension and TONE, psychosis. Transferred from TCU 8/30/2022 with left hand pain, decreased motion and increasing generalized weakness.     Suspected Lewy body dementia.  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   Rigidity with motor symptoms/extrapyramidal side effects suspect related to above with worsening by anti-psychotics.  Depression/anxiety.  Physical deconditioning from senile fragility, medical illness.  * Recent hospitalization at [facility] 8/2 - 8/24/ 2022 with presyncope, hypotension, dementia with psychosis. PTA donepezil, escitalopram, risperidone and gabapentin.  --Admitted with generalized weakness and progressive inability to ambulate accompanied by extremity rigidity. Wife reported worsening symptoms after recent hospitalization and start of antipsychotic medications to help treat underlying psychosis. However, since starting, the patient exhibited worsening rigidity concerning for parkinsonism.   --On admission afebrile, hemodynamically stable, WBC normal, hgb 10.5; BMP unremarkable; LFT's showed low albumin and protein, otherwise normal.  Head CT 8/30 negative.   * Unable to obtain an MRI due to PPM/abandoned RV lead.  * Neurology followed, overall there was concern for underlying Lewy body dementia, with dystonia that may have been worsened by starting antipsychotics.  * Psychiatry stopped PTA risperidone and started aripiprazole 8/31.  -Carbidopa-levodopa started 9/1.   -Given continued rigidity, aripiprazole stopped 9/2. Pimavanserin started 9/2.  -Quetiapine started 9/4. Increased carbidopa-levadopa 9/4.  Pimavanserin stopped 9/4 due to long term cost issues.  -Escitalopram decreased 9/5.  plan  - Continue carbidopa-levodopa  mg TID.  - Continue donepezil 10 mg at bedtime; quetiapine 25 mg at bedtime; PRN quetiapine 12.5 mg BID.  - Continue escitalopram 10 mg daily, gabapentin 100 mg TID.  - PT, OT recommend TCU for rehabilitation.  -  assisting with transition plans -Hospital course prolonged with placement issues.     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   -Orthopedics recommend WBAT and elevation recommended; PRN left wrist splint ordered.  -Continue WBAT.  -Elevated left upper extremity when at rest.  -Continue PRN left wrist splint.  -Continue PRN acetaminophen.  -Outpatient follow-up with hand surgeon.      CAD with h/o CABG (1992).  CHF (HFrEF, biventricular).  Hypertension (benign essential).  [PTA: carvedilol 12.5 mg BID; furosemide 40 mg daily; spironolactone 25 mg daily.]  * Echo 8/3/2022 showed LVEF 15-20%, grade 1 diastolic dysfunction, severe diffuse hypokinesis; RV global function mildly reduced.  - Continue carvedilol; furosemide; spironolactone.  - On Eliquis as below for anticoagulation.         Paroxysmal atrial fibrillation.  S/p PPM.  -On Coreg, continue.  Rate controlled.    - On apixaban for anticoagulation   -Patient has intermittent hematuria but hemoglobin fairly stable, monitor.     Complicated urinary tract infection.  Chronic indwelling Louie catheter due to urinary retention.   Chronic/ intermittent hematuria.  Prostate cancer s/p XRT > 10 years ago with known radiation cystitis  -Louie catheter changed every month.  -Intermittent hematuria, urology has been  consulted evaluated multiple times, per urology- Given his known radiation cystitis, occasional blood in the urine is expected. If urine becomes cherry or maroon in color with clots or if the catheter stops draining, would consider bladder irrigation.  Appreciate input.  -UA 9/14 with moderate leukocyte esterase, greater than 182 WBCs, cultures < 10,000 urogenital qasim.  -Urology recommends to treat complicated UTI given indwelling catheter, completed IV ceftriaxone [9/15] - 3 days.   -Louie catheter changed 9/15.  -Follow-up with primary urologist at [facility] after discharge.     Iron deficiency anemia  Recently completed a course of IV iron sucrose at [facility] 8/2022.  -Hemoglobin stable around 10-11.  -Hb remained stable despite intermittent hematuria.    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  Continue local wound cares per [facility] nurse rec's     Malnutrition related to acute and chronic medical issues.  Nutrition followed, continue dietary supplements.     Gout.  Continue allopurinol.     KRISTIAN.  Does not use CPAP. Follow-up outpatient.     Recent COVID-19 infection, recovered.  Had minimal symptoms (positive on 7/30) considered COVID recovered.     Orders Placed This Encounter      Regular Diet Adult      Advance Diet as Tolerated      DVT Prophylaxis: On Eliquis.  Code Status: Full Code  Disposition: Expected discharge pending safe discharge plan in place     Discussed with patient,  awaiting rehab    Mahesh Barnes MD   Hospitalist        Interval History:        No new complaints  Patient denies any new symptom including pain, dyspnea, nausea, suprapubic discomfort.              Physical Exam:        Physical Exam   Temp:  [97.4  F (36.3  C)-97.8  F (36.6  C)] 97.4  F (36.3  C)  Pulse:  [72-89] 72  Resp:  [16] 16  BP: (106-125)/(64-69) 106/69  SpO2:  [93 %-98 %] 98 %     PHYSICAL EXAM  GENERAL: Patient awake, frail-appearing.  Able to answer simple  questions.  LUNGS:Respirations unlabored  ABDOMEN: Soft, no abdominal tenderness, bowel sounds heard   Genitourinary dark-colored urine.  NEURO: Weakness on Rt.  EXTREMITIES: No pedal edema.  : Louie catheter in place, bag shows few cc of urine with dark old appearing blood.          Medications:          allopurinol  300 mg Oral Daily     apixaban ANTICOAGULANT  5 mg Oral BID     carbidopa-levodopa  1 tablet Oral TID     carvedilol  12.5 mg Oral BID     donepezil  10 mg Oral At Bedtime     escitalopram  10 mg Oral Daily     furosemide  40 mg Oral Daily     gabapentin  100 mg Oral TID     loratadine  10 mg Oral Daily     multivitamin w/minerals  1 tablet Oral Daily     psyllium  3 capsule Oral Daily     QUEtiapine  25 mg Oral At Bedtime     senna-docusate  1 tablet Oral BID     sennosides  1 tablet Oral BID     sodium chloride (PF)  3 mL Intracatheter Q8H     spironolactone  25 mg Oral Daily     acetaminophen, artificial tears, melatonin, ondansetron **OR** ondansetron, polyethylene glycol, QUEtiapine, sodium chloride (PF)         Data:      All new lab and imaging data was reviewed.

## 2022-09-26 NOTE — PROGRESS NOTES
Care Management Follow Up    Length of Stay (days): 25    Expected Discharge Date: 09/29/2022     Concerns to be Addressed:       Patient plan of care discussed at interdisciplinary rounds: Yes    Anticipated Discharge Disposition: Skilled Nursing Facility, Transitional Care     Anticipated Discharge Services: None  Anticipated Discharge DME: None    Patient/family educated on Medicare website which has current facility and service quality ratings: yes  Education Provided on the Discharge Plan:    Patient/Family in Agreement with the Plan: yes    Referrals Placed by CM/SW:    Private pay costs discussed:     Additional Information:  Multiple referrals have been made today for  TCU placement.   TCU placement challenging given patient's acuity and slow progress in therapy. In the TCU setting, therapy generally is provided once in the afternoon and one in the evening.   If TUC's decline, we may need to seek a LTC vacancy in a care center where patient can still receive daily therapy.     Update:  Patient declined by all Estates due to patient's acuity; total assist for all cares and total assist for feeding.  During patient care rounds, goals of care was raised and care coordinator will speak with Dr Barnes.          JORDI OvalleSW

## 2022-09-27 ENCOUNTER — APPOINTMENT (OUTPATIENT)
Dept: OCCUPATIONAL THERAPY | Facility: CLINIC | Age: 82
DRG: 056 | End: 2022-09-27
Attending: PSYCHIATRY & NEUROLOGY
Payer: MEDICARE

## 2022-09-27 PROCEDURE — 97530 THERAPEUTIC ACTIVITIES: CPT | Mod: GO | Performed by: OCCUPATIONAL THERAPIST

## 2022-09-27 PROCEDURE — 250N000013 HC RX MED GY IP 250 OP 250 PS 637: Performed by: INTERNAL MEDICINE

## 2022-09-27 PROCEDURE — 250N000013 HC RX MED GY IP 250 OP 250 PS 637: Performed by: PSYCHIATRY & NEUROLOGY

## 2022-09-27 PROCEDURE — 250N000013 HC RX MED GY IP 250 OP 250 PS 637: Performed by: HOSPITALIST

## 2022-09-27 PROCEDURE — 99232 SBSQ HOSP IP/OBS MODERATE 35: CPT | Performed by: STUDENT IN AN ORGANIZED HEALTH CARE EDUCATION/TRAINING PROGRAM

## 2022-09-27 PROCEDURE — 120N000001 HC R&B MED SURG/OB

## 2022-09-27 RX ADMIN — LORATADINE 10 MG: 10 TABLET ORAL at 08:29

## 2022-09-27 RX ADMIN — CARVEDILOL 12.5 MG: 12.5 TABLET, FILM COATED ORAL at 20:51

## 2022-09-27 RX ADMIN — Medication 3 CAPSULE: at 08:30

## 2022-09-27 RX ADMIN — SPIRONOLACTONE 25 MG: 25 TABLET ORAL at 08:28

## 2022-09-27 RX ADMIN — APIXABAN 5 MG: 5 TABLET, FILM COATED ORAL at 20:51

## 2022-09-27 RX ADMIN — ESCITALOPRAM OXALATE 10 MG: 10 TABLET ORAL at 08:29

## 2022-09-27 RX ADMIN — CARBIDOPA AND LEVODOPA 1 TABLET: 25; 100 TABLET ORAL at 13:56

## 2022-09-27 RX ADMIN — CARBIDOPA AND LEVODOPA 1 TABLET: 25; 100 TABLET ORAL at 20:52

## 2022-09-27 RX ADMIN — FUROSEMIDE 40 MG: 40 TABLET ORAL at 08:30

## 2022-09-27 RX ADMIN — SENNOSIDES 1 TABLET: 8.6 TABLET, FILM COATED ORAL at 20:51

## 2022-09-27 RX ADMIN — SENNOSIDES AND DOCUSATE SODIUM 1 TABLET: 50; 8.6 TABLET ORAL at 20:51

## 2022-09-27 RX ADMIN — SENNOSIDES 1 TABLET: 8.6 TABLET, FILM COATED ORAL at 08:28

## 2022-09-27 RX ADMIN — GABAPENTIN 100 MG: 100 CAPSULE ORAL at 20:51

## 2022-09-27 RX ADMIN — APIXABAN 5 MG: 5 TABLET, FILM COATED ORAL at 08:29

## 2022-09-27 RX ADMIN — ALLOPURINOL 300 MG: 300 TABLET ORAL at 08:28

## 2022-09-27 RX ADMIN — GABAPENTIN 100 MG: 100 CAPSULE ORAL at 13:56

## 2022-09-27 RX ADMIN — SENNOSIDES AND DOCUSATE SODIUM 1 TABLET: 50; 8.6 TABLET ORAL at 08:29

## 2022-09-27 RX ADMIN — QUETIAPINE FUMARATE 25 MG: 25 TABLET ORAL at 20:51

## 2022-09-27 RX ADMIN — CARVEDILOL 12.5 MG: 12.5 TABLET, FILM COATED ORAL at 08:29

## 2022-09-27 RX ADMIN — CARBIDOPA AND LEVODOPA 1 TABLET: 25; 100 TABLET ORAL at 08:28

## 2022-09-27 RX ADMIN — GABAPENTIN 100 MG: 100 CAPSULE ORAL at 08:28

## 2022-09-27 RX ADMIN — DONEPEZIL HYDROCHLORIDE 10 MG: 10 TABLET ORAL at 20:51

## 2022-09-27 RX ADMIN — MULTIPLE VITAMINS W/ MINERALS TAB 1 TABLET: TAB at 08:28

## 2022-09-27 ASSESSMENT — ACTIVITIES OF DAILY LIVING (ADL)
ADLS_ACUITY_SCORE: 63
ADLS_ACUITY_SCORE: 63
ADLS_ACUITY_SCORE: 65
ADLS_ACUITY_SCORE: 63
ADLS_ACUITY_SCORE: 65
ADLS_ACUITY_SCORE: 65
ADLS_ACUITY_SCORE: 63
ADLS_ACUITY_SCORE: 65

## 2022-09-27 NOTE — PROGRESS NOTES
[facility]  Hospitalist Progress Note     When I evaluated patient: 09/27/2022          Assessment and Plan:       William Almazan is an 81-year-old male with dementia, HTN, CHF (HFrEF), CAD, PAF, gout, urinary retention with chronic indwelling Louie catheter, h/o prostate cancer; with recent hospitalization at [facility] (8/2-8/24/2022) for issues including failure to thrive with hypotension and TONE, psychosis. Transferred from TCU 8/30/2022 with left hand pain, decreased motion and increasing generalized weakness.     Suspected Lewy body dementia.  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   Rigidity with motor symptoms/extrapyramidal side effects suspect related to above with worsening by anti-psychotics.  Depression/anxiety.  Physical deconditioning from senile fragility, medical illness.  * Recent hospitalization at [facility] 8/2 - 8/24/ 2022 with presyncope, hypotension, dementia with psychosis. PTA donepezil, escitalopram, risperidone and gabapentin.  --Admitted with generalized weakness and progressive inability to ambulate accompanied by extremity rigidity. Wife reported worsening symptoms after recent hospitalization and start of antipsychotic medications to help treat underlying psychosis. However, since starting, the patient exhibited worsening rigidity concerning for parkinsonism.   --On admission afebrile, hemodynamically stable, WBC normal, hgb 10.5; BMP unremarkable; LFT's showed low albumin and protein, otherwise normal.  Head CT 8/30 negative.   * Unable to obtain an MRI due to PPM/abandoned RV lead.  * Neurology followed, overall there was concern for underlying Lewy body dementia, with dystonia that may have been worsened by starting antipsychotics.  * Psychiatry stopped PTA risperidone and started aripiprazole 8/31.  -Carbidopa-levodopa started 9/1.   -Given continued rigidity, aripiprazole stopped 9/2. Pimavanserin started 9/2.  -Quetiapine started 9/4. Increased carbidopa-levadopa 9/4.  Pimavanserin stopped 9/4 due to long term cost issues.  -Escitalopram decreased 9/5.  plan  - Continue carbidopa-levodopa  mg TID.  - Continue donepezil 10 mg at bedtime; quetiapine 25 mg at bedtime; PRN quetiapine 12.5 mg BID.  - Continue escitalopram 10 mg daily, gabapentin 100 mg TID.  - PT, OT recommend TCU for rehabilitation.  -  assisting with transition plans -Hospital course prolonged with placement issues -> Declined by all Estates due to patient's acuity; total assist for all cares and total assist for feeding.  - Palliative care may need to be involved.      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   -Orthopedics recommend WBAT and elevation recommended; PRN left wrist splint ordered.  -Continue WBAT.  -Elevated left upper extremity when at rest.  -Continue PRN left wrist splint.  -Continue PRN acetaminophen.  -Outpatient follow-up with hand surgeon.      CAD with h/o CABG (1992).  CHF (HFrEF, biventricular).  Hypertension (benign essential).  [PTA: carvedilol 12.5 mg BID; furosemide 40 mg daily; spironolactone 25 mg daily.]  * Echo 8/3/2022 showed LVEF 15-20%, grade 1 diastolic dysfunction, severe diffuse hypokinesis; RV global function mildly reduced.  - Continue carvedilol; furosemide; spironolactone.  - On Eliquis as below for anticoagulation.         Paroxysmal atrial fibrillation.  S/p PPM.  -On Coreg, continue.  Rate controlled.    - On apixaban for anticoagulation   -Patient has intermittent hematuria but hemoglobin fairly stable, monitor.     Complicated urinary tract infection.  Chronic indwelling Louie catheter due to urinary retention.   Chronic/ intermittent  hematuria.  Prostate cancer s/p XRT > 10 years ago with known radiation cystitis  -Louie catheter changed every month.  -Intermittent hematuria, urology has been consulted evaluated multiple times, per urology- Given his known radiation cystitis, occasional blood in the urine is expected. If urine becomes cherry or maroon in color with clots or if the catheter stops draining, would consider bladder irrigation.  Appreciate input.  -UA 9/14 with moderate leukocyte esterase, greater than 182 WBCs, cultures < 10,000 urogenital qasim.  -Urology recommends to treat complicated UTI given indwelling catheter, completed IV ceftriaxone [9/15] - 3 days.   -Louie catheter changed 9/15.  -Follow-up with primary urologist at [facility], [place], operated by [facility] after discharge.     Iron deficiency anemia  Recently completed a course of IV iron sucrose at [facility] 8/2022.  -Hemoglobin stable around 10-11.  -Hb remained stable despite intermittent hematuria.    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  Continue local wound cares per [facility] nurse rec's     Malnutrition related to acute and chronic medical issues.  Nutrition followed, continue dietary supplements.     Gout.  Continue allopurinol.     KRISTIAN.  Does not use CPAP. Follow-up outpatient.     Recent COVID-19 infection, recovered.  Had minimal symptoms (positive on 7/30) considered COVID recovered.     Orders Placed This Encounter      Regular Diet Adult      Advance Diet as Tolerated      DVT Prophylaxis: On Eliquis.  Code Status: Full Code  Disposition: Expected discharge pending safe discharge plan in place     Discussed with patient,  awaiting rehab    Mahesh Barnes MD   Hospitalist        Interval History:        No new complaints  Patient denies any new symptom including pain, dyspnea, nausea, suprapubic discomfort.    No CP/SOB  Declined by all Estates due to patient's acuity; total assist for all cares and total assist for feeding.         Physical  Exam:        Physical Exam   Temp:  [97.4  F (36.3  C)-98  F (36.7  C)] 97.4  F (36.3  C)  Pulse:  [67-83] 80  Resp:  [16-18] 16  BP: (110-126)/(68-74) 120/74  SpO2:  [95 %-97 %] 97 %     PHYSICAL EXAM  GENERAL: Patient awake, frail-appearing.  Able to answer simple questions.  LUNGS:Respirations unlabored  ABDOMEN: Soft, no abdominal tenderness, bowel sounds heard   Genitourinary dark-colored urine.  NEURO: Weakness on Rt.  EXTREMITIES: No pedal edema.  : Louie catheter in place, bag shows few cc of urine with dark old appearing blood.          Medications:          allopurinol  300 mg Oral Daily     apixaban ANTICOAGULANT  5 mg Oral BID     carbidopa-levodopa  1 tablet Oral TID     carvedilol  12.5 mg Oral BID     donepezil  10 mg Oral At Bedtime     escitalopram  10 mg Oral Daily     furosemide  40 mg Oral Daily     gabapentin  100 mg Oral TID     loratadine  10 mg Oral Daily     multivitamin w/minerals  1 tablet Oral Daily     psyllium  3 capsule Oral Daily     QUEtiapine  25 mg Oral At Bedtime     senna-docusate  1 tablet Oral BID     sennosides  1 tablet Oral BID     sodium chloride (PF)  3 mL Intracatheter Q8H     spironolactone  25 mg Oral Daily     acetaminophen, artificial tears, melatonin, ondansetron **OR** ondansetron, polyethylene glycol, QUEtiapine, sodium chloride (PF)         Data:      All new lab and imaging data was reviewed.

## 2022-09-27 NOTE — PLAN OF CARE
Goal Outcome Evaluation:    Plan of Care Reviewed With: patient     Summary: L hand pain, decreased motion, and increased generalized weakness; Suspected Lewy Body Dementia  DATE & TIME: 9/26/22 0101-3561  Cognitive Concerns/ Orientation : A&O x 3-4, answers orientation questions correctly although appears confused/forgetful at times.   BEHAVIOR & AGGRESSION TOOL COLOR: Green   ABNL VS/O2: VSS on RA  MOBILITY: Total, T/R q2h, moderately impaired movement, contracted hands  PAIN MANAGMENT: Denies  DIET: Reg, needs full assist with eating.   BOWEL/BLADDER: Incontinent of bowel. Chronic keating with chronic hematuria  ABNL LAB/BG: hgb 12.4  DRAIN/DEVICES: RPIV SL. Keating in place.   SKIN: Wounds to L great toe, bandage CDI. Mepilex in place on excoriation on coccyx, CDI.  Paste to Right abdominal fold open site, CDI.  D/C DATE: awaiting safe discharge plan, possibly to rehab  OTHER IMPORTANT INFO: Keating with dark red hematuria-clears with increased water intake.  CONSULTS: Respiratory, Urology, and PT following

## 2022-09-27 NOTE — PLAN OF CARE
Summary: L hand pain, decreased motion, and increased generalized weakness; Suspected Lewy Body Dementia  DATE & TIME: 9/26-09/27/22 Night shift  Cognitive Concerns/ Orientation : A&O x 3-4, answers orientation questions correctly although appears confused/forgetful at times; slow to respond; flat affect  BEHAVIOR & AGGRESSION TOOL COLOR: Green   ABNL VS/O2: VSS on RA  MOBILITY: Total, T/R q2h, moderately impaired movement, contracted hands  PAIN MANAGMENT: Denies  DIET: Reg, needs full assist with eating.   BOWEL/BLADDER: Incontinent of bowel- no BM; Chronic keating with chronic hematuria  ABNL LAB/BG: hgb 12.4  DRAIN/DEVICES: RPIV SL. Keating in place.   SKIN: Wounds to L great toe, bandage CDI. Mepilex in place on excoriation on coccyx, CDI.  Paste to Right abdominal fold open site, CDI.  D/C DATE: awaiting safe discharge plan, possibly to rehab; possible reassess goals of care  OTHER IMPORTANT INFO: Keating with dark red hematuria-clears with increased water intake.  CONSULTS: Respiratory, Urology, and PT following

## 2022-09-27 NOTE — PROGRESS NOTES
"Attestation:     I, Susy Salgado, [facility] staff , have reviewed and edited the following  student s note on 9/28/2022 at 11:07 AM.       SPIRITUAL HEALTH SERVICES Progress Note    [facility] 65    Saw pt William Almazan per length of stay.      Illness Narrative - Patient said \"he was not feeling too well. But was much improved than when he first come. And said he hopes to get well again soon and return home.\"      Distress - Pt said he was \"stressed out and concern about his health condition.\"      Coping - he said he has family and [employer] that are in touch or visiting; and he requested to be kept in prayers.\"      Meaning-Making -       Plan - This  was continue to monitor, will pray for patient and will follow up visit soon    Raza Phillip MA MEd   Intern       "

## 2022-09-27 NOTE — PLAN OF CARE
Goal Outcome Evaluation:    Plan of Care Reviewed With: patient   Summary: L hand pain, decreased motion, and increased generalized weakness; Suspected Lewy Body Dementia  DATE & TIME: 9/26-09/27/22 [id number]  Cognitive Concerns/ Orientation : A&O x 3-4, answers orientation questions correctly although appears confused/forgetful at times; slow to respond; flat affect  BEHAVIOR & AGGRESSION TOOL COLOR: Green   ABNL VS/O2: VSS on RA  MOBILITY: Total, T/R q2h,  PAIN MANAGMENT: Denies  DIET: Reg, needs full assist with eating.   BOWEL/BLADDER: Incontinent of bowel- Small BM; Chronic keating with chronic hematuria  ABNL LAB/BG: NA  DRAIN/DEVICES: RPIV SL. Keating in place.   SKIN: Wounds to L great toe, bandage CDI. Mepilex in place on excoriation on coccyx, CDI.  Paste to Right abdominal fold open site, CDI.  D/C DATE: TCU placement pending.  OTHER IMPORTANT INFO: Keating with dark red hematuria.  CONSULTS: Respiratory, Urology, and PT following

## 2022-09-28 PROCEDURE — 120N000001 HC R&B MED SURG/OB

## 2022-09-28 PROCEDURE — 250N000013 HC RX MED GY IP 250 OP 250 PS 637: Performed by: PSYCHIATRY & NEUROLOGY

## 2022-09-28 PROCEDURE — 250N000013 HC RX MED GY IP 250 OP 250 PS 637: Performed by: INTERNAL MEDICINE

## 2022-09-28 PROCEDURE — 99231 SBSQ HOSP IP/OBS SF/LOW 25: CPT | Performed by: HOSPITALIST

## 2022-09-28 PROCEDURE — 250N000013 HC RX MED GY IP 250 OP 250 PS 637: Performed by: HOSPITALIST

## 2022-09-28 PROCEDURE — 99221 1ST HOSP IP/OBS SF/LOW 40: CPT | Performed by: NURSE PRACTITIONER

## 2022-09-28 RX ADMIN — CARVEDILOL 12.5 MG: 12.5 TABLET, FILM COATED ORAL at 21:29

## 2022-09-28 RX ADMIN — ALLOPURINOL 300 MG: 300 TABLET ORAL at 09:51

## 2022-09-28 RX ADMIN — CARBIDOPA AND LEVODOPA 1 TABLET: 25; 100 TABLET ORAL at 09:48

## 2022-09-28 RX ADMIN — SENNOSIDES 1 TABLET: 8.6 TABLET, FILM COATED ORAL at 21:29

## 2022-09-28 RX ADMIN — APIXABAN 5 MG: 5 TABLET, FILM COATED ORAL at 09:48

## 2022-09-28 RX ADMIN — GABAPENTIN 100 MG: 100 CAPSULE ORAL at 21:29

## 2022-09-28 RX ADMIN — QUETIAPINE FUMARATE 25 MG: 25 TABLET ORAL at 21:29

## 2022-09-28 RX ADMIN — SENNOSIDES AND DOCUSATE SODIUM 1 TABLET: 50; 8.6 TABLET ORAL at 21:29

## 2022-09-28 RX ADMIN — GABAPENTIN 100 MG: 100 CAPSULE ORAL at 14:25

## 2022-09-28 RX ADMIN — Medication 3 CAPSULE: at 09:52

## 2022-09-28 RX ADMIN — GABAPENTIN 100 MG: 100 CAPSULE ORAL at 09:48

## 2022-09-28 RX ADMIN — CARBIDOPA AND LEVODOPA 1 TABLET: 25; 100 TABLET ORAL at 21:29

## 2022-09-28 RX ADMIN — APIXABAN 5 MG: 5 TABLET, FILM COATED ORAL at 21:29

## 2022-09-28 RX ADMIN — CARBIDOPA AND LEVODOPA 1 TABLET: 25; 100 TABLET ORAL at 14:25

## 2022-09-28 RX ADMIN — ESCITALOPRAM OXALATE 10 MG: 10 TABLET ORAL at 09:48

## 2022-09-28 RX ADMIN — LORATADINE 10 MG: 10 TABLET ORAL at 09:49

## 2022-09-28 RX ADMIN — MULTIPLE VITAMINS W/ MINERALS TAB 1 TABLET: TAB at 09:48

## 2022-09-28 RX ADMIN — DONEPEZIL HYDROCHLORIDE 10 MG: 10 TABLET ORAL at 21:29

## 2022-09-28 RX ADMIN — SENNOSIDES AND DOCUSATE SODIUM 1 TABLET: 50; 8.6 TABLET ORAL at 09:52

## 2022-09-28 RX ADMIN — SENNOSIDES 1 TABLET: 8.6 TABLET, FILM COATED ORAL at 09:52

## 2022-09-28 ASSESSMENT — ACTIVITIES OF DAILY LIVING (ADL)
ADLS_ACUITY_SCORE: 67
ADLS_ACUITY_SCORE: 65
ADLS_ACUITY_SCORE: 61
ADLS_ACUITY_SCORE: 65
ADLS_ACUITY_SCORE: 61
ADLS_ACUITY_SCORE: 67
ADLS_ACUITY_SCORE: 61

## 2022-09-28 NOTE — PROGRESS NOTES
[facility]  Hospitalist Progress Note   09/28/2022          Assessment and Plan:       William Almazan is an 81-year-old male with dementia, HTN, CHF (HFrEF), CAD, PAF, gout, urinary retention with chronic indwelling Louie catheter, h/o prostate cancer; with recent hospitalization at [facility] (8/2-8/24/2022) for issues including failure to thrive with hypotension and TONE, psychosis. Transferred from TCU 8/30/2022 with left hand pain, decreased motion and increasing generalized weakness.     Suspected Lewy body dementia.  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   Rigidity with motor symptoms/extrapyramidal side effects suspect related to above with worsening by anti-psychotics.  Depression/anxiety.  Physical deconditioning from senile fragility, medical illness.  * Recent hospitalization at [facility] 8/2 - 8/24/ 2022 with presyncope, hypotension, dementia with psychosis. PTA donepezil, escitalopram, risperidone and gabapentin.  --Admitted with generalized weakness and progressive inability to ambulate accompanied by extremity rigidity. Wife reported worsening symptoms after recent hospitalization and start of antipsychotic medications to help treat underlying psychosis. However, since starting, the patient exhibited worsening rigidity concerning for parkinsonism.   --On admission afebrile, hemodynamically stable, WBC normal, hgb 10.5; BMP unremarkable; LFT's showed low albumin and protein, otherwise normal.  Head CT 8/30 negative.   * Unable to obtain an MRI due to PPM/abandoned RV lead.  * Neurology followed, overall there was concern for underlying Lewy body dementia, with dystonia that may have been worsened by starting antipsychotics.  * Psychiatry stopped PTA risperidone and started aripiprazole 8/31.  Carbidopa-levodopa started 9/1.   Given continued rigidity, aripiprazole stopped 9/2. Pimavanserin started 9/2.  Quetiapine started 9/4. Increased carbidopa-levadopa 9/4. Pimavanserin stopped 9/4 due to long  term cost issues.  Escitalopram decreased 9/5.    - Continue carbidopa-levodopa  mg TID.  - Continue donepezil 10 mg at bedtime; quetiapine 25 mg at bedtime; PRN quetiapine 12.5 mg BID.  - Continue escitalopram 10 mg daily, gabapentin 100 mg TID.  PT, OT recommend TCU for rehabilitation.   assisting with transition plans -Hospital course prolonged with placement issues.total assist for all cares and total assist for feeding.   Unfortunately patient not accepted to any facility at this time.  Discussed with patient's wife Ilsa over the telephone, she feels patient has made some progress in the upper body.  Physical therapy reconsulted.  Palliative team consulted for goals of care discussion, discussed with palliative team.  Patient's wife Ilsa in agreement with discussing with palliative.     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   Orthopedics recommend WBAT and elevation recommended; PRN left wrist splint ordered.  Continue WBAT.  Elevated left upper extremity when at rest.  Continue PRN left wrist splint.  Continue PRN acetaminophen.  Outpatient follow-up with hand surgeon.      CAD with h/o CABG (1992).  CHF (HFrEF, biventricular).  Hypertension (benign essential).  [PTA: carvedilol 12.5 mg BID; furosemide 40 mg daily; spironolactone 25 mg daily.]  * Echo 8/3/2022 showed LVEF 15-20%, grade 1 diastolic dysfunction, severe diffuse hypokinesis; RV global function mildly reduced.  -Continue carvedilol; furosemide; spironolactone.  On Eliquis as below for anticoagulation.  If any drop in hemoglobin will consider holding Eliquis.  -Monitor i/o's, daily wts.     Paroxysmal atrial fibrillation.  S/p  PPM.  - Continue apixaban, carvedilol.  On apixaban for anticoagulation. Patient has intermittent hematuria but hemoglobin fairly stable, monitor.     Complicated urinary tract infection.  Chronic indwelling Louie catheter due to urinary retention.   Chronic hematuria.  Prostate cancer s/p XRT > 10 years ago with known radiation cystitis  Louie catheter changed every month.  Intermittent hematuria, urology has been consulted evaluated multiple times, per urologyGiven his known radiation cystitis, occasional blood in the urine is expected. If urine becomes cherry or maroon in color with clots or if the catheter stops draining, would consider bladder irrigation.  Appreciate input.  UA 9/14 with moderate leukocyte esterase, greater than 182 WBCs  Urine cultures 9/14 less than 10,000 urogenital qasim.  Urology recommends to treat complicated UTI given indwelling catheter, On IV ceftriaxone [9/15] - 3 days.   Louie catheter changed 9/15.  Follow-up with primary urologist at [facility] after discharge.     Iron deficiency anemia  Recently completed a course of IV iron sucrose at [facility] 8/2022.  Hemoglobin stable around 10-11.    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  Continue local wound cares per [facility] nurse rec's     Malnutrition related to acute and chronic medical issues.  Nutrition followed, continue dietary supplements.     Gout.  Continue allopurinol.     KRISTIAN.  Does not use CPAP. Follow-up outpatient.     Recent COVID-19 infection, recovered.  Had minimal symptoms (positive on 7/30) considered COVID recovered.     Orders Placed This Encounter      Regular Diet Adult      Advance Diet as Tolerated      DVT Prophylaxis: On Eliquis.  Code Status: Full Code  Disposition: Expected discharge pending safe discharge plan in place     Discussed with patient, bedside RN, , care coordinator, palliative team.  Discussed with patient's wife over the telephone (  9/28)  Total time greater than 25 minutes.  More than 70% of time spent in direct patient care, care coordination, patient counseling, and formalizing plan of care.     Vladimir Craig MD        Interval History:      Patient appears comfortable lying in bed.  Awake, slow to communicate   Denies any complaints this morning.  Continues to have blood-tinged urine  Ambulating out of bed with lift.         Physical Exam:        Physical Exam   Temp:  [97.4  F (36.3  C)-98  F (36.7  C)] 98  F (36.7  C)  Pulse:  [80-83] 83  Resp:  [16] 16  BP: (108-120)/(68-75) 116/68  SpO2:  [97 %-98 %] 97 %    Intake/Output Summary (Last 24 hours) at 9/16/2022 1410  Last data filed at 9/16/2022 1300  Gross per 24 hour   Intake 240 ml   Output 700 ml   Net -460 ml       Admission Weight: 81.9 kg (180 lb 8.9 oz)  Current Weight: 81.9 kg (180 lb 8.9 oz)    PHYSICAL EXAM  GENERAL: Patient awake, frail-appearing.  Able to answer simple questions.  LUNGS:Respirations unlabored  ABDOMEN: Soft, no abdominal tenderness, bowel sounds heard   Genitourinary dark-colored urine.  NEURO: Moving all extremities.  EXTREMITIES: No pedal edema.  PSYCHIATRY Cooperative       Medications:          allopurinol  300 mg Oral Daily     apixaban ANTICOAGULANT  5 mg Oral BID     carbidopa-levodopa  1 tablet Oral TID     carvedilol  12.5 mg Oral BID     donepezil  10 mg Oral At Bedtime     escitalopram  10 mg Oral Daily     furosemide  40 mg Oral Daily     gabapentin  100 mg Oral TID     loratadine  10 mg Oral Daily     multivitamin w/minerals  1 tablet Oral Daily     psyllium  3 capsule Oral Daily     QUEtiapine  25 mg Oral At Bedtime     senna-docusate  1 tablet Oral BID     sennosides  1 tablet Oral BID     sodium chloride (PF)  3 mL Intracatheter Q8H     spironolactone  25 mg Oral Daily     acetaminophen, artificial tears, melatonin, ondansetron **OR** ondansetron, polyethylene glycol, QUEtiapine, sodium chloride (PF)         Data:      All new lab and  imaging data was reviewed.

## 2022-09-28 NOTE — PLAN OF CARE
Goal Outcome Evaluation:    Summary: L hand pain, decreased motion, and increased generalized weakness; Suspected Lewy Body Dementia  DATE & TIME: 09/27/22 8166-6845  Cognitive Concerns/ Orientation : A&O x 3, disoriented to situation at times - confused/forgetful at times; slow to respond; flat affect  BEHAVIOR & AGGRESSION TOOL COLOR: Green   ABNL VS/O2: VSS on RA  MOBILITY: Total, T/R q2h,  PAIN MANAGMENT: Denies  DIET: Reg, needs full assist with eating - takes mx well with applesauce  BOWEL/BLADDER: Incontinent of bowel- Chronic keating with chronic hematuria  ABNL LAB/BG: NA  DRAIN/DEVICES: R-PIV SL. Keating in place.   SKIN: Wounds to L great toe, bandage CDI. Mepilex in place on excoriation on coccyx - CDI.  Paste to Right abd. fold open site  D/C DATE: TCU placement pending.  OTHER IMPORTANT INFO:   CONSULTS: Respiratory, Urology, and PT following

## 2022-09-28 NOTE — CONSULTS
[facility]  Palliative Care Consultation Note    Patient: William Almazan  Date of Admission:  8/30/2022    Requesting Clinician / Team: Dr. Craig/Hospitalist   Reason for consult: Goals of care    Recommendations:    William does not appear to be able to make complex medical decisions independently. He would benefit from the support of family present for assistance in complex medical decision making     Spoke with wife Ilsa today. She and daughter Renuka are available to come in to meet me and Willaim tomorrow, Thursday 9/29 at 1PM      These recommendations have been discussed with Dr. Craig, unit SW , and unit care coordinator José Luis.    [facility]  Contact information available via [facility] Paging/Directory      Thank you for the opportunity to participate in the care of this patient and family. Our team: will continue to follow.     During regular M-F work hours ([id number]) -- if you are not sure who specifically to contact -- please contact us on [facility] Smart Web.     After regular work hours and on weekends/holidays, you can call our answering service at [phone].     Attestation:  Total time on the floor involved in the patient's care: 45 minutes  Total time spent in counseling/care coordination: >50% spent in counseling goals of care in setting of Lewy body dementia with psychosis, EPS, complicated UTI, pressure injury, severe malnutrition, failure to thrive, HFrEF    Assessments:  William Almazan is a 81 year old male with PMH significant for dementia, HTN, CHF (HFrEF with EF 15-20%), CAD with hx CABG 1992, paroxysmal a.fib s/p PPM, gout, urinary retention with chronic indwelling Louie catheter, recent covid infection now recovered, hx prostate cancer s/p XRT complicated by radiation cystitis, and recent hospitalization at [facility] (8/2-8/24/2022) for failure to thrive, hypotension, TONE, and psychosis who transferred from TCU 8/30  with left hand pain, decreased motion and increasing generalized weakness. His protracted hospital course has been complicated by suspected Lewy body dementia with psychosis and rigidity with motor symptoms/extrapyramidal side effects related to antipsychotics; Psych and Neuro involved in medication adjustment, complicated UTI s/p abx treatment, pressure injury, severe malnutrition, and overall failure to thrive. SW is having difficulty with placement.     Today, the patient was seen for:  Goals of care in setting of Lewy body dementia with psychosis, EPS, complicated UTI, pressure injury, severe malnutrition, failure to thrive, HFrEF    Visited with William this AM. He is seen sleeping in bed, yet awakens easily to verbal cue. He is cachectic appearing. He believes he is at home. We acknowledge he is in the hospital. He believes this is due to a stroke. He recalls that he has heart issues. He states that he wants to survive. He is open to me speaking with his wife Ilsa.    Called Ilsa via phone. Introduced myself and our services; assistance in pain and symptom management, emotional and spiritual support, and complex medical decision making.    She and dtmarco antonio Grayson are interested in coming to the hospital for a family discussion. Plan for meeting Thursday 9/29 at 1PM.     Prognosis, Goals, & Planning:      Functional Status just prior to hospitalization: 3 (Capable of only limited self-care; needs help with ADLs; in bed/chair >50% of waking hours)      Prognosis, Goals, and/or Advance Care Planning were addressed today: Yes      Patient's decision making preferences: unable to assess          Patient has decision-making capacity today for complex decisions: Partial (needs assistance with complex decisions)            I have concerns about the patient/family's health literacy today: Unable to assess today           Patient has a completed Health Care Directive: No.       Code status: Full Code    Coping, Meaning, &  Spirituality:   Mood, coping, and/or meaning in the context of serious illness were addressed today: Yes    Social:     Living situation: Has been in and out of the hospital/TCU for the last 2 months. 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     History of Present Illness:  History gathered today from: patient, family/loved ones, medical chart, medical team members, unit team members    William Almazan is a 81 year old male with PMH significant for dementia, HTN, CHF (HFrEF with EF 15-20%), CAD with hx CABG 1992, paroxysmal a.fib s/p PPM, gout, urinary retention with chronic indwelling Louie catheter, recent covid infection now recovered, hx prostate cancer s/p XRT complicated by radiation cystitis, and recent hospitalization at [facility] (8/2-8/24/2022) for issues including failure to thrive with hypotension and TONE, psychosis who transferred from TCU 8/30 with left hand pain, decreased motion and increasing generalized weakness. His protracted hospital course has been complicated by suspected Lewy body dementia with psychosis and rigidity with motor symptoms/extrapyramidal side effects related to antipsychotics; Psych and Neuro involved in medication adjustment, complicated UTI s/p abx treatment, pressure injury, severe malnutrition, and overall failure to thrive. SW is having difficulty with placement.     Key Palliative Symptom Data:  We are not helping to manage these symptoms currently in this patient.    Patient is on opioids: bowels not assessed today.    ROS:  Comprehensive ROS is reviewed and is negative except as here & per HPI: N/A     Past Medical History:  Past Medical History:   Diagnosis Date     Infection due to 2019 novel coronavirus         Past Surgical History:  No past surgical history on file.      Family History:  No family history on file.      Allergies:  No Known  Allergies     Medications:  I have reviewed this patient's medication profile and medications from this hospitalization.   Noted scheduled meds are:  Allopurinol   Eliquis   Sinemet   Aricept   Lexapro   Lasix 40mg PO daily  Gabapentin 100mg TID   Seroquel 25mg PO at bedtime   Senna 1 tab PO BID   Spironolactone 25mg PO daily     Noted PRN meds are:  Seroquel 12.5mg PO BID PRN agitation, anxiety, hallucinations     Physical Exam:  Vital Signs: Temp: 98  F (36.7  C) Temp src: Oral BP: 116/81 Pulse: (!) 44   Resp: 16 SpO2: 97 % O2 Device: None (Room air)    Weight: 162 lbs 14.72 oz  CONSTITUTIONAL: Chronically ill cachectic man seen sleeping in bed in NAD, awakens to verbal cue, he is disoriented to place and situation. He is pleasant, calm and cooperative  RESPIRATORY: NL respiratory effort on RA    Data reviewed:  Recent imaging reviewed, my comments on pertinents:   Results for orders placed or performed during the hospital encounter of 08/30/22   CT Head w/o Contrast    Impression    IMPRESSION:  1.  No acute intracranial process.   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 Subchondral   cystic change distal radius with small loose body along the radial margin of the radioscaphoid joint. No acute left wrist fracture or dislocation identified. Volar left wrist soft tissue swelling. Slight widening of the distal radioulnar joint.    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 No acute displaced left hand fracture or dislocation is identified. Flexion at the index through small finger MCP joints.   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 Advanced  polyarticular arthritis in the left wrist including radioscaphoid, capitolunate, hamate and lunate, thumb CMC and STT joints. Subchondral   cystic change distal radius with small loose body along the radial margin of the radioscaphoid joint. No acute left wrist fracture or dislocation identified. Volar left wrist soft tissue swelling. Slight widening of the distal radioulnar joint.    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 No acute displaced left hand fracture or dislocation is identified. Flexion at the index through small finger MCP joints.       Recent lab data reviewed, my comments on pertinents:   Creat 0.49  Hgb 12.4  Albumin 2.9

## 2022-09-28 NOTE — PLAN OF CARE
Summary: L hand pain, decreased motion, and increased generalized weakness; Suspected Lewy Body Dementia  DATE & TIME: 09/27-09/28/22 Night shift  Cognitive Concerns/ Orientation : A&O x 3, disoriented to situation at times - confused/forgetful at times; slow to respond; flat affect  BEHAVIOR & AGGRESSION TOOL COLOR: Green   ABNL VS/O2: VSS on RA  MOBILITY: Total, T/R q2h,  PAIN MANAGMENT: Denies  DIET: Reg, needs full assist with eating - takes meds well with applesauce  BOWEL/BLADDER: Incontinent of bowel x1- Chronic keating with chronic hematuria  ABNL LAB/BG: NA  DRAIN/DEVICES: R-PIV SL. Keating in place.   SKIN: Wounds to L great toe, bandage CDI. Mepilex in place on excoriation on coccyx - CDI.  Paste to Right abdominal fold open site  D/C DATE: TCU placement pending; however goals of care should be in place  OTHER IMPORTANT INFO:   CONSULTS: Respiratory, Urology, and PT following

## 2022-09-28 NOTE — PROGRESS NOTES
Summary: L hand pain, decreased motion, and increased generalized weakness; Suspected Lewy Body Dementia  DATE & TIME:09/28 1446-1686  Cognitive Concerns/ Orientation : A&O x 3, disoriented to situation at times - confused/forgetful at times; slow to respond; flat affect  BEHAVIOR & AGGRESSION TOOL COLOR: Green   ABNL VS/O2: VSS on RA  MOBILITY: Total, T/R q2h,  PAIN MANAGMENT: Denies  DIET: Reg, needs full assist with eating - takes meds well with applesauce  BOWEL/BLADDER: Incontinent of bowel - Chronic keating with chronic hematuria  ABNL LAB/BG: NA  DRAIN/DEVICES: R-PIV SL. Keating in place.   SKIN: Wounds to L great toe, bandage CDI. Mepilex in place on excoriation on coccyx - CDI.  Paste to Right abdominal fold open site  D/C DATE: TCU placement pending; however goals of care should be in place  OTHER IMPORTANT INFO:   CONSULTS: Respiratory, Urology, and PT following

## 2022-09-28 NOTE — PLAN OF CARE
Goal Outcome Evaluation:    Plan of Care Reviewed With: patient     Overall Patient Progress: improving       Cognitive Concerns/ Orientation : A&O x 3, disoriented to situation at times - confused/forgetful at times; slow to respond; flat affect  BEHAVIOR & AGGRESSION TOOL COLOR: Green             ABNL VS/O2: VSS,on RA, bradycardic, HR 44, MD aware, blood pressure meds held this shift  MOBILITY: Total, T/R q2h, Up to chairX1 with lift  PAIN MANAGMENT: reported some pain on left knee, relieved with repo  DIET: Regular diet, total feed. Takes meds one by one   BOWEL/BLADDER: Incontinent of bowel, chronic keating with chronic hematuria, urine yellow and cloudy   ABNL LAB/BG: NA  DRAIN/DEVICES: R-PIV SL. Keating   SKIN: Wounds to L great toe, wound cleaned and new dressing on,  Mepilex in place on excoriation on coccyx - CDI.   D/C DATE: pending palliative consult tomorrow   OTHER IMPORTANT INFO: Wife at beside   CONSULTS: Respiratory, Urology, PT, palliative

## 2022-09-29 PROCEDURE — 250N000013 HC RX MED GY IP 250 OP 250 PS 637: Performed by: INTERNAL MEDICINE

## 2022-09-29 PROCEDURE — 250N000013 HC RX MED GY IP 250 OP 250 PS 637: Performed by: PSYCHIATRY & NEUROLOGY

## 2022-09-29 PROCEDURE — 99232 SBSQ HOSP IP/OBS MODERATE 35: CPT | Performed by: STUDENT IN AN ORGANIZED HEALTH CARE EDUCATION/TRAINING PROGRAM

## 2022-09-29 PROCEDURE — 99233 SBSQ HOSP IP/OBS HIGH 50: CPT | Performed by: NURSE PRACTITIONER

## 2022-09-29 PROCEDURE — 120N000001 HC R&B MED SURG/OB

## 2022-09-29 PROCEDURE — 250N000013 HC RX MED GY IP 250 OP 250 PS 637: Performed by: HOSPITALIST

## 2022-09-29 RX ADMIN — CARBIDOPA AND LEVODOPA 1 TABLET: 25; 100 TABLET ORAL at 20:15

## 2022-09-29 RX ADMIN — GABAPENTIN 100 MG: 100 CAPSULE ORAL at 08:18

## 2022-09-29 RX ADMIN — FUROSEMIDE 40 MG: 40 TABLET ORAL at 08:18

## 2022-09-29 RX ADMIN — CARVEDILOL 12.5 MG: 12.5 TABLET, FILM COATED ORAL at 08:17

## 2022-09-29 RX ADMIN — CARBIDOPA AND LEVODOPA 1 TABLET: 25; 100 TABLET ORAL at 14:33

## 2022-09-29 RX ADMIN — APIXABAN 5 MG: 5 TABLET, FILM COATED ORAL at 20:15

## 2022-09-29 RX ADMIN — APIXABAN 5 MG: 5 TABLET, FILM COATED ORAL at 08:17

## 2022-09-29 RX ADMIN — GABAPENTIN 100 MG: 100 CAPSULE ORAL at 20:15

## 2022-09-29 RX ADMIN — SENNOSIDES 1 TABLET: 8.6 TABLET, FILM COATED ORAL at 08:18

## 2022-09-29 RX ADMIN — LORATADINE 10 MG: 10 TABLET ORAL at 08:17

## 2022-09-29 RX ADMIN — SENNOSIDES AND DOCUSATE SODIUM 1 TABLET: 50; 8.6 TABLET ORAL at 08:17

## 2022-09-29 RX ADMIN — SPIRONOLACTONE 25 MG: 25 TABLET ORAL at 08:18

## 2022-09-29 RX ADMIN — CARBIDOPA AND LEVODOPA 1 TABLET: 25; 100 TABLET ORAL at 08:17

## 2022-09-29 RX ADMIN — MULTIPLE VITAMINS W/ MINERALS TAB 1 TABLET: TAB at 08:17

## 2022-09-29 RX ADMIN — ALLOPURINOL 300 MG: 300 TABLET ORAL at 08:18

## 2022-09-29 RX ADMIN — Medication 3 CAPSULE: at 08:17

## 2022-09-29 RX ADMIN — ESCITALOPRAM OXALATE 10 MG: 10 TABLET ORAL at 08:17

## 2022-09-29 RX ADMIN — GABAPENTIN 100 MG: 100 CAPSULE ORAL at 14:33

## 2022-09-29 ASSESSMENT — ACTIVITIES OF DAILY LIVING (ADL)
ADLS_ACUITY_SCORE: 63
ADLS_ACUITY_SCORE: 63
ADLS_ACUITY_SCORE: 65
ADLS_ACUITY_SCORE: 63
ADLS_ACUITY_SCORE: 65
ADLS_ACUITY_SCORE: 65

## 2022-09-29 NOTE — PROGRESS NOTES
[facility]  Hospitalist Progress Note   09/29/2022          Assessment and Plan:       William Almazan is an 81-year-old male with dementia, HTN, CHF (HFrEF), CAD, PAF, gout, urinary retention with chronic indwelling Louie catheter, h/o prostate cancer; with recent hospitalization at [facility] (8/2-8/24/2022) for issues including failure to thrive with hypotension and TONE, psychosis. Transferred from TCU 8/30/2022 with left hand pain, decreased motion and increasing generalized weakness.     Suspected Lewy body dementia dx this admission  Psychosis, suspect related to above.   Rigidity with motor symptoms/extrapyramidal medication side effects   Depression/anxiety.  Physical deconditioning from senile fragility, medical illness.  * Recent hospitalization at [facility] 8/2 - 8/24/ 2022 with presyncope, hypotension, dementia with psychosis. PTA donepezil, escitalopram, risperidone and gabapentin.  --Admitted with generalized weakness and progressive inability to ambulate accompanied by extremity rigidity. Wife reported worsening symptoms after recent hospitalization and start of antipsychotic medications to help treat underlying psychosis. However, since starting, the patient exhibited worsening rigidity concerning for parkinsonism.   --On admission afebrile, hemodynamically stable, WBC normal, hgb 10.5; BMP unremarkable; LFT's showed low albumin and protein, otherwise normal.  Head CT 8/30 negative.   * Unable to obtain an MRI due to PPM/abandoned RV lead.  * Neurology followed, overall there was concern for underlying Lewy body dementia, with dystonia that may have been worsened by starting antipsychotics.  * Psychiatry stopped PTA risperidone and started aripiprazole 8/31.  Carbidopa-levodopa started 9/1.   Given continued rigidity, aripiprazole stopped 9/2. Pimavanserin started 9/2.  Quetiapine started 9/4. Increased carbidopa-levadopa 9/4. Pimavanserin stopped 9/4 due to long term cost issues.  Escitalopram  decreased 9/5.    - Continue carbidopa-levodopa  mg TID.  - Continue donepezil 10 mg at bedtime; quetiapine 25 mg at bedtime; PRN quetiapine 12.5 mg BID.  - Continue escitalopram 10 mg daily, gabapentin 100 mg TID.  PT, OT recommend TCU for rehabilitation.  -  assisting with transition plans -Hospital course prolonged with placement issues.total assist for all cares and total assist for feeding.   - Unfortunately patient not accepted to any facility at this time.  - Palliative team consulted for goals of care discussion, discussed with palliative team.  Patient's wife Ilsa in agreement with discussing with palliative.     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     Orthopedics recommend WBAT and elevation recommended; PRN left wrist splint ordered.  Elevated left upper extremity when at rest.  Continue PRN left wrist splint.  Continue PRN acetaminophen.  Outpatient follow-up with hand surgeon.      CAD with h/o CABG (1992).  CHF (HFrEF, biventricular).  Hypertension (benign essential).  [PTA: carvedilol 12.5 mg BID; furosemide 40 mg daily; spironolactone 25 mg daily.]  * Echo 8/3/2022 showed LVEF 15-20%, grade 1 diastolic dysfunction, severe diffuse hypokinesis; RV global function mildly reduced.  -Continue carvedilol; furosemide; spironolactone.  On Eliquis as below for anticoagulation.  If any drop in hemoglobin will consider holding Eliquis.  -Monitor i/o's, daily wts.     Paroxysmal atrial fibrillation.  S/p PPM.  - Continue apixaban, carvedilol.  On apixaban for anticoagulation. Patient has intermittent hematuria but hemoglobin fairly stable, monitor.     Complicated urinary tract  infection.  Chronic indwelling Louie catheter due to urinary retention.   Chronic hematuria.  Prostate cancer s/p XRT > 10 years ago with known radiation cystitis  Louie catheter changed q monthly  Intermittent hematuria, urology has been consulted evaluated multiple times, per urologyGiven his known radiation cystitis, occasional blood in the urine is expected. If urine becomes cherry or maroon in color with clots or if the catheter stops draining, would consider bladder irrigation.    UA 9/14 with moderate leukocyte esterase, greater than 182 WBCs  Urine cultures 9/14 less than 10,000 urogenital qasim.  Urology recommended to treat complicated UTI given indwelling given 3 days of rocephin and then stopped 09/18    - Follow-up with primary urologist at [facility] after discharge.  -  Monitor for recurrent infection and output for signs of obstruction     Iron deficiency anemia  Recently completed a course of IV iron sucrose at [facility] 8/2022.  Hemoglobin stable around 10-11.    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  Continue local wound cares per WO nurse rec's     Malnutrition related to acute and chronic medical issues.  Nutrition followed, continue dietary supplements.     Gout.  Continue allopurinol.     KRISTIAN.  Does not use CPAP. Follow-up outpatient.     Recent COVID-19 infection, recovered.  Had minimal symptoms (positive on 7/30) considered COVID recovered.     Orders Placed This Encounter      Regular Diet Adult      Advance Diet as Tolerated      DVT Prophylaxis: On Eliquis.  Code Status: Full Code  Disposition: Expected discharge pending safe discharge plan in place     Discussed with patient and bedside RN    Clarissa Bender DO        Interval History:      Patient is comfortable in bed. About to get up and and eat bfast. Reports some mild leg pain behind his knee with movement. Otherwise breathing is stable. NO GI issues. No cough. Updated on plan  for meeting today with palliative         Physical Exam:        Physical Exam   Temp:  [97.3  F (36.3  C)-97.8  F (36.6  C)] 97.3  F (36.3  C)  Pulse:  [63-77] 63  Resp:  [16] 16  BP: (121-128)/(73-76) 128/75  SpO2:  [92 %-98 %] 97 %    Intake/Output Summary (Last 24 hours) at 9/16/2022 1410  Last data filed at 9/16/2022 1300  Gross per 24 hour   Intake 240 ml   Output 700 ml   Net -460 ml       Admission Weight: 81.9 kg (180 lb 8.9 oz)  Current Weight: 81.9 kg (180 lb 8.9 oz)    PHYSICAL EXAM  GENERAL: Patient awake, frail-appearing.  Able to answer simple questions.  LUNGS:Respirations unlabored, CTAB  Cardiac: no murmurs  ABDOMEN: Soft, no abdominal tenderness, bowel sounds heard   NEURO: able to move arms and legs but with limitations and tremors, contractures of hands, did not assess gait  EXTREMITIES: No pedal edema.  PSYCHIATRY Cooperative       Medications:          allopurinol  300 mg Oral Daily     apixaban ANTICOAGULANT  5 mg Oral BID     carbidopa-levodopa  1 tablet Oral TID     carvedilol  12.5 mg Oral BID     donepezil  10 mg Oral At Bedtime     escitalopram  10 mg Oral Daily     furosemide  40 mg Oral Daily     gabapentin  100 mg Oral TID     loratadine  10 mg Oral Daily     multivitamin w/minerals  1 tablet Oral Daily     psyllium  3 capsule Oral Daily     QUEtiapine  25 mg Oral At Bedtime     senna-docusate  1 tablet Oral BID     sennosides  1 tablet Oral BID     sodium chloride (PF)  3 mL Intracatheter Q8H     spironolactone  25 mg Oral Daily     acetaminophen, artificial tears, melatonin, ondansetron **OR** ondansetron, polyethylene glycol, QUEtiapine, sodium chloride (PF)         Data:      All new lab and imaging data was reviewed.

## 2022-09-29 NOTE — PLAN OF CARE
Summary: L hand pain, decreased motion, and increased generalized weakness; Suspected Lewy Body Dementia  DATE & TIME:09/29/2022; AM shift  Cognitive Concerns/ Orientation : A&O x 2-3, disoriented to situation, time and place; confusion fluctuates, sometimes more clear than others. Slow to respond; flat affect  BEHAVIOR & AGGRESSION TOOL COLOR: Green             ABNL VS/O2: VSS on RA  MOBILITY: Total, lift for transfers. T/R q2h when in bed. Up to the chair x 2 this shift.   PAIN MANAGMENT: reports lower back discomfort this morning; repositioned for comfort, declined tylenol.   DIET: Reg, needs full assist with eating - takes meds well with applesauce  BOWEL/BLADDER: Incontinent of bowel - Chronic keating with chronic hematuria  ABNL LAB/BG: NA  DRAIN/DEVICES: R-PIV SL. Keating in place.   SKIN: Wounds to L great toe, bandage CDI. Mepilex in place to coccyx - CDI.   D/C DATE: discharge pending palliative consult and recommendations. Per SW note, pt has been [facility], SW will update facility on the meeting with palliative today.  OTHER IMPORTANT INFO: family visiting today.

## 2022-09-29 NOTE — PROGRESS NOTES
"[facility]  Palliative Care Daily Progress Note       Recommendations & Counseling       William and family are motivated to stay on a restorative pathway. William hopes and plans to get better and stronger     William and family are encouraged by the progress he has made in recent weeks (upper extremity strength) and hope that time and their anabel continue to guide the way for his recovery     Expressed worry for William's multiple lengthy hospitalizations since July, and that this may be the strongest that William feels moving forward. His underlying chronic conditions put him at risk for future health setbacks/recurrent hospitalizations, which could erase what slow progress has been made     Impressed importance of optimizing his nutrition status to help with healing and strength moving forward     Ultimately the conversation was rather guarded, as I do not feel that William and family are fully ready to explore the \"what ifs,\" should he not get stronger from here    Code status not reviewed today     After the meeting concluded, I learned that patient will be discharging to TCU this evening. For future inpatient stays, he may benefit from added support from palliative  and palliative LICSW       Case was reviewed with unit HERNANDEZ Middleton.    OTONIEL Jones CNP  [facility]  Contact information available via [facility] Paging/Directory      Thank you for the opportunity to participate in the care of this patient and family. Our team: does not plan on following further, however do not hesitate to call or re-consult if we can be of further assistance to the patient/family.     During regular M-F work hours ([id number]) -- if you are not sure who specifically to contact -- please contact us in [facility] Smart Web.     After regular work hours and on weekends/holidays, you can call our answering service at [phone].     Attestation:  Total time on the floor involved in the " patient's care: 60 minutes  Total time spent in counseling/care coordination: >50% spent in counseling goals of care, pt and family support in setting of Lewy body dementia with psychosis, EPS, complicated UTI, pressure injury, severe malnutrition, failure to thrive, HFrEF, a.fib, hematuria      Assessments          William Almazan is a 81 year old male with PMH significant for dementia, HTN, CHF (HFrEF with EF 15-20%), CAD with hx CABG 1992, paroxysmal a.fib s/p PPM on chronic anticoagulation, gout, urinary retention with chronic indwelling keating catheter, recent covid infection now recovered, hx prostate cancer s/p XRT complicated by radiation cystitis, and recent hospitalization at [facility] (8/2-8/24/2022) for failure to thrive, hypotension, TONE, and psychosis who transferred from TCU 8/30 with left hand pain, decreased motion and increasing generalized weakness. His protracted hospital course has been complicated by suspected Lewy body dementia with psychosis and rigidity with motor symptoms/extrapyramidal side effects related to antipsychotics; Psych and Neuro involved in medication adjustment, complicated UTI s/p abx treatment, pressure injury, severe malnutrition, hematuria, and overall failure to thrive.     Today, the patient was seen for:  Goals of care, pt and family support in setting of Lewy body dementia with psychosis, EPS, complicated UTI, pressure injury, severe malnutrition, failure to thrive, HFrEF, a.fib, hematuria     Visited with William, along with wife Ilsa and daughter Renuka. Introduced myself and our services; assistance in pain and symptom management, emotional and spiritual support, and complex medical decision making.    William is alert, fatigued appearing, soft vocal quality. He and family understand he came to the hospital out of concern for a stroke. We note that medication adjustments were needed to improve his cognition. They overall see an improvement in his upper extremity movement.  "They are encouraged by this slow progress and wanting to see what more time can bring for his recovery.      We do acknowledge his multiple hospital stays since June of this year. He has not been home since early this summer. Prior to these hospitalizations, he was independent with a cane (able to dress, bathe, cook, clean, toilet for BM, go out to the movies/rides).     We note the significant change in his condition since that point. He is now completely dependent on nursing care for all ADLs. He is incontinent of stool. He is a lift to the chair. He is total feed.     I express worry for his lengthy hospitalizations and the dramatic change in his function/indepenendence/strength. Despite his best efforts to get stronger/better, I express worry that this may be the strongest that he feels moving forward. Daughter states that only \"God knows\" what will happen from here. We do note that level of uncertainty to be true.     We acknowledge his underlying health issues, including his heart failure which can affect his fluid balance status/respiratory/renal systems, his a.fib with anticoagulation causing blood in the urine (although Hgb is stable), his pressure injuries, potential for infection with indwelling keating and nearly bed bound status (PNA), and his severe malnutrition.     I did share that his underlying chronic conditions put him at risk for future health setbacks/recurrent hospitalizations, which could erase what slow progress has been made to date.     It is very evident throughout the discussion that getting stronger is William and family's only goal. There was little room to explore the \"what ifs.\"    I impressed upon William and family the importance of optimizing his nutrition status to help with healing and strength moving forward.     Prognosis, Goals, or Advance Care Planning was addressed today with: Yes.  Mood, coping, and/or meaning in the context of serious illness were addressed today: " Yes.  Summary/Comments: Strong family and friend support. They rely on their [place] anabel for support.             Interval History:     Chart review/discussion with unit or clinical team members:   Uneventful night, remains total care.     Per patient or family/caregivers today:  Feeling ok today, wanting to get stronger and feel better. Motivated to eat, get in the chair.     Key Palliative Symptoms:  We are not helping to manage these symptoms currently in this patient.    Patient is on opioids: assessed and bowels ok/no needed changes to plan of care today.           Review of Systems:     Besides above, an additional N/A system ROS was reviewed and is unremarkable          Medications:     I have reviewed this patient's medication profile and medications during this hospitalization.    Noted meds:    Allopurinol   Eliquis   Sinemet   Aricept   Lexapro   Lasix 40mg PO daily  Gabapentin 100mg TID   Seroquel 25mg PO at bedtime   Senna 1 tab PO BID   Spironolactone 25mg PO daily   Seroquel 12.5mg PO BID PRN agitation, anxiety, hallucinations            Physical Exam:   Temp: 97.3  F (36.3  C) Temp src: Axillary BP: 128/75 Pulse: 63   Resp: 16 SpO2: 97 % O2 Device: None (Room air)    CONSTITUTIONAL: Chronically ill cachectic man seen resting in bed in NAD, alert, conversant, soft vocal quality. He is pleasant, calm and cooperative. Family present   RESPIRATORY: NL respiratory effort on RA           Data Reviewed:     Recent imaging reviewed, my comments on pertinents:   Results for orders placed or performed during the hospital encounter of 08/30/22   CT Head w/o Contrast    Impression    IMPRESSION:  1.  No acute intracranial process.   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 Subchondral   cystic change distal radius with small loose body along the radial margin of the radioscaphoid joint. No acute left wrist fracture or dislocation identified. Volar left wrist soft tissue swelling. Slight widening of the distal radioulnar joint.    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 No acute displaced left hand fracture or dislocation is identified. Flexion at the index through small finger MCP joints.   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 Subchondral   cystic change distal radius with small loose body along the radial margin of the radioscaphoid joint. No acute left wrist fracture or dislocation identified. Volar left wrist soft tissue swelling. Slight widening of the distal radioulnar joint.    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 No acute displaced left hand fracture or dislocation is identified. Flexion at the index through small finger MCP joints.       Recent lab data reviewed, my comments on pertinents:   Creat 0.49  Hgb 12.4  Albumin 2.9

## 2022-09-29 NOTE — PROGRESS NOTES
Care Management Follow Up    Length of Stay (days): 28    Expected Discharge Date: 09/30/2022     Concerns to be Addressed:       Patient plan of care discussed at interdisciplinary rounds: Yes    Anticipated Discharge Disposition: Skilled Nursing Facility, Transitional Care     Anticipated Discharge Services: None  Anticipated Discharge DME: None    Patient/family educated on Medicare website which has current facility and service quality ratings: other (see comments) (has bed hold.)  Education Provided on the Discharge Plan:    Patient/Family in Agreement with the Plan: yes    Referrals Placed by CM/SW:    Private pay costs discussed: transportation costs    Additional Information:  Patient has been accepted at [facility] if his care plan remains restorative as the facility is accepting patient into their TCU.  Their admission director explains the patient's grandson is a [employer] employee.  Their admission director is aware of the Palliative Care Meeting at 1300 and will want to be updated regarding the outcome.    Update at 1500  Writer met with spouse, patient and daughter after Palliative Meeting.  Shared patient has been accepted by [facility].   Reviewed gee works for [employer] at their [employer]. Explained the facility can accept patient today with transportation being available at 1700.  Wife and daughter explain they will not agree to this discharge plan prior to taking a tour of the facility.  This is due to patient's experience at another TCU which they describe as tramatic for patient.  They explain his previous discharge was rushed like wife feels writer is rushing this discharge.  Above conversation reviewed with Dr Bender. She will discharge patient tomorrow.    The following plan has been agreed upon by family. They will tour the facility today and call writer regarding their decision.  If they accept the facility patient will  discharge tomorrow.   Writer cancelled transport for today and the first available BLS stretcher tomorrow  is at 1300.    Update 1530  Received a call from daughter and Lary, admissions director at [facility].  Daughter and wife would like to accept the private room at [facility] with discharge on Friday.  They are aware there is no extra cost for the private room. Daughter is aware BLS strecher is being recommended by nursing as the safest method of transport for patient, feeling that patient may not be safe sitting in a w/c given the body stiffness due to his Parkinsons. Writer explained to daughter  the potential cost of BLS if not covered by insurance.   Daughter accepting of this information.    Daughter and TCU aware of discharge time for Friday at 1300.  Paged Dr Yulia Burroughs, [facility]

## 2022-09-29 NOTE — PLAN OF CARE
Goal Outcome Evaluation:    Summary: L hand pain, decreased motion, and increased generalized weakness; Suspected Lewy Body Dementia  DATE & TIME:09/28-29/2022 [id number]  Cognitive Concerns/ Orientation : A&O x 3, disoriented to situation at times - confused/forgetful at times; slow to respond; flat affect  BEHAVIOR & AGGRESSION TOOL COLOR: Green   ABNL VS/O2: VSS on RA  MOBILITY: Total, T/R q2h,  PAIN MANAGMENT: Denies  DIET: Reg, needs full assist with eating - takes meds well with applesauce  BOWEL/BLADDER: Incontinent of bowel x1. Chronic keating with chronic hematuria  ABNL LAB/BG: NA  DRAIN/DEVICES: R-PIV SL. Keating in place.   SKIN: Wounds to L great toe, bandage CDI. Mepilex in place on excoriation on coccyx - CDI.  Paste to Right abdominal fold open site  D/C DATE: TCU placement pending;   OTHER IMPORTANT INFO:   CONSULTS: Respiratory, Urology, and PT following

## 2022-09-30 VITALS
SYSTOLIC BLOOD PRESSURE: 123 MMHG | WEIGHT: 162.92 LBS | HEIGHT: 69 IN | OXYGEN SATURATION: 94 % | BODY MASS INDEX: 24.13 KG/M2 | RESPIRATION RATE: 16 BRPM | HEART RATE: 74 BPM | TEMPERATURE: 97.6 F | DIASTOLIC BLOOD PRESSURE: 80 MMHG

## 2022-09-30 PROCEDURE — 250N000013 HC RX MED GY IP 250 OP 250 PS 637: Performed by: PSYCHIATRY & NEUROLOGY

## 2022-09-30 PROCEDURE — 250N000013 HC RX MED GY IP 250 OP 250 PS 637: Performed by: INTERNAL MEDICINE

## 2022-09-30 PROCEDURE — 250N000013 HC RX MED GY IP 250 OP 250 PS 637: Performed by: HOSPITALIST

## 2022-09-30 PROCEDURE — 99239 HOSP IP/OBS DSCHRG MGMT >30: CPT | Performed by: STUDENT IN AN ORGANIZED HEALTH CARE EDUCATION/TRAINING PROGRAM

## 2022-09-30 RX ADMIN — DONEPEZIL HYDROCHLORIDE 10 MG: 10 TABLET ORAL at 00:28

## 2022-09-30 RX ADMIN — APIXABAN 5 MG: 5 TABLET, FILM COATED ORAL at 09:02

## 2022-09-30 RX ADMIN — SENNOSIDES 1 TABLET: 8.6 TABLET, FILM COATED ORAL at 09:02

## 2022-09-30 RX ADMIN — SPIRONOLACTONE 25 MG: 25 TABLET ORAL at 09:02

## 2022-09-30 RX ADMIN — Medication 3 CAPSULE: at 09:02

## 2022-09-30 RX ADMIN — GABAPENTIN 100 MG: 100 CAPSULE ORAL at 09:02

## 2022-09-30 RX ADMIN — FUROSEMIDE 40 MG: 40 TABLET ORAL at 09:02

## 2022-09-30 RX ADMIN — ACETAMINOPHEN 650 MG: 325 TABLET ORAL at 09:01

## 2022-09-30 RX ADMIN — ESCITALOPRAM OXALATE 10 MG: 10 TABLET ORAL at 09:02

## 2022-09-30 RX ADMIN — MULTIPLE VITAMINS W/ MINERALS TAB 1 TABLET: TAB at 09:02

## 2022-09-30 RX ADMIN — SENNOSIDES AND DOCUSATE SODIUM 1 TABLET: 50; 8.6 TABLET ORAL at 09:01

## 2022-09-30 RX ADMIN — CARBIDOPA AND LEVODOPA 1 TABLET: 25; 100 TABLET ORAL at 09:01

## 2022-09-30 RX ADMIN — GABAPENTIN 100 MG: 100 CAPSULE ORAL at 13:03

## 2022-09-30 RX ADMIN — CARVEDILOL 12.5 MG: 12.5 TABLET, FILM COATED ORAL at 09:02

## 2022-09-30 RX ADMIN — CARBIDOPA AND LEVODOPA 1 TABLET: 25; 100 TABLET ORAL at 13:03

## 2022-09-30 RX ADMIN — LORATADINE 10 MG: 10 TABLET ORAL at 09:02

## 2022-09-30 RX ADMIN — ALLOPURINOL 300 MG: 300 TABLET ORAL at 09:02

## 2022-09-30 RX ADMIN — QUETIAPINE FUMARATE 25 MG: 25 TABLET ORAL at 00:27

## 2022-09-30 ASSESSMENT — ACTIVITIES OF DAILY LIVING (ADL)
ADLS_ACUITY_SCORE: 65
ADLS_ACUITY_SCORE: 63
ADLS_ACUITY_SCORE: 65
ADLS_ACUITY_SCORE: 67
ADLS_ACUITY_SCORE: 67

## 2022-09-30 NOTE — PLAN OF CARE
Summary: L hand pain, decreased motion, and increased generalized weakness; Suspected Lewy Body Dementia  DATE & TIME: 09/29/2022 Evenings   Cognitive Concerns/ Orientation : A&Ox1-2. Slow to respond and soft spoken. Follows directions and able to make most needs known. Refusing some cares and medications this evening. Calls as needed.   BEHAVIOR & AGGRESSION TOOL COLOR: Green             ABNL VS/O2: VSS, on RA.   MOBILITY: Total, lift for transfers. Turn q2h when in bed. Up to the chair x 1 this evening. Rigid and stiff ROM. Fists clenched. Refused any interventions and pulled away when assessing.   PAIN MANAGMENT: Denied   DIET: Reg, needs full assist with eating - takes meds well with applesauce. Poor appetite this evening. Fluids offered freq, pt refusing freq,   BOWEL/BLADDER: Incontinent of bowel, 1 large loose/soft BM this shift - Chronic keating with chronic hematuria. Cares performed.   ABNL LAB/BG: NA  DRAIN/DEVICES: R-PIV SL. Keating in place.   SKIN: Wounds to L great toe, bandage CDI. Mepilex in place to coccyx - CDI. Lips dry. Refused cares.   D/C DATE: Discharge pending palliative consult and recommendations. Per HERNANDEZ note, pt has been accepted to [facility], HERNANDEZ will update facility on the meeting with palliative today.  OTHER IMPORTANT INFO: Bed alarm on for safety. Rounded on freq. Encouraged turns and educated on medications. Time given for pt to respond. Offer fluids and oral cares freq.

## 2022-09-30 NOTE — PLAN OF CARE
DATE & TIME: 9/29/22, 5860 - 4331    Cognitive Concerns/ Orientation : A&O x 2, disoriented to time and situation. Forgetful. Slow to respond and soft spoken  BEHAVIOR & AGGRESSION TOOL COLOR: Green   ABNL VS/O2: VSS on room air  MOBILITY: Total. Up with lift. Turned and repositioned, assist x 2  PAIN MANAGMENT: Denied  DIET: Regular. Needs full assistance with eating. Takes medications with applesauce. Oral fluids encouraged  BOWEL/BLADDER: Chronic keating in place and patent  ABNL LAB/BG: NA  DRAIN/DEVICES: PIV SL, keating insitu  TELEMETRY RHYTHM: NA  SKIN: Wounds to left great toe. Bandage CDI. Mepilex in place to coccyx, CDI.   TESTS/PROCEDURES: NA  D/C DATE: Discharge planned for today, 9/30/22 to [facility] at 1300    OTHER IMPORTANT INFO: SW following    Goal Outcome Evaluation:    Plan of Care Reviewed With: patient     Overall Patient Progress: improving

## 2022-09-30 NOTE — PROGRESS NOTES
SPIRITUAL HEALTH SERVICES  SPIRITUAL ASSESSMENT Progress Note (Palliative Focus)  [facility] 65    REFERRAL SOURCE: Palliative/family request for spiritual support    On this visit, William asked for prayer, and we prayed together. Offered words of comfort and encouragement.    PLAN: Spiritual Health remains available per need/request.    Flor Moore  Associate   Phone: [phone]

## 2022-09-30 NOTE — PLAN OF CARE
Physical Therapy Discharge Summary    Reason for therapy discharge:    Discharged to transitional care facility.    Progress towards therapy goal(s). See goals on Care Plan in [facility] electronic health record for goal details.  Goals not met.  Barriers to achieving goals:   discharge from facility.    Therapy recommendation(s):    Continued therapy is recommended.  Rationale/Recommendations:  To progress independence and safety with functional mobility.

## 2022-09-30 NOTE — PLAN OF CARE
Discharge    Patient discharged to TCU with [facility] transportation, via stretcher.  Care plan note  Summary: L hand pain, decreased motion, and increased generalized weakness; Suspected Lewy Body Dementia  DATE & TIME:09/30/2022; AM shift  Cognitive Concerns/ Orientation : A&O x 2-3, disoriented to situation, time and place; confusion fluctuates, sometimes more clear than others. Slow to respond; flat affect  BEHAVIOR & AGGRESSION TOOL COLOR: Green             ABNL VS/O2: VSS on RA  MOBILITY: Total, lift for transfers. T/R q2h when in bed. Up to the chair x 2 this shift.   PAIN MANAGMENT: reports lower back discomfort this morning; repositioned for comfort, tylenol x 1 given  DIET: Reg, needs full assist with eating - takes meds well with applesauce.   BOWEL/BLADDER: Incontinent of bowel - Chronic keating with chronic hematuria  ABNL LAB/BG: NA  DRAIN/DEVICES: R-PIV SL. Keating in place.   SKIN: Wounds to L great toe, bandage CDI. Mepilex in place to coccyx - CDI.   D/C DATE: discharged to [facility]  OTHER IMPORTANT INFO: family visiting today.     Listed belongings gathered and given to patient (including from security/pharmacy). Yes  Care Plan and Patient education resolved: Yes  Prescriptions if needed, hard copies sent with patient  NA  Medication Bin checked and emptied on discharge Yes  SW/care coordinator/charge RN aware of discharge: Yes

## 2022-09-30 NOTE — PROGRESS NOTES
Care Management Discharge Note    Discharge Date: 09/30/2022       Discharge Disposition:  ([facility])    Discharge Services: None    Discharge DME: None    Discharge Transportation:  ([facility] at 1300 on 9/30)    Private pay costs discussed: transportation costs    PAS Confirmation Code:  (previous PAS is valid  # [id number])  Patient/family educated on Medicare website which has current facility and service quality ratings:yes    Education Provided on the Discharge Plan:  yes  Persons Notified of Discharge Plans: patient, wife and daughter  Patient/Family in Agreement with the Plan: yes    Handoff Referral Completed: Yes    Additional Information   Daughter is aware BLS strecher is being recommended by nursing as the safest method of transport for patient, feeling that patient may not be safe sitting in a w/c given the body stiffness due to his Parkinsons. Writer explained to daughter  the potential cost of BLS if not covered by insurance.   Daughter accepting of this information.    Orders have been faxed to [facility] this morning.  PCS and face sheet faxed to [facility].   Daughter was informed of transport time and she will communicate with patient's wife.      Gunjan Burroughs, JORDISW

## 2022-09-30 NOTE — DISCHARGE SUMMARY
[facility]  Hospitalist Discharge Summary      Date of Admission:  8/30/2022  Date of Discharge:  9/30/2022  Discharging Provider: Clarissa Bender DO  Discharge Service: Hospitalist Service    Discharge Diagnoses   See below    Follow-ups Needed After Discharge   Follow-up Appointments     Follow Up and recommended labs and tests      Follow-up with Nursing home physician.  The following labs/tests are   recommended: CBC, BMP.  Follow-up with primary care provider after TCU discharge.  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  Follow-up with primary Urologist through [facility] in 1-2 weeks.  Follow-up with [facility], Dr. Huerta, in 1 month.             Discharge Disposition   Discharged to home  Condition at discharge: Stable      Hospital Course   William Almazan is an 81-year-old male with dementia, HTN, CHF (HFrEF), CAD, PAF, gout, urinary retention with chronic indwelling Louie catheter, h/o prostate cancer; with recent hospitalization at [facility] (8/2-8/24/2022) for issues including failure to thrive with hypotension and TONE, psychosis. Transferred from TCU 8/30/2022 with left hand pain, decreased motion and increasing generalized weakness. Shortly after admission it was suspected that patient having dystonic side effects from new antipsychotics medications. After full work up with neurology Lewy body dementia suspected as underlying dx. Medication changes were made and patient will have close follow up with neurology on discharge. Overall patient made small improvements in his weakness and contractures and will discharge to TCU to continue ongoing rehab efforts.    Detailed problems below     Suspected Lewy body dementia dx this admission  Psychosis, suspect related to above.   Rigidity with motor symptoms/extrapyramidal medication side effects    Depression/anxiety.  Physical deconditioning from senile fragility, medical illness.  * Recent hospitalization at [facility] 8/2 - 8/24/ 2022 with presyncope, hypotension, dementia with psychosis. PTA donepezil, escitalopram, risperidone and gabapentin.  --Admitted with generalized weakness and progressive inability to ambulate accompanied by extremity rigidity. Wife reported worsening symptoms after recent hospitalization and start of antipsychotic medications to help treat underlying psychosis. However, since starting, the patient exhibited worsening rigidity concerning for parkinsonism.   --On admission afebrile, hemodynamically stable, WBC normal, hgb 10.5; BMP unremarkable; LFT's showed low albumin and protein, otherwise normal.  Head CT 8/30 negative.   * Unable to obtain an MRI due to PPM/abandoned RV lead.  * Neurology followed, overall there was concern for underlying Lewy body dementia, with dystonia that may have been worsened by starting antipsychotics.  * Psychiatry stopped PTA risperidone and started aripiprazole 8/31.  Carbidopa-levodopa started 9/1.   - Continue carbidopa-levodopa  mg TID.  - Continue donepezil 10 mg at bedtime; quetiapine 25 mg at bedtime; PRN quetiapine 12.5 mg BID.  - Continue escitalopram 10 mg daily, gabapentin 100 mg TID.    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   Orthopedics recommend WBAT and elevation recommended; PRN left wrist splint ordered.  Elevated left upper extremity when at rest.  Continue PRN left wrist splint.  Continue PRN acetaminophen.  Outpatient follow-up with hand surgeon.      CAD with h/o CABG (1992).  CHF (HFrEF, biventricular).  Hypertension (benign  essential).  [PTA: carvedilol 12.5 mg BID; furosemide 40 mg daily; spironolactone 25 mg daily.]  * Echo 8/3/2022 showed LVEF 15-20%, grade 1 diastolic dysfunction, severe diffuse hypokinesis; RV global function mildly reduced.  -Continue carvedilol; furosemide; spironolactone.  On Eliquis as below for anticoagulation.      Paroxysmal atrial fibrillation.  S/p PPM.  - Continue apixaban, carvedilol.     Complicated urinary tract infection.  Chronic indwelling Louie catheter due to urinary retention.   Chronic hematuria.  Prostate cancer s/p XRT > 10 years ago with known radiation cystitis  Louie catheter changed q monthly  Intermittent hematuria, urology has been consulted evaluated multiple times, per urologyGiven his known radiation cystitis, occasional blood in the urine is expected. If urine becomes cherry or maroon in color with clots or if the catheter stops draining, would consider bladder irrigation.    UA 9/14 with moderate leukocyte esterase, greater than 182 WBCs  Urine cultures 9/14 less than 10,000 urogenital qasim.  Urology recommended to treat complicated UTI given indwelling given 3 days of rocephin and then stopped 09/18     - Follow-up with primary urologist at [facility] after discharge.  -  Monitor for recurrent infection and output for signs of obstruction     Iron deficiency anemia  Recently completed a course of IV iron sucrose at [facility] 8/2022.  Hemoglobin stable around 10-11.     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  Continue local wound cares per [facility] nurse rec's     Malnutrition related to acute and chronic medical issues.  Nutrition followed, continue dietary supplements.     Gout.  Continue allopurinol.     KRISTIAN.  Does not use CPAP. Follow-up outpatient.     Recent COVID-19 infection, recovered.  Had minimal symptoms (positive on 7/30) considered COVID recovered.    Consultations This Hospital Stay   CARE MANAGEMENT / SOCIAL WORK IP  CONSULT  PSYCHIATRY IP CONSULT  ORTHOPEDIC SURGERY IP CONSULT  WOUND OSTOMY CONTINENCE NURSE  IP CONSULT  NUTRITION SERVICES ADULT IP CONSULT  OCCUPATIONAL THERAPY ADULT IP CONSULT  PHYSICAL THERAPY ADULT IP CONSULT  ORTHOSIS EXTREMITY UPPER REFERRAL IP CONSULT  NEUROLOGY IP CONSULT  PSYCHIATRY IP CONSULT  PHARMACY LIAISON FOR MEDICATION COVERAGE CONSULT  PSYCHIATRY IP CONSULT  CARE MANAGEMENT / SOCIAL WORK IP CONSULT  PSYCHIATRY IP CONSULT  SPIRITUAL HEALTH SERVICES IP CONSULT  RESPIRATORY CARE IP CONSULT  PHYSICAL THERAPY ADULT IP CONSULT  OCCUPATIONAL THERAPY ADULT IP CONSULT  UROLOGY IP CONSULT  CARE MANAGEMENT / SOCIAL WORK IP CONSULT  UROLOGY IP CONSULT  PHYSICAL THERAPY ADULT IP CONSULT  PALLIATIVE CARE ADULT IP CONSULT    Code Status   Full Code    Time Spent on this Encounter   IClarissa DO, personally saw the patient today and spent greater than 30 minutes discharging this patient.       Clarissa Bender DO  [place] [address] [facility]  [address] [place] [address]  Phone: [phone]  ______________________________________________________________________    Physical Exam   Vital Signs: Temp: 97.6  F (36.4  C) Temp src: Axillary BP: 123/80 Pulse: 74   Resp: 16 SpO2: 94 % O2 Device: None (Room air)    Weight: 162 lbs 14.72 oz     GENERAL: Patient awake, frail-appearing.  Able to answer simple questions.  LUNGS:Respirations unlabored, CTAB  Cardiac: no murmurs  ABDOMEN: Soft, no abdominal tenderness, bowel sounds heard   NEURO: able to move arms and legs but with limitations and tremors, contractures of hands, did not assess gait  EXTREMITIES: No pedal edema.  PSYCHIATRY Cooperative       Primary Care Physician   Victoriano Inveen    Discharge Orders      General info for SNF    Length of Stay Estimate: Short Term Care: Estimated # of Days <30  Condition at Discharge: Improving  Level of care:skilled   Rehabilitation Potential: Good  Admission H&P remains valid and  up-to-date: Yes  Recent Chemotherapy: N/A  Use Nursing Home Standing Orders: Yes     Mantoux instructions    Give two-step Mantoux (PPD) Per Facility Policy Yes     Activity - Up with nursing assistance     Follow Up and recommended labs and tests    Follow-up with Nursing home physician.  The following labs/tests are recommended: CBC, BMP.  Follow-up with primary care provider after TCU discharge.  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  Follow-up with primary Urologist through [facility] in 1-2 weeks.  Follow-up with [facility], Dr. Huerta, in 1 month.     Reason for your hospital stay    1. Suspected Lewy body dementia.  2. 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   3. Rigidity with motor symptoms/extrapyramidal side effects suspect related to above with worsening by anti-psychotics.  4. 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  5. Weakness and physical deconditioning due to multiple acute and chronic medical issues.  6. Malnutrition related to acute and chronic medical issues.  7. 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     Monitor and record    Daily weights. Notify MD if weight increases by >=3 lbs/day or >/= 5 lbs/week.     Louie catheter    To straight gravity drainage. Change catheter every 2 weeks and PRN for leaking or decreased urine output with signs of bladder distention. DO NOT change catheter without a specific Provider order IF diagnosis of benign prostatic hypertrophy (BPH), neurogenic bladder, or other urological conditions     Physical Therapy Adult Consult    Evaluate and treat as clinically indicated.    Reason:  weakness and deconditioning     Occupational Therapy Adult Consult    Evaluate and treat as clinically indicated.    Reason:  weakness and  deconditioning     Advance Diet as Tolerated    Follow this diet upon discharge: Orders Placed This Encounter      Snacks/Supplements Pediatric: Ensure Enlive; With Meals      Regular Diet Adult         Significant Results and Procedures   Most Recent 3 CBC's:Recent Labs   Lab Test 09/26/22  1834 09/26/22  0648 09/25/22  1758 09/24/22  0735 09/14/22  1056 09/14/22  0643 09/11/22  0611   WBC  --   --   --  4.2  --  5.1 4.9   HGB 12.4* 11.5* 12.1* 11.9*   < > 11.8* 10.9*   MCV  --   --   --  90  --  87 91   PLT  --   --   --  242  --  284 268    < > = values in this interval not displayed.     Most Recent 3 BMP's:Recent Labs   Lab Test 09/21/22  0836 09/14/22  1056 09/11/22  0611    140 137   POTASSIUM 4.3 4.4 4.4   CHLORIDE 100 103 100   CO2 31 34* 33*   BUN 21 29 20   CR 0.49* 0.58* 0.56*   ANIONGAP 5 3 4   CRYS 9.3 9.7 9.2   GLC 96 137* 107*     Most Recent 2 LFT's:Recent Labs   Lab Test 09/14/22  1056 08/30/22  1950   AST 29 12   ALT 14 18   ALKPHOS 87 91   BILITOTAL 0.6 0.3   ,   Results for orders placed or performed during the hospital encounter of 08/30/22   CT Head w/o Contrast    Narrative    EXAM: CT HEAD W/O CONTRAST  LOCATION: [facility]  DATE/TIME: 8/30/2022 8:38 PM    INDICATION: left hand and bilateral leg weakness, anticoagulated, dementia  COMPARISON: 07/02/2022  TECHNIQUE: Routine CT Head without IV contrast. Multiplanar reformats. Dose reduction techniques were used.    FINDINGS:  INTRACRANIAL CONTENTS: No intracranial hemorrhage, extraaxial collection, or mass effect.  No CT evidence of acute infarct. Moderate presumed chronic small vessel ischemic changes. Moderate generalized volume loss. No hydrocephalus.     VISUALIZED ORBITS/SINUSES/MASTOIDS: No intraorbital abnormality. No paranasal sinus mucosal disease. No middle ear or mastoid effusion.    BONES/SOFT TISSUES: No acute abnormality.      Impression    IMPRESSION:  1.  No acute intracranial process.   XR Hand  Left G/E 3 Views    Narrative    EXAM: XR WRIST LEFT G/E 3 VIEWS, XR HAND LEFT G/E 3 VIEWS  LOCATION: [facility]  DATE/TIME: 8/30/2022 8:31 PM    INDICATION: Left wrist pain.  COMPARISON: None available.      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 Subchondral   cystic change distal radius with small loose body along the radial margin of the radioscaphoid joint. No acute left wrist fracture or dislocation identified. Volar left wrist soft tissue swelling. Slight widening of the distal radioulnar joint.    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 No acute displaced left hand fracture or dislocation is identified. Flexion at the index through small finger MCP joints.   XR Wrist Left G/E 3 Views    Narrative    EXAM: XR WRIST LEFT G/E 3 VIEWS, XR HAND LEFT G/E 3 VIEWS  LOCATION: [facility]  DATE/TIME: 8/30/2022 8:31 PM    INDICATION: Left wrist pain.  COMPARISON: None available.      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 Subchondral   cystic change distal radius with small loose body along the radial margin of the radioscaphoid joint. No acute left wrist fracture or dislocation identified. Volar left wrist soft tissue swelling. Slight widening of the distal radioulnar joint.    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 No acute displaced left hand fracture or dislocation is identified. Flexion at the index through small finger MCP joints.       Discharge Medications   Current Discharge Medication List       START taking these medications    Details   carbidopa-levodopa (SINEMET)  MG tablet Take 1 tablet by mouth 3 times daily    Associated Diagnoses: Lewy body dementia with behavioral disturbance (H)      multivitamin w/minerals (THERA-VIT-M) tablet Take 1 tablet by mouth daily    Associated Diagnoses: Nutritional deficiency      ondansetron (ZOFRAN ODT) 4 MG ODT tab Take 1 tablet (4 mg) by mouth every 6 hours as needed for nausea or vomiting    Associated Diagnoses: Nausea      !! QUEtiapine (SEROQUEL) 25 MG tablet Take 1 tablet (25 mg) by mouth At Bedtime    Associated Diagnoses: Dementia with behavioral disturbance, unspecified dementia type      !! QUEtiapine (SEROQUEL) 25 MG tablet Take 0.5 tablets (12.5 mg) by mouth 2 times daily as needed (Or anxiety or reported hallucinations)    Associated Diagnoses: Dementia with behavioral disturbance, unspecified dementia type       !! - Potential duplicate medications found. Please discuss with provider.      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         CONTINUE these medications which have NOT CHANGED    Details   acetaminophen (TYLENOL) 325 MG tablet Take 2 tablets (650 mg) by mouth every 4 hours as needed for mild pain  Qty: 30 tablet, Refills: 3    Associated Diagnoses: Chronic pain of both knees      allopurinol (ZYLOPRIM) 300 MG tablet Take 1 tablet by mouth daily      amoxicillin (AMOXIL) 500 MG capsule Take 1 capsule by mouth as needed PRN for dental procedures      apixaban ANTICOAGULANT (ELIQUIS) 5 MG tablet Take 5 mg by mouth 2 times daily      carvedilol (COREG) 12.5 MG tablet Take 1 tablet (12.5 mg) by mouth 2 times daily    Associated Diagnoses: Chronic systolic heart failure (H)      cholecalciferol 25 MCG (1000 UT) TABS Take 1,000 Units by mouth daily       cyanocobalamin (VITAMIN B-12) 1000 MCG tablet Take 1,000 mcg by mouth daily      diclofenac (VOLTAREN) 1 % topical gel Apply 2 g topically 4 times daily  Qty: 350 g, Refills: 0    Associated Diagnoses: Chronic pain of both knees      donepezil (ARICEPT) 10 MG tablet Take 1 tablet (10 mg) by mouth At Bedtime  Qty: 90 tablet, Refills: 3    Associated Diagnoses: 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; Dementia with psychosis      furosemide (LASIX) 40 MG tablet Take 1 tablet (40 mg) by mouth daily    Associated Diagnoses: Chronic systolic heart failure (H)      gabapentin (NEURONTIN) 100 MG capsule Take 1 capsule (100 mg) by mouth 3 times daily  Qty: 90 capsule, Refills: 3    Associated Diagnoses: Other chronic pain      loratadine (CLARITIN) 10 MG tablet Take 1 tablet by mouth daily      polyethylene glycol (MIRALAX) 17 GM/Dose powder Take 17 g by mouth daily as needed for constipation  Qty: 510 g    Associated Diagnoses: Constipation, unspecified constipation type      psyllium (METAMUCIL/KONSYL) capsule Take 3 capsules by mouth daily  Qty: 270 capsule, Refills: 3    Comments: Pharmacy to dispense capsule size that is available.  Associated Diagnoses: Constipation, unspecified constipation type      spironolactone (ALDACTONE) 25 MG tablet Take 1 tablet by mouth daily         STOP taking these medications       risperiDONE (RISPERDAL) 0.25 MG tablet Comments:   Reason for Stopping:         risperiDONE (RISPERDAL) 0.25 MG tablet Comments:   Reason for Stopping:         sennosides (SENOKOT) 8.6 MG tablet Comments:   Reason for Stopping:             Allergies   No Known Allergies

## 2022-10-01 NOTE — PLAN OF CARE
Occupational Therapy Discharge Summary    Reason for therapy discharge:    Discharged to transitional care facility.    Progress towards therapy goal(s). See goals on Care Plan in [facility] electronic health record for goal details.  Goals not met.  Barriers to achieving goals:   discharge from facility.    Therapy recommendation(s):    Continued therapy is recommended.  Rationale/Recommendations: Pt will benefit from continued OT to increase ADL and functional mobility.

## 2022-10-03 ENCOUNTER — PATIENT OUTREACH (OUTPATIENT)
Dept: CARE COORDINATION | Facility: CLINIC | Age: 82
End: 2022-10-03

## 2022-10-03 NOTE — PROGRESS NOTES
[facility]    Background: Transitional Care Management program auto-identified and prompting a chart review by [facility] Resource [place] team.    Assessment: Upon chart review, CCR Team member will cancel/close this episode of Transitional Care Management program due to reason below:    Patient is not established within [facility] Primary Care and CCR team member noted patient discharged to TCU/ARU/LTACH.    Plan: Transitional Care Management episode closed per reason above.      Ilda Josue RN  [facility], [facility]    *Connected Care Resource Team does NOT follow patient ongoing. Referrals are identified based on internal discharge reports and the outreach is to ensure patient has an understanding of their discharge instructions.

## 2022-10-04 PROBLEM — F03.918 DEMENTIA WITH BEHAVIORAL DISTURBANCE (H): Status: ACTIVE | Noted: 2022-06-28

## 2022-10-31 NOTE — TELEPHONE ENCOUNTER
S:  Dr Adson Called @ 9:49am from [place] [facility] with 81y/M for IPMH r/t AH as a result of Dementia.    B:   Dr Adson called asking pt to be placed on Adult Worklist for IPMH.  Pt is reportedly having  AH related to his dementia.  Pt hearing voices telling him to throw himself on the floor.       Dec had attempted to see Pt, but did not complete assessment as they were under the understanding that pt was going to Medical.  Dr Leyva reports pt needs IPMH.  Patient and family report a decline in patient's overall cognitive functioning over the past 1-2 years, with the past several weeks to month being extremely difficult due to patient experiencing command hallucinations in the form of destruction of technology/cords, as well as physically throwing himself on the floor to be compliant with commands.   Epic chart also indicates potential dementia as contributing factor.   Pt reports he does not feel safe.     Pt was started on risperdal in ED by Dr Leyva.      Per medical record - Pt is an  81 year old male with dementia, HFrEF, CAD s/p CABG, PAF, HTN, DM2, KRISTIAN, and prostate cancer who was brought to ED by family for evaluation of worsening auditory hallucinations. Patient notes hallucinations since 2020 when his son passed away. He notes increased frequency of hallucinations more recently. Some of these are command hallucinations telling him to harm himself. He denies worsening depression or suicidal ideation.     A:  Vol    R:  Patient cleared and ready for behavioral bed placement: Yes    Pt added to adult worklist for IP MH.     room air

## 2024-06-23 NOTE — PLAN OF CARE
06/23/24 8:32 AM     Chart reviewed for Mammogram ; nothing is submitted to the patient's insurance at this time.     Kathy Guerrero MA   [facility]   Patient is alert and oriented per baseline. VSS. Denies pain at this time. Assist of x1 with GB and walker. Patient still reporting auditory hallucinations, but denies SI. Louie is in place, patent. Family visited. Patient was sitting up on chair before dinner time. Currently in bed. Will continue with current POC.